# Patient Record
Sex: FEMALE | Race: WHITE | NOT HISPANIC OR LATINO | Employment: OTHER | RURAL
[De-identification: names, ages, dates, MRNs, and addresses within clinical notes are randomized per-mention and may not be internally consistent; named-entity substitution may affect disease eponyms.]

---

## 2017-01-25 RX ORDER — LEVOTHYROXINE SODIUM 0.1 MG/1
TABLET ORAL
Qty: 90 TABLET | Refills: 1 | Status: SHIPPED | OUTPATIENT
Start: 2017-01-25 | End: 2017-08-01 | Stop reason: SDUPTHER

## 2017-01-25 RX ORDER — ATORVASTATIN CALCIUM 20 MG/1
TABLET, FILM COATED ORAL
Qty: 90 TABLET | Refills: 0 | Status: SHIPPED | OUTPATIENT
Start: 2017-01-25 | End: 2017-04-24 | Stop reason: SDUPTHER

## 2017-02-01 RX ORDER — ISOSORBIDE MONONITRATE 30 MG/1
TABLET, EXTENDED RELEASE ORAL
Qty: 90 TABLET | Refills: 0 | Status: SHIPPED | OUTPATIENT
Start: 2017-02-01 | End: 2017-05-05 | Stop reason: SDUPTHER

## 2017-02-08 RX ORDER — SYRINGE-NEEDLE,INSULIN,0.5 ML 31 GX5/16"
SYRINGE, EMPTY DISPOSABLE MISCELLANEOUS
Qty: 100 EACH | Refills: 2 | Status: SHIPPED | OUTPATIENT
Start: 2017-02-08 | End: 2017-10-04 | Stop reason: SDUPTHER

## 2017-02-16 ENCOUNTER — OFFICE VISIT (OUTPATIENT)
Dept: CARDIOLOGY | Facility: CLINIC | Age: 82
End: 2017-02-16

## 2017-02-16 VITALS
HEIGHT: 65 IN | HEART RATE: 70 BPM | WEIGHT: 225 LBS | DIASTOLIC BLOOD PRESSURE: 65 MMHG | BODY MASS INDEX: 37.49 KG/M2 | SYSTOLIC BLOOD PRESSURE: 116 MMHG

## 2017-02-16 DIAGNOSIS — I50.84 CHF (NYHA CLASS IV, ACC/AHA STAGE D) (HCC): ICD-10-CM

## 2017-02-16 DIAGNOSIS — I48.20 CHRONIC ATRIAL FIBRILLATION (HCC): Primary | ICD-10-CM

## 2017-02-16 DIAGNOSIS — I10 ESSENTIAL HYPERTENSION: ICD-10-CM

## 2017-02-16 PROCEDURE — 93289 INTERROG DEVICE EVAL HEART: CPT | Performed by: INTERNAL MEDICINE

## 2017-02-16 PROCEDURE — 99213 OFFICE O/P EST LOW 20 MIN: CPT | Performed by: INTERNAL MEDICINE

## 2017-02-16 NOTE — PROGRESS NOTES
Karla Cristobal  1935  [phone]      02/16/2017    [facility] CARDIOLOGY     Giselle Guzman, DO  [address] [address]    Chief Complaint   Patient presents with   • Atrial Fibrillation   • Cardiomyopathy       Problem List:   PROBLEM LIST:  1. Ischemic heart disease:  a. History of remote myocardial infarction/CABG x3, [facility] (database insufficient).   b. Left heart catheterization by Dr. Basilio Grossman, 2007, revealing patent LIMA graft/medical therapy, LVEF 30%.  c. Echocardiogram, [facility], 2007: LVEF 25%; moderate/severe mitral regurgitation.  d. Upgrade of pacemaker to [employer] Jamel BI-V pacemaker per Dr. Hammonds on 10/25/2007.  e. Reported negative Cardiolite stress test in 2010, [facility], Dr. Basilio Grossman (database insufficient).  f. Reported echocardiogram, October 2011, [facility] (database insufficient).   g. Echocardiogram, 08/29/2012: LVEF 35% to 40%, mild mitral regurgitation.  h. History of MRSA pacemaker infection, 2005 (database insufficient).   i. St. Jamel pacemaker, 2005, with upgrade to a [employer] Jamel BI-V ICD device in 2007, Dr. Hammonds.   j. CRT-D generator exchange by Tano Beal, 09/21/2012, St. Jamel device (Serial# [id number]).  k. Persistent ischemic cardiomyopathy.   l. Echocardiogram, 10/20/2015: LVEF 35% to 40%, moderate mitral regurgitation/tricuspid regurgitation; RVSP 45 mmHg.   2. Chronic class III systolic heart failure.  3. History of persistent atrial fibrillation.  a. CHADS2 score equal to 4.   b. Chronic Coumadin therapy managed by [facility]’s office.   4. Pericardial effusion, 2008.  5. History of frequent urinary tract infections.  6. Hypothyroidism.  7. Insulin-dependent diabetes mellitus, type 2.  8. Chronic kidney disease, stage 4, followed by [facility] Mckeon.   9. Dyslipidemia.  10. Hypertension.  11. Home O2 nightly/sleep apnea.  12. History of endometrial  "cancer, status post partial hysterectomy in 2008 with follow up radiation therapy followed by Dr. Sosa.   13. History of multiple left hip surgeries most recently in 2011 at [facility].     Allergies  Allergies   Allergen Reactions   • No Known Drug Allergy        Current Medications    Current Outpatient Prescriptions:   •  aspirin (ASPIRIN LOW DOSE) 81 MG tablet, Take  by mouth daily., Disp: , Rfl:   •  atorvastatin (LIPITOR) 20 MG tablet, TAKE ONE TABLET ONCE DAILY, Disp: 90 tablet, Rfl: 0  •  B-D INS SYRINGE 0.5CC/31GX5/16 31G X 5/16\" 0.5 ML misc, USE 3 TIMES DAILY, Disp: 100 each, Rfl: 2  •  brimonidine-timolol (COMBIGAN) 0.2-0.5 % ophthalmic solution, Apply  to eye., Disp: , Rfl:   •  carvedilol (COREG) 25 MG tablet, Take 12.5 mg by mouth 2 (two) times a day with meals., Disp: , Rfl:   •  cholecalciferol (VITAMIN D3) 1000 UNITS tablet, Take 1,000 Units by mouth Daily., Disp: , Rfl:   •  eszopiclone (LUNESTA) 2 MG tablet, TAKE 1 TABLET AT BEDTIME AS NEEDED, Disp: 30 tablet, Rfl: 3  •  furosemide (LASIX) 40 MG tablet, Take  by mouth as needed., Disp: , Rfl:   •  glucagon (GLUCAGON EMERGENCY) 1 MG injection, Glucagon Emergency 1 MG Injection Kit; Patient Sig: Glucagon Emergency 1 MG Injection Kit USE AS DIRECTED.; 1; 2; 14-Dec-2012; Active, Disp: , Rfl:   •  glucose blood (ONE TOUCH ULTRA TEST) test strip, 3 (three) times a day., Disp: , Rfl:   •  indapamide (LOZOL) 2.5 MG tablet, Take  by mouth daily., Disp: , Rfl:   •  insulin aspart (NOVOLOG) 100 UNIT/ML injection, Inject 20 Units under the skin 3 (Three) Times a Day Before Meals., Disp: , Rfl:   •  insulin glargine (LANTUS) 100 UNIT/ML injection, Inject 20 Units under the skin Daily. (Patient taking differently: Inject 25 Units under the skin Daily.), Disp: 2 each, Rfl: 0  •  insulin lispro (HumaLOG) 100 UNIT/ML injection, Inject 15 Units under the skin 3 (Three) Times a Day Before Meals., Disp: 5 each, Rfl: 0  •  Insulin Pen Needle (B-D " "ULTRAFINE III SHORT PEN) 31G X 8 MM misc, 1 Units by Subdermal route 4 (Four) Times a Day., Disp: 100 each, Rfl: 3  •  Insulin Syringe-Needle U-100 (ULTRA-COMFORT INSULIN SYRINGE) 31G X 5/16\" 0.3 ML misc, , Disp: , Rfl:   •  isosorbide mononitrate (IMDUR) 30 MG 24 hr tablet, TAKE ONE TABLET BY MOUTH EVERY DAY, Disp: 90 tablet, Rfl: 0  •  levothyroxine (SYNTHROID, LEVOTHROID) 100 MCG tablet, TAKE 1 TABLET DAILY., Disp: 90 tablet, Rfl: 1  •  ondansetron (ZOFRAN) 4 MG tablet, Take  by mouth every 12 (twelve) hours as needed., Disp: , Rfl:   •  ONETOUCH DELICA LANCETS 33G misc, , Disp: , Rfl:   •  spironolactone (ALDACTONE) 25 MG tablet, Take  by mouth daily., Disp: , Rfl:   •  warfarin (COUMADIN) 3 MG tablet, Take  by mouth daily. Patient takes 2 mg & 3 mg.  Rotates dosage every other day. , Disp: , Rfl:     History of Present Illness   HPI    Pt presents for follow up of atrial fibrillation, cardiomyopathy, and BiV ICD check. Since we last saw the pt, pt denies any AF episodes, chronic SOB, CP, LH, and dizziness. Denies any hospitalizations, ER visits, bleeding, or TIA/CVA symptoms. Overall feels well except for a little worsening of her chronic SOB.     ROS:  General:  Denies fatigue, weight gain or loss  Cardiovascular:  Denies CP, PND, syncope, near syncope, edema or palpitations.  Pulmonary:  Denies MARKHAM, cough, or wheezing      Vitals:    02/16/17 1319   BP: 116/65   BP Location: Right arm   Pulse: 70   Weight: 225 lb (102 kg)   Height: 65\" (165.1 cm)     PE:  NAD  Neck: no JVD, no carotid bruits, no TM  Heart RRR, NL S1, S2, S4 present, no rubs, murmurs  Lungs: CTA  Abd: soft, non-tender, NL BS  Ext: No musculoskeletal deformities    Diagnostic Data:  Procedures     BiV ICD check: normal function, 3.3 years on battery, 97% afib    1. Chronic atrial fibrillation    2. CHF (NYHA class IV, ACC/AHA stage D)    3. Essential hypertension          Plan:  1) AF- chronic in nature, rate controlled  Continue present " medications.   2) Anticoagulation: CHADSVasc = 7  Continue coumadin  3) ICM with normal  biV ICD check. On Coreg, no ACE or ARB due to kidney disease  4) HTN- controlled  Wt loss, exercise, salt reduction    F/up in 6 months    Scribed for Tano Beal MD by Mireya Hall PA-C. 2/16/2017  1:54 PM       ITano MD, personally performed the services described in this documentation as scribed by the above named individual in my presence, and it is both accurate and complete.  2/16/2017  1:58 PM    Tano HOLLINGSWORTH MD, personally performed the services described in this documentation as scribed by the above named individual in my presence, and it is both accurate and complete.  2/16/2017  1:57 PM

## 2017-02-24 ENCOUNTER — TELEPHONE (OUTPATIENT)
Dept: INTERNAL MEDICINE | Facility: CLINIC | Age: 82
End: 2017-02-24

## 2017-02-24 NOTE — TELEPHONE ENCOUNTER
----- Message from Yulisa Starr sent at 2/23/2017  2:26 PM EST -----  Contact: PATIENT  RE: SAMPLES    MS MEAD WOULD LIKE TO KNOW IF THERE ARE ANY SAMPLES OF NOVOLOG AVAILABLE.

## 2017-03-13 ENCOUNTER — OFFICE VISIT (OUTPATIENT)
Dept: ENDOCRINOLOGY | Facility: CLINIC | Age: 82
End: 2017-03-13

## 2017-03-13 ENCOUNTER — OFFICE VISIT (OUTPATIENT)
Dept: INTERNAL MEDICINE | Facility: CLINIC | Age: 82
End: 2017-03-13

## 2017-03-13 VITALS
WEIGHT: 237 LBS | DIASTOLIC BLOOD PRESSURE: 60 MMHG | SYSTOLIC BLOOD PRESSURE: 110 MMHG | HEART RATE: 92 BPM | BODY MASS INDEX: 40.68 KG/M2

## 2017-03-13 VITALS
HEIGHT: 64 IN | OXYGEN SATURATION: 98 % | WEIGHT: 237 LBS | BODY MASS INDEX: 40.46 KG/M2 | SYSTOLIC BLOOD PRESSURE: 110 MMHG | HEART RATE: 92 BPM | DIASTOLIC BLOOD PRESSURE: 60 MMHG

## 2017-03-13 DIAGNOSIS — I10 ESSENTIAL HYPERTENSION: ICD-10-CM

## 2017-03-13 DIAGNOSIS — N18.4 CKD (CHRONIC KIDNEY DISEASE), STAGE IV (HCC): ICD-10-CM

## 2017-03-13 DIAGNOSIS — E11.9 TYPE 2 DIABETES MELLITUS WITHOUT COMPLICATION, WITH LONG-TERM CURRENT USE OF INSULIN (HCC): Primary | ICD-10-CM

## 2017-03-13 DIAGNOSIS — I48.20 CHRONIC ATRIAL FIBRILLATION (HCC): ICD-10-CM

## 2017-03-13 DIAGNOSIS — E03.9 ACQUIRED HYPOTHYROIDISM: ICD-10-CM

## 2017-03-13 DIAGNOSIS — Z79.4 TYPE 2 DIABETES MELLITUS WITHOUT COMPLICATION, WITH LONG-TERM CURRENT USE OF INSULIN (HCC): Primary | ICD-10-CM

## 2017-03-13 DIAGNOSIS — I10 ESSENTIAL HYPERTENSION: Primary | ICD-10-CM

## 2017-03-13 DIAGNOSIS — F51.01 PRIMARY INSOMNIA: ICD-10-CM

## 2017-03-13 DIAGNOSIS — E78.00 PURE HYPERCHOLESTEROLEMIA: ICD-10-CM

## 2017-03-13 DIAGNOSIS — E78.5 HYPERLIPIDEMIA, UNSPECIFIED HYPERLIPIDEMIA TYPE: ICD-10-CM

## 2017-03-13 LAB
GLUCOSE BLDC GLUCOMTR-MCNC: 172 MG/DL (ref 70–130)
HBA1C MFR BLD: 7.1 %

## 2017-03-13 PROCEDURE — 83036 HEMOGLOBIN GLYCOSYLATED A1C: CPT | Performed by: INTERNAL MEDICINE

## 2017-03-13 PROCEDURE — 99214 OFFICE O/P EST MOD 30 MIN: CPT | Performed by: INTERNAL MEDICINE

## 2017-03-13 PROCEDURE — 82947 ASSAY GLUCOSE BLOOD QUANT: CPT | Performed by: INTERNAL MEDICINE

## 2017-03-13 PROCEDURE — 99213 OFFICE O/P EST LOW 20 MIN: CPT | Performed by: INTERNAL MEDICINE

## 2017-03-13 NOTE — PROGRESS NOTES
"Karla Cristobal 81 y.o.  [facility]:Follow-up; Diabetes (Type II); Hypothyroidism; Hypertension; and Hyperlipidemia    [place]: Follow-up; Diabetes (Type II); Hypothyroidism; Hypertension; and Hyperlipidemia    Blood sugar and 90 day average sugar reviewed  Results for orders placed or performed in visit on 03/13/17   POC Glycosylated Hemoglobin (Hb A1C)   Result Value Ref Range    Hemoglobin A1C 7.1 %   POC Glucose Fingerstick   Result Value Ref Range    Glucose 172 (A) 70 - 130 mg/dL     bp is good   Energy level is good overall  Having more eye problems  No colds or flu viruses   Dr Muniz - tried to check to see if changes in lenses would help - hard to read   occ neuropathy   Cramp occ in foot     Allergies   Allergen Reactions   • No Known Drug Allergy        Current Outpatient Prescriptions:   •  aspirin (ASPIRIN LOW DOSE) 81 MG tablet, Take  by mouth daily., Disp: , Rfl:   •  atorvastatin (LIPITOR) 20 MG tablet, TAKE ONE TABLET ONCE DAILY, Disp: 90 tablet, Rfl: 0  •  B-D INS SYRINGE 0.5CC/31GX5/16 31G X 5/16\" 0.5 ML misc, USE 3 TIMES DAILY, Disp: 100 each, Rfl: 2  •  brimonidine-timolol (COMBIGAN) 0.2-0.5 % ophthalmic solution, Apply  to eye., Disp: , Rfl:   •  carvedilol (COREG) 25 MG tablet, Take 12.5 mg by mouth 2 (two) times a day with meals., Disp: , Rfl:   •  cholecalciferol (VITAMIN D3) 1000 UNITS tablet, Take 1,000 Units by mouth Daily., Disp: , Rfl:   •  eszopiclone (LUNESTA) 2 MG tablet, TAKE 1 TABLET AT BEDTIME AS NEEDED, Disp: 30 tablet, Rfl: 3  •  furosemide (LASIX) 40 MG tablet, Take  by mouth as needed., Disp: , Rfl:   •  glucagon (GLUCAGON EMERGENCY) 1 MG injection, Glucagon Emergency 1 MG Injection Kit; Patient Sig: Glucagon Emergency 1 MG Injection Kit USE AS DIRECTED.; 1; 2; 14-Dec-2012; Active, Disp: , Rfl:   •  glucose blood (ONE TOUCH ULTRA TEST) test strip, 3 (three) times a day., Disp: , Rfl:   •  indapamide (LOZOL) 2.5 MG tablet, Take  by mouth daily., Disp: , Rfl:   •  insulin aspart (NOVOLOG) " "100 UNIT/ML injection, Inject 20 Units under the skin 3 (Three) Times a Day Before Meals., Disp: 4 each, Rfl: 0  •  insulin glargine (LANTUS) 100 UNIT/ML injection, Inject 20 Units under the skin Daily. (Patient taking differently: Inject 25 Units under the skin Daily.), Disp: 2 each, Rfl: 0  •  Insulin Pen Needle (B-D ULTRAFINE III SHORT PEN) 31G X 8 MM misc, 1 Units by Subdermal route 4 (Four) Times a Day., Disp: 100 each, Rfl: 3  •  Insulin Syringe-Needle U-100 (ULTRA-COMFORT INSULIN SYRINGE) 31G X 5/16\" 0.3 ML misc, , Disp: , Rfl:   •  isosorbide mononitrate (IMDUR) 30 MG 24 hr tablet, TAKE ONE TABLET BY MOUTH EVERY DAY, Disp: 90 tablet, Rfl: 0  •  levothyroxine (SYNTHROID, LEVOTHROID) 100 MCG tablet, TAKE 1 TABLET DAILY., Disp: 90 tablet, Rfl: 1  •  ondansetron (ZOFRAN) 4 MG tablet, Take  by mouth every 12 (twelve) hours as needed., Disp: , Rfl:   •  ONETOUCH DELICA LANCETS 33G misc, , Disp: , Rfl:   •  spironolactone (ALDACTONE) 25 MG tablet, Take  by mouth daily., Disp: , Rfl:   •  warfarin (COUMADIN) 3 MG tablet, Take  by mouth daily. Patient takes 2 mg & 3 mg.  Rotates dosage every other day. , Disp: , Rfl:   Patient Active Problem List    Diagnosis   • Change in mole [D22.9]   • DVT of lower extremity (deep venous thrombosis) [I82.409]   • CAD (coronary artery disease) [I25.10]   • Anemia [D64.9]   • Ischemic heart disease [I25.9]     Overview Note:     1. Ischemic heart disease.  a. History of remote myocardial infarction/CABG x 3, [facility] (database insufficient).   b. Left heart catheterization by Dr. Basilio Grossman. 2007 revealing patent LIMA graft/medical therapy, LVEF 30%.  c. Echocardiogram, [facility]. 2007: LVEF 25%; moderate/severe mitral regurgitation.  d. Upgrade of pacemaker to [employer] Jamel BI-V pacemaker per Dr. Hammonds on 10/25/2007.  e. Reported negative Cardiolite stress test in 2010, [facility], Dr. Basilio Grossman (database insufficient).  f. Reported " echocardiogram, October 2011, [facility] (database insufficient).   g. Echocardiogram, 08/29/2012: LVEF 35-40%, mild mitral regurgitation.  h. Echocardiogram, 10/20/2015: LVEF 35-40%, moderate mitral regurgitation/tricuspid regurgitation; RVSP 45 mmHg.   i. History of MRSA pacemaker infection, 2005 (database insufficient).   j. St. Jamel pacemaker 2005 with upgrade to a [employer] Jamel BI-V ICD device in 2007, Dr. Hammonds.   k. CRT-D generator exchange by Tano Beal, 09/21/2012. St. Jamel device (Serial# [id number]).  l. Persistent ischemic cardiomyopathy.      • Anemia due to chronic kidney disease [N18.9, D63.1]   • Atrial fibrillation [I48.91]     Overview Note:     Impression: 03/15/2016 - stable  Impression: 11/23/2015 - stable. INR is  Impression: 03/24/2015 - INR is 2.1. Continue 3 mg and f/u with cardio  Impression: 03/24/2015 - INR is  Impression: 11/18/2014 - stable;      • Change in nevus [D22.9]   • Chronic kidney disease, stage IV (severe) [N18.4]     Overview Note:     Impression: 11/23/2015 - follows with nephro.; Description: [facility] 12/12     • Conjunctivitis [H10.9]   • Diabetes mellitus [E11.9]     Overview Note:     Impression: 03/15/2016 - blood sugar 166 and hgn a1c 7.3%   is up to date with eye exam   neuropathy with callus, no ulcer   some scratches feet   ur alb due and ordered   no change other than provided samples of toujeo because she feels like lantus worked much better than levemir, she has tried titrating levemir and it is less effective   continue testing and f/u 3-4 months  Impression: 11/23/2015 - blood sugar stable    is up to date with eye exam- 2 weeks ago   ur alb pos, ckd, no arb or ace but bp well controlled   neuropathy intermittent currently- lancinating pain   samples provided for insulin   commended current efforts to maintain good bp control   wound left hand and right arm appear to be healing  Impression: 07/20/2015 - would like to change to lantus for  alexander  is on levemir now  utd eye exam  lancination foot pain  due ur alb  discussed blood sugar 250 after coffee cake  discussed hgn a1c 7.2% (average 150)  Impression: 07/20/2015 - would like to change to lantus for alexander  is on levemir now  utd eye exam  lancination foot pain  due ur alb  Impression: 03/24/2015 - blood [id number], hgn a1c 7.9% (down from 8.2%)  is doing well overall  discussed adjustment of novolog prn   is up to date with eye exam  neuropathy stable - no new callus  ur alb today  Impression: 11/18/2014 - blood sugar is 188, hgn a1c 8.1% (average 180)  is up to date with eye exam, neuropathy is stable  small callus right foot just under great toe  dry skin heel  doing well overall  discussed goal hgn a1c but she has been in the donut hole so has been cutting back on insulin - samples provided today for novolog.  More stress related to recent death of   f/u 3-4 months  Impression: 07/17/2014 - blood sugar and 90 day average sugar were reviewed, in good range currently.  She is up to date with preventive care, doing well overall.  No low sugars, occ higher number.  Giving own insulin   f/u 3-4 months; Description: dx 1995     • Diabetic foot ulcer [E11.621, L97.509]   • Diabetic nephropathy [E11.21]     Overview Note:     Impression: 11/18/2014 - update ur alb;      • Diabetic retinopathy [E11.319]     Overview Note:     Impression: 11/18/2014 - is up to date with eye exam;      • Dog bite of hand [S61.459A, W54.0XXA]   • Bruises easily [R23.8]   • Malignant neoplasm of endometrium [C54.1]   • Foot pain [M79.673]     Overview Note:     Description: lancinating- worse but not consistent enough to want rx     • Hyperlipidemia [E78.5]     Overview Note:     Impression: 03/15/2016 - lipids and cmp  Impression: 11/23/2015 - stable  Impression: 03/24/2015 - lipids, cmp  Impression: 11/18/2014 - check flp  Impression: 11/18/2014 - not fasting, lipids next visit.  Impression: 07/17/2014 - stable on  current dose of statin; Description: pravastatin 40 mg - do not titrate     • Hypertension [I10]     Overview Note:     Impression: 03/15/2016 - bp is good  Impression: 03/15/2016 - cmp, lipids, urine micro  Impression: 11/23/2015 - stable  Impression: 03/24/2015 - urine micro  Impression: 11/18/2014 - bp is good  Impression: 11/18/2014 - stable  Impression: 07/17/2014 - bp is good;      • Hypothyroidism [E03.9]     Overview Note:     Impression: 03/15/2016 - check tsh  Impression: 11/23/2015 - tsh normal 7/15- update next lab draw  Impression: 07/20/2015 - check tfts  Impression: 11/18/2014 - check tft;      • Insomnia [G47.00]     Overview Note:     Impression: 03/15/2016 - refilled ambien  Impression: 11/23/2015 - refilled ambien  Impression: 03/24/2015 - refilled ambien  Impression: 11/18/2014 - stable;      • Generalized ischemic myocardial dysfunction [I25.5]   • Leukocytosis [D72.829]   • Memory impairment [R41.3]     Overview Note:     Impression: 03/15/2016 - MSE 29  May be Lunesta (started around this time)  Impression: 03/15/2016 - MSE  May be Lunesta (started around this time);      • Methicillin resistant Staphylococcus aureus infection [A49.02]   • Nausea and vomiting [R11.2]     Overview Note:     Impression: 07/17/2014 - rx zofran provided  no diarrhea since this am  no abdominal pain, no blood / mucus/ fever  treat with supportive care, to call if not resolved within 24-48 hours;      • Pulmonary embolism [I26.99]   • Renal insufficiency [N28.9]     Overview Note:     Description: baseline creatinine - 2.49     • Skin lesion [L98.9]     Overview Note:     Impression: 03/24/2015 - refer derm for eval- seb kerat ant tib and ?ak medially with redness and irritation;      • Sleep apnea [G47.30]   • Shortness of breath [R06.02]     Overview Note:     Impression: 03/24/2015 - chronic. On 2 l oxygen at night. check cbc, bnp;      • Squamous cell carcinoma of skin [C44.92]     Overview Note:      Description: 5/15 Fito     • CHF (NYHA class IV, ACC/AHA stage D) [I50.9]     Review of Systems   Constitutional: Negative for activity change, appetite change, chills, diaphoresis, fatigue, fever and unexpected weight change.   HENT: Negative for congestion, dental problem, drooling, ear discharge, ear pain, facial swelling, hearing loss, mouth sores, nosebleeds, postnasal drip, rhinorrhea, sinus pressure, sneezing, sore throat, tinnitus, trouble swallowing and voice change.    Eyes: Negative for photophobia, pain, discharge, redness, itching and visual disturbance.   Respiratory: Negative for apnea, cough, choking, chest tightness, shortness of breath, wheezing and stridor.    Cardiovascular: Negative for chest pain, palpitations and leg swelling.   Gastrointestinal: Negative for abdominal distention, abdominal pain, anal bleeding, blood in stool, constipation, diarrhea, nausea, rectal pain and vomiting.   Endocrine: Negative for cold intolerance, heat intolerance, polydipsia, polyphagia and polyuria.   Genitourinary: Negative for decreased urine volume, difficulty urinating, dysuria, enuresis, flank pain, frequency, genital sores, hematuria and urgency.   Musculoskeletal: Negative for arthralgias, back pain, gait problem, joint swelling, myalgias, neck pain and neck stiffness.   Skin: Negative for color change, pallor, rash and wound.   Allergic/Immunologic: Negative for environmental allergies, food allergies and immunocompromised state.   Neurological: Negative for dizziness, tremors, seizures, syncope, facial asymmetry, speech difficulty, weakness, light-headedness, numbness and headaches.   Hematological: Negative for adenopathy. Does not bruise/bleed easily.   Psychiatric/Behavioral: Negative for agitation, behavioral problems, confusion, decreased concentration, dysphoric mood, hallucinations, self-injury, sleep disturbance and suicidal ideas. The patient is not nervous/anxious and is not hyperactive.   "    Social History     Social History   • Marital status:      Spouse name: N/A   • Number of children: N/A   • Years of education: N/A     Occupational History   • Not on file.     Social History Main Topics   • Smoking status: Never Smoker   • Smokeless tobacco: Never Used   • Alcohol use No   • Drug use: No   • Sexual activity: Defer     Other Topics Concern   • Not on file     Social History Narrative     Family History   Problem Relation Age of Onset   • Breast cancer Other    • Diabetes Other    • Esophageal cancer Other    • Hypertension Other    • Transient ischemic attack Other    • Breast cancer Mother    • Cancer Father      Visit Vitals   • /60   • Pulse 92   • Ht 64\" (162.6 cm)   • Wt 237 lb (108 kg)   • LMP  (LMP Unknown)   • SpO2 98%   • BMI 40.68 kg/m2     Physical Exam   Constitutional: She is oriented to person, place, and time. She appears well-developed and well-nourished.   HENT:   Head: Normocephalic and atraumatic.   Nose: Nose normal.   Mouth/Throat: Oropharynx is clear and moist.   Eyes: Conjunctivae, EOM and lids are normal. Pupils are equal, round, and reactive to light.   Neck: Trachea normal and normal range of motion. Neck supple. Carotid bruit is not present. No tracheal deviation present. No thyroid mass and no thyromegaly present.   Cardiovascular: Normal rate, regular rhythm, normal heart sounds and intact distal pulses.  Exam reveals no gallop and no friction rub.    No murmur heard.  Pulmonary/Chest: Effort normal and breath sounds normal. No respiratory distress. She has no wheezes.   Musculoskeletal: Normal range of motion. She exhibits no edema or deformity.   Lymphadenopathy:     She has no cervical adenopathy.   Neurological: She is alert and oriented to person, place, and time. She has normal reflexes. She displays normal reflexes. No cranial nerve deficit.   Skin: Skin is warm and dry. No rash noted. No cyanosis or erythema. Nails show no clubbing. "   Psychiatric: She has a normal mood and affect. Her speech is normal and behavior is normal. Judgment and thought content normal. Cognition and memory are normal.   Nursing note and vitals reviewed.    Results for orders placed or performed in visit on 11/15/16   TSH   Result Value Ref Range    TSH 2.114 0.350 - 5.350 mIU/mL   T4, Free   Result Value Ref Range    Free T4 1.33 0.89 - 1.76 ng/dL   Microalbumin / Creatinine Urine Ratio   Result Value Ref Range    Creatinine, Urine 65.2 Not Estab. mg/dL    Microalbumin, Urine 14.2 Not Estab. ug/mL    Microalbumin/Creatinine Ratio Urine 21.8 0.0 - 30.0 mg/g creat   POC Glucose Fingerstick   Result Value Ref Range    Glucose 85 70 - 130 mg/dL   POC Glycated Hemoglobin, Total   Result Value Ref Range    Hemoglobin A1C 7.3 %    Lot Number [id number]     Expiration Date 6/2018      Karla was seen today for follow-up, diabetes, hypothyroidism, hypertension and hyperlipidemia.    Diagnoses and all orders for this visit:    Type 2 diabetes mellitus without complication, with long-term current use of insulin  -     POC Glycosylated Hemoglobin (Hb A1C)  -     POC Glucose Fingerstick      Problem List Items Addressed This Visit        Cardiovascular and Mediastinum    Hyperlipidemia     Check flp          Hypertension     bp is good overall             Endocrine    Diabetes mellitus - Primary     Blood sugar and 90 day average sugar reviewed  Results for orders placed or performed in visit on 03/13/17   POC Glycosylated Hemoglobin (Hb A1C)   Result Value Ref Range    Hemoglobin A1C 7.1 %   POC Glucose Fingerstick   Result Value Ref Range    Glucose 172 (A) 70 - 130 mg/dL     Is doing well overall  She was reminded about eye exam  She has stable foot exam without callus or ulcer  Ur alb neg 11/16  Goal discussed with patient along with rationale for goals          Relevant Orders    POC Glycosylated Hemoglobin (Hb A1C) (Completed)    POC Glucose Fingerstick (Completed)     Hypothyroidism     Thyroid function tests are normal 11/16- no changes              Return in about 3 months (around 6/13/2017) for Recheck 30 min .    Karina Taylor MA

## 2017-03-13 NOTE — PROGRESS NOTES
Subjective   Karla Cristobal is a 81 y.o. female.   Chief Complaint   Patient presents with   • Hypertension   • Atrial Fibrillation   • Insomnia       Hypertension   This is a chronic problem. The current episode started more than 1 year ago. Pertinent negatives include no chest pain, headaches, neck pain, palpitations or shortness of breath. There are no compliance problems.    Hyperlipidemia   This is a chronic problem. The current episode started more than 1 year ago. Pertinent negatives include no chest pain, myalgias or shortness of breath. The current treatment provides moderate improvement of lipids. There are no compliance problems.    Atrial Fibrillation   Presents for follow-up visit. Symptoms are negative for chest pain, palpitations and shortness of breath. The symptoms have been stable. Past medical history includes atrial fibrillation and hyperlipidemia.   Insomnia   This is a chronic problem. The current episode started more than 1 year ago. Pertinent negatives include no abdominal pain, arthralgias, chest pain, chills, congestion, coughing, diaphoresis, fatigue, fever, headaches, myalgias, nausea, neck pain, numbness, rash, sore throat or vomiting. The treatment provided moderate relief.      CKD has been stable x years.  The following portions of the patient's history were reviewed and updated as appropriate: allergies, current medications, past family history, past medical history, past social history, past surgical history and problem list.    Review of Systems   Constitutional: Negative for activity change, appetite change, chills, diaphoresis, fatigue, fever and unexpected weight change.   HENT: Negative for congestion, ear discharge, ear pain, mouth sores, nosebleeds, sinus pressure, sneezing and sore throat.    Eyes: Negative for pain, discharge and itching.   Respiratory: Negative for cough, chest tightness, shortness of breath and wheezing.    Cardiovascular: Negative for chest pain,  palpitations and leg swelling.   Gastrointestinal: Negative for abdominal pain, constipation, diarrhea, nausea and vomiting.   Endocrine: Negative for cold intolerance, heat intolerance, polydipsia and polyphagia.   Genitourinary: Negative for dysuria, flank pain, frequency, hematuria and urgency.   Musculoskeletal: Negative for arthralgias, back pain, gait problem, myalgias, neck pain and neck stiffness.   Skin: Negative for color change, pallor and rash.   Neurological: Negative for seizures, speech difficulty, numbness and headaches.   Psychiatric/Behavioral: Negative for agitation, confusion, decreased concentration and sleep disturbance. The patient has insomnia. The patient is not nervous/anxious.      Visit Vitals   • /60   • Pulse 92   • Wt 237 lb (108 kg)   • LMP  (LMP Unknown)   • BMI 40.68 kg/m2         Objective   Physical Exam   Constitutional: She appears well-developed.   HENT:   Head: Normocephalic.   Right Ear: External ear normal.   Left Ear: External ear normal.   Nose: Nose normal.   Mouth/Throat: Oropharynx is clear and moist.   Eyes: Conjunctivae are normal. Pupils are equal, round, and reactive to light.   Neck: No JVD present. No thyromegaly present.   Cardiovascular: Normal rate, regular rhythm and normal heart sounds.  Exam reveals no friction rub.    No murmur heard.  Pulmonary/Chest: Effort normal and breath sounds normal. No respiratory distress. She has no wheezes. She has no rales.   Abdominal: Soft. Bowel sounds are normal. She exhibits no distension. There is no tenderness. There is no guarding.   Musculoskeletal: She exhibits no edema or tenderness.   Lymphadenopathy:     She has no cervical adenopathy.   Neurological: She displays normal reflexes. No cranial nerve deficit.   Skin: No rash noted.   Psychiatric: Her behavior is normal.   Nursing note and vitals reviewed.      Assessment/Plan   Karla was seen today for hypertension, atrial fibrillation and insomnia.    Diagnoses  and all orders for this visit:    Essential hypertension  Is having labs at [facility] today  Hyperlipidemia, unspecified hyperlipidemia type  stable  CKD (chronic kidney disease), stage IV  Following with nephro  Chronic atrial fibrillation  stable  Primary insomnia  stable    50% of visit spent counseling

## 2017-03-16 RX ORDER — CARVEDILOL 25 MG/1
TABLET ORAL
Qty: 90 TABLET | Refills: 3 | Status: SHIPPED | OUTPATIENT
Start: 2017-03-16 | End: 2018-03-13 | Stop reason: SDUPTHER

## 2017-03-16 NOTE — ASSESSMENT & PLAN NOTE
Blood sugar and 90 day average sugar reviewed  Results for orders placed or performed in visit on 03/13/17   POC Glycosylated Hemoglobin (Hb A1C)   Result Value Ref Range    Hemoglobin A1C 7.1 %   POC Glucose Fingerstick   Result Value Ref Range    Glucose 172 (A) 70 - 130 mg/dL     Is doing well overall  She was reminded about eye exam  She has stable foot exam without callus or ulcer  Ur alb neg 11/16  Goal discussed with patient along with rationale for goals

## 2017-04-26 RX ORDER — ATORVASTATIN CALCIUM 20 MG/1
TABLET, FILM COATED ORAL
Qty: 90 TABLET | Refills: 0 | Status: SHIPPED | OUTPATIENT
Start: 2017-04-26 | End: 2017-07-23 | Stop reason: SDUPTHER

## 2017-05-05 ENCOUNTER — TELEPHONE (OUTPATIENT)
Dept: INTERNAL MEDICINE | Facility: CLINIC | Age: 82
End: 2017-05-05

## 2017-05-05 RX ORDER — ISOSORBIDE MONONITRATE 30 MG/1
TABLET, EXTENDED RELEASE ORAL
Qty: 90 TABLET | Refills: 0 | Status: SHIPPED | OUTPATIENT
Start: 2017-05-05 | End: 2017-08-07 | Stop reason: SDUPTHER

## 2017-05-18 DIAGNOSIS — G47.00 INSOMNIA: ICD-10-CM

## 2017-05-19 ENCOUNTER — OFFICE VISIT (OUTPATIENT)
Dept: INTERNAL MEDICINE | Facility: CLINIC | Age: 82
End: 2017-05-19

## 2017-05-19 VITALS
DIASTOLIC BLOOD PRESSURE: 76 MMHG | BODY MASS INDEX: 43.67 KG/M2 | OXYGEN SATURATION: 98 % | WEIGHT: 254.4 LBS | SYSTOLIC BLOOD PRESSURE: 124 MMHG | HEART RATE: 92 BPM

## 2017-05-19 DIAGNOSIS — L03.119 CELLULITIS AND ABSCESS OF FOOT: Primary | ICD-10-CM

## 2017-05-19 DIAGNOSIS — L02.619 CELLULITIS AND ABSCESS OF FOOT: Primary | ICD-10-CM

## 2017-05-19 PROCEDURE — 99213 OFFICE O/P EST LOW 20 MIN: CPT | Performed by: INTERNAL MEDICINE

## 2017-05-19 RX ORDER — TIMOLOL MALEATE 5 MG/ML
SOLUTION/ DROPS OPHTHALMIC
Refills: 5 | COMMUNITY
Start: 2017-05-10 | End: 2018-05-15

## 2017-05-19 RX ORDER — ESZOPICLONE 2 MG/1
TABLET, FILM COATED ORAL
Qty: 30 TABLET | Refills: 2 | OUTPATIENT
Start: 2017-05-19 | End: 2017-06-06 | Stop reason: SDUPTHER

## 2017-05-19 RX ORDER — LATANOPROST 50 UG/ML
1 SOLUTION/ DROPS OPHTHALMIC NIGHTLY
Refills: 5 | COMMUNITY
Start: 2017-05-10

## 2017-05-19 RX ORDER — AMOXICILLIN AND CLAVULANATE POTASSIUM 875; 125 MG/1; MG/1
1 TABLET, FILM COATED ORAL 2 TIMES DAILY
Qty: 20 TABLET | Refills: 0 | Status: SHIPPED | OUTPATIENT
Start: 2017-05-19 | End: 2017-07-11

## 2017-05-31 ENCOUNTER — CLINICAL SUPPORT NO REQUIREMENTS (OUTPATIENT)
Dept: CARDIOLOGY | Facility: CLINIC | Age: 82
End: 2017-05-31

## 2017-05-31 DIAGNOSIS — I25.5 GENERALIZED ISCHEMIC MYOCARDIAL DYSFUNCTION: Primary | ICD-10-CM

## 2017-05-31 DIAGNOSIS — I48.20 CHRONIC ATRIAL FIBRILLATION (HCC): ICD-10-CM

## 2017-05-31 DIAGNOSIS — I50.84 CHF (NYHA CLASS IV, ACC/AHA STAGE D) (HCC): ICD-10-CM

## 2017-05-31 PROCEDURE — 93296 REM INTERROG EVL PM/IDS: CPT | Performed by: INTERNAL MEDICINE

## 2017-05-31 PROCEDURE — 93295 DEV INTERROG REMOTE 1/2/MLT: CPT | Performed by: INTERNAL MEDICINE

## 2017-06-06 DIAGNOSIS — G47.00 INSOMNIA: ICD-10-CM

## 2017-06-07 RX ORDER — ESZOPICLONE 2 MG/1
TABLET, FILM COATED ORAL
Qty: 30 TABLET | Refills: 1 | Status: SHIPPED | OUTPATIENT
Start: 2017-06-07 | End: 2017-08-07 | Stop reason: SDUPTHER

## 2017-06-14 ENCOUNTER — TELEPHONE (OUTPATIENT)
Dept: INTERNAL MEDICINE | Facility: CLINIC | Age: 82
End: 2017-06-14

## 2017-06-14 DIAGNOSIS — R35.0 URINARY FREQUENCY: Primary | ICD-10-CM

## 2017-06-14 LAB
BILIRUB BLD-MCNC: NEGATIVE MG/DL
CLARITY, POC: ABNORMAL
COLOR UR: YELLOW
GLUCOSE UR STRIP-MCNC: NEGATIVE MG/DL
KETONES UR QL: NEGATIVE
LEUKOCYTE EST, POC: ABNORMAL
NITRITE UR-MCNC: NEGATIVE MG/ML
PH UR: 6 [PH] (ref 5–8)
PROT UR STRIP-MCNC: NEGATIVE MG/DL
RBC # UR STRIP: ABNORMAL /UL
SP GR UR: 1.01 (ref 1–1.03)
UROBILINOGEN UR QL: NORMAL

## 2017-06-14 PROCEDURE — 81003 URINALYSIS AUTO W/O SCOPE: CPT | Performed by: INTERNAL MEDICINE

## 2017-06-15 ENCOUNTER — TELEPHONE (OUTPATIENT)
Dept: INTERNAL MEDICINE | Facility: CLINIC | Age: 82
End: 2017-06-15

## 2017-06-15 RX ORDER — NITROFURANTOIN 25; 75 MG/1; MG/1
100 CAPSULE ORAL DAILY
Qty: 10 CAPSULE | Refills: 0 | Status: SHIPPED | OUTPATIENT
Start: 2017-06-15 | End: 2017-06-25

## 2017-06-15 RX ORDER — NITROFURANTOIN 25; 75 MG/1; MG/1
CAPSULE ORAL
Qty: 14 CAPSULE | Refills: 0 | Status: SHIPPED | OUTPATIENT
Start: 2017-06-15 | End: 2017-06-15

## 2017-06-15 NOTE — TELEPHONE ENCOUNTER
NEED U/A RESULTS.  IF SHE NEEDS MEDICATION YOU HAVE TO CALL PATIENT BECAUSE SHE GOES TO A DIFFERENT PHARMACY NEAR HER HOME.   HER DAUGHTER CALLED BUT SHE ISN;T ON HER ELIS .

## 2017-06-15 NOTE — TELEPHONE ENCOUNTER
DR. CALLEJAS' OFFICE CALLED HE IS PATIENTS NEPHOLOGY DOCTOR. PATIENT IS IN STAGE 4 RENAL DISEASE AND ARE CALLING TO LET US KNOW WE NEED TO CALL IN MACROBID 100 MG FOR PATIENT TO TAKE ONCE A DAY FOR 10 DAYS. PATIENT NOTIFIED DR. CALLEJAS ABOUT MEDICATION DR. MARTINEZ CALLED IN FOR PATIENTS SYMPTOMS. THIS IS HIS RECOMMENDATION DUE TO HER KIDNEY DISEASE. THEY NEED DR. MARTINEZ TO SEND A SCRIPT FOR MACROBID 100 MG TO PATIENTS PHARMACY. ONCE WE CALL THIS IN THEY WANT US TO NOTIFY PATIENTS DAUGHTER ARCHIE [phone].

## 2017-06-15 NOTE — TELEPHONE ENCOUNTER
Script for Macrobid 100mg bid for 7 days canceled.     Sent in Macrobid 100mg 1 po qd for 10 days.

## 2017-06-28 ENCOUNTER — TELEPHONE (OUTPATIENT)
Dept: INTERNAL MEDICINE | Facility: CLINIC | Age: 82
End: 2017-06-28

## 2017-07-11 ENCOUNTER — OFFICE VISIT (OUTPATIENT)
Dept: ENDOCRINOLOGY | Facility: CLINIC | Age: 82
End: 2017-07-11

## 2017-07-11 ENCOUNTER — OFFICE VISIT (OUTPATIENT)
Dept: INTERNAL MEDICINE | Facility: CLINIC | Age: 82
End: 2017-07-11

## 2017-07-11 VITALS
DIASTOLIC BLOOD PRESSURE: 60 MMHG | HEIGHT: 64 IN | BODY MASS INDEX: 39.78 KG/M2 | WEIGHT: 233 LBS | SYSTOLIC BLOOD PRESSURE: 122 MMHG

## 2017-07-11 VITALS
WEIGHT: 233.3 LBS | SYSTOLIC BLOOD PRESSURE: 140 MMHG | OXYGEN SATURATION: 98 % | BODY MASS INDEX: 40.05 KG/M2 | HEART RATE: 76 BPM | DIASTOLIC BLOOD PRESSURE: 80 MMHG

## 2017-07-11 DIAGNOSIS — E03.8 OTHER SPECIFIED HYPOTHYROIDISM: ICD-10-CM

## 2017-07-11 DIAGNOSIS — N18.4 CKD (CHRONIC KIDNEY DISEASE), STAGE IV (HCC): ICD-10-CM

## 2017-07-11 DIAGNOSIS — E11.9 TYPE 2 DIABETES MELLITUS WITHOUT COMPLICATION, WITH LONG-TERM CURRENT USE OF INSULIN (HCC): Primary | ICD-10-CM
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
## 2017-07-11 DIAGNOSIS — E78.5 HYPERLIPIDEMIA LDL GOAL <100: ICD-10-CM

## 2017-07-11 DIAGNOSIS — I48.20 CHRONIC ATRIAL FIBRILLATION (HCC): Primary | ICD-10-CM

## 2017-07-11 DIAGNOSIS — I10 ESSENTIAL HYPERTENSION: ICD-10-CM

## 2017-07-11 DIAGNOSIS — S40.262A TICK BITE OF SHOULDER, LEFT, INITIAL ENCOUNTER: ICD-10-CM

## 2017-07-11 DIAGNOSIS — Z79.4 TYPE 2 DIABETES MELLITUS WITHOUT COMPLICATION, WITH LONG-TERM CURRENT USE OF INSULIN (HCC): Primary | ICD-10-CM

## 2017-07-11 DIAGNOSIS — N28.9 RENAL INSUFFICIENCY: ICD-10-CM

## 2017-07-11 LAB
GLUCOSE BLDC GLUCOMTR-MCNC: 156 MG/DL (ref 70–130)
HBA1C MFR BLD: 6.8 %

## 2017-07-11 PROCEDURE — 86618 LYME DISEASE ANTIBODY: CPT | Performed by: INTERNAL MEDICINE

## 2017-07-11 PROCEDURE — 99213 OFFICE O/P EST LOW 20 MIN: CPT | Performed by: INTERNAL MEDICINE

## 2017-07-11 PROCEDURE — 99214 OFFICE O/P EST MOD 30 MIN: CPT | Performed by: INTERNAL MEDICINE

## 2017-07-11 PROCEDURE — 82947 ASSAY GLUCOSE BLOOD QUANT: CPT | Performed by: INTERNAL MEDICINE

## 2017-07-11 PROCEDURE — 83036 HEMOGLOBIN GLYCOSYLATED A1C: CPT | Performed by: INTERNAL MEDICINE

## 2017-07-11 RX ORDER — AMOXICILLIN AND CLAVULANATE POTASSIUM 875; 125 MG/1; MG/1
1 TABLET, FILM COATED ORAL 2 TIMES DAILY
Qty: 20 TABLET | Refills: 0 | Status: SHIPPED | OUTPATIENT
Start: 2017-07-11 | End: 2017-07-11

## 2017-07-11 NOTE — ASSESSMENT & PLAN NOTE
Blood sugar and 90 day average sugar reviewed  Results for orders placed or performed in visit on 07/11/17   POC Glycosylated Hemoglobin (Hb A1C)   Result Value Ref Range    Hemoglobin A1C 6.8 %   POC Glucose Fingerstick   Result Value Ref Range    Glucose 156 (A) 70 - 130 mg/dL     Blood sugars well controlled  No low sugars  Is up to date with eye exam  Dr Cast 3/17  Neuropathy stable, has abrasions on toes assoc with hitting toes on rubber wheels of walker  Discussed foot care and recommended podiatry Dr Simms  Ur alb neg 11//6  F/u 4 months- doing well overall

## 2017-07-11 NOTE — PROGRESS NOTES
Marlen Cristobal is a 81 y.o. female.   Chief Complaint   Patient presents with   • Atrial Fibrillation   • Hypertension   • Insomnia       Atrial Fibrillation   Presents for follow-up visit. Symptoms include hypertension. Symptoms are negative for shortness of breath, syncope, tachycardia and weakness. Past medical history includes atrial fibrillation.   Hypertension   This is a chronic problem. The current episode started more than 1 year ago. Pertinent negatives include no shortness of breath.   Insomnia   This is a chronic problem. The current episode started more than 1 year ago. Pertinent negatives include no weakness.      Did get bite by tick last week.  On right shoulder  The following portions of the patient's history were reviewed and updated as appropriate: allergies, current medications, past family history, past medical history, past social history, past surgical history and problem list.    Review of Systems   Respiratory: Negative for shortness of breath.    Cardiovascular: Negative for syncope.   Neurological: Negative for weakness.   Psychiatric/Behavioral: The patient has insomnia.        Objective   Physical Exam   Constitutional: She is oriented to person, place, and time. She appears well-developed.   HENT:   Head: Normocephalic.   Right Ear: External ear normal.   Left Ear: External ear normal.   Nose: Nose normal.   Mouth/Throat: Oropharynx is clear and moist.   Eyes: Conjunctivae are normal. Pupils are equal, round, and reactive to light.   Neck: No JVD present. No thyromegaly present.   Cardiovascular: Normal rate, regular rhythm and normal heart sounds.  Exam reveals no friction rub.    No murmur heard.  Pulmonary/Chest: Effort normal and breath sounds normal. No respiratory distress. She has no wheezes. She has no rales.   Abdominal: Soft. Bowel sounds are normal. She exhibits no distension. There is no tenderness. There is no guarding.   Musculoskeletal: She exhibits no edema  or tenderness.   Lymphadenopathy:     She has no cervical adenopathy.   Neurological: She is oriented to person, place, and time. She displays normal reflexes. No cranial nerve deficit.   Skin: No rash noted.        Psychiatric: Her behavior is normal.   Nursing note and vitals reviewed.      Assessment/Plan   Karla was seen today for atrial fibrillation, hypertension and insomnia.    Diagnoses and all orders for this visit:    Chronic atrial fibrillation  Stable. Nephro manages her coumadin and does not want to change this  Hyperlipidemia LDL goal <100  stable  Essential hypertension  stable  CKD (chronic kidney disease), stage IV  Continue with nephro.    tICK BITE   lYME TITER  50% of visit spent counseling

## 2017-07-11 NOTE — PROGRESS NOTES
"Karla Cristobal 81 y.o.  [facility]:Follow-up and Diabetes (Type II, last eye exam was within the last 3 months by Dr Epifanio Muniz)    [facility]: Follow-up and Diabetes (Type II, last eye exam was within the last 3 months by Dr Epifanio Muniz)    Blood sugar and 90 day average sugar reviewed  Results for orders placed or performed in visit on 07/11/17   POC Glycosylated Hemoglobin (Hb A1C)   Result Value Ref Range    Hemoglobin A1C 6.8 %   POC Glucose Fingerstick   Result Value Ref Range    Glucose 156 (A) 70 - 130 mg/dL     Average sugar is 140  She is doing well overall  Is on statin and not on ace or arb   bp is good   utd with eye exam - Dr Muniz   No foot lesion   Has macular degeneration (getting drops)  occ shot in the eye (not for awhile)  Had laser surgery for glaucoma  and cataract     Allergies   Allergen Reactions   • No Known Drug Allergy        Current Outpatient Prescriptions:   •  aspirin (ASPIRIN LOW DOSE) 81 MG tablet, Take  by mouth daily., Disp: , Rfl:   •  atorvastatin (LIPITOR) 20 MG tablet, TAKE ONE TABLET ONCE DAILY, Disp: 90 tablet, Rfl: 0  •  B-D INS SYRINGE 0.5CC/31GX5/16 31G X 5/16\" 0.5 ML misc, USE 3 TIMES DAILY, Disp: 100 each, Rfl: 2  •  brimonidine-timolol (COMBIGAN) 0.2-0.5 % ophthalmic solution, Apply  to eye., Disp: , Rfl:   •  carvedilol (COREG) 25 MG tablet, TAKE 1/2 TABLET BY MOUTH TWICE A DAY, Disp: 90 tablet, Rfl: 3  •  cholecalciferol (VITAMIN D3) 1000 UNITS tablet, Take 1,000 Units by mouth Daily., Disp: , Rfl:   •  eszopiclone (LUNESTA) 2 MG tablet, TAKE 1 TABLET BY MOUTH AT BEDTIME AS NEEDED, Disp: 30 tablet, Rfl: 1  •  furosemide (LASIX) 40 MG tablet, Take  by mouth as needed., Disp: , Rfl:   •  glucagon (GLUCAGON EMERGENCY) 1 MG injection, Glucagon Emergency 1 MG Injection Kit; Patient Sig: Glucagon Emergency 1 MG Injection Kit USE AS DIRECTED.; 1; 2; 14-Dec-2012; Active, Disp: , Rfl:   •  glucose blood (ONE TOUCH ULTRA TEST) test strip, Test blood sugars TID - Diagnosis: E11.9, Disp: " "100 each, Rfl: 3  •  indapamide (LOZOL) 2.5 MG tablet, Take  by mouth daily., Disp: , Rfl:   •  insulin aspart (NOVOLOG) 100 UNIT/ML injection, Inject 20 Units under the skin 3 (Three) Times a Day Before Meals., Disp: 4 each, Rfl: 0  •  insulin glargine (LANTUS) 100 UNIT/ML injection, Inject 20 Units under the skin Daily. (Patient taking differently: Inject 25 Units under the skin Daily.), Disp: 2 each, Rfl: 0  •  Insulin Pen Needle (B-D ULTRAFINE III SHORT PEN) 31G X 8 MM misc, 1 Units by Subdermal route 4 (Four) Times a Day., Disp: 100 each, Rfl: 3  •  Insulin Syringe-Needle U-100 (ULTRA-COMFORT INSULIN SYRINGE) 31G X 5/16\" 0.3 ML misc, , Disp: , Rfl:   •  isosorbide mononitrate (IMDUR) 30 MG 24 hr tablet, TAKE ONE TABLET BY MOUTH EVERY DAY, Disp: 90 tablet, Rfl: 0  •  latanoprost (XALATAN) 0.005 % ophthalmic solution, APPLY 1 DROP TO EACH EYE AT BEDTIME, Disp: , Rfl: 5  •  levothyroxine (SYNTHROID, LEVOTHROID) 100 MCG tablet, TAKE 1 TABLET DAILY., Disp: 90 tablet, Rfl: 1  •  ondansetron (ZOFRAN) 4 MG tablet, Take  by mouth every 12 (twelve) hours as needed., Disp: , Rfl:   •  ONETOUCH DELICA LANCETS 33G misc, , Disp: , Rfl:   •  spironolactone (ALDACTONE) 25 MG tablet, Take  by mouth daily., Disp: , Rfl:   •  timolol (TIMOPTIC) 0.5 % ophthalmic solution, APPLY 1 DROP IN EACH EYE 2 TIMES A DAY, Disp: , Rfl: 5  •  warfarin (COUMADIN) 3 MG tablet, Take  by mouth daily. Patient takes 2 mg & 3 mg.  Rotates dosage every other day. , Disp: , Rfl:   Patient Active Problem List    Diagnosis   • Change in mole [D22.9]   • DVT of lower extremity (deep venous thrombosis) [I82.409]   • CAD (coronary artery disease) [I25.10]   • Anemia [D64.9]   • Ischemic heart disease [I25.9]     Overview Note:     1. Ischemic heart disease.  a. History of remote myocardial infarction/CABG x 3, [facility] (database insufficient).   b. Left heart catheterization by Dr. Basilio Grossman. 2007 revealing patent URENA " graft/medical therapy, LVEF 30%.  c. Echocardiogram, [facility]. 2007: LVEF 25%; moderate/severe mitral regurgitation.  d. Upgrade of pacemaker to [employer] BI-V pacemaker per Dr. Hammonds on 10/25/2007.  e. Reported negative Cardiolite stress test in 2010, [facility], Dr. Basilio Grossman (database insufficient).  f. Reported echocardiogram, October 2011, [facility] (database insufficient).   g. Echocardiogram, 08/29/2012: LVEF 35-40%, mild mitral regurgitation.  h. Echocardiogram, 10/20/2015: LVEF 35-40%, moderate mitral regurgitation/tricuspid regurgitation; RVSP 45 mmHg.   i. History of MRSA pacemaker infection, 2005 (database insufficient).   j. [employer] Jamel pacemaker 2005 with upgrade to a [employer] BI-V ICD device in 2007, Dr. Hammonds.   k. CRT-D generator exchange by Tano Beal, 09/21/2012. [employer]el device (Serial# [id number]).  l. Persistent ischemic cardiomyopathy.      • Anemia due to chronic kidney disease [N18.9, D63.1]   • Chronic atrial fibrillation [I48.2]     Overview Note:     Impression: 03/15/2016 - stable  Impression: 11/23/2015 - stable. INR is  Impression: 03/24/2015 - INR is 2.1. Continue 3 mg and f/u with cardio  Impression: 03/24/2015 - INR is  Impression: 11/18/2014 - stable;      • Change in nevus [D22.9]   • CKD (chronic kidney disease), stage IV [N18.4]     Overview Note:     Impression: 11/23/2015 - follows with nephro.; Description: [facility] 12/12     • Conjunctivitis [H10.9]   • Diabetes mellitus [E11.9]     Overview Note:     Impression: 03/15/2016 - blood sugar 166 and hgn a1c 7.3%   is up to date with eye exam   neuropathy with callus, no ulcer   some scratches feet   ur alb due and ordered   no change other than provided samples of toujeo because she feels like lantus worked much better than levemir, she has tried titrating levemir and it is less effective   continue testing and f/u 3-4 months  Impression: 11/23/2015 - blood sugar stable    is up to  date with eye exam- 2 weeks ago   ur alb pos, ckd, no arb or ace but bp well controlled   neuropathy intermittent currently- lancinating pain   samples provided for insulin   commended current efforts to maintain good bp control   wound left hand and right arm appear to be healing  Impression: 07/20/2015 - would like to change to lantus for copay  is on levemir now  utd eye exam  lancination foot pain  due ur alb  discussed blood sugar 250 after coffee cake  discussed hgn a1c 7.2% (average 150)  Impression: 07/20/2015 - would like to change to lantus for copay  is on levemir now  utd eye exam  lancination foot pain  due ur alb  Impression: 03/24/2015 - blood [id number], hgn a1c 7.9% (down from 8.2%)  is doing well overall  discussed adjustment of novolog prn   is up to date with eye exam  neuropathy stable - no new callus  ur alb today  Impression: 11/18/2014 - blood sugar is 188, hgn a1c 8.1% (average 180)  is up to date with eye exam, neuropathy is stable  small callus right foot just under great toe  dry skin heel  doing well overall  discussed goal hgn a1c but she has been in the donut hole so has been cutting back on insulin - samples provided today for novolog.  More stress related to recent death of   f/u 3-4 months  Impression: 07/17/2014 - blood sugar and 90 day average sugar were reviewed, in good range currently.  She is up to date with preventive care, doing well overall.  No low sugars, occ higher number.  Giving own insulin   f/u 3-4 months; Description: dx 1995       Assessment & Plan Note:     Blood sugar and 90 day average sugar reviewed  Results for orders placed or performed in visit on 07/11/17   POC Glycosylated Hemoglobin (Hb A1C)   Result Value Ref Range    Hemoglobin A1C 6.8 %   POC Glucose Fingerstick   Result Value Ref Range    Glucose 156 (A) 70 - 130 mg/dL     Blood sugars well controlled  No low sugars  Is up to date with eye exam  Dr Cast 3/17  Neuropathy stable, has abrasions  on toes assoc with hitting toes on rubber wheels of walker  Discussed foot care and recommended podiatry Dr Simms  Ur alb neg 11//6  F/u 4 months- doing well overall     • Diabetic foot ulcer [E11.621, L97.509]   • Diabetic nephropathy [E11.21]     Overview Note:     Impression: 11/18/2014 - update ur alb;      • Diabetic retinopathy [E11.319]     Overview Note:     Impression: 11/18/2014 - is up to date with eye exam;      • Dog bite of hand [S61.459A, W54.0XXA]   • Bruises easily [R23.8]   • Malignant neoplasm of endometrium [C54.1]   • Foot pain [M79.673]     Overview Note:     Description: lancinating- worse but not consistent enough to want rx     • Hyperlipidemia LDL goal <100 [E78.5]     Overview Note:     Impression: 03/15/2016 - lipids and cmp  Impression: 11/23/2015 - stable  Impression: 03/24/2015 - lipids, cmp  Impression: 11/18/2014 - check flp  Impression: 11/18/2014 - not fasting, lipids next visit.  Impression: 07/17/2014 - stable on current dose of statin; Description: pravastatin 40 mg - do not titrate     • Essential hypertension [I10]     Overview Note:     Impression: 03/15/2016 - bp is good  Impression: 03/15/2016 - cmp, lipids, urine micro  Impression: 11/23/2015 - stable  Impression: 03/24/2015 - urine micro  Impression: 11/18/2014 - bp is good  Impression: 11/18/2014 - stable  Impression: 07/17/2014 - bp is good;      • Hypothyroidism [E03.9]     Overview Note:     Impression: 03/15/2016 - check tsh  Impression: 11/23/2015 - tsh normal 7/15- update next lab draw  Impression: 07/20/2015 - check tfts  Impression: 11/18/2014 - check tft;        Assessment & Plan Note:     Reviewed recent tfts- in good range     • Insomnia [G47.00]     Overview Note:     Impression: 03/15/2016 - refilled ambien  Impression: 11/23/2015 - refilled ambien  Impression: 03/24/2015 - refilled ambien  Impression: 11/18/2014 - stable;      • Generalized ischemic myocardial dysfunction [I25.5]   • Leukocytosis [D72.829]    • Memory impairment [R41.3]     Overview Note:     Impression: 03/15/2016 - MSE 29  May be Lunesta (started around this time)  Impression: 03/15/2016 - MSE  May be Lunesta (started around this time);      • Methicillin resistant Staphylococcus aureus infection [A49.02]   • Nausea and vomiting [R11.2]     Overview Note:     Impression: 07/17/2014 - rx zofran provided  no diarrhea since this am  no abdominal pain, no blood / mucus/ fever  treat with supportive care, to call if not resolved within 24-48 hours;      • Pulmonary embolism [I26.99]   • Renal insufficiency [N28.9]     Overview Note:     Description: baseline creatinine - 2.49       Assessment & Plan Note:     Stable cmp      • Skin lesion [L98.9]     Overview Note:     Impression: 03/24/2015 - refer derm for eval- seb kerat ant tib and ?ak medially with redness and irritation;      • Sleep apnea [G47.30]   • Shortness of breath [R06.02]     Overview Note:     Impression: 03/24/2015 - chronic. On 2 l oxygen at night. check cbc, bnp;      • Squamous cell carcinoma of skin [C44.92]     Overview Note:     Description: 5/15 Fito     • CHF (NYHA class IV, ACC/AHA stage D) [I50.9]     Review of Systems   Constitutional: Negative for activity change, appetite change, chills, diaphoresis, fatigue, fever and unexpected weight change.   HENT: Negative for congestion, dental problem, drooling, ear discharge, ear pain, facial swelling, hearing loss, mouth sores, nosebleeds, postnasal drip, rhinorrhea, sinus pressure, sneezing, sore throat, tinnitus, trouble swallowing and voice change.    Eyes: Negative for photophobia, pain, discharge, redness, itching and visual disturbance.   Respiratory: Negative for apnea, cough, choking, chest tightness, shortness of breath, wheezing and stridor.    Cardiovascular: Negative for chest pain, palpitations and leg swelling.   Gastrointestinal: Negative for abdominal distention, abdominal pain, anal bleeding, blood in stool,  "constipation, diarrhea, nausea, rectal pain and vomiting.   Endocrine: Negative for cold intolerance, heat intolerance, polydipsia, polyphagia and polyuria.   Genitourinary: Negative for decreased urine volume, difficulty urinating, dysuria, enuresis, flank pain, frequency, genital sores, hematuria and urgency.   Musculoskeletal: Negative for arthralgias, back pain, gait problem, joint swelling, myalgias, neck pain and neck stiffness.   Skin: Negative for color change, pallor, rash and wound.   Allergic/Immunologic: Negative for environmental allergies, food allergies and immunocompromised state.   Neurological: Negative for dizziness, tremors, seizures, syncope, facial asymmetry, speech difficulty, weakness, light-headedness, numbness and headaches.   Hematological: Negative for adenopathy. Does not bruise/bleed easily.   Psychiatric/Behavioral: Negative for agitation, behavioral problems, confusion, decreased concentration, dysphoric mood, hallucinations, self-injury, sleep disturbance and suicidal ideas. The patient is not nervous/anxious and is not hyperactive.      Social History     Social History   • Marital status:      Spouse name: N/A   • Number of children: N/A   • Years of education: N/A     Occupational History   • Not on file.     Social History Main Topics   • Smoking status: Never Smoker   • Smokeless tobacco: Never Used   • Alcohol use No   • Drug use: No   • Sexual activity: Defer     Other Topics Concern   • Not on file     Social History Narrative     Family History   Problem Relation Age of Onset   • Breast cancer Other    • Diabetes Other    • Esophageal cancer Other    • Hypertension Other    • Transient ischemic attack Other    • Breast cancer Mother    • Cancer Father      /60  Ht 64\" (162.6 cm)  Wt 233 lb (106 kg)  LMP  (LMP Unknown)  BMI 39.99 kg/m2  Physical Exam   Constitutional: She is oriented to person, place, and time. She appears well-developed and well-nourished. "   HENT:   Head: Normocephalic and atraumatic.   Nose: Nose normal.   Mouth/Throat: Oropharynx is clear and moist.   Eyes: Conjunctivae, EOM and lids are normal. Pupils are equal, round, and reactive to light.   Neck: Trachea normal and normal range of motion. Neck supple. Carotid bruit is not present. No tracheal deviation present. No thyroid mass and no thyromegaly present.   Cardiovascular: Normal rate, regular rhythm, normal heart sounds and intact distal pulses.  Exam reveals no gallop and no friction rub.    No murmur heard.  Pulmonary/Chest: Effort normal and breath sounds normal. No respiratory distress. She has no wheezes.   Musculoskeletal: Normal range of motion. She exhibits no edema or deformity.       Neurological Sensory Findings - Altered hot/cold right ankle/foot discrimination and altered hot/cold left ankle/foot discrimination. Altered sharp/dull right ankle/foot discrimination and altered sharp/dull left ankle/foot discrimination. Right ankle/foot altered proprioception and left ankle/foot altered proprioception.    Vascular Status -  Her exam exhibits right foot vasculature normal. Her exam exhibits no right foot edema. Her exam exhibits left foot vasculature normal. Her exam exhibits no left foot edema.   Skin Integrity  -  Her right foot skin is not intact.    Karla 's left foot skin is intact. .  Lymphadenopathy:     She has no cervical adenopathy.   Neurological: She is alert and oriented to person, place, and time. She has normal reflexes. She displays normal reflexes. No cranial nerve deficit.   Skin: Skin is warm and dry. No rash noted. No cyanosis or erythema. Nails show no clubbing.   Psychiatric: She has a normal mood and affect. Her speech is normal and behavior is normal. Judgment and thought content normal. Cognition and memory are normal.   Nursing note and vitals reviewed.    Results for orders placed or performed in visit on 07/11/17   POC Glycosylated Hemoglobin (Hb A1C)   Result  Value Ref Range    Hemoglobin A1C 6.8 %   POC Glucose Fingerstick   Result Value Ref Range    Glucose 156 (A) 70 - 130 mg/dL     Karla was seen today for follow-up and diabetes.    Diagnoses and all orders for this visit:    Type 2 diabetes mellitus without complication, with long-term current use of insulin  -     POC Glycosylated Hemoglobin (Hb A1C)  -     POC Glucose Fingerstick    Other specified hypothyroidism    Renal insufficiency    Other orders  -     Discontinue: amoxicillin-clavulanate (AUGMENTIN) 875-125 MG per tablet; Take 1 tablet by mouth 2 (Two) Times a Day.      Problem List Items Addressed This Visit        Endocrine    Diabetes mellitus - Primary     Blood sugar and 90 day average sugar reviewed  Results for orders placed or performed in visit on 07/11/17   POC Glycosylated Hemoglobin (Hb A1C)   Result Value Ref Range    Hemoglobin A1C 6.8 %   POC Glucose Fingerstick   Result Value Ref Range    Glucose 156 (A) 70 - 130 mg/dL     Blood sugars well controlled  No low sugars  Is up to date with eye exam  Dr Cast 3/17  Neuropathy stable, has abrasions on toes assoc with hitting toes on rubber wheels of walker  Discussed foot care and recommended podiatry Dr Simms  Ur alb neg 11//6  F/u 4 months- doing well overall         Relevant Orders    POC Glycosylated Hemoglobin (Hb A1C) (Completed)    POC Glucose Fingerstick (Completed)    Hypothyroidism     Reviewed recent tfts- in good range            Genitourinary    Renal insufficiency     Stable cmp              Return in about 4 months (around 11/11/2017) for Recheck 30 min .    Rani Lane MD  Signed Rani Lane MD

## 2017-07-13 LAB — B BURGDOR IGG+IGM SER-ACNC: <0.91 ISR (ref 0–0.9)

## 2017-07-24 RX ORDER — ATORVASTATIN CALCIUM 20 MG/1
TABLET, FILM COATED ORAL
Qty: 90 TABLET | Refills: 0 | Status: SHIPPED | OUTPATIENT
Start: 2017-07-24 | End: 2017-10-22 | Stop reason: SDUPTHER

## 2017-08-01 RX ORDER — LEVOTHYROXINE SODIUM 0.1 MG/1
TABLET ORAL
Qty: 90 TABLET | Refills: 1 | Status: SHIPPED | OUTPATIENT
Start: 2017-08-01 | End: 2018-01-22 | Stop reason: SDUPTHER

## 2017-08-07 DIAGNOSIS — G47.00 INSOMNIA: ICD-10-CM

## 2017-08-08 RX ORDER — ESZOPICLONE 2 MG/1
TABLET, FILM COATED ORAL
Qty: 30 TABLET | Refills: 3 | OUTPATIENT
Start: 2017-08-08 | End: 2017-12-16 | Stop reason: SDUPTHER

## 2017-08-08 RX ORDER — ISOSORBIDE MONONITRATE 30 MG/1
TABLET, EXTENDED RELEASE ORAL
Qty: 90 TABLET | Refills: 3 | Status: SHIPPED | OUTPATIENT
Start: 2017-08-08 | End: 2018-08-07 | Stop reason: SDUPTHER

## 2017-08-29 ENCOUNTER — CLINICAL SUPPORT NO REQUIREMENTS (OUTPATIENT)
Dept: CARDIOLOGY | Facility: CLINIC | Age: 82
End: 2017-08-29

## 2017-08-29 DIAGNOSIS — Z95.810 ICD (IMPLANTABLE CARDIOVERTER-DEFIBRILLATOR), BIVENTRICULAR, IN SITU: Primary | ICD-10-CM

## 2017-08-29 PROCEDURE — 93284 PRGRMG EVAL IMPLANTABLE DFB: CPT | Performed by: INTERNAL MEDICINE

## 2017-10-05 RX ORDER — SYRINGE-NEEDLE,INSULIN,0.5 ML 31 GX5/16"
SYRINGE, EMPTY DISPOSABLE MISCELLANEOUS
Qty: 100 EACH | Refills: 3 | Status: SHIPPED | OUTPATIENT
Start: 2017-10-05 | End: 2017-12-01 | Stop reason: SDUPTHER

## 2017-10-23 RX ORDER — ATORVASTATIN CALCIUM 20 MG/1
TABLET, FILM COATED ORAL
Qty: 90 TABLET | Refills: 0 | Status: SHIPPED | OUTPATIENT
Start: 2017-10-23 | End: 2017-10-26 | Stop reason: SDUPTHER

## 2017-10-26 RX ORDER — ATORVASTATIN CALCIUM 20 MG/1
20 TABLET, FILM COATED ORAL NIGHTLY
Qty: 90 TABLET | Refills: 0 | Status: SHIPPED | OUTPATIENT
Start: 2017-10-26 | End: 2018-01-22 | Stop reason: SDUPTHER

## 2017-11-01 ENCOUNTER — TELEPHONE (OUTPATIENT)
Dept: INTERNAL MEDICINE | Facility: CLINIC | Age: 82
End: 2017-11-01

## 2017-11-01 NOTE — TELEPHONE ENCOUNTER
PATIENT CALLED TO REQUEST NOVOLOG SAMPLES IF THERE ARE ANY AVAILABLE. HER DAUGHTER WILL BE ABLE TO  ON THURS.    CALL BACK [phone]

## 2017-11-14 ENCOUNTER — OFFICE VISIT (OUTPATIENT)
Dept: ENDOCRINOLOGY | Facility: CLINIC | Age: 82
End: 2017-11-14

## 2017-11-14 ENCOUNTER — OFFICE VISIT (OUTPATIENT)
Dept: INTERNAL MEDICINE | Facility: CLINIC | Age: 82
End: 2017-11-14

## 2017-11-14 VITALS
WEIGHT: 232 LBS | SYSTOLIC BLOOD PRESSURE: 128 MMHG | DIASTOLIC BLOOD PRESSURE: 76 MMHG | OXYGEN SATURATION: 98 % | HEART RATE: 76 BPM | BODY MASS INDEX: 38.61 KG/M2

## 2017-11-14 VITALS
HEART RATE: 76 BPM | WEIGHT: 232 LBS | BODY MASS INDEX: 38.61 KG/M2 | DIASTOLIC BLOOD PRESSURE: 76 MMHG | SYSTOLIC BLOOD PRESSURE: 128 MMHG | OXYGEN SATURATION: 98 %

## 2017-11-14 DIAGNOSIS — E55.9 VITAMIN D DEFICIENCY: ICD-10-CM

## 2017-11-14 DIAGNOSIS — I48.20 CHRONIC ATRIAL FIBRILLATION (HCC): Primary | ICD-10-CM

## 2017-11-14 DIAGNOSIS — F51.01 PRIMARY INSOMNIA: ICD-10-CM

## 2017-11-14 DIAGNOSIS — E78.5 HYPERLIPIDEMIA LDL GOAL <100: ICD-10-CM

## 2017-11-14 DIAGNOSIS — L97.509 DIABETIC ULCER OF TOE ASSOCIATED WITH TYPE 2 DIABETES MELLITUS, UNSPECIFIED LATERALITY, UNSPECIFIED ULCER STAGE (HCC): ICD-10-CM

## 2017-11-14 DIAGNOSIS — I10 ESSENTIAL HYPERTENSION: ICD-10-CM

## 2017-11-14 DIAGNOSIS — E11.621 DIABETIC ULCER OF TOE ASSOCIATED WITH TYPE 2 DIABETES MELLITUS, UNSPECIFIED LATERALITY, UNSPECIFIED ULCER STAGE (HCC): ICD-10-CM

## 2017-11-14 DIAGNOSIS — Z79.4 TYPE 2 DIABETES MELLITUS WITHOUT COMPLICATION, WITH LONG-TERM CURRENT USE OF INSULIN (HCC): Primary | ICD-10-CM

## 2017-11-14 DIAGNOSIS — E11.9 TYPE 2 DIABETES MELLITUS WITHOUT COMPLICATION, WITH LONG-TERM CURRENT USE OF INSULIN (HCC): Primary | ICD-10-CM

## 2017-11-14 DIAGNOSIS — N18.4 CKD (CHRONIC KIDNEY DISEASE), STAGE IV (HCC): ICD-10-CM

## 2017-11-14 DIAGNOSIS — E11.21 DIABETIC NEPHROPATHY ASSOCIATED WITH TYPE 2 DIABETES MELLITUS (HCC): ICD-10-CM

## 2017-11-14 LAB
25(OH)D3 SERPL-MCNC: 46.1 NG/ML
ALBUMIN SERPL-MCNC: 4 G/DL (ref 3.2–4.8)
ALBUMIN/GLOB SERPL: 1.3 G/DL (ref 1.5–2.5)
ALP SERPL-CCNC: 166 U/L (ref 25–100)
ALT SERPL W P-5'-P-CCNC: 19 U/L (ref 7–40)
ANION GAP SERPL CALCULATED.3IONS-SCNC: 9 MMOL/L (ref 3–11)
ARTICHOKE IGE QN: 63 MG/DL (ref 0–130)
AST SERPL-CCNC: 24 U/L (ref 0–33)
BASOPHILS # BLD AUTO: 0.03 10*3/MM3 (ref 0–0.2)
BASOPHILS NFR BLD AUTO: 0.3 % (ref 0–1)
BILIRUB SERPL-MCNC: 0.6 MG/DL (ref 0.3–1.2)
BUN BLD-MCNC: 76 MG/DL (ref 9–23)
BUN/CREAT SERPL: 38 (ref 7–25)
CALCIUM SPEC-SCNC: 9 MG/DL (ref 8.7–10.4)
CHLORIDE SERPL-SCNC: 106 MMOL/L (ref 99–109)
CHOLEST SERPL-MCNC: 107 MG/DL (ref 0–200)
CO2 SERPL-SCNC: 27 MMOL/L (ref 20–31)
CREAT BLD-MCNC: 2 MG/DL (ref 0.6–1.3)
DEPRECATED RDW RBC AUTO: 49.7 FL (ref 37–54)
EOSINOPHIL # BLD AUTO: 0.16 10*3/MM3 (ref 0–0.3)
EOSINOPHIL NFR BLD AUTO: 1.5 % (ref 0–3)
ERYTHROCYTE [DISTWIDTH] IN BLOOD BY AUTOMATED COUNT: 14.9 % (ref 11.3–14.5)
GFR SERPL CREATININE-BSD FRML MDRD: 24 ML/MIN/1.73
GLOBULIN UR ELPH-MCNC: 3.1 GM/DL
GLUCOSE BLD-MCNC: 65 MG/DL (ref 70–100)
GLUCOSE BLDC GLUCOMTR-MCNC: 62 MG/DL (ref 70–130)
HBA1C MFR BLD: 6.6 %
HCT VFR BLD AUTO: 44.3 % (ref 34.5–44)
HDLC SERPL-MCNC: 39 MG/DL (ref 40–60)
HGB BLD-MCNC: 13.8 G/DL (ref 11.5–15.5)
IMM GRANULOCYTES # BLD: 0.04 10*3/MM3 (ref 0–0.03)
IMM GRANULOCYTES NFR BLD: 0.4 % (ref 0–0.6)
LYMPHOCYTES # BLD AUTO: 1.82 10*3/MM3 (ref 0.6–4.8)
LYMPHOCYTES NFR BLD AUTO: 17 % (ref 24–44)
MCH RBC QN AUTO: 28.5 PG (ref 27–31)
MCHC RBC AUTO-ENTMCNC: 31.2 G/DL (ref 32–36)
MCV RBC AUTO: 91.3 FL (ref 80–99)
MONOCYTES # BLD AUTO: 0.93 10*3/MM3 (ref 0–1)
MONOCYTES NFR BLD AUTO: 8.7 % (ref 0–12)
NEUTROPHILS # BLD AUTO: 7.71 10*3/MM3 (ref 1.5–8.3)
NEUTROPHILS NFR BLD AUTO: 72.1 % (ref 41–71)
PLATELET # BLD AUTO: 204 10*3/MM3 (ref 150–450)
PMV BLD AUTO: 11.5 FL (ref 6–12)
POTASSIUM BLD-SCNC: 4.8 MMOL/L (ref 3.5–5.5)
PROT SERPL-MCNC: 7.1 G/DL (ref 5.7–8.2)
RBC # BLD AUTO: 4.85 10*6/MM3 (ref 3.89–5.14)
SODIUM BLD-SCNC: 142 MMOL/L (ref 132–146)
TRIGL SERPL-MCNC: 102 MG/DL (ref 0–150)
TSH SERPL DL<=0.05 MIU/L-ACNC: 1.68 MIU/ML (ref 0.35–5.35)
WBC NRBC COR # BLD: 10.69 10*3/MM3 (ref 3.5–10.8)

## 2017-11-14 PROCEDURE — 84443 ASSAY THYROID STIM HORMONE: CPT | Performed by: INTERNAL MEDICINE

## 2017-11-14 PROCEDURE — 80061 LIPID PANEL: CPT | Performed by: INTERNAL MEDICINE

## 2017-11-14 PROCEDURE — 80053 COMPREHEN METABOLIC PANEL: CPT | Performed by: INTERNAL MEDICINE

## 2017-11-14 PROCEDURE — 99214 OFFICE O/P EST MOD 30 MIN: CPT | Performed by: INTERNAL MEDICINE

## 2017-11-14 PROCEDURE — 83036 HEMOGLOBIN GLYCOSYLATED A1C: CPT | Performed by: INTERNAL MEDICINE

## 2017-11-14 PROCEDURE — 85025 COMPLETE CBC W/AUTO DIFF WBC: CPT | Performed by: INTERNAL MEDICINE

## 2017-11-14 PROCEDURE — 82570 ASSAY OF URINE CREATININE: CPT | Performed by: INTERNAL MEDICINE

## 2017-11-14 PROCEDURE — 82306 VITAMIN D 25 HYDROXY: CPT | Performed by: INTERNAL MEDICINE

## 2017-11-14 PROCEDURE — 82043 UR ALBUMIN QUANTITATIVE: CPT | Performed by: INTERNAL MEDICINE

## 2017-11-14 PROCEDURE — 82947 ASSAY GLUCOSE BLOOD QUANT: CPT | Performed by: INTERNAL MEDICINE

## 2017-11-14 RX ORDER — MUPIROCIN CALCIUM 20 MG/G
CREAM TOPICAL
Qty: 30 G | Refills: 2 | Status: SHIPPED | OUTPATIENT
Start: 2017-11-14 | End: 2017-11-16 | Stop reason: SDUPTHER

## 2017-11-14 NOTE — ASSESSMENT & PLAN NOTE
Blood sugar and 90 day average sugar reviewed  Results for orders placed or performed in visit on 11/14/17   POCT Glucose   Result Value Ref Range    Glucose 62 (A) 70 - 130 mg/dL   POC Glycosylated Hemoglobin (Hb A1C)   Result Value Ref Range    Hemoglobin A1C 6.6 %     Doing well overall  Low sugars pp meals- discussed reducing premeal insulin by 5 units (novolog 15 units before meals tid) unless eating a lot of carbs (thanksgiving, etc)  Is utd with eye exam  Neuropathy with multiple small toe wounds that appear minimally red with eschar- discussed wearing shoes and mupirocin topical provided  Also recommended appt with podiatry for appropriate foot wear   Encouraged her to wear shoes even at home   Foot care discussed  Due ur alb- has upcoming appt with Dr Mckeon  F/u 3-4 months

## 2017-11-14 NOTE — PROGRESS NOTES
Subjective   Karla Cristobal is a 82 y.o. female.   Chief Complaint   Patient presents with   • Chronic Kidney Disease   • Hyperlipidemia   • Hypertension       Chronic Kidney Disease   Pertinent negatives include no abdominal pain, arthralgias, chest pain, chills, congestion, coughing, diaphoresis, fatigue, fever, headaches, myalgias, nausea, neck pain, numbness, rash, sore throat, vomiting or weakness.   Hyperlipidemia   Pertinent negatives include no chest pain, myalgias or shortness of breath.   Hypertension   This is a chronic problem. The current episode started more than 1 year ago. Pertinent negatives include no chest pain, headaches, neck pain, palpitations or shortness of breath.   Atrial Fibrillation   Presents for follow-up visit. Symptoms include hypertension. Symptoms are negative for chest pain, palpitations, shortness of breath, syncope, tachycardia and weakness. Past medical history includes atrial fibrillation and hyperlipidemia.   Insomnia   This is a chronic problem. The current episode started more than 1 year ago. Pertinent negatives include no abdominal pain, arthralgias, chest pain, chills, congestion, coughing, diaphoresis, fatigue, fever, headaches, myalgias, nausea, neck pain, numbness, rash, sore throat, vomiting or weakness.        The following portions of the patient's history were reviewed and updated as appropriate: allergies, current medications, past family history, past medical history, past social history, past surgical history and problem list.    Review of Systems   Constitutional: Negative for activity change, appetite change, chills, diaphoresis, fatigue, fever and unexpected weight change.   HENT: Negative for congestion, ear discharge, ear pain, mouth sores, nosebleeds, sinus pressure, sneezing and sore throat.    Eyes: Negative for pain, discharge and itching.   Respiratory: Negative for cough, chest tightness, shortness of breath and wheezing.    Cardiovascular: Negative  for chest pain, palpitations, leg swelling and syncope.   Gastrointestinal: Negative for abdominal pain, constipation, diarrhea, nausea and vomiting.   Endocrine: Negative for cold intolerance, heat intolerance, polydipsia and polyphagia.   Genitourinary: Negative for dysuria, flank pain, frequency, hematuria and urgency.   Musculoskeletal: Negative for arthralgias, back pain, gait problem, myalgias, neck pain and neck stiffness.   Skin: Negative for color change, pallor and rash.   Neurological: Negative for seizures, speech difficulty, weakness, numbness and headaches.   Psychiatric/Behavioral: Negative for agitation, confusion, decreased concentration and sleep disturbance. The patient has insomnia. The patient is not nervous/anxious.      /76  Pulse 76  Wt 232 lb (105 kg)  LMP  (LMP Unknown)  SpO2 98%  BMI 38.61 kg/m2    Objective   Physical Exam   Constitutional: She is oriented to person, place, and time. She appears well-developed.   HENT:   Head: Normocephalic.   Right Ear: External ear normal.   Left Ear: External ear normal.   Nose: Nose normal.   Mouth/Throat: Oropharynx is clear and moist.   Eyes: Conjunctivae are normal. Pupils are equal, round, and reactive to light.   Neck: No JVD present. No thyromegaly present.   Cardiovascular: Normal rate, regular rhythm and normal heart sounds.  Exam reveals no friction rub.    No murmur heard.  Pulmonary/Chest: Effort normal and breath sounds normal. No respiratory distress. She has no wheezes. She has no rales.   Abdominal: Soft. Bowel sounds are normal. She exhibits no distension. There is no tenderness. There is no guarding.   Musculoskeletal: She exhibits no edema or tenderness.   Lymphadenopathy:     She has no cervical adenopathy.   Neurological: She is oriented to person, place, and time. She displays normal reflexes. No cranial nerve deficit.   Skin: No rash noted.        Psychiatric: Her behavior is normal.   Nursing note and vitals  reviewed.      Assessment/Plan   Karla was seen today for atrial fibrillation, hypertension and insomnia.    Diagnoses and all orders for this visit:    Chronic atrial fibrillation  Stable. Nephro manages her coumadin and does not want to change this  Hyperlipidemia LDL goal <100  stable  Essential hypertension  stable  CKD (chronic kidney disease), stage IV  Continue with nephro.      50% of visit spent counseling

## 2017-11-14 NOTE — PROGRESS NOTES
"Karla Cristobal 82 y.o.  CC: Follow-up (Type 2 diabetes mellitus)    [place]: Follow-up (Type 2 diabetes mellitus)    Blood sugar and 90 day average sugar reviewed  Results for orders placed or performed in visit on 11/14/17   POCT Glucose   Result Value Ref Range    Glucose 62 (A) 70 - 130 mg/dL   POC Glycosylated Hemoglobin (Hb A1C)   Result Value Ref Range    Hemoglobin A1C 6.6 %   discussed lower sugar- she is fasting today  Higher sugar over the weekend- drank hot apple cider over the weekend   Neuropathy with multiple toe lesions due to hitting feet on walker   She goes barefoot at home   Eye md Dr Muniz - exam 4/17     Allergies   Allergen Reactions   • No Known Drug Allergy        Current Outpatient Prescriptions:   •  aspirin (ASPIRIN LOW DOSE) 81 MG tablet, Take  by mouth daily., Disp: , Rfl:   •  atorvastatin (LIPITOR) 20 MG tablet, Take 1 tablet by mouth Every Night., Disp: 90 tablet, Rfl: 0  •  B-D INS SYRINGE 0.5CC/31GX5/16 31G X 5/16\" 0.5 ML misc, USE 3 TIMES DAILY, Disp: 100 each, Rfl: 3  •  brimonidine-timolol (COMBIGAN) 0.2-0.5 % ophthalmic solution, Apply  to eye., Disp: , Rfl:   •  carvedilol (COREG) 25 MG tablet, TAKE 1/2 TABLET BY MOUTH TWICE A DAY, Disp: 90 tablet, Rfl: 3  •  cholecalciferol (VITAMIN D3) 1000 UNITS tablet, Take 1,000 Units by mouth Daily., Disp: , Rfl:   •  eszopiclone (LUNESTA) 2 MG tablet, TAKE 1 TABLET BY MOUTH AT BEDTIME, Disp: 30 tablet, Rfl: 3  •  furosemide (LASIX) 40 MG tablet, Take  by mouth as needed., Disp: , Rfl:   •  glucagon (GLUCAGON EMERGENCY) 1 MG injection, Glucagon Emergency 1 MG Injection Kit; Patient Sig: Glucagon Emergency 1 MG Injection Kit USE AS DIRECTED.; 1; 2; 14-Dec-2012; Active, Disp: , Rfl:   •  glucose blood (ONE TOUCH ULTRA TEST) test strip, Test blood sugars TID - Diagnosis: E11.9, Disp: 100 each, Rfl: 3  •  indapamide (LOZOL) 2.5 MG tablet, Take  by mouth daily., Disp: , Rfl:   •  insulin aspart (NOVOLOG) 100 UNIT/ML injection, Inject 20 Units " "under the skin 3 (Three) Times a Day Before Meals., Disp: 4 each, Rfl: 0  •  insulin glargine (LANTUS) 100 UNIT/ML injection, Inject 20 Units under the skin Daily. (Patient taking differently: Inject 25 Units under the skin Daily.), Disp: 2 each, Rfl: 0  •  Insulin Pen Needle (B-D ULTRAFINE III SHORT PEN) 31G X 8 MM misc, 1 Units by Subdermal route 4 (Four) Times a Day., Disp: 100 each, Rfl: 3  •  Insulin Syringe-Needle U-100 (ULTRA-COMFORT INSULIN SYRINGE) 31G X 5/16\" 0.3 ML misc, , Disp: , Rfl:   •  isosorbide mononitrate (IMDUR) 30 MG 24 hr tablet, TAKE ONE TABLET BY MOUTH EVERY DAY, Disp: 90 tablet, Rfl: 3  •  latanoprost (XALATAN) 0.005 % ophthalmic solution, APPLY 1 DROP TO EACH EYE AT BEDTIME, Disp: , Rfl: 5  •  levothyroxine (SYNTHROID, LEVOTHROID) 100 MCG tablet, TAKE 1 TABLET DAILY., Disp: 90 tablet, Rfl: 1  •  ondansetron (ZOFRAN) 4 MG tablet, Take  by mouth every 12 (twelve) hours as needed., Disp: , Rfl:   •  ONETOUCH DELICA LANCETS 33G misc, , Disp: , Rfl:   •  spironolactone (ALDACTONE) 25 MG tablet, Take  by mouth daily., Disp: , Rfl:   •  timolol (TIMOPTIC) 0.5 % ophthalmic solution, APPLY 1 DROP IN EACH EYE 2 TIMES A DAY, Disp: , Rfl: 5  •  warfarin (COUMADIN) 3 MG tablet, Take  by mouth daily. Patient takes 2 mg & 3 mg.  Rotates dosage every other day. , Disp: , Rfl:   Patient Active Problem List    Diagnosis   • Change in mole [L81.9]   • DVT of lower extremity (deep venous thrombosis) [I82.409]   • CAD (coronary artery disease) [I25.10]   • Anemia [D64.9]   • Ischemic heart disease [I25.9]     Overview Note:     1. Ischemic heart disease.  a. History of remote myocardial infarction/CABG x 3, [facility] (database insufficient).   b. Left heart catheterization by Dr. Basilio Grossman. 2007 revealing patent LIMA graft/medical therapy, LVEF 30%.  c. Echocardiogram, [facility]. 2007: LVEF 25%; moderate/severe mitral regurgitation.  d. Upgrade of pacemaker to [employer]el BI-V " pacemaker per Dr. Hammonds on 10/25/2007.  e. Reported negative Cardiolite stress test in 2010, [facility], Dr. Basilio Grossman (database insufficient).  f. Reported echocardiogram, October 2011, [facility] (database insufficient).   g. Echocardiogram, 08/29/2012: LVEF 35-40%, mild mitral regurgitation.  h. Echocardiogram, 10/20/2015: LVEF 35-40%, moderate mitral regurgitation/tricuspid regurgitation; RVSP 45 mmHg.   i. History of MRSA pacemaker infection, 2005 (database insufficient).   j. [employer] Jamel pacemaker 2005 with upgrade to a [employer] Jamel BI-V ICD device in 2007, Dr. Hammonds.   k. CRT-D generator exchange by Tano Beal, 09/21/2012. [employer] Jamel device (Serial# [id number]).  l. Persistent ischemic cardiomyopathy.      • Anemia due to chronic kidney disease [N18.9, D63.1]   • Chronic atrial fibrillation [I48.2]     Overview Note:     Impression: 03/15/2016 - stable  Impression: 11/23/2015 - stable. INR is  Impression: 03/24/2015 - INR is 2.1. Continue 3 mg and f/u with cardio  Impression: 03/24/2015 - INR is  Impression: 11/18/2014 - stable;      • Change in nevus [D22.9]   • CKD (chronic kidney disease), stage IV [N18.4]     Overview Note:     Impression: 11/23/2015 - follows with nephro.; Description: [facility] 12/12     • Conjunctivitis [H10.9]   • Diabetes mellitus [E11.9]     Overview Note:     Impression: 03/15/2016 - blood sugar 166 and hgn a1c 7.3%   is up to date with eye exam   neuropathy with callus, no ulcer   some scratches feet   ur alb due and ordered   no change other than provided samples of toujeo because she feels like lantus worked much better than levemir, she has tried titrating levemir and it is less effective   continue testing and f/u 3-4 months  Impression: 11/23/2015 - blood sugar stable    is up to date with eye exam- 2 weeks ago   ur alb pos, ckd, no arb or ace but bp well controlled   neuropathy intermittent currently- lancinating pain   samples provided for  insulin   commended current efforts to maintain good bp control   wound left hand and right arm appear to be healing  Impression: 07/20/2015 - would like to change to lantus for copay  is on levemir now  utd eye exam  lancination foot pain  due ur alb  discussed blood sugar 250 after coffee cake  discussed hgn a1c 7.2% (average 150)  Impression: 07/20/2015 - would like to change to lantus for copay  is on levemir now  utd eye exam  lancination foot pain  due ur alb  Impression: 03/24/2015 - blood [id number], hgn a1c 7.9% (down from 8.2%)  is doing well overall  discussed adjustment of novolog prn   is up to date with eye exam  neuropathy stable - no new callus  ur alb today  Impression: 11/18/2014 - blood sugar is 188, hgn a1c 8.1% (average 180)  is up to date with eye exam, neuropathy is stable  small callus right foot just under great toe  dry skin heel  doing well overall  discussed goal hgn a1c but she has been in the donut hole so has been cutting back on insulin - samples provided today for novolog.  More stress related to recent death of   f/u 3-4 months  Impression: 07/17/2014 - blood sugar and 90 day average sugar were reviewed, in good range currently.  She is up to date with preventive care, doing well overall.  No low sugars, occ higher number.  Giving own insulin   f/u 3-4 months; Description: dx 1995     • Diabetic foot ulcer [E11.621, L97.509]   • Diabetic nephropathy [E11.21]     Overview Note:     Impression: 11/18/2014 - update ur alb;      • Diabetic retinopathy [E11.319]     Overview Note:     Impression: 11/18/2014 - is up to date with eye exam;      • Dog bite of hand [S61.459A, W54.0XXA]   • Bruises easily [R23.8]   • Malignant neoplasm of endometrium [C54.1]   • Foot pain [M79.673]     Overview Note:     Description: lancinating- worse but not consistent enough to want rx     • Hyperlipidemia LDL goal <100 [E78.5]     Overview Note:     Impression: 03/15/2016 - lipids and cmp   Impression: 11/23/2015 - stable  Impression: 03/24/2015 - lipids, cmp  Impression: 11/18/2014 - check flp  Impression: 11/18/2014 - not fasting, lipids next visit.  Impression: 07/17/2014 - stable on current dose of statin; Description: pravastatin 40 mg - do not titrate     • Essential hypertension [I10]     Overview Note:     Impression: 03/15/2016 - bp is good  Impression: 03/15/2016 - cmp, lipids, urine micro  Impression: 11/23/2015 - stable  Impression: 03/24/2015 - urine micro  Impression: 11/18/2014 - bp is good  Impression: 11/18/2014 - stable  Impression: 07/17/2014 - bp is good;      • Hypothyroidism [E03.9]     Overview Note:     Impression: 03/15/2016 - check tsh  Impression: 11/23/2015 - tsh normal 7/15- update next lab draw  Impression: 07/20/2015 - check tfts  Impression: 11/18/2014 - check tft;      • Insomnia [G47.00]     Overview Note:     Impression: 03/15/2016 - refilled ambien  Impression: 11/23/2015 - refilled ambien  Impression: 03/24/2015 - refilled ambien  Impression: 11/18/2014 - stable;      • Generalized ischemic myocardial dysfunction [I25.5]   • Leukocytosis [D72.829]   • Memory impairment [R41.3]     Overview Note:     Impression: 03/15/2016 - MSE 29  May be Lunesta (started around this time)  Impression: 03/15/2016 - MSE  May be Lunesta (started around this time);      • Methicillin resistant Staphylococcus aureus infection [A49.02]   • Nausea and vomiting [R11.2]     Overview Note:     Impression: 07/17/2014 - rx zofran provided  no diarrhea since this am  no abdominal pain, no blood / mucus/ fever  treat with supportive care, to call if not resolved within 24-48 hours;      • Pulmonary embolism [I26.99]   • Renal insufficiency [N28.9]     Overview Note:     Description: baseline creatinine - 2.49     • Skin lesion [L98.9]     Overview Note:     Impression: 03/24/2015 - refer derm for eval- seb kerat ant tib and ?ak medially with redness and irritation;      • Sleep apnea [G47.30]    • Shortness of breath [R06.02]     Overview Note:     Impression: 03/24/2015 - chronic. On 2 l oxygen at night. check cbc, bnp;      • Squamous cell carcinoma of skin [C44.92]     Overview Note:     Description: 5/15 Fito     • CHF (NYHA class IV, ACC/AHA stage D) [I50.9]     Review of Systems   Constitutional: Negative for activity change, appetite change, chills, diaphoresis, fatigue, fever and unexpected weight change.   HENT: Negative for congestion, dental problem, drooling, ear discharge, ear pain, facial swelling, hearing loss, mouth sores, nosebleeds, postnasal drip, rhinorrhea, sinus pressure, sneezing, sore throat, tinnitus, trouble swallowing and voice change.    Eyes: Negative for photophobia, pain, discharge, redness, itching and visual disturbance.   Respiratory: Negative for apnea, cough, choking, chest tightness, shortness of breath, wheezing and stridor.    Cardiovascular: Negative for chest pain, palpitations and leg swelling.   Gastrointestinal: Negative for abdominal distention, abdominal pain, anal bleeding, blood in stool, constipation, diarrhea, nausea, rectal pain and vomiting.   Endocrine: Negative for cold intolerance, heat intolerance, polydipsia, polyphagia and polyuria.   Genitourinary: Negative for decreased urine volume, difficulty urinating, dysuria, enuresis, flank pain, frequency, genital sores, hematuria and urgency.   Musculoskeletal: Negative for arthralgias, back pain, gait problem, joint swelling, myalgias, neck pain and neck stiffness.   Skin: Negative for color change, pallor, rash and wound.        Sweating with sugar 62  Multiple small foot lesions    Allergic/Immunologic: Negative for environmental allergies, food allergies and immunocompromised state.   Neurological: Negative for dizziness, tremors, seizures, syncope, facial asymmetry, speech difficulty, weakness, light-headedness, numbness and headaches.   Hematological: Negative for adenopathy. Does not  bruise/bleed easily.   Psychiatric/Behavioral: Negative for agitation, behavioral problems, confusion, decreased concentration, dysphoric mood, hallucinations, self-injury, sleep disturbance and suicidal ideas. The patient is not nervous/anxious and is not hyperactive.      Social History     Social History   • Marital status:      Spouse name: N/A   • Number of children: N/A   • Years of education: N/A     Occupational History   • Not on file.     Social History Main Topics   • Smoking status: Never Smoker   • Smokeless tobacco: Never Used   • Alcohol use No   • Drug use: No   • Sexual activity: Defer     Other Topics Concern   • Not on file     Social History Narrative     Family History   Problem Relation Age of Onset   • Breast cancer Other    • Diabetes Other    • Esophageal cancer Other    • Hypertension Other    • Transient ischemic attack Other    • Breast cancer Mother    • Cancer Father      /76  Pulse 76  Wt 232 lb (105 kg)  LMP  (LMP Unknown)  SpO2 98%  BMI 38.61 kg/m2  Physical Exam   Constitutional: She is oriented to person, place, and time. She appears well-developed and well-nourished.   HENT:   Head: Normocephalic and atraumatic.   Nose: Nose normal.   Mouth/Throat: Oropharynx is clear and moist.   Eyes: Conjunctivae, EOM and lids are normal. Pupils are equal, round, and reactive to light.   Neck: Trachea normal and normal range of motion. Neck supple. Carotid bruit is not present. No tracheal deviation present. No thyroid mass and no thyromegaly present.   Cardiovascular: Normal rate, regular rhythm, normal heart sounds and intact distal pulses.  Exam reveals no gallop and no friction rub.    No murmur heard.  Pulmonary/Chest: Effort normal and breath sounds normal. No respiratory distress. She has no wheezes.   Musculoskeletal: Normal range of motion. She exhibits no edema or deformity.    Karla had a diabetic foot exam performed today.   During the foot exam she had a  monofilament test performed.    Neurological Sensory Findings - Altered hot/cold right ankle/foot discrimination and altered hot/cold left ankle/foot discrimination. Altered sharp/dull right ankle/foot discrimination and altered sharp/dull left ankle/foot discrimination. Left ankle/foot altered proprioception.    Vascular Status -  Her exam exhibits right foot vasculature normal. Her exam exhibits no right foot edema. Her exam exhibits left foot vasculature normal. Her exam exhibits no left foot edema.   Skin Integrity  -  Her right foot skin is not intact.   Karla's left foot skin is not intact. .     Lymphadenopathy:     She has no cervical adenopathy.   Neurological: She is alert and oriented to person, place, and time. She has normal reflexes. She displays normal reflexes. No cranial nerve deficit.   Skin: Skin is warm and dry. No rash noted. No cyanosis or erythema. Nails show no clubbing.   Psychiatric: She has a normal mood and affect. Her speech is normal and behavior is normal. Judgment and thought content normal. Cognition and memory are normal.   Nursing note and vitals reviewed.    Results for orders placed or performed during the hospital encounter of 11/05/17   POCT Urinalysis (automated dipstick)   Result Value Ref Range    Color Dark Yellow Yellow, Straw, Dark Yellow, Maria Isabel    Clarity, UA Turbid (A) Clear    Glucose, UA Negative Negative, 1000 mg/dL (3+) mg/dL    Bilirubin Negative Negative    Ketones, UA Negative Negative    Specific Gravity  1.020 1.005 - 1.030    Blood, UA 2+ (A) Negative    pH, Urine 6.0 5.0 - 8.0    Protein, POC Trace (A) Negative mg/dL    Urobilinogen, UA Normal Normal    Leukocytes Large (3+) (A) Negative    Nitrite, UA Positive (A) Negative     Problem List Items Addressed This Visit        Cardiovascular and Mediastinum    Hyperlipidemia LDL goal <100     Check flp          Relevant Orders    TSH    Lipid Panel    Essential hypertension     bp is good today- continue to  monitor          Relevant Orders    CBC & Differential    CBC Auto Differential       Endocrine    Diabetes mellitus - Primary     Blood sugar and 90 day average sugar reviewed  Results for orders placed or performed in visit on 11/14/17   POCT Glucose   Result Value Ref Range    Glucose 62 (A) 70 - 130 mg/dL   POC Glycosylated Hemoglobin (Hb A1C)   Result Value Ref Range    Hemoglobin A1C 6.6 %     Doing well overall  Low sugars pp meals- discussed reducing premeal insulin by 5 units (novolog 15 units before meals tid) unless eating a lot of carbs (thanksgiving, etc)  Is utd with eye exam  Neuropathy with multiple small toe wounds that appear minimally red with eschar- discussed wearing shoes and mupirocin topical provided  Also recommended appt with podiatry for appropriate foot wear   Encouraged her to wear shoes even at home   Foot care discussed  Due ur alb- has upcoming appt with Dr Mckeon  F/u 3-4 months          Relevant Orders    POCT Glucose (Completed)    POC Glycosylated Hemoglobin (Hb A1C) (Completed)    Comprehensive Metabolic Panel    Microalbumin / Creatinine Urine Ratio - Urine, Clean Catch    Diabetic foot ulcer     Toes and right heel   Reviewed foot care with plan as above          Diabetic nephropathy     Continue f/u Dr Mckeon            Other Visit Diagnoses     Vitamin D deficiency        Relevant Orders    Vitamin D 25 Hydroxy        Return in about 4 months (around 3/14/2018) for Recheck 30 min .    Tricia Kapoor MA  Signed Rani Lane MD

## 2017-11-16 ENCOUNTER — TELEPHONE (OUTPATIENT)
Dept: INTERNAL MEDICINE | Facility: CLINIC | Age: 82
End: 2017-11-16

## 2017-11-16 LAB
CREAT 24H UR-MCNC: 86.9 MG/DL
MICROALBUMIN UR-MCNC: 30.6 UG/ML
MICROALBUMIN/CREAT UR: 35.2 MG/G CREAT (ref 0–30)

## 2017-11-16 RX ORDER — MUPIROCIN CALCIUM 20 MG/G
CREAM TOPICAL
Qty: 30 G | Refills: 2 | Status: SHIPPED | OUTPATIENT
Start: 2017-11-16 | End: 2018-09-21

## 2017-11-16 NOTE — TELEPHONE ENCOUNTER
PATIENT'S DAUGHTER CALLED ASKING TO HAVE RX FOR MUPIROCIN 2% SENT TO [facility]. THIS IS THE PATIENT'S NEW PHARMACY.

## 2017-11-29 ENCOUNTER — HOSPITAL ENCOUNTER (EMERGENCY)
Facility: HOSPITAL | Age: 82
Discharge: HOME | End: 2017-11-29
Attending: EMERGENCY MEDICINE | Admitting: EMERGENCY MEDICINE

## 2017-11-29 ENCOUNTER — APPOINTMENT (OUTPATIENT)
Dept: GENERAL RADIOLOGY | Facility: HOSPITAL | Age: 82
End: 2017-11-29

## 2017-11-29 VITALS
WEIGHT: 227 LBS | SYSTOLIC BLOOD PRESSURE: 160 MMHG | DIASTOLIC BLOOD PRESSURE: 90 MMHG | RESPIRATION RATE: 16 BRPM | OXYGEN SATURATION: 91 % | HEIGHT: 65 IN | HEART RATE: 74 BPM | TEMPERATURE: 97.9 F | BODY MASS INDEX: 37.82 KG/M2

## 2017-11-29 DIAGNOSIS — E66.9 DIABETES MELLITUS TYPE 2 IN OBESE (HCC): ICD-10-CM

## 2017-11-29 DIAGNOSIS — S61.412A LACERATION OF LEFT HAND WITH COMPLICATION, INITIAL ENCOUNTER: ICD-10-CM

## 2017-11-29 DIAGNOSIS — Z79.01 CHRONIC ANTICOAGULATION: ICD-10-CM

## 2017-11-29 DIAGNOSIS — S61.452A DOG BITE, HAND, LEFT, INITIAL ENCOUNTER: Primary | ICD-10-CM

## 2017-11-29 DIAGNOSIS — I10 ESSENTIAL HYPERTENSION: ICD-10-CM
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
## 2017-11-29 DIAGNOSIS — N18.4 STAGE 4 CHRONIC KIDNEY DISEASE (HCC): ICD-10-CM

## 2017-11-29 DIAGNOSIS — E11.69 DIABETES MELLITUS TYPE 2 IN OBESE (HCC): ICD-10-CM

## 2017-11-29 LAB
ANION GAP SERPL CALCULATED.3IONS-SCNC: 8 MMOL/L (ref 3–11)
BUN BLD-MCNC: 61 MG/DL (ref 9–23)
BUN/CREAT SERPL: 35.9 (ref 7–25)
CALCIUM SPEC-SCNC: 8.9 MG/DL (ref 8.7–10.4)
CHLORIDE SERPL-SCNC: 107 MMOL/L (ref 99–109)
CO2 SERPL-SCNC: 21 MMOL/L (ref 20–31)
CREAT BLD-MCNC: 1.7 MG/DL (ref 0.6–1.3)
GFR SERPL CREATININE-BSD FRML MDRD: 29 ML/MIN/1.73
GLUCOSE BLD-MCNC: 167 MG/DL (ref 70–100)
HOLD SPECIMEN: NORMAL
HOLD SPECIMEN: NORMAL
INR PPP: 2.9 (ref 0.8–1.2)
POTASSIUM BLD-SCNC: 5 MMOL/L (ref 3.5–5.5)
PROTHROMBIN TIME: 33.3 SECONDS (ref 12.8–15.2)
SODIUM BLD-SCNC: 136 MMOL/L (ref 132–146)
WHOLE BLOOD HOLD SPECIMEN: NORMAL
WHOLE BLOOD HOLD SPECIMEN: NORMAL

## 2017-11-29 PROCEDURE — 99284 EMERGENCY DEPT VISIT MOD MDM: CPT

## 2017-11-29 PROCEDURE — 90471 IMMUNIZATION ADMIN: CPT | Performed by: EMERGENCY MEDICINE

## 2017-11-29 PROCEDURE — 80048 BASIC METABOLIC PNL TOTAL CA: CPT | Performed by: EMERGENCY MEDICINE

## 2017-11-29 PROCEDURE — 25010000003 AMPICILLIN-SULBACTAM PER 1.5 G: Performed by: EMERGENCY MEDICINE

## 2017-11-29 PROCEDURE — 25010000002 TDAP 5-2.5-18.5 LF-MCG/0.5 SUSPENSION: Performed by: EMERGENCY MEDICINE

## 2017-11-29 PROCEDURE — 73130 X-RAY EXAM OF HAND: CPT

## 2017-11-29 PROCEDURE — 85610 PROTHROMBIN TIME: CPT

## 2017-11-29 PROCEDURE — 96365 THER/PROPH/DIAG IV INF INIT: CPT

## 2017-11-29 PROCEDURE — 90715 TDAP VACCINE 7 YRS/> IM: CPT | Performed by: EMERGENCY MEDICINE

## 2017-11-29 RX ORDER — TRAMADOL HYDROCHLORIDE 50 MG/1
50 TABLET ORAL EVERY 6 HOURS PRN
Qty: 12 TABLET | Refills: 0 | Status: SHIPPED | OUTPATIENT
Start: 2017-11-29 | End: 2018-03-15

## 2017-11-29 RX ORDER — LIDOCAINE HYDROCHLORIDE AND EPINEPHRINE 10; 10 MG/ML; UG/ML
10 INJECTION, SOLUTION INFILTRATION; PERINEURAL ONCE
Status: COMPLETED | OUTPATIENT
Start: 2017-11-29 | End: 2017-11-29

## 2017-11-29 RX ORDER — SODIUM CHLORIDE 0.9 % (FLUSH) 0.9 %
10 SYRINGE (ML) INJECTION AS NEEDED
Status: DISCONTINUED | OUTPATIENT
Start: 2017-11-29 | End: 2017-11-29 | Stop reason: HOSPADM

## 2017-11-29 RX ORDER — AMOXICILLIN AND CLAVULANATE POTASSIUM 500; 125 MG/1; MG/1
1 TABLET, FILM COATED ORAL 2 TIMES DAILY
Qty: 14 TABLET | Refills: 0 | Status: SHIPPED | OUTPATIENT
Start: 2017-11-29 | End: 2018-03-15

## 2017-11-29 RX ADMIN — TETANUS TOXOID, REDUCED DIPHTHERIA TOXOID AND ACELLULAR PERTUSSIS VACCINE, ADSORBED 0.5 ML: 5; 2.5; 8; 8; 2.5 SUSPENSION INTRAMUSCULAR at 03:01

## 2017-11-29 RX ADMIN — LIDOCAINE HYDROCHLORIDE,EPINEPHRINE BITARTRATE 10 ML: 10; .01 INJECTION, SOLUTION INFILTRATION; PERINEURAL at 10:20

## 2017-11-29 RX ADMIN — AMPICILLIN SODIUM AND SULBACTAM SODIUM 3 G: 2; 1 INJECTION, POWDER, FOR SOLUTION INTRAMUSCULAR; INTRAVENOUS at 03:35

## 2017-12-01 RX ORDER — BLOOD SUGAR DIAGNOSTIC
STRIP MISCELLANEOUS
Qty: 100 EACH | Refills: 5 | Status: SHIPPED | OUTPATIENT
Start: 2017-12-01 | End: 2018-03-15

## 2017-12-04 NOTE — OP NOTE
PREOPERATIVE DIAGNOSIS: Mutilating dog bite to left dorsal hand.    POSTOPERATIVE DIAGNOSIS: Mutilating dog bite to left dorsal hand.    PROCEDURES PERFORMED:   1.  Complex laceration and repair of left dorsal hand (total length:  17 cm).   2.  Repair of left princeps pollicis artery under loupe magnification.    SURGEON:  Thomas Prasad MD    ASSISTANT:  None.    ANESTHESIA:  20 mL of 50/50 mixture of 1% Xylocaine with 1:100,000 epinephrine and 0.25% Marcaine plain.    COMPLICATIONS:  None.     ESTIMATED BLOOD LOSS:  50 mL.    SPECIMENS:  None.    DRAINS:  None.     INDICATIONS FOR PROCEDURE:  This is an 82-year-old right-hand dominant female who was caring for her dog at home and the dog snapped at her and avulsed a large dorsal hand flap based over the 1st web space and dorsum of her left thumb.  Due to the fact that she is on Coumadin, she had fair amount of bleeding at home which was contained by emergency services. She was brought to the emergency room and received tetanus vaccination as well as antibiotics.      DESCRIPTION OF PROCEDURE: After reviewing the informed consent including the risks, benefits, expected outcomes, and possible complications of the procedure, the patient consented for bedside repair.  We specifically discussed the risks of bleeding, infection, loss of hand function, need for hand therapy, numbness, scarring, and she wished to proceed.      In the emergency room she was prepped and draped in the usual customary fashion, a total of 10 mL of local anesthesia was instilled as a field block.  The wound was copiously irrigated with 2L of normal saline.  All critical structures were identified including the princeps pollicis artery which had been divided.  Attention was first paid to the arterial repair using 8-0 nylon suture under loupe magnification. The artery was closed in an interrupted fashion being careful to avoid a back wall injury.  This was flushed with an angiocatheter and  demonstrated pulsatile flow after the repair.  Next attention was turned to the skin repair which was closed with 4-0 Vicryl suture with an interrupted buried stitch and the skin was closed with 4-0 nylon in an interrupted manner.  The patient tolerated the procedure well.  One hour following the procedure was awake, alert, and conversant with good pain control.

## 2017-12-05 ENCOUNTER — PATIENT OUTREACH (OUTPATIENT)
Dept: CASE MANAGEMENT | Facility: OTHER | Age: 82
End: 2017-12-05

## 2017-12-05 NOTE — OUTREACH NOTE
Spoke to Ms Cristobal briefly, as she was on her way to a medical appointment. Will call again later.

## 2017-12-07 ENCOUNTER — PATIENT OUTREACH (OUTPATIENT)
Dept: CASE MANAGEMENT | Facility: OTHER | Age: 82
End: 2017-12-07

## 2017-12-07 NOTE — OUTREACH NOTE
Spoke with Mrs Cristobal for HRCM. Pt has been treated for a recent dog bite, states is healing well, no swelling, pain resolved. Pt voiced no new needs or requests at present. No gaps in care noted. Will follow as needed for HRCM.

## 2017-12-16 DIAGNOSIS — G47.00 INSOMNIA: ICD-10-CM

## 2017-12-18 RX ORDER — ESZOPICLONE 2 MG/1
TABLET, FILM COATED ORAL
Qty: 30 TABLET | Refills: 3 | OUTPATIENT
Start: 2017-12-18

## 2017-12-18 RX ORDER — ESZOPICLONE 2 MG/1
TABLET, FILM COATED ORAL
Qty: 30 TABLET | Refills: 3 | OUTPATIENT
Start: 2017-12-18 | End: 2018-04-11 | Stop reason: SDUPTHER

## 2017-12-20 ENCOUNTER — CLINICAL SUPPORT NO REQUIREMENTS (OUTPATIENT)
Dept: CARDIOLOGY | Facility: CLINIC | Age: 82
End: 2017-12-20

## 2017-12-20 DIAGNOSIS — I50.84 CHF (NYHA CLASS IV, ACC/AHA STAGE D) (HCC): Primary | ICD-10-CM

## 2017-12-20 DIAGNOSIS — I48.20 CHRONIC ATRIAL FIBRILLATION (HCC): ICD-10-CM

## 2017-12-20 PROCEDURE — 93296 REM INTERROG EVL PM/IDS: CPT | Performed by: INTERNAL MEDICINE

## 2017-12-20 PROCEDURE — 93295 DEV INTERROG REMOTE 1/2/MLT: CPT | Performed by: INTERNAL MEDICINE

## 2018-01-22 RX ORDER — LEVOTHYROXINE SODIUM 0.1 MG/1
TABLET ORAL
Qty: 90 TABLET | Refills: 0 | Status: SHIPPED | OUTPATIENT
Start: 2018-01-22 | End: 2018-04-25 | Stop reason: SDUPTHER

## 2018-01-22 RX ORDER — ATORVASTATIN CALCIUM 20 MG/1
20 TABLET, FILM COATED ORAL NIGHTLY
Qty: 90 TABLET | Refills: 0 | Status: SHIPPED | OUTPATIENT
Start: 2018-01-22 | End: 2018-04-30 | Stop reason: SDUPTHER

## 2018-03-02 NOTE — TELEPHONE ENCOUNTER
PT CALLED IN NEEDING A REFILL FOR NOVALOG. PT STATED SHE NEEDED IT BY TODAY SENT TO [facility] LISTED. CALL BACK # IF NEEDED -[phone]

## 2018-03-13 RX ORDER — CARVEDILOL 25 MG/1
TABLET ORAL
Qty: 90 TABLET | Refills: 0 | Status: SHIPPED | OUTPATIENT
Start: 2018-03-13 | End: 2018-06-14 | Stop reason: SDUPTHER

## 2018-03-15 ENCOUNTER — OFFICE VISIT (OUTPATIENT)
Dept: CARDIOLOGY | Facility: CLINIC | Age: 83
End: 2018-03-15

## 2018-03-15 VITALS
WEIGHT: 223 LBS | DIASTOLIC BLOOD PRESSURE: 72 MMHG | SYSTOLIC BLOOD PRESSURE: 124 MMHG | HEIGHT: 65 IN | HEART RATE: 76 BPM | BODY MASS INDEX: 37.15 KG/M2

## 2018-03-15 DIAGNOSIS — I10 ESSENTIAL HYPERTENSION: ICD-10-CM

## 2018-03-15 DIAGNOSIS — I50.22 CHRONIC SYSTOLIC CONGESTIVE HEART FAILURE (HCC): ICD-10-CM

## 2018-03-15 DIAGNOSIS — I48.20 CHRONIC ATRIAL FIBRILLATION (HCC): Primary | ICD-10-CM

## 2018-03-15 PROCEDURE — 93284 PRGRMG EVAL IMPLANTABLE DFB: CPT | Performed by: INTERNAL MEDICINE

## 2018-03-15 NOTE — PROGRESS NOTES
Karla LINO Cristobal  1935  [phone]      03/15/2018    [facility] CARDIOLOGY     Giselle Guzman, DO  [address] [address]    Chief Complaint   Patient presents with   • Atrial Fibrillation       PROBLEM LIST:  1. Ischemic heart disease:  a. History of remote myocardial infarction/CABG x3, [facility] (database insufficient).   b. Left heart catheterization by Dr. Basilio Grossman, 2007, revealing patent LIMA graft/medical therapy, LVEF 30%.  c. Echocardiogram, [facility], 2007: LVEF 25%; moderate/severe mitral regurgitation.  d. Upgrade of pacemaker to [employer] BI-V pacemaker per Dr. Hammonds on 10/25/2007.  e. Reported negative Cardiolite stress test in 2010, [facility], Dr. Basilio Grossman (database insufficient).  f. Reported echocardiogram, October 2011, [facility] (database insufficient).   g. Echocardiogram, 08/29/2012: LVEF 35% to 40%, mild mitral regurgitation.  h. History of MRSA pacemaker infection, 2005 (database insufficient).   i. [employer]el pacemaker, 2005, with upgrade to a [employer] BI-V ICD device in 2007, Dr. Hammonds.   j. CRT-D generator exchange by Tano Beal, 09/21/2012, [employer]el device (Serial# [id number]).  k. Persistent ischemic cardiomyopathy.   l. Echocardiogram, 10/20/2015: LVEF 35% to 40%, moderate mitral regurgitation/tricuspid regurgitation; RVSP 45 mmHg.   2. Chronic class III systolic heart failure.  3. History of persistent atrial fibrillation.  a. CHADS2 score equal to 4.   b. Chronic Coumadin therapy managed by [facility]’s office.   4. Pericardial effusion, 2008.  5. History of frequent urinary tract infections.  6. Hypothyroidism.  7. Insulin-dependent diabetes mellitus, type 2.  8. Chronic kidney disease, stage 4, followed by [facility] Mckeon.   9. Dyslipidemia.  10. Hypertension.  11. Home O2 nightly/sleep apnea.  12. History of endometrial cancer, status post partial  hysterectomy in 2008 with follow up radiation therapy followed by Dr. Sosa.   13. History of multiple left hip surgeries most recently in 2011 at [facility]    Allergies  Allergies   Allergen Reactions   • No Known Drug Allergy        Current Medications    Current Outpatient Prescriptions:   •  aspirin (ASPIRIN LOW DOSE) 81 MG tablet, Take  by mouth daily., Disp: , Rfl:   •  atorvastatin (LIPITOR) 20 MG tablet, Take 1 tablet by mouth Every Night., Disp: 90 tablet, Rfl: 0  •  brimonidine-timolol (COMBIGAN) 0.2-0.5 % ophthalmic solution, Apply  to eye., Disp: , Rfl:   •  carvedilol (COREG) 25 MG tablet, TAKE 1/2 TABLET BY MOUTH TWICE DAILY , Disp: 90 tablet, Rfl: 0  •  cholecalciferol (VITAMIN D3) 1000 UNITS tablet, Take 1,000 Units by mouth Daily., Disp: , Rfl:   •  eszopiclone (LUNESTA) 2 MG tablet, TAKE 1 TABLET BY MOUTH AT BEDTIME. , Disp: 30 tablet, Rfl: 3  •  furosemide (LASIX) 40 MG tablet, Take  by mouth as needed., Disp: , Rfl:   •  glucagon (GLUCAGON EMERGENCY) 1 MG injection, Glucagon Emergency 1 MG Injection Kit; Patient Sig: Glucagon Emergency 1 MG Injection Kit USE AS DIRECTED.; 1; 2; 14-Dec-2012; Active, Disp: , Rfl:   •  indapamide (LOZOL) 2.5 MG tablet, Take  by mouth daily., Disp: , Rfl:   •  insulin aspart (NOVOLOG) 100 UNIT/ML injection, Inject 20 Units under the skin 3 (Three) Times a Day Before Meals., Disp: 4 each, Rfl: 0  •  insulin glargine (LANTUS) 100 UNIT/ML injection, Inject 20 Units under the skin Daily. (Patient taking differently: Inject 25 Units under the skin Daily.), Disp: 2 each, Rfl: 0  •  isosorbide mononitrate (IMDUR) 30 MG 24 hr tablet, TAKE ONE TABLET BY MOUTH EVERY DAY, Disp: 90 tablet, Rfl: 3  •  latanoprost (XALATAN) 0.005 % ophthalmic solution, APPLY 1 DROP TO EACH EYE AT BEDTIME, Disp: , Rfl: 5  •  levothyroxine (SYNTHROID, LEVOTHROID) 100 MCG tablet, TAKE ONE TABLET BY MOUTH ONE TIME DAILY , Disp: 90 tablet, Rfl: 0  •  mupirocin (BACTROBAN) 2 % cream,  "Apply to toes twice daily, Disp: 30 g, Rfl: 2  •  ondansetron (ZOFRAN) 4 MG tablet, Take  by mouth every 12 (twelve) hours as needed., Disp: , Rfl:   •  spironolactone (ALDACTONE) 25 MG tablet, Take  by mouth daily., Disp: , Rfl:   •  timolol (TIMOPTIC) 0.5 % ophthalmic solution, APPLY 1 DROP IN EACH EYE 2 TIMES A DAY, Disp: , Rfl: 5  •  warfarin (COUMADIN) 3 MG tablet, Take  by mouth daily. Patient takes 2 mg & 3 mg.  Rotates dosage every other day. , Disp: , Rfl:     History of Present Illness   HPI    Pt presents for follow up of chronic atrial fibrillation, chronic systolic CHF, and BiV ICD check. Since we last saw the pt, pt denies any awareness AF episodes, changes in her chronic SOB, CP, LH, and dizziness/sycnope. She denies any bleeding issues on her warfarin and her INRs have been therapeutic. No TIA/CVA symptoms. Overall feels well for the most part. She did have one ER visit for a dog bite and had stitches.     ROS:  General:  Denies fatigue, weight gain or loss  Cardiovascular:  Denies CP, PND, syncope, near syncope, edema or palpitations.  Pulmonary: +MARKHAM,  - cough, or wheezing      Vitals:    03/15/18 1255   BP: 124/72   BP Location: Left arm   Patient Position: Sitting   Pulse: 76   Weight: 101 kg (223 lb)   Height: 165.1 cm (65\")     Body mass index is 37.11 kg/m².  PE:  General: NAD  Neck: no JVD, no carotid bruits, no TM  Heart RRR, NL S1, S2,, no rubs, murmurs  Lungs: CTA, no wheezes, rhonchi, or rales  Abd: soft, non-tender, NL BS  Ext: No musculoskeletal deformities, trace left lower extremity edema, cyanosis, or clubbing  Psych: normal mood and affect    Diagnostic Data:    BiV ICD check: normal function. 99% BiV paced. Chronic atrial fibrillation. 2.1 years on battery.     Procedures    1. Chronic atrial fibrillation    2. Chronic systolic congestive heart failure    3. Essential hypertension          Plan:  1) AF- chronic, asymptomatic, rate controlled.   Continue present medications.   2) " Anticoagulation: CHADSVasc = 7   Continue warfarin  3) Chronic systolic CHF - stable, normal BiV ICD function. On Coreg, no ACE or ARB due to CKD  Wt loss, exercise, salt reduction  4) HTN - controlled.     F/up in 8 months    Scribed for Tano Beal MD by Mireya Hall PA-C. 3/15/2018  1:14 PM     I, Tano Beal MD, personally performed the services described in this documentation as scribed by the above named individual in my presence, and it is both accurate and complete.  3/15/2018  1:17 PM

## 2018-04-11 ENCOUNTER — TELEPHONE (OUTPATIENT)
Dept: INTERNAL MEDICINE | Facility: CLINIC | Age: 83
End: 2018-04-11

## 2018-04-11 DIAGNOSIS — G47.00 INSOMNIA: ICD-10-CM

## 2018-04-11 NOTE — TELEPHONE ENCOUNTER
MS MEAD CALLED TODAY TO SEE IF THERE WOULD BE ANY SAMPLES OF NOVOLOG AVAILABLE FOR HER TO .    CALL BACK [phone]

## 2018-04-13 RX ORDER — ESZOPICLONE 2 MG/1
TABLET, FILM COATED ORAL
Qty: 30 TABLET | Refills: 0 | OUTPATIENT
Start: 2018-04-13 | End: 2018-05-15 | Stop reason: SDUPTHER

## 2018-04-13 NOTE — TELEPHONE ENCOUNTER
Patient advised we do not have samples of novolog however we can provide humalog samples.  Pt voiced understanding and will pick humalog samples up

## 2018-04-26 RX ORDER — LEVOTHYROXINE SODIUM 0.1 MG/1
TABLET ORAL
Qty: 90 TABLET | Refills: 0 | OUTPATIENT
Start: 2018-04-26

## 2018-04-26 RX ORDER — LEVOTHYROXINE SODIUM 0.1 MG/1
TABLET ORAL
Qty: 90 TABLET | Refills: 0 | Status: SHIPPED | OUTPATIENT
Start: 2018-04-26 | End: 2018-07-21 | Stop reason: SDUPTHER

## 2018-04-30 RX ORDER — ATORVASTATIN CALCIUM 20 MG/1
20 TABLET, FILM COATED ORAL NIGHTLY
Qty: 90 TABLET | Refills: 0 | Status: SHIPPED | OUTPATIENT
Start: 2018-04-30 | End: 2018-08-03 | Stop reason: SDUPTHER

## 2018-04-30 RX ORDER — SPIRONOLACTONE 25 MG/1
25 TABLET ORAL DAILY
Qty: 90 TABLET | Refills: 1 | Status: SHIPPED | OUTPATIENT
Start: 2018-04-30 | End: 2021-05-02

## 2018-05-10 DIAGNOSIS — G47.00 INSOMNIA: ICD-10-CM

## 2018-05-10 RX ORDER — ESZOPICLONE 2 MG/1
TABLET, FILM COATED ORAL
Qty: 30 TABLET | Refills: 0 | OUTPATIENT
Start: 2018-05-10

## 2018-05-10 NOTE — TELEPHONE ENCOUNTER
Please advise, Dr. Guzman is out and you are on call for her Pt's Friday    Pt last seen 11-14-17 . Last rx 4-13-18 #30 [id number] . CSA: none  UDS: none GEORGETTE: 2016??

## 2018-05-11 NOTE — TELEPHONE ENCOUNTER
Refused lunesta - no documentation in the last year re: insomnia.  Last GEORGETTE from 8/2016.  No signed contract.    Needs OV for RX

## 2018-05-15 ENCOUNTER — OFFICE VISIT (OUTPATIENT)
Dept: ENDOCRINOLOGY | Facility: CLINIC | Age: 83
End: 2018-05-15

## 2018-05-15 ENCOUNTER — OFFICE VISIT (OUTPATIENT)
Dept: INTERNAL MEDICINE | Facility: CLINIC | Age: 83
End: 2018-05-15

## 2018-05-15 VITALS
SYSTOLIC BLOOD PRESSURE: 110 MMHG | BODY MASS INDEX: 37.49 KG/M2 | WEIGHT: 225.3 LBS | HEART RATE: 73 BPM | DIASTOLIC BLOOD PRESSURE: 80 MMHG | OXYGEN SATURATION: 95 %

## 2018-05-15 VITALS
DIASTOLIC BLOOD PRESSURE: 80 MMHG | WEIGHT: 225 LBS | HEART RATE: 73 BPM | BODY MASS INDEX: 36.16 KG/M2 | SYSTOLIC BLOOD PRESSURE: 110 MMHG | HEIGHT: 66 IN

## 2018-05-15 DIAGNOSIS — I48.20 CHRONIC ATRIAL FIBRILLATION (HCC): ICD-10-CM

## 2018-05-15 DIAGNOSIS — I10 ESSENTIAL HYPERTENSION: Primary | ICD-10-CM

## 2018-05-15 DIAGNOSIS — E78.5 HYPERLIPIDEMIA LDL GOAL <100: ICD-10-CM

## 2018-05-15 DIAGNOSIS — E11.9 TYPE 2 DIABETES MELLITUS WITHOUT COMPLICATION, WITH LONG-TERM CURRENT USE OF INSULIN (HCC): Primary | ICD-10-CM

## 2018-05-15 DIAGNOSIS — I10 ESSENTIAL HYPERTENSION: ICD-10-CM

## 2018-05-15 DIAGNOSIS — G47.09 OTHER INSOMNIA: ICD-10-CM

## 2018-05-15 DIAGNOSIS — E03.8 OTHER SPECIFIED HYPOTHYROIDISM: ICD-10-CM

## 2018-05-15 DIAGNOSIS — N18.4 CKD (CHRONIC KIDNEY DISEASE), STAGE IV (HCC): ICD-10-CM

## 2018-05-15 DIAGNOSIS — Z79.4 TYPE 2 DIABETES MELLITUS WITHOUT COMPLICATION, WITH LONG-TERM CURRENT USE OF INSULIN (HCC): Primary | ICD-10-CM

## 2018-05-15 LAB
ALBUMIN SERPL-MCNC: 4 G/DL (ref 3.2–4.8)
ALBUMIN/GLOB SERPL: 1.3 G/DL (ref 1.5–2.5)
ALP SERPL-CCNC: 156 U/L (ref 25–100)
ALT SERPL W P-5'-P-CCNC: 22 U/L (ref 7–40)
ANION GAP SERPL CALCULATED.3IONS-SCNC: 10 MMOL/L (ref 3–11)
ARTICHOKE IGE QN: 73 MG/DL (ref 0–130)
AST SERPL-CCNC: 23 U/L (ref 0–33)
BILIRUB SERPL-MCNC: 0.6 MG/DL (ref 0.3–1.2)
BUN BLD-MCNC: 100 MG/DL (ref 9–23)
BUN/CREAT SERPL: 45.5 (ref 7–25)
CALCIUM SPEC-SCNC: 9.3 MG/DL (ref 8.7–10.4)
CHLORIDE SERPL-SCNC: 102 MMOL/L (ref 99–109)
CHOLEST SERPL-MCNC: 113 MG/DL (ref 0–200)
CO2 SERPL-SCNC: 28 MMOL/L (ref 20–31)
CREAT BLD-MCNC: 2.2 MG/DL (ref 0.6–1.3)
GFR SERPL CREATININE-BSD FRML MDRD: 21 ML/MIN/1.73
GLOBULIN UR ELPH-MCNC: 3.1 GM/DL
GLUCOSE BLD-MCNC: 101 MG/DL (ref 70–100)
GLUCOSE BLDC GLUCOMTR-MCNC: 78 MG/DL (ref 70–130)
HBA1C MFR BLD: 6.7 %
HDLC SERPL-MCNC: 38 MG/DL (ref 40–60)
POTASSIUM BLD-SCNC: 4.6 MMOL/L (ref 3.5–5.5)
PROT SERPL-MCNC: 7.1 G/DL (ref 5.7–8.2)
SODIUM BLD-SCNC: 140 MMOL/L (ref 132–146)
TRIGL SERPL-MCNC: 81 MG/DL (ref 0–150)

## 2018-05-15 PROCEDURE — 82947 ASSAY GLUCOSE BLOOD QUANT: CPT | Performed by: INTERNAL MEDICINE

## 2018-05-15 PROCEDURE — 83036 HEMOGLOBIN GLYCOSYLATED A1C: CPT | Performed by: INTERNAL MEDICINE

## 2018-05-15 PROCEDURE — 99214 OFFICE O/P EST MOD 30 MIN: CPT | Performed by: INTERNAL MEDICINE

## 2018-05-15 PROCEDURE — 80061 LIPID PANEL: CPT | Performed by: INTERNAL MEDICINE

## 2018-05-15 PROCEDURE — 80053 COMPREHEN METABOLIC PANEL: CPT | Performed by: INTERNAL MEDICINE

## 2018-05-15 RX ORDER — ESZOPICLONE 2 MG/1
2 TABLET, FILM COATED ORAL
Qty: 30 TABLET | Refills: 2 | Status: SHIPPED | OUTPATIENT
Start: 2018-05-15 | End: 2018-08-20 | Stop reason: SDUPTHER

## 2018-05-15 NOTE — PROGRESS NOTES
Marlen Cristobal is a 82 y.o. female.   Chief Complaint   Patient presents with   • Atrial Fibrillation   • Hypertension   • Insomnia       Chronic Kidney Disease   Pertinent negatives include no abdominal pain, arthralgias, chest pain, chills, congestion, coughing, diaphoresis, fatigue, fever, headaches, myalgias, nausea, neck pain, numbness, rash, sore throat, vomiting or weakness.   Hyperlipidemia   Pertinent negatives include no chest pain, myalgias or shortness of breath.   Hypertension   This is a chronic problem. The current episode started more than 1 year ago. Pertinent negatives include no chest pain, headaches, neck pain, palpitations or shortness of breath.   Atrial Fibrillation   Presents for follow-up visit. Symptoms include hypertension. Symptoms are negative for chest pain, palpitations, shortness of breath, syncope, tachycardia and weakness. Past medical history includes atrial fibrillation and hyperlipidemia.   Insomnia   This is a chronic problem. The current episode started more than 1 year ago. Pertinent negatives include no abdominal pain, arthralgias, chest pain, chills, congestion, coughing, diaphoresis, fatigue, fever, headaches, myalgias, nausea, neck pain, numbness, rash, sore throat, vomiting or weakness.        The following portions of the patient's history were reviewed and updated as appropriate: allergies, current medications, past family history, past medical history, past social history, past surgical history and problem list.    Review of Systems   Constitutional: Negative for activity change, appetite change, chills, diaphoresis, fatigue, fever and unexpected weight change.   HENT: Negative for congestion, ear discharge, ear pain, mouth sores, nosebleeds, sinus pressure, sneezing and sore throat.    Eyes: Negative for pain, discharge and itching.   Respiratory: Negative for cough, chest tightness, shortness of breath and wheezing.    Cardiovascular: Negative for chest  pain, palpitations, leg swelling and syncope.   Gastrointestinal: Negative for abdominal pain, constipation, diarrhea, nausea and vomiting.   Endocrine: Negative for cold intolerance, heat intolerance, polydipsia and polyphagia.   Genitourinary: Negative for dysuria, flank pain, frequency, hematuria and urgency.   Musculoskeletal: Negative for arthralgias, back pain, gait problem, myalgias, neck pain and neck stiffness.   Skin: Negative for color change, pallor and rash.   Neurological: Negative for seizures, speech difficulty, weakness, numbness and headaches.   Psychiatric/Behavioral: Negative for agitation, confusion, decreased concentration and sleep disturbance. The patient has insomnia. The patient is not nervous/anxious.      /80   Pulse 73   Wt 102 kg (225 lb 4.8 oz)   LMP  (LMP Unknown)   SpO2 95%   BMI 37.49 kg/m²     Objective   Physical Exam   Constitutional: She is oriented to person, place, and time. She appears well-developed.   HENT:   Head: Normocephalic.   Right Ear: External ear normal.   Left Ear: External ear normal.   Nose: Nose normal.   Mouth/Throat: Oropharynx is clear and moist.   Eyes: Conjunctivae are normal. Pupils are equal, round, and reactive to light.   Neck: No JVD present. No thyromegaly present.   Cardiovascular: Normal rate, regular rhythm and normal heart sounds.  Exam reveals no friction rub.    No murmur heard.  Pulmonary/Chest: Effort normal and breath sounds normal. No respiratory distress. She has no wheezes. She has no rales.   Abdominal: Soft. Bowel sounds are normal. She exhibits no distension. There is no tenderness. There is no guarding.   Musculoskeletal: She exhibits no edema or tenderness.   Lymphadenopathy:     She has no cervical adenopathy.   Neurological: She is oriented to person, place, and time. She displays normal reflexes. No cranial nerve deficit.   Skin: No rash noted.   Psychiatric: Her behavior is normal.   Nursing note and vitals  reviewed.      Assessment/Plan   Karla was seen today for atrial fibrillation, hypertension and insomnia.    Diagnoses and all orders for this visit:    Chronic atrial fibrillation  Stable. Nephro manages her coumadin and does not want to change this  Hyperlipidemia LDL goal <100  stable  Essential hypertension  stable  CKD (chronic kidney disease), stage IV  Continue with nephro.      50% of visit spent counseling

## 2018-05-15 NOTE — PROGRESS NOTES
Karla Cristobal 82 y.o.  CC:Follow-up; Diabetes (Type II, eye exam was 2/2018 by Epifanio Muniz MD); and Peripheral Neuropathy      [place]: Follow-up; Diabetes (Type II, eye exam was 2/2018 by Epifanio Muniz MD); and Peripheral Neuropathy    Blood sugar and 90 day average sugar reviewed  Results for orders placed or performed in visit on 05/15/18   POC Glycosylated Hemoglobin (Hb A1C)   Result Value Ref Range    Hemoglobin A1C 6.7 %   POC Glucose Fingerstick   Result Value Ref Range    Glucose 78 70 - 130 mg/dL     Average sugar is 140  She is on lantus and novolog   No low sugars   occ sharp foot pain - in toe  Short lived and self limited  She Is up to date with eye exam  Ur alb borderline 11/17  Has lab work ordered by PCP Dr Guzman     Allergies   Allergen Reactions   • No Known Drug Allergy        Current Outpatient Prescriptions:   •  aspirin (ASPIRIN LOW DOSE) 81 MG tablet, Take  by mouth daily., Disp: , Rfl:   •  atorvastatin (LIPITOR) 20 MG tablet, Take 1 tablet by mouth Every Night., Disp: 90 tablet, Rfl: 0  •  brimonidine-timolol (COMBIGAN) 0.2-0.5 % ophthalmic solution, Apply  to eye., Disp: , Rfl:   •  carvedilol (COREG) 25 MG tablet, TAKE 1/2 TABLET BY MOUTH TWICE DAILY , Disp: 90 tablet, Rfl: 0  •  cholecalciferol (VITAMIN D3) 1000 UNITS tablet, Take 1,000 Units by mouth Daily., Disp: , Rfl:   •  eszopiclone (LUNESTA) 2 MG tablet, Take 1 tablet by mouth every night at bedtime. Take immediately before bedtime, Disp: 30 tablet, Rfl: 2  •  furosemide (LASIX) 40 MG tablet, Take  by mouth as needed., Disp: , Rfl:   •  glucagon (GLUCAGON EMERGENCY) 1 MG injection, Glucagon Emergency 1 MG Injection Kit; Patient Sig: Glucagon Emergency 1 MG Injection Kit USE AS DIRECTED.; 1; 2; 14-Dec-2012; Active, Disp: , Rfl:   •  indapamide (LOZOL) 2.5 MG tablet, Take  by mouth daily., Disp: , Rfl:   •  insulin aspart (NOVOLOG) 100 UNIT/ML injection, Inject 20 Units under the skin 3 (Three) Times a Day Before Meals., Disp: 4  each, Rfl: 0  •  insulin glargine (LANTUS) 100 UNIT/ML injection, Inject 20 Units under the skin Daily. (Patient taking differently: Inject 25 Units under the skin Daily.), Disp: 2 each, Rfl: 0  •  isosorbide mononitrate (IMDUR) 30 MG 24 hr tablet, TAKE ONE TABLET BY MOUTH EVERY DAY, Disp: 90 tablet, Rfl: 3  •  latanoprost (XALATAN) 0.005 % ophthalmic solution, APPLY 1 DROP TO EACH EYE AT BEDTIME, Disp: , Rfl: 5  •  levothyroxine (SYNTHROID, LEVOTHROID) 100 MCG tablet, TAKE ONE TABLET BY MOUTH ONE TIME DAILY , Disp: 90 tablet, Rfl: 0  •  mupirocin (BACTROBAN) 2 % cream, Apply to toes twice daily, Disp: 30 g, Rfl: 2  •  ondansetron (ZOFRAN) 4 MG tablet, Take  by mouth every 12 (twelve) hours as needed., Disp: , Rfl:   •  spironolactone (ALDACTONE) 25 MG tablet, Take 1 tablet by mouth Daily., Disp: 90 tablet, Rfl: 1  •  warfarin (COUMADIN) 3 MG tablet, Take  by mouth daily. Patient takes 2 mg & 3 mg.  Rotates dosage every other day. , Disp: , Rfl:   Patient Active Problem List    Diagnosis   • Chronic systolic congestive heart failure [I50.22]   • Change in mole [L81.9]   • DVT of lower extremity (deep venous thrombosis) [I82.409]   • CAD (coronary artery disease) [I25.10]   • Anemia [D64.9]   • Ischemic heart disease [I25.9]     Overview Note:     1. Ischemic heart disease.  a. History of remote myocardial infarction/CABG x 3, [facility] (database insufficient).   b. Left heart catheterization by Dr. Basilio Grossamn. 2007 revealing patent LIMA graft/medical therapy, LVEF 30%.  c. Echocardiogram, [facility]. 2007: LVEF 25%; moderate/severe mitral regurgitation.  d. Upgrade of pacemaker to [employer] BI-V pacemaker per Dr. Hammonds on 10/25/2007.  e. Reported negative Cardiolite stress test in 2010, [facility], Dr. Basilio Grossman (database insufficient).  f. Reported echocardiogram, October 2011, [facility] (database insufficient).   g. Echocardiogram, 08/29/2012: LVEF 35-40%,  mild mitral regurgitation.  h. Echocardiogram, 10/20/2015: LVEF 35-40%, moderate mitral regurgitation/tricuspid regurgitation; RVSP 45 mmHg.   i. History of MRSA pacemaker infection, 2005 (database insufficient).   j. [employer] Jamel pacemaker 2005 with upgrade to a [employer] BI-V ICD device in 2007, Dr. Hammonds.   k. CRT-D generator exchange by Tano Beal, 09/21/2012. [employer]el device (Serial# [id number]).  l. Persistent ischemic cardiomyopathy.      • Anemia due to chronic kidney disease [N18.9, D63.1]   • Chronic atrial fibrillation [I48.2]     Overview Note:     Impression: 03/15/2016 - stable  Impression: 11/23/2015 - stable. INR is  Impression: 03/24/2015 - INR is 2.1. Continue 3 mg and f/u with cardio  Impression: 03/24/2015 - INR is  Impression: 11/18/2014 - stable;      • Change in nevus [D22.9]   • CKD (chronic kidney disease), stage IV [N18.4]     Overview Note:     Impression: 11/23/2015 - follows with nephro.; Description: [facility] 12/12     • Conjunctivitis [H10.9]   • Diabetes mellitus [E11.9]     Overview Note:     Impression: 03/15/2016 - blood sugar 166 and hgn a1c 7.3%   is up to date with eye exam   neuropathy with callus, no ulcer   some scratches feet   ur alb due and ordered   no change other than provided samples of toujeo because she feels like lantus worked much better than levemir, she has tried titrating levemir and it is less effective   continue testing and f/u 3-4 months  Impression: 11/23/2015 - blood sugar stable    is up to date with eye exam- 2 weeks ago   ur alb pos, ckd, no arb or ace but bp well controlled   neuropathy intermittent currently- lancinating pain   samples provided for insulin   commended current efforts to maintain good bp control   wound left hand and right arm appear to be healing  Impression: 07/20/2015 - would like to change to lantus for copay  is on levemir now  utd eye exam  lancination foot pain  due ur alb  discussed blood sugar 250 after coffee  cake  discussed hgn a1c 7.2% (average 150)  Impression: 07/20/2015 - would like to change to lantus for copay  is on levemir now  utd eye exam  lancination foot pain  due ur alb  Impression: 03/24/2015 - blood [id number], hgn a1c 7.9% (down from 8.2%)  is doing well overall  discussed adjustment of novolog prn   is up to date with eye exam  neuropathy stable - no new callus  ur alb today  Impression: 11/18/2014 - blood sugar is 188, hgn a1c 8.1% (average 180)  is up to date with eye exam, neuropathy is stable  small callus right foot just under great toe  dry skin heel  doing well overall  discussed goal hgn a1c but she has been in the donut hole so has been cutting back on insulin - samples provided today for novolog.  More stress related to recent death of   f/u 3-4 months  Impression: 07/17/2014 - blood sugar and 90 day average sugar were reviewed, in good range currently.  She is up to date with preventive care, doing well overall.  No low sugars, occ higher number.  Giving own insulin   f/u 3-4 months; Description: dx 1994     • Diabetic foot ulcer [E11.621, L97.509]   • Diabetic nephropathy [E11.21]     Overview Note:     Impression: 11/18/2014 - update ur alb;      • Diabetic retinopathy [E11.319]     Overview Note:     Impression: 11/18/2014 - is up to date with eye exam;      • Dog bite of hand [S61.459A, W54.0XXA]   • Bruises easily [R23.8]   • Malignant neoplasm of endometrium [C54.1]   • Foot pain [M79.673]     Overview Note:     Description: lancinating- worse but not consistent enough to want rx     • Hyperlipidemia LDL goal <100 [E78.5]     Overview Note:     Impression: 03/15/2016 - lipids and cmp  Impression: 11/23/2015 - stable  Impression: 03/24/2015 - lipids, cmp  Impression: 11/18/2014 - check flp  Impression: 11/18/2014 - not fasting, lipids next visit.  Impression: 07/17/2014 - stable on current dose of statin; Description: pravastatin 40 mg - do not titrate     • Essential hypertension  [I10]     Overview Note:     Impression: 03/15/2016 - bp is good  Impression: 03/15/2016 - cmp, lipids, urine micro  Impression: 11/23/2015 - stable  Impression: 03/24/2015 - urine micro  Impression: 11/18/2014 - bp is good  Impression: 11/18/2014 - stable  Impression: 07/17/2014 - bp is good;      • Hypothyroidism [E03.9]     Overview Note:     Impression: 03/15/2016 - check tsh  Impression: 11/23/2015 - tsh normal 7/15- update next lab draw  Impression: 07/20/2015 - check tfts  Impression: 11/18/2014 - check tft;      • Insomnia [G47.00]     Overview Note:     Impression: 03/15/2016 - refilled ambien  Impression: 11/23/2015 - refilled ambien  Impression: 03/24/2015 - refilled ambien  Impression: 11/18/2014 - stable;      • Generalized ischemic myocardial dysfunction [I25.5]   • Leukocytosis [D72.829]   • Memory impairment [R41.3]     Overview Note:     Impression: 03/15/2016 - MSE 29  May be Lunesta (started around this time)  Impression: 03/15/2016 - MSE  May be Lunesta (started around this time);      • Methicillin resistant Staphylococcus aureus infection [A49.02]   • Nausea and vomiting [R11.2]     Overview Note:     Impression: 07/17/2014 - rx zofran provided  no diarrhea since this am  no abdominal pain, no blood / mucus/ fever  treat with supportive care, to call if not resolved within 24-48 hours;      • Pulmonary embolism [I26.99]   • Renal insufficiency [N28.9]     Overview Note:     Description: baseline creatinine - 2.49     • Skin lesion [L98.9]     Overview Note:     Impression: 03/24/2015 - refer derm for eval- seb kerat ant tib and ?ak medially with redness and irritation;      • Sleep apnea [G47.30]   • Shortness of breath [R06.02]     Overview Note:     Impression: 03/24/2015 - chronic. On 2 l oxygen at night. check cbc, bnp;      • Squamous cell carcinoma of skin [C44.92]     Overview Note:     Description: 5/15 [place]     • CHF (NYHA class IV, ACC/AHA stage D) [I50.9]     Review of Systems    Constitutional: Negative for activity change, appetite change, chills, diaphoresis, fatigue, fever and unexpected weight change.   HENT: Negative for congestion, dental problem, drooling, ear discharge, ear pain, facial swelling, hearing loss, mouth sores, nosebleeds, postnasal drip, rhinorrhea, sinus pressure, sneezing, sore throat, tinnitus, trouble swallowing and voice change.    Eyes: Negative for photophobia, pain, discharge, redness, itching and visual disturbance.   Respiratory: Negative for apnea, cough, choking, chest tightness, shortness of breath, wheezing and stridor.    Cardiovascular: Positive for leg swelling. Negative for chest pain and palpitations.   Gastrointestinal: Negative for abdominal distention, abdominal pain, anal bleeding, blood in stool, constipation, diarrhea, nausea, rectal pain and vomiting.   Endocrine: Negative for cold intolerance, heat intolerance, polydipsia, polyphagia and polyuria.   Genitourinary: Negative for decreased urine volume, difficulty urinating, dysuria, enuresis, flank pain, frequency, genital sores, hematuria and urgency.   Musculoskeletal: Positive for arthralgias and back pain. Negative for gait problem, joint swelling, myalgias, neck pain and neck stiffness.        Using walker for ambulation due to back and knee pain - stabilizing    Skin: Negative for color change, pallor, rash and wound.   Allergic/Immunologic: Negative for environmental allergies, food allergies and immunocompromised state.   Neurological: Negative for dizziness, tremors, seizures, syncope, facial asymmetry, speech difficulty, weakness, light-headedness, numbness and headaches.   Hematological: Negative for adenopathy. Does not bruise/bleed easily.   Psychiatric/Behavioral: Negative for agitation, behavioral problems, confusion, decreased concentration, dysphoric mood, hallucinations, self-injury, sleep disturbance and suicidal ideas. The patient is not nervous/anxious and is not  "hyperactive.      Social History     Social History   • Marital status:      Spouse name: N/A   • Number of children: N/A   • Years of education: N/A     Occupational History   • Not on file.     Social History Main Topics   • Smoking status: Never Smoker   • Smokeless tobacco: Never Used   • Alcohol use No   • Drug use: No   • Sexual activity: Defer     Other Topics Concern   • Not on file     Social History Narrative   • No narrative on file     Family History   Problem Relation Age of Onset   • Breast cancer Other    • Diabetes Other    • Esophageal cancer Other    • Hypertension Other    • Transient ischemic attack Other    • Breast cancer Mother    • Cancer Father      /80   Pulse 73   Ht 167.6 cm (66\")   Wt 102 kg (225 lb)   LMP  (LMP Unknown)   BMI 36.32 kg/m²   Physical Exam   Constitutional: She is oriented to person, place, and time. She appears well-developed and well-nourished.   HENT:   Head: Normocephalic and atraumatic.   Nose: Nose normal.   Mouth/Throat: Oropharynx is clear and moist.   Eyes: Conjunctivae, EOM and lids are normal. Pupils are equal, round, and reactive to light.   Neck: Trachea normal and normal range of motion. Neck supple. Carotid bruit is not present. No tracheal deviation present. No thyroid mass and no thyromegaly present.   Cardiovascular: Normal rate, regular rhythm, normal heart sounds and intact distal pulses.  Exam reveals no gallop and no friction rub.    No murmur heard.  Pulmonary/Chest: Effort normal and breath sounds normal. No respiratory distress. She has no wheezes.   Musculoskeletal: Normal range of motion. She exhibits no edema or deformity.    Karla had a diabetic foot exam performed today.   During the foot exam she had a monofilament test performed.    Neurological Sensory Findings - Unaltered hot/cold right ankle/foot discrimination and unaltered hot/cold left ankle/foot discrimination. Unaltered sharp/dull right ankle/foot discrimination and " unaltered sharp/dull left ankle/foot discrimination. No right ankle/foot altered proprioception and no left ankle/foot altered proprioception  Vascular Status -  Her right foot exhibits abnormal foot edema. Her right foot exhibits normal foot vasculature . Her left foot exhibits abnormal foot edema. Her left foot exhibits normal foot vasculature .  Skin Integrity  -  Her right foot skin is intact.Her left foot skin is intact..  Lymphadenopathy:     She has no cervical adenopathy.   Neurological: She is alert and oriented to person, place, and time. She has normal reflexes. She displays normal reflexes. No cranial nerve deficit.   Skin: Skin is warm and dry. No rash noted. No cyanosis or erythema. Nails show no clubbing.   Psychiatric: She has a normal mood and affect. Her speech is normal and behavior is normal. Judgment and thought content normal. Cognition and memory are normal.   Nursing note and vitals reviewed.    Results for orders placed or performed during the hospital encounter of 11/29/17   Basic Metabolic Panel   Result Value Ref Range    Glucose 167 (H) 70 - 100 mg/dL    BUN 61 (H) 9 - 23 mg/dL    Creatinine 1.70 (H) 0.60 - 1.30 mg/dL    Sodium 136 132 - 146 mmol/L    Potassium 5.0 3.5 - 5.5 mmol/L    Chloride 107 99 - 109 mmol/L    CO2 21.0 20.0 - 31.0 mmol/L    Calcium 8.9 8.7 - 10.4 mg/dL    eGFR Non African Amer 29 (L) >60 mL/min/1.73    BUN/Creatinine Ratio 35.9 (H) 7.0 - 25.0    Anion Gap 8.0 3.0 - 11.0 mmol/L   POC Protime / INR   Result Value Ref Range    Protime 33.3 (H) 12.8 - 15.2 seconds    INR 2.9 (H) 0.8 - 1.2   Light Blue Top   Result Value Ref Range    Extra Tube hold for add-on    Green Top (Gel)   Result Value Ref Range    Extra Tube Hold for add-ons.    Lavender Top   Result Value Ref Range    Extra Tube hold for add-on    Gold Top - SST   Result Value Ref Range    Extra Tube Hold for add-ons.      Karla was seen today for follow-up, diabetes and peripheral neuropathy.    Diagnoses and  all orders for this visit:    Type 2 diabetes mellitus without complication, with long-term current use of insulin  -     POC Glycosylated Hemoglobin (Hb A1C)  -     POC Glucose Fingerstick      Problem List Items Addressed This Visit        Cardiovascular and Mediastinum    Hyperlipidemia LDL goal <100     Goal ldl 70 or less  Lab work today by Dr Guzman   Is on Lipitor          Essential hypertension      Currently well controlled- borderline high albumin   Continue to monitor             Endocrine    Hypothyroidism     Normal tfts- check yearly          Diabetes mellitus - Primary     Dx 1995  On insulin currently   Likely type 2 (now burned out)  Continue current dose of insulin   No low sugars, hgn a1c reviewed  Results for orders placed or performed in visit on 05/15/18   POC Glycosylated Hemoglobin (Hb A1C)   Result Value Ref Range    Hemoglobin A1C 6.7 %   POC Glucose Fingerstick   Result Value Ref Range    Glucose 78 70 - 130 mg/dL     And is well controlled   Is utd with eye exam  Neuropathy without callus or ulcer  Ur alb pos as above   F/u 3-4 months          Relevant Orders    POC Glycosylated Hemoglobin (Hb A1C) (Completed)    POC Glucose Fingerstick (Completed)      Other Visit Diagnoses    None.       Return in about 4 months (around 9/15/2018) for Recheck 30 min .    Karina Taylor MA  Signed Rani Lane MD

## 2018-05-16 NOTE — ASSESSMENT & PLAN NOTE
Dx 1994  On insulin currently   Likely type 2 (now burned out)  Continue current dose of insulin   No low sugars, hgn a1c reviewed  Results for orders placed or performed in visit on 05/15/18   POC Glycosylated Hemoglobin (Hb A1C)   Result Value Ref Range    Hemoglobin A1C 6.7 %   POC Glucose Fingerstick   Result Value Ref Range    Glucose 78 70 - 130 mg/dL     And is well controlled   Is utd with eye exam  Neuropathy without callus or ulcer  Ur alb pos as above   F/u 3-4 months

## 2018-06-14 RX ORDER — CARVEDILOL 25 MG/1
TABLET ORAL
Qty: 90 TABLET | Refills: 3 | Status: SHIPPED | OUTPATIENT
Start: 2018-06-14 | End: 2019-06-07 | Stop reason: SDUPTHER

## 2018-06-19 ENCOUNTER — TELEPHONE (OUTPATIENT)
Dept: INTERNAL MEDICINE | Facility: CLINIC | Age: 83
End: 2018-06-19

## 2018-06-19 NOTE — TELEPHONE ENCOUNTER
PATIENT WOULD LIKE TO KNOW IF WE HAVE ANY SAMPLES OF HUMALOG OR NOVOLOG ? THE PATIENT WOULD LIKE TO GET A CALL BACK IN REGARDS TO THIS MATTER -[phone]

## 2018-06-20 ENCOUNTER — TELEPHONE (OUTPATIENT)
Dept: INTERNAL MEDICINE | Facility: CLINIC | Age: 83
End: 2018-06-20

## 2018-06-20 ENCOUNTER — CLINICAL SUPPORT NO REQUIREMENTS (OUTPATIENT)
Dept: CARDIOLOGY | Facility: CLINIC | Age: 83
End: 2018-06-20

## 2018-06-20 DIAGNOSIS — I48.20 CHRONIC ATRIAL FIBRILLATION (HCC): ICD-10-CM

## 2018-06-20 DIAGNOSIS — I50.22 CHRONIC SYSTOLIC CONGESTIVE HEART FAILURE (HCC): ICD-10-CM

## 2018-06-20 PROCEDURE — 93295 DEV INTERROG REMOTE 1/2/MLT: CPT | Performed by: INTERNAL MEDICINE

## 2018-06-20 PROCEDURE — 93296 REM INTERROG EVL PM/IDS: CPT | Performed by: INTERNAL MEDICINE

## 2018-06-20 NOTE — TELEPHONE ENCOUNTER
PATIENT IS NEEDING HER DIABETIC TEST STRIPS CALLED IN TODAY TO [facility]. SHE IS COMPLETLEY OUT. HER SUGAR DROPPED LAST NIGHT AND WENT TO GET ORANGE JUICE AND SHE TRIED TO GET UP AND SLID OFF THE BED INTO FLOOR. SHE WAS IN FLOOR ALL NIGHT UNTIL THIS MORNING. HER GRANDDAUGHTER FOUND HER. PATIENT WAS OUT OF IT. SHE WAS NOT ABLE TO CHECK SUGAR THIS MORNING. AMBULANCE CAME AND CHECKED HER OUT AND PATIENT REFUSED TO GO TO ER. HER SUGAR HAD GONE BACK UP.

## 2018-07-18 RX ORDER — SYRINGE-NEEDLE,INSULIN,0.5 ML 31 GX5/16"
SYRINGE, EMPTY DISPOSABLE MISCELLANEOUS
Qty: 100 EACH | Refills: 1 | Status: SHIPPED | OUTPATIENT
Start: 2018-07-18 | End: 2018-09-28 | Stop reason: SDUPTHER

## 2018-07-21 RX ORDER — LEVOTHYROXINE SODIUM 0.1 MG/1
TABLET ORAL
Qty: 90 TABLET | Refills: 0 | Status: SHIPPED | OUTPATIENT
Start: 2018-07-21 | End: 2018-10-22 | Stop reason: SDUPTHER

## 2018-07-23 RX ORDER — ATORVASTATIN CALCIUM 20 MG/1
TABLET, FILM COATED ORAL
Qty: 90 TABLET | Refills: 0 | OUTPATIENT
Start: 2018-07-23

## 2018-07-24 RX ORDER — ATORVASTATIN CALCIUM 20 MG/1
TABLET, FILM COATED ORAL
Qty: 90 TABLET | Refills: 0 | OUTPATIENT
Start: 2018-07-24

## 2018-08-03 RX ORDER — ATORVASTATIN CALCIUM 20 MG/1
20 TABLET, FILM COATED ORAL NIGHTLY
Qty: 90 TABLET | Refills: 1 | Status: SHIPPED | OUTPATIENT
Start: 2018-08-03 | End: 2022-01-01 | Stop reason: SDUPTHER

## 2018-08-03 RX ORDER — ISOSORBIDE MONONITRATE 30 MG/1
TABLET, EXTENDED RELEASE ORAL
Qty: 90 TABLET | Refills: 1 | OUTPATIENT
Start: 2018-08-03

## 2018-08-06 ENCOUNTER — TELEPHONE (OUTPATIENT)
Dept: INTERNAL MEDICINE | Facility: CLINIC | Age: 83
End: 2018-08-06

## 2018-08-06 NOTE — TELEPHONE ENCOUNTER
PT WANTS TO KNOW IF AN RX FOR ISOSORBIDE CAN BE CALLED IN FOR HER THIS RX IS NOT ANYTHING WE'VE NOT GIVEN IT TO HER BEFORE PLEASE CALL PT IF YOU HAVE QUESTIONS [phone]

## 2018-08-07 RX ORDER — ISOSORBIDE MONONITRATE 30 MG/1
30 TABLET, EXTENDED RELEASE ORAL DAILY
Qty: 90 TABLET | Refills: 0 | Status: SHIPPED | OUTPATIENT
Start: 2018-08-07 | End: 2022-01-01 | Stop reason: HOSPADM

## 2018-08-17 RX ORDER — ESZOPICLONE 2 MG/1
2 TABLET, FILM COATED ORAL
Qty: 30 TABLET | Refills: 1 | Status: CANCELLED | OUTPATIENT
Start: 2018-08-17

## 2018-08-20 RX ORDER — ESZOPICLONE 2 MG/1
2 TABLET, FILM COATED ORAL
Qty: 30 TABLET | Refills: 2 | Status: SHIPPED | OUTPATIENT
Start: 2018-08-20 | End: 2018-11-16 | Stop reason: SDUPTHER

## 2018-09-08 ENCOUNTER — OFFICE VISIT (OUTPATIENT)
Dept: INTERNAL MEDICINE | Facility: CLINIC | Age: 83
End: 2018-09-08

## 2018-09-08 VITALS
BODY MASS INDEX: 36.16 KG/M2 | HEIGHT: 66 IN | DIASTOLIC BLOOD PRESSURE: 68 MMHG | OXYGEN SATURATION: 95 % | WEIGHT: 225 LBS | SYSTOLIC BLOOD PRESSURE: 118 MMHG | HEART RATE: 79 BPM

## 2018-09-08 DIAGNOSIS — J06.9 UPPER RESPIRATORY TRACT INFECTION, UNSPECIFIED TYPE: Primary | ICD-10-CM

## 2018-09-08 DIAGNOSIS — J40 BRONCHITIS: ICD-10-CM

## 2018-09-08 DIAGNOSIS — R05.9 COUGH: ICD-10-CM

## 2018-09-08 PROCEDURE — 99214 OFFICE O/P EST MOD 30 MIN: CPT | Performed by: NURSE PRACTITIONER

## 2018-09-08 RX ORDER — BENZONATATE 100 MG/1
100 CAPSULE ORAL 3 TIMES DAILY PRN
Qty: 21 CAPSULE | Refills: 0 | Status: SHIPPED | OUTPATIENT
Start: 2018-09-08 | End: 2019-02-18

## 2018-09-08 RX ORDER — DOXYCYCLINE HYCLATE 100 MG/1
100 CAPSULE ORAL 2 TIMES DAILY
Qty: 14 CAPSULE | Refills: 0 | Status: SHIPPED | OUTPATIENT
Start: 2018-09-08 | End: 2018-09-15

## 2018-09-08 RX ORDER — ALBUTEROL SULFATE 90 UG/1
2 AEROSOL, METERED RESPIRATORY (INHALATION) EVERY 4 HOURS PRN
Qty: 1 INHALER | Refills: 5 | Status: SHIPPED | OUTPATIENT
Start: 2018-09-08 | End: 2020-07-14

## 2018-09-08 NOTE — PROGRESS NOTES
"Chief Complaint   Patient presents with   • URI     cough x 2 weeks.        History of Present Illness  83 y.o.female presents for URI and cough    Cough 2 weeks with frequent cough that won't quit/ bronchospasm, productive cough looked like \"mud\"; did not look like blood in her sputum. Positive post nasal drainage, sore throat.  No fever or chills. Tried flonase.  No melena, hematochezia, or hematemesis.    Hx Diabetes, kidney failure    On coumadin followed by Dr. Mckeon last INR 1.4.     Review of Systems   Constitutional: Negative for chills and fever.   HENT: Positive for congestion, postnasal drip, rhinorrhea, sinus pressure and sore throat. Negative for sneezing.    Eyes: Negative for discharge.   Respiratory: Positive for cough. Negative for shortness of breath.    Gastrointestinal: Negative for blood in stool, diarrhea, nausea and vomiting.         [facility]  The following portions of the patient's history were reviewed and updated as appropriate: allergies, current medications, past family history, past medical history, past social history, past surgical history and problem list.     Past Medical History:   Diagnosis Date   • Acute bronchitis    • Atrial fibrillation (CMS/HCC)    • CAD (coronary artery disease)    • Change in mole    • Chronic kidney disease    • Chronic renal disease, stage 4, severely decreased glomerular filtration rate between 15-29 mL/min/1.73 square meter (CMS/HCC)    • Chronic systolic heart failure (CMS/HCC)    • Congestive heart disease (CMS/HCC)    • Conjunctivitis    • Deep vein thrombosis of lower extremity (CMS/HCC)    • Diabetes mellitus (CMS/HCC)    • Dog bite of hand    • Easy bruising    • Endometrial cancer (CMS/HCC)    • Foot pain    • Fracture of hip (CMS/HCC)    • Hyperlipidemia    • Hypertension    • Hypothyroidism    • Insomnia    • Ischemic heart disease    • Myocardial infarction    • Nausea with vomiting    • Pericardial effusion    • Renal disorder    • Skin " cancer, basal cell    • Sleep apnea    • UTI (urinary tract infection)       Past Surgical History:   Procedure Laterality Date   • CARDIAC CATHETERIZATION     • CARDIAC DEFIBRILLATOR PLACEMENT     • CHOLECYSTECTOMY     • CORONARY ARTERY BYPASS GRAFT     • CORONARY STENT PLACEMENT     • HIP SURGERY      x3   • KNEE ARTHROPLASTY     • PACEMAKER IMPLANTATION        Allergies   Allergen Reactions   • No Known Drug Allergy       Family History   Problem Relation Age of Onset   • Breast cancer Other    • Diabetes Other    • Esophageal cancer Other    • Hypertension Other    • Transient ischemic attack Other    • Breast cancer Mother    • Cancer Father       Social History     Social History   • Marital status:      Spouse name: N/A   • Number of children: N/A   • Years of education: N/A     Occupational History   • Not on file.     Social History Main Topics   • Smoking status: Never Smoker   • Smokeless tobacco: Never Used   • Alcohol use No   • Drug use: No   • Sexual activity: Defer     Other Topics Concern   • Not on file     Social History Narrative   • No narrative on file         Current Outpatient Prescriptions:   •  aspirin (ASPIRIN LOW DOSE) 81 MG tablet, Take  by mouth daily., Disp: , Rfl:   •  atorvastatin (LIPITOR) 20 MG tablet, Take 1 tablet by mouth Every Night., Disp: 90 tablet, Rfl: 1  •  brimonidine-timolol (COMBIGAN) 0.2-0.5 % ophthalmic solution, Apply  to eye., Disp: , Rfl:   •  carvedilol (COREG) 25 MG tablet, take 1/2 tablet by mouth twice a day. , Disp: 90 tablet, Rfl: 3  •  cholecalciferol (VITAMIN D3) 1000 UNITS tablet, Take 1,000 Units by mouth Daily., Disp: , Rfl:   •  eszopiclone (LUNESTA) 2 MG tablet, Take 1 tablet by mouth every night at bedtime. Take immediately before bedtime, Disp: 30 tablet, Rfl: 2  •  furosemide (LASIX) 40 MG tablet, Take  by mouth as needed., Disp: , Rfl:   •  glucagon (GLUCAGON EMERGENCY) 1 MG injection, Glucagon Emergency 1 MG Injection Kit; Patient Sig:  "Glucagon Emergency 1 MG Injection Kit USE AS DIRECTED.; 1; 2; 14-Dec-2012; Active, Disp: , Rfl:   •  indapamide (LOZOL) 2.5 MG tablet, Take  by mouth daily., Disp: , Rfl:   •  insulin aspart (NOVOLOG) 100 UNIT/ML injection, Inject 20 Units under the skin 3 (Three) Times a Day Before Meals., Disp: 4 each, Rfl: 0  •  insulin glargine (LANTUS) 100 UNIT/ML injection, Inject 20 Units under the skin Daily. (Patient taking differently: Inject 25 Units under the skin Daily.), Disp: 2 each, Rfl: 0  •  isosorbide mononitrate (IMDUR) 30 MG 24 hr tablet, Take 1 tablet by mouth Daily., Disp: 90 tablet, Rfl: 0  •  latanoprost (XALATAN) 0.005 % ophthalmic solution, APPLY 1 DROP TO EACH EYE AT BEDTIME, Disp: , Rfl: 5  •  levothyroxine (SYNTHROID, LEVOTHROID) 100 MCG tablet, take one tablet by mouth one time daily, Disp: 90 tablet, Rfl: 0  •  mupirocin (BACTROBAN) 2 % cream, Apply to toes twice daily, Disp: 30 g, Rfl: 2  •  ondansetron (ZOFRAN) 4 MG tablet, Take  by mouth every 12 (twelve) hours as needed., Disp: , Rfl:   •  ONE TOUCH ULTRA TEST test strip, USE TO TEST BLOOD SUGAR 3 TIMES A DAY (DX CODE E11.9), Disp: 100 each, Rfl: 2  •  spironolactone (ALDACTONE) 25 MG tablet, Take 1 tablet by mouth Daily., Disp: 90 tablet, Rfl: 1  •  TRUEPLUS INSULIN SYRINGE 31G X 5/16\" 0.5 ML misc, Use 4 syringes per day to inject insulin, Disp: 100 each, Rfl: 1  •  warfarin (COUMADIN) 3 MG tablet, Take  by mouth daily. Patient takes 2 mg & 3 mg.  Rotates dosage every other day. , Disp: , Rfl:     VITALS:  /68   Pulse 79   Ht 167.6 cm (66\")   Wt 102 kg (225 lb)   LMP  (LMP Unknown)   SpO2 95%   BMI 36.32 kg/m²   T 97.7    Physical Exam   Constitutional: She is oriented to person, place, and time. She appears well-developed and well-nourished.   HENT:   Head: Normocephalic.   Right Ear: Tympanic membrane, external ear and ear canal normal. Tympanic membrane is not erythematous.   Left Ear: Tympanic membrane, external ear and ear canal " normal. Tympanic membrane is not erythematous.   Nose: Mucosal edema, rhinorrhea and congestion present. Right sinus exhibits no maxillary sinus tenderness and no frontal sinus tenderness. Left sinus exhibits no maxillary sinus tenderness and no frontal sinus tenderness.   Mouth/Throat: Mucous membranes are normal. Posterior oropharyngeal erythema present. No tonsillar exudate.   Posterior pharynx cobblestoning   Eyes: Conjunctivae are normal. Right eye exhibits no discharge. Left eye exhibits no discharge.   Neck: Normal range of motion. Neck supple.   Cardiovascular: Normal rate, regular rhythm and normal heart sounds.    Pulmonary/Chest: Effort normal and breath sounds normal. No respiratory distress. She has no wheezes. She has no rhonchi. She has no rales.   Frequent coughing   Abdominal: Soft. Bowel sounds are normal. There is no tenderness.   Lymphadenopathy:     She has no cervical adenopathy.   Neurological: She is alert and oriented to person, place, and time.   Skin: Skin is warm and dry. Capillary refill takes less than 2 seconds. No rash noted. She is not diaphoretic.   Psychiatric: She has a normal mood and affect. Her behavior is normal.   Nursing note and vitals reviewed.      LABS  Results for orders placed or performed in visit on 05/15/18   POC Glycosylated Hemoglobin (Hb A1C)   Result Value Ref Range    Hemoglobin A1C 6.7 %   POC Glucose Fingerstick   Result Value Ref Range    Glucose 78 70 - 130 mg/dL       ASSESSMENT/PLAN  Karla was seen today for uri.    Diagnoses and all orders for this visit:    Upper respiratory tract infection, unspecified type  Comments:  continue with flonase  Orders:  -     doxycycline (VIBRAMYCIN) 100 MG capsule; Take 1 capsule by mouth 2 (Two) Times a Day for 7 days.  -     benzonatate (TESSALON) 100 MG capsule; Take 1 capsule by mouth 3 (Three) Times a Day As Needed for Cough.    Bronchitis  -     doxycycline (VIBRAMYCIN) 100 MG capsule; Take 1 capsule by mouth 2  (Two) Times a Day for 7 days.  -     albuterol (PROVENTIL HFA;VENTOLIN HFA) 108 (90 Base) MCG/ACT inhaler; Inhale 2 puffs Every 4 (Four) Hours As Needed for Wheezing or Shortness of Air.  -     benzonatate (TESSALON) 100 MG capsule; Take 1 capsule by mouth 3 (Three) Times a Day As Needed for Cough.    Cough  -     albuterol (PROVENTIL HFA;VENTOLIN HFA) 108 (90 Base) MCG/ACT inhaler; Inhale 2 puffs Every 4 (Four) Hours As Needed for Wheezing or Shortness of Air.  -     benzonatate (TESSALON) 100 MG capsule; Take 1 capsule by mouth 3 (Three) Times a Day As Needed for Cough.    With her description of mud like sputum production, I opted to treat with doxycycline. Inhaler and tessalon pearls to help with cough.  Educated patient if worsening of symptoms or fevers> 101.5 or blood in sputum, she should be reevaluated or seek emergency care.  I recommended an INR chec in 1 week as antibiotic therapy while on Coumadin can increase INR. Patient verbalized understanding and she is also accompanied by her daughter at this visit.  She will go to get INR at Dr. Chu office.    I discussed the patients findings and my recommendations with patient.     Patient was encouraged to keep me informed of any acute changes, lack of improvement, or any new concerning symptoms.    Patient voiced understanding of all instructions and denied further questions.      FOLLOW-UP  Return in about 1 week (around 9/15/2018), or if symptoms worsen or fail to improve, for 1 wk check INR.    Electronically signed by:    NESS Diaz  09/08/2018

## 2018-09-21 ENCOUNTER — OFFICE VISIT (OUTPATIENT)
Dept: ENDOCRINOLOGY | Facility: CLINIC | Age: 83
End: 2018-09-21

## 2018-09-21 VITALS
HEART RATE: 64 BPM | OXYGEN SATURATION: 98 % | WEIGHT: 228 LBS | HEIGHT: 64 IN | BODY MASS INDEX: 38.93 KG/M2 | SYSTOLIC BLOOD PRESSURE: 124 MMHG | DIASTOLIC BLOOD PRESSURE: 60 MMHG

## 2018-09-21 DIAGNOSIS — Z79.4 TYPE 2 DIABETES MELLITUS WITHOUT COMPLICATION, WITH LONG-TERM CURRENT USE OF INSULIN (HCC): Primary | ICD-10-CM

## 2018-09-21 DIAGNOSIS — I10 ESSENTIAL HYPERTENSION: ICD-10-CM

## 2018-09-21 DIAGNOSIS — E11.9 TYPE 2 DIABETES MELLITUS WITHOUT COMPLICATION, WITH LONG-TERM CURRENT USE OF INSULIN (HCC): Primary | ICD-10-CM

## 2018-09-21 LAB
GLUCOSE BLDC GLUCOMTR-MCNC: 59 MG/DL (ref 70–130)
HBA1C MFR BLD: 6.7 %

## 2018-09-21 PROCEDURE — 82947 ASSAY GLUCOSE BLOOD QUANT: CPT | Performed by: INTERNAL MEDICINE

## 2018-09-21 PROCEDURE — 99214 OFFICE O/P EST MOD 30 MIN: CPT | Performed by: INTERNAL MEDICINE

## 2018-09-21 PROCEDURE — 83036 HEMOGLOBIN GLYCOSYLATED A1C: CPT | Performed by: INTERNAL MEDICINE

## 2018-09-21 PROCEDURE — G0008 ADMIN INFLUENZA VIRUS VAC: HCPCS | Performed by: INTERNAL MEDICINE

## 2018-09-21 PROCEDURE — 90662 IIV NO PRSV INCREASED AG IM: CPT | Performed by: INTERNAL MEDICINE

## 2018-09-21 RX ORDER — TIMOLOL MALEATE 5 MG/ML
1 SOLUTION/ DROPS OPHTHALMIC 2 TIMES DAILY
COMMUNITY
Start: 2018-07-25

## 2018-09-21 NOTE — PROGRESS NOTES
Karla Cristobal 83 y.o.  CC:Follow-up; Diabetes ([facility] II eye exam was 2/18 by Dr Muniz); and Peripheral Neuropathy      [place]: Follow-up; Diabetes (TYpe II eye exam was 2/18 by Dr Muniz); and Peripheral Neuropathy    Blood sugar and 90 day average sugar reviewed  Results for orders placed or performed in visit on 05/15/18   POC Glycosylated Hemoglobin (Hb A1C)   Result Value Ref Range    Hemoglobin A1C 6.7 %   POC Glucose Fingerstick   Result Value Ref Range    Glucose 78 70 - 130 mg/dL     Had low sugar today   Had episode at home - low sugar and they found her on the floor and was out of it - was about 4-6 weeks ago   Also some nighttime low sugars  Is on lantus 25 units before meals  novolog 12-20 units before meals   Discussed hgn a1c   Discussed my concern about her lower sugars and unawaredness   She admits she does not sense low sugars as well as she used to   Information about sensor provided and we did recommend lowering her insulin by 4 units (all insulin doses)  Ranges for premeal insulin discussed and when reviewing this I found that she is frequently bolusing after the meal  She was encouraged to bolus prior to the meal and the rationale for this was discussed    Allergies   Allergen Reactions   • No Known Drug Allergy        Current Outpatient Prescriptions:   •  albuterol (PROVENTIL HFA;VENTOLIN HFA) 108 (90 Base) MCG/ACT inhaler, Inhale 2 puffs Every 4 (Four) Hours As Needed for Wheezing or Shortness of Air., Disp: 1 inhaler, Rfl: 5  •  aspirin (ASPIRIN LOW DOSE) 81 MG tablet, Take  by mouth daily., Disp: , Rfl:   •  atorvastatin (LIPITOR) 20 MG tablet, Take 1 tablet by mouth Every Night., Disp: 90 tablet, Rfl: 1  •  benzonatate (TESSALON) 100 MG capsule, Take 1 capsule by mouth 3 (Three) Times a Day As Needed for Cough., Disp: 21 capsule, Rfl: 0  •  carvedilol (COREG) 25 MG tablet, take 1/2 tablet by mouth twice a day. , Disp: 90 tablet, Rfl: 3  •  cholecalciferol (VITAMIN D3) 1000 UNITS tablet,  "Take 1,000 Units by mouth Daily., Disp: , Rfl:   •  eszopiclone (LUNESTA) 2 MG tablet, Take 1 tablet by mouth every night at bedtime. Take immediately before bedtime, Disp: 30 tablet, Rfl: 2  •  furosemide (LASIX) 40 MG tablet, Take  by mouth as needed., Disp: , Rfl:   •  glucagon (GLUCAGON EMERGENCY) 1 MG injection, Glucagon Emergency 1 MG Injection Kit; Patient Sig: Glucagon Emergency 1 MG Injection Kit USE AS DIRECTED.; 1; 2; 14-Dec-2012; Active, Disp: , Rfl:   •  indapamide (LOZOL) 2.5 MG tablet, Take  by mouth daily., Disp: , Rfl:   •  insulin aspart (NOVOLOG) 100 UNIT/ML injection, Inject 20 Units under the skin 3 (Three) Times a Day Before Meals. (Patient taking differently: Inject 15-20 Units under the skin into the appropriate area as directed 3 (Three) Times a Day Before Meals.), Disp: 4 each, Rfl: 0  •  insulin glargine (LANTUS) 100 UNIT/ML injection, Inject 20 Units under the skin Daily. (Patient taking differently: Inject 25 Units under the skin Daily.), Disp: 2 each, Rfl: 0  •  isosorbide mononitrate (IMDUR) 30 MG 24 hr tablet, Take 1 tablet by mouth Daily., Disp: 90 tablet, Rfl: 0  •  latanoprost (XALATAN) 0.005 % ophthalmic solution, APPLY 1 DROP TO EACH EYE AT BEDTIME, Disp: , Rfl: 5  •  levothyroxine (SYNTHROID, LEVOTHROID) 100 MCG tablet, take one tablet by mouth one time daily, Disp: 90 tablet, Rfl: 0  •  ondansetron (ZOFRAN) 4 MG tablet, Take  by mouth every 12 (twelve) hours as needed., Disp: , Rfl:   •  ONE TOUCH ULTRA TEST test strip, USE TO TEST BLOOD SUGAR 3 TIMES A DAY (DX CODE E11.9), Disp: 100 each, Rfl: 2  •  spironolactone (ALDACTONE) 25 MG tablet, Take 1 tablet by mouth Daily., Disp: 90 tablet, Rfl: 1  •  timolol (TIMOPTIC) 0.5 % ophthalmic solution, , Disp: , Rfl:   •  TRUEPLUS INSULIN SYRINGE 31G X 5/16\" 0.5 ML misc, Use 4 syringes per day to inject insulin, Disp: 100 each, Rfl: 1  •  warfarin (COUMADIN) 3 MG tablet, Take  by mouth daily. Patient takes 2 mg & 3 mg.  Rotates dosage " every other day. , Disp: , Rfl:   Patient Active Problem List    Diagnosis   • Chronic systolic congestive heart failure (CMS/HCC) [I50.22]   • Change in mole [L81.9]   • DVT of lower extremity (deep venous thrombosis) (CMS/HCC) [I82.409]   • CAD (coronary artery disease) [I25.10]   • Anemia [D64.9]   • Ischemic heart disease [I25.9]     Overview Note:     1. Ischemic heart disease.  a. History of remote myocardial infarction/CABG x 3, [facility] (database insufficient).   b. Left heart catheterization by Dr. Basilio Grossman. 2007 revealing patent LIMA graft/medical therapy, LVEF 30%.  c. Echocardiogram, [facility]. 2007: LVEF 25%; moderate/severe mitral regurgitation.  d. Upgrade of pacemaker to [employer]el BI-V pacemaker per Dr. Hammonds on 10/25/2007.  e. Reported negative Cardiolite stress test in 2010, [facility], Dr. Basilio Grossman (database insufficient).  f. Reported echocardiogram, October 2011, [facility] (database insufficient).   g. Echocardiogram, 08/29/2012: LVEF 35-40%, mild mitral regurgitation.  h. Echocardiogram, 10/20/2015: LVEF 35-40%, moderate mitral regurgitation/tricuspid regurgitation; RVSP 45 mmHg.   i. History of MRSA pacemaker infection, 2005 (database insufficient).   j. [employer] Jamel pacemaker 2005 with upgrade to a [employer] BI-V ICD device in 2007, Dr. Hammonds.   k. CRT-D generator exchange by Tano Beal, 09/21/2012. [employer]el device (Serial# [id number]).  l. Persistent ischemic cardiomyopathy.      • Anemia due to chronic kidney disease [N18.9, D63.1]   • Chronic atrial fibrillation (CMS/HCC) [I48.2]     Overview Note:     Impression: 03/15/2016 - stable  Impression: 11/23/2015 - stable. INR is  Impression: 03/24/2015 - INR is 2.1. Continue 3 mg and f/u with cardio  Impression: 03/24/2015 - INR is  Impression: 11/18/2014 - stable;      • Change in nevus [D22.9]   • CKD (chronic kidney disease), stage IV (CMS/HCC) [N18.4]     Overview Note:      Impression: 11/23/2015 - follows with nephro.; Description: [facility]     • Conjunctivitis [H10.9]   • Diabetes mellitus (CMS/[facility]) [E11.9]     Overview Note:     Impression: 03/15/2016 - blood sugar 166 and hgn a1c 7.3%   is up to date with eye exam   neuropathy with callus, no ulcer   some scratches feet   ur alb due and ordered   no change other than provided samples of toujeo because she feels like lantus worked much better than levemir, she has tried titrating levemir and it is less effective   continue testing and f/u 3-4 months  Impression: 11/23/2015 - blood sugar stable    is up to date with eye exam- 2 weeks ago   ur alb pos, ckd, no arb or ace but bp well controlled   neuropathy intermittent currently- lancinating pain   samples provided for insulin   commended current efforts to maintain good bp control   wound left hand and right arm appear to be healing  Impression: 07/20/2015 - would like to change to lantus for copay  is on levemir now  utd eye exam  lancination foot pain  due ur alb  discussed blood sugar 250 after coffee cake  discussed hgn a1c 7.2% (average 150)  Impression: 07/20/2015 - would like to change to lantus for copay  is on levemir now  utd eye exam  lancination foot pain  due ur alb  Impression: 03/24/2015 - blood [id number], hgn a1c 7.9% (down from 8.2%)  is doing well overall  discussed adjustment of novolog prn   is up to date with eye exam  neuropathy stable - no new callus  ur alb today  Impression: 11/18/2014 - blood sugar is 188, hgn a1c 8.1% (average 180)  is up to date with eye exam, neuropathy is stable  small callus right foot just under great toe  dry skin heel  doing well overall  discussed goal hgn a1c but she has been in the donut hole so has been cutting back on insulin - samples provided today for novolog.  More stress related to recent death of   f/u 3-4 months  Impression: 07/17/2014 - blood sugar and 90 day average sugar were reviewed, in good  range currently.  She is up to date with preventive care, doing well overall.  No low sugars, occ higher number.  Giving own insulin   f/u 3-4 months; Description: dx 1995     • Diabetic foot ulcer (CMS/[facility]) [E11.621, L97.509]   • Diabetic nephropathy (CMS/[facility]) [E11.21]     Overview Note:     Impression: 11/18/2014 - update ur alb;      • Diabetic retinopathy (CMS/[facility]) [E11.319]     Overview Note:     Impression: 11/18/2014 - is up to date with eye exam;      • Dog bite of hand [S61.459A, W54.0XXA]   • Bruises easily [R23.8]   • Malignant neoplasm of endometrium (CMS/[facility]) [C54.1]   • Foot pain [M79.673]     Overview Note:     Description: lancinating- worse but not consistent enough to want rx     • Hyperlipidemia LDL goal <100 [E78.5]     Overview Note:     Impression: 03/15/2016 - lipids and cmp  Impression: 11/23/2015 - stable  Impression: 03/24/2015 - lipids, cmp  Impression: 11/18/2014 - check flp  Impression: 11/18/2014 - not fasting, lipids next visit.  Impression: 07/17/2014 - stable on current dose of statin; Description: pravastatin 40 mg - do not titrate     • Essential hypertension [I10]     Overview Note:     Impression: 03/15/2016 - bp is good  Impression: 03/15/2016 - cmp, lipids, urine micro  Impression: 11/23/2015 - stable  Impression: 03/24/2015 - urine micro  Impression: 11/18/2014 - bp is good  Impression: 11/18/2014 - stable  Impression: 07/17/2014 - bp is good;      • Hypothyroidism [E03.9]     Overview Note:     Impression: 03/15/2016 - check tsh  Impression: 11/23/2015 - tsh normal 7/15- update next lab draw  Impression: 07/20/2015 - check tfts  Impression: 11/18/2014 - check tft;      • Insomnia [G47.00]     Overview Note:     Impression: 03/15/2016 - refilled ambien  Impression: 11/23/2015 - refilled ambien  Impression: 03/24/2015 - refilled ambien  Impression: 11/18/2014 - stable;      • Generalized ischemic myocardial dysfunction [I25.5]   • Leukocytosis [D72.796]   • Memory impairment  [R41.3]     Overview Note:     Impression: 03/15/2016 - MSE 29  May be Lunesta (started around this time)  Impression: 03/15/2016 - MSE  May be Lunesta (started around this time);      • Methicillin resistant Staphylococcus aureus infection [A49.02]   • Nausea and vomiting [R11.2]     Overview Note:     Impression: 07/17/2014 - rx zofran provided  no diarrhea since this am  no abdominal pain, no blood / mucus/ fever  treat with supportive care, to call if not resolved within 24-48 hours;      • Pulmonary embolism (CMS/HCC) [I26.99]   • Renal insufficiency [N28.9]     Overview Note:     Description: baseline creatinine - 2.49     • Skin lesion [L98.9]     Overview Note:     Impression: 03/24/2015 - refer derm for eval- seb kerat ant tib and ?ak medially with redness and irritation;      • Sleep apnea [G47.30]   • Shortness of breath [R06.02]     Overview Note:     Impression: 03/24/2015 - chronic. On 2 l oxygen at night. check cbc, bnp;      • Squamous cell carcinoma of skin [C44.92]     Overview Note:     Description: 5/15 Fito     • CHF (NYHA class IV, ACC/AHA stage D) (CMS/HCC) [I50.9]     Review of Systems   Constitutional: Negative for activity change, appetite change, chills, diaphoresis, fatigue, fever and unexpected weight change.   HENT: Negative for congestion, dental problem, drooling, ear discharge, ear pain, facial swelling, hearing loss, mouth sores, nosebleeds, postnasal drip, rhinorrhea, sinus pressure, sneezing, sore throat, tinnitus, trouble swallowing and voice change.    Eyes: Negative for photophobia, pain, discharge, redness, itching and visual disturbance.   Respiratory: Negative for apnea, cough, choking, chest tightness, shortness of breath, wheezing and stridor.    Cardiovascular: Negative for chest pain, palpitations and leg swelling.   Gastrointestinal: Negative for abdominal distention, abdominal pain, anal bleeding, blood in stool, constipation, diarrhea, nausea, rectal pain and  "vomiting.   Endocrine: Negative for cold intolerance, heat intolerance, polydipsia, polyphagia and polyuria.   Genitourinary: Negative for decreased urine volume, difficulty urinating, dysuria, enuresis, flank pain, frequency, genital sores, hematuria and urgency.   Musculoskeletal: Negative for arthralgias, back pain, gait problem, joint swelling, myalgias, neck pain and neck stiffness.   Skin: Negative for color change, pallor, rash and wound.   Allergic/Immunologic: Negative for environmental allergies, food allergies and immunocompromised state.   Neurological: Negative for dizziness, tremors, seizures, syncope, facial asymmetry, speech difficulty, weakness, light-headedness, numbness and headaches.   Hematological: Negative for adenopathy. Does not bruise/bleed easily.   Psychiatric/Behavioral: Negative for agitation, behavioral problems, confusion, decreased concentration, dysphoric mood, hallucinations, self-injury, sleep disturbance and suicidal ideas. The patient is not nervous/anxious and is not hyperactive.      Social History     Social History   • Marital status:      Spouse name: N/A   • Number of children: N/A   • Years of education: N/A     Occupational History   • Not on file.     Social History Main Topics   • Smoking status: Never Smoker   • Smokeless tobacco: Never Used   • Alcohol use No   • Drug use: No   • Sexual activity: Defer     Other Topics Concern   • Not on file     Social History Narrative   • No narrative on file     Family History   Problem Relation Age of Onset   • Breast cancer Other    • Diabetes Other    • Esophageal cancer Other    • Hypertension Other    • Transient ischemic attack Other    • Breast cancer Mother    • Cancer Father      /60   Pulse 64   Ht 162.6 cm (64\")   Wt 103 kg (228 lb)   LMP  (LMP Unknown)   SpO2 98%   Breastfeeding? No   BMI 39.14 kg/m²   Physical Exam   Constitutional: She is oriented to person, place, and time. She appears " well-developed and well-nourished.   HENT:   Head: Normocephalic and atraumatic.   Nose: Nose normal.   Mouth/Throat: Oropharynx is clear and moist.   Eyes: Pupils are equal, round, and reactive to light. Conjunctivae, EOM and lids are normal.   Neck: Trachea normal and normal range of motion. Neck supple. Carotid bruit is not present. No tracheal deviation present. No thyroid mass and no thyromegaly present.   Cardiovascular: Normal rate, regular rhythm, normal heart sounds and intact distal pulses.  Exam reveals no gallop and no friction rub.    No murmur heard.  Pulmonary/Chest: Effort normal and breath sounds normal. No respiratory distress. She has no wheezes.   Musculoskeletal: Normal range of motion. She exhibits no edema or deformity.    Karla had a diabetic foot exam performed today.   During the foot exam she had a monofilament test performed.    Neurological Sensory Findings - Unaltered hot/cold right ankle/foot discrimination and unaltered hot/cold left ankle/foot discrimination. Unaltered sharp/dull right ankle/foot discrimination and unaltered sharp/dull left ankle/foot discrimination. No right ankle/foot altered proprioception and no left ankle/foot altered proprioception  Vascular Status -  Her right foot exhibits normal foot vasculature  and no edema. Her left foot exhibits normal foot vasculature  and no edema.  Skin Integrity  -  Her right foot skin is intact.Her left foot skin is intact..  Lymphadenopathy:     She has no cervical adenopathy.   Neurological: She is alert and oriented to person, place, and time. She has normal reflexes. She displays normal reflexes. No cranial nerve deficit.   Skin: Skin is warm and dry. No rash noted. No cyanosis or erythema. Nails show no clubbing.   Psychiatric: She has a normal mood and affect. Her speech is normal and behavior is normal. Judgment and thought content normal. Cognition and memory are normal.   Nursing note and vitals reviewed.    Results for  orders placed or performed in visit on 05/15/18   POC Glycosylated Hemoglobin (Hb A1C)   Result Value Ref Range    Hemoglobin A1C 6.7 %   POC Glucose Fingerstick   Result Value Ref Range    Glucose 78 70 - 130 mg/dL     Karla was seen today for follow-up, diabetes and peripheral neuropathy.    Diagnoses and all orders for this visit:    Type 2 diabetes mellitus without complication, with long-term current use of insulin (CMS/[facility])  -     POC Glycosylated Hemoglobin (Hb A1C)  -     POC Glucose Fingerstick      Problem List Items Addressed This Visit        Cardiovascular and Mediastinum    Essential hypertension     bp is well controlled - continue to monitor and continue current medications            Endocrine    Diabetes mellitus (CMS/[facility]) - Primary     Blood sugar and 90 day average sugar reviewed  Results for orders placed or performed in visit on 09/21/18   POC Glycosylated Hemoglobin (Hb A1C)   Result Value Ref Range    Hemoglobin A1C 6.7 %   POC Glucose Fingerstick   Result Value Ref Range    Glucose 59 (A) 70 - 130 mg/dL     Average sugar is 140 and she has current low sugar treated with juice and crackers  Lowered all insulin by 4 units and recommended monitoring more often and dosing insulin (novolog) prior to meals  Goal no sugar lower than 100   Risk of low sugar reviewed with her and her daughter  She is particularly vulnerable due to age and ckd  Information about a sensor provided and we discussed benefit of this   F/u here 3 months   Notify us immediately if further low sugars          Relevant Orders    POC Glycosylated Hemoglobin (Hb A1C) (Completed)    POC Glucose Fingerstick (Completed)        Return in about 3 months (around 12/21/2018) for Recheck 30 min .    Karina Taylor MA  Signed Rani Lane MD

## 2018-09-22 NOTE — ASSESSMENT & PLAN NOTE
Blood sugar and 90 day average sugar reviewed  Results for orders placed or performed in visit on 09/21/18   POC Glycosylated Hemoglobin (Hb A1C)   Result Value Ref Range    Hemoglobin A1C 6.7 %   POC Glucose Fingerstick   Result Value Ref Range    Glucose 59 (A) 70 - 130 mg/dL     Average sugar is 140 and she has current low sugar treated with juice and crackers  Lowered all insulin by 4 units and recommended monitoring more often and dosing insulin (novolog) prior to meals  Goal no sugar lower than 100   Risk of low sugar reviewed with her and her daughter  She is particularly vulnerable due to age and ckd  Information about a sensor provided and we discussed benefit of this   F/u here 3 months   Notify us immediately if further low sugars

## 2018-09-26 ENCOUNTER — CLINICAL SUPPORT NO REQUIREMENTS (OUTPATIENT)
Dept: CARDIOLOGY | Facility: CLINIC | Age: 83
End: 2018-09-26

## 2018-09-26 DIAGNOSIS — I25.9 ISCHEMIC HEART DISEASE: ICD-10-CM

## 2018-09-26 PROCEDURE — 93295 DEV INTERROG REMOTE 1/2/MLT: CPT | Performed by: INTERNAL MEDICINE

## 2018-09-26 PROCEDURE — 93296 REM INTERROG EVL PM/IDS: CPT | Performed by: INTERNAL MEDICINE

## 2018-09-28 RX ORDER — SYRINGE-NEEDLE,INSULIN,0.5 ML 31 GX5/16"
SYRINGE, EMPTY DISPOSABLE MISCELLANEOUS
Qty: 100 EACH | Refills: 0 | Status: SHIPPED | OUTPATIENT
Start: 2018-09-28 | End: 2018-12-10 | Stop reason: SDUPTHER

## 2018-10-05 ENCOUNTER — EPISODE CHANGES (OUTPATIENT)
Dept: CASE MANAGEMENT | Facility: OTHER | Age: 83
End: 2018-10-05

## 2018-10-23 RX ORDER — LEVOTHYROXINE SODIUM 0.1 MG/1
TABLET ORAL
Qty: 90 TABLET | Refills: 1 | Status: SHIPPED | OUTPATIENT
Start: 2018-10-23 | End: 2019-04-18 | Stop reason: SDUPTHER

## 2018-10-25 ENCOUNTER — TELEPHONE (OUTPATIENT)
Dept: INTERNAL MEDICINE | Facility: CLINIC | Age: 83
End: 2018-10-25

## 2018-10-25 NOTE — TELEPHONE ENCOUNTER
PATIENT IS WANTING TO KNOW IF WE HAVE ANY SAMPLES OF HUMALOG OR NOVOLOG. PLEASE ADVISE AND GIVE PT A CALL. THANKS.

## 2018-10-26 NOTE — TELEPHONE ENCOUNTER
Patient advised we are currently out of novolog and humalog however advised her to call back in 1-2 weeks to check again  Pt voiced understanding

## 2018-11-02 ENCOUNTER — EPISODE CHANGES (OUTPATIENT)
Dept: CASE MANAGEMENT | Facility: OTHER | Age: 83
End: 2018-11-02

## 2018-11-19 RX ORDER — ESZOPICLONE 2 MG/1
TABLET, FILM COATED ORAL
Qty: 30 TABLET | Refills: 1 | Status: SHIPPED | OUTPATIENT
Start: 2018-11-19 | End: 2019-01-09 | Stop reason: SDUPTHER

## 2018-11-19 NOTE — TELEPHONE ENCOUNTER
Please advise Last OV 5/15/18, Patient does have an appt on 11/30/18.  Jerzy updated and placed in your bin

## 2018-12-10 RX ORDER — SYRINGE-NEEDLE,INSULIN,0.5 ML 31 GX5/16"
SYRINGE, EMPTY DISPOSABLE MISCELLANEOUS
Qty: 200 EACH | Refills: 5 | Status: SHIPPED | OUTPATIENT
Start: 2018-12-10 | End: 2020-01-15

## 2018-12-10 NOTE — TELEPHONE ENCOUNTER
PATIENT CALLED TO FOLLOW UP ON THIS REQUEST, SHE IS NOW DOWN TO 2 SYRINGES AND WILL NEED TO PICK THIS UP TODAY. SHE WOULD ALSO LIKE TO KNOW IF THERE IS ANY EXTRA NOVOLOG OR HUMALOG AVAILABLE TO .

## 2018-12-11 ENCOUNTER — TELEPHONE (OUTPATIENT)
Dept: INTERNAL MEDICINE | Facility: CLINIC | Age: 83
End: 2018-12-11

## 2018-12-11 RX ORDER — BLOOD SUGAR DIAGNOSTIC
STRIP MISCELLANEOUS
Qty: 200 EACH | Refills: 5 | Status: SHIPPED | OUTPATIENT
Start: 2018-12-11 | End: 2020-07-14

## 2018-12-11 NOTE — TELEPHONE ENCOUNTER
Frida was advised we are out of novolog and humalog samples   She states she does have a refill but also has one pen but needs pen needles sent to the pharmacy  rx will be sent

## 2018-12-11 NOTE — TELEPHONE ENCOUNTER
PTS DAUGHTER IS CALLING, SHE WAS TOLD WE WERE OUT OF NOVALOG, SHE WANTS TO KNOW IT WE HAVE ANY HUMALOG SAMPLE INSTEAD, SAYS HER MOTHER HAS USED IT BEFORE, SHE WOULD PREFER BOTTLE, BUT WILL TAKE WHATEVER WE HAVE. DAUGHTERARCHIE CAN BE REACHED -[phone]

## 2018-12-20 ENCOUNTER — CLINICAL SUPPORT NO REQUIREMENTS (OUTPATIENT)
Dept: CARDIOLOGY | Facility: CLINIC | Age: 83
End: 2018-12-20

## 2018-12-20 DIAGNOSIS — Z95.810 ICD (IMPLANTABLE CARDIOVERTER-DEFIBRILLATOR), BIVENTRICULAR, IN SITU: Primary | ICD-10-CM

## 2018-12-20 PROCEDURE — 93284 PRGRMG EVAL IMPLANTABLE DFB: CPT | Performed by: INTERNAL MEDICINE

## 2019-01-10 RX ORDER — ESZOPICLONE 2 MG/1
TABLET, FILM COATED ORAL
Qty: 30 TABLET | Refills: 0 | Status: SHIPPED | OUTPATIENT
Start: 2019-01-10 | End: 2019-02-18 | Stop reason: SDUPTHER

## 2019-02-11 RX ORDER — ESZOPICLONE 2 MG/1
TABLET, FILM COATED ORAL
Qty: 30 TABLET | Refills: 0 | OUTPATIENT
Start: 2019-02-11

## 2019-02-18 ENCOUNTER — OFFICE VISIT (OUTPATIENT)
Dept: INTERNAL MEDICINE | Facility: CLINIC | Age: 84
End: 2019-02-18

## 2019-02-18 VITALS
SYSTOLIC BLOOD PRESSURE: 100 MMHG | DIASTOLIC BLOOD PRESSURE: 80 MMHG | OXYGEN SATURATION: 98 % | WEIGHT: 229.4 LBS | HEART RATE: 73 BPM | BODY MASS INDEX: 39.38 KG/M2

## 2019-02-18 DIAGNOSIS — N18.4 CKD (CHRONIC KIDNEY DISEASE), STAGE IV (HCC): ICD-10-CM

## 2019-02-18 DIAGNOSIS — I10 ESSENTIAL HYPERTENSION: ICD-10-CM

## 2019-02-18 DIAGNOSIS — F51.01 PRIMARY INSOMNIA: Primary | ICD-10-CM

## 2019-02-18 DIAGNOSIS — I50.84 CHF (NYHA CLASS IV, ACC/AHA STAGE D) (HCC): ICD-10-CM

## 2019-02-18 DIAGNOSIS — E78.5 HYPERLIPIDEMIA LDL GOAL <100: ICD-10-CM

## 2019-02-18 DIAGNOSIS — I48.20 CHRONIC ATRIAL FIBRILLATION (HCC): ICD-10-CM

## 2019-02-18 PROCEDURE — 99213 OFFICE O/P EST LOW 20 MIN: CPT | Performed by: INTERNAL MEDICINE

## 2019-02-18 RX ORDER — ESZOPICLONE 2 MG/1
TABLET, FILM COATED ORAL
Qty: 30 TABLET | Refills: 0 | Status: SHIPPED | OUTPATIENT
Start: 2019-02-18 | End: 2019-03-19 | Stop reason: SDUPTHER

## 2019-02-18 NOTE — PROGRESS NOTES
Marlen Cristobal is a 83 y.o. female.   Chief Complaint   Patient presents with   • Atrial Fibrillation     Follow Up   • Hypertension   • Insomnia       Atrial Fibrillation   Presents for follow-up visit. Symptoms include hypertension. Symptoms are negative for chest pain, palpitations, shortness of breath, syncope, tachycardia and weakness. Past medical history includes atrial fibrillation and hyperlipidemia.   Hypertension   This is a chronic problem. The current episode started more than 1 year ago. Pertinent negatives include no chest pain, headaches, neck pain, palpitations or shortness of breath.   Insomnia   This is a chronic problem. The current episode started more than 1 year ago. Pertinent negatives include no abdominal pain, arthralgias, chest pain, chills, congestion, coughing, diaphoresis, fatigue, fever, headaches, myalgias, nausea, neck pain, numbness, rash, sore throat, vomiting or weakness.   Chronic Kidney Disease   Pertinent negatives include no abdominal pain, arthralgias, chest pain, chills, congestion, coughing, diaphoresis, fatigue, fever, headaches, myalgias, nausea, neck pain, numbness, rash, sore throat, vomiting or weakness.   Hyperlipidemia   Pertinent negatives include no chest pain, myalgias or shortness of breath.      Chronic heart failure that is stable.   The following portions of the patient's history were reviewed and updated as appropriate: allergies, current medications, past family history, past medical history, past social history, past surgical history and problem list.    Review of Systems   Constitutional: Negative for activity change, appetite change, chills, diaphoresis, fatigue, fever and unexpected weight change.   HENT: Negative for congestion, ear discharge, ear pain, mouth sores, nosebleeds, sinus pressure, sneezing and sore throat.    Eyes: Negative for pain, discharge and itching.   Respiratory: Negative for cough, chest tightness, shortness of breath  and wheezing.    Cardiovascular: Negative for chest pain, palpitations, leg swelling and syncope.   Gastrointestinal: Negative for abdominal pain, constipation, diarrhea, nausea and vomiting.   Endocrine: Negative for cold intolerance, heat intolerance, polydipsia and polyphagia.   Genitourinary: Negative for dysuria, flank pain, frequency, hematuria and urgency.   Musculoskeletal: Negative for arthralgias, back pain, gait problem, myalgias, neck pain and neck stiffness.   Skin: Negative for color change, pallor and rash.   Neurological: Negative for seizures, speech difficulty, weakness, numbness and headaches.   Psychiatric/Behavioral: Negative for agitation, confusion, decreased concentration and sleep disturbance. The patient has insomnia. The patient is not nervous/anxious.      /80   Pulse 73   Wt 104 kg (229 lb 6.4 oz)   LMP  (LMP Unknown)   SpO2 98%   BMI 39.38 kg/m²     Objective   Physical Exam   Constitutional: She is oriented to person, place, and time. She appears well-developed.   HENT:   Head: Normocephalic.   Right Ear: External ear normal.   Left Ear: External ear normal.   Nose: Nose normal.   Mouth/Throat: Oropharynx is clear and moist.   Eyes: Conjunctivae are normal. Pupils are equal, round, and reactive to light.   Neck: No JVD present. No thyromegaly present.   Cardiovascular: Normal rate, regular rhythm and normal heart sounds. Exam reveals no friction rub.   No murmur heard.  Pulmonary/Chest: Effort normal and breath sounds normal. No respiratory distress. She has no wheezes. She has no rales.   Abdominal: Soft. Bowel sounds are normal. She exhibits no distension. There is no tenderness. There is no guarding.   Musculoskeletal: She exhibits no edema or tenderness.   Lymphadenopathy:     She has no cervical adenopathy.   Neurological: She is oriented to person, place, and time. She displays normal reflexes. No cranial nerve deficit.   Skin: No rash noted.   Psychiatric: Her  behavior is normal.   Nursing note and vitals reviewed.      Assessment/Plan   Karla was seen today for atrial fibrillation, hypertension and insomnia.    Diagnoses and all orders for this visit:    Chronic atrial fibrillation  Stable. Nephro manages her coumadin and does not want to change this  Hyperlipidemia LDL goal <100  stable  Essential hypertension  stable  CKD (chronic kidney disease), stage IV  Continue with nephro.    Insomnia  Refilled Lunesta.

## 2019-02-20 ENCOUNTER — CLINICAL SUPPORT NO REQUIREMENTS (OUTPATIENT)
Dept: CARDIOLOGY | Facility: CLINIC | Age: 84
End: 2019-02-20

## 2019-02-20 DIAGNOSIS — I25.5 ISCHEMIC CARDIOMYOPATHY: ICD-10-CM

## 2019-02-20 PROCEDURE — 93296 REM INTERROG EVL PM/IDS: CPT | Performed by: INTERNAL MEDICINE

## 2019-02-20 PROCEDURE — 93295 DEV INTERROG REMOTE 1/2/MLT: CPT | Performed by: INTERNAL MEDICINE

## 2019-03-19 DIAGNOSIS — F51.01 PRIMARY INSOMNIA: ICD-10-CM

## 2019-03-19 RX ORDER — ESZOPICLONE 2 MG/1
TABLET, FILM COATED ORAL
Qty: 30 TABLET | Refills: 0 | Status: SHIPPED | OUTPATIENT
Start: 2019-03-19 | End: 2019-04-11 | Stop reason: SDUPTHER

## 2019-04-03 ENCOUNTER — OFFICE VISIT (OUTPATIENT)
Dept: INTERNAL MEDICINE | Facility: CLINIC | Age: 84
End: 2019-04-03

## 2019-04-03 VITALS
SYSTOLIC BLOOD PRESSURE: 130 MMHG | HEART RATE: 75 BPM | BODY MASS INDEX: 38.62 KG/M2 | OXYGEN SATURATION: 96 % | DIASTOLIC BLOOD PRESSURE: 80 MMHG | WEIGHT: 225 LBS

## 2019-04-03 DIAGNOSIS — E66.01 MORBIDLY OBESE (HCC): ICD-10-CM

## 2019-04-03 DIAGNOSIS — I50.84 CHF (NYHA CLASS IV, ACC/AHA STAGE D) (HCC): ICD-10-CM

## 2019-04-03 DIAGNOSIS — D63.1 ANEMIA DUE TO STAGE 4 CHRONIC KIDNEY DISEASE (HCC): ICD-10-CM

## 2019-04-03 DIAGNOSIS — C44.92 SQUAMOUS CELL CARCINOMA OF SKIN: ICD-10-CM

## 2019-04-03 DIAGNOSIS — I48.20 CHRONIC ATRIAL FIBRILLATION (HCC): ICD-10-CM

## 2019-04-03 DIAGNOSIS — I25.709 CORONARY ARTERY DISEASE INVOLVING CORONARY BYPASS GRAFT OF NATIVE HEART WITH ANGINA PECTORIS (HCC): ICD-10-CM

## 2019-04-03 DIAGNOSIS — I10 ESSENTIAL HYPERTENSION: ICD-10-CM

## 2019-04-03 DIAGNOSIS — E78.5 HYPERLIPIDEMIA LDL GOAL <100: ICD-10-CM

## 2019-04-03 DIAGNOSIS — E08.311 DIABETIC RETINOPATHY OF BOTH EYES WITH MACULAR EDEMA ASSOCIATED WITH DIABETES MELLITUS DUE TO UNDERLYING CONDITION, UNSPECIFIED RETINOPATHY SEVERITY (HCC): ICD-10-CM

## 2019-04-03 DIAGNOSIS — C54.1 MALIGNANT NEOPLASM OF ENDOMETRIUM (HCC): ICD-10-CM

## 2019-04-03 DIAGNOSIS — G47.33 OBSTRUCTIVE SLEEP APNEA SYNDROME: ICD-10-CM

## 2019-04-03 DIAGNOSIS — I25.10 CORONARY ARTERY DISEASE INVOLVING NATIVE CORONARY ARTERY OF NATIVE HEART WITHOUT ANGINA PECTORIS: ICD-10-CM

## 2019-04-03 DIAGNOSIS — N18.4 CKD (CHRONIC KIDNEY DISEASE), STAGE IV (HCC): ICD-10-CM

## 2019-04-03 DIAGNOSIS — I25.9 ISCHEMIC HEART DISEASE: ICD-10-CM

## 2019-04-03 DIAGNOSIS — I25.5 GENERALIZED ISCHEMIC MYOCARDIAL DYSFUNCTION: ICD-10-CM

## 2019-04-03 DIAGNOSIS — E08.21 DIABETIC NEPHROPATHY ASSOCIATED WITH DIABETES MELLITUS DUE TO UNDERLYING CONDITION (HCC): ICD-10-CM

## 2019-04-03 DIAGNOSIS — N18.4 ANEMIA DUE TO STAGE 4 CHRONIC KIDNEY DISEASE (HCC): ICD-10-CM

## 2019-04-03 DIAGNOSIS — Z00.00 MEDICARE ANNUAL WELLNESS VISIT, SUBSEQUENT: Primary | ICD-10-CM

## 2019-04-03 DIAGNOSIS — E03.9 ACQUIRED HYPOTHYROIDISM: ICD-10-CM

## 2019-04-03 DIAGNOSIS — F51.01 PRIMARY INSOMNIA: ICD-10-CM

## 2019-04-03 DIAGNOSIS — I26.99 OTHER ACUTE PULMONARY EMBOLISM WITHOUT ACUTE COR PULMONALE (HCC): ICD-10-CM

## 2019-04-03 PROCEDURE — G0439 PPPS, SUBSEQ VISIT: HCPCS | Performed by: INTERNAL MEDICINE

## 2019-04-03 NOTE — PROGRESS NOTES
Initial Medicare Wellness Visit   The ABC's of the Annual Wellness Visit    Chief Complaint   Patient presents with   • Medicare Wellness-Initial Visit       HPI:  Karla Cristobal YOB: 1935, is a 83 y.o. female who presents for an Initial Medicare Wellness Visit.    Recent Hospitalizations:  No hospitalization(s) within the last year..    Current Medical Providers:  Patient Care Team:  Giselle Guzman DO as PCP - General  Mckeon, Santana Bourgeois MD as PCP - Claims Attributed    Health Habits and Functional and Cognitive Screening and Depression Screening:  Functional & Cognitive Status 4/3/2019   Do you have difficulty preparing food and eating? No   Do you have difficulty bathing yourself, getting dressed or grooming yourself? No   Do you have difficulty using the toilet? No   Do you have difficulty moving around from place to place? No   Do you have trouble with steps or getting out of a bed or a chair? No   In the past year have you fallen or experienced a near fall? No   Current Diet Well Balanced Diet   Dental Exam Up to date   Eye Exam Up to date   Exercise (times per week) 0 times per week   Current Exercise Activities Include None   Do you need help using the phone?  No   Are you deaf or do you have serious difficulty hearing?  No   Do you need help with transportation? Yes   Do you need help shopping? Yes   Do you need help preparing meals?  Yes   Do you need help with housework?  Yes   Do you need help with laundry? Yes   Do you need help taking your medications? No   Do you need help managing money? No   Do you ever drive or ride in a car without wearing a seat belt? No   Have you felt unusual stress, anger or loneliness in the last month? No   Who do you live with? (No Data)   If you need help, do you have trouble finding someone available to you? No   Have you been bothered in the last four weeks by sexual problems? No   Do you have difficulty concentrating, remembering or making decisions? No        Compared to one year ago, the patient feels her physical health is the same and her mental health is the same.    Depression Screen:  PHQ-2/PHQ-9 Depression Screening 4/3/2019   Little interest or pleasure in doing things 0   Feeling down, depressed, or hopeless 0   Total Score 0         Past Medical/Family/Social History:  The following portions of the patient's history were reviewed and updated as appropriate: allergies, current medications, past family history, past medical history, past social history, past surgical history and problem list.    Allergies   Allergen Reactions   • No Known Drug Allergy          Current Outpatient Medications:   •  albuterol (PROVENTIL HFA;VENTOLIN HFA) 108 (90 Base) MCG/ACT inhaler, Inhale 2 puffs Every 4 (Four) Hours As Needed for Wheezing or Shortness of Air., Disp: 1 inhaler, Rfl: 5  •  aspirin (ASPIRIN LOW DOSE) 81 MG tablet, Take  by mouth daily., Disp: , Rfl:   •  atorvastatin (LIPITOR) 20 MG tablet, Take 1 tablet by mouth Every Night., Disp: 90 tablet, Rfl: 1  •  carvedilol (COREG) 25 MG tablet, take 1/2 tablet by mouth twice a day. , Disp: 90 tablet, Rfl: 3  •  cholecalciferol (VITAMIN D3) 1000 UNITS tablet, Take 1,000 Units by mouth Daily., Disp: , Rfl:   •  eszopiclone (LUNESTA) 2 MG tablet, TAKE ONE TABLET BY MOUTH AT BEDTIME, Disp: 30 tablet, Rfl: 0  •  furosemide (LASIX) 40 MG tablet, Take  by mouth as needed., Disp: , Rfl:   •  glucagon (GLUCAGON EMERGENCY) 1 MG injection, Glucagon Emergency 1 MG Injection Kit; Patient Sig: Glucagon Emergency 1 MG Injection Kit USE AS DIRECTED.; 1; 2; 14-Dec-2012; Active, Disp: , Rfl:   •  glucose blood (ONE TOUCH ULTRA TEST) test strip, Test blood sugars TID, Disp: 100 each, Rfl: 5  •  indapamide (LOZOL) 2.5 MG tablet, Take  by mouth daily., Disp: , Rfl:   •  insulin glargine (LANTUS) 100 UNIT/ML injection, Inject 20 Units under the skin Daily. (Patient taking differently: Inject 25 Units under the skin Daily.), Disp: 2 each,  "Rfl: 0  •  Insulin Pen Needle (B-D ULTRAFINE III SHORT PEN) 31G X 8 MM misc, Use 4 per day to administer insulin, Disp: 150 each, Rfl: 5  •  Insulin Syringe-Needle U-100 (TRUEPLUS INSULIN SYRINGE) 31G X 5/16\" 0.5 ML misc, Use 4 per day to administer insulin, Disp: 200 each, Rfl: 5  •  isosorbide mononitrate (IMDUR) 30 MG 24 hr tablet, Take 1 tablet by mouth Daily., Disp: 90 tablet, Rfl: 0  •  latanoprost (XALATAN) 0.005 % ophthalmic solution, APPLY 1 DROP TO EACH EYE AT BEDTIME, Disp: , Rfl: 5  •  levothyroxine (SYNTHROID, LEVOTHROID) 100 MCG tablet, TAKE ONE TABLET BY MOUTH ONE TIME DAILY , Disp: 90 tablet, Rfl: 1  •  NOVOLOG 100 UNIT/ML injection, Inject 20 units sub-q 3 times a day before meals, Disp: 20 mL, Rfl: 2  •  ondansetron (ZOFRAN) 4 MG tablet, Take  by mouth every 12 (twelve) hours as needed., Disp: , Rfl:   •  spironolactone (ALDACTONE) 25 MG tablet, Take 1 tablet by mouth Daily., Disp: 90 tablet, Rfl: 1  •  timolol (TIMOPTIC) 0.5 % ophthalmic solution, , Disp: , Rfl:   •  TRUEPLUS INSULIN SYRINGE 31G X 5/16\" 0.5 ML misc, USE 4 SYRINGES PER DAY TO INJECT INSULIN, Disp: 200 each, Rfl: 5  •  warfarin (COUMADIN) 3 MG tablet, Take  by mouth daily. Patient takes 2 mg & 3 mg.  Rotates dosage every other day. , Disp: , Rfl:     Aspirin use counseling: Taking ASA appropriately as indicated    Current medication list contains no high risk medications.  No harmful drug interactions have been identified.     Family History   Problem Relation Age of Onset   • Breast cancer Other    • Diabetes Other    • Esophageal cancer Other    • Hypertension Other    • Transient ischemic attack Other    • Breast cancer Mother    • Cancer Father        Social History     Tobacco Use   • Smoking status: Never Smoker   • Smokeless tobacco: Never Used   Substance Use Topics   • Alcohol use: No       Past Surgical History:   Procedure Laterality Date   • CARDIAC CATHETERIZATION     • CARDIAC DEFIBRILLATOR PLACEMENT     • " CHOLECYSTECTOMY     • CORONARY ARTERY BYPASS GRAFT     • CORONARY STENT PLACEMENT     • HIP SURGERY      x3   • KNEE ARTHROPLASTY     • PACEMAKER IMPLANTATION         Patient Active Problem List   Diagnosis   • Anemia due to chronic kidney disease   • Chronic atrial fibrillation (CMS/HCC)   • CKD (chronic kidney disease), stage IV (CMS/HCC)   • Diabetic nephropathy (CMS/HCC)   • Diabetic retinopathy (CMS/HCC)   • Malignant neoplasm of endometrium (CMS/HCC)   • Hyperlipidemia LDL goal <100   • Essential hypertension   • Hypothyroidism   • Insomnia   • Generalized ischemic myocardial dysfunction   • Leukocytosis   • Memory impairment   • Nausea and vomiting   • Pulmonary embolism (CMS/HCC)   • Renal insufficiency   • Sleep apnea   • Squamous cell carcinoma of skin   • CHF (NYHA class IV, ACC/AHA stage D) (CMS/HCC)   • Ischemic heart disease   • CAD (coronary artery disease)   • DVT of lower extremity (deep venous thrombosis) (CMS/HCC)   • Chronic systolic congestive heart failure (CMS/HCC)   • Morbidly obese (CMS/HCC)       Review of Systems   Constitutional: Negative for activity change, appetite change, chills, diaphoresis, fatigue, fever and unexpected weight change.   HENT: Negative for congestion, ear discharge, ear pain, mouth sores, nosebleeds, sinus pressure, sneezing and sore throat.    Eyes: Negative for pain, discharge and itching.   Respiratory: Positive for shortness of breath (chronic). Negative for cough, chest tightness and wheezing.    Cardiovascular: Negative for chest pain, palpitations and leg swelling.   Gastrointestinal: Negative for abdominal pain, constipation, diarrhea, nausea and vomiting.   Endocrine: Negative for cold intolerance, heat intolerance, polydipsia and polyphagia.   Genitourinary: Negative for dysuria, flank pain, frequency, hematuria and urgency.   Musculoskeletal: Positive for arthralgias. Negative for back pain, gait problem, myalgias, neck pain and neck stiffness.        Hand  pain, chronic   Skin: Negative for color change, pallor and rash.   Neurological: Negative for seizures, speech difficulty, numbness and headaches.   Psychiatric/Behavioral: Negative for agitation, confusion, decreased concentration and sleep disturbance. The patient is not nervous/anxious.        Objective     Vitals:    04/03/19 1507   BP: 130/80   Pulse: 75   SpO2: 96%   Weight: 102 kg (225 lb)   PainSc: 0-No pain       Patient's Body mass index is 38.62 kg/m². BMI is above normal parameters. Recommendations include: educational material.      No exam data present    The patient has potential evidence of mild cognitive impairment. 2/3 items were correctly remembered at 5 minutes.  /80   Pulse 75   Wt 102 kg (225 lb)   LMP  (LMP Unknown)   SpO2 96%   BMI 38.62 kg/m²     Physical Exam   Constitutional: She is oriented to person, place, and time. She appears well-developed.   HENT:   Head: Normocephalic.   Right Ear: External ear normal.   Left Ear: External ear normal.   Nose: Nose normal.   Mouth/Throat: Oropharynx is clear and moist.   Eyes: Conjunctivae are normal. Pupils are equal, round, and reactive to light.   Neck: No JVD present. No thyromegaly present.   Cardiovascular: Normal rate, regular rhythm and normal heart sounds. Exam reveals no friction rub.   No murmur heard.  Pulmonary/Chest: Effort normal and breath sounds normal. No respiratory distress. She has no wheezes. She has no rales.   Abdominal: Soft. Bowel sounds are normal. She exhibits no distension. There is no tenderness.   Musculoskeletal: She exhibits no edema or tenderness.   Lymphadenopathy:     She has no cervical adenopathy.   Neurological: She is oriented to person, place, and time. She displays normal reflexes. No cranial nerve deficit.   Skin: No rash noted.   Psychiatric: Her behavior is normal.   Nursing note and vitals reviewed.      Recent Lab Results:     Lab Results   Component Value Date    CHOL 113 05/15/2018    TRIG  81 05/15/2018    HDL 38 (L) 05/15/2018         Assessment/Plan   Age-appropriate Screening Schedule:  Refer to the list below for future screening recommendations based on patient's age, sex and/or medical conditions.      Health Maintenance   Topic Date Due   • MAMMOGRAM  03/13/2019   • HEMOGLOBIN A1C  03/21/2019   • URINE MICROALBUMIN  04/12/2019 (Originally 11/14/2018)   • ZOSTER VACCINE (2 of 2) 02/27/2020 (Originally 5/8/2017)   • LIPID PANEL  05/15/2019   • INFLUENZA VACCINE  08/01/2019   • TDAP/TD VACCINES (2 - Td) 11/29/2027   • PNEUMOCOCCAL VACCINES (65+ LOW/MEDIUM RISK)  Completed       Medicare Risks and Personalized Health Plan:  No longer does mammogram      CMS-Preventive Services Quick Reference  Medicare Preventive Services Addressed:  Annual Wellness Visit (AWV)    Advance Care Planning:  Patient has an advance directive - a copy has not been provided. Have asked the patient to send this to us to add to record    Diagnoses and all orders for this visit:    1. Medicare annual wellness visit, subsequent (Primary)  Done today  2. Chronic atrial fibrillation (CMS/[facility])  On chronic anticoagulation. Coumadin. Monitored with cardio.   3. Hyperlipidemia LDL goal <100  stable  4. Essential hypertension  stable  5. Generalized ischemic myocardial dysfunction  stable  6. CHF (NYHA class IV, ACC/AHA stage D) (CMS/[facility])  stable  7. Ischemic heart disease  stable  8. Coronary artery disease involving native coronary artery of native heart without angina pectoris  stable  9. Obstructive sleep apnea syndrome  Did not tolerate Cpap. Uses 2L oxygen at night  10. Diabetic retinopathy of both eyes with macular edema associated with diabetes mellitus due to underlying condition, unspecified retinopathy severity (CMS/[facility])  stable  11. Diabetic nephropathy associated with diabetes mellitus due to underlying condition (CMS/[facility])  stable  12. Acquired hypothyroidism  Follows with Dr. Lane  13. Squamous cell carcinoma of  skin  Sees derm  14. CKD (chronic kidney disease), stage IV (CMS/HCC)  Stable . Follows with Nephrology  15. Anemia due to stage 4 chronic kidney disease (CMS/HCC)  stable  16. Primary insomnia  stable  17. Malignant neoplasm of endometrium (CMS/HCC)  HECTOR BSO. Completed radiation treatment  18. Other acute pulmonary embolism without acute cor pulmonale (CMS/HCC)  On coumadin  19. Coronary artery disease involving coronary bypass graft of native heart with angina pectoris (CMS/HCC)  stable  20. Morbidly obese (CMS/HCC)   Exercise not an option.  Low carb diet.      An After Visit Summary and PPPS with all of these plans were given to the patient.      Follow Up:  Return in about 1 year (around 4/3/2020) for Medicare Wellness.

## 2019-04-09 ENCOUNTER — OFFICE VISIT (OUTPATIENT)
Dept: ENDOCRINOLOGY | Facility: CLINIC | Age: 84
End: 2019-04-09

## 2019-04-09 VITALS
DIASTOLIC BLOOD PRESSURE: 62 MMHG | BODY MASS INDEX: 40.22 KG/M2 | HEIGHT: 63 IN | OXYGEN SATURATION: 99 % | WEIGHT: 227 LBS | HEART RATE: 74 BPM | SYSTOLIC BLOOD PRESSURE: 112 MMHG

## 2019-04-09 DIAGNOSIS — I25.10 CORONARY ARTERY DISEASE INVOLVING NATIVE CORONARY ARTERY OF NATIVE HEART WITHOUT ANGINA PECTORIS: ICD-10-CM

## 2019-04-09 DIAGNOSIS — E78.5 HYPERLIPIDEMIA LDL GOAL <100: ICD-10-CM

## 2019-04-09 DIAGNOSIS — Z79.4 TYPE 2 DIABETES MELLITUS WITHOUT COMPLICATION, WITH LONG-TERM CURRENT USE OF INSULIN (HCC): Primary | ICD-10-CM

## 2019-04-09 DIAGNOSIS — E03.9 ACQUIRED HYPOTHYROIDISM: ICD-10-CM

## 2019-04-09 DIAGNOSIS — E11.9 TYPE 2 DIABETES MELLITUS WITHOUT COMPLICATION, WITH LONG-TERM CURRENT USE OF INSULIN (HCC): Primary | ICD-10-CM

## 2019-04-09 DIAGNOSIS — I10 ESSENTIAL HYPERTENSION: ICD-10-CM

## 2019-04-09 DIAGNOSIS — N18.4 CKD (CHRONIC KIDNEY DISEASE), STAGE IV (HCC): ICD-10-CM

## 2019-04-09 LAB
25(OH)D3 SERPL-MCNC: 46.1 NG/ML (ref 30–100)
ALBUMIN SERPL-MCNC: 3.8 G/DL (ref 3.5–5.2)
ALBUMIN/GLOB SERPL: 1 G/DL
ALP SERPL-CCNC: 143 U/L (ref 39–117)
ALT SERPL W P-5'-P-CCNC: 12 U/L (ref 1–33)
ANION GAP SERPL CALCULATED.3IONS-SCNC: 15.3 MMOL/L
AST SERPL-CCNC: 10 U/L (ref 1–32)
BASOPHILS # BLD AUTO: 0.05 10*3/MM3 (ref 0–0.2)
BASOPHILS NFR BLD AUTO: 0.5 % (ref 0–1.5)
BILIRUB SERPL-MCNC: 0.6 MG/DL (ref 0.2–1.2)
BUN BLD-MCNC: 57 MG/DL (ref 8–23)
BUN/CREAT SERPL: 31.8 (ref 7–25)
CALCIUM SPEC-SCNC: 9.4 MG/DL (ref 8.6–10.5)
CHLORIDE SERPL-SCNC: 100 MMOL/L (ref 98–107)
CHOLEST SERPL-MCNC: 108 MG/DL (ref 0–200)
CO2 SERPL-SCNC: 27.7 MMOL/L (ref 22–29)
CREAT BLD-MCNC: 1.79 MG/DL (ref 0.57–1)
DEPRECATED RDW RBC AUTO: 48.3 FL (ref 37–54)
EOSINOPHIL # BLD AUTO: 0.14 10*3/MM3 (ref 0–0.4)
EOSINOPHIL NFR BLD AUTO: 1.3 % (ref 0.3–6.2)
ERYTHROCYTE [DISTWIDTH] IN BLOOD BY AUTOMATED COUNT: 13.8 % (ref 12.3–15.4)
GFR SERPL CREATININE-BSD FRML MDRD: 27 ML/MIN/1.73
GLOBULIN UR ELPH-MCNC: 4 GM/DL
GLUCOSE BLD-MCNC: 104 MG/DL (ref 65–99)
GLUCOSE BLDC GLUCOMTR-MCNC: 87 MG/DL (ref 70–130)
HBA1C MFR BLD: 7 %
HCT VFR BLD AUTO: 46 % (ref 34–46.6)
HDLC SERPL-MCNC: 40 MG/DL (ref 40–60)
HGB BLD-MCNC: 14.2 G/DL (ref 12–15.9)
IMM GRANULOCYTES # BLD AUTO: 0.04 10*3/MM3 (ref 0–0.05)
IMM GRANULOCYTES NFR BLD AUTO: 0.4 % (ref 0–0.5)
LDLC SERPL CALC-MCNC: 48 MG/DL (ref 0–100)
LDLC/HDLC SERPL: 1.19 {RATIO}
LYMPHOCYTES # BLD AUTO: 2.55 10*3/MM3 (ref 0.7–3.1)
LYMPHOCYTES NFR BLD AUTO: 24.3 % (ref 19.6–45.3)
MCH RBC QN AUTO: 29.3 PG (ref 26.6–33)
MCHC RBC AUTO-ENTMCNC: 30.9 G/DL (ref 31.5–35.7)
MCV RBC AUTO: 95 FL (ref 79–97)
MONOCYTES # BLD AUTO: 0.8 10*3/MM3 (ref 0.1–0.9)
MONOCYTES NFR BLD AUTO: 7.6 % (ref 5–12)
NEUTROPHILS # BLD AUTO: 6.91 10*3/MM3 (ref 1.4–7)
NEUTROPHILS NFR BLD AUTO: 65.9 % (ref 42.7–76)
NRBC BLD AUTO-RTO: 0 /100 WBC (ref 0–0)
PLATELET # BLD AUTO: 241 10*3/MM3 (ref 140–450)
PMV BLD AUTO: 12.4 FL (ref 6–12)
POTASSIUM BLD-SCNC: 4.7 MMOL/L (ref 3.5–5.2)
PROT SERPL-MCNC: 7.8 G/DL (ref 6–8.5)
RBC # BLD AUTO: 4.84 10*6/MM3 (ref 3.77–5.28)
SODIUM BLD-SCNC: 143 MMOL/L (ref 136–145)
T4 FREE SERPL-MCNC: 1.5 NG/DL (ref 0.93–1.7)
TRIGL SERPL-MCNC: 102 MG/DL (ref 0–150)
TSH SERPL DL<=0.05 MIU/L-ACNC: 2.98 MIU/ML (ref 0.27–4.2)
VLDLC SERPL-MCNC: 20.4 MG/DL (ref 5–40)
WBC NRBC COR # BLD: 10.49 10*3/MM3 (ref 3.4–10.8)

## 2019-04-09 PROCEDURE — 84443 ASSAY THYROID STIM HORMONE: CPT | Performed by: INTERNAL MEDICINE

## 2019-04-09 PROCEDURE — 80061 LIPID PANEL: CPT | Performed by: INTERNAL MEDICINE

## 2019-04-09 PROCEDURE — 85025 COMPLETE CBC W/AUTO DIFF WBC: CPT | Performed by: INTERNAL MEDICINE

## 2019-04-09 PROCEDURE — 99214 OFFICE O/P EST MOD 30 MIN: CPT | Performed by: INTERNAL MEDICINE

## 2019-04-09 PROCEDURE — 80053 COMPREHEN METABOLIC PANEL: CPT | Performed by: INTERNAL MEDICINE

## 2019-04-09 PROCEDURE — 82043 UR ALBUMIN QUANTITATIVE: CPT | Performed by: INTERNAL MEDICINE

## 2019-04-09 PROCEDURE — 83036 HEMOGLOBIN GLYCOSYLATED A1C: CPT | Performed by: INTERNAL MEDICINE

## 2019-04-09 PROCEDURE — 82570 ASSAY OF URINE CREATININE: CPT | Performed by: INTERNAL MEDICINE

## 2019-04-09 PROCEDURE — 82947 ASSAY GLUCOSE BLOOD QUANT: CPT | Performed by: INTERNAL MEDICINE

## 2019-04-09 PROCEDURE — 82306 VITAMIN D 25 HYDROXY: CPT | Performed by: INTERNAL MEDICINE

## 2019-04-09 PROCEDURE — 84439 ASSAY OF FREE THYROXINE: CPT | Performed by: INTERNAL MEDICINE

## 2019-04-09 NOTE — PROGRESS NOTES
Karla Cristobal 83 y.o.  CC:Diabetes (Type II, eye exam was January 2019 Dr Muniz); Peripheral Neuropathy; and Hypothyroidism      [place]: Diabetes (Type II, eye exam was January 2019 Dr Muniz); Peripheral Neuropathy; and Hypothyroidism    Blood sugar and 90 day average sugar reviewed  Results for orders placed or performed in visit on 04/09/19   POC Glycosylated Hemoglobin (Hb A1C)   Result Value Ref Range    Hemoglobin A1C 7.0 %   POC Glucose Fingerstick   Result Value Ref Range    Glucose 87 70 - 130 mg/dL     Average sugar is 150   Is utd with eye exam  No foot callus or ulcer   bp is well controlled   Average sugar is good  Gave juice for sugar 87  Energy is good  occ low sugar- did not bring meter or readings  Is on lantus and novolog   Discussed bringing meter with her or blood sugar readings  Sees Dr Muniz for eye exam    Allergies   Allergen Reactions   • No Known Drug Allergy        Current Outpatient Medications:   •  albuterol (PROVENTIL HFA;VENTOLIN HFA) 108 (90 Base) MCG/ACT inhaler, Inhale 2 puffs Every 4 (Four) Hours As Needed for Wheezing or Shortness of Air., Disp: 1 inhaler, Rfl: 5  •  aspirin (ASPIRIN LOW DOSE) 81 MG tablet, Take  by mouth daily., Disp: , Rfl:   •  atorvastatin (LIPITOR) 20 MG tablet, Take 1 tablet by mouth Every Night., Disp: 90 tablet, Rfl: 1  •  carvedilol (COREG) 25 MG tablet, take 1/2 tablet by mouth twice a day. , Disp: 90 tablet, Rfl: 3  •  cholecalciferol (VITAMIN D3) 1000 UNITS tablet, Take 1,000 Units by mouth Daily., Disp: , Rfl:   •  eszopiclone (LUNESTA) 2 MG tablet, TAKE ONE TABLET BY MOUTH AT BEDTIME, Disp: 30 tablet, Rfl: 0  •  furosemide (LASIX) 40 MG tablet, Take  by mouth as needed., Disp: , Rfl:   •  glucagon (GLUCAGON EMERGENCY) 1 MG injection, Glucagon Emergency 1 MG Injection Kit; Patient Sig: Glucagon Emergency 1 MG Injection Kit USE AS DIRECTED.; 1; 2; 14-Dec-2012; Active, Disp: , Rfl:   •  glucose blood (ONE TOUCH ULTRA TEST) test strip, Test blood  "sugars TID, Disp: 100 each, Rfl: 5  •  indapamide (LOZOL) 2.5 MG tablet, Take  by mouth daily., Disp: , Rfl:   •  insulin glargine (LANTUS) 100 UNIT/ML injection, Inject 20 Units under the skin Daily., Disp: 2 each, Rfl: 0  •  Insulin Pen Needle (B-D ULTRAFINE III SHORT PEN) 31G X 8 MM misc, Use 4 per day to administer insulin, Disp: 150 each, Rfl: 5  •  Insulin Syringe-Needle U-100 (TRUEPLUS INSULIN SYRINGE) 31G X 5/16\" 0.5 ML misc, Use 4 per day to administer insulin, Disp: 200 each, Rfl: 5  •  isosorbide mononitrate (IMDUR) 30 MG 24 hr tablet, Take 1 tablet by mouth Daily., Disp: 90 tablet, Rfl: 0  •  latanoprost (XALATAN) 0.005 % ophthalmic solution, APPLY 1 DROP TO EACH EYE AT BEDTIME, Disp: , Rfl: 5  •  levothyroxine (SYNTHROID, LEVOTHROID) 100 MCG tablet, TAKE ONE TABLET BY MOUTH ONE TIME DAILY , Disp: 90 tablet, Rfl: 1  •  NOVOLOG 100 UNIT/ML injection, Inject 20 units sub-q 3 times a day before meals (Patient taking differently: Inject 15 units sub-q 3 times a day before meals), Disp: 20 mL, Rfl: 2  •  ondansetron (ZOFRAN) 4 MG tablet, Take  by mouth every 12 (twelve) hours as needed., Disp: , Rfl:   •  spironolactone (ALDACTONE) 25 MG tablet, Take 1 tablet by mouth Daily., Disp: 90 tablet, Rfl: 1  •  timolol (TIMOPTIC) 0.5 % ophthalmic solution, , Disp: , Rfl:   •  TRUEPLUS INSULIN SYRINGE 31G X 5/16\" 0.5 ML misc, USE 4 SYRINGES PER DAY TO INJECT INSULIN, Disp: 200 each, Rfl: 5  •  warfarin (COUMADIN) 3 MG tablet, Take  by mouth daily. Patient takes 2 mg & 3 mg.  Rotates dosage every other day. , Disp: , Rfl:   Patient Active Problem List    Diagnosis   • Morbidly obese (CMS/[facility]) [E66.01]   • Chronic systolic congestive heart failure (CMS/[facility]) [I50.22]   • DVT of lower extremity (deep venous thrombosis) (CMS/[facility]) [I82.409]   • CAD (coronary artery disease) [I25.10]   • Ischemic heart disease [I25.9]   • Anemia due to chronic kidney disease [N18.9, D63.1]   • Chronic atrial fibrillation (CMS/[facility]) [I48.2] "   • CKD (chronic kidney disease), stage IV (CMS/[facility]) [N18.4]   • Diabetic nephropathy (CMS/[facility]) [E11.21]   • Diabetic retinopathy (CMS/[facility]) [E11.319]   • Malignant neoplasm of endometrium (CMS/[facility]) [C54.1]   • Hyperlipidemia LDL goal <100 [E78.5]   • Essential hypertension [I10]   • Hypothyroidism [E03.9]   • Insomnia [G47.00]   • Generalized ischemic myocardial dysfunction [I25.5]   • Leukocytosis [D72.829]   • Memory impairment [R41.3]   • Nausea and vomiting [R11.2]   • Pulmonary embolism (CMS/[facility]) [I26.99]   • Renal insufficiency [N28.9]   • Sleep apnea [G47.30]   • Squamous cell carcinoma of skin [C44.92]   • CHF (NYHA class IV, ACC/AHA stage D) (CMS/[facility]) [I50.9]     Review of Systems   Constitutional: Positive for activity change and fatigue. Negative for appetite change, chills, diaphoresis, fever and unexpected weight change.   HENT: Negative for congestion, dental problem, drooling, ear discharge, ear pain, facial swelling, hearing loss, mouth sores, nosebleeds, postnasal drip, rhinorrhea, sinus pressure, sneezing, sore throat, tinnitus, trouble swallowing and voice change.    Eyes: Negative for photophobia, pain, discharge, redness, itching and visual disturbance.   Respiratory: Positive for shortness of breath. Negative for apnea, cough, choking, chest tightness, wheezing and stridor.    Cardiovascular: Positive for leg swelling. Negative for chest pain and palpitations.   Gastrointestinal: Negative for abdominal distention, abdominal pain, anal bleeding, blood in stool, constipation, diarrhea, nausea, rectal pain and vomiting.   Endocrine: Negative for cold intolerance, heat intolerance, polydipsia, polyphagia and polyuria.   Genitourinary: Negative for decreased urine volume, difficulty urinating, dysuria, enuresis, flank pain, frequency, genital sores, hematuria and urgency.   Musculoskeletal: Negative for arthralgias, back pain, gait problem, joint swelling, myalgias, neck pain and neck stiffness.  "  Skin: Negative for color change, pallor, rash and wound.   Allergic/Immunologic: Negative for environmental allergies, food allergies and immunocompromised state.   Neurological: Negative for dizziness, tremors, seizures, syncope, facial asymmetry, speech difficulty, weakness, light-headedness, numbness and headaches.   Hematological: Negative for adenopathy. Does not bruise/bleed easily.   Psychiatric/Behavioral: Negative for agitation, behavioral problems, confusion, decreased concentration, dysphoric mood, hallucinations, self-injury, sleep disturbance and suicidal ideas. The patient is not nervous/anxious and is not hyperactive.      Social History     Socioeconomic History   • Marital status:      Spouse name: Not on file   • Number of children: Not on file   • Years of education: Not on file   • Highest education level: Not on file   Tobacco Use   • Smoking status: Never Smoker   • Smokeless tobacco: Never Used   Substance and Sexual Activity   • Alcohol use: No   • Drug use: No   • Sexual activity: Defer     Family History   Problem Relation Age of Onset   • Breast cancer Other    • Diabetes Other    • Esophageal cancer Other    • Hypertension Other    • Transient ischemic attack Other    • Breast cancer Mother    • Cancer Father      Ht 160 cm (63\")   Wt 103 kg (227 lb)   LMP  (LMP Unknown)   Breastfeeding? No   BMI 40.21 kg/m²   Physical Exam   Constitutional: She is oriented to person, place, and time. She appears well-developed and well-nourished.   HENT:   Head: Normocephalic and atraumatic.   Nose: Nose normal.   Mouth/Throat: Oropharynx is clear and moist.   Eyes: Conjunctivae, EOM and lids are normal. Pupils are equal, round, and reactive to light.   Neck: Trachea normal and normal range of motion. Neck supple. Carotid bruit is not present. No tracheal deviation present. No thyroid mass and no thyromegaly present.   Cardiovascular: Normal rate, regular rhythm, normal heart sounds and " intact distal pulses. Exam reveals no gallop and no friction rub.   No murmur heard.  Pulmonary/Chest: Effort normal and breath sounds normal. No respiratory distress. She has no wheezes.   Musculoskeletal: Normal range of motion. She exhibits no edema or deformity.    Karla had a diabetic foot exam performed today.   During the foot exam she had a monofilament test performed.    Neurological Sensory Findings - Altered hot/cold right ankle/foot discrimination and altered hot/cold left ankle/foot discrimination. Unaltered sharp/dull right ankle/foot discrimination and unaltered sharp/dull left ankle/foot discrimination. No right ankle/foot altered proprioception and no left ankle/foot altered proprioception  Vascular Status -  Her right foot exhibits normal foot vasculature  and no edema. Her left foot exhibits normal foot vasculature  and no edema.  Skin Integrity  -  Her right foot skin is intact.Her left foot skin is intact..     Lymphadenopathy:     She has no cervical adenopathy.   Neurological: She is alert and oriented to person, place, and time. She has normal reflexes. She displays normal reflexes. No cranial nerve deficit.   Skin: Skin is warm and dry. No rash noted. No cyanosis or erythema. Nails show no clubbing.   Psychiatric: She has a normal mood and affect. Her speech is normal and behavior is normal. Judgment and thought content normal. Cognition and memory are normal.   Nursing note and vitals reviewed.    Results for orders placed or performed in visit on 09/21/18   POC Glycosylated Hemoglobin (Hb A1C)   Result Value Ref Range    Hemoglobin A1C 6.7 %   POC Glucose Fingerstick   Result Value Ref Range    Glucose 59 (A) 70 - 130 mg/dL     Karla was seen today for diabetes, peripheral neuropathy and hypothyroidism.    Diagnoses and all orders for this visit:    Type 2 diabetes mellitus without complication, with long-term current use of insulin (CMS/[facility])  -     POC Glycosylated Hemoglobin (Hb A1C)  -      POC Glucose Fingerstick      Problem List Items Addressed This Visit        Cardiovascular and Mediastinum    Hyperlipidemia LDL goal <100     Check flp- goal as above          Relevant Orders    Lipid Panel (Completed)    Vitamin D 25 Hydroxy (Completed)    Essential hypertension     bp is good          Relevant Orders    CBC Auto Differential (Completed)    CAD (coronary artery disease)     Check flp   Goal LDL less than 70 bsed on above             Endocrine    Hypothyroidism    Relevant Orders    TSH (Completed)    T4, Free (Completed)    Diabetes mellitus (CMS/[facility]) - Primary     Well controlled without significant hypoglycemia- discussed goal average sugar 150 or less  Results for orders placed or performed in visit on 04/09/19   Comprehensive Metabolic Panel   Result Value Ref Range    Glucose 104 (H) 65 - 99 mg/dL    BUN 57 (H) 8 - 23 mg/dL    Creatinine 1.79 (H) 0.57 - 1.00 mg/dL    Sodium 143 136 - 145 mmol/L    Potassium 4.7 3.5 - 5.2 mmol/L    Chloride 100 98 - 107 mmol/L    CO2 27.7 22.0 - 29.0 mmol/L    Calcium 9.4 8.6 - 10.5 mg/dL    Total Protein 7.8 6.0 - 8.5 g/dL    Albumin 3.80 3.50 - 5.20 g/dL    ALT (SGPT) 12 1 - 33 U/L    AST (SGOT) 10 1 - 32 U/L    Alkaline Phosphatase 143 (H) 39 - 117 U/L    Total Bilirubin 0.6 0.2 - 1.2 mg/dL    eGFR Non African Amer 27 (L) >60 mL/min/1.73    Globulin 4.0 gm/dL    A/G Ratio 1.0 g/dL    BUN/Creatinine Ratio 31.8 (H) 7.0 - 25.0    Anion Gap 15.3 mmol/L   CBC Auto Differential   Result Value Ref Range    WBC 10.49 3.40 - 10.80 10*3/mm3    RBC 4.84 3.77 - 5.28 10*6/mm3    Hemoglobin 14.2 12.0 - 15.9 g/dL    Hematocrit 46.0 34.0 - 46.6 %    MCV 95.0 79.0 - 97.0 fL    MCH 29.3 26.6 - 33.0 pg    MCHC 30.9 (L) 31.5 - 35.7 g/dL    RDW 13.8 12.3 - 15.4 %    RDW-SD 48.3 37.0 - 54.0 fl    MPV 12.4 (H) 6.0 - 12.0 fL    Platelets 241 140 - 450 10*3/mm3    Neutrophil % 65.9 42.7 - 76.0 %    Lymphocyte % 24.3 19.6 - 45.3 %    Monocyte % 7.6 5.0 - 12.0 %    Eosinophil % 1.3  0.3 - 6.2 %    Basophil % 0.5 0.0 - 1.5 %    Immature Grans % 0.4 0.0 - 0.5 %    Neutrophils, Absolute 6.91 1.40 - 7.00 10*3/mm3    Lymphocytes, Absolute 2.55 0.70 - 3.10 10*3/mm3    Monocytes, Absolute 0.80 0.10 - 0.90 10*3/mm3    Eosinophils, Absolute 0.14 0.00 - 0.40 10*3/mm3    Basophils, Absolute 0.05 0.00 - 0.20 10*3/mm3    Immature Grans, Absolute 0.04 0.00 - 0.05 10*3/mm3    nRBC 0.0 0.0 - 0.0 /100 WBC   Microalbumin / Creatinine Urine Ratio - Urine, Clean Catch   Result Value Ref Range    Microalbumin/Creatinine Ratio 127.1 mg/g    Creatinine, Urine 91.3 mg/dL    Microalbumin, Urine 11.6 mg/L   Lipid Panel   Result Value Ref Range    Total Cholesterol 108 0 - 200 mg/dL    Triglycerides 102 0 - 150 mg/dL    HDL Cholesterol 40 40 - 60 mg/dL    LDL Cholesterol  48 0 - 100 mg/dL    VLDL Cholesterol 20.4 5 - 40 mg/dL    LDL/HDL Ratio 1.19    TSH   Result Value Ref Range    TSH 2.980 0.270 - 4.200 mIU/mL   T4, Free   Result Value Ref Range    Free T4 1.50 0.93 - 1.70 ng/dL   Vitamin D 25 Hydroxy   Result Value Ref Range    25 Hydroxy, Vitamin D 46.1 30.0 - 100.0 ng/ml   POC Glycosylated Hemoglobin (Hb A1C)   Result Value Ref Range    Hemoglobin A1C 7.0 %   POC Glucose Fingerstick   Result Value Ref Range    Glucose 87 70 - 130 mg/dL     Average sugar is 150 currently   Monitor medication and dosing with ckd- caution related to low sugar discussed   Is utd with eye exam  Neuropathy stable without callus or ulcer   Ur alb due today   F/u 3-4 months          Relevant Orders    POC Glycosylated Hemoglobin (Hb A1C) (Completed)    POC Glucose Fingerstick (Completed)    Comprehensive Metabolic Panel (Completed)    Microalbumin / Creatinine Urine Ratio - Urine, Clean Catch (Completed)       Genitourinary    CKD (chronic kidney disease), stage IV (CMS/HCC)     See above- followed by Dr Mckeon  Avoid nsaids, other kidney toxins         Relevant Orders    Vitamin D 25 Hydroxy (Completed)        Return in about 4 years  (around 4/9/2023) for Recheck 30 min .    Karina Taylor MA  Signed Rani Lane MD

## 2019-04-10 LAB
ALBUMIN UR-MCNC: 11.6 MG/L
CREAT UR-MCNC: 91.3 MG/DL
MICROALBUMIN/CREAT UR: 127.1 MG/G

## 2019-04-11 DIAGNOSIS — F51.01 PRIMARY INSOMNIA: ICD-10-CM

## 2019-04-11 NOTE — ASSESSMENT & PLAN NOTE
Well controlled without significant hypoglycemia- discussed goal average sugar 150 or less  Results for orders placed or performed in visit on 04/09/19   Comprehensive Metabolic Panel   Result Value Ref Range    Glucose 104 (H) 65 - 99 mg/dL    BUN 57 (H) 8 - 23 mg/dL    Creatinine 1.79 (H) 0.57 - 1.00 mg/dL    Sodium 143 136 - 145 mmol/L    Potassium 4.7 3.5 - 5.2 mmol/L    Chloride 100 98 - 107 mmol/L    CO2 27.7 22.0 - 29.0 mmol/L    Calcium 9.4 8.6 - 10.5 mg/dL    Total Protein 7.8 6.0 - 8.5 g/dL    Albumin 3.80 3.50 - 5.20 g/dL    ALT (SGPT) 12 1 - 33 U/L    AST (SGOT) 10 1 - 32 U/L    Alkaline Phosphatase 143 (H) 39 - 117 U/L    Total Bilirubin 0.6 0.2 - 1.2 mg/dL    eGFR Non African Amer 27 (L) >60 mL/min/1.73    Globulin 4.0 gm/dL    A/G Ratio 1.0 g/dL    BUN/Creatinine Ratio 31.8 (H) 7.0 - 25.0    Anion Gap 15.3 mmol/L   CBC Auto Differential   Result Value Ref Range    WBC 10.49 3.40 - 10.80 10*3/mm3    RBC 4.84 3.77 - 5.28 10*6/mm3    Hemoglobin 14.2 12.0 - 15.9 g/dL    Hematocrit 46.0 34.0 - 46.6 %    MCV 95.0 79.0 - 97.0 fL    MCH 29.3 26.6 - 33.0 pg    MCHC 30.9 (L) 31.5 - 35.7 g/dL    RDW 13.8 12.3 - 15.4 %    RDW-SD 48.3 37.0 - 54.0 fl    MPV 12.4 (H) 6.0 - 12.0 fL    Platelets 241 140 - 450 10*3/mm3    Neutrophil % 65.9 42.7 - 76.0 %    Lymphocyte % 24.3 19.6 - 45.3 %    Monocyte % 7.6 5.0 - 12.0 %    Eosinophil % 1.3 0.3 - 6.2 %    Basophil % 0.5 0.0 - 1.5 %    Immature Grans % 0.4 0.0 - 0.5 %    Neutrophils, Absolute 6.91 1.40 - 7.00 10*3/mm3    Lymphocytes, Absolute 2.55 0.70 - 3.10 10*3/mm3    Monocytes, Absolute 0.80 0.10 - 0.90 10*3/mm3    Eosinophils, Absolute 0.14 0.00 - 0.40 10*3/mm3    Basophils, Absolute 0.05 0.00 - 0.20 10*3/mm3    Immature Grans, Absolute 0.04 0.00 - 0.05 10*3/mm3    nRBC 0.0 0.0 - 0.0 /100 WBC   Microalbumin / Creatinine Urine Ratio - Urine, Clean Catch   Result Value Ref Range    Microalbumin/Creatinine Ratio 127.1 mg/g    Creatinine, Urine 91.3 mg/dL    Microalbumin,  Urine 11.6 mg/L   Lipid Panel   Result Value Ref Range    Total Cholesterol 108 0 - 200 mg/dL    Triglycerides 102 0 - 150 mg/dL    HDL Cholesterol 40 40 - 60 mg/dL    LDL Cholesterol  48 0 - 100 mg/dL    VLDL Cholesterol 20.4 5 - 40 mg/dL    LDL/HDL Ratio 1.19    TSH   Result Value Ref Range    TSH 2.980 0.270 - 4.200 mIU/mL   T4, Free   Result Value Ref Range    Free T4 1.50 0.93 - 1.70 ng/dL   Vitamin D 25 Hydroxy   Result Value Ref Range    25 Hydroxy, Vitamin D 46.1 30.0 - 100.0 ng/ml   POC Glycosylated Hemoglobin (Hb A1C)   Result Value Ref Range    Hemoglobin A1C 7.0 %   POC Glucose Fingerstick   Result Value Ref Range    Glucose 87 70 - 130 mg/dL     Average sugar is 150 currently   Monitor medication and dosing with ckd- caution related to low sugar discussed   Is utd with eye exam  Neuropathy stable without callus or ulcer   Ur alb due today   F/u 3-4 months

## 2019-04-12 RX ORDER — ESZOPICLONE 2 MG/1
TABLET, FILM COATED ORAL
Qty: 30 TABLET | Refills: 0 | Status: SHIPPED | OUTPATIENT
Start: 2019-04-12 | End: 2019-05-10 | Stop reason: SDUPTHER

## 2019-04-18 RX ORDER — LEVOTHYROXINE SODIUM 0.1 MG/1
TABLET ORAL
Qty: 90 TABLET | Refills: 0 | Status: SHIPPED | OUTPATIENT
Start: 2019-04-18 | End: 2019-07-20 | Stop reason: SDUPTHER

## 2019-05-10 DIAGNOSIS — F51.01 PRIMARY INSOMNIA: ICD-10-CM

## 2019-05-10 RX ORDER — ESZOPICLONE 2 MG/1
TABLET, FILM COATED ORAL
Qty: 30 TABLET | Refills: 0 | Status: SHIPPED | OUTPATIENT
Start: 2019-05-10 | End: 2019-06-07 | Stop reason: SDUPTHER

## 2019-05-21 ENCOUNTER — TELEPHONE (OUTPATIENT)
Dept: CARDIOLOGY | Facility: CLINIC | Age: 84
End: 2019-05-21

## 2019-05-21 NOTE — TELEPHONE ENCOUNTER
Called pt due to Merlin home monitor not connecting.  Spoke to pt and she would like to try cell phone adapter.  Sending cellular adapter and she will call when she receives it for instructions.

## 2019-05-22 ENCOUNTER — CLINICAL SUPPORT NO REQUIREMENTS (OUTPATIENT)
Dept: CARDIOLOGY | Facility: CLINIC | Age: 84
End: 2019-05-22

## 2019-05-22 DIAGNOSIS — I50.22 CHRONIC SYSTOLIC CONGESTIVE HEART FAILURE (HCC): ICD-10-CM

## 2019-05-22 DIAGNOSIS — I48.20 CHRONIC ATRIAL FIBRILLATION (HCC): ICD-10-CM

## 2019-05-22 PROCEDURE — 93295 DEV INTERROG REMOTE 1/2/MLT: CPT | Performed by: INTERNAL MEDICINE

## 2019-05-22 PROCEDURE — 93296 REM INTERROG EVL PM/IDS: CPT | Performed by: INTERNAL MEDICINE

## 2019-06-07 DIAGNOSIS — F51.01 PRIMARY INSOMNIA: ICD-10-CM

## 2019-06-07 RX ORDER — CARVEDILOL 25 MG/1
TABLET ORAL
Qty: 90 TABLET | Refills: 2 | Status: SHIPPED | OUTPATIENT
Start: 2019-06-07 | End: 2020-03-10

## 2019-06-10 RX ORDER — ESZOPICLONE 2 MG/1
TABLET, FILM COATED ORAL
Qty: 30 TABLET | Refills: 0 | Status: SHIPPED | OUTPATIENT
Start: 2019-06-10 | End: 2019-08-09 | Stop reason: SDUPTHER

## 2019-06-20 ENCOUNTER — OFFICE VISIT (OUTPATIENT)
Dept: CARDIOLOGY | Facility: CLINIC | Age: 84
End: 2019-06-20

## 2019-06-20 VITALS
DIASTOLIC BLOOD PRESSURE: 80 MMHG | HEART RATE: 71 BPM | WEIGHT: 227 LBS | BODY MASS INDEX: 37.82 KG/M2 | SYSTOLIC BLOOD PRESSURE: 137 MMHG | HEIGHT: 65 IN

## 2019-06-20 DIAGNOSIS — I10 ESSENTIAL HYPERTENSION: ICD-10-CM

## 2019-06-20 DIAGNOSIS — I50.22 CHRONIC SYSTOLIC CONGESTIVE HEART FAILURE (HCC): ICD-10-CM

## 2019-06-20 DIAGNOSIS — I48.20 CHRONIC ATRIAL FIBRILLATION (HCC): Primary | ICD-10-CM

## 2019-06-20 DIAGNOSIS — I25.9 ISCHEMIC HEART DISEASE: ICD-10-CM

## 2019-06-20 PROCEDURE — 93284 PRGRMG EVAL IMPLANTABLE DFB: CPT | Performed by: INTERNAL MEDICINE

## 2019-06-20 PROCEDURE — 99213 OFFICE O/P EST LOW 20 MIN: CPT | Performed by: INTERNAL MEDICINE

## 2019-06-20 NOTE — PROGRESS NOTES
Karla HUYNH Colbert  1935  [phone]      06/20/2019    [facility] CARDIOLOGY     Giselle Guzman, DO  [address] [id number]    Chief Complaint   Patient presents with   • Atrial Fibrillation   • Congestive Heart Failure   • Cardiomyopathy       Problem List:   PROBLEM LIST:  1. Ischemic heart disease:  a. History of remote myocardial infarction/CABG x3, [facility] (database insufficient).   b. Left heart catheterization by Dr. Basilio Grossman, 2007, revealing patent LIMA graft/medical therapy, LVEF 30%.  c. Echocardiogram, [facility], 2007: LVEF 25%; moderate/severe mitral regurgitation.  d. Upgrade of pacemaker to [employer] BI-V pacemaker per Dr. Hammonds on 10/25/2007.  e. Reported negative Cardiolite stress test in 2010, [facility], Dr. Basilio Grossman (database insufficient).  f. Reported echocardiogram, October 2011, [facility] (database insufficient).   g. Echocardiogram, 08/29/2012: LVEF 35% to 40%, mild mitral regurgitation.  h. History of MRSA pacemaker infection, 2005 (database insufficient).   i. [employer]el pacemaker, 2005, with upgrade to a [employer] BI-V ICD device in 2007, Dr. Hammonds.   j. CRT-D generator exchange by Tano Beal, 09/21/2012, [employer] device (Serial# [id number]).  k. Persistent ischemic cardiomyopathy.   l. Echocardiogram, 10/20/2015: LVEF 35% to 40%, moderate mitral regurgitation/tricuspid regurgitation; RVSP 45 mmHg.   2. Chronic class III systolic heart failure.  3. History of persistent atrial fibrillation.  a. CHADS2 score equal to 4.   b. Chronic Coumadin therapy managed by Bk Mckeon’s office.   4. Pericardial effusion, 2008.  5. History of frequent urinary tract infections.  6. Hypothyroidism.  7. Insulin-dependent diabetes mellitus, type 2.  8. Chronic kidney disease, stage 4, followed by Bk Mckeon.   9. Dyslipidemia.  10. Hypertension.  11. Home O2 nightly/sleep  "apnea.  12. History of endometrial cancer, status post partial hysterectomy in 2008 with follow up radiation therapy followed by Dr. Sosa.   13. History of multiple left hip surgeries most recently in 2011 at [facility]  Allergies  Allergies   Allergen Reactions   • No Known Drug Allergy        Current Medications    Current Outpatient Medications:   •  albuterol (PROVENTIL HFA;VENTOLIN HFA) 108 (90 Base) MCG/ACT inhaler, Inhale 2 puffs Every 4 (Four) Hours As Needed for Wheezing or Shortness of Air., Disp: 1 inhaler, Rfl: 5  •  aspirin (ASPIRIN LOW DOSE) 81 MG tablet, Take  by mouth daily., Disp: , Rfl:   •  atorvastatin (LIPITOR) 20 MG tablet, Take 1 tablet by mouth Every Night., Disp: 90 tablet, Rfl: 1  •  carvedilol (COREG) 25 MG tablet, TAKE 1/2 TABLET BY MOUTH TWICE DAILY, Disp: 90 tablet, Rfl: 2  •  cholecalciferol (VITAMIN D3) 1000 UNITS tablet, Take 1,000 Units by mouth Daily., Disp: , Rfl:   •  eszopiclone (LUNESTA) 2 MG tablet, TAKE ONE TABLET BY MOUTH AT BEDTIME , Disp: 30 tablet, Rfl: 0  •  furosemide (LASIX) 40 MG tablet, Take  by mouth as needed., Disp: , Rfl:   •  glucose blood (ONE TOUCH ULTRA TEST) test strip, Test blood sugars TID, Disp: 100 each, Rfl: 5  •  indapamide (LOZOL) 2.5 MG tablet, Take  by mouth daily., Disp: , Rfl:   •  insulin glargine (LANTUS) 100 UNIT/ML injection, Inject 20 Units under the skin Daily., Disp: 2 each, Rfl: 0  •  Insulin Pen Needle (B-D ULTRAFINE III SHORT PEN) 31G X 8 MM misc, Use 4 per day to administer insulin, Disp: 150 each, Rfl: 5  •  Insulin Syringe-Needle U-100 (TRUEPLUS INSULIN SYRINGE) 31G X 5/16\" 0.5 ML misc, Use 4 per day to administer insulin, Disp: 200 each, Rfl: 5  •  isosorbide mononitrate (IMDUR) 30 MG 24 hr tablet, Take 1 tablet by mouth Daily., Disp: 90 tablet, Rfl: 0  •  latanoprost (XALATAN) 0.005 % ophthalmic solution, APPLY 1 DROP TO EACH EYE AT BEDTIME, Disp: , Rfl: 5  •  levothyroxine (SYNTHROID, LEVOTHROID) 100 MCG tablet, " "TAKE ONE TABLET BY MOUTH ONE TIME DAILY , Disp: 90 tablet, Rfl: 0  •  NOVOLOG 100 UNIT/ML injection, Inject 20 units sub-q 3 times a day before meals (Patient taking differently: Inject 15 units sub-q 3 times a day before meals), Disp: 20 mL, Rfl: 2  •  ondansetron (ZOFRAN) 4 MG tablet, Take  by mouth every 12 (twelve) hours as needed., Disp: , Rfl:   •  spironolactone (ALDACTONE) 25 MG tablet, Take 1 tablet by mouth Daily., Disp: 90 tablet, Rfl: 1  •  timolol (TIMOPTIC) 0.5 % ophthalmic solution, , Disp: , Rfl:   •  TRUEPLUS INSULIN SYRINGE 31G X 5/16\" 0.5 ML misc, USE 4 SYRINGES PER DAY TO INJECT INSULIN, Disp: 200 each, Rfl: 5  •  warfarin (COUMADIN) 3 MG tablet, Take  by mouth daily. Patient takes 2 mg & 3 mg.  Rotates dosage every other day. , Disp: , Rfl:     History of Present Illness     Pt presents for follow up of CHF/DCM/AF. Since the pt has seen us, pt has done reasonably well.  She denies any substantial palpitations, CP, LH, and dizziness. Denies any hospitalizations, ER visits, ICD shocks, or TIA/CVA symptoms. Overall feels ok except for fatigue.  She does have chronic dyspnea on exertion and walks with a walker which is been unchanged..  Blood pressures at home of been stable.  Her PT/INRs have been stable for the most part.    ROS:  General:  + fatigue, No weight gain or loss  Cardiovascular:  Denies CP, PND, syncope, near syncope, edema or palpitations.  Pulmonary:  + MARKHAM, No cough, or wheezing    Vitals:    06/20/19 1211   BP: 137/80   BP Location: Right arm   Patient Position: Sitting   Pulse: 71   Weight: 103 kg (227 lb)   Height: 165.1 cm (65\")       PE:  General: NAD  Neck: no JVD, no carotid bruits, no TM  Heart RRR, NL S1, S2, no rubs, murmurs  Lungs: CTA, no wheezes, rhonchi, or rales  Abd: soft, non-tender, NL BS  Ext: No musculoskeletal deformities, no edema, cyanosis, or clubbing  Psych: normal mood and affect    Diagnostic Data:  Procedures.    1. Chronic atrial fibrillation (CMS/HCC)  "   2. Chronic systolic congestive heart failure (CMS/HCC)    3. Ischemic heart disease    4. Essential hypertension        ICD interrogation: NL fxn, NL battery fxn,  CAF, 99% BiV paced, 18 M left    Plan:  1) AF- chronic, asymptomatic, rate controlled.   Continue present medications.   2) Anticoagulation: CHADSVasc = 7   Continue warfarin  3) Chronic systolic CHF - stable class I-2 with normal BiV ICD function. On Coreg, no ACE or ARB due to CKD  Wt loss, exercise, salt reduction  4) HTN - controlled.     F/up in 6 months

## 2019-06-30 DIAGNOSIS — F51.01 PRIMARY INSOMNIA: ICD-10-CM

## 2019-07-02 ENCOUNTER — OFFICE VISIT (OUTPATIENT)
Dept: INTERNAL MEDICINE | Facility: CLINIC | Age: 84
End: 2019-07-02

## 2019-07-02 VITALS
WEIGHT: 228.4 LBS | OXYGEN SATURATION: 98 % | SYSTOLIC BLOOD PRESSURE: 110 MMHG | BODY MASS INDEX: 38.01 KG/M2 | HEART RATE: 73 BPM | DIASTOLIC BLOOD PRESSURE: 80 MMHG

## 2019-07-02 DIAGNOSIS — T14.8XXA HEMATOMA: Primary | ICD-10-CM

## 2019-07-02 DIAGNOSIS — L03.115 CELLULITIS OF RIGHT LOWER EXTREMITY: ICD-10-CM

## 2019-07-02 PROCEDURE — 99213 OFFICE O/P EST LOW 20 MIN: CPT | Performed by: INTERNAL MEDICINE

## 2019-07-02 RX ORDER — CEPHALEXIN 500 MG/1
CAPSULE ORAL 2 TIMES DAILY
COMMUNITY
Start: 2019-06-27 | End: 2019-07-15

## 2019-07-02 RX ORDER — ESZOPICLONE 2 MG/1
TABLET, FILM COATED ORAL
Qty: 30 TABLET | Refills: 0 | Status: SHIPPED | OUTPATIENT
Start: 2019-07-02 | End: 2019-07-02 | Stop reason: SDUPTHER

## 2019-07-02 NOTE — PROGRESS NOTES
Subjective   Karla Cristobal is a 83 y.o. female.   Chief Complaint   Patient presents with   • Knot in shin     Acute       History of Present Illness   Hit right lower extremity on car door 1 week ago. Went to [facility] on Thursday and given keflex and bactroban.  The following portions of the patient's history were reviewed and updated as appropriate: allergies, current medications, past family history, past medical history, past social history, past surgical history and problem list.    Review of Systems   Constitutional: Negative for activity change, appetite change, chills, diaphoresis, fatigue, fever and unexpected weight change.   HENT: Negative for congestion, ear discharge, ear pain, mouth sores, nosebleeds, sinus pressure, sneezing and sore throat.    Eyes: Negative for pain, discharge and itching.   Respiratory: Negative for cough, chest tightness, shortness of breath and wheezing.    Cardiovascular: Negative for chest pain, palpitations and leg swelling.   Gastrointestinal: Negative for abdominal pain, constipation, diarrhea, nausea and vomiting.   Endocrine: Negative for cold intolerance, heat intolerance, polydipsia and polyphagia.   Genitourinary: Negative for dysuria, flank pain, frequency, hematuria and urgency.   Musculoskeletal: Negative for arthralgias, back pain, gait problem, myalgias, neck pain and neck stiffness.   Skin: Negative for color change, pallor and rash.   Neurological: Negative for seizures, speech difficulty, numbness and headaches.   Psychiatric/Behavioral: Negative for agitation, confusion, decreased concentration and sleep disturbance. The patient is not nervous/anxious.      /80   Pulse 73   Wt 104 kg (228 lb 6.4 oz)   LMP  (LMP Unknown)   SpO2 98%   BMI 38.01 kg/m²     Objective   Physical Exam   Constitutional: She is oriented to person, place, and time. She appears well-developed.   HENT:   Head: Normocephalic.   Right Ear: External ear normal.   Left Ear: External ear  normal.   Nose: Nose normal.   Mouth/Throat: Oropharynx is clear and moist.   Eyes: Conjunctivae are normal. Pupils are equal, round, and reactive to light.   Neck: No JVD present. No thyromegaly present.   Cardiovascular: Normal rate, regular rhythm and normal heart sounds. Exam reveals no friction rub.   No murmur heard.  Pulmonary/Chest: Effort normal and breath sounds normal. No respiratory distress. She has no wheezes. She has no rales.   Abdominal: Soft. Bowel sounds are normal. She exhibits no distension. There is no tenderness. There is no guarding.   Musculoskeletal: She exhibits no edema or tenderness.   Lymphadenopathy:     She has no cervical adenopathy.   Neurological: She is oriented to person, place, and time. She displays normal reflexes. No cranial nerve deficit.   Skin: No rash noted.        Psychiatric: Her behavior is normal.   Nursing note and vitals reviewed.      Assessment/Plan   Karla was seen today for knot in shin.    Diagnoses and all orders for this visit:    Hematoma  Will resolve  Cellulitis of right lower extremity  Finish antibiotics.

## 2019-07-12 ENCOUNTER — TELEPHONE (OUTPATIENT)
Dept: INTERNAL MEDICINE | Facility: CLINIC | Age: 84
End: 2019-07-12

## 2019-07-12 NOTE — TELEPHONE ENCOUNTER
Please call pt she has questions about a pain med ication for her leg    Please bradly [phone]

## 2019-07-15 ENCOUNTER — HOSPITAL ENCOUNTER (OUTPATIENT)
Dept: GENERAL RADIOLOGY | Facility: HOSPITAL | Age: 84
Discharge: HOME | End: 2019-07-15
Admitting: INTERNAL MEDICINE

## 2019-07-15 ENCOUNTER — OFFICE VISIT (OUTPATIENT)
Dept: INTERNAL MEDICINE | Facility: CLINIC | Age: 84
End: 2019-07-15

## 2019-07-15 VITALS
SYSTOLIC BLOOD PRESSURE: 130 MMHG | BODY MASS INDEX: 38.24 KG/M2 | DIASTOLIC BLOOD PRESSURE: 80 MMHG | WEIGHT: 229.8 LBS | HEART RATE: 74 BPM | OXYGEN SATURATION: 98 %

## 2019-07-15 DIAGNOSIS — M79.661 PAIN IN RIGHT LOWER LEG: ICD-10-CM

## 2019-07-15 DIAGNOSIS — M79.661 PAIN IN RIGHT LOWER LEG: Primary | ICD-10-CM

## 2019-07-15 PROCEDURE — 73590 X-RAY EXAM OF LOWER LEG: CPT

## 2019-07-15 PROCEDURE — 99213 OFFICE O/P EST LOW 20 MIN: CPT | Performed by: INTERNAL MEDICINE

## 2019-07-15 PROCEDURE — 85025 COMPLETE CBC W/AUTO DIFF WBC: CPT | Performed by: INTERNAL MEDICINE

## 2019-07-15 RX ORDER — HYDROCODONE BITARTRATE AND ACETAMINOPHEN 5; 325 MG/1; MG/1
1 TABLET ORAL EVERY 6 HOURS PRN
Qty: 12 TABLET | Refills: 0 | Status: SHIPPED | OUTPATIENT
Start: 2019-07-15 | End: 2020-07-14

## 2019-07-15 NOTE — PROGRESS NOTES
Subjective   Karla Cristobal is a 83 y.o. female.   Chief Complaint   Patient presents with   • Right Leg Pain       History of Present Illness   Seen by NP after she hit her leg on car door. Finished antibiotics one week ago. Feels like swelling more. Has a hematoma. NO SOB or CP. Is on chronic coumadin.  The following portions of the patient's history were reviewed and updated as appropriate: allergies, current medications, past family history, past medical history, past social history, past surgical history and problem list.    Review of Systems   Constitutional: Negative for activity change, appetite change, chills, diaphoresis, fatigue, fever and unexpected weight change.   HENT: Negative for congestion, ear discharge, ear pain, mouth sores, nosebleeds, sinus pressure, sneezing and sore throat.    Eyes: Negative for pain, discharge and itching.   Respiratory: Negative for cough, chest tightness, shortness of breath and wheezing.    Cardiovascular: Negative for chest pain, palpitations and leg swelling.   Gastrointestinal: Negative for abdominal pain, constipation, diarrhea, nausea and vomiting.   Endocrine: Negative for cold intolerance, heat intolerance, polydipsia and polyphagia.   Genitourinary: Negative for dysuria, flank pain, frequency, hematuria and urgency.   Musculoskeletal: Negative for arthralgias, back pain, gait problem, myalgias, neck pain and neck stiffness.        Leg pain   Skin: Negative for color change, pallor and rash.   Neurological: Negative for seizures, speech difficulty, numbness and headaches.   Psychiatric/Behavioral: Negative for agitation, confusion, decreased concentration and sleep disturbance. The patient is not nervous/anxious.        Objective   Physical Exam   Constitutional: She is oriented to person, place, and time. She appears well-developed.   HENT:   Head: Normocephalic.   Right Ear: External ear normal.   Left Ear: External ear normal.   Nose: Nose normal.    Mouth/Throat: Oropharynx is clear and moist.   Eyes: Conjunctivae are normal. Pupils are equal, round, and reactive to light.   Neck: No JVD present. No thyromegaly present.   Cardiovascular: Normal rate, regular rhythm and normal heart sounds. Exam reveals no friction rub.   Pulmonary/Chest: Effort normal and breath sounds normal. No respiratory distress. She has no wheezes. She has no rales.   Abdominal: She exhibits no distension. There is no tenderness. There is no guarding.   Musculoskeletal: She exhibits no edema or tenderness.   Lymphadenopathy:     She has no cervical adenopathy.   Neurological: She is oriented to person, place, and time. She displays normal reflexes. No cranial nerve deficit.   Skin: No rash noted.        Psychiatric: Her behavior is normal.   Nursing note and vitals reviewed.      Assessment/Plan   Karla was seen today for right leg pain.    Diagnoses and all orders for this visit:    Pain in right lower leg  -     XR tibia and fibula 1 vw right; Future  -     CBC & Differential  -     HYDROcodone-acetaminophen (NORCO) 5-325 MG per tablet; Take 1 tablet by mouth Every 6 (Six) Hours As Needed for Moderate Pain .  -     CBC Auto Differential

## 2019-07-16 ENCOUNTER — TELEPHONE (OUTPATIENT)
Dept: INTERNAL MEDICINE | Facility: CLINIC | Age: 84
End: 2019-07-16

## 2019-07-16 LAB
BASOPHILS # BLD AUTO: 0.06 10*3/MM3 (ref 0–0.2)
BASOPHILS NFR BLD AUTO: 0.6 % (ref 0–1.5)
DEPRECATED RDW RBC AUTO: 50.3 FL (ref 37–54)
EOSINOPHIL # BLD AUTO: 0.17 10*3/MM3 (ref 0–0.4)
EOSINOPHIL NFR BLD AUTO: 1.8 % (ref 0.3–6.2)
ERYTHROCYTE [DISTWIDTH] IN BLOOD BY AUTOMATED COUNT: 14.6 % (ref 12.3–15.4)
HCT VFR BLD AUTO: 44 % (ref 34–46.6)
HGB BLD-MCNC: 13.3 G/DL (ref 12–15.9)
IMM GRANULOCYTES # BLD AUTO: 0.04 10*3/MM3 (ref 0–0.05)
IMM GRANULOCYTES NFR BLD AUTO: 0.4 % (ref 0–0.5)
LYMPHOCYTES # BLD AUTO: 2.51 10*3/MM3 (ref 0.7–3.1)
LYMPHOCYTES NFR BLD AUTO: 26.6 % (ref 19.6–45.3)
MCH RBC QN AUTO: 28.6 PG (ref 26.6–33)
MCHC RBC AUTO-ENTMCNC: 30.2 G/DL (ref 31.5–35.7)
MCV RBC AUTO: 94.6 FL (ref 79–97)
MONOCYTES # BLD AUTO: 0.89 10*3/MM3 (ref 0.1–0.9)
MONOCYTES NFR BLD AUTO: 9.4 % (ref 5–12)
NEUTROPHILS # BLD AUTO: 5.75 10*3/MM3 (ref 1.7–7)
NEUTROPHILS NFR BLD AUTO: 61.2 % (ref 42.7–76)
NRBC BLD AUTO-RTO: 0 /100 WBC (ref 0–0.2)
PLATELET # BLD AUTO: 245 10*3/MM3 (ref 140–450)
PMV BLD AUTO: 12.2 FL (ref 6–12)
RBC # BLD AUTO: 4.65 10*6/MM3 (ref 3.77–5.28)
WBC NRBC COR # BLD: 9.42 10*3/MM3 (ref 3.4–10.8)

## 2019-07-16 NOTE — TELEPHONE ENCOUNTER
Let know xray showed no fracture. Blood count is okay. If still hurting, next step would be ortho eval.

## 2019-07-22 RX ORDER — LEVOTHYROXINE SODIUM 0.1 MG/1
TABLET ORAL
Qty: 90 TABLET | Refills: 0 | Status: SHIPPED | OUTPATIENT
Start: 2019-07-22 | End: 2019-10-22 | Stop reason: SDUPTHER

## 2019-08-07 DIAGNOSIS — F51.01 PRIMARY INSOMNIA: ICD-10-CM

## 2019-08-07 RX ORDER — ESZOPICLONE 2 MG/1
TABLET, FILM COATED ORAL
Qty: 30 TABLET | Refills: 0 | OUTPATIENT
Start: 2019-08-07

## 2019-08-08 NOTE — TELEPHONE ENCOUNTER
PATENT CALLED TO INQUIRE AS TO WHY THIS REFILL REQUEST WAS DENIED BY THIS OFFCE. SHE STATES SHE HAS KEPT ALL OF HER APPTS AND HAS NOT HAD ISSUE REFILLING IN THE PAST.

## 2019-08-09 DIAGNOSIS — F51.01 PRIMARY INSOMNIA: ICD-10-CM

## 2019-08-09 RX ORDER — ESZOPICLONE 2 MG/1
2 TABLET, FILM COATED ORAL
Qty: 30 TABLET | Refills: 2 | Status: SHIPPED | OUTPATIENT
Start: 2019-08-09 | End: 2019-11-02 | Stop reason: SDUPTHER

## 2019-08-09 NOTE — TELEPHONE ENCOUNTER
PATIENTS DAUGHTER ARCHIE DUMONT HAS WALKED IN THE OFFICE AND IS WANTING HER MOTHERS ESZOPICLONE REFILLED TODAY, SHE IS NOT UNDERSTANDING WHY IT HAS NOT BEEN REFILLED. SHE CANB E REACHED [phone]

## 2019-08-12 ENCOUNTER — OFFICE VISIT (OUTPATIENT)
Dept: ENDOCRINOLOGY | Facility: CLINIC | Age: 84
End: 2019-08-12

## 2019-08-12 VITALS
BODY MASS INDEX: 37.69 KG/M2 | SYSTOLIC BLOOD PRESSURE: 118 MMHG | OXYGEN SATURATION: 99 % | HEART RATE: 74 BPM | WEIGHT: 226.2 LBS | HEIGHT: 65 IN | DIASTOLIC BLOOD PRESSURE: 80 MMHG

## 2019-08-12 DIAGNOSIS — I10 ESSENTIAL HYPERTENSION: ICD-10-CM

## 2019-08-12 DIAGNOSIS — Z79.4 TYPE 2 DIABETES MELLITUS WITHOUT COMPLICATION, WITH LONG-TERM CURRENT USE OF INSULIN (HCC): Primary | ICD-10-CM

## 2019-08-12 DIAGNOSIS — E78.5 HYPERLIPIDEMIA LDL GOAL <100: ICD-10-CM

## 2019-08-12 DIAGNOSIS — R11.0 NAUSEA: ICD-10-CM

## 2019-08-12 DIAGNOSIS — I25.10 CORONARY ARTERY DISEASE INVOLVING NATIVE CORONARY ARTERY OF NATIVE HEART WITHOUT ANGINA PECTORIS: ICD-10-CM

## 2019-08-12 DIAGNOSIS — E11.9 TYPE 2 DIABETES MELLITUS WITHOUT COMPLICATION, WITH LONG-TERM CURRENT USE OF INSULIN (HCC): Primary | ICD-10-CM

## 2019-08-12 LAB
GLUCOSE BLDC GLUCOMTR-MCNC: 85 MG/DL (ref 70–130)
HBA1C MFR BLD: 7.2 %

## 2019-08-12 PROCEDURE — 99214 OFFICE O/P EST MOD 30 MIN: CPT | Performed by: INTERNAL MEDICINE

## 2019-08-12 PROCEDURE — 82947 ASSAY GLUCOSE BLOOD QUANT: CPT | Performed by: INTERNAL MEDICINE

## 2019-08-12 PROCEDURE — 83036 HEMOGLOBIN GLYCOSYLATED A1C: CPT | Performed by: INTERNAL MEDICINE

## 2019-08-12 RX ORDER — INSULIN GLARGINE 100 [IU]/ML
20 INJECTION, SOLUTION SUBCUTANEOUS DAILY
Qty: 15 ML | Refills: 1 | COMMUNITY
Start: 2019-08-12 | End: 2019-12-16 | Stop reason: SDUPTHER

## 2019-08-12 NOTE — PROGRESS NOTES
Karla Cristobal 84 y.o.  [facility]:Follow-up; Diabetes (TYpe II, eye exam was 1/19); Peripheral Neuropathy; and Hypothyroidism      [place]: Follow-up; Diabetes (TYpe II, eye exam was 1/19); Peripheral Neuropathy; and Hypothyroidism    Blood sugar and 90 day average sugar reviewed  Results for orders placed or performed in visit on 08/12/19   POC Glycosylated Hemoglobin (Hb A1C)   Result Value Ref Range    Hemoglobin A1C 7.2 %   POC Glucose Fingerstick   Result Value Ref Range    Glucose 85 70 - 130 mg/dL     Provided juice for current blood sugar  Cc is nausea- started after waking up this morning  No abdominal pain or vomiting   Kept juice down today  Feels out of balance as well  No problems with allergies   Last bm was yesterday- normal  Last few weeks- blood sugars 100-200  Has been sitting in the sun - wondered if it has affected the function and we discussed replacing   She is utd with eye exam  No foot callus or ulcer  Ur alb 4/19 neg       Allergies   Allergen Reactions   • No Known Drug Allergy        Current Outpatient Medications:   •  albuterol (PROVENTIL HFA;VENTOLIN HFA) 108 (90 Base) MCG/ACT inhaler, Inhale 2 puffs Every 4 (Four) Hours As Needed for Wheezing or Shortness of Air., Disp: 1 inhaler, Rfl: 5  •  aspirin (ASPIRIN LOW DOSE) 81 MG tablet, Take  by mouth daily., Disp: , Rfl:   •  atorvastatin (LIPITOR) 20 MG tablet, Take 1 tablet by mouth Every Night., Disp: 90 tablet, Rfl: 1  •  carvedilol (COREG) 25 MG tablet, TAKE 1/2 TABLET BY MOUTH TWICE DAILY, Disp: 90 tablet, Rfl: 2  •  cholecalciferol (VITAMIN D3) 1000 UNITS tablet, Take 1,000 Units by mouth Daily., Disp: , Rfl:   •  eszopiclone (LUNESTA) 2 MG tablet, Take 1 tablet by mouth every night at bedtime. Take immediately before bedtime, Disp: 30 tablet, Rfl: 2  •  furosemide (LASIX) 40 MG tablet, Take  by mouth as needed., Disp: , Rfl:   •  glucose blood (ONE TOUCH ULTRA TEST) test strip, Test blood sugars TID, Disp: 100 each, Rfl: 5  •   "HYDROcodone-acetaminophen (NORCO) 5-325 MG per tablet, Take 1 tablet by mouth Every 6 (Six) Hours As Needed for Moderate Pain ., Disp: 12 tablet, Rfl: 0  •  indapamide (LOZOL) 2.5 MG tablet, Take  by mouth daily., Disp: , Rfl:   •  insulin glargine (LANTUS) 100 UNIT/ML injection, Inject 20 Units under the skin Daily., Disp: 2 each, Rfl: 0  •  Insulin Pen Needle (B-D ULTRAFINE III SHORT PEN) 31G X 8 MM misc, Use 4 per day to administer insulin, Disp: 150 each, Rfl: 5  •  Insulin Syringe-Needle U-100 (TRUEPLUS INSULIN SYRINGE) 31G X 5/16\" 0.5 ML misc, Use 4 per day to administer insulin, Disp: 200 each, Rfl: 5  •  isosorbide mononitrate (IMDUR) 30 MG 24 hr tablet, Take 1 tablet by mouth Daily., Disp: 90 tablet, Rfl: 0  •  latanoprost (XALATAN) 0.005 % ophthalmic solution, APPLY 1 DROP TO EACH EYE AT BEDTIME, Disp: , Rfl: 5  •  levothyroxine (SYNTHROID, LEVOTHROID) 100 MCG tablet, TAKE ONE TABLET BY MOUTH ONE TIME DAILY , Disp: 90 tablet, Rfl: 0  •  mupirocin (BACTROBAN) 2 % ointment, , Disp: , Rfl:   •  NOVOLOG 100 UNIT/ML injection, Inject 20 units sub-q 3 times a day before meals (Patient taking differently: Inject 15 units sub-q 3 times a day before meals), Disp: 20 mL, Rfl: 2  •  ondansetron (ZOFRAN) 4 MG tablet, Take  by mouth every 12 (twelve) hours as needed., Disp: , Rfl:   •  spironolactone (ALDACTONE) 25 MG tablet, Take 1 tablet by mouth Daily., Disp: 90 tablet, Rfl: 1  •  timolol (TIMOPTIC) 0.5 % ophthalmic solution, , Disp: , Rfl:   •  TRUEPLUS INSULIN SYRINGE 31G X 5/16\" 0.5 ML misc, USE 4 SYRINGES PER DAY TO INJECT INSULIN, Disp: 200 each, Rfl: 5  •  warfarin (COUMADIN) 3 MG tablet, Take  by mouth daily. Patient takes 2 mg & 3 mg.  Rotates dosage every other day. , Disp: , Rfl:   Patient Active Problem List    Diagnosis   • Morbidly obese (CMS/[facility]) [E66.01]   • Chronic systolic congestive heart failure (CMS/[facility]) [I50.22]   • DVT of lower extremity (deep venous thrombosis) (CMS/[facility]) [I82.409]   • CAD " (coronary artery disease) [I25.10]   • Ischemic heart disease [I25.9]   • Anemia due to chronic kidney disease [N18.9, D63.1]   • Chronic atrial fibrillation (CMS/HCC) [I48.2]   • CKD (chronic kidney disease), stage IV (CMS/HCC) [N18.4]   • Diabetes mellitus (CMS/HCC) [E11.9]   • Diabetic nephropathy (CMS/HCC) [E11.21]   • Diabetic retinopathy (CMS/HCC) [E11.319]   • Malignant neoplasm of endometrium (CMS/HCC) [C54.1]   • Hyperlipidemia LDL goal <100 [E78.5]   • Essential hypertension [I10]   • Hypothyroidism [E03.9]   • Insomnia [G47.00]   • Generalized ischemic myocardial dysfunction [I25.5]   • Leukocytosis [D72.829]   • Memory impairment [R41.3]   • Nausea and vomiting [R11.2]   • Pulmonary embolism (CMS/HCC) [I26.99]   • Renal insufficiency [N28.9]   • Sleep apnea [G47.30]   • Squamous cell carcinoma of skin [C44.92]   • CHF (NYHA class IV, ACC/AHA stage D) (CMS/HCC) [I50.9]     Review of Systems   Constitutional: Negative for activity change, appetite change, chills, diaphoresis, fatigue, fever and unexpected weight change.   HENT: Negative for congestion, dental problem, drooling, ear discharge, ear pain, facial swelling, hearing loss, mouth sores, nosebleeds, postnasal drip, rhinorrhea, sinus pressure, sneezing, sore throat, tinnitus, trouble swallowing and voice change.    Eyes: Negative for photophobia, pain, discharge, redness, itching and visual disturbance.   Respiratory: Negative for apnea, cough, choking, chest tightness, shortness of breath, wheezing and stridor.    Cardiovascular: Negative for chest pain, palpitations and leg swelling.   Gastrointestinal: Negative for abdominal distention, abdominal pain, anal bleeding, blood in stool, constipation, diarrhea, nausea, rectal pain and vomiting.   Endocrine: Negative for cold intolerance, heat intolerance, polydipsia, polyphagia and polyuria.   Genitourinary: Negative for decreased urine volume, difficulty urinating, dysuria, enuresis, flank pain,  "frequency, genital sores, hematuria and urgency.   Musculoskeletal: Negative for arthralgias, back pain, gait problem, joint swelling, myalgias, neck pain and neck stiffness.   Skin: Negative for color change, pallor, rash and wound.   Allergic/Immunologic: Negative for environmental allergies, food allergies and immunocompromised state.   Neurological: Negative for dizziness, tremors, seizures, syncope, facial asymmetry, speech difficulty, weakness, light-headedness, numbness and headaches.   Hematological: Negative for adenopathy. Does not bruise/bleed easily.   Psychiatric/Behavioral: Negative for agitation, behavioral problems, confusion, decreased concentration, dysphoric mood, hallucinations, self-injury, sleep disturbance and suicidal ideas. The patient is not nervous/anxious and is not hyperactive.      Social History     Socioeconomic History   • Marital status:      Spouse name: Not on file   • Number of children: Not on file   • Years of education: Not on file   • Highest education level: Not on file   Tobacco Use   • Smoking status: Never Smoker   • Smokeless tobacco: Never Used   Substance and Sexual Activity   • Alcohol use: No   • Drug use: No   • Sexual activity: Defer     Family History   Problem Relation Age of Onset   • Breast cancer Other    • Diabetes Other    • Esophageal cancer Other    • Hypertension Other    • Transient ischemic attack Other    • Breast cancer Mother    • Cancer Father      /80   Pulse 74   Ht 165.1 cm (65\")   Wt 103 kg (226 lb 3.2 oz)   LMP  (LMP Unknown)   SpO2 99%   BMI 37.64 kg/m²   Physical Exam   Constitutional: She is oriented to person, place, and time. She appears well-developed and well-nourished.   HENT:   Head: Normocephalic and atraumatic.   Nose: Nose normal.   Mouth/Throat: Oropharynx is clear and moist.   Eyes: Conjunctivae, EOM and lids are normal. Pupils are equal, round, and reactive to light.   Neck: Trachea normal and normal range of " motion. Neck supple. Carotid bruit is not present. No tracheal deviation present. No thyroid mass and no thyromegaly present.   Cardiovascular: Normal rate, regular rhythm, normal heart sounds and intact distal pulses. Exam reveals no gallop and no friction rub.   No murmur heard.  Pulmonary/Chest: Effort normal and breath sounds normal. No respiratory distress. She has no wheezes.   Abdominal: Soft. Bowel sounds are normal.   Musculoskeletal: Normal range of motion. She exhibits no edema or deformity.    Karla had a diabetic foot exam performed today.   During the foot exam she had a monofilament test performed.    Neurological Sensory Findings - Unaltered hot/cold right ankle/foot discrimination and unaltered hot/cold left ankle/foot discrimination. Unaltered sharp/dull right ankle/foot discrimination and unaltered sharp/dull left ankle/foot discrimination. Right ankle/foot altered proprioception and left ankle/foot altered proprioception.  Vascular Status -  Her right foot exhibits normal foot vasculature  and no edema. Her left foot exhibits normal foot vasculature  and no edema.  Skin Integrity  -  Her right foot skin is intact.Her left foot skin is intact..     Lymphadenopathy:     She has no cervical adenopathy.   Neurological: She is alert and oriented to person, place, and time. She has normal reflexes. She displays normal reflexes. No cranial nerve deficit.   Skin: Skin is warm and dry. No rash noted. No cyanosis or erythema. Nails show no clubbing.   Psychiatric: She has a normal mood and affect. Her speech is normal and behavior is normal. Judgment and thought content normal. Cognition and memory are normal.   Nursing note and vitals reviewed.    Results for orders placed or performed in visit on 07/15/19   CBC Auto Differential   Result Value Ref Range    WBC 9.42 3.40 - 10.80 10*3/mm3    RBC 4.65 3.77 - 5.28 10*6/mm3    Hemoglobin 13.3 12.0 - 15.9 g/dL    Hematocrit 44.0 34.0 - 46.6 %    MCV 94.6 79.0 -  97.0 fL    MCH 28.6 26.6 - 33.0 pg    MCHC 30.2 (L) 31.5 - 35.7 g/dL    RDW 14.6 12.3 - 15.4 %    RDW-SD 50.3 37.0 - 54.0 fl    MPV 12.2 (H) 6.0 - 12.0 fL    Platelets 245 140 - 450 10*3/mm3    Neutrophil % 61.2 42.7 - 76.0 %    Lymphocyte % 26.6 19.6 - 45.3 %    Monocyte % 9.4 5.0 - 12.0 %    Eosinophil % 1.8 0.3 - 6.2 %    Basophil % 0.6 0.0 - 1.5 %    Immature Grans % 0.4 0.0 - 0.5 %    Neutrophils, Absolute 5.75 1.70 - 7.00 10*3/mm3    Lymphocytes, Absolute 2.51 0.70 - 3.10 10*3/mm3    Monocytes, Absolute 0.89 0.10 - 0.90 10*3/mm3    Eosinophils, Absolute 0.17 0.00 - 0.40 10*3/mm3    Basophils, Absolute 0.06 0.00 - 0.20 10*3/mm3    Immature Grans, Absolute 0.04 0.00 - 0.05 10*3/mm3    nRBC 0.0 0.0 - 0.2 /100 WBC     Karla was seen today for follow-up, diabetes, peripheral neuropathy and hypothyroidism.    Diagnoses and all orders for this visit:    Type 2 diabetes mellitus without complication, with long-term current use of insulin (CMS/[facility])  -     POC Glycosylated Hemoglobin (Hb A1C)  -     POC Glucose Fingerstick      Problem List Items Addressed This Visit        Cardiovascular and Mediastinum    Hyperlipidemia LDL goal <100     LDL 4/19 was 48  No change for now - continue current medications and monitoring          Essential hypertension     bp is good today -continue to monitor          CAD (coronary artery disease)     H/o prior MI- has chest pain with heart attack   Denies any chest pain and understands if she develops worsening nausea or vomiting, shortness of breath, chest pain/pressure or neck pain she should see emergency attention .            Digestive    Nausea     Started this morning - no vomiting and she declines medication for this  No abdominal pain             Endocrine    Diabetes mellitus (CMS/HCC) - Primary     Blood sugar and 90 day average sugar reviewed  Results for orders placed or performed in visit on 08/12/19   POC Glycosylated Hemoglobin (Hb A1C)   Result Value Ref Range     Hemoglobin A1C 7.2 %   POC Glucose Fingerstick   Result Value Ref Range    Glucose 85 70 - 130 mg/dL     Average sugar is 150   She was given juice for lower sugar  She is utd with preventive care  F/u 3-4 months          Relevant Orders    POC Glycosylated Hemoglobin (Hb A1C) (Completed)    POC Glucose Fingerstick (Completed)        Return in about 4 months (around 12/12/2019) for Recheck 30 min .    Karina Taylor MA  Signed Rani Lane MD

## 2019-08-13 NOTE — ASSESSMENT & PLAN NOTE
H/o prior MI- has chest pain with heart attack   Denies any chest pain and understands if she develops worsening nausea or vomiting, shortness of breath, chest pain/pressure or neck pain she should see emergency attention .

## 2019-08-13 NOTE — ASSESSMENT & PLAN NOTE
Blood sugar and 90 day average sugar reviewed  Results for orders placed or performed in visit on 08/12/19   POC Glycosylated Hemoglobin (Hb A1C)   Result Value Ref Range    Hemoglobin A1C 7.2 %   POC Glucose Fingerstick   Result Value Ref Range    Glucose 85 70 - 130 mg/dL     Average sugar is 150   She was given juice for lower sugar  She is utd with preventive care  F/u 3-4 months

## 2019-08-22 ENCOUNTER — CLINICAL SUPPORT NO REQUIREMENTS (OUTPATIENT)
Dept: CARDIOLOGY | Facility: CLINIC | Age: 84
End: 2019-08-22

## 2019-08-22 DIAGNOSIS — I48.20 CHRONIC ATRIAL FIBRILLATION (HCC): Primary | ICD-10-CM

## 2019-08-22 DIAGNOSIS — I50.22 CHRONIC SYSTOLIC CONGESTIVE HEART FAILURE (HCC): ICD-10-CM

## 2019-08-22 PROCEDURE — 93295 DEV INTERROG REMOTE 1/2/MLT: CPT | Performed by: INTERNAL MEDICINE

## 2019-08-22 PROCEDURE — 93296 REM INTERROG EVL PM/IDS: CPT | Performed by: INTERNAL MEDICINE

## 2019-09-04 ENCOUNTER — OFFICE VISIT (OUTPATIENT)
Dept: INTERNAL MEDICINE | Facility: CLINIC | Age: 84
End: 2019-09-04

## 2019-09-04 VITALS
BODY MASS INDEX: 37.61 KG/M2 | DIASTOLIC BLOOD PRESSURE: 80 MMHG | WEIGHT: 226 LBS | SYSTOLIC BLOOD PRESSURE: 100 MMHG | HEART RATE: 75 BPM | OXYGEN SATURATION: 97 %

## 2019-09-04 DIAGNOSIS — I48.20 CHRONIC ATRIAL FIBRILLATION (HCC): Primary | ICD-10-CM

## 2019-09-04 DIAGNOSIS — F51.01 PRIMARY INSOMNIA: ICD-10-CM

## 2019-09-04 DIAGNOSIS — N18.4 CKD (CHRONIC KIDNEY DISEASE), STAGE IV (HCC): ICD-10-CM

## 2019-09-04 DIAGNOSIS — E78.5 HYPERLIPIDEMIA LDL GOAL <100: ICD-10-CM

## 2019-09-04 DIAGNOSIS — I10 ESSENTIAL HYPERTENSION: ICD-10-CM

## 2019-09-04 PROCEDURE — G0008 ADMIN INFLUENZA VIRUS VAC: HCPCS | Performed by: INTERNAL MEDICINE

## 2019-09-04 PROCEDURE — 99214 OFFICE O/P EST MOD 30 MIN: CPT | Performed by: INTERNAL MEDICINE

## 2019-09-04 PROCEDURE — 90662 IIV NO PRSV INCREASED AG IM: CPT | Performed by: INTERNAL MEDICINE

## 2019-09-04 NOTE — PROGRESS NOTES
Subjective   Karla Cristobal is a 84 y.o. female.   Chief Complaint   Patient presents with   • Chronic Kidney Disease     Follow Up   • Hyperlipidemia   • Hypertension   • Insomnia   • Chronic Afib       Atrial Fibrillation   Presents for follow-up visit. Symptoms include hypertension and shortness of breath (chronic and require oxygen at night). Symptoms are negative for chest pain, palpitations, syncope, tachycardia and weakness. Past medical history includes atrial fibrillation and hyperlipidemia.   Hypertension   This is a chronic problem. The current episode started more than 1 year ago. Associated symptoms include shortness of breath (chronic and require oxygen at night). Pertinent negatives include no chest pain, headaches, neck pain or palpitations.   Insomnia   This is a chronic problem. The current episode started more than 1 year ago. Pertinent negatives include no abdominal pain, arthralgias, chest pain, chills, congestion, coughing, diaphoresis, fatigue, fever, headaches, myalgias, nausea, neck pain, numbness, rash, sore throat, vomiting or weakness.   Chronic Kidney Disease   Pertinent negatives include no abdominal pain, arthralgias, chest pain, chills, congestion, coughing, diaphoresis, fatigue, fever, headaches, myalgias, nausea, neck pain, numbness, rash, sore throat, vomiting or weakness.   Hyperlipidemia   Associated symptoms include shortness of breath (chronic and require oxygen at night). Pertinent negatives include no chest pain or myalgias.      Chronic heart failure that is stable.   The following portions of the patient's history were reviewed and updated as appropriate: allergies, current medications, past family history, past medical history, past social history, past surgical history and problem list.    Review of Systems   Constitutional: Negative for activity change, appetite change, chills, diaphoresis, fatigue, fever and unexpected weight change.   HENT: Negative for congestion, ear  discharge, ear pain, mouth sores, nosebleeds, sinus pressure, sneezing and sore throat.    Eyes: Negative for pain, discharge and itching.   Respiratory: Positive for shortness of breath (chronic and require oxygen at night). Negative for cough, chest tightness and wheezing.    Cardiovascular: Negative for chest pain, palpitations, leg swelling and syncope.   Gastrointestinal: Negative for abdominal pain, constipation, diarrhea, nausea and vomiting.   Endocrine: Negative for cold intolerance, heat intolerance, polydipsia and polyphagia.   Genitourinary: Negative for dysuria, flank pain, frequency, hematuria and urgency.   Musculoskeletal: Negative for arthralgias, back pain, gait problem, myalgias, neck pain and neck stiffness.   Skin: Negative for color change, pallor and rash.   Neurological: Negative for seizures, speech difficulty, weakness, numbness and headaches.   Psychiatric/Behavioral: Negative for agitation, confusion, decreased concentration and sleep disturbance. The patient has insomnia. The patient is not nervous/anxious.      /80   Pulse 75   Wt 103 kg (226 lb)   LMP  (LMP Unknown)   SpO2 97%   BMI 37.61 kg/m²     Objective   Physical Exam   Constitutional: She is oriented to person, place, and time. She appears well-developed.   HENT:   Head: Normocephalic.   Right Ear: External ear normal.   Left Ear: External ear normal.   Nose: Nose normal.   Mouth/Throat: Oropharynx is clear and moist.   Eyes: Conjunctivae are normal. Pupils are equal, round, and reactive to light.   Neck: No JVD present. No thyromegaly present.   Cardiovascular: Normal rate, regular rhythm and normal heart sounds. Exam reveals no friction rub.   No murmur heard.  Pulmonary/Chest: Effort normal and breath sounds normal. No respiratory distress. She has no wheezes. She has no rales.   Abdominal: Soft. Bowel sounds are normal. She exhibits no distension. There is no tenderness. There is no guarding.   Musculoskeletal:  She exhibits no edema or tenderness.   Lymphadenopathy:     She has no cervical adenopathy.   Neurological: She is oriented to person, place, and time. She displays normal reflexes. No cranial nerve deficit.   Skin: No rash noted.   Psychiatric: Her behavior is normal.   Nursing note and vitals reviewed.      Assessment/Plan   Karla was seen today for atrial fibrillation, hypertension and insomnia.    Diagnoses and all orders for this visit:    Chronic atrial fibrillation  Stable. Nephro manages her coumadin and does not want to change this  Hyperlipidemia LDL goal <100  stable  Essential hypertension  stable  CKD (chronic kidney disease), stage IV  Continue with nephro.    Insomnia  Refilled Lunesta.

## 2019-09-27 ENCOUNTER — HOSPITAL ENCOUNTER (EMERGENCY)
Facility: HOSPITAL | Age: 84
End: 2019-09-27

## 2019-09-27 ENCOUNTER — OFFICE VISIT (OUTPATIENT)
Dept: INTERNAL MEDICINE | Facility: CLINIC | Age: 84
End: 2019-09-27

## 2019-09-27 VITALS
HEART RATE: 75 BPM | DIASTOLIC BLOOD PRESSURE: 80 MMHG | OXYGEN SATURATION: 94 % | WEIGHT: 228 LBS | HEIGHT: 65 IN | SYSTOLIC BLOOD PRESSURE: 134 MMHG | BODY MASS INDEX: 37.99 KG/M2 | TEMPERATURE: 97.8 F

## 2019-09-27 DIAGNOSIS — M79.89 PAIN AND SWELLING OF LEFT LOWER LEG: Primary | ICD-10-CM

## 2019-09-27 DIAGNOSIS — M79.662 PAIN AND SWELLING OF LEFT LOWER LEG: Primary | ICD-10-CM

## 2019-09-27 PROCEDURE — 99213 OFFICE O/P EST LOW 20 MIN: CPT | Performed by: NURSE PRACTITIONER

## 2019-09-30 ENCOUNTER — EPISODE CHANGES (OUTPATIENT)
Dept: CASE MANAGEMENT | Facility: OTHER | Age: 84
End: 2019-09-30

## 2019-10-18 ENCOUNTER — EPISODE CHANGES (OUTPATIENT)
Dept: CASE MANAGEMENT | Facility: OTHER | Age: 84
End: 2019-10-18

## 2019-10-22 RX ORDER — LEVOTHYROXINE SODIUM 0.1 MG/1
TABLET ORAL
Qty: 90 TABLET | Refills: 0 | Status: SHIPPED | OUTPATIENT
Start: 2019-10-22 | End: 2020-01-20

## 2019-11-02 DIAGNOSIS — F51.01 PRIMARY INSOMNIA: ICD-10-CM

## 2019-11-02 PROCEDURE — 87088 URINE BACTERIA CULTURE: CPT | Performed by: NURSE PRACTITIONER

## 2019-11-02 PROCEDURE — 87086 URINE CULTURE/COLONY COUNT: CPT | Performed by: NURSE PRACTITIONER

## 2019-11-02 PROCEDURE — 87186 SC STD MICRODIL/AGAR DIL: CPT | Performed by: NURSE PRACTITIONER

## 2019-11-04 ENCOUNTER — TELEPHONE (OUTPATIENT)
Dept: URGENT CARE | Facility: CLINIC | Age: 84
End: 2019-11-04

## 2019-11-04 RX ORDER — ESZOPICLONE 2 MG/1
TABLET, FILM COATED ORAL
Qty: 30 TABLET | Refills: 1 | Status: SHIPPED | OUTPATIENT
Start: 2019-11-04 | End: 2019-12-31

## 2019-12-16 ENCOUNTER — OFFICE VISIT (OUTPATIENT)
Dept: ENDOCRINOLOGY | Facility: CLINIC | Age: 84
End: 2019-12-16

## 2019-12-16 VITALS
OXYGEN SATURATION: 98 % | BODY MASS INDEX: 38.81 KG/M2 | HEART RATE: 87 BPM | SYSTOLIC BLOOD PRESSURE: 132 MMHG | WEIGHT: 233.2 LBS | DIASTOLIC BLOOD PRESSURE: 80 MMHG

## 2019-12-16 DIAGNOSIS — I10 ESSENTIAL HYPERTENSION: ICD-10-CM

## 2019-12-16 DIAGNOSIS — Z79.4 TYPE 2 DIABETES MELLITUS WITHOUT COMPLICATION, WITH LONG-TERM CURRENT USE OF INSULIN (HCC): Primary | ICD-10-CM

## 2019-12-16 DIAGNOSIS — E78.5 HYPERLIPIDEMIA LDL GOAL <100: ICD-10-CM

## 2019-12-16 DIAGNOSIS — E11.9 TYPE 2 DIABETES MELLITUS WITHOUT COMPLICATION, WITH LONG-TERM CURRENT USE OF INSULIN (HCC): Primary | ICD-10-CM

## 2019-12-16 DIAGNOSIS — E03.9 ACQUIRED HYPOTHYROIDISM: ICD-10-CM

## 2019-12-16 LAB
GLUCOSE BLDC GLUCOMTR-MCNC: 121 MG/DL (ref 70–130)
HBA1C MFR BLD: 6.7 %

## 2019-12-16 PROCEDURE — 80053 COMPREHEN METABOLIC PANEL: CPT | Performed by: INTERNAL MEDICINE

## 2019-12-16 PROCEDURE — 84443 ASSAY THYROID STIM HORMONE: CPT | Performed by: INTERNAL MEDICINE

## 2019-12-16 PROCEDURE — 84439 ASSAY OF FREE THYROXINE: CPT | Performed by: INTERNAL MEDICINE

## 2019-12-16 PROCEDURE — 80061 LIPID PANEL: CPT | Performed by: INTERNAL MEDICINE

## 2019-12-16 PROCEDURE — 82947 ASSAY GLUCOSE BLOOD QUANT: CPT | Performed by: INTERNAL MEDICINE

## 2019-12-16 PROCEDURE — 99214 OFFICE O/P EST MOD 30 MIN: CPT | Performed by: INTERNAL MEDICINE

## 2019-12-16 PROCEDURE — 82043 UR ALBUMIN QUANTITATIVE: CPT | Performed by: INTERNAL MEDICINE

## 2019-12-16 PROCEDURE — 82570 ASSAY OF URINE CREATININE: CPT | Performed by: INTERNAL MEDICINE

## 2019-12-16 PROCEDURE — 83036 HEMOGLOBIN GLYCOSYLATED A1C: CPT | Performed by: INTERNAL MEDICINE

## 2019-12-16 RX ORDER — BLOOD-GLUCOSE METER
EACH MISCELLANEOUS
Qty: 1 EACH | Refills: 0 | Status: SHIPPED | OUTPATIENT
Start: 2019-12-16 | End: 2019-12-16 | Stop reason: SDUPTHER

## 2019-12-16 RX ORDER — BLOOD-GLUCOSE METER
EACH MISCELLANEOUS
Qty: 1 EACH | Refills: 0 | Status: SHIPPED | OUTPATIENT
Start: 2019-12-16 | End: 2019-12-20 | Stop reason: SDUPTHER

## 2019-12-16 RX ORDER — INSULIN GLARGINE 100 [IU]/ML
20 INJECTION, SOLUTION SUBCUTANEOUS DAILY
Qty: 20 ML | Refills: 3 | Status: SHIPPED | OUTPATIENT
Start: 2019-12-16 | End: 2019-12-17 | Stop reason: CLARIF

## 2019-12-16 NOTE — PROGRESS NOTES
Karla Cristobal 84 y.o.  CC:Follow-up; Diabetes (TYpe II, eye exam was Jan 2019); Hypothyroidism; and Peripheral Neuropathy      Crow: Follow-up; Diabetes (TYpe II, eye exam was Jan 2019); Hypothyroidism; and Peripheral Neuropathy    Blood sugar and 90 day average sugar reviewed  Results for orders placed or performed in visit on 12/16/19   POC Glycosylated Hemoglobin (Hb A1C)   Result Value Ref Range    Hemoglobin A1C 6.7 %   POC Glucose Fingerstick   Result Value Ref Range    Glucose 121 70 - 130 mg/dL     No low blood sugar   bp is good   Energy is good   Is on vitamin D supplement   Neuropathy stable without callus or ulcer   Is utd with eye exam  Ur alb neg     Allergies   Allergen Reactions   • No Known Drug Allergy        Current Outpatient Medications:   •  albuterol (PROVENTIL HFA;VENTOLIN HFA) 108 (90 Base) MCG/ACT inhaler, Inhale 2 puffs Every 4 (Four) Hours As Needed for Wheezing or Shortness of Air., Disp: 1 inhaler, Rfl: 5  •  aspirin (ASPIRIN LOW DOSE) 81 MG tablet, Take  by mouth daily., Disp: , Rfl:   •  atorvastatin (LIPITOR) 20 MG tablet, Take 1 tablet by mouth Every Night., Disp: 90 tablet, Rfl: 1  •  Blood Glucose Monitoring Suppl (ONE TOUCH ULTRA 2) w/Device kit, Use daily to test blood sugars, Disp: 1 each, Rfl: 0  •  carvedilol (COREG) 25 MG tablet, TAKE 1/2 TABLET BY MOUTH TWICE DAILY, Disp: 90 tablet, Rfl: 2  •  cholecalciferol (VITAMIN D3) 1000 UNITS tablet, Take 1,000 Units by mouth Daily., Disp: , Rfl:   •  ciprofloxacin (CIPRO) 500 MG tablet, Take 1 tablet by mouth Daily., Disp: 7 tablet, Rfl: 0  •  eszopiclone (LUNESTA) 2 MG tablet, TAKE ONE TABLET BY MOUTH EVERY NIGHT IMMEDIATELY BEFORE BEDTIME , Disp: 30 tablet, Rfl: 1  •  furosemide (LASIX) 40 MG tablet, Take  by mouth as needed., Disp: , Rfl:   •  glucose blood (ONE TOUCH ULTRA TEST) test strip, Test blood sugars TID, Disp: 100 each, Rfl: 5  •  HYDROcodone-acetaminophen (NORCO) 5-325 MG per tablet, Take 1 tablet by mouth Every 6  "(Six) Hours As Needed for Moderate Pain ., Disp: 12 tablet, Rfl: 0  •  indapamide (LOZOL) 2.5 MG tablet, Take  by mouth daily., Disp: , Rfl:   •  insulin aspart (NOVOLOG) 100 UNIT/ML injection, Inject 15 Units under the skin into the appropriate area as directed 3 (Three) Times a Day Before Meals., Disp: 20 mL, Rfl: 2  •  insulin glargine (LANTUS) 100 UNIT/ML injection, Inject 20 Units under the skin into the appropriate area as directed Daily., Disp: 20 mL, Rfl: 3  •  Insulin Pen Needle (B-D ULTRAFINE III SHORT PEN) 31G X 8 MM misc, Use 4 per day to administer insulin, Disp: 150 each, Rfl: 5  •  Insulin Syringe-Needle U-100 (TRUEPLUS INSULIN SYRINGE) 31G X 5/16\" 0.5 ML misc, Use 4 per day to administer insulin, Disp: 200 each, Rfl: 5  •  isosorbide mononitrate (IMDUR) 30 MG 24 hr tablet, Take 1 tablet by mouth Daily., Disp: 90 tablet, Rfl: 0  •  latanoprost (XALATAN) 0.005 % ophthalmic solution, APPLY 1 DROP TO EACH EYE AT BEDTIME, Disp: , Rfl: 5  •  levothyroxine (SYNTHROID, LEVOTHROID) 100 MCG tablet, TAKE ONE TABLET BY MOUTH ONE TIME DAILY, Disp: 90 tablet, Rfl: 0  •  mupirocin (BACTROBAN) 2 % ointment, , Disp: , Rfl:   •  ondansetron (ZOFRAN) 4 MG tablet, Take  by mouth every 12 (twelve) hours as needed., Disp: , Rfl:   •  spironolactone (ALDACTONE) 25 MG tablet, Take 1 tablet by mouth Daily., Disp: 90 tablet, Rfl: 1  •  timolol (TIMOPTIC) 0.5 % ophthalmic solution, , Disp: , Rfl:   •  TRUEPLUS INSULIN SYRINGE 31G X 5/16\" 0.5 ML misc, USE 4 SYRINGES PER DAY TO INJECT INSULIN, Disp: 200 each, Rfl: 5  •  warfarin (COUMADIN) 3 MG tablet, Take  by mouth daily. Patient takes 2 mg & 3 mg.  Rotates dosage every other day. , Disp: , Rfl:   Patient Active Problem List    Diagnosis   • Coronary atherosclerosis [I25.10]   • Morbidly obese (CMS/[facility]) [E66.01]   • Chronic systolic congestive heart failure (CMS/[facility]) [I50.22]   • DVT of lower extremity (deep venous thrombosis) (CMS/[facility]) [I82.409]   • CAD (coronary artery " disease) [I25.10]   • Ischemic heart disease [I25.9]   • Anemia due to chronic kidney disease [N18.9, D63.1]   • Chronic atrial fibrillation [I48.20]   • CKD (chronic kidney disease), stage IV (CMS/[facility]) [N18.4]   • Diabetes mellitus (CMS/HCC) [E11.9]   • Diabetic nephropathy (CMS/[facility]) [E11.21]   • Diabetic retinopathy (CMS/[facility]) [E11.319]   • Malignant neoplasm of endometrium (CMS/[facility]) [C54.1]   • Hyperlipidemia LDL goal <100 [E78.5]   • Essential hypertension [I10]   • Hypothyroidism [E03.9]   • Insomnia [G47.00]   • 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   HENT: Negative for congestion, dental problem, drooling, ear discharge, ear pain, facial swelling, hearing loss, mouth sores, nosebleeds, postnasal drip, rhinorrhea, sinus pressure, sneezing, sore throat, tinnitus, trouble swallowing and voice change.    Eyes: Negative for photophobia, pain, discharge, redness, itching and visual disturbance.   Respiratory: Negative for apnea, cough, choking, chest tightness, shortness of breath, wheezing and stridor.    Cardiovascular: Negative for chest pain, palpitations and leg swelling.   Gastrointestinal: Negative for abdominal distention, abdominal pain, anal bleeding, blood in stool, constipation, diarrhea, nausea, rectal pain and vomiting.   Endocrine: Negative for cold intolerance, heat intolerance, polydipsia, polyphagia and polyuria.   Genitourinary: Negative for decreased urine volume, difficulty urinating, dysuria, enuresis, flank pain,  frequency, genital sores, hematuria and urgency.   Musculoskeletal: Negative for arthralgias, back pain, gait problem, joint swelling, myalgias, neck pain and neck stiffness.   Skin: Negative for color change, pallor, rash and wound.   Allergic/Immunologic: Negative for environmental allergies, food allergies and immunocompromised state.   Neurological: Negative for dizziness, tremors, seizures, syncope, facial asymmetry, speech difficulty, weakness, light-headedness, numbness and headaches.   Hematological: Negative for adenopathy. Does not bruise/bleed easily.   Psychiatric/Behavioral: Negative for agitation, behavioral problems, confusion, decreased concentration, dysphoric mood, hallucinations, self-injury, sleep disturbance and suicidal ideas. The patient is not nervous/anxious and is not hyperactive.      Social History     Socioeconomic History   • Marital status:      Spouse name: Not on file   • Number of children: Not on file   • Years of education: Not on file   • Highest education level: Not on file   Tobacco Use   • Smoking status: Never Smoker   • Smokeless tobacco: Never Used   Substance and Sexual Activity   • Alcohol use: No   • Drug use: No   • Sexual activity: Defer     Family History   Problem Relation Age of Onset   • Breast cancer Other    • Diabetes Other    • Esophageal cancer Other    • Hypertension Other    • Transient ischemic attack Other    • Breast cancer Mother    • Cancer Father      /80   Pulse 87   Wt 106 kg (233 lb 3.2 oz)   LMP  (LMP Unknown)   SpO2 98%   BMI 38.81 kg/m²   Physical Exam   Constitutional: She is oriented to person, place, and time. She appears well-developed and well-nourished.   HENT:   Head: Normocephalic and atraumatic.   Nose: Nose normal.   Mouth/Throat: Oropharynx is clear and moist.   Eyes: Pupils are equal, round, and reactive to light. Conjunctivae, EOM and lids are normal.   Neck: Trachea normal and normal range of motion. Neck supple.  Carotid bruit is not present. No tracheal deviation present. No thyroid mass and no thyromegaly present.   Cardiovascular: Normal rate, regular rhythm, normal heart sounds and intact distal pulses. Exam reveals no gallop and no friction rub.   No murmur heard.  Pulmonary/Chest: Effort normal and breath sounds normal. No respiratory distress. She has no wheezes.   Musculoskeletal: Normal range of motion. She exhibits no edema or deformity.    Karla had a diabetic foot exam performed today.   During the foot exam she had a monofilament test performed.    Neurological Sensory Findings - Unaltered hot/cold right ankle/foot discrimination and unaltered hot/cold left ankle/foot discrimination. Unaltered sharp/dull right ankle/foot discrimination and unaltered sharp/dull left ankle/foot discrimination. No right ankle/foot altered proprioception and no left ankle/foot altered proprioception  Vascular Status -  Her right foot exhibits normal foot vasculature  and no edema. Her left foot exhibits normal foot vasculature  and no edema.  Skin Integrity  -  Her right foot skin is intact.Her left foot skin is intact..  Lymphadenopathy:     She has no cervical adenopathy.   Neurological: She is alert and oriented to person, place, and time. She has normal reflexes. She displays normal reflexes. No cranial nerve deficit.   Skin: Skin is warm and dry. No rash noted. No cyanosis or erythema. Nails show no clubbing.   Psychiatric: She has a normal mood and affect. Her speech is normal and behavior is normal. Judgment and thought content normal. Cognition and memory are normal.   Nursing note and vitals reviewed.    Results for orders placed or performed during the hospital encounter of 11/02/19   Urine Culture - Urine, Urine, Clean Catch   Result Value Ref Range    Urine Culture >100,000 CFU/mL Escherichia coli (A)        Susceptibility    Escherichia coli - TRUDY     Ampicillin 8 Susceptible ug/ml     Ampicillin + Sulbactam <=2  Susceptible ug/ml     Cefazolin <=4 Susceptible ug/ml     Cefepime <=1 Susceptible ug/ml     Ceftazidime <=1 Susceptible ug/ml     Ceftriaxone <=1 Susceptible ug/ml     Gentamicin <=1 Susceptible ug/ml     Levofloxacin <=0.12 Susceptible ug/ml     Nitrofurantoin <=16 Susceptible ug/ml     Piperacillin + Tazobactam <=4 Susceptible ug/ml     Tetracycline <=1 Susceptible ug/ml     Trimethoprim + Sulfamethoxazole <=20 Susceptible ug/ml   POC Urinalysis Dipstick, Multipro (Automated dipstick)   Result Value Ref Range    Color Yellow Yellow, Straw, Dark Yellow, Maria Isabel    Clarity, UA Cloudy (A) Clear    Glucose, UA Negative Negative, 1000 mg/dL (3+) mg/dL    Bilirubin Negative Negative    Ketones, UA Negative Negative    Specific Gravity  1.015 1.005 - 1.030    Blood, UA 1+ (A) Negative    pH, Urine 6.0 5.0 - 8.0    Protein, POC 1+ (A) Negative mg/dL    Urobilinogen, UA Normal Normal    Nitrite, UA Negative Negative    Leukocytes Large (3+) (A) Negative     Karla was seen today for follow-up, diabetes, hypothyroidism and peripheral neuropathy.    Diagnoses and all orders for this visit:    Type 2 diabetes mellitus without complication, with long-term current use of insulin (CMS/[facility])  -     POC Glycosylated Hemoglobin (Hb A1C)  -     POC Glucose Fingerstick    Other orders  -     Discontinue: Blood Glucose Monitoring Suppl (ONE TOUCH ULTRA 2) w/Device kit; Use daily to test blood sugars  -     insulin glargine (LANTUS) 100 UNIT/ML injection; Inject 20 Units under the skin into the appropriate area as directed Daily.  -     insulin aspart (NOVOLOG) 100 UNIT/ML injection; Inject 15 Units under the skin into the appropriate area as directed 3 (Three) Times a Day Before Meals.  -     Blood Glucose Monitoring Suppl (ONE TOUCH ULTRA 2) w/Device kit; Use daily to test blood sugars      Problem List Items Addressed This Visit        Cardiovascular and Mediastinum    Hyperlipidemia LDL goal <100     Is on statin therapy   Check flp           Relevant Orders    Lipid Panel    Essential hypertension     bp is good             Endocrine    Hypothyroidism     Check tfts- is on supplement without missed doses or problems with dosing          Relevant Orders    T4, Free    TSH    Diabetes mellitus (CMS/HCC) - Primary     Blood sugar and 90 day average sugar reviewed  Results for orders placed or performed in visit on 12/16/19   POC Glycosylated Hemoglobin (Hb A1C)   Result Value Ref Range    Hemoglobin A1C 6.7 %   POC Glucose Fingerstick   Result Value Ref Range    Glucose 121 70 - 130 mg/dL     Average sugar is good  Is utd with preventive care   Is utd with eye exam  No changes for now   No low sugars   F/u 4-6 months          Relevant Medications    insulin glargine (LANTUS) 100 UNIT/ML injection    insulin aspart (NOVOLOG) 100 UNIT/ML injection    Other Relevant Orders    POC Glycosylated Hemoglobin (Hb A1C) (Completed)    POC Glucose Fingerstick (Completed)    Comprehensive Metabolic Panel    Microalbumin / Creatinine Urine Ratio - Urine, Clean Catch        Return in about 6 months (around 6/16/2020) for Recheck 30 min .    Karina Taylor MA  Signed Rani Lane MD

## 2019-12-16 NOTE — ASSESSMENT & PLAN NOTE
Blood sugar and 90 day average sugar reviewed  Results for orders placed or performed in visit on 12/16/19   POC Glycosylated Hemoglobin (Hb A1C)   Result Value Ref Range    Hemoglobin A1C 6.7 %   POC Glucose Fingerstick   Result Value Ref Range    Glucose 121 70 - 130 mg/dL     Average sugar is good  Is utd with preventive care   Is utd with eye exam  No changes for now   No low sugars   F/u 4-6 months

## 2019-12-17 LAB
ALBUMIN SERPL-MCNC: 3.5 G/DL (ref 3.5–5.2)
ALBUMIN UR-MCNC: 3.8 MG/DL
ALBUMIN/GLOB SERPL: 0.9 G/DL
ALP SERPL-CCNC: 130 U/L (ref 39–117)
ALT SERPL W P-5'-P-CCNC: 12 U/L (ref 1–33)
ANION GAP SERPL CALCULATED.3IONS-SCNC: 13.4 MMOL/L (ref 5–15)
AST SERPL-CCNC: 13 U/L (ref 1–32)
BILIRUB SERPL-MCNC: 0.5 MG/DL (ref 0.2–1.2)
BUN BLD-MCNC: 50 MG/DL (ref 8–23)
BUN/CREAT SERPL: 24.9 (ref 7–25)
CALCIUM SPEC-SCNC: 9.3 MG/DL (ref 8.6–10.5)
CHLORIDE SERPL-SCNC: 103 MMOL/L (ref 98–107)
CHOLEST SERPL-MCNC: 96 MG/DL (ref 0–200)
CO2 SERPL-SCNC: 26.6 MMOL/L (ref 22–29)
CREAT BLD-MCNC: 2.01 MG/DL (ref 0.57–1)
CREAT UR-MCNC: 58.9 MG/DL
GFR SERPL CREATININE-BSD FRML MDRD: 24 ML/MIN/1.73
GLOBULIN UR ELPH-MCNC: 3.9 GM/DL
GLUCOSE BLD-MCNC: 86 MG/DL (ref 65–99)
HDLC SERPL-MCNC: 38 MG/DL (ref 40–60)
LDLC SERPL CALC-MCNC: 40 MG/DL (ref 0–100)
LDLC/HDLC SERPL: 1.05 {RATIO}
MICROALBUMIN/CREAT UR: 64.5 MG/G
POTASSIUM BLD-SCNC: 5.3 MMOL/L (ref 3.5–5.2)
PROT SERPL-MCNC: 7.4 G/DL (ref 6–8.5)
SODIUM BLD-SCNC: 143 MMOL/L (ref 136–145)
T4 FREE SERPL-MCNC: 1.55 NG/DL (ref 0.93–1.7)
TRIGL SERPL-MCNC: 90 MG/DL (ref 0–150)
TSH SERPL DL<=0.05 MIU/L-ACNC: 3.63 UIU/ML (ref 0.27–4.2)
VLDLC SERPL-MCNC: 18 MG/DL (ref 5–40)

## 2019-12-17 RX ORDER — INSULIN GLARGINE 100 [IU]/ML
INJECTION, SOLUTION SUBCUTANEOUS
Qty: 7 PEN | Refills: 5 | OUTPATIENT
Start: 2019-12-17 | End: 2020-12-07

## 2019-12-17 NOTE — TELEPHONE ENCOUNTER
rx changed from lantus to basaglar reina Cook at [facility] pharmacy due to formulary

## 2019-12-20 ENCOUNTER — TELEPHONE (OUTPATIENT)
Dept: INTERNAL MEDICINE | Facility: CLINIC | Age: 84
End: 2019-12-20

## 2019-12-20 RX ORDER — LANCETS
EACH MISCELLANEOUS
Qty: 100 EACH | Refills: 5 | Status: SHIPPED | OUTPATIENT
Start: 2019-12-20 | End: 2020-07-14

## 2019-12-20 RX ORDER — BLOOD-GLUCOSE METER
EACH MISCELLANEOUS
Qty: 1 EACH | Refills: 0 | Status: SHIPPED | OUTPATIENT
Start: 2019-12-20 | End: 2020-07-14

## 2019-12-20 NOTE — TELEPHONE ENCOUNTER
[facility] CALLED BACK ABOUT THE PRESCRIPTION FOR THE TESTING MACHINE. STATED THEY ALSO NEED A SCRIPT FOR TEST STRIPS AND LANCTICS AS WELL.      YOU CAN REACH THEM -[phone]

## 2019-12-20 NOTE — TELEPHONE ENCOUNTER
[facility] CALLED TO GET PRESCRIPTION FOR ONE TOUCH ULTRA KIT SENT TO   [facility] [address].

## 2019-12-30 DIAGNOSIS — F51.01 PRIMARY INSOMNIA: ICD-10-CM

## 2019-12-31 RX ORDER — ESZOPICLONE 2 MG/1
TABLET, FILM COATED ORAL
Qty: 30 TABLET | Refills: 0 | Status: SHIPPED | OUTPATIENT
Start: 2019-12-31 | End: 2020-01-29

## 2020-01-15 RX ORDER — SYRINGE-NEEDLE,INSULIN,0.5 ML 31 GX5/16"
SYRINGE, EMPTY DISPOSABLE MISCELLANEOUS
Qty: 200 EACH | Refills: 5 | Status: SHIPPED | OUTPATIENT
Start: 2020-01-15 | End: 2020-07-14

## 2020-01-16 ENCOUNTER — OFFICE VISIT (OUTPATIENT)
Dept: CARDIOLOGY | Facility: CLINIC | Age: 85
End: 2020-01-16

## 2020-01-16 VITALS
HEIGHT: 65 IN | HEART RATE: 76 BPM | DIASTOLIC BLOOD PRESSURE: 70 MMHG | BODY MASS INDEX: 37.82 KG/M2 | SYSTOLIC BLOOD PRESSURE: 122 MMHG | WEIGHT: 227 LBS

## 2020-01-16 DIAGNOSIS — I25.9 ISCHEMIC HEART DISEASE: ICD-10-CM

## 2020-01-16 DIAGNOSIS — I48.20 CHRONIC ATRIAL FIBRILLATION (HCC): Primary | ICD-10-CM

## 2020-01-16 DIAGNOSIS — I10 ESSENTIAL HYPERTENSION: ICD-10-CM

## 2020-01-16 DIAGNOSIS — I50.22 CHRONIC SYSTOLIC CONGESTIVE HEART FAILURE (HCC): ICD-10-CM

## 2020-01-16 PROCEDURE — 99213 OFFICE O/P EST LOW 20 MIN: CPT | Performed by: INTERNAL MEDICINE

## 2020-01-16 PROCEDURE — 93284 PRGRMG EVAL IMPLANTABLE DFB: CPT | Performed by: INTERNAL MEDICINE

## 2020-01-16 NOTE — PROGRESS NOTES
Karla LINO [place]lake Weir  1935  [phone]      01/16/2020    [facility] CARDIOLOGY     Giselle Guzman, DO  [address] [address]    Chief Complaint   Patient presents with   • Cardiomyopathy       PROBLEM LIST:  1. Ischemic heart disease:  a. History of remote myocardial infarction/CABG x3, [facility] (database insufficient).   b. Left heart catheterization by Dr. Basilio Grossman, 2007, revealing patent LIMA graft/medical therapy, LVEF 30%.  c. Echocardiogram, [facility], 2007: LVEF 25%; moderate/severe mitral regurgitation.  d. Upgrade of pacemaker to [employer] Jamel BI-V pacemaker per Dr. Hammonds on 10/25/2007.  e. Reported negative Cardiolite stress test in 2010, [facility], Dr. Basilio Grossman (database insufficient).  f. Reported echocardiogram, October 2011, [facility] (database insufficient).   g. Echocardiogram, 08/29/2012: LVEF 35% to 40%, mild mitral regurgitation.  h. History of MRSA pacemaker infection, 2005 (database insufficient).   i. St. Jamel pacemaker, 2005, with upgrade to a [employer] Jamel BI-V ICD device in 2007, Dr. Hammonds.   j. CRT-D generator exchange by Tano Beal, 09/21/2012, St. Jamel device (Serial# [id number]).  k. Persistent ischemic cardiomyopathy.   l. Echocardiogram, 10/20/2015: LVEF 35% to 40%, moderate mitral regurgitation/tricuspid regurgitation; RVSP 45 mmHg.   2. Chronic class III systolic heart failure.  3. History of persistent atrial fibrillation.  a. CHADS2 score equal to 4.   b. Chronic Coumadin therapy managed by Bk Mckeon’s office.   4. Pericardial effusion, 2008.  5. History of frequent urinary tract infections.  6. Hypothyroidism.  7. Insulin-dependent diabetes mellitus, type 2.  8. Chronic kidney disease, stage 4, followed by Bk Mckeon.   9. Dyslipidemia.  10. Hypertension.  11. Home O2 nightly/sleep apnea.  12. History of endometrial cancer, status post partial hysterectomy in  "2008 with follow up radiation therapy followed by Dr. Sosa.   13. History of multiple left hip surgeries most recently in 2011 at [facility]    Allergies  Allergies   Allergen Reactions   • No Known Drug Allergy        Current Medications    Current Outpatient Medications:   •  aspirin (ASPIRIN LOW DOSE) 81 MG tablet, Take  by mouth daily., Disp: , Rfl:   •  atorvastatin (LIPITOR) 20 MG tablet, Take 1 tablet by mouth Every Night., Disp: 90 tablet, Rfl: 1  •  Blood Glucose Monitoring Suppl (ONE TOUCH ULTRA 2) w/Device kit, Use daily to test blood sugars, Disp: 1 each, Rfl: 0  •  carvedilol (COREG) 25 MG tablet, TAKE 1/2 TABLET BY MOUTH TWICE DAILY, Disp: 90 tablet, Rfl: 2  •  cholecalciferol (VITAMIN D3) 1000 UNITS tablet, Take 1,000 Units by mouth Daily., Disp: , Rfl:   •  eszopiclone (LUNESTA) 2 MG tablet, Take 1 tablet by mouth every night immediately before bedtime, Disp: 30 tablet, Rfl: 0  •  furosemide (LASIX) 40 MG tablet, Take  by mouth as needed., Disp: , Rfl:   •  glucose blood (ONE TOUCH ULTRA TEST) test strip, Test blood sugars TID, Disp: 100 each, Rfl: 5  •  glucose blood (ONE TOUCH ULTRA TEST) test strip, USE ONE STRIP EACH TIME TO CHECK BLOOD SUGARS THREE TIMES DAILY, Disp: 100 each, Rfl: 5  •  indapamide (LOZOL) 2.5 MG tablet, Take  by mouth daily., Disp: , Rfl:   •  insulin aspart (NOVOLOG) 100 UNIT/ML injection, Inject 15 Units under the skin into the appropriate area as directed 3 (Three) Times a Day Before Meals., Disp: 20 mL, Rfl: 2  •  Insulin Glargine (BASAGLAR KWIKPEN) 100 UNIT/ML injection pen, Inject 20 units subQ once per day, Disp: 7 pen, Rfl: 5  •  Insulin Pen Needle (B-D ULTRAFINE III SHORT PEN) 31G X 8 MM misc, Use 4 per day to administer insulin, Disp: 150 each, Rfl: 5  •  Insulin Syringe-Needle U-100 (TRUEPLUS INSULIN SYRINGE) 31G X 5/16\" 0.5 ML misc, Use 4 per day to administer insulin, Disp: 200 each, Rfl: 5  •  isosorbide mononitrate (IMDUR) 30 MG 24 hr tablet, " "Take 1 tablet by mouth Daily., Disp: 90 tablet, Rfl: 0  •  Lancets (ONETOUCH ULTRASOFT) lancets, USE ONE LANCET TO CHECK BLOOD SUGARS THREE TIMES DAILY, Disp: 100 each, Rfl: 5  •  latanoprost (XALATAN) 0.005 % ophthalmic solution, APPLY 1 DROP TO EACH EYE AT BEDTIME, Disp: , Rfl: 5  •  levothyroxine (SYNTHROID, LEVOTHROID) 100 MCG tablet, TAKE ONE TABLET BY MOUTH ONE TIME DAILY, Disp: 90 tablet, Rfl: 0  •  mupirocin (BACTROBAN) 2 % ointment, , Disp: , Rfl:   •  ondansetron (ZOFRAN) 4 MG tablet, Take  by mouth every 12 (twelve) hours as needed., Disp: , Rfl:   •  spironolactone (ALDACTONE) 25 MG tablet, Take 1 tablet by mouth Daily., Disp: 90 tablet, Rfl: 1  •  timolol (TIMOPTIC) 0.5 % ophthalmic solution, , Disp: , Rfl:   •  TRUEPLUS INSULIN SYRINGE 31G X 5/16\" 0.5 ML misc, Use 4 syringes daily to inject insulin as directed, Disp: 200 each, Rfl: 5  •  warfarin (COUMADIN) 3 MG tablet, Take  by mouth daily. Patient takes 2 mg & 3 mg.  Rotates dosage every other day. , Disp: , Rfl:   •  albuterol (PROVENTIL HFA;VENTOLIN HFA) 108 (90 Base) MCG/ACT inhaler, Inhale 2 puffs Every 4 (Four) Hours As Needed for Wheezing or Shortness of Air., Disp: 1 inhaler, Rfl: 5  •  ciprofloxacin (CIPRO) 500 MG tablet, Take 1 tablet by mouth Daily., Disp: 7 tablet, Rfl: 0  •  HYDROcodone-acetaminophen (NORCO) 5-325 MG per tablet, Take 1 tablet by mouth Every 6 (Six) Hours As Needed for Moderate Pain ., Disp: 12 tablet, Rfl: 0    History of Present Illness     Pt presents for follow up of DCM/CHF/AF Since the pt has seen us, pt denies any palpitations, CP, LH, and dizziness. Denies any hospitalizations, ER visits, ICD shocks, or TIA/CVA symptoms. Overall feels OK. Mild MARKHAM chronic, No side effects to medications. Right calf pain x two weeks relieved with tylenol. BP stable at home    ROS:  General:  + mild fatigue, No weight gain or loss  Cardiovascular:  Denies CP, PND, syncope, near syncope, edema or palpitations.  Pulmonary:  Denies MARKHAM, " "cough, or wheezing    Vitals:    01/16/20 1304   BP: 122/70   BP Location: Left arm   Patient Position: Sitting   Pulse: 76   Weight: 103 kg (227 lb)   Height: 165.1 cm (65\")       PE:  General: NAD  Neck: no JVD, no carotid bruits, no TM  Heart RRR, NL S1, S2, S4 present, no rubs, murmurs  Lungs: CTA, no wheezes, rhonchi, or rales  Abd: soft, non-tender, NL BS  Ext: No musculoskeletal deformities, TR  Bilateral ankle edema, No cyanosis, or clubbing  Psych: normal mood and affect    Diagnostic Data:  Procedures.    1. Chronic atrial fibrillation    2. Chronic systolic congestive heart failure (CMS/HCC)    3. Ischemic heart disease    4. Essential hypertension        ICD interrogation: NL fxn, NL battery fxn, No VT, 100% CAF, 10 M battery, 99% BiV paced     Plan:  1) AF- permanent, asymptomatic, rate controlled.   Continue present medications.   2) Anticoagulation: CHADSVasc = 7   Continue warfarin  3) Chronic systolic CHF - stable class I-2 with normal BiV ICD function. On Coreg, no ACE or ARB due to CKD  Wt loss, exercise, salt reduction  4) HTN - controlled.       F/up in 6 months         "

## 2020-01-20 RX ORDER — LEVOTHYROXINE SODIUM 0.1 MG/1
TABLET ORAL
Qty: 90 TABLET | Refills: 0 | Status: SHIPPED | OUTPATIENT
Start: 2020-01-20 | End: 2020-04-14

## 2020-01-28 DIAGNOSIS — F51.01 PRIMARY INSOMNIA: ICD-10-CM

## 2020-01-29 RX ORDER — ESZOPICLONE 2 MG/1
TABLET, FILM COATED ORAL
Qty: 30 TABLET | Refills: 0 | Status: SHIPPED | OUTPATIENT
Start: 2020-01-29 | End: 2020-02-27

## 2020-02-11 ENCOUNTER — TELEPHONE (OUTPATIENT)
Dept: INTERNAL MEDICINE | Facility: CLINIC | Age: 85
End: 2020-02-11

## 2020-02-19 ENCOUNTER — CLINICAL SUPPORT NO REQUIREMENTS (OUTPATIENT)
Dept: CARDIOLOGY | Facility: CLINIC | Age: 85
End: 2020-02-19

## 2020-02-19 DIAGNOSIS — I50.22 CHRONIC SYSTOLIC CONGESTIVE HEART FAILURE (HCC): ICD-10-CM

## 2020-02-19 DIAGNOSIS — I48.20 CHRONIC ATRIAL FIBRILLATION (HCC): ICD-10-CM

## 2020-02-19 PROCEDURE — 93295 DEV INTERROG REMOTE 1/2/MLT: CPT | Performed by: INTERNAL MEDICINE

## 2020-02-19 PROCEDURE — 93296 REM INTERROG EVL PM/IDS: CPT | Performed by: INTERNAL MEDICINE

## 2020-02-25 DIAGNOSIS — F51.01 PRIMARY INSOMNIA: ICD-10-CM

## 2020-02-26 ENCOUNTER — TELEPHONE (OUTPATIENT)
Dept: INTERNAL MEDICINE | Facility: CLINIC | Age: 85
End: 2020-02-26

## 2020-02-27 RX ORDER — ESZOPICLONE 2 MG/1
TABLET, FILM COATED ORAL
Qty: 30 TABLET | Refills: 0 | Status: SHIPPED | OUTPATIENT
Start: 2020-02-27 | End: 2020-03-04 | Stop reason: SDUPTHER

## 2020-03-04 ENCOUNTER — OFFICE VISIT (OUTPATIENT)
Dept: INTERNAL MEDICINE | Facility: CLINIC | Age: 85
End: 2020-03-04

## 2020-03-04 VITALS
DIASTOLIC BLOOD PRESSURE: 76 MMHG | HEART RATE: 77 BPM | HEIGHT: 65 IN | BODY MASS INDEX: 38.09 KG/M2 | RESPIRATION RATE: 22 BRPM | OXYGEN SATURATION: 94 % | TEMPERATURE: 96.9 F | WEIGHT: 228.6 LBS | SYSTOLIC BLOOD PRESSURE: 106 MMHG

## 2020-03-04 DIAGNOSIS — N18.4 CKD (CHRONIC KIDNEY DISEASE), STAGE IV (HCC): ICD-10-CM

## 2020-03-04 DIAGNOSIS — I48.20 CHRONIC ATRIAL FIBRILLATION (HCC): ICD-10-CM

## 2020-03-04 DIAGNOSIS — E78.5 HYPERLIPIDEMIA LDL GOAL <100: ICD-10-CM

## 2020-03-04 DIAGNOSIS — F51.01 PRIMARY INSOMNIA: ICD-10-CM

## 2020-03-04 DIAGNOSIS — I50.22 CHRONIC SYSTOLIC CONGESTIVE HEART FAILURE (HCC): Primary | ICD-10-CM

## 2020-03-04 DIAGNOSIS — I10 ESSENTIAL HYPERTENSION: ICD-10-CM

## 2020-03-04 PROCEDURE — 99214 OFFICE O/P EST MOD 30 MIN: CPT | Performed by: INTERNAL MEDICINE

## 2020-03-04 RX ORDER — ESZOPICLONE 2 MG/1
2 TABLET, FILM COATED ORAL NIGHTLY PRN
Qty: 30 TABLET | Refills: 2 | Status: SHIPPED | OUTPATIENT
Start: 2020-03-04 | End: 2020-06-23

## 2020-03-04 NOTE — PROGRESS NOTES
Subjective   Karla Cristobal is a 84 y.o. female.   Chief Complaint   Patient presents with   • Atrial Fibrillation     6 month follow up   • Hypertension       Chronic Kidney Disease   Pertinent negatives include no abdominal pain, arthralgias, chest pain, chills, congestion, coughing, diaphoresis, fatigue, fever, headaches, myalgias, nausea, neck pain, numbness, rash, sore throat, vomiting or weakness.   Hyperlipidemia   Associated symptoms include shortness of breath (chronic and require oxygen at night). Pertinent negatives include no chest pain or myalgias.   Hypertension   This is a chronic problem. The current episode started more than 1 year ago. Associated symptoms include shortness of breath (chronic and require oxygen at night). Pertinent negatives include no chest pain, headaches, neck pain or palpitations.   Insomnia   This is a chronic problem. The current episode started more than 1 year ago. Pertinent negatives include no abdominal pain, arthralgias, chest pain, chills, congestion, coughing, diaphoresis, fatigue, fever, headaches, myalgias, nausea, neck pain, numbness, rash, sore throat, vomiting or weakness.   Atrial Fibrillation   Presents for follow-up visit. Symptoms include hypertension and shortness of breath (chronic and require oxygen at night). Symptoms are negative for chest pain, palpitations, syncope, tachycardia and weakness. Past medical history includes atrial fibrillation and hyperlipidemia.      Chronic heart failure that is stable.   The following portions of the patient's history were reviewed and updated as appropriate: allergies, current medications, past family history, past medical history, past social history, past surgical history and problem list.    Review of Systems   Constitutional: Negative for activity change, appetite change, chills, diaphoresis, fatigue, fever and unexpected weight change.   HENT: Negative for congestion, ear discharge, ear pain, mouth sores,  "nosebleeds, sinus pressure, sneezing and sore throat.    Eyes: Negative for pain, discharge and itching.   Respiratory: Positive for shortness of breath (chronic and require oxygen at night). Negative for cough, chest tightness and wheezing.    Cardiovascular: Negative for chest pain, palpitations, leg swelling and syncope.   Gastrointestinal: Negative for abdominal pain, constipation, diarrhea, nausea and vomiting.   Endocrine: Negative for cold intolerance, heat intolerance, polydipsia and polyphagia.   Genitourinary: Negative for dysuria, flank pain, frequency, hematuria and urgency.   Musculoskeletal: Negative for arthralgias, back pain, gait problem, myalgias, neck pain and neck stiffness.   Skin: Negative for color change, pallor and rash.   Neurological: Negative for seizures, speech difficulty, weakness, numbness and headaches.   Psychiatric/Behavioral: Negative for agitation, confusion, decreased concentration and sleep disturbance. The patient has insomnia. The patient is not nervous/anxious.      /76   Pulse 77   Temp 96.9 °F (36.1 °C)   Resp 22   Ht 165.1 cm (65\")   Wt 104 kg (228 lb 9.6 oz)   LMP  (LMP Unknown)   SpO2 94%   BMI 38.04 kg/m²     Objective   Physical Exam   Constitutional: She is oriented to person, place, and time. She appears well-developed.   HENT:   Head: Normocephalic.   Right Ear: External ear normal.   Left Ear: External ear normal.   Nose: Nose normal.   Mouth/Throat: Oropharynx is clear and moist.   Eyes: Pupils are equal, round, and reactive to light. Conjunctivae are normal.   Neck: No JVD present. No thyromegaly present.   Cardiovascular: Normal rate, regular rhythm and normal heart sounds. Exam reveals no friction rub.   No murmur heard.  Pulmonary/Chest: Effort normal and breath sounds normal. No respiratory distress. She has no wheezes. She has no rales.   Abdominal: Soft. Bowel sounds are normal. She exhibits no distension. There is no tenderness. There is no " guarding.   Musculoskeletal: She exhibits no edema or tenderness.   Lymphadenopathy:     She has no cervical adenopathy.   Neurological: She is oriented to person, place, and time. She displays normal reflexes. No cranial nerve deficit.   Skin: No rash noted.   Psychiatric: Her behavior is normal.   Nursing note and vitals reviewed.      Assessment/Plan   Karla was seen today for atrial fibrillation, hypertension and insomnia.    Diagnoses and all orders for this visit:    Chronic atrial fibrillation  Stable. Nephro manages her coumadin and does not want to change this  Hyperlipidemia LDL goal <100  stable  Essential hypertension  stable  CKD (chronic kidney disease), stage IV  Continue with nephro.    Insomnia  Refilled Lunesta.

## 2020-03-05 ENCOUNTER — TELEPHONE (OUTPATIENT)
Dept: ENDOCRINOLOGY | Facility: CLINIC | Age: 85
End: 2020-03-05

## 2020-03-05 NOTE — TELEPHONE ENCOUNTER
PATIENT CALLED TO SEE IF THERE ARE NOVOLOG OR HUMALOG VIAL SAMPLES AVAILABLE OR  WHATEVER IS AVAILABLE. SHE STATES THAT HER DAUGHTER CAN PICK THEM UP FROM HER.     CALL BACK [phone]

## 2020-03-10 RX ORDER — CARVEDILOL 25 MG/1
TABLET ORAL
Qty: 90 TABLET | Refills: 3 | Status: SHIPPED | OUTPATIENT
Start: 2020-03-10 | End: 2021-03-03

## 2020-03-16 RX ORDER — OXYMETAZOLINE HCL 0.05% 0.05 MG/ML
SPRAY NASAL
Qty: 200 EACH | Refills: 4 | Status: SHIPPED | OUTPATIENT
Start: 2020-03-16 | End: 2020-07-14

## 2020-04-14 RX ORDER — LEVOTHYROXINE SODIUM 0.1 MG/1
TABLET ORAL
Qty: 90 TABLET | Refills: 0 | Status: SHIPPED | OUTPATIENT
Start: 2020-04-14 | End: 2020-07-22 | Stop reason: SDUPTHER

## 2020-05-20 ENCOUNTER — TELEPHONE (OUTPATIENT)
Dept: CARDIOLOGY | Facility: CLINIC | Age: 85
End: 2020-05-20

## 2020-05-20 NOTE — TELEPHONE ENCOUNTER
Called pt due to Merlin home monitor isn't connecting or reading.  Left message for pt to return my call.

## 2020-05-26 ENCOUNTER — TELEPHONE (OUTPATIENT)
Dept: INTERNAL MEDICINE | Facility: CLINIC | Age: 85
End: 2020-05-26

## 2020-05-26 NOTE — TELEPHONE ENCOUNTER
Pt's daughter called because she bought a [employer] UTI reader and she is positive and would like to drop off a sample the office for confirmation and possible antibiotics     [phone]

## 2020-05-27 ENCOUNTER — OFFICE VISIT (OUTPATIENT)
Dept: INTERNAL MEDICINE | Facility: CLINIC | Age: 85
End: 2020-05-27

## 2020-05-27 ENCOUNTER — APPOINTMENT (OUTPATIENT)
Dept: LAB | Facility: HOSPITAL | Age: 85
End: 2020-05-27

## 2020-05-27 VITALS
TEMPERATURE: 96.4 F | DIASTOLIC BLOOD PRESSURE: 80 MMHG | HEIGHT: 65 IN | HEART RATE: 78 BPM | WEIGHT: 219 LBS | SYSTOLIC BLOOD PRESSURE: 110 MMHG | BODY MASS INDEX: 36.49 KG/M2 | OXYGEN SATURATION: 98 %

## 2020-05-27 DIAGNOSIS — N30.01 ACUTE CYSTITIS WITH HEMATURIA: Primary | ICD-10-CM

## 2020-05-27 DIAGNOSIS — R30.0 DYSURIA: ICD-10-CM

## 2020-05-27 LAB
BILIRUB BLD-MCNC: NEGATIVE MG/DL
CLARITY, POC: ABNORMAL
COLOR UR: ABNORMAL
GLUCOSE UR STRIP-MCNC: NEGATIVE MG/DL
KETONES UR QL: NEGATIVE
LEUKOCYTE EST, POC: ABNORMAL
NITRITE UR-MCNC: POSITIVE MG/ML
PH UR: 6 [PH] (ref 5–8)
PROT UR STRIP-MCNC: ABNORMAL MG/DL
RBC # UR STRIP: ABNORMAL /UL
SP GR UR: 1.02 (ref 1–1.03)
UROBILINOGEN UR QL: NORMAL

## 2020-05-27 PROCEDURE — 81003 URINALYSIS AUTO W/O SCOPE: CPT | Performed by: NURSE PRACTITIONER

## 2020-05-27 PROCEDURE — 99213 OFFICE O/P EST LOW 20 MIN: CPT | Performed by: NURSE PRACTITIONER

## 2020-05-27 PROCEDURE — 87086 URINE CULTURE/COLONY COUNT: CPT | Performed by: NURSE PRACTITIONER

## 2020-05-27 RX ORDER — CIPROFLOXACIN 500 MG/1
500 TABLET, FILM COATED ORAL DAILY
Qty: 7 TABLET | Refills: 0 | Status: SHIPPED | OUTPATIENT
Start: 2020-05-27 | End: 2020-06-03

## 2020-05-27 NOTE — PROGRESS NOTES
Chief Complaint   Patient presents with   • Urinary Tract Infection     x1 week        History of Present Illness    Karla Cristobal is a 84 y.o. female who presents today for UTI.    Urinary Tract Infection    This is a new problem. Episode onset: 1 week ago. The problem has been gradually worsening. The patient is experiencing no pain. There has been no fever. Associated symptoms include frequency and urgency. Pertinent negatives include no chills, flank pain, hematuria, hesitancy, nausea, possible pregnancy, sweats or vomiting. She has tried nothing for the symptoms. Her past medical history is significant for recurrent UTIs.   Followed by Dr. Mckeon nephrologist who has allowed cipro 500mg daily x 7 days in the past given chronic renal failure.     [facility]    The following portions of the patient's history were reviewed and updated as appropriate: allergies, current medications, past family history, past medical history, past social history, past surgical history and problem list.     Social History     Tobacco Use   • Smoking status: Never Smoker   • Smokeless tobacco: Never Used   Substance Use Topics   • Alcohol use: No       Past Medical History:   Diagnosis Date   • Acute bronchitis    • Atrial fibrillation (CMS/[facility])    • CAD (coronary artery disease)    • Change in mole    • Change in mole    • Change in nevus 6/16/2016   • Chronic kidney disease    • Chronic renal disease, stage 4, severely decreased glomerular filtration rate between 15-29 mL/min/1.73 square meter (CMS/[facility])    • Chronic systolic heart failure (CMS/[facility])    • Congestive heart disease (CMS/[facility])    • Conjunctivitis    • Deep vein thrombosis of lower extremity (CMS/[facility])    • Diabetes mellitus (CMS/[facility])    • Diabetes mellitus (CMS/[facility]) 6/16/2016    Impression: 03/15/2016 - blood sugar 166 and hgn a1c 7.3%  is up to date with eye exam  neuropathy with callus, no ulcer  some scratches feet  ur alb due and ordered  no change other than provided  samples of toujeo because she feels like lantus worked much better than levemir, she has tried titrating levemir and it is less effective  continue testing and f/u 3-4 months  Impression: 11/23/2015 - bl   • Diabetic foot ulcer (CMS/HCC) 6/16/2016   • Dog bite of hand    • Easy bruising    • Endometrial cancer (CMS/HCC)    • Foot pain    • Foot pain 6/16/2016    Description: lancinating- worse but not consistent enough to want rx   • Fracture of hip (CMS/HCC)    • Hyperlipidemia    • Hypertension    • Hypothyroidism    • Insomnia    • Ischemic heart disease    • Methicillin resistant Staphylococcus aureus infection 6/16/2016   • Myocardial infarction (CMS/HCC)    • Nausea with vomiting    • Pericardial effusion    • Renal disorder    • Shortness of breath 6/16/2016    Impression: 03/24/2015 - chronic. On 2 l oxygen at night. check cbc, bnp;    • Skin cancer, basal cell    • Skin lesion 6/16/2016    Impression: 03/24/2015 - refer derm for eval- seb kerat ant tib and ?ak medially with redness and irritation;    • Sleep apnea    • UTI (urinary tract infection)        Past Surgical History:   Procedure Laterality Date   • CARDIAC CATHETERIZATION     • CARDIAC DEFIBRILLATOR PLACEMENT     • CHOLECYSTECTOMY     • CORONARY ARTERY BYPASS GRAFT     • CORONARY STENT PLACEMENT     • HIP SURGERY      x3   • KNEE ARTHROPLASTY     • PACEMAKER IMPLANTATION         Family History   Problem Relation Age of Onset   • Breast cancer Other    • Diabetes Other    • Esophageal cancer Other    • Hypertension Other    • Transient ischemic attack Other    • Breast cancer Mother    • Cancer Father        No Known Allergies      Current Outpatient Medications:   •  albuterol (PROVENTIL HFA;VENTOLIN HFA) 108 (90 Base) MCG/ACT inhaler, Inhale 2 puffs Every 4 (Four) Hours As Needed for Wheezing or Shortness of Air., Disp: 1 inhaler, Rfl: 5  •  aspirin (ASPIRIN LOW DOSE) 81 MG tablet, Take  by mouth daily., Disp: , Rfl:   •  atorvastatin (LIPITOR)  "20 MG tablet, Take 1 tablet by mouth Every Night., Disp: 90 tablet, Rfl: 1  •  Blood Glucose Monitoring Suppl (ONE TOUCH ULTRA 2) w/Device kit, Use daily to test blood sugars, Disp: 1 each, Rfl: 0  •  carvedilol (COREG) 25 MG tablet, Take 1/2 tablet by mouth twice a day, Disp: 90 tablet, Rfl: 3  •  cholecalciferol (VITAMIN D3) 1000 UNITS tablet, Take 1,000 Units by mouth Daily., Disp: , Rfl:   •  eszopiclone (LUNESTA) 2 MG tablet, Take 1 tablet by mouth At Night As Needed for Sleep. Take immediately before bedtime, Disp: 30 tablet, Rfl: 2  •  furosemide (LASIX) 40 MG tablet, Take  by mouth as needed., Disp: , Rfl:   •  glucose blood (ONE TOUCH ULTRA TEST) test strip, Test blood sugars TID, Disp: 100 each, Rfl: 5  •  glucose blood (ONE TOUCH ULTRA TEST) test strip, USE ONE STRIP EACH TIME TO CHECK BLOOD SUGARS THREE TIMES DAILY, Disp: 100 each, Rfl: 5  •  HYDROcodone-acetaminophen (NORCO) 5-325 MG per tablet, Take 1 tablet by mouth Every 6 (Six) Hours As Needed for Moderate Pain ., Disp: 12 tablet, Rfl: 0  •  indapamide (LOZOL) 2.5 MG tablet, Take  by mouth daily., Disp: , Rfl:   •  insulin aspart (NOVOLOG) 100 UNIT/ML injection, Inject 15 Units under the skin into the appropriate area as directed 3 (Three) Times a Day Before Meals., Disp: 20 mL, Rfl: 2  •  Insulin Glargine (BASAGLAR KWIKPEN) 100 UNIT/ML injection pen, Inject 20 units subQ once per day, Disp: 7 pen, Rfl: 5  •  Insulin Syringe-Needle U-100 (TRUEPLUS INSULIN SYRINGE) 31G X 5/16\" 0.5 ML misc, Use 4 per day to administer insulin, Disp: 200 each, Rfl: 5  •  isosorbide mononitrate (IMDUR) 30 MG 24 hr tablet, Take 1 tablet by mouth Daily., Disp: 90 tablet, Rfl: 0  •  Lancets (ONETOUCH ULTRASOFT) lancets, USE ONE LANCET TO CHECK BLOOD SUGARS THREE TIMES DAILY, Disp: 100 each, Rfl: 5  •  latanoprost (XALATAN) 0.005 % ophthalmic solution, APPLY 1 DROP TO EACH EYE AT BEDTIME, Disp: , Rfl: 5  •  levothyroxine (SYNTHROID, LEVOTHROID) 100 MCG tablet, TAKE ONE " "TABLET BY MOUTH ONE TIME DAILY, Disp: 90 tablet, Rfl: 0  •  mupirocin (BACTROBAN) 2 % ointment, , Disp: , Rfl:   •  ondansetron (ZOFRAN) 4 MG tablet, Take  by mouth every 12 (twelve) hours as needed., Disp: , Rfl:   •  spironolactone (ALDACTONE) 25 MG tablet, Take 1 tablet by mouth Daily., Disp: 90 tablet, Rfl: 1  •  timolol (TIMOPTIC) 0.5 % ophthalmic solution, , Disp: , Rfl:   •  TRUEPLUS INSULIN SYRINGE 31G X 5/16\" 0.5 ML misc, Use 4 syringes daily to inject insulin as directed, Disp: 200 each, Rfl: 5  •  UNIFINE PENTIPS PLUS 31G X 8 MM misc, Use 4 needles daily with insulin pens as directed, Disp: 200 each, Rfl: 4  •  warfarin (COUMADIN) 3 MG tablet, Take 2 mg by mouth Daily. Patient takes 2 mg 6 days a week; on 7th day takes 4mg, Disp: , Rfl:   •  ciprofloxacin (CIPRO) 500 MG tablet, Take 1 tablet by mouth Daily for 7 days., Disp: 7 tablet, Rfl: 0    Review of Systems  Review of Systems   Constitutional: Positive for fatigue. Negative for appetite change, chills, diaphoresis and fever.   Respiratory: Negative for cough and shortness of breath.    Cardiovascular: Negative for chest pain and palpitations.   Gastrointestinal: Negative for abdominal distention, abdominal pain, diarrhea, nausea and vomiting.   Genitourinary: Positive for frequency, pelvic pain and urgency. Negative for decreased urine volume, difficulty urinating, dysuria, flank pain, hematuria and hesitancy.   Musculoskeletal: Negative for back pain and myalgias.   Neurological: Negative for dizziness, light-headedness and headaches.   Psychiatric/Behavioral: Negative for confusion and sleep disturbance.       Vitals:  Vitals:    05/27/20 1419   BP: 110/80   Pulse: 78   Temp: 96.4 °F (35.8 °C)   SpO2: 98%   Weight: 99.3 kg (219 lb)   Height: 165.1 cm (65\")   PainSc:   2   PainLoc: Abdomen       Physical Exam  Physical Exam   Constitutional: She is oriented to person, place, and time. Vital signs are normal. She appears well-developed and " well-nourished.  Non-toxic appearance. No distress.   Cardiovascular: Normal rate, regular rhythm and normal heart sounds.   Pulmonary/Chest: Effort normal and breath sounds normal. No respiratory distress. She has no decreased breath sounds. She has no wheezes. She has no rhonchi. She has no rales.   Abdominal: There is no CVA tenderness.   Neurological: She is alert and oriented to person, place, and time.   Skin: Skin is warm and dry.   Psychiatric: She has a normal mood and affect. Her speech is normal and behavior is normal. Cognition and memory are normal.   Nursing note and vitals reviewed.      Labs  Results for orders placed or performed in visit on 05/27/20   POCT urinalysis dipstick, automated   Result Value Ref Range    Color Straw Yellow, Straw, Dark Yellow, Maria Isabel    Clarity, UA Hazy (A) Clear    Specific Gravity  1.020 1.005 - 1.030    pH, Urine 6.0 5.0 - 8.0    Leukocytes 500 Ho/ul (A) Negative    Nitrite, UA Positive (A) Negative    Protein, POC 1+ (A) Negative mg/dL    Glucose, UA Negative Negative, 1000 mg/dL (3+) mg/dL    Ketones, UA Negative Negative    Urobilinogen, UA Normal Normal    Bilirubin Negative Negative    Blood,  Nicholas/ul (A) Negative       Assessment/Plan    Karla was seen today for urinary tract infection.    Diagnoses and all orders for this visit:    Acute cystitis with hematuria  -     ciprofloxacin (CIPRO) 500 MG tablet; Take 1 tablet by mouth Daily for 7 days.    Dysuria  -     POCT urinalysis dipstick, automated  -     Urine Culture - Urine, Urine, Clean Catch    Will notify of urine culture results received and treat accordingly.  Patient started on antibiotics empirically given presenting symptoms and lab results in office.  Patient advised in adequate water intake, avoidance of excessive caffeine, and use of acetaminophen as needed.  Advised patient to follow-up for new, worsening, or persistent symptoms for repeat urinalysis.    Plan of care reviewed with patient at  conclusion of today's visit. Patient education was provided regarding diagnosis, management, and prescribed or recommended OTC medications. Patient was informed to notify office of any new, worsening, or persistent symptoms. Patient verbalized understanding and agreement with plan of care.     Follow-Up  Return if symptoms worsen or fail to improve.      Electronically Signed By:  NESS Sweeney Dragon/Transcription Disclaimer:  Please note that portions of this encounter note were completed using electronic transcription/translation of spoken language to printed text.  The electronic transcription/translation of spoken language may permit erroneous, or at times, nonsensical words or phrases to be inadvertently transcribed.  Although I have reviewed the note for such errors, some may still exist in this documentation.

## 2020-05-28 LAB — BACTERIA SPEC AEROBE CULT: ABNORMAL

## 2020-05-29 ENCOUNTER — TELEPHONE (OUTPATIENT)
Dept: ENDOCRINOLOGY | Facility: CLINIC | Age: 85
End: 2020-05-29

## 2020-06-19 ENCOUNTER — TELEPHONE (OUTPATIENT)
Dept: INTERNAL MEDICINE | Facility: CLINIC | Age: 85
End: 2020-06-19

## 2020-06-22 ENCOUNTER — TELEPHONE (OUTPATIENT)
Dept: CARDIOLOGY | Facility: CLINIC | Age: 85
End: 2020-06-22

## 2020-06-22 DIAGNOSIS — I25.5 ISCHEMIC CARDIOMYOPATHY: ICD-10-CM

## 2020-06-22 DIAGNOSIS — I48.20 CHRONIC ATRIAL FIBRILLATION (HCC): Primary | ICD-10-CM

## 2020-06-22 NOTE — TELEPHONE ENCOUNTER
Called pt due to Merlin home monitor readings show she has reached CRESENCIO.  Left message that scheduling will contact her to get procedure scheduled.

## 2020-06-23 DIAGNOSIS — F51.01 PRIMARY INSOMNIA: ICD-10-CM

## 2020-06-23 PROBLEM — I25.5 ISCHEMIC CARDIOMYOPATHY: Status: ACTIVE | Noted: 2020-06-23

## 2020-06-23 RX ORDER — ESZOPICLONE 2 MG/1
TABLET, FILM COATED ORAL
Qty: 30 TABLET | Refills: 0 | Status: SHIPPED | OUTPATIENT
Start: 2020-06-23 | End: 2020-07-22

## 2020-06-24 ENCOUNTER — TELEPHONE (OUTPATIENT)
Dept: CARDIOLOGY | Facility: CLINIC | Age: 85
End: 2020-06-24

## 2020-06-24 NOTE — TELEPHONE ENCOUNTER
----- Message from Tano Beal MD sent at 6/24/2020  7:43 AM EDT -----  Hold warfarin 2 day prior to ICD change

## 2020-06-25 ENCOUNTER — PREP FOR SURGERY (OUTPATIENT)
Dept: OTHER | Facility: HOSPITAL | Age: 85
End: 2020-06-25

## 2020-06-25 DIAGNOSIS — I25.5 ISCHEMIC CARDIOMYOPATHY: Primary | ICD-10-CM

## 2020-06-25 RX ORDER — SODIUM CHLORIDE 0.9 % (FLUSH) 0.9 %
10 SYRINGE (ML) INJECTION AS NEEDED
Status: CANCELLED | OUTPATIENT
Start: 2020-06-25

## 2020-06-25 RX ORDER — SODIUM CHLORIDE 9 MG/ML
1 INJECTION, SOLUTION INTRAVENOUS CONTINUOUS
Status: CANCELLED | OUTPATIENT
Start: 2020-06-25 | End: 2020-06-25

## 2020-06-25 RX ORDER — CEFAZOLIN SODIUM 2 G/100ML
2 INJECTION, SOLUTION INTRAVENOUS ONCE
Status: CANCELLED | OUTPATIENT
Start: 2020-06-25 | End: 2020-06-25

## 2020-06-25 RX ORDER — SODIUM CHLORIDE 0.9 % (FLUSH) 0.9 %
3 SYRINGE (ML) INJECTION EVERY 12 HOURS SCHEDULED
Status: CANCELLED | OUTPATIENT
Start: 2020-06-25

## 2020-06-25 RX ORDER — NITROGLYCERIN 0.4 MG/1
0.4 TABLET SUBLINGUAL
Status: CANCELLED | OUTPATIENT
Start: 2020-06-25

## 2020-06-25 RX ORDER — PROMETHAZINE HYDROCHLORIDE 25 MG/ML
12.5 INJECTION, SOLUTION INTRAMUSCULAR; INTRAVENOUS EVERY 4 HOURS PRN
Status: CANCELLED | OUTPATIENT
Start: 2020-06-25

## 2020-06-25 RX ORDER — ACETAMINOPHEN 325 MG/1
650 TABLET ORAL EVERY 4 HOURS PRN
Status: CANCELLED | OUTPATIENT
Start: 2020-06-25

## 2020-07-13 ENCOUNTER — OFFICE VISIT (OUTPATIENT)
Dept: INTERNAL MEDICINE | Facility: CLINIC | Age: 85
End: 2020-07-13

## 2020-07-13 VITALS
WEIGHT: 217.2 LBS | BODY MASS INDEX: 36.19 KG/M2 | TEMPERATURE: 96.4 F | RESPIRATION RATE: 20 BRPM | SYSTOLIC BLOOD PRESSURE: 130 MMHG | DIASTOLIC BLOOD PRESSURE: 84 MMHG | HEIGHT: 65 IN | HEART RATE: 88 BPM | OXYGEN SATURATION: 97 %

## 2020-07-13 DIAGNOSIS — N30.00 ACUTE CYSTITIS WITHOUT HEMATURIA: Primary | ICD-10-CM

## 2020-07-13 LAB
BILIRUB BLD-MCNC: NEGATIVE MG/DL
CLARITY, POC: ABNORMAL
COLOR UR: YELLOW
GLUCOSE UR STRIP-MCNC: NEGATIVE MG/DL
KETONES UR QL: NEGATIVE
LEUKOCYTE EST, POC: ABNORMAL
NITRITE UR-MCNC: NEGATIVE MG/ML
PH UR: 6 [PH] (ref 5–8)
PROT UR STRIP-MCNC: NEGATIVE MG/DL
RBC # UR STRIP: NEGATIVE /UL
SP GR UR: 1.01 (ref 1–1.03)
UROBILINOGEN UR QL: NORMAL

## 2020-07-13 PROCEDURE — 81003 URINALYSIS AUTO W/O SCOPE: CPT | Performed by: INTERNAL MEDICINE

## 2020-07-13 PROCEDURE — 99213 OFFICE O/P EST LOW 20 MIN: CPT | Performed by: INTERNAL MEDICINE

## 2020-07-13 RX ORDER — CEFDINIR 300 MG/1
300 CAPSULE ORAL DAILY
Qty: 7 CAPSULE | Refills: 0 | Status: SHIPPED | OUTPATIENT
Start: 2020-07-13 | End: 2020-07-20

## 2020-07-13 NOTE — PROGRESS NOTES
"Internal Medicine Acute Visit    Chief Complaint   Patient presents with   • Urinary Frequency     several days,no pain,frequency         HPI  Ms. Cristobal comes in today with urinary frequency. She reports that she has been urinating frequently for the last 2-3 days. Also gets an \"electrical like sensation\" in her urethra with urination. No fever, chills, flank pain, N/V, hematuria, abdominal pain. The symptoms she is experiencing currently are typical of UTI for her. She reports frequent UTI with last being at end of May and prior to that being in March. She reports that she is typically treated with ciprofloxacin. She has chronic stress incontinence and wears pads. She also had CKD stage 4 and follows with Dr. Mckeon, reports renal function has been stable. She also has diabetes on insulin and reports FSBG 120s.       Review of Systems  Review of Systems   Constitutional: Negative.    Gastrointestinal: Negative for abdominal pain, nausea and vomiting.   Genitourinary: Positive for dysuria, frequency, urgency and urinary incontinence. Negative for decreased urine volume, difficulty urinating, flank pain, hematuria, pelvic pain and pelvic pressure.        Medications  Current Outpatient Medications on File Prior to Visit   Medication Sig Dispense Refill   • albuterol (PROVENTIL HFA;VENTOLIN HFA) 108 (90 Base) MCG/ACT inhaler Inhale 2 puffs Every 4 (Four) Hours As Needed for Wheezing or Shortness of Air. 1 inhaler 5   • aspirin (ASPIRIN LOW DOSE) 81 MG tablet Take  by mouth daily.     • atorvastatin (LIPITOR) 20 MG tablet Take 1 tablet by mouth Every Night. 90 tablet 1   • Blood Glucose Monitoring Suppl (ONE TOUCH ULTRA 2) w/Device kit Use daily to test blood sugars 1 each 0   • carvedilol (COREG) 25 MG tablet Take 1/2 tablet by mouth twice a day 90 tablet 3   • cholecalciferol (VITAMIN D3) 1000 UNITS tablet Take 1,000 Units by mouth Daily.     • eszopiclone (LUNESTA) 2 MG tablet TAKE ONE TABLET BY MOUTH AT NIGHT " "IMMEDIATELY BEFORE BEDTIME AS NEEDED FOR SLEEP. 30 tablet 0   • furosemide (LASIX) 40 MG tablet Take  by mouth as needed.     • glucose blood (ONE TOUCH ULTRA TEST) test strip Test blood sugars  each 5   • glucose blood (ONE TOUCH ULTRA TEST) test strip USE ONE STRIP EACH TIME TO CHECK BLOOD SUGARS THREE TIMES DAILY 100 each 5   • HYDROcodone-acetaminophen (NORCO) 5-325 MG per tablet Take 1 tablet by mouth Every 6 (Six) Hours As Needed for Moderate Pain . 12 tablet 0   • indapamide (LOZOL) 2.5 MG tablet Take  by mouth daily.     • insulin aspart (NOVOLOG) 100 UNIT/ML injection Inject 15 Units under the skin into the appropriate area as directed 3 (Three) Times a Day Before Meals. 20 mL 2   • Insulin Glargine (BASAGLAR KWIKPEN) 100 UNIT/ML injection pen Inject 20 units subQ once per day 7 pen 5   • Insulin Syringe-Needle U-100 (TRUEPLUS INSULIN SYRINGE) 31G X 5/16\" 0.5 ML misc Use 4 per day to administer insulin 200 each 5   • isosorbide mononitrate (IMDUR) 30 MG 24 hr tablet Take 1 tablet by mouth Daily. 90 tablet 0   • Lancets (ONETOUCH ULTRASOFT) lancets USE ONE LANCET TO CHECK BLOOD SUGARS THREE TIMES DAILY 100 each 5   • latanoprost (XALATAN) 0.005 % ophthalmic solution APPLY 1 DROP TO EACH EYE AT BEDTIME  5   • levothyroxine (SYNTHROID, LEVOTHROID) 100 MCG tablet TAKE ONE TABLET BY MOUTH ONE TIME DAILY 90 tablet 0   • mupirocin (BACTROBAN) 2 % ointment      • ondansetron (ZOFRAN) 4 MG tablet Take  by mouth every 12 (twelve) hours as needed.     • spironolactone (ALDACTONE) 25 MG tablet Take 1 tablet by mouth Daily. 90 tablet 1   • timolol (TIMOPTIC) 0.5 % ophthalmic solution      • TRUEPLUS INSULIN SYRINGE 31G X 5/16\" 0.5 ML misc Use 4 syringes daily to inject insulin as directed 200 each 5   • UNIFINE PENTIPS PLUS 31G X 8 MM misc Use 4 needles daily with insulin pens as directed 200 each 4   • warfarin (COUMADIN) 3 MG tablet Take 2 mg by mouth Daily. Patient takes 2 mg 6 days a week; on 7th day takes " 4mg       No current facility-administered medications on file prior to visit.         Allergies  No Known Allergies    [facility]  Past Medical History:   Diagnosis Date   • Acute bronchitis    • Atrial fibrillation (CMS/[facility])    • CAD (coronary artery disease)    • Change in mole    • Change in mole    • Change in nevus 6/16/2016   • Chronic kidney disease    • Chronic renal disease, stage 4, severely decreased glomerular filtration rate between 15-29 mL/min/1.73 square meter (CMS/[facility])    • Chronic systolic heart failure (CMS/[facility])    • Congestive heart disease (CMS/[facility])    • Conjunctivitis    • Deep vein thrombosis of lower extremity (CMS/[facility])    • Diabetes mellitus (CMS/[facility])    • Diabetes mellitus (CMS/[facility]) 6/16/2016    Impression: 03/15/2016 - blood sugar 166 and hgn a1c 7.3%  is up to date with eye exam  neuropathy with callus, no ulcer  some scratches feet  ur alb due and ordered  no change other than provided samples of toujeo because she feels like lantus worked much better than levemir, she has tried titrating levemir and it is less effective  continue testing and f/u 3-4 months  Impression: 11/23/2015 - bl   • Diabetic foot ulcer (CMS/[facility]) 6/16/2016   • Dog bite of hand    • Easy bruising    • Endometrial cancer (CMS/[facility])    • Foot pain    • Foot pain 6/16/2016    Description: lancinating- worse but not consistent enough to want rx   • Fracture of hip (CMS/[facility])    • Hyperlipidemia    • Hypertension    • Hypothyroidism    • Insomnia    • Ischemic heart disease    • Methicillin resistant Staphylococcus aureus infection 6/16/2016   • Myocardial infarction (CMS/[facility])    • Nausea with vomiting    • Pericardial effusion    • Renal disorder    • Shortness of breath 6/16/2016    Impression: 03/24/2015 - chronic. On 2 l oxygen at night. check cbc, bnp;    • Skin cancer, basal cell    • Skin lesion 6/16/2016    Impression: 03/24/2015 - refer derm for eval- seb kerat ant tib and ?ak medially with redness and irritation;    •  "Sleep apnea    • UTI (urinary tract infection)        Objective  Visit Vitals  /84   Pulse 88   Temp 96.4 °F (35.8 °C)   Resp 20   Ht 165.1 cm (65\")   Wt 98.5 kg (217 lb 3.2 oz)   LMP  (LMP Unknown)   SpO2 97%   BMI 36.14 kg/m²        Physical Exam  Physical Exam   Constitutional: She is oriented to person, place, and time. She appears well-developed and well-nourished.  Non-toxic appearance. No distress.   HENT:   Head: Normocephalic and atraumatic.   Eyes: Conjunctivae are normal.   Pulmonary/Chest: Effort normal. No respiratory distress.   Abdominal: Soft. She exhibits no distension. There is no tenderness. There is no CVA tenderness.   Neurological: She is alert and oriented to person, place, and time.   Skin: Skin is warm and dry.   Psychiatric: She has a normal mood and affect.   Nursing note and vitals reviewed.      Results  Results for orders placed or performed in visit on 05/27/20   Urine Culture - Urine, Urine, Clean Catch   Result Value Ref Range    Urine Culture <25,000 CFU/mL Proteus species (A)    POCT urinalysis dipstick, automated   Result Value Ref Range    Color Straw Yellow, Straw, Dark Yellow, Maria Isabel    Clarity, UA Hazy (A) Clear    Specific Gravity  1.020 1.005 - 1.030    pH, Urine 6.0 5.0 - 8.0    Leukocytes 500 Ho/ul (A) Negative    Nitrite, UA Positive (A) Negative    Protein, POC 1+ (A) Negative mg/dL    Glucose, UA Negative Negative, 1000 mg/dL (3+) mg/dL    Ketones, UA Negative Negative    Urobilinogen, UA Normal Normal    Bilirubin Negative Negative    Blood,  Nicholas/ul (A) Negative        Assessment and Plan  Karla was seen today for urinary frequency.    Diagnoses and all orders for this visit:    Acute cystitis without hematuria  - UA in office with leukocytes, symptoms consistent with UTI.  - Review of most recent visit for UTI with culture growing proteus mirabilis. Based upon this will choose to treat with cefdinir due to resistance to cipro. Creatinine clearance " calculated to be 25 so dose reduced to 300mg daily x7d.  - urine culture sent.  - Defer KUB to evaluate for staghorn calculus today due to lack of flank pain however if UTI persist and proteus continues to grow in culture this may be considered.      Return if symptoms worsen or fail to improve.

## 2020-07-14 ENCOUNTER — APPOINTMENT (OUTPATIENT)
Dept: PREADMISSION TESTING | Facility: HOSPITAL | Age: 85
End: 2020-07-14

## 2020-07-14 ENCOUNTER — LAB (OUTPATIENT)
Dept: LAB | Facility: HOSPITAL | Age: 85
End: 2020-07-14

## 2020-07-14 DIAGNOSIS — I25.5 ISCHEMIC CARDIOMYOPATHY: ICD-10-CM

## 2020-07-14 DIAGNOSIS — N30.00 ACUTE CYSTITIS WITHOUT HEMATURIA: ICD-10-CM

## 2020-07-14 LAB
ANION GAP SERPL CALCULATED.3IONS-SCNC: 6 MMOL/L (ref 5–15)
BUN SERPL-MCNC: 89 MG/DL (ref 8–23)
BUN/CREAT SERPL: 39.9 (ref 7–25)
CALCIUM SPEC-SCNC: 9 MG/DL (ref 8.6–10.5)
CHLORIDE SERPL-SCNC: 103 MMOL/L (ref 98–107)
CO2 SERPL-SCNC: 32 MMOL/L (ref 22–29)
CREAT SERPL-MCNC: 2.23 MG/DL (ref 0.57–1)
DEPRECATED RDW RBC AUTO: 48.3 FL (ref 37–54)
ERYTHROCYTE [DISTWIDTH] IN BLOOD BY AUTOMATED COUNT: 14.1 % (ref 12.3–15.4)
GFR SERPL CREATININE-BSD FRML MDRD: 21 ML/MIN/1.73
GLUCOSE SERPL-MCNC: 126 MG/DL (ref 65–99)
HBA1C MFR BLD: 6.1 % (ref 4.8–5.6)
HCT VFR BLD AUTO: 42.6 % (ref 34–46.6)
HGB BLD-MCNC: 13.1 G/DL (ref 12–15.9)
INR PPP: 2.39 (ref 0.85–1.16)
MCH RBC QN AUTO: 28.6 PG (ref 26.6–33)
MCHC RBC AUTO-ENTMCNC: 30.8 G/DL (ref 31.5–35.7)
MCV RBC AUTO: 93 FL (ref 79–97)
PLATELET # BLD AUTO: 233 10*3/MM3 (ref 140–450)
PMV BLD AUTO: 11.3 FL (ref 6–12)
POTASSIUM SERPL-SCNC: 4.3 MMOL/L (ref 3.5–5.2)
PROTHROMBIN TIME: 25.7 SECONDS (ref 11.5–14)
RBC # BLD AUTO: 4.58 10*6/MM3 (ref 3.77–5.28)
SODIUM SERPL-SCNC: 141 MMOL/L (ref 136–145)
WBC # BLD AUTO: 7.59 10*3/MM3 (ref 3.4–10.8)

## 2020-07-14 PROCEDURE — 80048 BASIC METABOLIC PNL TOTAL CA: CPT | Performed by: NURSE PRACTITIONER

## 2020-07-14 PROCEDURE — C9803 HOPD COVID-19 SPEC COLLECT: HCPCS

## 2020-07-14 PROCEDURE — 87086 URINE CULTURE/COLONY COUNT: CPT | Performed by: INTERNAL MEDICINE

## 2020-07-14 PROCEDURE — 83036 HEMOGLOBIN GLYCOSYLATED A1C: CPT | Performed by: NURSE PRACTITIONER

## 2020-07-14 PROCEDURE — 87186 SC STD MICRODIL/AGAR DIL: CPT | Performed by: INTERNAL MEDICINE

## 2020-07-14 PROCEDURE — 85610 PROTHROMBIN TIME: CPT | Performed by: NURSE PRACTITIONER

## 2020-07-14 PROCEDURE — 36415 COLL VENOUS BLD VENIPUNCTURE: CPT

## 2020-07-14 PROCEDURE — 85027 COMPLETE CBC AUTOMATED: CPT | Performed by: NURSE PRACTITIONER

## 2020-07-14 PROCEDURE — U0002 COVID-19 LAB TEST NON-CDC: HCPCS

## 2020-07-14 PROCEDURE — U0004 COV-19 TEST NON-CDC HGH THRU: HCPCS

## 2020-07-14 PROCEDURE — 87077 CULTURE AEROBIC IDENTIFY: CPT | Performed by: INTERNAL MEDICINE

## 2020-07-14 RX ORDER — ANTIOX #8/OM3/DHA/EPA/LUT/ZEAX 250-2.5 MG
1 CAPSULE ORAL 2 TIMES DAILY
COMMUNITY
End: 2022-01-01 | Stop reason: HOSPADM

## 2020-07-14 RX ORDER — CHOLECALCIFEROL (VITAMIN D3) 125 MCG
5 CAPSULE ORAL NIGHTLY
COMMUNITY
End: 2022-01-01 | Stop reason: ALTCHOICE

## 2020-07-14 NOTE — DISCHARGE INSTRUCTIONS

## 2020-07-14 NOTE — PAT
Patient to apply Chlorhexadine wipes  to surgical area (as instructed) the night before procedure and the AM of procedure. Wipes provided.    Patient currently taking Cefdinir for UTI.  Patient's daughter Frida Vazquez called Dr Beal's office and informed them of UTI and antibiotics.  The office stated it would not be an issue.     Patient currently taking coumadin

## 2020-07-15 LAB
REF LAB TEST METHOD: NORMAL
SARS-COV-2 RNA RESP QL NAA+PROBE: NOT DETECTED

## 2020-07-16 LAB — BACTERIA SPEC AEROBE CULT: ABNORMAL

## 2020-07-17 ENCOUNTER — HOSPITAL ENCOUNTER (OUTPATIENT)
Facility: HOSPITAL | Age: 85
Discharge: HOME | End: 2020-07-17
Attending: INTERNAL MEDICINE | Admitting: INTERNAL MEDICINE

## 2020-07-17 VITALS
BODY MASS INDEX: 36 KG/M2 | WEIGHT: 216.05 LBS | RESPIRATION RATE: 18 BRPM | HEIGHT: 65 IN | TEMPERATURE: 97.4 F | SYSTOLIC BLOOD PRESSURE: 100 MMHG | DIASTOLIC BLOOD PRESSURE: 84 MMHG | HEART RATE: 70 BPM | OXYGEN SATURATION: 93 %

## 2020-07-17 DIAGNOSIS — I48.20 CHRONIC ATRIAL FIBRILLATION (HCC): ICD-10-CM

## 2020-07-17 DIAGNOSIS — I25.5 ISCHEMIC CARDIOMYOPATHY: ICD-10-CM

## 2020-07-17 LAB — GLUCOSE BLDC GLUCOMTR-MCNC: 127 MG/DL (ref 70–130)

## 2020-07-17 PROCEDURE — 25010000002 ONDANSETRON PER 1 MG: Performed by: INTERNAL MEDICINE

## 2020-07-17 PROCEDURE — 25010000002 FENTANYL CITRATE (PF) 100 MCG/2ML SOLUTION: Performed by: INTERNAL MEDICINE

## 2020-07-17 PROCEDURE — 25010000003 CEFAZOLIN IN DEXTROSE 2-4 GM/100ML-% SOLUTION: Performed by: NURSE PRACTITIONER

## 2020-07-17 PROCEDURE — 33264 RMVL & RPLCMT DFB GEN MLT LD: CPT | Performed by: INTERNAL MEDICINE

## 2020-07-17 PROCEDURE — 99152 MOD SED SAME PHYS/QHP 5/>YRS: CPT | Performed by: INTERNAL MEDICINE

## 2020-07-17 PROCEDURE — 93641 EP EVL 1/2CHMB PAC CVDFB TST: CPT | Performed by: INTERNAL MEDICINE

## 2020-07-17 PROCEDURE — 99153 MOD SED SAME PHYS/QHP EA: CPT | Performed by: INTERNAL MEDICINE

## 2020-07-17 PROCEDURE — 25010000003 LIDOCAINE 1 % SOLUTION: Performed by: INTERNAL MEDICINE

## 2020-07-17 PROCEDURE — 82962 GLUCOSE BLOOD TEST: CPT

## 2020-07-17 PROCEDURE — 25010000002 MIDAZOLAM PER 1 MG: Performed by: INTERNAL MEDICINE

## 2020-07-17 PROCEDURE — C1882 AICD, OTHER THAN SING/DUAL: HCPCS | Performed by: INTERNAL MEDICINE

## 2020-07-17 DEVICE — ICD UNIFY ASSURA CRTD CD335740C: Type: IMPLANTABLE DEVICE | Status: FUNCTIONAL

## 2020-07-17 RX ORDER — LIDOCAINE HYDROCHLORIDE 10 MG/ML
INJECTION, SOLUTION INFILTRATION; PERINEURAL AS NEEDED
Status: DISCONTINUED | OUTPATIENT
Start: 2020-07-17 | End: 2020-07-17 | Stop reason: HOSPADM

## 2020-07-17 RX ORDER — NITROGLYCERIN 0.4 MG/1
0.4 TABLET SUBLINGUAL
Status: DISCONTINUED | OUTPATIENT
Start: 2020-07-17 | End: 2020-07-17 | Stop reason: HOSPADM

## 2020-07-17 RX ORDER — SODIUM CHLORIDE 0.9 % (FLUSH) 0.9 %
10 SYRINGE (ML) INJECTION AS NEEDED
Status: DISCONTINUED | OUTPATIENT
Start: 2020-07-17 | End: 2020-07-17 | Stop reason: HOSPADM

## 2020-07-17 RX ORDER — SODIUM CHLORIDE 0.9 % (FLUSH) 0.9 %
3 SYRINGE (ML) INJECTION EVERY 12 HOURS SCHEDULED
Status: DISCONTINUED | OUTPATIENT
Start: 2020-07-17 | End: 2020-07-17 | Stop reason: HOSPADM

## 2020-07-17 RX ORDER — ONDANSETRON 2 MG/ML
4 INJECTION INTRAMUSCULAR; INTRAVENOUS EVERY 6 HOURS PRN
Status: DISCONTINUED | OUTPATIENT
Start: 2020-07-17 | End: 2020-07-17 | Stop reason: HOSPADM

## 2020-07-17 RX ORDER — ONDANSETRON 2 MG/ML
INJECTION INTRAMUSCULAR; INTRAVENOUS AS NEEDED
Status: DISCONTINUED | OUTPATIENT
Start: 2020-07-17 | End: 2020-07-17 | Stop reason: HOSPADM

## 2020-07-17 RX ORDER — ACETAMINOPHEN 650 MG/1
650 SUPPOSITORY RECTAL EVERY 4 HOURS PRN
Status: DISCONTINUED | OUTPATIENT
Start: 2020-07-17 | End: 2020-07-17 | Stop reason: HOSPADM

## 2020-07-17 RX ORDER — BUPIVACAINE HYDROCHLORIDE 5 MG/ML
INJECTION, SOLUTION PERINEURAL AS NEEDED
Status: DISCONTINUED | OUTPATIENT
Start: 2020-07-17 | End: 2020-07-17 | Stop reason: HOSPADM

## 2020-07-17 RX ORDER — HYDROCODONE BITARTRATE AND ACETAMINOPHEN 5; 325 MG/1; MG/1
1 TABLET ORAL EVERY 4 HOURS PRN
Status: DISCONTINUED | OUTPATIENT
Start: 2020-07-17 | End: 2020-07-17 | Stop reason: HOSPADM

## 2020-07-17 RX ORDER — CEFAZOLIN SODIUM 2 G/100ML
2 INJECTION, SOLUTION INTRAVENOUS ONCE
Status: COMPLETED | OUTPATIENT
Start: 2020-07-17 | End: 2020-07-17

## 2020-07-17 RX ORDER — SODIUM CHLORIDE 9 MG/ML
INJECTION, SOLUTION INTRAVENOUS CONTINUOUS PRN
Status: COMPLETED | OUTPATIENT
Start: 2020-07-17 | End: 2020-07-17

## 2020-07-17 RX ORDER — FENTANYL CITRATE 50 UG/ML
INJECTION, SOLUTION INTRAMUSCULAR; INTRAVENOUS AS NEEDED
Status: DISCONTINUED | OUTPATIENT
Start: 2020-07-17 | End: 2020-07-17 | Stop reason: HOSPADM

## 2020-07-17 RX ORDER — SODIUM CHLORIDE 9 MG/ML
1 INJECTION, SOLUTION INTRAVENOUS CONTINUOUS
Status: ACTIVE | OUTPATIENT
Start: 2020-07-17 | End: 2020-07-17

## 2020-07-17 RX ORDER — MIDAZOLAM HYDROCHLORIDE 1 MG/ML
INJECTION INTRAMUSCULAR; INTRAVENOUS AS NEEDED
Status: DISCONTINUED | OUTPATIENT
Start: 2020-07-17 | End: 2020-07-17 | Stop reason: HOSPADM

## 2020-07-17 RX ORDER — PROMETHAZINE HYDROCHLORIDE 25 MG/ML
12.5 INJECTION, SOLUTION INTRAMUSCULAR; INTRAVENOUS EVERY 4 HOURS PRN
Status: DISCONTINUED | OUTPATIENT
Start: 2020-07-17 | End: 2020-07-17 | Stop reason: HOSPADM

## 2020-07-17 RX ORDER — ACETAMINOPHEN 325 MG/1
650 TABLET ORAL EVERY 4 HOURS PRN
Status: DISCONTINUED | OUTPATIENT
Start: 2020-07-17 | End: 2020-07-17 | Stop reason: HOSPADM

## 2020-07-17 RX ADMIN — SODIUM CHLORIDE 1 ML/KG/HR: 9 INJECTION, SOLUTION INTRAVENOUS at 07:18

## 2020-07-17 RX ADMIN — CEFAZOLIN SODIUM 2 G: 2 INJECTION, SOLUTION INTRAVENOUS at 07:58

## 2020-07-17 NOTE — H&P
Cardiology H&P     Karla A Cristobal  1935 07/17/20    DATE OF ADMISSION: 7/17/2020  [facility] Giselle Mullins DO  [address] [id number]    No chief complaint on file.    PROBLEM LIST:    1. Ischemic heart disease / Chronic class III systolic heart failure.   a. History of remote myocardial infarction/CABG x3, [facility] (database insufficient).   b. Left heart catheterization by Dr. Basilio Grossman, 2007, revealing patent LIMA graft/medical therapy, LVEF 30%.  c. Echocardiogram, [facility], 2007: LVEF 25%; moderate/severe mitral regurgitation.  d. Upgrade of pacemaker to [employer] Jamel BI-V pacemaker per Dr. Hammonds on 10/25/2007.  e. Reported negative Cardiolite stress test in 2010, [facility], Dr. Basilio Grossman (database insufficient).  f. Reported echocardiogram, October 2011,  (database insufficient).   g. Echocardiogram, 08/29/2012: LVEF 35% to 40%, mild mitral regurgitation.  h. History of MRSA pacemaker infection, 2005 (database insufficient).   i. [employer]. Jamel pacemaker, 2005, with upgrade to a [employer] Jamel BI-V ICD device in 2007, Dr. Hammonds.   j. CRT-D generator exchange by Tano Beal, 09/21/2012, [employer]. Jamel device (Serial# [id number]).  k. Persistent ischemic cardiomyopathy.    l. Echocardiogram, 10/20/2015: LVEF 35% to 40%, moderate mitral regurgitation/tricuspid regurgitation; RVSP 45 mmHg.   2. Persistent atrial fibrillation.  a. CHADs VASc score equal to 7  b. Chronic Coumadin therapy managed by Bk Mckeon’s office.   3. Essential hypertension  4. Dyslipidemia  5. Hypothyroidism.  6. Insulin-dependent diabetes mellitus, type 2.  7. Chronic kidney disease   a. stage 4   b. followed by Bk Mckeon.   c. Baseline Creatinine 1.7-2.2  8. Home O2 nightly/sleep apnea.  9. History of endometrial cancer, status post partial hysterectomy in 2008 with follow up radiation therapy followed by Dr. Sosa.   10. History  of multiple left hip surgeries most recently in 2011 at [facility]      History of Present Illness: Ms. Karla hammond is a 85-year-old white female with a past medical history of ischemic cardiomyopathy, prior MI, and chronic systolic congestive heart failure status post biventricular ICD placement in the past that was recently found to be at CRESENCIO necessitating presentation for replacement.  Upon presentation patient admits to stable NYHA class III CHF symptomology with fatigue, shortness of breath, and dyspnea on minimal exertion.  Patient denies chest pain, palpitations, dizziness, presyncope, or syncope.  Patient noted to have a history of persistent atrial fibrillation with a chads vasc score equal to 7 maintained on Coumadin therapy with preprocedure INR at 2.3 on 7/14/2020.  Patient reports holding Coumadin over the past 2 days as instructed.  Patient denies side effect to Coumadin, symptoms of bleeding, or TIA/strokelike symptoms.  Patient does report recent UTI for which she was initiated on antibiotics on 7/13/2020 with WBC count of 7.59 on preprocedure lab evaluation.  Patient denies recent sick contacts.  She denies symptoms of fever, chills, sweats, or cough.  She has a documented negative COVID screening on the chart within the last 72 hours.      No Known Allergies    Prior to Admission Medications     Prescriptions Last Dose Informant Patient Reported? Taking?    aspirin (ASPIRIN LOW DOSE) 81 MG tablet  Medication Bottle Yes No    Take 81 mg by mouth Daily.    atorvastatin (LIPITOR) 20 MG tablet  Medication Bottle No No    Take 1 tablet by mouth Every Night.    carvedilol (COREG) 25 MG tablet  Medication Bottle No No    Take 1/2 tablet by mouth twice a day    Patient taking differently:  Take 12.5 mg by mouth 2 (Two) Times a Day With Meals.    cefdinir (OMNICEF) 300 MG capsule  Medication Bottle No No    Take 1 capsule by mouth Daily for 7 days.    Patient taking differently:  Take 300 mg  by mouth Daily. Recent UTI    cholecalciferol (VITAMIN D3) 1000 UNITS tablet  Medication Bottle Yes No    Take 1,000 Units by mouth Daily.    eszopiclone (LUNESTA) 2 MG tablet  Medication Bottle No No    TAKE ONE TABLET BY MOUTH AT NIGHT IMMEDIATELY BEFORE BEDTIME AS NEEDED FOR SLEEP.    Patient taking differently:  Take 2 mg by mouth Every Night.    furosemide (LASIX) 40 MG tablet  Medication Bottle Yes No    Take 40 mg by mouth As Needed.    indapamide (LOZOL) 2.5 MG tablet  Medication Bottle Yes No    Take 2.5 mg by mouth Every Morning.    insulin aspart (NOVOLOG) 100 UNIT/ML injection  Self No No    Inject 15 Units under the skin into the appropriate area as directed 3 (Three) Times a Day Before Meals.    Insulin Glargine (BASAGLAR KWIKPEN) 100 UNIT/ML injection pen  Self No No    Inject 20 units subQ once per day    Patient taking differently:  20 Units Every Night. Inject 20 units subQ once per day    isosorbide mononitrate (IMDUR) 30 MG 24 hr tablet  Medication Bottle No No    Take 1 tablet by mouth Daily.    Patient taking differently:  Take 30 mg by mouth Every Morning.    latanoprost (XALATAN) 0.005 % ophthalmic solution  Self Yes No    Administer 1 drop to both eyes Every Night.    levothyroxine (SYNTHROID, LEVOTHROID) 100 MCG tablet  Medication Bottle No No    TAKE ONE TABLET BY MOUTH ONE TIME DAILY    Patient taking differently:  Take 100 mcg by mouth Every Morning.    melatonin 5 MG tablet tablet  Medication Bottle Yes No    Take 5 mg by mouth Every Night.    multivitamins-minerals (PRESERVISION AREDS 2) capsule capsule  Medication Bottle Yes No    Take 1 capsule by mouth 2 (Two) Times a Day.    mupirocin (BACTROBAN) 2 % ointment  Self Yes No    1 application As Needed.    ondansetron (ZOFRAN) 4 MG tablet  Self Yes No    Take 4 mg by mouth Every 12 (Twelve) Hours As Needed.    spironolactone (ALDACTONE) 25 MG tablet  Medication Bottle No No    Take 1 tablet by mouth Daily.    timolol (TIMOPTIC) 0.5 %  ophthalmic solution  Self Yes No    Administer 1 drop to both eyes 2 (Two) Times a Day.    warfarin (Coumadin) 2 MG tablet  Medication Bottle Yes No    Take 2 mg by mouth Daily.            Current Facility-Administered Medications:   •  acetaminophen (TYLENOL) tablet 650 mg, 650 mg, Oral, Q4H PRN, Sherrell Lance APRN  •  ceFAZolin in dextrose (ANCEF) IVPB solution 2 g, 2 g, Intravenous, Once, Sherrell Lance APRN  •  nitroglycerin (NITROSTAT) SL tablet 0.4 mg, 0.4 mg, Sublingual, Q5 Min PRN, Sherrell Lance APRN  •  promethazine (PHENERGAN) injection 12.5 mg, 12.5 mg, Intravenous, Q4H PRN, Sherrell Lance APRN  •  sodium chloride 0.9 % flush 10 mL, 10 mL, Intravenous, PRN, Sherrell Lance APRN  •  sodium chloride 0.9 % flush 3 mL, 3 mL, Intravenous, Q12H, Sherrell Lance APRN  •  sodium chloride 0.9 % infusion, 1 mL/kg/hr, Intravenous, Continuous, Sherrell Lance APRN, Last Rate: 99.3 mL/hr at 07/17/20 0718, 1 mL/kg/hr at 07/17/20 0718    Social History     Socioeconomic History   • Marital status:      Spouse name: Not on file   • Number of children: Not on file   • Years of education: Not on file   • Highest education level: Not on file   Tobacco Use   • Smoking status: Never Smoker   • Smokeless tobacco: Never Used   Substance and Sexual Activity   • Alcohol use: No   • Drug use: No   • Sexual activity: Defer       Family History   Problem Relation Age of Onset   • Breast cancer Other    • Diabetes Other    • Esophageal cancer Other    • Hypertension Other    • Transient ischemic attack Other    • Breast cancer Mother    • Cancer Father        REVIEW OF SYSTEMS:   CONST:  No weight loss, fever, chills, weakness or fatigue.   HEENT:  No visual loss, blurred vision, double vision, yellow sclerae.                   No hearing loss, congestion, sore throat.   SKIN:      No rashes, urticaria, ulcers, sores.     RESP:     No shortness  "of breath, hemoptysis, cough, sputum.   GI:           No anorexia, nausea, vomiting, diarrhea. No abdominal pain, melena.   :         No burning on urination, hematuria or increased frequency.  ENDO:    No diaphoresis, cold or heat intolerance. No polyuria or polydipsia.   NEURO:  No headache, dizziness, syncope, paralysis, ataxia, or parasthesias.                  No change in bowel or bladder control. No history of CVA/TIA  MUSC:    No muscle, back pain, joint pain or stiffness.   HEME:    No anemia, bleeding, bruising. No history of DVT/PE.  PSYCH:  No history of depression, anxiety    Vitals:    07/17/20 0649 07/17/20 0651   BP: 127/40 140/57   BP Location: Right arm Left arm   Patient Position: Lying Lying   Pulse: 71    Resp: 16    Temp: 97.4 °F (36.3 °C)    TempSrc: Tympanic    SpO2: 97%    Weight: 98 kg (216 lb 0.8 oz)    Height: 165.1 cm (65\")          Vital Sign Min/Max for last 24 hours  Temp  Min: 97.4 °F (36.3 °C)  Max: 97.4 °F (36.3 °C)   BP  Min: 127/40  Max: 140/57   Pulse  Min: 71  Max: 71   Resp  Min: 16  Max: 16   SpO2  Min: 97 %  Max: 97 %   No data recorded    No intake or output data in the 24 hours ending 07/17/20 0719          Physical Exam:  GEN: Well nourished, Well- developed  No acute distress  HEENT: Normocephalic, Atraumatic, PERRLA, moist mucous membranes  NECK: supple, NO JVD, no thyromegaly, no lymphadenopathy  CARD: S1S2  RRR no murmur, gallop, rub  LUNGS: Clear to auscultataion, normal respiratory effort  ABDOMEN: Soft, nontender, normal bowel sounds  EXTREMITIES:No gross deformities,  No clubbing, cyanosis, or edema  SKIN: Warm, dry  NEURO: No focal deficits  PSYCHIATRIC: Normal affect and mood      I personally viewed and interpreted the patient's EKG/Telemetry/lab data    Data:   Results from last 7 days   Lab Units 07/14/20  1232   WBC 10*3/mm3 7.59   HEMOGLOBIN g/dL 13.1   HEMATOCRIT % 42.6   PLATELETS 10*3/mm3 233     Results from last 7 days   Lab Units 07/14/20  1232 "   SODIUM mmol/L 141   POTASSIUM mmol/L 4.3   CHLORIDE mmol/L 103   CO2 mmol/L 32.0*   BUN mg/dL 89*   CREATININE mg/dL 2.23*   GLUCOSE mg/dL 126*      Results from last 7 days   Lab Units 07/14/20  1233   HEMOGLOBIN A1C % 6.10*             Results from last 7 days   Lab Units 07/14/20  1232   PROTIME Seconds 25.7*   INR  2.39*       Telemetry: V Paced 70 bpm      Assessment and Plan: Ms. Cristobal presents today for elective biventricular ICD generator change with recent remote device interrogation revealing her device at CRESENCIO necessitating replacement.  Patient reports stable NYHA class III symptomology without chest pain, palpitations, dizziness, presyncope, or syncope.  She is maintained on Coumadin therapy for a history of atrial fibrillation with reports of holding over the past 2 days as instructed.  Last INR is 2.39 on 7/14/2020.  All preoperative lab evaluation was reviewed and acceptable with noted elevated creatinine due to her chronic renal insufficiency.  All risk, benefits, and alternatives to the procedure were outlined and viewed in detail.  Patient verbalized complete understanding of the procedure is willing to proceed as scheduled.      Electronically signed by NESS Miranda, 07/17/20, 7:04 AM.

## 2020-07-21 ENCOUNTER — TELEPHONE (OUTPATIENT)
Dept: INTERNAL MEDICINE | Facility: CLINIC | Age: 85
End: 2020-07-21

## 2020-07-21 NOTE — TELEPHONE ENCOUNTER
PATIENT'S DAUGHTER STATES PATIENT RECENTLY HAD HER BATTERY REPLACED IN HER PACE MAKER (7/17) AND TOOK SOME TYLENOL FOR A COUPLE OF DAYS AND NOW THE PATIENT IS EXTREMELY CONSTIPATED.  THE DAUGHTER IS REQUESTING A CALL BACK SO SHE CAN LEARN WHAT WOULD BE A SAFE METHOD OR MEDICATION TO HELP WITH THE PATIENT'S CONSTIPATION.    PLEASE CALL AND ADVISE [phone]

## 2020-07-22 DIAGNOSIS — F51.01 PRIMARY INSOMNIA: ICD-10-CM

## 2020-07-22 RX ORDER — ESZOPICLONE 2 MG/1
TABLET, FILM COATED ORAL
Qty: 30 TABLET | Refills: 0 | Status: SHIPPED | OUTPATIENT
Start: 2020-07-22 | End: 2020-08-27

## 2020-07-22 RX ORDER — LEVOTHYROXINE SODIUM 0.1 MG/1
100 TABLET ORAL EVERY MORNING
Qty: 90 TABLET | Refills: 0 | Status: SHIPPED | OUTPATIENT
Start: 2020-07-22 | End: 2020-10-15

## 2020-07-24 ENCOUNTER — OFFICE VISIT (OUTPATIENT)
Dept: CARDIOLOGY | Facility: CLINIC | Age: 85
End: 2020-07-24

## 2020-07-24 DIAGNOSIS — I48.20 CHRONIC ATRIAL FIBRILLATION (HCC): Primary | ICD-10-CM

## 2020-07-24 PROCEDURE — 99024 POSTOP FOLLOW-UP VISIT: CPT | Performed by: INTERNAL MEDICINE

## 2020-07-24 NOTE — PROGRESS NOTES
WOUND CHECK    2020    Karla LINO Cristobal, : 1935    WOUND CHECK    B/P: 128/74  (Sitting)      Pulse: 72    Patient has fever: [] Temperature if indicated:  n/a    Wound Location: left intraventricular     Dressing Removed [x]        Old Dressing Appearance:  Clean, dry []                 Old, bloody drainage [x]                            Moist, serous drainage []                Moist, thick yellow/green drainage []       Wound Appearance: Redness [x]                  Drainage []                  Culture obtained []        Color: Clear     Consistency: n/a     Amount: none         Gloves used, wound cleansed with sterile 4x4 and peroxide [x]       MD notified [] MD orders:    Antibiotic started []  If checked, type   Other:     Appointment for follow-up scheduled for 3 months post procedure [x]    Future Appointments   Date Time Provider Department Center   2020  3:30 PM Rani Lane MD [facility] None   2020  2:00 PM Giselle Guzman DO [facility] None   10/15/2020  1:15 PM Tano Beal MD [facility] None           Cristina Perez MA, 20      MD Signature:______________________________ Completed By/Date:

## 2020-08-11 ENCOUNTER — OFFICE VISIT (OUTPATIENT)
Dept: ENDOCRINOLOGY | Facility: CLINIC | Age: 85
End: 2020-08-11

## 2020-08-11 VITALS
SYSTOLIC BLOOD PRESSURE: 124 MMHG | BODY MASS INDEX: 35.65 KG/M2 | HEIGHT: 65 IN | WEIGHT: 214 LBS | OXYGEN SATURATION: 99 % | HEART RATE: 77 BPM | DIASTOLIC BLOOD PRESSURE: 64 MMHG

## 2020-08-11 DIAGNOSIS — N18.4 CKD (CHRONIC KIDNEY DISEASE), STAGE IV (HCC): ICD-10-CM

## 2020-08-11 DIAGNOSIS — I10 ESSENTIAL HYPERTENSION: ICD-10-CM

## 2020-08-11 DIAGNOSIS — Z79.4 TYPE 2 DIABETES MELLITUS WITHOUT COMPLICATION, WITH LONG-TERM CURRENT USE OF INSULIN (HCC): Primary | ICD-10-CM

## 2020-08-11 DIAGNOSIS — E78.5 HYPERLIPIDEMIA LDL GOAL <100: ICD-10-CM

## 2020-08-11 DIAGNOSIS — E11.9 TYPE 2 DIABETES MELLITUS WITHOUT COMPLICATION, WITH LONG-TERM CURRENT USE OF INSULIN (HCC): Primary | ICD-10-CM

## 2020-08-11 DIAGNOSIS — E03.9 ACQUIRED HYPOTHYROIDISM: ICD-10-CM

## 2020-08-11 LAB
EXPIRATION DATE: NORMAL
EXPIRATION DATE: NORMAL
GLUCOSE BLDC GLUCOMTR-MCNC: 86 MG/DL (ref 70–130)
HBA1C MFR BLD: 6 %
Lab: NORMAL
Lab: NORMAL

## 2020-08-11 PROCEDURE — 99214 OFFICE O/P EST MOD 30 MIN: CPT | Performed by: INTERNAL MEDICINE

## 2020-08-11 PROCEDURE — 82947 ASSAY GLUCOSE BLOOD QUANT: CPT | Performed by: INTERNAL MEDICINE

## 2020-08-11 PROCEDURE — 83036 HEMOGLOBIN GLYCOSYLATED A1C: CPT | Performed by: INTERNAL MEDICINE

## 2020-08-11 NOTE — PROGRESS NOTES
Karla Cristobal 85 y.o.  CC:Follow-up; Diabetes (Type II, eye exam 2 months ago Dr Donohue); Hypothyroidism; and Peripheral Neuropathy      [place]: Follow-up; Diabetes (Type II, eye exam 2 months ago Dr Donohue); Hypothyroidism; and Peripheral Neuropathy    Blood sugar and 90 day average sugar reviewed  Results for orders placed or performed in visit on 08/11/20   POC Glycosylated Hemoglobin (Hb A1C)   Result Value Ref Range    Hemoglobin A1C 6.0 %    Lot Number [id number]     Expiration Date 02/24/2022    POC Glucose Fingerstick   Result Value Ref Range    Glucose 86 70 - 130 mg/dL    Lot Number [id number]     Expiration Date 2,242,020      Average sugar is good  Is on synthroid 100 mcg daily   Is on basaglar 20 u daily   Is on novolog 15 u before meals  Discussed lower sugar now   Adjustment of premeal insulin discussed with lowering of novolog to 10 units reviewed  Is utd with eye exam  Neuropathy without callus but does have right ingrown nail   Non painful   Foot care reviewed    No Known Allergies    Current Outpatient Medications:   •  aspirin (ASPIRIN LOW DOSE) 81 MG tablet, Take 81 mg by mouth Every Night., Disp: , Rfl:   •  atorvastatin (LIPITOR) 20 MG tablet, Take 1 tablet by mouth Every Night., Disp: 90 tablet, Rfl: 1  •  carvedilol (COREG) 25 MG tablet, Take 1/2 tablet by mouth twice a day (Patient taking differently: Take 12.5 mg by mouth 2 (Two) Times a Day With Meals.), Disp: 90 tablet, Rfl: 3  •  cholecalciferol (VITAMIN D3) 1000 UNITS tablet, Take 1,000 Units by mouth Daily., Disp: , Rfl:   •  eszopiclone (LUNESTA) 2 MG tablet, TAKE ONE TABLET BY MOUTH IMMEDIATELY BEFORE BEDTIME AS NEEDED FOR SLEEP, Disp: 30 tablet, Rfl: 0  •  furosemide (LASIX) 40 MG tablet, Take 40 mg by mouth As Needed., Disp: , Rfl:   •  indapamide (LOZOL) 2.5 MG tablet, Take 2.5 mg by mouth Every Morning., Disp: , Rfl:   •  insulin aspart (NOVOLOG) 100 UNIT/ML injection, Inject 15 Units under the skin into the appropriate area as  directed 3 (Three) Times a Day Before Meals., Disp: 20 mL, Rfl: 2  •  Insulin Glargine (BASAGLAR KWIKPEN) 100 UNIT/ML injection pen, Inject 20 units subQ once per day (Patient taking differently: Inject 20 Units under the skin into the appropriate area as directed Every Night. Inject 20 units subQ once per day), Disp: 7 pen, Rfl: 5  •  isosorbide mononitrate (IMDUR) 30 MG 24 hr tablet, Take 1 tablet by mouth Daily. (Patient taking differently: Take 30 mg by mouth Every Morning.), Disp: 90 tablet, Rfl: 0  •  latanoprost (XALATAN) 0.005 % ophthalmic solution, Administer 1 drop to both eyes Every Night., Disp: , Rfl: 5  •  levothyroxine (SYNTHROID, LEVOTHROID) 100 MCG tablet, Take 1 tablet by mouth Every Morning., Disp: 90 tablet, Rfl: 0  •  melatonin 5 MG tablet tablet, Take 5 mg by mouth Every Night., Disp: , Rfl:   •  multivitamins-minerals (PRESERVISION AREDS 2) capsule capsule, Take 1 capsule by mouth 2 (Two) Times a Day., Disp: , Rfl:   •  mupirocin (BACTROBAN) 2 % ointment, 1 application As Needed., Disp: , Rfl:   •  ondansetron (ZOFRAN) 4 MG tablet, Take 4 mg by mouth Every 12 (Twelve) Hours As Needed., Disp: , Rfl:   •  spironolactone (ALDACTONE) 25 MG tablet, Take 1 tablet by mouth Daily., Disp: 90 tablet, Rfl: 1  •  timolol (TIMOPTIC) 0.5 % ophthalmic solution, Administer 1 drop to both eyes 2 (Two) Times a Day., Disp: , Rfl:   •  warfarin (Coumadin) 2 MG tablet, Take 2 mg by mouth Daily., Disp: , Rfl:   Patient Active Problem List    Diagnosis   • Ischemic cardiomyopathy [I25.5]   • Coronary atherosclerosis [I25.10]   • Morbidly obese (CMS/HCC) [E66.01]   • Chronic systolic congestive heart failure (CMS/HCC) [I50.22]   • DVT of lower extremity (deep venous thrombosis) (CMS/HCC) [I82.409]   • CAD (coronary artery disease) [I25.10]   • Ischemic heart disease [I25.9]   • Anemia due to chronic kidney disease [N18.9, D63.1]   • Chronic atrial fibrillation (CMS/HCC) [I48.20]   • CKD (chronic kidney disease),  stage IV (CMS/HCC) [N18.4]   • Diabetes mellitus (CMS/HCC) [E11.9]   • Diabetic nephropathy (CMS/HCC) [E11.21]   • Diabetic retinopathy (CMS/HCC) [E11.319]   • Malignant neoplasm of endometrium (CMS/HCC) [C54.1]   • Hyperlipidemia LDL goal <100 [E78.5]   • Essential hypertension [I10]   • Hypothyroidism [E03.9]   • Insomnia [G47.00]   • Generalized ischemic myocardial dysfunction [I25.5]   • Leukocytosis [D72.829]   • Memory impairment [R41.3]   • Nausea [R11.0]   • Pulmonary embolism (CMS/HCC) [I26.99]   • Renal insufficiency [N28.9]   • Sleep apnea [G47.30]   • Squamous cell carcinoma of skin [C44.92]   • CHF (NYHA class IV, ACC/AHA stage D) (CMS/[facility]) [I50.9]   • Renal insufficiency syndrome [N28.9]     Review of Systems   Constitutional: Positive for activity change and fatigue. Negative for appetite change, chills, diaphoresis, fever and unexpected weight change.   HENT: Negative for congestion, dental problem, drooling, ear discharge, ear pain, facial swelling, hearing loss, mouth sores, nosebleeds, postnasal drip, rhinorrhea, sinus pressure, sneezing, sore throat, tinnitus, trouble swallowing and voice change.    Eyes: Negative for photophobia, pain, discharge, redness, itching and visual disturbance.   Respiratory: Negative for apnea, cough, choking, chest tightness, shortness of breath, wheezing and stridor.    Cardiovascular: Positive for leg swelling. Negative for chest pain and palpitations.   Gastrointestinal: Negative for abdominal distention, abdominal pain, anal bleeding, blood in stool, constipation, diarrhea, nausea, rectal pain and vomiting.   Endocrine: Negative for cold intolerance, heat intolerance, polydipsia, polyphagia and polyuria.   Genitourinary: Negative for decreased urine volume, difficulty urinating, dysuria, enuresis, flank pain, frequency, genital sores, hematuria and urgency.   Musculoskeletal: Positive for arthralgias and gait problem. Negative for back pain, joint swelling,  "myalgias, neck pain and neck stiffness.   Skin: Negative for color change, pallor, rash and wound.   Allergic/Immunologic: Negative for environmental allergies, food allergies and immunocompromised state.   Neurological: Positive for weakness and numbness. Negative for dizziness, tremors, seizures, syncope, facial asymmetry, speech difficulty, light-headedness and headaches.   Hematological: Negative for adenopathy. Does not bruise/bleed easily.   Psychiatric/Behavioral: Negative for agitation, behavioral problems, confusion, decreased concentration, dysphoric mood, hallucinations, self-injury, sleep disturbance and suicidal ideas. The patient is not nervous/anxious and is not hyperactive.      Social History     Socioeconomic History   • Marital status:      Spouse name: Not on file   • Number of children: Not on file   • Years of education: Not on file   • Highest education level: Not on file   Tobacco Use   • Smoking status: Never Smoker   • Smokeless tobacco: Never Used   Substance and Sexual Activity   • Alcohol use: No   • Drug use: No   • Sexual activity: Defer     Family History   Problem Relation Age of Onset   • Breast cancer Other    • Diabetes Other    • Esophageal cancer Other    • Hypertension Other    • Transient ischemic attack Other    • Breast cancer Mother    • Cancer Father      /64   Pulse 77   Ht 165.1 cm (65\")   Wt 97.1 kg (214 lb)   LMP  (LMP Unknown) Comment: last mammogram -unsure   SpO2 99%   BMI 35.61 kg/m²   Physical Exam   Constitutional: She is oriented to person, place, and time. She appears well-developed and well-nourished.   HENT:   Head: Normocephalic and atraumatic.   Nose: Nose normal.   Mouth/Throat: Oropharynx is clear and moist.   Eyes: Pupils are equal, round, and reactive to light. Conjunctivae, EOM and lids are normal.   Neck: Trachea normal and normal range of motion. Neck supple. Carotid bruit is not present. No tracheal deviation present. No thyroid " mass and no thyromegaly present.   Cardiovascular: Normal rate, regular rhythm and intact distal pulses. Exam reveals no gallop and no friction rub.   Murmur heard.  Pulmonary/Chest: Effort normal and breath sounds normal. No respiratory distress. She has no wheezes.   Musculoskeletal: Normal range of motion. She exhibits edema. She exhibits no deformity.   Lymphadenopathy:     She has no cervical adenopathy.   Neurological: She is alert and oriented to person, place, and time. She has normal reflexes. She displays normal reflexes. No cranial nerve deficit.   Skin: Skin is warm and dry. No rash noted. No cyanosis or erythema. Nails show no clubbing.   Psychiatric: She has a normal mood and affect. Her speech is normal and behavior is normal. Judgment and thought content normal. Cognition and memory are normal.   Nursing note and vitals reviewed.    Results for orders placed or performed in visit on 08/11/20   POC Glycosylated Hemoglobin (Hb A1C)   Result Value Ref Range    Hemoglobin A1C 6.0 %    Lot Number [id number]     Expiration Date 02/24/2022    POC Glucose Fingerstick   Result Value Ref Range    Glucose 86 70 - 130 mg/dL    Lot Number [id number]     Expiration Date 2,242,020      Karla was seen today for follow-up, diabetes, hypothyroidism and peripheral neuropathy.    Diagnoses and all orders for this visit:    Type 2 diabetes mellitus without complication, with long-term current use of insulin (CMS/[facility])  -     POC Glycosylated Hemoglobin (Hb A1C)  -     POC Glucose Fingerstick      Problem List Items Addressed This Visit        Cardiovascular and Mediastinum    Hyperlipidemia LDL goal <100     Is on lipitor 20 mg daily   Check flp          Essential hypertension     bp is good   Continue monitoring and medication             Endocrine    Hypothyroidism     Recent tft normal  Continue to monitor   Continue synthroid 100 mcg daily          Diabetes mellitus (CMS/[facility]) - Primary     Blood sugar and 90 day average  sugar reviewed  Results for orders placed or performed in visit on 08/11/20   POC Glycosylated Hemoglobin (Hb A1C)   Result Value Ref Range    Hemoglobin A1C 6.0 %    Lot Number [id number]     Expiration Date 02/24/2022    POC Glucose Fingerstick   Result Value Ref Range    Glucose 86 70 - 130 mg/dL    Lot Number [id number]     Expiration Date 2,242,020      Average sugar is 120   Is utd with eye exam   No foot callus but ingrown nail right foot   Foot care discussed  Ur alb neg  F/u 4-6 months          Relevant Orders    POC Glycosylated Hemoglobin (Hb A1C) (Completed)    POC Glucose Fingerstick (Completed)       Genitourinary    CKD (chronic kidney disease), stage IV (CMS/[facility])     Has appt next week with Dr Mckeon  Will have lab work at that time  Need for reduction of insulin dosing with decline in kidney function discussed  Reduce novolog as discussed   Call if sugar below 80   Reduction of basaglar for low fasting sugar reviewed               Return in about 3 months (around 11/11/2020).    Rani Lane MD  Signed Rani Lane MD

## 2020-08-12 NOTE — ASSESSMENT & PLAN NOTE
Has appt next week with Dr Mckeon  Will have lab work at that time  Need for reduction of insulin dosing with decline in kidney function discussed  Reduce novolog as discussed   Call if sugar below 80   Reduction of basaglar for low fasting sugar reviewed

## 2020-08-12 NOTE — ASSESSMENT & PLAN NOTE
Blood sugar and 90 day average sugar reviewed  Results for orders placed or performed in visit on 08/11/20   POC Glycosylated Hemoglobin (Hb A1C)   Result Value Ref Range    Hemoglobin A1C 6.0 %    Lot Number [id number]     Expiration Date 02/24/2022    POC Glucose Fingerstick   Result Value Ref Range    Glucose 86 70 - 130 mg/dL    Lot Number [id number]     Expiration Date 2,242,020      Average sugar is 120   Is utd with eye exam   No foot callus but ingrown nail right foot   Foot care discussed  Ur alb neg  F/u 4-6 months

## 2020-08-26 DIAGNOSIS — F51.01 PRIMARY INSOMNIA: ICD-10-CM

## 2020-08-27 RX ORDER — ESZOPICLONE 2 MG/1
TABLET, FILM COATED ORAL
Qty: 30 TABLET | Refills: 0 | Status: SHIPPED | OUTPATIENT
Start: 2020-08-27 | End: 2020-09-24

## 2020-08-29 PROCEDURE — 87077 CULTURE AEROBIC IDENTIFY: CPT | Performed by: NURSE PRACTITIONER

## 2020-08-29 PROCEDURE — 87186 SC STD MICRODIL/AGAR DIL: CPT | Performed by: NURSE PRACTITIONER

## 2020-08-29 PROCEDURE — 87086 URINE CULTURE/COLONY COUNT: CPT | Performed by: NURSE PRACTITIONER

## 2020-09-01 ENCOUNTER — TELEPHONE (OUTPATIENT)
Dept: URGENT CARE | Facility: CLINIC | Age: 85
End: 2020-09-01

## 2020-09-01 NOTE — TELEPHONE ENCOUNTER
----- Message from NESS Dixon sent at 8/31/2020  8:05 PM EDT -----  She is taking appropriate therapy, have her continue as prescribed.  Assess for improvement in symptoms.  Have her follow-up with primary care provider  Pt called and given urine cx result. Pt states she is feeling better. Advised pt to complete full course of abx. VU.

## 2020-09-04 ENCOUNTER — OFFICE VISIT (OUTPATIENT)
Dept: INTERNAL MEDICINE | Facility: CLINIC | Age: 85
End: 2020-09-04

## 2020-09-04 ENCOUNTER — LAB (OUTPATIENT)
Dept: LAB | Facility: HOSPITAL | Age: 85
End: 2020-09-04

## 2020-09-04 VITALS
OXYGEN SATURATION: 93 % | SYSTOLIC BLOOD PRESSURE: 110 MMHG | HEIGHT: 65 IN | DIASTOLIC BLOOD PRESSURE: 78 MMHG | BODY MASS INDEX: 35.65 KG/M2 | WEIGHT: 214 LBS | TEMPERATURE: 96 F | HEART RATE: 88 BPM

## 2020-09-04 DIAGNOSIS — N18.4 CKD (CHRONIC KIDNEY DISEASE), STAGE IV (HCC): ICD-10-CM

## 2020-09-04 DIAGNOSIS — I50.22 CHRONIC SYSTOLIC CONGESTIVE HEART FAILURE (HCC): ICD-10-CM

## 2020-09-04 DIAGNOSIS — I50.84 CHF (NYHA CLASS IV, ACC/AHA STAGE D) (HCC): ICD-10-CM

## 2020-09-04 DIAGNOSIS — G47.33 OBSTRUCTIVE SLEEP APNEA SYNDROME: ICD-10-CM

## 2020-09-04 DIAGNOSIS — I25.10 CORONARY ARTERY DISEASE INVOLVING NATIVE CORONARY ARTERY OF NATIVE HEART WITHOUT ANGINA PECTORIS: ICD-10-CM

## 2020-09-04 DIAGNOSIS — E78.5 HYPERLIPIDEMIA LDL GOAL <100: ICD-10-CM

## 2020-09-04 DIAGNOSIS — I48.20 CHRONIC ATRIAL FIBRILLATION (HCC): ICD-10-CM

## 2020-09-04 DIAGNOSIS — Z00.00 MEDICARE ANNUAL WELLNESS VISIT, SUBSEQUENT: Primary | ICD-10-CM

## 2020-09-04 DIAGNOSIS — I25.5 ISCHEMIC CARDIOMYOPATHY: ICD-10-CM

## 2020-09-04 DIAGNOSIS — I10 ESSENTIAL HYPERTENSION: ICD-10-CM

## 2020-09-04 DIAGNOSIS — R41.3 MEMORY IMPAIRMENT: ICD-10-CM

## 2020-09-04 DIAGNOSIS — F51.01 PRIMARY INSOMNIA: ICD-10-CM

## 2020-09-04 DIAGNOSIS — C44.92 SQUAMOUS CELL CARCINOMA OF SKIN: ICD-10-CM

## 2020-09-04 DIAGNOSIS — D63.1 ANEMIA DUE TO CHRONIC KIDNEY DISEASE, UNSPECIFIED CKD STAGE: ICD-10-CM

## 2020-09-04 DIAGNOSIS — N18.9 ANEMIA DUE TO CHRONIC KIDNEY DISEASE, UNSPECIFIED CKD STAGE: ICD-10-CM

## 2020-09-04 DIAGNOSIS — E66.01 MORBIDLY OBESE (HCC): ICD-10-CM

## 2020-09-04 LAB
ALBUMIN SERPL-MCNC: 3.7 G/DL (ref 3.5–5.2)
ALBUMIN/GLOB SERPL: 1.1 G/DL
ALP SERPL-CCNC: 133 U/L (ref 39–117)
ALT SERPL W P-5'-P-CCNC: 19 U/L (ref 1–33)
ANION GAP SERPL CALCULATED.3IONS-SCNC: 11.2 MMOL/L (ref 5–15)
AST SERPL-CCNC: 17 U/L (ref 1–32)
BASOPHILS # BLD AUTO: 0.05 10*3/MM3 (ref 0–0.2)
BASOPHILS NFR BLD AUTO: 0.7 % (ref 0–1.5)
BILIRUB SERPL-MCNC: 0.5 MG/DL (ref 0–1.2)
BUN SERPL-MCNC: 75 MG/DL (ref 8–23)
BUN/CREAT SERPL: 34.4 (ref 7–25)
CALCIUM SPEC-SCNC: 9.2 MG/DL (ref 8.6–10.5)
CHLORIDE SERPL-SCNC: 106 MMOL/L (ref 98–107)
CHOLEST SERPL-MCNC: 103 MG/DL (ref 0–200)
CO2 SERPL-SCNC: 24.8 MMOL/L (ref 22–29)
CREAT SERPL-MCNC: 2.18 MG/DL (ref 0.57–1)
DEPRECATED RDW RBC AUTO: 44.5 FL (ref 37–54)
EOSINOPHIL # BLD AUTO: 0.25 10*3/MM3 (ref 0–0.4)
EOSINOPHIL NFR BLD AUTO: 3.3 % (ref 0.3–6.2)
ERYTHROCYTE [DISTWIDTH] IN BLOOD BY AUTOMATED COUNT: 13.1 % (ref 12.3–15.4)
GFR SERPL CREATININE-BSD FRML MDRD: 21 ML/MIN/1.73
GLOBULIN UR ELPH-MCNC: 3.3 GM/DL
GLUCOSE SERPL-MCNC: 145 MG/DL (ref 65–99)
HCT VFR BLD AUTO: 40.6 % (ref 34–46.6)
HDLC SERPL-MCNC: 38 MG/DL (ref 40–60)
HGB BLD-MCNC: 12.9 G/DL (ref 12–15.9)
IMM GRANULOCYTES # BLD AUTO: 0.02 10*3/MM3 (ref 0–0.05)
IMM GRANULOCYTES NFR BLD AUTO: 0.3 % (ref 0–0.5)
LDLC SERPL CALC-MCNC: 48 MG/DL (ref 0–100)
LDLC/HDLC SERPL: 1.26 {RATIO}
LYMPHOCYTES # BLD AUTO: 1.7 10*3/MM3 (ref 0.7–3.1)
LYMPHOCYTES NFR BLD AUTO: 22.8 % (ref 19.6–45.3)
MCH RBC QN AUTO: 29.1 PG (ref 26.6–33)
MCHC RBC AUTO-ENTMCNC: 31.8 G/DL (ref 31.5–35.7)
MCV RBC AUTO: 91.6 FL (ref 79–97)
MONOCYTES # BLD AUTO: 0.72 10*3/MM3 (ref 0.1–0.9)
MONOCYTES NFR BLD AUTO: 9.6 % (ref 5–12)
NEUTROPHILS NFR BLD AUTO: 4.73 10*3/MM3 (ref 1.7–7)
NEUTROPHILS NFR BLD AUTO: 63.3 % (ref 42.7–76)
NRBC BLD AUTO-RTO: 0 /100 WBC (ref 0–0.2)
PLATELET # BLD AUTO: 221 10*3/MM3 (ref 140–450)
PMV BLD AUTO: 12.6 FL (ref 6–12)
POTASSIUM SERPL-SCNC: 4.8 MMOL/L (ref 3.5–5.2)
PROT SERPL-MCNC: 7 G/DL (ref 6–8.5)
RBC # BLD AUTO: 4.43 10*6/MM3 (ref 3.77–5.28)
SODIUM SERPL-SCNC: 142 MMOL/L (ref 136–145)
TRIGL SERPL-MCNC: 86 MG/DL (ref 0–150)
VLDLC SERPL-MCNC: 17.2 MG/DL (ref 5–40)
WBC # BLD AUTO: 7.47 10*3/MM3 (ref 3.4–10.8)

## 2020-09-04 PROCEDURE — 80053 COMPREHEN METABOLIC PANEL: CPT | Performed by: INTERNAL MEDICINE

## 2020-09-04 PROCEDURE — 80061 LIPID PANEL: CPT | Performed by: INTERNAL MEDICINE

## 2020-09-04 PROCEDURE — G0439 PPPS, SUBSEQ VISIT: HCPCS | Performed by: INTERNAL MEDICINE

## 2020-09-04 PROCEDURE — 85025 COMPLETE CBC W/AUTO DIFF WBC: CPT | Performed by: INTERNAL MEDICINE

## 2020-09-04 NOTE — PROGRESS NOTES
The ABCs of the Annual Wellness Visit  Subsequent Medicare Wellness Visit    Chief Complaint   Patient presents with   • Medicare Wellness-subsequent       Subjective   History of Present Illness:  Karla Cristobal is a 85 y.o. female who presents for a Subsequent Medicare Wellness Visit.    HEALTH RISK ASSESSMENT    Recent Hospitalizations:  No hospitalization(s) within the last year.    Current Medical Providers:  Patient Care Team:  Giselle Guzman DO as PCP - General  Giselle Guzman DO as PCP - Claims Attributed    Smoking Status:  Social History     Tobacco Use   Smoking Status Never Smoker   Smokeless Tobacco Never Used       Alcohol Consumption:  Social History     Substance and Sexual Activity   Alcohol Use No       Depression Screen:   PHQ-2/PHQ-9 Depression Screening 7/2/2019   Little interest or pleasure in doing things 0   Feeling down, depressed, or hopeless 0   Total Score 0       Fall Risk Screen:  STEADI Fall Risk Assessment has not been completed.    Health Habits and Functional and Cognitive Screening:  Functional & Cognitive Status 4/3/2019   Do you have difficulty preparing food and eating? No   Do you have difficulty bathing yourself, getting dressed or grooming yourself? No   Do you have difficulty using the toilet? No   Do you have difficulty moving around from place to place? No   Do you have trouble with steps or getting out of a bed or a chair? No   Current Diet Well Balanced Diet   Dental Exam Up to date   Eye Exam Up to date   Exercise (times per week) 0 times per week   Current Exercise Activities Include None   Do you need help using the phone?  No   Are you deaf or do you have serious difficulty hearing?  No   Do you need help with transportation? Yes   Do you need help shopping? Yes   Do you need help preparing meals?  Yes   Do you need help with housework?  Yes   Do you need help with laundry? Yes   Do you need help taking your medications? No   Do you need help managing money?  No   Do you ever drive or ride in a car without wearing a seat belt? No   Have you felt unusual stress, anger or loneliness in the last month? No   Who do you live with? (No Data)   If you need help, do you have trouble finding someone available to you? No   Have you been bothered in the last four weeks by sexual problems? No   Do you have difficulty concentrating, remembering or making decisions? No         Does the patient have evidence of cognitive impairment? No    Asprin use counseling:Taking ASA appropriately as indicated    Age-appropriate Screening Schedule:  Refer to the list below for future screening recommendations based on patient's age, sex and/or medical conditions. Orders for these recommended tests are listed in the plan section. The patient has been provided with a written plan.    Health Maintenance   Topic Date Due   • MAMMOGRAM  04/29/2016   • ZOSTER VACCINE (2 of 2) 05/08/2017   • INFLUENZA VACCINE  08/01/2020   • DIABETIC EYE EXAM  10/30/2020   • LIPID PANEL  12/16/2020   • URINE MICROALBUMIN  12/16/2020   • HEMOGLOBIN A1C  02/11/2021   • TDAP/TD VACCINES (2 - Td) 11/29/2027          The following portions of the patient's history were reviewed and updated as appropriate: allergies, current medications, past family history, past medical history, past social history, past surgical history and problem list.    Outpatient Medications Prior to Visit   Medication Sig Dispense Refill   • aspirin (ASPIRIN LOW DOSE) 81 MG tablet Take 81 mg by mouth Every Night.     • atorvastatin (LIPITOR) 20 MG tablet Take 1 tablet by mouth Every Night. 90 tablet 1   • carvedilol (COREG) 25 MG tablet Take 1/2 tablet by mouth twice a day (Patient taking differently: Take 12.5 mg by mouth 2 (Two) Times a Day With Meals.) 90 tablet 3   • cholecalciferol (VITAMIN D3) 1000 UNITS tablet Take 1,000 Units by mouth Daily.     • ciprofloxacin (CIPRO) 500 MG tablet Take 1 tablet by mouth Daily for 7 days. 7 tablet 0   •  eszopiclone (LUNESTA) 2 MG tablet TAKE ONE TABLET BY MOUTH IMMEDIATELY BEFORE BEDTIME AS NEEDED FOR SLEEP 30 tablet 0   • furosemide (LASIX) 40 MG tablet Take 40 mg by mouth As Needed.     • indapamide (LOZOL) 2.5 MG tablet Take 2.5 mg by mouth Every Morning.     • insulin aspart (NOVOLOG) 100 UNIT/ML injection Inject 15 Units under the skin into the appropriate area as directed 3 (Three) Times a Day Before Meals. 20 mL 2   • Insulin Glargine (BASAGLAR KWIKPEN) 100 UNIT/ML injection pen Inject 20 units subQ once per day (Patient taking differently: Inject 20 Units under the skin into the appropriate area as directed Every Night. Inject 20 units subQ once per day) 7 pen 5   • isosorbide mononitrate (IMDUR) 30 MG 24 hr tablet Take 1 tablet by mouth Daily. (Patient taking differently: Take 30 mg by mouth Every Morning.) 90 tablet 0   • latanoprost (XALATAN) 0.005 % ophthalmic solution Administer 1 drop to both eyes Every Night.  5   • levothyroxine (SYNTHROID, LEVOTHROID) 100 MCG tablet Take 1 tablet by mouth Every Morning. 90 tablet 0   • melatonin 5 MG tablet tablet Take 5 mg by mouth Every Night.     • multivitamins-minerals (PRESERVISION AREDS 2) capsule capsule Take 1 capsule by mouth 2 (Two) Times a Day.     • mupirocin (BACTROBAN) 2 % ointment 1 application As Needed.     • ondansetron (ZOFRAN) 4 MG tablet Take 4 mg by mouth Every 12 (Twelve) Hours As Needed.     • spironolactone (ALDACTONE) 25 MG tablet Take 1 tablet by mouth Daily. 90 tablet 1   • timolol (TIMOPTIC) 0.5 % ophthalmic solution Administer 1 drop to both eyes 2 (Two) Times a Day.     • warfarin (Coumadin) 2 MG tablet Take 2 mg by mouth Daily.       No facility-administered medications prior to visit.        Patient Active Problem List   Diagnosis   • Anemia due to chronic kidney disease   • Chronic atrial fibrillation (CMS/HCC)   • CKD (chronic kidney disease), stage IV (CMS/HCC)   • Diabetes mellitus (CMS/HCC)   • Diabetic nephropathy (CMS/HCC)    • Diabetic retinopathy (CMS/HCC)   • Malignant neoplasm of endometrium (CMS/HCC)   • Hyperlipidemia LDL goal <100   • Essential hypertension   • Hypothyroidism   • Insomnia   • Generalized ischemic myocardial dysfunction   • Leukocytosis   • Memory impairment   • Nausea   • Pulmonary embolism (CMS/HCC)   • Renal insufficiency   • Sleep apnea   • Squamous cell carcinoma of skin   • CHF (NYHA class IV, ACC/AHA stage D) (CMS/HCC)   • Ischemic heart disease   • CAD (coronary artery disease)   • DVT of lower extremity (deep venous thrombosis) (CMS/HCC)   • Chronic systolic congestive heart failure (CMS/HCC)   • Morbidly obese (CMS/HCC)   • Renal insufficiency syndrome   • Coronary atherosclerosis   • Ischemic cardiomyopathy       Advanced Care Planning:  ACP discussion was held with the patient during this visit. Patient does not have an advance directive, information provided.    Review of Systems   Constitutional: Negative for activity change, appetite change, chills, diaphoresis, fatigue, fever and unexpected weight change.   HENT: Negative for congestion, ear discharge, ear pain, mouth sores, nosebleeds, sinus pressure, sneezing and sore throat.    Eyes: Negative for pain, discharge and itching.   Respiratory: Positive for shortness of breath (exertion only,chronic). Negative for cough, chest tightness and wheezing.    Cardiovascular: Positive for leg swelling. Negative for chest pain and palpitations.   Gastrointestinal: Negative for abdominal pain, constipation, diarrhea, nausea and vomiting.   Endocrine: Negative for cold intolerance, heat intolerance, polydipsia and polyphagia.   Genitourinary: Negative for dysuria, flank pain, frequency, hematuria and urgency.   Musculoskeletal: Positive for arthralgias. Negative for back pain, gait problem, myalgias, neck pain and neck stiffness.   Skin: Negative for color change, pallor and rash.   Neurological: Negative for seizures, speech difficulty, numbness and  headaches.   Psychiatric/Behavioral: Negative for agitation, confusion, decreased concentration and sleep disturbance. The patient is not nervous/anxious.        Compared to one year ago, the patient feels her physical health is the same.  Compared to one year ago, the patient feels her mental health is the same.    Reviewed chart for potential of high risk medication in the elderly: yes  Reviewed chart for potential of harmful drug interactions in the elderly:yes    Objective       There were no vitals filed for this visit.    There is no height or weight on file to calculate BMI.  Discussed the patient's BMI with her. The BMI is above average; BMI management plan is completed.    Physical Exam    Lab Results   Component Value Date    HGBA1C 6.0 08/11/2020    HGBA1C 6.10 (H) 07/14/2020        Assessment/Plan   Medicare Risks and Personalized Health Plan  CMS Preventative Services Quick Reference  Obesity/Overweight     The above risks/problems have been discussed with the patient.  Pertinent information has been shared with the patient in the After Visit Summary.  Follow up plans and orders are seen below in the Assessment/Plan Section.    Diagnoses and all orders for this visit:    1. Medicare annual wellness visit, subsequent (Primary)  Done today  2. Coronary artery disease involving native coronary artery of native heart without angina pectoris  On ASA and statin  3. CHF (NYHA class IV, ACC/AHA stage D) (CMS/[facility])  Stable on lasix  4. Chronic atrial fibrillation (CMS/[facility])  carvediol  5. Chronic systolic congestive heart failure (CMS/[facility])  Stable on carvediol and lasix  6. Essential hypertension  -     Comprehensive Metabolic Panel; Future  -     CBC & Differential; Future    7. Hyperlipidemia LDL goal <100  -     Lipid Panel; Future  -     Comprehensive Metabolic Panel; Future  Stable on lipitor  8. Ischemic cardiomyopathy  stable  9. Obstructive sleep apnea syndrome  stable  10. Morbidly obese (CMS/[facility])  Weight  loss  11. Squamous cell carcinoma of skin  Follows with derm  12. CKD (chronic kidney disease), stage IV (CMS/HCC)  Cmp, follows with Nephrology  13. Anemia due to chronic kidney disease, unspecified CKD stage  cbc  14. Primary insomnia  Stable on lunesta  15. Memory impairment  stable    Follow Up:  No follow-ups on file.     An After Visit Summary and PPPS were given to the patient.

## 2020-09-23 DIAGNOSIS — F51.01 PRIMARY INSOMNIA: ICD-10-CM

## 2020-09-24 RX ORDER — ESZOPICLONE 2 MG/1
TABLET, FILM COATED ORAL
Qty: 30 TABLET | Refills: 0 | Status: SHIPPED | OUTPATIENT
Start: 2020-09-24 | End: 2020-10-28

## 2020-10-15 RX ORDER — LEVOTHYROXINE SODIUM 0.1 MG/1
TABLET ORAL
Qty: 90 TABLET | Refills: 1 | Status: SHIPPED | OUTPATIENT
Start: 2020-10-15 | End: 2021-04-14

## 2020-10-19 NOTE — PROGRESS NOTES
[facility]   OFFICE NOTE      Karla Cristobal  1935  PCP: Giselle Guzman DO    SUBJECTIVE:   Krala Cristobal is a 85 y.o. female seen for a follow up visit regarding the following:     CC:ICM    HPI:   Pleasant 85-year-old female returns today follow-up regarding ischemic cardiomyopathy, chronic class III systolic heart failure, permanent atrial fibrillation, and Saint Jamel biventricular ICD device.  She recently had a generator change 3 months ago.  Since since her device is healed well.  She is not have any worsening heart failure symptoms.  She denies any recent hospitalizations.  She denies any strokelike symptoms.  She is tolerating her Coumadin states her INR levels have been stable.  She did have a slight mechanical fall and injured her back she is little uncomfortable but this seems to get better over the past week.  Otherwise she states she is doing well.    Cardiac PMH: (Old records have been reviewed and summarized below)  1. Ischemic heart disease:  a. History of remote myocardial infarction/CABG x3, [facility] (database insufficient).   b. Left heart catheterization by Dr. Basilio Grossman, 2007, revealing patent LIMA graft/medical therapy, LVEF 30%.  c. Echocardiogram, [facility], 2007: LVEF 25%; moderate/severe mitral regurgitation.  d. Upgrade of pacemaker to St. Jamel BI-V pacemaker per Dr. Hammonds on 10/25/2007.  e. Reported negative Cardiolite stress test in 2010, [facility], Dr. Basilio Grossman (database insufficient).  f. Reported echocardiogram, October 2011, [facility] (database insufficient).   g. Echocardiogram, 08/29/2012: LVEF 35% to 40%, mild mitral regurgitation.  h. History of MRSA pacemaker infection, 2005 (database insufficient).   i. St. Jamel pacemaker, 2005, with upgrade to a St. Jamel BI-V ICD device in 2007, Dr. Hammonds.   j. CRT-D generator exchange by Tano Beal, 09/21/2012, St. Jamel device  (Serial# [id number]).  k. Persistent ischemic cardiomyopathy.   l. Echocardiogram, 10/20/2015: LVEF 35% to 40%, moderate mitral regurgitation/tricuspid regurgitation; RVSP 45 mmHg.   2. Chronic class III systolic heart failure.  3. History of persistent atrial fibrillation.  a. CHADS2 score equal to 4.   b. Chronic Coumadin therapy managed by Bk Mckeon’s office.   4. Pericardial effusion, 2008.  5. History of frequent urinary tract infections.  6. Hypothyroidism.  7. Insulin-dependent diabetes mellitus, type 2.  8. Chronic kidney disease, stage 4, followed by Bk Mckeon.   9. Dyslipidemia.  10. Hypertension.  11. Home O2 nightly/sleep apnea.  12. History of endometrial cancer, status post partial hysterectomy in 2008 with follow up radiation therapy followed by Dr. Sosa.   13. History of multiple left hip surgeries most recently in 2011 at [facility]       Past Medical History, Past Surgical History, Family history, Social History, and Medications were all reviewed with the patient today and updated as necessary.       Current Outpatient Medications:   •  aspirin (ASPIRIN LOW DOSE) 81 MG tablet, Take 81 mg by mouth Every Night., Disp: , Rfl:   •  atorvastatin (LIPITOR) 20 MG tablet, Take 1 tablet by mouth Every Night., Disp: 90 tablet, Rfl: 1  •  carvedilol (COREG) 25 MG tablet, Take 1/2 tablet by mouth twice a day (Patient taking differently: Take 12.5 mg by mouth 2 (Two) Times a Day With Meals.), Disp: 90 tablet, Rfl: 3  •  cholecalciferol (VITAMIN D3) 1000 UNITS tablet, Take 1,000 Units by mouth Daily., Disp: , Rfl:   •  eszopiclone (LUNESTA) 2 MG tablet, TAKE ONE TABLET BY MOUTH IMMEDIATELY BEFORE BEDTIME AS NEEDED FOR SLEEP, Disp: 30 tablet, Rfl: 0  •  furosemide (LASIX) 40 MG tablet, Take 40 mg by mouth As Needed., Disp: , Rfl:   •  indapamide (LOZOL) 2.5 MG tablet, Take 2.5 mg by mouth Every Morning., Disp: , Rfl:   •  insulin aspart (NOVOLOG) 100 UNIT/ML injection, Inject 15 Units  under the skin into the appropriate area as directed 3 (Three) Times a Day Before Meals., Disp: 20 mL, Rfl: 2  •  Insulin Glargine (BASAGLAR KWIKPEN) 100 UNIT/ML injection pen, Inject 20 units subQ once per day (Patient taking differently: Inject 20 Units under the skin into the appropriate area as directed Every Night. Inject 20 units subQ once per day), Disp: 7 pen, Rfl: 5  •  isosorbide mononitrate (IMDUR) 30 MG 24 hr tablet, Take 1 tablet by mouth Daily. (Patient taking differently: Take 30 mg by mouth Every Morning.), Disp: 90 tablet, Rfl: 0  •  latanoprost (XALATAN) 0.005 % ophthalmic solution, Administer 1 drop to both eyes Every Night., Disp: , Rfl: 5  •  levothyroxine (SYNTHROID, LEVOTHROID) 100 MCG tablet, TAKE ONE TABLET BY MOUTH IN THE MORNING , Disp: 90 tablet, Rfl: 1  •  melatonin 5 MG tablet tablet, Take 5 mg by mouth Every Night., Disp: , Rfl:   •  multivitamins-minerals (PRESERVISION AREDS 2) capsule capsule, Take 1 capsule by mouth 2 (Two) Times a Day., Disp: , Rfl:   •  mupirocin (BACTROBAN) 2 % ointment, 1 application As Needed., Disp: , Rfl:   •  ondansetron (ZOFRAN) 4 MG tablet, Take 4 mg by mouth Every 12 (Twelve) Hours As Needed., Disp: , Rfl:   •  spironolactone (ALDACTONE) 25 MG tablet, Take 1 tablet by mouth Daily. (Patient taking differently: Take 25 mg by mouth Daily. 1/2 tablet 12.5mg), Disp: 90 tablet, Rfl: 1  •  timolol (TIMOPTIC) 0.5 % ophthalmic solution, Administer 1 drop to both eyes 2 (Two) Times a Day., Disp: , Rfl:   •  warfarin (Coumadin) 2 MG tablet, Take 2 mg by mouth Daily., Disp: , Rfl:       No Known Allergies  Patient Active Problem List   Diagnosis   • Anemia due to chronic kidney disease   • Chronic atrial fibrillation (CMS/HCC)   • CKD (chronic kidney disease), stage IV (CMS/HCC)   • Diabetes mellitus (CMS/HCC)   • Diabetic nephropathy (CMS/HCC)   • Diabetic retinopathy (CMS/HCC)   • Malignant neoplasm of endometrium (CMS/HCC)   • Hyperlipidemia LDL goal <100   •  Essential hypertension   • Hypothyroidism   • Insomnia   • Generalized ischemic myocardial dysfunction   • Leukocytosis   • Memory impairment   • Nausea   • Pulmonary embolism (CMS/[facility])   • Renal insufficiency   • Sleep apnea   • Squamous cell carcinoma of skin   • CHF (NYHA class IV, ACC/AHA stage D) (CMS/[facility])   • Ischemic heart disease   • CAD (coronary artery disease)   • DVT of lower extremity (deep venous thrombosis) (CMS/[facility])   • Chronic systolic congestive heart failure (CMS/[facility])   • Morbidly obese (CMS/[facility])   • Renal insufficiency syndrome   • Coronary atherosclerosis   • Ischemic cardiomyopathy     Past Medical History:   Diagnosis Date   • Acute bronchitis    • Arthritis    • Atrial fibrillation (CMS/[facility])    • CAD (coronary artery disease)     s/p MI 2005; 3 stents inserted    • Change in mole    • Change in mole    • Change in nevus 6/16/2016   • Chronic kidney disease     stage IV-sees Dr Bk Mckeon every 3-4 months    • Chronic renal disease, stage 4, severely decreased glomerular filtration rate between 15-29 mL/min/1.73 square meter (CMS/[facility])    • Chronic systolic heart failure (CMS/[facility])    • Congestive heart disease (CMS/[facility])    • Conjunctivitis    • Deep vein thrombosis of lower extremity (CMS/[facility])    • Diabetes mellitus (CMS/[facility])    • Diabetes mellitus (CMS/[facility]) 6/16/2016    Impression: 03/15/2016 - blood sugar 166 and hgn a1c 7.3%  is up to date with eye exam  neuropathy with callus, no ulcer  some scratches feet  ur alb due and ordered  no change other than provided samples of toujeo because she feels like lantus worked much better than levemir, she has tried titrating levemir and it is less effective  continue testing and f/u 3-4 months  Impression: 11/23/2015 - bl   • Diabetic foot ulcer (CMS/[facility]) 6/16/2016   • Dog bite of hand    • Easy bruising    • Endometrial cancer (CMS/[facility])     partial hysterectomy; radiation as well    • Foot pain    • Foot pain 6/16/2016    Description: lancinating-  worse but not consistent enough to want rx   • Fracture of hip (CMS/[facility])    • Glaucoma     drops daily    • Hyperlipidemia    • Hypertension    • Hypothyroidism    • Insomnia    • Ischemic heart disease    • Macular degeneration    • Methicillin resistant Staphylococcus aureus infection 6/16/2016   • Myocardial infarction (CMS/[facility]) 2005   • Nausea with vomiting    • Pericardial effusion    • Shortness of breath 6/16/2016    Impression: 03/24/2015 - chronic. On 2 l oxygen at night. check cbc, bnp;    • Skin cancer, basal cell     nose, leg    • Skin lesion 6/16/2016    Impression: 03/24/2015 - refer derm for eval- seb kerat ant tib and ?ak medially with redness and irritation;    • Sleep apnea     oxygen at night due to low sats but no cpap   • UTI (urinary tract infection) 06/12/2020    currently taking antibiotics-patient's daughter notified Dr Beal's office and office said it should not delay generator change      Past Surgical History:   Procedure Laterality Date   • CARDIAC CATHETERIZATION     • CARDIAC DEFIBRILLATOR PLACEMENT     • CARDIAC ELECTROPHYSIOLOGY PROCEDURE N/A 7/17/2020    Procedure: ICD BIV  generator change- [facility]- 3-6 weeks;  Surgeon: Tano Beal MD;  Location: [facility] INVASIVE LOCATION;  Service: Cardiology;  Laterality: N/A;   • CHOLECYSTECTOMY     • CORONARY ARTERY BYPASS GRAFT  2000   • CORONARY STENT PLACEMENT      3 stents    • EYE SURGERY Bilateral     cataract extraction    • HIP SURGERY Left     x3   • HYSTERECTOMY      partial    • KNEE ARTHROPLASTY Bilateral    • PACEMAKER IMPLANTATION     • TONSILLECTOMY       Family History   Problem Relation Age of Onset   • Breast cancer Other    • Diabetes Other    • Esophageal cancer Other    • Hypertension Other    • Transient ischemic attack Other    • Breast cancer Mother    • Cancer Father      Social History     Tobacco Use   • Smoking status: Never Smoker   • Smokeless tobacco: Never Used   Substance Use Topics   • Alcohol use:  "No       ROS:  Review of Symptoms:  General:, weakness or fatigue  Skin: no rashes, lumps, or other skin changes  HEENT: no dizziness, lightheadedness, or vision changes  Respiratory: no cough or hemoptysis  Cardiovascular: no palpitations, and tachycardia  Gastrointestinal: no black/tarry stools or diarrhea  Urinary: no change in frequency or urgency  Peripheral Vascular: no claudication or leg cramps  Musculoskeletal: Back pain  Psychiatric: no depression or excessive stress  Neurological: no sensory or motor loss, no syncope  Hematologic: no anemia, easy bruising or bleeding  Endocrine: no thyroid problems, nor heat or cold intolerance    PHYSICAL EXAM:    /60 (BP Location: Right arm, Patient Position: Sitting)   Pulse 77   Ht 165.1 cm (65\")   Wt 98.2 kg (216 lb 9.6 oz)   LMP  (LMP Unknown) Comment: last mammogram -unsure   SpO2 96%   BMI 36.04 kg/m²        Wt Readings from Last 5 Encounters:   10/20/20 98.2 kg (216 lb 9.6 oz)   09/04/20 97.1 kg (214 lb)   08/11/20 97.1 kg (214 lb)   07/17/20 98 kg (216 lb 0.8 oz)   07/13/20 98.5 kg (217 lb 3.2 oz)       BP Readings from Last 5 Encounters:   10/20/20 110/60   09/04/20 110/78   08/29/20 121/64   08/11/20 124/64   07/17/20 100/84       General appearance - Alert, well appearing, and in no distress   Mental status - Affect appropriate to mood.  Eyes - Sclerae anicteric,  ENMT - Hearing grossly normal bilaterally, Dental hygiene good.  Neck - Carotids upstroke normal bilaterally, no bruits, no JVD.  Resp - Clear to auscultation, no wheezes, rales or rhonchi, symmetric air entry.  Heart - Normal rate, regular rhythm, normal S1, S2, no murmurs, rubs, clicks or gallops.  GI - Soft, nontender, nondistended, no masses or organomegaly.  Neurological - Grossly intact - normal speech, no focal findings  Musculoskeletal - No joint tenderness, deformity or swelling, no muscular tenderness noted.  Extremities - Peripheral pulses normal, no pedal edema, no clubbing " or cyanosis.  Skin - Normal coloration and turgor.  Psych -  oriented to person, place, and time.    Medical problems and test results were reviewed with the patient today.     No results found for this or any previous visit (from the past 672 hour(s)).      EKG: (EKG has been independently visualized by me and summarized below)    ECG 12 Lead    Date/Time: 10/20/2020 3:50 PM  Performed by: Terrence Byrnes PA  Authorized by: Terrence Byrnes PA   Comparison: not compared with previous ECG   Rhythm: atrial fibrillation and paced  Rate: normal  Conduction: conduction normal  QRS axis: normal  Other findings: non-specific ST-T wave changes            Device Interrogation:  Please see device interrogation form which has been signed and updated for full details.  Saint Jamel biventricular ICD.  VVIR at 70.  RV LV paced 9 9%.  R wave 6.6 mV.  Acceptable thresholds and impedances.  Charge impedance 42 ohms.  Battery voltage 94% 6.5 years remaining.  Turned off A. fib alerts.    ASSESSMENT   1. Permanent Afib, Asymptomatic rate controlled.  2. Chronic SHF, Class II  3. CKD stage IV  4. Anticoagulation: Chadsvasc=7, Coumadin-Stable INR's  5. BIVICD: Normal function.     PLAN  · Continue medical therapy  · Return for follow-up in 9 months [facility] or sooner as needed    10/20/2020  15:48 EDT    Will Soniya WALLACE

## 2020-10-20 ENCOUNTER — OFFICE VISIT (OUTPATIENT)
Dept: CARDIOLOGY | Facility: CLINIC | Age: 85
End: 2020-10-20

## 2020-10-20 VITALS
OXYGEN SATURATION: 96 % | BODY MASS INDEX: 36.09 KG/M2 | DIASTOLIC BLOOD PRESSURE: 60 MMHG | SYSTOLIC BLOOD PRESSURE: 110 MMHG | HEART RATE: 77 BPM | HEIGHT: 65 IN | WEIGHT: 216.6 LBS

## 2020-10-20 DIAGNOSIS — I10 ESSENTIAL HYPERTENSION: ICD-10-CM

## 2020-10-20 DIAGNOSIS — I48.20 CHRONIC ATRIAL FIBRILLATION (HCC): Primary | ICD-10-CM

## 2020-10-20 DIAGNOSIS — I25.5 ISCHEMIC CARDIOMYOPATHY: ICD-10-CM

## 2020-10-20 DIAGNOSIS — I25.9 ISCHEMIC HEART DISEASE: ICD-10-CM

## 2020-10-20 PROCEDURE — 93283 PRGRMG EVAL IMPLANTABLE DFB: CPT | Performed by: PHYSICIAN ASSISTANT

## 2020-10-20 PROCEDURE — 99213 OFFICE O/P EST LOW 20 MIN: CPT | Performed by: PHYSICIAN ASSISTANT

## 2020-10-28 DIAGNOSIS — F51.01 PRIMARY INSOMNIA: ICD-10-CM

## 2020-10-28 RX ORDER — ESZOPICLONE 2 MG/1
TABLET, FILM COATED ORAL
Qty: 30 TABLET | Refills: 0 | Status: SHIPPED | OUTPATIENT
Start: 2020-10-28 | End: 2020-11-25

## 2020-11-25 DIAGNOSIS — F51.01 PRIMARY INSOMNIA: ICD-10-CM

## 2020-11-25 RX ORDER — ESZOPICLONE 2 MG/1
TABLET, FILM COATED ORAL
Qty: 30 TABLET | Refills: 2 | Status: SHIPPED | OUTPATIENT
Start: 2020-11-25 | End: 2021-02-23

## 2020-11-25 NOTE — TELEPHONE ENCOUNTER
Last Office Visit: 09/04/20  Next Office Visit:03/10/21    Labs completed in past 6 months? yes      Last Refill Date:10/28/20  Quantity:30  Refills:0

## 2020-12-07 RX ORDER — INSULIN GLARGINE 100 [IU]/ML
20 INJECTION, SOLUTION SUBCUTANEOUS NIGHTLY
Qty: 15 ML | Refills: 0 | Status: SHIPPED | OUTPATIENT
Start: 2020-12-07 | End: 2021-01-07

## 2021-01-01 ENCOUNTER — OFFICE VISIT (OUTPATIENT)
Dept: CARDIOLOGY | Facility: CLINIC | Age: 86
End: 2021-01-01

## 2021-01-01 VITALS
WEIGHT: 196 LBS | OXYGEN SATURATION: 97 % | SYSTOLIC BLOOD PRESSURE: 124 MMHG | HEIGHT: 64 IN | DIASTOLIC BLOOD PRESSURE: 58 MMHG | BODY MASS INDEX: 33.46 KG/M2 | HEART RATE: 74 BPM

## 2021-01-01 DIAGNOSIS — N18.4 CKD (CHRONIC KIDNEY DISEASE), STAGE IV (HCC): ICD-10-CM

## 2021-01-01 DIAGNOSIS — I48.21 PERMANENT ATRIAL FIBRILLATION (HCC): Primary | ICD-10-CM

## 2021-01-01 DIAGNOSIS — I50.22 CHRONIC SYSTOLIC CONGESTIVE HEART FAILURE (HCC): ICD-10-CM

## 2021-01-01 DIAGNOSIS — I25.5 ISCHEMIC CARDIOMYOPATHY: ICD-10-CM

## 2021-01-01 DIAGNOSIS — I44.30 AVB (ATRIOVENTRICULAR BLOCK): ICD-10-CM

## 2021-01-01 PROCEDURE — 99213 OFFICE O/P EST LOW 20 MIN: CPT | Performed by: INTERNAL MEDICINE

## 2021-01-01 PROCEDURE — 93284 PRGRMG EVAL IMPLANTABLE DFB: CPT | Performed by: INTERNAL MEDICINE

## 2021-01-06 ENCOUNTER — TELEPHONE (OUTPATIENT)
Dept: ENDOCRINOLOGY | Facility: CLINIC | Age: 86
End: 2021-01-06

## 2021-01-06 NOTE — TELEPHONE ENCOUNTER
Yes- if we have some ok to put in fridge with her name on it and let her know she can come by to   Thank you!

## 2021-01-07 RX ORDER — INSULIN GLARGINE 100 [IU]/ML
INJECTION, SOLUTION SUBCUTANEOUS
Qty: 15 ML | Refills: 1 | Status: SHIPPED | OUTPATIENT
Start: 2021-01-07 | End: 2022-01-01 | Stop reason: SDUPTHER

## 2021-01-07 NOTE — TELEPHONE ENCOUNTER
Pt advised we currently are out of humalog and novolog samples but she is welcome to call back anytime to check on samples  Pt voiced understanding

## 2021-01-13 PROCEDURE — 87086 URINE CULTURE/COLONY COUNT: CPT | Performed by: NURSE PRACTITIONER

## 2021-02-22 DIAGNOSIS — F51.01 PRIMARY INSOMNIA: ICD-10-CM

## 2021-02-23 RX ORDER — ESZOPICLONE 2 MG/1
TABLET, FILM COATED ORAL
Qty: 30 TABLET | Refills: 0 | Status: SHIPPED | OUTPATIENT
Start: 2021-02-23 | End: 2021-03-24

## 2021-02-23 NOTE — TELEPHONE ENCOUNTER
Last Office Visit: 09/04/20  Next Office Visit: 03/10/21    Labs completed in past 6 months? no  Labs completed in past year? yes    Last Refill Date: 11/25/20  Quantity:30  Refills:2    Pharmacy:

## 2021-02-27 PROCEDURE — 93295 DEV INTERROG REMOTE 1/2/MLT: CPT | Performed by: INTERNAL MEDICINE

## 2021-02-27 PROCEDURE — 93296 REM INTERROG EVL PM/IDS: CPT | Performed by: INTERNAL MEDICINE

## 2021-03-03 RX ORDER — CARVEDILOL 25 MG/1
12.5 TABLET ORAL 2 TIMES DAILY WITH MEALS
Qty: 30 TABLET | Refills: 11 | Status: SHIPPED | OUTPATIENT
Start: 2021-03-03 | End: 2022-01-01 | Stop reason: HOSPADM

## 2021-03-10 ENCOUNTER — OFFICE VISIT (OUTPATIENT)
Dept: ENDOCRINOLOGY | Facility: CLINIC | Age: 86
End: 2021-03-10

## 2021-03-10 ENCOUNTER — OFFICE VISIT (OUTPATIENT)
Dept: INTERNAL MEDICINE | Facility: CLINIC | Age: 86
End: 2021-03-10

## 2021-03-10 VITALS
BODY MASS INDEX: 35.15 KG/M2 | OXYGEN SATURATION: 97 % | SYSTOLIC BLOOD PRESSURE: 110 MMHG | HEART RATE: 70 BPM | DIASTOLIC BLOOD PRESSURE: 78 MMHG | WEIGHT: 211.2 LBS | TEMPERATURE: 96.9 F

## 2021-03-10 VITALS
HEIGHT: 65 IN | HEART RATE: 68 BPM | WEIGHT: 212 LBS | BODY MASS INDEX: 35.32 KG/M2 | SYSTOLIC BLOOD PRESSURE: 106 MMHG | TEMPERATURE: 97.1 F | DIASTOLIC BLOOD PRESSURE: 68 MMHG

## 2021-03-10 DIAGNOSIS — E03.9 ACQUIRED HYPOTHYROIDISM: ICD-10-CM

## 2021-03-10 DIAGNOSIS — E11.9 TYPE 2 DIABETES MELLITUS WITHOUT COMPLICATION, WITH LONG-TERM CURRENT USE OF INSULIN (HCC): Primary | ICD-10-CM

## 2021-03-10 DIAGNOSIS — Z79.4 TYPE 2 DIABETES MELLITUS WITHOUT COMPLICATION, WITH LONG-TERM CURRENT USE OF INSULIN (HCC): Primary | ICD-10-CM

## 2021-03-10 DIAGNOSIS — R54 AGE-RELATED PHYSICAL DEBILITY: Primary | ICD-10-CM

## 2021-03-10 DIAGNOSIS — E78.5 HYPERLIPIDEMIA LDL GOAL <100: ICD-10-CM

## 2021-03-10 DIAGNOSIS — K22.70 BARRETT'S ESOPHAGUS WITHOUT DYSPLASIA: ICD-10-CM

## 2021-03-10 DIAGNOSIS — I10 ESSENTIAL HYPERTENSION: ICD-10-CM

## 2021-03-10 DIAGNOSIS — D64.9 ANEMIA, UNSPECIFIED TYPE: ICD-10-CM

## 2021-03-10 DIAGNOSIS — E78.2 MIXED HYPERLIPIDEMIA: ICD-10-CM

## 2021-03-10 LAB
EXPIRATION DATE: ABNORMAL
EXPIRATION DATE: NORMAL
GLUCOSE BLDC GLUCOMTR-MCNC: 205 MG/DL (ref 70–130)
HBA1C MFR BLD: 6.2 %
Lab: ABNORMAL
Lab: NORMAL

## 2021-03-10 PROCEDURE — 82947 ASSAY GLUCOSE BLOOD QUANT: CPT | Performed by: INTERNAL MEDICINE

## 2021-03-10 PROCEDURE — 99214 OFFICE O/P EST MOD 30 MIN: CPT | Performed by: NURSE PRACTITIONER

## 2021-03-10 PROCEDURE — 99214 OFFICE O/P EST MOD 30 MIN: CPT | Performed by: INTERNAL MEDICINE

## 2021-03-10 PROCEDURE — 83036 HEMOGLOBIN GLYCOSYLATED A1C: CPT | Performed by: INTERNAL MEDICINE

## 2021-03-10 RX ORDER — OMEPRAZOLE 40 MG/1
CAPSULE, DELAYED RELEASE ORAL
COMMUNITY
End: 2022-01-01 | Stop reason: HOSPADM

## 2021-03-10 RX ORDER — APIXABAN 2.5 MG/1
TABLET, FILM COATED ORAL
COMMUNITY
Start: 2021-02-23 | End: 2022-01-01 | Stop reason: HOSPADM

## 2021-03-10 NOTE — PROGRESS NOTES
Chief Complaint   Patient presents with   • Follow-up     6 month        History of Present Illness    85 y.o.female with chronic anemia due to CKD, diabetes type 2, HLD, HTN, hypothyroid, CHF, CAD, PAF presents for 6 month follow up appt.   Had colonoscopy in Feb Dr. Vazquez; had 2 polyps one with focus of cancer. Has appt with Dr. Price oncology. Small spot GI bleed, barretts esophagus and continue prilosec. Is also getting procit with nephrology Dr. Mckeon. eliquis resumed after colonoscopy. Has had her first covid vaccine tolerated ok. Has appt with Dr. Lane later today for endocrine FU. And FU next month with nephrology for updated cbc.  HTN chronic onset years; controlled. On coreg, lasix prn, imdur, aldactone.  HLD: chronic onset years. On statin  PAF on eliquis.  Chronic physical debility. Uses wheelchair for long distances.     Review of Systems   Constitutional: Positive for appetite change and fatigue. Negative for chills and fever.   Respiratory: Positive for shortness of breath. Negative for cough.    Cardiovascular: Positive for leg swelling. Negative for chest pain and palpitations.   Gastrointestinal: Negative for abdominal pain and blood in stool.   Genitourinary: Negative for difficulty urinating.   Musculoskeletal: Positive for arthralgias and gait problem.   Neurological: Negative for dizziness.           PMSFH  The following portions of the patient's history were reviewed and updated as appropriate: allergies, current medications, past family history, past medical history, past social history, past surgical history and problem list.     Past Medical History:   Diagnosis Date   • Acute bronchitis    • Arthritis    • Atrial fibrillation (CMS/HCC)    • CAD (coronary artery disease)     s/p MI 2005; 3 stents inserted    • Change in mole    • Change in mole    • Change in nevus 6/16/2016   • Chronic kidney disease     stage IV-sees Dr Bk Mckeon every 3-4 months    • Chronic renal disease,  stage 4, severely decreased glomerular filtration rate between 15-29 mL/min/1.73 square meter (CMS/[facility])    • Chronic systolic heart failure (CMS/[facility])    • Congestive heart disease (CMS/[facility])    • Conjunctivitis    • Deep vein thrombosis of lower extremity (CMS/[facility])    • Diabetes mellitus (CMS/[facility])    • Diabetes mellitus (CMS/[facility]) 6/16/2016    Impression: 03/15/2016 - blood sugar 166 and hgn a1c 7.3%  is up to date with eye exam  neuropathy with callus, no ulcer  some scratches feet  ur alb due and ordered  no change other than provided samples of toujeo because she feels like lantus worked much better than levemir, she has tried titrating levemir and it is less effective  continue testing and f/u 3-4 months  Impression: 11/23/2015 - bl   • Diabetic foot ulcer (CMS/[facility]) 6/16/2016   • Dog bite of hand    • Easy bruising    • Endometrial cancer (CMS/[facility])     partial hysterectomy; radiation as well    • Foot pain    • Foot pain 6/16/2016    Description: lancinating- worse but not consistent enough to want rx   • Fracture of hip (CMS/[facility])    • Glaucoma     drops daily    • Hyperlipidemia    • Hypertension    • Hypothyroidism    • Insomnia    • Ischemic heart disease    • Macular degeneration    • Methicillin resistant Staphylococcus aureus infection 6/16/2016   • Myocardial infarction (CMS/[facility]) 2005   • Nausea with vomiting    • Pericardial effusion    • Shortness of breath 6/16/2016    Impression: 03/24/2015 - chronic. On 2 l oxygen at night. check cbc, bnp;    • Skin cancer, basal cell     nose, leg    • Skin lesion 6/16/2016    Impression: 03/24/2015 - refer derm for eval- seb kerat ant tib and ?ak medially with redness and irritation;    • Sleep apnea     oxygen at night due to low sats but no cpap   • UTI (urinary tract infection) 06/12/2020    currently taking antibiotics-patient's daughter notified Dr Beal's office and office said it should not delay generator change       Allergies   Allergen Reactions   •  Lisinopril Cough   • Codeine Rash      Social History     Tobacco Use   • Smoking status: Never Smoker   • Smokeless tobacco: Never Used   Substance Use Topics   • Alcohol use: No   • Drug use: No         Current Outpatient Medications:   •  atorvastatin (LIPITOR) 20 MG tablet, Take 1 tablet by mouth Every Night., Disp: 90 tablet, Rfl: 1  •  carvedilol (COREG) 25 MG tablet, Take 0.5 tablets by mouth 2 (Two) Times a Day With Meals., Disp: 30 tablet, Rfl: 11  •  cholecalciferol (VITAMIN D3) 1000 UNITS tablet, Take 1,000 Units by mouth Daily., Disp: , Rfl:   •  Eliquis 2.5 MG tablet tablet, , Disp: , Rfl:   •  eszopiclone (LUNESTA) 2 MG tablet, TAKE ONE TABLET BY MOUTH IMMEDIATELY BEFORE BEDTIME AS NEEDED FOR SLEEP, Disp: 30 tablet, Rfl: 0  •  furosemide (LASIX) 40 MG tablet, Take 40 mg by mouth As Needed., Disp: , Rfl:   •  indapamide (LOZOL) 2.5 MG tablet, Take 2.5 mg by mouth Every Morning., Disp: , Rfl:   •  Insulin Glargine (BASAGLAR KWIKPEN) 100 UNIT/ML injection pen, INJECT 20 UNITS UNDER THE SKIN ONCE DAILY AS DIRECTED, Disp: 15 mL, Rfl: 1  •  isosorbide mononitrate (IMDUR) 30 MG 24 hr tablet, Take 1 tablet by mouth Daily. (Patient taking differently: Take 30 mg by mouth Every Morning.), Disp: 90 tablet, Rfl: 0  •  latanoprost (XALATAN) 0.005 % ophthalmic solution, Administer 1 drop to both eyes Every Night., Disp: , Rfl: 5  •  levothyroxine (SYNTHROID, LEVOTHROID) 100 MCG tablet, TAKE ONE TABLET BY MOUTH IN THE MORNING , Disp: 90 tablet, Rfl: 1  •  melatonin 5 MG tablet tablet, Take 5 mg by mouth Every Night., Disp: , Rfl:   •  multivitamins-minerals (PRESERVISION AREDS 2) capsule capsule, Take 1 capsule by mouth 2 (Two) Times a Day., Disp: , Rfl:   •  mupirocin (BACTROBAN) 2 % ointment, 1 application As Needed., Disp: , Rfl:   •  NovoLOG 100 UNIT/ML injection, Inject 15 units under the skin as directed 3 times a day before meals, Disp: 20 mL, Rfl: 0  •  omeprazole (priLOSEC) 40 MG capsule, omeprazole 40 mg  capsule,delayed release, Disp: , Rfl:   •  ondansetron (ZOFRAN) 4 MG tablet, Take 4 mg by mouth Every 12 (Twelve) Hours As Needed., Disp: , Rfl:   •  spironolactone (ALDACTONE) 25 MG tablet, Take 1 tablet by mouth Daily. (Patient taking differently: Take 25 mg by mouth Daily. 1/2 tablet 12.5mg), Disp: 90 tablet, Rfl: 1  •  timolol (TIMOPTIC) 0.5 % ophthalmic solution, Administer 1 drop to both eyes 2 (Two) Times a Day., Disp: , Rfl:     VITALS:  /78   Pulse 70   Temp 96.9 °F (36.1 °C)   Wt 95.8 kg (211 lb 3.2 oz)   LMP  (LMP Unknown) Comment: last mammogram -unsure   SpO2 97%   Breastfeeding No   BMI 35.15 kg/m²     Physical Exam  HENT:      Mouth/Throat:      Mouth: Mucous membranes are moist.   Cardiovascular:      Rate and Rhythm: Normal rate and regular rhythm.      Heart sounds: Normal heart sounds.   Pulmonary:      Effort: Pulmonary effort is normal. No respiratory distress.      Breath sounds: Normal breath sounds.   Abdominal:      General: Bowel sounds are normal.      Palpations: Abdomen is soft.      Tenderness: There is no abdominal tenderness.   Skin:     General: Skin is warm and dry.      Coloration: Skin is pale.   Neurological:      Mental Status: She is alert and oriented to person, place, and time. Mental status is at baseline.   Psychiatric:         Mood and Affect: Mood normal.         Result Review: pending    Assessment and Plan    Diagnoses and all orders for this visit:    1. Age-related physical debility (Primary)  -     Heavy Duty Wheelchair  Pt was seen in office . Pt has limited mobility that is affecting their ability to perform basic activities in the home and when having to walk longer distances. The use of a wheelchair will improve this problem, and the patient has agreed to do so. A can and walker would not be sufficient for this patient. Patient has ability to self propel wheelchair.  2. Essential hypertension  Comments:  coreg, imdur, aldactone per cardiology; prn  lasix.    3. Mixed hyperlipidemia  Comments:  cont statin    4. Anemia, unspecified type  Comments:  multifacotoral. last hg 8.9 March 2nd. cont procrit and FU nephrology    5. 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  Comments:  cont prilosec    reviewed medications; no refills needed today.  Pt reports will be getting labs done this afternoon at endocrine appt.        Follow Up   Return in about 6 months (around 9/10/2021), or if symptoms worsen or fail to improve, for Medicare Wellness.      I discussed the patients findings and my recommendations with patient.  Patient was encouraged to keep me informed of any acute changes, lack of improvement, or any new concerning symptoms.  Patient voiced understanding of all instructions and denied further questions.    Electronically signed by:    NESS Diaz  03/10/2021    EMR Dragon/Transcription Disclaimer:  Much of this encounter note is an electronic transcription/translation of spoken language to printed text.  The electronic translation of spoken language may permit erroneous, or at times, nonsensical words or phrases to be inadvertently transcribed.  Although I have reviewed the note for such errors, some may still exist

## 2021-03-10 NOTE — PROGRESS NOTES
Karla Cristobal 85 y.o.  [facility]: Follow-up, Diabetes, Hypertension, and Hypothyroidism      [place]: Follow-up, Diabetes, Hypertension, and Hypothyroidism    Is on 11 u novolog before meals  Is on 20 u of basaglar   Is anemic  GI bleed   Is getting procrit   bp is low and she feels bad  Had colonoscopy - Dr Vazquez   She had 2 polyps- one focus of cancer   Has appt to see Dr Price (oncology)   Previously did not want colonoscopy  Stomach also has erosions with bleeding   Had barretts esophagus and is supposed to continue prilosec     Allergies   Allergen Reactions   • Lisinopril Cough   • Codeine Rash       Current Outpatient Medications:   •  atorvastatin (LIPITOR) 20 MG tablet, Take 1 tablet by mouth Every Night., Disp: 90 tablet, Rfl: 1  •  carvedilol (COREG) 25 MG tablet, Take 0.5 tablets by mouth 2 (Two) Times a Day With Meals., Disp: 30 tablet, Rfl: 11  •  cholecalciferol (VITAMIN D3) 1000 UNITS tablet, Take 1,000 Units by mouth Daily., Disp: , Rfl:   •  Eliquis 2.5 MG tablet tablet, , Disp: , Rfl:   •  eszopiclone (LUNESTA) 2 MG tablet, TAKE ONE TABLET BY MOUTH IMMEDIATELY BEFORE BEDTIME AS NEEDED FOR SLEEP, Disp: 30 tablet, Rfl: 0  •  furosemide (LASIX) 40 MG tablet, Take 40 mg by mouth As Needed., Disp: , Rfl:   •  indapamide (LOZOL) 2.5 MG tablet, Take 2.5 mg by mouth Every Morning., Disp: , Rfl:   •  Insulin Glargine (BASAGLAR KWIKPEN) 100 UNIT/ML injection pen, INJECT 20 UNITS UNDER THE SKIN ONCE DAILY AS DIRECTED, Disp: 15 mL, Rfl: 1  •  isosorbide mononitrate (IMDUR) 30 MG 24 hr tablet, Take 1 tablet by mouth Daily. (Patient taking differently: Take 30 mg by mouth Every Morning.), Disp: 90 tablet, Rfl: 0  •  latanoprost (XALATAN) 0.005 % ophthalmic solution, Administer 1 drop to both eyes Every Night., Disp: , Rfl: 5  •  levothyroxine (SYNTHROID, LEVOTHROID) 100 MCG tablet, TAKE ONE TABLET BY MOUTH IN THE MORNING , Disp: 90 tablet, Rfl: 1  •  melatonin 5 MG tablet tablet, Take 5 mg by mouth Every Night., Disp: ,  Rfl:   •  multivitamins-minerals (PRESERVISION AREDS 2) capsule capsule, Take 1 capsule by mouth 2 (Two) Times a Day., Disp: , Rfl:   •  mupirocin (BACTROBAN) 2 % ointment, 1 application As Needed., Disp: , Rfl:   •  NovoLOG 100 UNIT/ML injection, Inject 15 units under the skin as directed 3 times a day before meals, Disp: 20 mL, Rfl: 0  •  omeprazole (priLOSEC) 40 MG capsule, omeprazole 40 mg capsule,delayed release, Disp: , Rfl:   •  ondansetron (ZOFRAN) 4 MG tablet, Take 4 mg by mouth Every 12 (Twelve) Hours As Needed., Disp: , Rfl:   •  spironolactone (ALDACTONE) 25 MG tablet, Take 1 tablet by mouth Daily. (Patient taking differently: Take 25 mg by mouth Daily. 1/2 tablet 12.5mg), Disp: 90 tablet, Rfl: 1  •  timolol (TIMOPTIC) 0.5 % ophthalmic solution, Administer 1 drop to both eyes 2 (Two) Times a Day., Disp: , Rfl:   Patient Active Problem List    Diagnosis    • Ischemic cardiomyopathy [I25.5]    • Coronary atherosclerosis [I25.10]    • Morbidly obese (CMS/HCC) [E66.01]    • Chronic systolic congestive heart failure (CMS/HCC) [I50.22]    • DVT of lower extremity (deep venous thrombosis) (CMS/HCC) [I82.409]    • CAD (coronary artery disease) [I25.10]    • Ischemic heart disease [I25.9]    • Anemia due to chronic kidney disease [N18.9, D63.1]    • Chronic atrial fibrillation (CMS/HCC) [I48.20]    • CKD (chronic kidney disease), stage IV (CMS/HCC) [N18.4]    • Diabetes mellitus (CMS/HCC) [E11.9]    • Diabetic nephropathy (CMS/HCC) [E11.21]    • Diabetic retinopathy (CMS/HCC) [E11.319]    • Malignant neoplasm of endometrium (CMS/HCC) [C54.1]    • Hyperlipidemia LDL goal <100 [E78.5]    • Essential hypertension [I10]    • Hypothyroidism [E03.9]    • Insomnia [G47.00]    • Generalized ischemic myocardial dysfunction [I25.5]    • Leukocytosis [D72.829]    • Memory impairment [R41.3]    • Nausea [R11.0]    • Pulmonary embolism (CMS/HCC) [I26.99]    • Renal insufficiency [N28.9]    • Sleep apnea [G47.30]    • Squamous  "cell carcinoma of skin [C44.92]    • CHF (NYHA class IV, ACC/AHA stage D) (CMS/HCC) [I50.9]    • Renal insufficiency syndrome [N28.9]      Review of Systems   Constitutional: Positive for activity change, appetite change and unexpected weight change.   HENT: Negative for congestion and rhinorrhea.    Eyes: Negative for visual disturbance.   Respiratory: Positive for shortness of breath. Negative for cough.    Cardiovascular: Positive for leg swelling. Negative for palpitations.   Gastrointestinal: Negative for constipation, diarrhea and nausea.        See above- malignant polyp and barretts esophagus    Genitourinary: Negative for hematuria.   Musculoskeletal: Positive for arthralgias, gait problem and joint swelling. Negative for back pain and myalgias.   Skin: Negative for color change, rash and wound.   Allergic/Immunologic: Negative for environmental allergies, food allergies and immunocompromised state.   Neurological: Positive for dizziness and light-headedness. Negative for weakness.   Psychiatric/Behavioral: Negative for confusion, decreased concentration, dysphoric mood and sleep disturbance. The patient is not nervous/anxious.      Social History     Socioeconomic History   • Marital status:      Spouse name: Not on file   • Number of children: Not on file   • Years of education: Not on file   • Highest education level: Not on file   Tobacco Use   • Smoking status: Never Smoker   • Smokeless tobacco: Never Used   Vaping Use   • Vaping Use: Never used   Substance and Sexual Activity   • Alcohol use: No   • Drug use: No   • Sexual activity: Defer     Family History   Problem Relation Age of Onset   • Breast cancer Other    • Diabetes Other    • Esophageal cancer Other    • Hypertension Other    • Transient ischemic attack Other    • Breast cancer Mother    • Cancer Father      /68   Pulse 68   Temp 97.1 °F (36.2 °C) (Infrared)   Ht 165.1 cm (65\")   Wt 96.2 kg (212 lb)   LMP  (LMP Unknown) " Comment: last mammogram -unsure   BMI 35.28 kg/m²   Physical Exam  Vitals and nursing note reviewed.   Constitutional:       Appearance: She is well-developed. She is ill-appearing.      Comments: Pale    HENT:      Head: Normocephalic and atraumatic.   Eyes:      General: Lids are normal.      Extraocular Movements: Extraocular movements intact.      Conjunctiva/sclera: Conjunctivae normal.      Pupils: Pupils are equal, round, and reactive to light.   Neck:      Thyroid: No thyroid mass or thyromegaly.      Vascular: No carotid bruit.      Trachea: Trachea normal. No tracheal deviation.   Cardiovascular:      Rate and Rhythm: Normal rate and regular rhythm.      Heart sounds: Murmur present. No friction rub. No gallop.    Pulmonary:      Effort: Pulmonary effort is normal. No respiratory distress.      Breath sounds: Normal breath sounds. No wheezing.   Musculoskeletal:         General: No deformity. Normal range of motion.      Cervical back: Normal range of motion and neck supple.      Right lower leg: Edema present.      Left lower leg: Edema present.   Lymphadenopathy:      Cervical: No cervical adenopathy.   Skin:     General: Skin is warm and dry.      Coloration: Skin is pale.      Findings: No erythema or rash.      Nails: There is no clubbing.   Neurological:      General: No focal deficit present.      Mental Status: She is alert and oriented to person, place, and time.      Cranial Nerves: No cranial nerve deficit.      Motor: Weakness present.      Deep Tendon Reflexes: Reflexes are normal and symmetric. Reflexes normal.      Comments: Wheelchair bound    Psychiatric:         Speech: Speech normal.         Behavior: Behavior normal.         Thought Content: Thought content normal.         Judgment: Judgment normal.       Results for orders placed or performed in visit on 03/10/21   POC Glycosylated Hemoglobin (Hb A1C)    Specimen: Blood   Result Value Ref Range    Hemoglobin A1C 6.2 %    Lot Number  [id number]     Expiration Date 12-7-22    POC Glucose, Blood    Specimen: Blood   Result Value Ref Range    Glucose 205 (A) 70 - 130 mg/dL    Lot Number [id number]     Expiration Date 12-7-22      Diagnoses and all orders for this visit:    1. Type 2 diabetes mellitus without complication, with long-term current use of insulin (CMS/[facility]) (Primary)  Assessment & Plan:  Blood sugar and 90 day average sugar reviewed  Results for orders placed or performed in visit on 03/10/21   POC Glycosylated Hemoglobin (Hb A1C)    Specimen: Blood   Result Value Ref Range    Hemoglobin A1C 6.2 %    Lot Number [id number]     Expiration Date 12-7-22    POC Glucose, Blood    Specimen: Blood   Result Value Ref Range    Glucose 205 (A) 70 - 130 mg/dL    Lot Number [id number]     Expiration Date 12-7-22      Current sugar is high but a1c is lower likely due to anemia   She is getting procrit- had ugi bleeding with barretts esophagus as well as malignant polyp (Dr Vazquez) - has f/u with oncology Dr Price [facility]   Is utd with eye exam  No foot callus or ulcer   Continue basaglar and novolog   Samples provided  No low sugars   F/u 6 months per patient request     Orders:  -     POC Glycosylated Hemoglobin (Hb A1C)  -     POC Glucose, Blood    2. Acquired hypothyroidism  Assessment & Plan:  No recent tfts- is on synthroid 100 mcg daily   Check tfts - order provided     Orders:  -     TSH; Future  -     T4, Free; Future    3. Hyperlipidemia LDL goal <100  Assessment & Plan:  Is on low fat diet - check flp   Order provided     Orders:  -     Lipid Panel; Future    4. Essential hypertension  Assessment & Plan:  bp is low - consider reducing amlodipine (is swelling )       Problems Addressed this Visit        Other    Hypothyroidism     No recent tfts- is on synthroid 100 mcg daily   Check tfts - order provided          Relevant Orders    TSH    T4, Free    Hyperlipidemia LDL goal <100     Is on low fat diet - check flp   Order provided           Relevant Orders    Lipid Panel    Essential hypertension     bp is low - consider reducing amlodipine (is swelling )          Diabetes mellitus (CMS/[facility]) - Primary     Blood sugar and 90 day average sugar reviewed  Results for orders placed or performed in visit on 03/10/21   POC Glycosylated Hemoglobin (Hb A1C)    Specimen: Blood   Result Value Ref Range    Hemoglobin A1C 6.2 %    Lot Number [id number]     Expiration Date 12-7-22    POC Glucose, Blood    Specimen: Blood   Result Value Ref Range    Glucose 205 (A) 70 - 130 mg/dL    Lot Number [id number]     Expiration Date 12-7-22      Current sugar is high but a1c is lower likely due to anemia   She is getting procrit- had ugi bleeding with barretts esophagus as well as malignant polyp (Dr Vazquez) - has f/u with oncology Dr Price [facility]   Is utd with eye exam  No foot callus or ulcer   Continue basaglar and novolog   Samples provided  No low sugars   F/u 6 months per patient request          Relevant Orders    POC Glycosylated Hemoglobin (Hb A1C) (Completed)    POC Glucose, Blood (Completed)      Diagnoses       Codes Comments    Type 2 diabetes mellitus without complication, with long-term current use of insulin (CMS/[facility])    -  Primary ICD-10-CM: E11.9, Z79.4  ICD-9-CM: 250.00, V58.67     Acquired hypothyroidism     ICD-10-CM: E03.9  ICD-9-CM: 244.9     Hyperlipidemia LDL goal <100     ICD-10-CM: E78.5  ICD-9-CM: 272.4     Essential hypertension     ICD-10-CM: I10  ICD-9-CM: 401.9         Return in about 6 months (around 9/10/2021) for Recheck.    Rani Lane MD  Signed Rani Lane MD

## 2021-03-11 NOTE — ASSESSMENT & PLAN NOTE
Blood sugar and 90 day average sugar reviewed  Results for orders placed or performed in visit on 03/10/21   POC Glycosylated Hemoglobin (Hb A1C)    Specimen: Blood   Result Value Ref Range    Hemoglobin A1C 6.2 %    Lot Number [id number]     Expiration Date 12-7-22    POC Glucose, Blood    Specimen: Blood   Result Value Ref Range    Glucose 205 (A) 70 - 130 mg/dL    Lot Number [id number]     Expiration Date 12-7-22      Current sugar is high but a1c is lower likely due to anemia   She is getting procrit- had ugi bleeding with barretts esophagus as well as malignant polyp (Dr Vazquez) - has f/u with oncology Dr Price [facility]   Is utd with eye exam  No foot callus or ulcer   Continue basaglar and novolog   Samples provided  No low sugars   F/u 6 months per patient request

## 2021-03-24 DIAGNOSIS — F51.01 PRIMARY INSOMNIA: ICD-10-CM

## 2021-03-24 RX ORDER — ESZOPICLONE 2 MG/1
TABLET, FILM COATED ORAL
Qty: 30 TABLET | Refills: 3 | Status: SHIPPED | OUTPATIENT
Start: 2021-03-24 | End: 2021-07-26

## 2021-03-24 NOTE — TELEPHONE ENCOUNTER
Last Office Visit: 09/04/20  Next Office Visit:09/08/21    Labs completed in past 6 months? yes  Labs completed in past year? yes    Last Refill Date: 02/23/21  Quantity:30  Refills:0    Pharmacy:

## 2021-03-27 LAB
CHOLEST SERPL-MCNC: 79 MG/DL (ref 100–199)
HDLC SERPL-MCNC: 36 MG/DL
LDLC SERPL CALC-MCNC: 29 MG/DL (ref 0–99)
TRIGL SERPL-MCNC: 57 MG/DL (ref 0–149)
TSH SERPL DL<=0.005 MIU/L-ACNC: 2.39 UIU/ML (ref 0.45–4.5)
VLDLC SERPL CALC-MCNC: 14 MG/DL (ref 5–40)

## 2021-04-14 RX ORDER — LEVOTHYROXINE SODIUM 0.1 MG/1
TABLET ORAL
Qty: 90 TABLET | Refills: 0 | Status: SHIPPED | OUTPATIENT
Start: 2021-04-14 | End: 2021-07-12

## 2021-04-26 RX ORDER — BLOOD SUGAR DIAGNOSTIC
STRIP MISCELLANEOUS
Qty: 100 EACH | Refills: 5 | Status: SHIPPED | OUTPATIENT
Start: 2021-04-26 | End: 2022-01-01

## 2021-05-02 PROCEDURE — 87086 URINE CULTURE/COLONY COUNT: CPT | Performed by: NURSE PRACTITIONER

## 2021-05-02 PROCEDURE — 87077 CULTURE AEROBIC IDENTIFY: CPT | Performed by: NURSE PRACTITIONER

## 2021-05-02 PROCEDURE — 87186 SC STD MICRODIL/AGAR DIL: CPT | Performed by: NURSE PRACTITIONER

## 2021-05-06 ENCOUNTER — TELEPHONE (OUTPATIENT)
Dept: URGENT CARE | Facility: CLINIC | Age: 86
End: 2021-05-06

## 2021-05-06 NOTE — TELEPHONE ENCOUNTER
----- Message from NESS Castillo sent at 5/4/2021  8:27 AM EDT -----  Patient treated appropriately with cefdinir which covers bacteria.  Please see how patient is feeling

## 2021-05-29 PROCEDURE — 93295 DEV INTERROG REMOTE 1/2/MLT: CPT | Performed by: INTERNAL MEDICINE

## 2021-05-29 PROCEDURE — 93296 REM INTERROG EVL PM/IDS: CPT | Performed by: INTERNAL MEDICINE

## 2021-07-12 RX ORDER — LEVOTHYROXINE SODIUM 0.1 MG/1
TABLET ORAL
Qty: 90 TABLET | Refills: 0 | Status: SHIPPED | OUTPATIENT
Start: 2021-07-12 | End: 2021-09-10

## 2021-07-14 ENCOUNTER — TELEPHONE (OUTPATIENT)
Dept: INTERNAL MEDICINE | Facility: CLINIC | Age: 86
End: 2021-07-14

## 2021-07-25 DIAGNOSIS — F51.01 PRIMARY INSOMNIA: ICD-10-CM

## 2021-07-26 RX ORDER — ESZOPICLONE 2 MG/1
TABLET, FILM COATED ORAL
Qty: 30 TABLET | Refills: 1 | Status: SHIPPED | OUTPATIENT
Start: 2021-07-26 | End: 2021-09-21

## 2021-07-26 NOTE — TELEPHONE ENCOUNTER
Last Office Visit: 9/4/20  Next Office Visit: 9/8/21    Labs completed in past 6 months? no  Labs completed in past year? no    Last Refill Date: 3/24/21  Quantity: 30  Refills:3    Pharmacy:     Please review pended refill request for any changes needed on refills or quantities. Thank you!

## 2021-08-28 PROCEDURE — 93295 DEV INTERROG REMOTE 1/2/MLT: CPT | Performed by: INTERNAL MEDICINE

## 2021-08-28 PROCEDURE — 93296 REM INTERROG EVL PM/IDS: CPT | Performed by: INTERNAL MEDICINE

## 2021-09-07 RX ORDER — SPIRONOLACTONE 25 MG/1
12.5 TABLET ORAL EVERY MORNING
COMMUNITY
Start: 2021-07-12 | End: 2022-01-01 | Stop reason: HOSPADM

## 2021-09-07 RX ORDER — INDAPAMIDE 2.5 MG/1
TABLET, FILM COATED ORAL
COMMUNITY
Start: 2021-06-14 | End: 2022-01-01 | Stop reason: HOSPADM

## 2021-09-08 ENCOUNTER — OFFICE VISIT (OUTPATIENT)
Dept: INTERNAL MEDICINE | Facility: CLINIC | Age: 86
End: 2021-09-08

## 2021-09-08 ENCOUNTER — OFFICE VISIT (OUTPATIENT)
Dept: ENDOCRINOLOGY | Facility: CLINIC | Age: 86
End: 2021-09-08

## 2021-09-08 ENCOUNTER — LAB (OUTPATIENT)
Dept: LAB | Facility: HOSPITAL | Age: 86
End: 2021-09-08

## 2021-09-08 VITALS
DIASTOLIC BLOOD PRESSURE: 84 MMHG | HEART RATE: 75 BPM | WEIGHT: 199 LBS | OXYGEN SATURATION: 97 % | SYSTOLIC BLOOD PRESSURE: 138 MMHG | BODY MASS INDEX: 33.15 KG/M2 | HEIGHT: 65 IN

## 2021-09-08 VITALS
WEIGHT: 199.6 LBS | DIASTOLIC BLOOD PRESSURE: 84 MMHG | SYSTOLIC BLOOD PRESSURE: 138 MMHG | HEIGHT: 65 IN | BODY MASS INDEX: 33.26 KG/M2

## 2021-09-08 DIAGNOSIS — I48.20 CHRONIC ATRIAL FIBRILLATION (HCC): ICD-10-CM

## 2021-09-08 DIAGNOSIS — Z79.4 TYPE 2 DIABETES MELLITUS WITHOUT COMPLICATION, WITH LONG-TERM CURRENT USE OF INSULIN (HCC): Primary | ICD-10-CM

## 2021-09-08 DIAGNOSIS — E66.01 MORBIDLY OBESE (HCC): ICD-10-CM

## 2021-09-08 DIAGNOSIS — R41.3 MEMORY IMPAIRMENT: ICD-10-CM

## 2021-09-08 DIAGNOSIS — I50.84 CHF (NYHA CLASS IV, ACC/AHA STAGE D) (HCC): ICD-10-CM

## 2021-09-08 DIAGNOSIS — N18.4 CKD (CHRONIC KIDNEY DISEASE), STAGE IV (HCC): ICD-10-CM

## 2021-09-08 DIAGNOSIS — Z79.4 DIABETES MELLITUS, TYPE II, INSULIN DEPENDENT (HCC): ICD-10-CM

## 2021-09-08 DIAGNOSIS — I25.9 ISCHEMIC HEART DISEASE: ICD-10-CM

## 2021-09-08 DIAGNOSIS — E03.9 ACQUIRED HYPOTHYROIDISM: ICD-10-CM

## 2021-09-08 DIAGNOSIS — E11.9 TYPE 2 DIABETES MELLITUS WITHOUT COMPLICATION, WITH LONG-TERM CURRENT USE OF INSULIN (HCC): Primary | ICD-10-CM

## 2021-09-08 DIAGNOSIS — I10 ESSENTIAL HYPERTENSION: ICD-10-CM

## 2021-09-08 DIAGNOSIS — I25.5 ISCHEMIC CARDIOMYOPATHY: ICD-10-CM

## 2021-09-08 DIAGNOSIS — I25.10 CORONARY ARTERY DISEASE INVOLVING NATIVE CORONARY ARTERY OF NATIVE HEART WITHOUT ANGINA PECTORIS: ICD-10-CM

## 2021-09-08 DIAGNOSIS — E08.21 DIABETIC NEPHROPATHY ASSOCIATED WITH DIABETES MELLITUS DUE TO UNDERLYING CONDITION (HCC): ICD-10-CM

## 2021-09-08 DIAGNOSIS — E78.5 HYPERLIPIDEMIA LDL GOAL <100: ICD-10-CM

## 2021-09-08 DIAGNOSIS — N18.4 ANEMIA DUE TO STAGE 4 CHRONIC KIDNEY DISEASE (HCC): ICD-10-CM

## 2021-09-08 DIAGNOSIS — F51.04 PSYCHOPHYSIOLOGICAL INSOMNIA: ICD-10-CM

## 2021-09-08 DIAGNOSIS — C54.1 MALIGNANT NEOPLASM OF ENDOMETRIUM (HCC): ICD-10-CM

## 2021-09-08 DIAGNOSIS — R09.02 HYPOXIA: ICD-10-CM

## 2021-09-08 DIAGNOSIS — H35.3211 EXUDATIVE AGE-RELATED MACULAR DEGENERATION, RIGHT EYE, WITH ACTIVE CHOROIDAL NEOVASCULARIZATION (HCC): ICD-10-CM

## 2021-09-08 DIAGNOSIS — E11.9 DIABETES MELLITUS, TYPE II, INSULIN DEPENDENT (HCC): ICD-10-CM

## 2021-09-08 DIAGNOSIS — Z00.00 MEDICARE ANNUAL WELLNESS VISIT, SUBSEQUENT: Primary | ICD-10-CM

## 2021-09-08 DIAGNOSIS — I82.401 ACUTE DEEP VEIN THROMBOSIS (DVT) OF RIGHT LOWER EXTREMITY, UNSPECIFIED VEIN (HCC): ICD-10-CM

## 2021-09-08 DIAGNOSIS — I26.99 OTHER ACUTE PULMONARY EMBOLISM WITHOUT ACUTE COR PULMONALE (HCC): ICD-10-CM

## 2021-09-08 DIAGNOSIS — D63.1 ANEMIA DUE TO STAGE 4 CHRONIC KIDNEY DISEASE (HCC): ICD-10-CM

## 2021-09-08 DIAGNOSIS — Z78.0 POSTMENOPAUSE: ICD-10-CM

## 2021-09-08 LAB
ALBUMIN SERPL-MCNC: 3.7 G/DL (ref 3.5–5.2)
ALBUMIN UR-MCNC: 3.4 MG/DL
ALBUMIN/GLOB SERPL: 1.1 G/DL
ALP SERPL-CCNC: 203 U/L (ref 39–117)
ALT SERPL W P-5'-P-CCNC: 11 U/L (ref 1–33)
ANION GAP SERPL CALCULATED.3IONS-SCNC: 10.6 MMOL/L (ref 5–15)
AST SERPL-CCNC: 18 U/L (ref 1–32)
BILIRUB SERPL-MCNC: 1.3 MG/DL (ref 0–1.2)
BUN SERPL-MCNC: 61 MG/DL (ref 8–23)
BUN/CREAT SERPL: 24.8 (ref 7–25)
CALCIUM SPEC-SCNC: 9.5 MG/DL (ref 8.6–10.5)
CHLORIDE SERPL-SCNC: 104 MMOL/L (ref 98–107)
CHOLEST SERPL-MCNC: 89 MG/DL (ref 0–200)
CO2 SERPL-SCNC: 28.4 MMOL/L (ref 22–29)
CREAT SERPL-MCNC: 2.46 MG/DL (ref 0.57–1)
CREAT UR-MCNC: 52.7 MG/DL
EXPIRATION DATE: NORMAL
EXPIRATION DATE: NORMAL
GFR SERPL CREATININE-BSD FRML MDRD: 19 ML/MIN/1.73
GLOBULIN UR ELPH-MCNC: 3.5 GM/DL
GLUCOSE BLDC GLUCOMTR-MCNC: 109 MG/DL (ref 70–130)
GLUCOSE SERPL-MCNC: 85 MG/DL (ref 65–99)
HBA1C MFR BLD: 6.6 %
HDLC SERPL-MCNC: 40 MG/DL (ref 40–60)
LDLC SERPL CALC-MCNC: 35 MG/DL (ref 0–100)
LDLC/HDLC SERPL: 0.92 {RATIO}
Lab: NORMAL
Lab: NORMAL
MICROALBUMIN/CREAT UR: 64.5 MG/G
POTASSIUM SERPL-SCNC: 4.8 MMOL/L (ref 3.5–5.2)
PROT SERPL-MCNC: 7.2 G/DL (ref 6–8.5)
SODIUM SERPL-SCNC: 143 MMOL/L (ref 136–145)
TRIGL SERPL-MCNC: 61 MG/DL (ref 0–150)
VLDLC SERPL-MCNC: 14 MG/DL (ref 5–40)

## 2021-09-08 PROCEDURE — 85025 COMPLETE CBC W/AUTO DIFF WBC: CPT

## 2021-09-08 PROCEDURE — 83036 HEMOGLOBIN GLYCOSYLATED A1C: CPT | Performed by: INTERNAL MEDICINE

## 2021-09-08 PROCEDURE — 84443 ASSAY THYROID STIM HORMONE: CPT

## 2021-09-08 PROCEDURE — 84439 ASSAY OF FREE THYROXINE: CPT

## 2021-09-08 PROCEDURE — G0439 PPPS, SUBSEQ VISIT: HCPCS | Performed by: INTERNAL MEDICINE

## 2021-09-08 PROCEDURE — 99214 OFFICE O/P EST MOD 30 MIN: CPT | Performed by: INTERNAL MEDICINE

## 2021-09-08 PROCEDURE — 82947 ASSAY GLUCOSE BLOOD QUANT: CPT | Performed by: INTERNAL MEDICINE

## 2021-09-08 PROCEDURE — 82043 UR ALBUMIN QUANTITATIVE: CPT | Performed by: INTERNAL MEDICINE

## 2021-09-08 PROCEDURE — 80061 LIPID PANEL: CPT

## 2021-09-08 PROCEDURE — 85007 BL SMEAR W/DIFF WBC COUNT: CPT

## 2021-09-08 PROCEDURE — 80053 COMPREHEN METABOLIC PANEL: CPT | Performed by: INTERNAL MEDICINE

## 2021-09-08 PROCEDURE — 3044F HG A1C LEVEL LT 7.0%: CPT | Performed by: INTERNAL MEDICINE

## 2021-09-08 PROCEDURE — 82570 ASSAY OF URINE CREATININE: CPT | Performed by: INTERNAL MEDICINE

## 2021-09-08 NOTE — PROGRESS NOTES
Karla Cristobal 86 y.o.  CC: Follow-up and Diabetes      "[employer]:  Follow-up and Diabetes    Blood sugar and 90 day average sugar reviewed  Results for orders placed or performed in visit on 09/08/21   Comprehensive Metabolic Panel    Specimen: Blood   Result Value Ref Range    Glucose 85 65 - 99 mg/dL    BUN 61 (H) 8 - 23 mg/dL    Creatinine 2.46 (H) 0.57 - 1.00 mg/dL    Sodium 143 136 - 145 mmol/L    Potassium 4.8 3.5 - 5.2 mmol/L    Chloride 104 98 - 107 mmol/L    CO2 28.4 22.0 - 29.0 mmol/L    Calcium 9.5 8.6 - 10.5 mg/dL    Total Protein 7.2 6.0 - 8.5 g/dL    Albumin 3.70 3.50 - 5.20 g/dL    ALT (SGPT) 11 1 - 33 U/L    AST (SGOT) 18 1 - 32 U/L    Alkaline Phosphatase 203 (H) 39 - 117 U/L    Total Bilirubin 1.3 (H) 0.0 - 1.2 mg/dL    eGFR Non African Amer 19 (L) >60 mL/min/1.73    Globulin 3.5 gm/dL    A/G Ratio 1.1 g/dL    BUN/Creatinine Ratio 24.8 7.0 - 25.0    Anion Gap 10.6 5.0 - 15.0 mmol/L   POC Glucose, Blood    Specimen: Blood   Result Value Ref Range    Glucose 109 70 - 130 mg/dL    Lot Number [id number]     Expiration Date 04.14.22    POC Glycosylated Hemoglobin (Hb A1C)    Specimen: Blood   Result Value Ref Range    Hemoglobin A1C 6.6 %    Lot Number [id number]     Expiration Date 05.11.2023      She is on basaglar and novolog  She is taking 10 u novolog prior to meals  Was having low sugar  Is on basaglar 16 u at hs  Now improved  Living with her daughter- healthy diet  Is utd with eye exam    Allergies   Allergen Reactions   • Bactrim [Sulfamethoxazole-Trimethoprim] GI Intolerance   • Lisinopril Cough   • Codeine Rash       Current Outpatient Medications:   •  atorvastatin (LIPITOR) 20 MG tablet, Take 1 tablet by mouth Every Night., Disp: 90 tablet, Rfl: 1  •  carvedilol (COREG) 25 MG tablet, Take 0.5 tablets by mouth 2 (Two) Times a Day With Meals. (Patient taking differently: Take 6.25 mg by mouth 2 (Two) Times a Day With Meals.), Disp: 30 tablet, Rfl: 11  •  cholecalciferol (VITAMIN D3) 1000 UNITS  "tablet, Take 1,000 Units by mouth Daily., Disp: , Rfl:   •  Eliquis 2.5 MG tablet tablet, , Disp: , Rfl:   •  eszopiclone (LUNESTA) 2 MG tablet, TAKE ONE TABLET BY MOUTH IMMEDIATELY BEFORE BEDTIME AS NEEDED FOR SLEEP, Disp: 30 tablet, Rfl: 1  •  furosemide (LASIX) 40 MG tablet, Take 40 mg by mouth As Needed., Disp: , Rfl:   •  indapamide (LOZOL) 2.5 MG tablet, , Disp: , Rfl:   •  Insulin Glargine (BASAGLAR KWIKPEN) 100 UNIT/ML injection pen, INJECT 20 UNITS UNDER THE SKIN ONCE DAILY AS DIRECTED, Disp: 15 mL, Rfl: 1  •  Insulin Syringe-Needle U-100 (TRUEplus Insulin Syringe) 31G X 5/16\" 0.5 ML misc, Use with Insulin Three Times daily, Disp: 100 each, Rfl: 5  •  isosorbide mononitrate (IMDUR) 30 MG 24 hr tablet, Take 1 tablet by mouth Daily. (Patient taking differently: Take 30 mg by mouth Every Morning.), Disp: 90 tablet, Rfl: 0  •  latanoprost (XALATAN) 0.005 % ophthalmic solution, Administer 1 drop to both eyes Every Night., Disp: , Rfl: 5  •  levothyroxine (SYNTHROID, LEVOTHROID) 100 MCG tablet, TAKE ONE TABLET BY MOUTH IN THE MORNING , Disp: 90 tablet, Rfl: 0  •  melatonin 5 MG tablet tablet, Take 5 mg by mouth Every Night., Disp: , Rfl:   •  multivitamins-minerals (PRESERVISION AREDS 2) capsule capsule, Take 1 capsule by mouth 2 (Two) Times a Day., Disp: , Rfl:   •  mupirocin (BACTROBAN) 2 % ointment, 1 application As Needed., Disp: , Rfl:   •  NovoLOG 100 UNIT/ML injection, INJECT 15 UNITS UNDER THE SKIN AS DIRECTED 3 TIMES DAILY BEFORE MEALS, Disp: 20 mL, Rfl: 5  •  omeprazole (priLOSEC) 40 MG capsule, omeprazole 40 mg capsule,delayed release, Disp: , Rfl:   •  ondansetron (ZOFRAN) 4 MG tablet, Take 4 mg by mouth Every 12 (Twelve) Hours As Needed., Disp: , Rfl:   •  spironolactone (ALDACTONE) 25 MG tablet, , Disp: , Rfl:   •  timolol (TIMOPTIC) 0.5 % ophthalmic solution, Administer 1 drop to both eyes 2 (Two) Times a Day., Disp: , Rfl:   Patient Active Problem List    Diagnosis    • Diabetes mellitus, type II, " insulin dependent (CMS/[facility]) [E11.9, Z79.4]    • Exudative age-related macular degeneration, right eye, with active choroidal neovascularization (CMS/[facility]) [H35.3211]    • Hypoxia [R09.02]    • Ischemic cardiomyopathy [I25.5]    • Morbidly obese (CMS/[facility]) [E66.01]    • Chronic systolic congestive heart failure (CMS/HCC) [I50.22]    • DVT of lower extremity (deep venous thrombosis) (CMS/[facility]) [I82.409]    • CAD (coronary artery disease) [I25.10]    • Ischemic heart disease [I25.9]    • Anemia due to chronic kidney disease [N18.9, D63.1]    • Chronic atrial fibrillation (CMS/[facility]) [I48.20]    • CKD (chronic kidney disease), stage IV (CMS/[facility]) [N18.4]    • Diabetic nephropathy (CMS/[facility]) [E11.21]    • Diabetic retinopathy (CMS/[facility]) [E11.319]    • Malignant neoplasm of endometrium (CMS/[facility]) [C54.1]    • Hyperlipidemia LDL goal <100 [E78.5]    • Essential hypertension [I10]    • Hypothyroidism [E03.9]    • Insomnia [G47.00]    • Leukocytosis [D72.829]    • Memory impairment [R41.3]    • Nausea [R11.0]    • Pulmonary embolism (CMS/[facility]) [I26.99]    • Sleep apnea [G47.30]    • Squamous cell carcinoma of skin [C44.92]    • CHF (NYHA class IV, ACC/AHA stage D) (CMS/[facility]) [I50.9]      Review of Systems   Constitutional: Negative for activity change, appetite change and unexpected weight change.   HENT: Negative for congestion and rhinorrhea.    Eyes: Negative for visual disturbance.   Respiratory: Negative for cough and shortness of breath.    Cardiovascular: Negative for palpitations and leg swelling.   Gastrointestinal: Negative for constipation, diarrhea and nausea.   Genitourinary: Negative for hematuria.   Musculoskeletal: Negative for arthralgias, back pain, gait problem, joint swelling and myalgias.   Skin: Negative for color change, rash and wound.   Allergic/Immunologic: Negative for environmental allergies, food allergies and immunocompromised state.   Neurological: Negative for dizziness, weakness and light-headedness.  "  Psychiatric/Behavioral: Negative for confusion, decreased concentration, dysphoric mood and sleep disturbance. The patient is not nervous/anxious.      Social History     Socioeconomic History   • Marital status:      Spouse name: Not on file   • Number of children: Not on file   • Years of education: Not on file   • Highest education level: Not on file   Tobacco Use   • Smoking status: Never Smoker   • Smokeless tobacco: Never Used   Vaping Use   • Vaping Use: Former   Substance and Sexual Activity   • Alcohol use: No   • Drug use: No   • Sexual activity: Defer     Family History   Problem Relation Age of Onset   • Breast cancer Other    • Diabetes Other    • Esophageal cancer Other    • Hypertension Other    • Transient ischemic attack Other    • Breast cancer Mother    • Cancer Father      /84   Ht 165.1 cm (65\")   Wt 90.5 kg (199 lb 9.6 oz)   LMP  (LMP Unknown) Comment: last mammogram -unsure   BMI 33.22 kg/m²   Physical Exam  Vitals and nursing note reviewed.   Constitutional:       Appearance: Normal appearance. She is well-developed.   HENT:      Head: Normocephalic and atraumatic.   Eyes:      General: Lids are normal.      Extraocular Movements: Extraocular movements intact.      Conjunctiva/sclera: Conjunctivae normal.      Pupils: Pupils are equal, round, and reactive to light.   Neck:      Thyroid: No thyroid mass or thyromegaly.      Vascular: No carotid bruit.      Trachea: Trachea normal. No tracheal deviation.   Cardiovascular:      Rate and Rhythm: Normal rate and regular rhythm.      Pulses: Normal pulses.      Heart sounds: Normal heart sounds. No murmur heard.   No friction rub. No gallop.    Pulmonary:      Effort: Pulmonary effort is normal. No respiratory distress.      Breath sounds: Normal breath sounds. No wheezing.   Musculoskeletal:         General: No deformity. Normal range of motion.      Cervical back: Normal range of motion and neck supple.   Feet:      Comments: " Diabetic foot exam:   Left: Filament test present   Pulses Dorsalis Pedis:  diminished   Reflexes absent    Vibratory sensation diminished   Proprioception diminished   Sharp/dull discrimination diminished       Right: Filament test absent   Pulses Dorsalis Pedis:  diminished   Reflexes absent    Vibratory sensation diminished   Proprioception diminished   Sharp/dull discrimination diminished  Diabetic Foot Exam Performed and Monofilament Test Performed    Lymphadenopathy:      Cervical: No cervical adenopathy.   Skin:     General: Skin is warm and dry.      Findings: No erythema or rash.      Nails: There is no clubbing.   Neurological:      General: No focal deficit present.      Mental Status: She is alert and oriented to person, place, and time.      Cranial Nerves: No cranial nerve deficit.      Deep Tendon Reflexes: Reflexes are normal and symmetric. Reflexes normal.   Psychiatric:         Mood and Affect: Mood normal.         Speech: Speech normal.         Behavior: Behavior normal.         Thought Content: Thought content normal.         Judgment: Judgment normal.       Results for orders placed or performed in visit on 09/08/21   Comprehensive Metabolic Panel    Specimen: Blood   Result Value Ref Range    Glucose 85 65 - 99 mg/dL    BUN 61 (H) 8 - 23 mg/dL    Creatinine 2.46 (H) 0.57 - 1.00 mg/dL    Sodium 143 136 - 145 mmol/L    Potassium 4.8 3.5 - 5.2 mmol/L    Chloride 104 98 - 107 mmol/L    CO2 28.4 22.0 - 29.0 mmol/L    Calcium 9.5 8.6 - 10.5 mg/dL    Total Protein 7.2 6.0 - 8.5 g/dL    Albumin 3.70 3.50 - 5.20 g/dL    ALT (SGPT) 11 1 - 33 U/L    AST (SGOT) 18 1 - 32 U/L    Alkaline Phosphatase 203 (H) 39 - 117 U/L    Total Bilirubin 1.3 (H) 0.0 - 1.2 mg/dL    eGFR Non African Amer 19 (L) >60 mL/min/1.73    Globulin 3.5 gm/dL    A/G Ratio 1.1 g/dL    BUN/Creatinine Ratio 24.8 7.0 - 25.0    Anion Gap 10.6 5.0 - 15.0 mmol/L   POC Glucose, Blood    Specimen: Blood   Result Value Ref Range    Glucose 109  70 - 130 mg/dL    Lot Number [id number]     Expiration Date 04.14.22    POC Glycosylated Hemoglobin (Hb A1C)    Specimen: Blood   Result Value Ref Range    Hemoglobin A1C 6.6 %    Lot Number [id number]     Expiration Date 05.11.2023      Diagnoses and all orders for this visit:    1. Type 2 diabetes mellitus without complication, with long-term current use of insulin (CMS/[facility]) (Primary)  -     POC Glucose, Blood  -     POC Glycosylated Hemoglobin (Hb A1C)  -     Comprehensive Metabolic Panel  -     Cancel: Lipid Panel  -     Cancel: TSH    2. Diabetes mellitus, type II, insulin dependent (CMS/[facility])  Assessment & Plan:  Blood sugar and 90 day average sugar reviewed  Results for orders placed or performed in visit on 09/08/21   Comprehensive Metabolic Panel    Specimen: Blood   Result Value Ref Range    Glucose 85 65 - 99 mg/dL    BUN 61 (H) 8 - 23 mg/dL    Creatinine 2.46 (H) 0.57 - 1.00 mg/dL    Sodium 143 136 - 145 mmol/L    Potassium 4.8 3.5 - 5.2 mmol/L    Chloride 104 98 - 107 mmol/L    CO2 28.4 22.0 - 29.0 mmol/L    Calcium 9.5 8.6 - 10.5 mg/dL    Total Protein 7.2 6.0 - 8.5 g/dL    Albumin 3.70 3.50 - 5.20 g/dL    ALT (SGPT) 11 1 - 33 U/L    AST (SGOT) 18 1 - 32 U/L    Alkaline Phosphatase 203 (H) 39 - 117 U/L    Total Bilirubin 1.3 (H) 0.0 - 1.2 mg/dL    eGFR Non African Amer 19 (L) >60 mL/min/1.73    Globulin 3.5 gm/dL    A/G Ratio 1.1 g/dL    BUN/Creatinine Ratio 24.8 7.0 - 25.0    Anion Gap 10.6 5.0 - 15.0 mmol/L   POC Glucose, Blood    Specimen: Blood   Result Value Ref Range    Glucose 109 70 - 130 mg/dL    Lot Number [id number]     Expiration Date 04.14.22    POC Glycosylated Hemoglobin (Hb A1C)    Specimen: Blood   Result Value Ref Range    Hemoglobin A1C 6.6 %    Lot Number [id number]     Expiration Date 05.11.2023      Average sugar is 135  Discussed continued reduction in insulin for lighter meals  Reduction for low sugar fasting discussed with her and her daughter  Is utd with eye exam  No callous or  ulcer  Ur alb due next ov   F/u 3-4 months      3. Essential hypertension  Assessment & Plan:  bp is controlled  Continue monitoring and medications      4. Acquired hypothyroidism  Assessment & Plan:  Is on thyroid supplement   Update tsh         5. Hyperlipidemia LDL goal <100  Assessment & Plan:  Is on low fat diet- check flp     Return in about 4 months (around 1/8/2022) for Recheck.    Rani Lane MD  Signed Rani Lane MD

## 2021-09-08 NOTE — PROGRESS NOTES
"The ABCs of the Annual Wellness Visit  Subsequent Medicare Wellness Visit    Chief Complaint   Patient presents with   • Medicare Wellness-subsequent      Subjective    History of Present Illness:  Karla Cristobal is a 86 y.o. female who presents for a Subsequent Medicare Wellness Visit.    The following portions of the patient's history were reviewed and   updated as appropriate: allergies, current medications, past family history, past medical history, past social history, past surgical history and problem list.     Compared to one year ago, the patient feels her physical   health is the same.    Compared to one year ago, the patient feels her mental   health is the same.    Recent Hospitalizations:  She was not admitted to the hospital during the last year.       Current Medical Providers:  Patient Care Team:  Giselle Guzman DO as PCP - General    Outpatient Medications Prior to Visit   Medication Sig Dispense Refill   • atorvastatin (LIPITOR) 20 MG tablet Take 1 tablet by mouth Every Night. 90 tablet 1   • carvedilol (COREG) 25 MG tablet Take 0.5 tablets by mouth 2 (Two) Times a Day With Meals. (Patient taking differently: Take 6.25 mg by mouth 2 (Two) Times a Day With Meals.) 30 tablet 11   • cholecalciferol (VITAMIN D3) 1000 UNITS tablet Take 1,000 Units by mouth Daily.     • Eliquis 2.5 MG tablet tablet      • eszopiclone (LUNESTA) 2 MG tablet TAKE ONE TABLET BY MOUTH IMMEDIATELY BEFORE BEDTIME AS NEEDED FOR SLEEP 30 tablet 1   • furosemide (LASIX) 40 MG tablet Take 40 mg by mouth As Needed.     • indapamide (LOZOL) 2.5 MG tablet      • Insulin Glargine (BASAGLAR KWIKPEN) 100 UNIT/ML injection pen INJECT 20 UNITS UNDER THE SKIN ONCE DAILY AS DIRECTED 15 mL 1   • Insulin Syringe-Needle U-100 (TRUEplus Insulin Syringe) 31G X 5/16\" 0.5 ML misc Use with Insulin Three Times daily 100 each 5   • isosorbide mononitrate (IMDUR) 30 MG 24 hr tablet Take 1 tablet by mouth Daily. (Patient taking differently: Take 30 " mg by mouth Every Morning.) 90 tablet 0   • latanoprost (XALATAN) 0.005 % ophthalmic solution Administer 1 drop to both eyes Every Night.  5   • levothyroxine (SYNTHROID, LEVOTHROID) 100 MCG tablet TAKE ONE TABLET BY MOUTH IN THE MORNING  90 tablet 0   • melatonin 5 MG tablet tablet Take 5 mg by mouth Every Night.     • multivitamins-minerals (PRESERVISION AREDS 2) capsule capsule Take 1 capsule by mouth 2 (Two) Times a Day.     • mupirocin (BACTROBAN) 2 % ointment 1 application As Needed.     • NovoLOG 100 UNIT/ML injection INJECT 15 UNITS UNDER THE SKIN AS DIRECTED 3 TIMES DAILY BEFORE MEALS 20 mL 5   • omeprazole (priLOSEC) 40 MG capsule omeprazole 40 mg capsule,delayed release     • ondansetron (ZOFRAN) 4 MG tablet Take 4 mg by mouth Every 12 (Twelve) Hours As Needed.     • spironolactone (ALDACTONE) 25 MG tablet      • timolol (TIMOPTIC) 0.5 % ophthalmic solution Administer 1 drop to both eyes 2 (Two) Times a Day.       No facility-administered medications prior to visit.       No opioid medication identified on active medication list. I have reviewed chart for other potential  high risk medication/s and harmful drug interactions in the elderly.          Aspirin is not on active medication list.  Aspirin use is not indicated based on review of current medical condition/s. Risk of harm outweighs potential benefits.  .    Patient Active Problem List   Diagnosis   • Anemia due to chronic kidney disease   • Chronic atrial fibrillation (CMS/HCC)   • CKD (chronic kidney disease), stage IV (CMS/HCC)   • Diabetic nephropathy (CMS/HCC)   • Diabetic retinopathy (CMS/HCC)   • Malignant neoplasm of endometrium (CMS/HCC)   • Hyperlipidemia LDL goal <100   • Essential hypertension   • Hypothyroidism   • Insomnia   • Leukocytosis   • Memory impairment   • Nausea   • Pulmonary embolism (CMS/HCC)   • Sleep apnea   • Squamous cell carcinoma of skin   • CHF (NYHA class IV, ACC/AHA stage D) (CMS/HCC)   • Ischemic heart disease   •  "CAD (coronary artery disease)   • DVT of lower extremity (deep venous thrombosis) (CMS/[facility])   • Chronic systolic congestive heart failure (CMS/HCC)   • Morbidly obese (CMS/[facility])   • Ischemic cardiomyopathy   • Diabetes mellitus, type II, insulin dependent (CMS/[facility])   • Exudative age-related macular degeneration, right eye, with active choroidal neovascularization (CMS/[facility])     Advance Care Planning   Advance Directive is not on file.  ACP discussion was held with the patient during this visit. Patient has an advance directive (not in EMR), copy requested.    Review of Systems   Constitutional: Negative for activity change and chills.   HENT: Negative for sinus pressure.    Respiratory: Negative for cough and chest tightness.    Cardiovascular: Negative for chest pain.   Gastrointestinal: Negative for abdominal pain and constipation.   Musculoskeletal: Negative for gait problem and joint swelling.   Neurological: Negative for dizziness and confusion.   Psychiatric/Behavioral: Negative for hallucinations. The patient is not nervous/anxious.         Objective       Vitals:    09/08/21 1525   BP: 138/84   Pulse: 75   SpO2: 97%   Weight: 90.3 kg (199 lb)   Height: 165.1 cm (65\")   PainSc: 0-No pain     BMI Readings from Last 1 Encounters:   09/08/21 33.12 kg/m²   BMI is above normal parameters. Recommendations include: none (medical contraindication)    Does the patient have evidence of cognitive impairment? No    Physical Exam  Lab Results   Component Value Date    HGBA1C 6.6 09/08/2021            HEALTH RISK ASSESSMENT    Smoking Status:  Social History     Tobacco Use   Smoking Status Never Smoker   Smokeless Tobacco Never Used     Alcohol Consumption:  Social History     Substance and Sexual Activity   Alcohol Use No     Fall Risk Screen:    KARLAADI Fall Risk Assessment has not been completed.    Depression Screening:  PHQ-2/PHQ-9 Depression Screening 9/8/2021   Little interest or pleasure in doing things 0   Feeling " down, depressed, or hopeless 0   Total Score 0       Health Habits and Functional and Cognitive Screening:  Functional & Cognitive Status 9/8/2021   Do you have difficulty preparing food and eating? No   Do you have difficulty bathing yourself, getting dressed or grooming yourself? No   Do you have difficulty using the toilet? No   Do you have difficulty moving around from place to place? No   Do you have trouble with steps or getting out of a bed or a chair? Yes   Current Diet Well Balanced Diet   Dental Exam Not up to date   Eye Exam Up to date   Exercise (times per week) 0 times per week   Current Exercises Include No Regular Exercise   Current Exercise Activities Include -   Do you need help using the phone?  No   Are you deaf or do you have serious difficulty hearing?  No   Do you need help with transportation? Yes   Do you need help shopping? Yes   Do you need help preparing meals?  No   Do you need help with housework?  Yes   Do you need help with laundry? Yes   Do you need help taking your medications? No   Do you need help managing money? No   Do you ever drive or ride in a car without wearing a seat belt? No   Have you felt unusual stress, anger or loneliness in the last month? No   Who do you live with? Other   If you need help, do you have trouble finding someone available to you? No   Have you been bothered in the last four weeks by sexual problems? No   Do you have difficulty concentrating, remembering or making decisions? No       Age-appropriate Screening Schedule:  Refer to the list below for future screening recommendations based on patient's age, sex and/or medical conditions. Orders for these recommended tests are listed in the plan section. The patient has been provided with a written plan.    Health Maintenance   Topic Date Due   • DXA SCAN  Never done   • ZOSTER VACCINE (2 of 2) 05/08/2017   • URINE MICROALBUMIN  12/16/2020   • INFLUENZA VACCINE  10/01/2021   • DIABETIC EYE EXAM  02/25/2022    • HEMOGLOBIN A1C  03/08/2022   • LIPID PANEL  03/26/2022   • TDAP/TD VACCINES (3 - Td or Tdap) 11/29/2027   • MAMMOGRAM  Discontinued              Assessment/Plan     CMS Preventative Services Quick Reference  Risk Factors Identified During Encounter  Fall Risk-High or Moderate  Obesity/Overweight   The above risks/problems have been discussed with the patient.  Follow up actions/plans if indicated are seen below in the Assessment/Plan Section.  Pertinent information has been shared with the patient in the After Visit Summary.    Diagnoses and all orders for this visit:    1. Medicare annual wellness visit, subsequent (Primary)  Done today  2. Coronary artery disease involving native coronary artery of native heart without angina pectoris  Continue Lipitor 20 mg po qhs  3. Chronic atrial fibrillation (CMS/HCC)  Continue Eluiq 2.5 mg po qd  4. CHF (NYHA class IV, ACC/AHA stage D) (CMS/[facility])  Continue lasix 40 mg po qd and coreg 12.5 m po bid  5. Essential hypertension  -     CBC & Differential; Future  -     Microalbumin / Creatinine Urine Ratio - Urine, Clean Catch  Continue coreg 12. 5 mg po bid  6. Hyperlipidemia LDL goal <100  Continue Lipitor 20 mg po qhs  7. Ischemic cardiomyopathy  Echo UTD  8. Ischemic heart disease  Continue Lipitor 20 mg po qhs  9. Acute deep vein thrombosis (DVT) of right lower extremity, unspecified vein (CMS/HCC)  On eliquis 2.5 mg po bid  10. Acquired hypothyroidism  Continue synthroid 100 mcg po qd  11. CKD (chronic kidney disease), stage IV (CMS/HCC)  Continue to follow with Nephrology  12. Diabetic nephropathy associated with diabetes mellitus due to underlying condition (CMS/HCC)  Follows with Endo  13. Anemia due to stage 4 chronic kidney disease (CMS/HCC)  Follows with nephrology, stable  14. Malignant neoplasm of endometrium (CMS/HCC)    15. Memory impairment  stable  16. Psychophysiological insomnia  Continue Lunesta 2 mg po qhs  17. Diabetes mellitus, type II, insulin  dependent (CMS/HCC)  Continue Basaglar and Novolog. Follows with endo  18. Exudative age-related macular degeneration, right eye, with active choroidal neovascularization (CMS/HCC)  follows with opth  19. Other acute pulmonary embolism without acute cor pulmonale (CMS/HCC)  On Eliquis  20. Morbidly obese (CMS/HCC)  Low carb diet  21. Postmenopause  -     DEXA Bone Density Axial; Future        Follow Up:   No follow-ups on file.     An After Visit Summary and PPPS were given to the patient.

## 2021-09-09 ENCOUNTER — HOSPITAL ENCOUNTER (EMERGENCY)
Age: 86
Discharge: HOME | End: 2021-09-09
Payer: MEDICARE

## 2021-09-09 ENCOUNTER — TELEPHONE (OUTPATIENT)
Dept: INTERNAL MEDICINE | Facility: CLINIC | Age: 86
End: 2021-09-09

## 2021-09-09 VITALS — BODY MASS INDEX: 34.1 KG/M2

## 2021-09-09 VITALS
DIASTOLIC BLOOD PRESSURE: 68 MMHG | TEMPERATURE: 97.88 F | OXYGEN SATURATION: 96 % | HEART RATE: 72 BPM | SYSTOLIC BLOOD PRESSURE: 137 MMHG | RESPIRATION RATE: 18 BRPM

## 2021-09-09 VITALS
SYSTOLIC BLOOD PRESSURE: 137 MMHG | HEART RATE: 72 BPM | TEMPERATURE: 97.8 F | DIASTOLIC BLOOD PRESSURE: 68 MMHG | OXYGEN SATURATION: 96 % | RESPIRATION RATE: 18 BRPM

## 2021-09-09 DIAGNOSIS — R07.89: Primary | ICD-10-CM

## 2021-09-09 DIAGNOSIS — N30.00: ICD-10-CM

## 2021-09-09 DIAGNOSIS — R79.89 ABNORMAL CBC: Primary | ICD-10-CM

## 2021-09-09 LAB
ACANTHOCYTES BLD QL SMEAR: NORMAL
ANISOCYTOSIS BLD QL: NORMAL
BASOPHILS # BLD MANUAL: 0.08 10*3/MM3 (ref 0–0.2)
BASOPHILS NFR BLD AUTO: 1 % (ref 0–1.5)
BURR CELLS BLD QL SMEAR: NORMAL
DACRYOCYTES BLD QL SMEAR: NORMAL
DEPRECATED RDW RBC AUTO: 66 FL (ref 37–54)
EOSINOPHIL # BLD MANUAL: 0.32 10*3/MM3 (ref 0–0.4)
EOSINOPHIL NFR BLD MANUAL: 4.1 % (ref 0.3–6.2)
ERYTHROCYTE [DISTWIDTH] IN BLOOD BY AUTOMATED COUNT: 20.4 % (ref 12.3–15.4)
HCT VFR BLD AUTO: 43 % (ref 34–46.6)
HGB BLD-MCNC: 12.7 G/DL (ref 12–15.9)
LYMPHOCYTES # BLD MANUAL: 1.75 10*3/MM3 (ref 0.7–3.1)
LYMPHOCYTES NFR BLD MANUAL: 22.4 % (ref 19.6–45.3)
LYMPHOCYTES NFR BLD MANUAL: 7.1 % (ref 5–12)
MACROCYTES BLD QL SMEAR: NORMAL
MCH RBC QN AUTO: 26.3 PG (ref 26.6–33)
MCHC RBC AUTO-ENTMCNC: 29.5 G/DL (ref 31.5–35.7)
MCV RBC AUTO: 89.2 FL (ref 79–97)
MONOCYTES # BLD AUTO: 0.55 10*3/MM3 (ref 0.1–0.9)
NEUTROPHILS # BLD AUTO: 5.1 10*3/MM3 (ref 1.7–7)
NEUTROPHILS NFR BLD MANUAL: 65.3 % (ref 42.7–76)
OVALOCYTES BLD QL SMEAR: NORMAL
PH,URINE: 6.5 (ref 5–8.5)
PLAT MORPH BLD: NORMAL
PLATELET # BLD AUTO: 137 10*3/MM3 (ref 140–450)
POIKILOCYTOSIS BLD QL SMEAR: NORMAL
POLYCHROMASIA BLD QL SMEAR: NORMAL
PROTEIN,URINE: (no result)
RBC # BLD AUTO: 4.82 10*6/MM3 (ref 3.77–5.28)
ROULEAUX BLD QL SMEAR: NORMAL
SPECIFIC GRAVITY, URINE: 1.02 (ref 1–1.03)
T4 FREE SERPL-MCNC: 1.63 NG/DL (ref 0.93–1.7)
TSH SERPL DL<=0.05 MIU/L-ACNC: 5.11 UIU/ML (ref 0.27–4.2)
UROBILINOGEN,URINE: 0.2 EU/DL
UTC LEUKOCYTE ESTERASE,URINE: (no result)
WBC # BLD AUTO: 7.81 10*3/MM3 (ref 3.4–10.8)
WBC MORPH BLD: NORMAL

## 2021-09-09 PROCEDURE — G0463 HOSPITAL OUTPT CLINIC VISIT: HCPCS

## 2021-09-09 PROCEDURE — 99202 OFFICE O/P NEW SF 15 MIN: CPT

## 2021-09-09 PROCEDURE — 96372 THER/PROPH/DIAG INJ SC/IM: CPT

## 2021-09-09 PROCEDURE — 87086 URINE CULTURE/COLONY COUNT: CPT

## 2021-09-09 PROCEDURE — 81003 URINALYSIS AUTO W/O SCOPE: CPT

## 2021-09-09 PROCEDURE — 87186 SC STD MICRODIL/AGAR DIL: CPT

## 2021-09-09 PROCEDURE — 87088 URINE BACTERIA CULTURE: CPT

## 2021-09-09 NOTE — ASSESSMENT & PLAN NOTE
Blood sugar and 90 day average sugar reviewed  Results for orders placed or performed in visit on 09/08/21   Comprehensive Metabolic Panel    Specimen: Blood   Result Value Ref Range    Glucose 85 65 - 99 mg/dL    BUN 61 (H) 8 - 23 mg/dL    Creatinine 2.46 (H) 0.57 - 1.00 mg/dL    Sodium 143 136 - 145 mmol/L    Potassium 4.8 3.5 - 5.2 mmol/L    Chloride 104 98 - 107 mmol/L    CO2 28.4 22.0 - 29.0 mmol/L    Calcium 9.5 8.6 - 10.5 mg/dL    Total Protein 7.2 6.0 - 8.5 g/dL    Albumin 3.70 3.50 - 5.20 g/dL    ALT (SGPT) 11 1 - 33 U/L    AST (SGOT) 18 1 - 32 U/L    Alkaline Phosphatase 203 (H) 39 - 117 U/L    Total Bilirubin 1.3 (H) 0.0 - 1.2 mg/dL    eGFR Non African Amer 19 (L) >60 mL/min/1.73    Globulin 3.5 gm/dL    A/G Ratio 1.1 g/dL    BUN/Creatinine Ratio 24.8 7.0 - 25.0    Anion Gap 10.6 5.0 - 15.0 mmol/L   POC Glucose, Blood    Specimen: Blood   Result Value Ref Range    Glucose 109 70 - 130 mg/dL    Lot Number [id number]     Expiration Date 04.14.22    POC Glycosylated Hemoglobin (Hb A1C)    Specimen: Blood   Result Value Ref Range    Hemoglobin A1C 6.6 %    Lot Number [id number]     Expiration Date 05.11.2023      Average sugar is 135  Discussed continued reduction in insulin for lighter meals  Reduction for low sugar fasting discussed with her and her daughter  Is utd with eye exam  No callous or ulcer  Ur alb due next ov   F/u 3-4 months

## 2021-09-09 NOTE — TELEPHONE ENCOUNTER
PATIENT RETURNED PHONE CALL REGARDING LAB RESULTS    PLEASE ADVISE   DAUGHTER MODESTO [phone]

## 2021-09-09 NOTE — TELEPHONE ENCOUNTER
----- Message from Giselle Guzman, DO sent at 9/9/2021  9:01 AM EDT -----  Platelets are decreased. Would like her to have a cbc  repeated in 4 weeks.

## 2021-09-10 RX ORDER — LEVOTHYROXINE SODIUM 112 UG/1
112 TABLET ORAL DAILY
Qty: 90 TABLET | Refills: 1 | Status: SHIPPED | OUTPATIENT
Start: 2021-09-10 | End: 2022-01-01 | Stop reason: SDUPTHER

## 2021-09-13 ENCOUNTER — TELEPHONE (OUTPATIENT)
Dept: INTERNAL MEDICINE | Facility: CLINIC | Age: 86
End: 2021-09-13

## 2021-09-13 DIAGNOSIS — N39.0 URINARY TRACT INFECTION WITH HEMATURIA, SITE UNSPECIFIED: Primary | ICD-10-CM

## 2021-09-13 DIAGNOSIS — R31.9 URINARY TRACT INFECTION WITH HEMATURIA, SITE UNSPECIFIED: Primary | ICD-10-CM

## 2021-09-13 RX ORDER — CIPROFLOXACIN 250 MG/1
250 TABLET, FILM COATED ORAL 2 TIMES DAILY
Qty: 14 TABLET | Refills: 0 | Status: SHIPPED | OUTPATIENT
Start: 2021-09-13 | End: 2021-11-19 | Stop reason: HOSPADM

## 2021-09-13 NOTE — TELEPHONE ENCOUNTER
Spoke with Dr. Guzman and per order sent in Cipro 250 bid X 7 days to [facility].  Instructed to stop taking Ceftnir.  Explained need for repeat urine in 1 week to daughter.  Lab order mailed to daughter at [address] [facility], [place] [id number].

## 2021-09-13 NOTE — TELEPHONE ENCOUNTER
Caller: SundeepleonoraArchie    Relationship: Emergency Contact    Best call back number: [phone]     Caller requesting test results: DAUGHTER ARCHIE    What test was performed: URINALYSIS    When was the test performed: 09/09/2021  Where was the test performed: [facility]   Additional notes: THE PATIENT'S DAUGHTER IS REQUESTING A CALL BACK TO SEE IF THE RESULTS WERE SENT TO DR MARTINEZ.    PLEASE CALL ARCHIE AND ADVISE -[phone].

## 2021-09-15 ENCOUNTER — TELEPHONE (OUTPATIENT)
Dept: INTERNAL MEDICINE | Facility: CLINIC | Age: 86
End: 2021-09-15

## 2021-09-15 NOTE — TELEPHONE ENCOUNTER
Caller: Archie Vazquez    Relationship: Emergency Contact    Best call back number: [phone]    What medication are you requesting: SOMETHING FOR COUGH    What are your current symptoms: COUGH AND DRAINAGE  How long have you been experiencing symptoms: 3 DAYS    If a prescription is needed, what is your preferred pharmacy and phone number: [facility] - [place], [place] - [address] - [phone] [facility] [phone]      Additional notes:PATIENT HAS A BAD COUGH WITH DRAINAGE. OTHER THAN THAT SHE FEELS OK SO THEY DON'T BELIEVE IT IS COVID. PLEASE ADVISE AND CALL ARCHIE BACK

## 2021-09-17 ENCOUNTER — TELEPHONE (OUTPATIENT)
Dept: INTERNAL MEDICINE | Facility: CLINIC | Age: 86
End: 2021-09-17

## 2021-09-17 NOTE — TELEPHONE ENCOUNTER
Caller: Frida Vazquez    Relationship: Emergency Contact    Best call back number: [phone]    What is the best time to reach you: ANYTIME    Who are you requesting to speak with (clinical staff, provider,  specific staff member): CLINICAL    What was the call regarding: PATIENT'S DAUGHTER CALLED AND STATED THAT HER MOTHER WOULD LIKE TO KEEP THE GLUCOSE MONITOR THAT GOES ON HER ARM. SHE'S HAD IT FOR TWO WEEKS AND IT'S TIME FOR ANOTHER    Do you require a callback: YES

## 2021-09-20 DIAGNOSIS — F51.01 PRIMARY INSOMNIA: ICD-10-CM

## 2021-09-21 ENCOUNTER — HOSPITAL ENCOUNTER (EMERGENCY)
Dept: HOSPITAL 22 - UTC | Age: 86
Discharge: HOME | End: 2021-09-21
Payer: MEDICARE

## 2021-09-21 VITALS
TEMPERATURE: 97.34 F | OXYGEN SATURATION: 96 % | RESPIRATION RATE: 16 BRPM | SYSTOLIC BLOOD PRESSURE: 142 MMHG | DIASTOLIC BLOOD PRESSURE: 74 MMHG | HEART RATE: 72 BPM

## 2021-09-21 VITALS
DIASTOLIC BLOOD PRESSURE: 76 MMHG | SYSTOLIC BLOOD PRESSURE: 142 MMHG | HEART RATE: 73 BPM | RESPIRATION RATE: 16 BRPM | OXYGEN SATURATION: 98 % | TEMPERATURE: 98.9 F

## 2021-09-21 VITALS — BODY MASS INDEX: 34.1 KG/M2 | BODY MASS INDEX: 33.9 KG/M2

## 2021-09-21 VITALS
OXYGEN SATURATION: 95 % | DIASTOLIC BLOOD PRESSURE: 85 MMHG | SYSTOLIC BLOOD PRESSURE: 144 MMHG | RESPIRATION RATE: 18 BRPM | HEART RATE: 75 BPM | TEMPERATURE: 98.42 F

## 2021-09-21 DIAGNOSIS — N18.9: ICD-10-CM

## 2021-09-21 DIAGNOSIS — Z20.822: ICD-10-CM

## 2021-09-21 DIAGNOSIS — J18.9: Primary | ICD-10-CM

## 2021-09-21 DIAGNOSIS — Z79.899: ICD-10-CM

## 2021-09-21 DIAGNOSIS — I50.9: ICD-10-CM

## 2021-09-21 LAB
ALBUMIN LEVEL: 3.5 G/DL (ref 3.5–5)
ALBUMIN/GLOB SERPL: 1 {RATIO} (ref 1.1–1.8)
ALP ISO SERPL-ACNC: 207 U/L (ref 38–126)
ALT SERPLBLD-CCNC: 13 U/L (ref 12–78)
ANION GAP SERPL CALC-SCNC: 15.4 MEQ/L (ref 5–15)
AST SERPL QL: 25 U/L (ref 14–36)
BILIRUBIN,TOTAL: 1.3 MG/DL (ref 0.2–1.3)
BUN SERPL-MCNC: 73 MG/DL (ref 7–17)
CALCIUM SPEC-MCNC: 8.9 MG/DL (ref 8.4–10.2)
CHLORIDE SPEC-SCNC: 103 MMOL/L (ref 98–107)
CO2 SERPL-SCNC: 28 MMOL/L (ref 22–30)
CREAT BLD-SCNC: 2.2 MG/DL (ref 0.52–1.04)
CREATININE CLEARANCE ESTIMATED: 27 ML/MIN (ref 50–200)
ESTIMATED GLOMERULAR FILT RATE: 21 ML/MIN (ref 60–?)
GFR (AFRICAN AMERICAN): 26 ML/MIN (ref 60–?)
GLOBULIN SER CALC-MCNC: 3.4 G/DL (ref 1.3–3.2)
GLUCOSE: 101 MG/DL (ref 74–100)
HCT VFR BLD CALC: 42.2 % (ref 37–47)
HGB BLD-MCNC: 13 G/DL (ref 12.2–16.2)
MCHC RBC-ENTMCNC: 30.8 G/DL (ref 31.8–35.4)
MCV RBC: 93.4 FL (ref 81–99)
MEAN CORPUSCULAR HEMOGLOBIN: 28.8 PG (ref 27–31.2)
NT PRO BRAIN NATRIURETIC PEP.: 4450 PG/ML (ref 0–450)
PLATELET # BLD: 114 K/MM3 (ref 142–424)
POTASSIUM: 4.4 MMOL/L (ref 3.5–5.1)
PROT SERPL-MCNC: 6.9 G/DL (ref 6.3–8.2)
RBC # BLD AUTO: 4.51 M/MM3 (ref 4.2–5.4)
SODIUM SPEC-SCNC: 142 MMOL/L (ref 136–145)
TROPONIN I: 0.02 NG/ML (ref 0–0.03)
WBC # BLD AUTO: 6.3 K/MM3 (ref 4.8–10.8)

## 2021-09-21 PROCEDURE — C9803 HOPD COVID-19 SPEC COLLECT: HCPCS

## 2021-09-21 PROCEDURE — 99282 EMERGENCY DEPT VISIT SF MDM: CPT

## 2021-09-21 PROCEDURE — 80053 COMPREHEN METABOLIC PANEL: CPT

## 2021-09-21 PROCEDURE — 83880 ASSAY OF NATRIURETIC PEPTIDE: CPT

## 2021-09-21 PROCEDURE — 85025 COMPLETE CBC W/AUTO DIFF WBC: CPT

## 2021-09-21 PROCEDURE — U0003 INFECTIOUS AGENT DETECTION BY NUCLEIC ACID (DNA OR RNA); SEVERE ACUTE RESPIRATORY SYNDROME CORONAVIRUS 2 (SARS-COV-2) (CORONAVIRUS DISEASE [COVID-19]), AMPLIFIED PROBE TECHNIQUE, MAKING USE OF HIGH THROUGHPUT TECHNOLOGIES AS DESCRIBED BY CMS-2020-01-R: HCPCS

## 2021-09-21 PROCEDURE — 84484 ASSAY OF TROPONIN QUANT: CPT

## 2021-09-21 PROCEDURE — 71046 X-RAY EXAM CHEST 2 VIEWS: CPT

## 2021-09-21 PROCEDURE — U0005 INFEC AGEN DETEC AMPLI PROBE: HCPCS

## 2021-09-21 RX ORDER — ESZOPICLONE 2 MG/1
TABLET, FILM COATED ORAL
Qty: 30 TABLET | Refills: 0 | Status: SHIPPED | OUTPATIENT
Start: 2021-09-21 | End: 2021-10-13

## 2021-09-21 NOTE — TELEPHONE ENCOUNTER
Last Office Visit: 09/08/21  Next Office Visit:03/09/2022    Labs completed in past 6 months? yes  Labs completed in past year? yes    Last Refill Date: 07/26/21  Quantity:30  Refills:1    Pharmacy:     Please review pended refill request for any changes needed on refills or quantities. Thank you!

## 2021-09-21 NOTE — TELEPHONE ENCOUNTER
Patient's daughter called and stated patient would like to keep her glucose monitor and have it prescribed. Stated she has had it for 2 weeks and really likes it.

## 2021-09-23 ENCOUNTER — TELEPHONE (OUTPATIENT)
Dept: ENDOCRINOLOGY | Facility: CLINIC | Age: 86
End: 2021-09-23

## 2021-09-29 LAB
APPEARANCE UR: CLEAR
BACTERIA #/AREA URNS HPF: NORMAL /[HPF]
BACTERIA UR CULT: NORMAL
BACTERIA UR CULT: NORMAL
BILIRUB UR QL STRIP: NEGATIVE
CASTS URNS QL MICRO: NORMAL /LPF
COLOR UR: YELLOW
EPI CELLS #/AREA URNS HPF: NORMAL /HPF (ref 0–10)
GLUCOSE UR QL: NEGATIVE
HGB UR QL STRIP: NEGATIVE
KETONES UR QL STRIP: NEGATIVE
LEUKOCYTE ESTERASE UR QL STRIP: ABNORMAL
MICRO URNS: ABNORMAL
NITRITE UR QL STRIP: NEGATIVE
PH UR STRIP: 6.5 [PH] (ref 5–7.5)
PROT UR QL STRIP: NEGATIVE
RBC #/AREA URNS HPF: NORMAL /HPF (ref 0–2)
SP GR UR: 1.01 (ref 1–1.03)
URINALYSIS REFLEX: ABNORMAL
UROBILINOGEN UR STRIP-MCNC: 1 MG/DL (ref 0.2–1)
WBC #/AREA URNS HPF: NORMAL /HPF (ref 0–5)

## 2021-09-30 NOTE — TELEPHONE ENCOUNTER
Reponse from [facility]:  The patient does not qualify for a CG under the patients' specific insurance requirements

## 2021-10-04 ENCOUNTER — TELEPHONE (OUTPATIENT)
Dept: INTERNAL MEDICINE | Facility: CLINIC | Age: 86
End: 2021-10-04

## 2021-10-04 NOTE — TELEPHONE ENCOUNTER
Left message for patient to return call to office regarding urine culture results.  Office Phone number given

## 2021-10-04 NOTE — TELEPHONE ENCOUNTER
----- Message from Giselle Guzman DO sent at 10/4/2021  9:01 AM EDT -----  Culture mixed sebas (okay). If sxs come back will need retest

## 2021-10-07 ENCOUNTER — TELEPHONE (OUTPATIENT)
Dept: ENDOCRINOLOGY | Facility: CLINIC | Age: 86
End: 2021-10-07

## 2021-10-07 NOTE — TELEPHONE ENCOUNTER
PT'S DAUGHTER CAME BY THE OFFICE TODAY TO SEE IF WE COULD DO SOMETHING TO LOWER THE COST OF THIS PT'S ANNAMARIA SENSORS. SHE STATED [facility] IS CHARGING $140 PER MONTH. IF THERE'S ANYTHING WE CAN DO PLEASE LOOK INTO THIS AND REACH OUT TO PT'S DAUGHTER REGARDLESS. THANK YOU

## 2021-10-07 NOTE — TELEPHONE ENCOUNTER
Contacted PT daughter and le her know that I have sent her Rx request to [facility]. With medicare, it must go through a DME company. They should hear back her them in a few days on cost

## 2021-10-12 DIAGNOSIS — F51.01 PRIMARY INSOMNIA: ICD-10-CM

## 2021-10-13 RX ORDER — ESZOPICLONE 2 MG/1
TABLET, FILM COATED ORAL
Qty: 30 TABLET | Refills: 2 | Status: SHIPPED | OUTPATIENT
Start: 2021-10-13 | End: 2022-01-01

## 2021-10-13 NOTE — TELEPHONE ENCOUNTER
Last Office Visit: 09/08/21  Next Office Visit:03/09/2022    Labs completed in past 6 months? yes  Labs completed in past year? yes    Last Refill Date:09/21/21  Quantity:30  Refills:0    Pharmacy:     Please review pended refill request for any changes needed on refills or quantities. Thank you!

## 2021-11-15 ENCOUNTER — HOSPITAL ENCOUNTER (OUTPATIENT)
Facility: HOSPITAL | Age: 86
Discharge: HOME HEALTH | End: 2021-11-19
Attending: EMERGENCY MEDICINE | Admitting: INTERNAL MEDICINE

## 2021-11-15 ENCOUNTER — APPOINTMENT (OUTPATIENT)
Dept: GENERAL RADIOLOGY | Facility: HOSPITAL | Age: 86
End: 2021-11-15

## 2021-11-15 ENCOUNTER — APPOINTMENT (OUTPATIENT)
Dept: CT IMAGING | Facility: HOSPITAL | Age: 86
End: 2021-11-15

## 2021-11-15 DIAGNOSIS — Z74.09 IMPAIRED FUNCTIONAL MOBILITY, BALANCE, GAIT, AND ENDURANCE: ICD-10-CM

## 2021-11-15 DIAGNOSIS — N18.9 CHRONIC KIDNEY DISEASE, UNSPECIFIED CKD STAGE: ICD-10-CM

## 2021-11-15 DIAGNOSIS — K22.70 BARRETT'S ESOPHAGUS WITHOUT DYSPLASIA: ICD-10-CM

## 2021-11-15 DIAGNOSIS — I25.5 ISCHEMIC CARDIOMYOPATHY: ICD-10-CM

## 2021-11-15 DIAGNOSIS — Z86.39 HISTORY OF INSULIN DEPENDENT DIABETES MELLITUS: ICD-10-CM

## 2021-11-15 DIAGNOSIS — I48.20 CHRONIC A-FIB (HCC): ICD-10-CM

## 2021-11-15 DIAGNOSIS — R09.02 HYPOXIA: ICD-10-CM

## 2021-11-15 DIAGNOSIS — E66.01 MORBIDLY OBESE (HCC): ICD-10-CM

## 2021-11-15 DIAGNOSIS — E11.9 DIABETES MELLITUS, TYPE II, INSULIN DEPENDENT (HCC): ICD-10-CM

## 2021-11-15 DIAGNOSIS — I50.84 CHF (NYHA CLASS IV, ACC/AHA STAGE D) (HCC): ICD-10-CM

## 2021-11-15 DIAGNOSIS — Z79.01 ANTICOAGULATED: ICD-10-CM

## 2021-11-15 DIAGNOSIS — L03.116 CELLULITIS OF LEFT LOWER EXTREMITY: ICD-10-CM

## 2021-11-15 DIAGNOSIS — T18.128A FOOD IMPACTION OF ESOPHAGUS, INITIAL ENCOUNTER: ICD-10-CM

## 2021-11-15 DIAGNOSIS — L03.116 LEFT LEG CELLULITIS: Primary | ICD-10-CM

## 2021-11-15 DIAGNOSIS — I82.402 ACUTE DEEP VEIN THROMBOSIS (DVT) OF LEFT LOWER EXTREMITY, UNSPECIFIED VEIN (HCC): ICD-10-CM

## 2021-11-15 DIAGNOSIS — I50.22 CHRONIC SYSTOLIC CONGESTIVE HEART FAILURE (HCC): ICD-10-CM

## 2021-11-15 DIAGNOSIS — R13.14 PHARYNGOESOPHAGEAL DYSPHAGIA: ICD-10-CM

## 2021-11-15 DIAGNOSIS — E08.311 DIABETIC RETINOPATHY OF BOTH EYES WITH MACULAR EDEMA ASSOCIATED WITH DIABETES MELLITUS DUE TO UNDERLYING CONDITION, UNSPECIFIED RETINOPATHY SEVERITY (HCC): ICD-10-CM

## 2021-11-15 DIAGNOSIS — Z79.4 DIABETES MELLITUS, TYPE II, INSULIN DEPENDENT (HCC): ICD-10-CM

## 2021-11-15 DIAGNOSIS — E08.21 DIABETIC NEPHROPATHY ASSOCIATED WITH DIABETES MELLITUS DUE TO UNDERLYING CONDITION (HCC): ICD-10-CM

## 2021-11-15 LAB
ALBUMIN SERPL-MCNC: 3.3 G/DL (ref 3.5–5.2)
ALBUMIN/GLOB SERPL: 0.9 G/DL
ALP SERPL-CCNC: 236 U/L (ref 39–117)
ALT SERPL W P-5'-P-CCNC: 8 U/L (ref 1–33)
ANION GAP SERPL CALCULATED.3IONS-SCNC: 11 MMOL/L (ref 5–15)
AST SERPL-CCNC: 15 U/L (ref 1–32)
BASOPHILS # BLD AUTO: 0.05 10*3/MM3 (ref 0–0.2)
BASOPHILS NFR BLD AUTO: 0.9 % (ref 0–1.5)
BILIRUB SERPL-MCNC: 1.1 MG/DL (ref 0–1.2)
BUN SERPL-MCNC: 66 MG/DL (ref 8–23)
BUN/CREAT SERPL: 28.1 (ref 7–25)
CALCIUM SPEC-SCNC: 9 MG/DL (ref 8.6–10.5)
CHLORIDE SERPL-SCNC: 105 MMOL/L (ref 98–107)
CO2 SERPL-SCNC: 28 MMOL/L (ref 22–29)
CREAT SERPL-MCNC: 2.35 MG/DL (ref 0.57–1)
DEPRECATED RDW RBC AUTO: 51.3 FL (ref 37–54)
EOSINOPHIL # BLD AUTO: 0.17 10*3/MM3 (ref 0–0.4)
EOSINOPHIL NFR BLD AUTO: 3 % (ref 0.3–6.2)
ERYTHROCYTE [DISTWIDTH] IN BLOOD BY AUTOMATED COUNT: 15.2 % (ref 12.3–15.4)
GFR SERPL CREATININE-BSD FRML MDRD: 20 ML/MIN/1.73
GLOBULIN UR ELPH-MCNC: 3.6 GM/DL
GLUCOSE SERPL-MCNC: 111 MG/DL (ref 65–99)
HCT VFR BLD AUTO: 41.8 % (ref 34–46.6)
HGB BLD-MCNC: 13.3 G/DL (ref 12–15.9)
IMM GRANULOCYTES # BLD AUTO: 0.01 10*3/MM3 (ref 0–0.05)
IMM GRANULOCYTES NFR BLD AUTO: 0.2 % (ref 0–0.5)
LYMPHOCYTES # BLD AUTO: 1.76 10*3/MM3 (ref 0.7–3.1)
LYMPHOCYTES NFR BLD AUTO: 30.9 % (ref 19.6–45.3)
MCH RBC QN AUTO: 29.2 PG (ref 26.6–33)
MCHC RBC AUTO-ENTMCNC: 31.8 G/DL (ref 31.5–35.7)
MCV RBC AUTO: 91.7 FL (ref 79–97)
MONOCYTES # BLD AUTO: 0.54 10*3/MM3 (ref 0.1–0.9)
MONOCYTES NFR BLD AUTO: 9.5 % (ref 5–12)
NEUTROPHILS NFR BLD AUTO: 3.17 10*3/MM3 (ref 1.7–7)
NEUTROPHILS NFR BLD AUTO: 55.5 % (ref 42.7–76)
NRBC BLD AUTO-RTO: 0 /100 WBC (ref 0–0.2)
NT-PROBNP SERPL-MCNC: 4108 PG/ML (ref 0–1800)
PLATELET # BLD AUTO: 133 10*3/MM3 (ref 140–450)
PMV BLD AUTO: 12.4 FL (ref 6–12)
POTASSIUM SERPL-SCNC: 4.1 MMOL/L (ref 3.5–5.2)
PROCALCITONIN SERPL-MCNC: 0.09 NG/ML (ref 0–0.25)
PROT SERPL-MCNC: 6.9 G/DL (ref 6–8.5)
RBC # BLD AUTO: 4.56 10*6/MM3 (ref 3.77–5.28)
SODIUM SERPL-SCNC: 144 MMOL/L (ref 136–145)
WBC # BLD AUTO: 5.7 10*3/MM3 (ref 3.4–10.8)

## 2021-11-15 PROCEDURE — 83880 ASSAY OF NATRIURETIC PEPTIDE: CPT | Performed by: PHYSICIAN ASSISTANT

## 2021-11-15 PROCEDURE — 87040 BLOOD CULTURE FOR BACTERIA: CPT | Performed by: PHYSICIAN ASSISTANT

## 2021-11-15 PROCEDURE — 73700 CT LOWER EXTREMITY W/O DYE: CPT

## 2021-11-15 PROCEDURE — 99220 PR INITIAL OBSERVATION CARE/DAY 70 MINUTES: CPT | Performed by: INTERNAL MEDICINE

## 2021-11-15 PROCEDURE — 87186 SC STD MICRODIL/AGAR DIL: CPT | Performed by: PHYSICIAN ASSISTANT

## 2021-11-15 PROCEDURE — 96365 THER/PROPH/DIAG IV INF INIT: CPT

## 2021-11-15 PROCEDURE — 73590 X-RAY EXAM OF LOWER LEG: CPT

## 2021-11-15 PROCEDURE — 99284 EMERGENCY DEPT VISIT MOD MDM: CPT

## 2021-11-15 PROCEDURE — 80053 COMPREHEN METABOLIC PANEL: CPT | Performed by: PHYSICIAN ASSISTANT

## 2021-11-15 PROCEDURE — 25010000002 DAPTOMYCIN PER 1 MG: Performed by: PHYSICIAN ASSISTANT

## 2021-11-15 PROCEDURE — 87147 CULTURE TYPE IMMUNOLOGIC: CPT | Performed by: PHYSICIAN ASSISTANT

## 2021-11-15 PROCEDURE — 87150 DNA/RNA AMPLIFIED PROBE: CPT | Performed by: PHYSICIAN ASSISTANT

## 2021-11-15 PROCEDURE — 87636 SARSCOV2 & INF A&B AMP PRB: CPT | Performed by: INTERNAL MEDICINE

## 2021-11-15 PROCEDURE — 83605 ASSAY OF LACTIC ACID: CPT | Performed by: PHYSICIAN ASSISTANT

## 2021-11-15 PROCEDURE — 84145 PROCALCITONIN (PCT): CPT | Performed by: PHYSICIAN ASSISTANT

## 2021-11-15 PROCEDURE — C9803 HOPD COVID-19 SPEC COLLECT: HCPCS

## 2021-11-15 PROCEDURE — 85025 COMPLETE CBC W/AUTO DIFF WBC: CPT | Performed by: PHYSICIAN ASSISTANT

## 2021-11-15 PROCEDURE — G0378 HOSPITAL OBSERVATION PER HR: HCPCS

## 2021-11-15 RX ORDER — DEXTROSE MONOHYDRATE 25 G/50ML
25 INJECTION, SOLUTION INTRAVENOUS
Status: DISCONTINUED | OUTPATIENT
Start: 2021-11-15 | End: 2021-11-19 | Stop reason: HOSPADM

## 2021-11-15 RX ORDER — NICOTINE POLACRILEX 4 MG
15 LOZENGE BUCCAL
Status: DISCONTINUED | OUTPATIENT
Start: 2021-11-15 | End: 2021-11-19 | Stop reason: HOSPADM

## 2021-11-15 RX ADMIN — DAPTOMYCIN 250 MG: 500 INJECTION, POWDER, LYOPHILIZED, FOR SOLUTION INTRAVENOUS at 23:33

## 2021-11-16 ENCOUNTER — APPOINTMENT (OUTPATIENT)
Dept: CARDIOLOGY | Facility: HOSPITAL | Age: 86
End: 2021-11-16

## 2021-11-16 LAB
ANION GAP SERPL CALCULATED.3IONS-SCNC: 16 MMOL/L (ref 5–15)
BACTERIA BLD CULT: ABNORMAL
BASOPHILS # BLD AUTO: 0.03 10*3/MM3 (ref 0–0.2)
BASOPHILS NFR BLD AUTO: 0.6 % (ref 0–1.5)
BH CV ECHO MEAS - BSA(HAYCOCK): 2 M^2
BH CV ECHO MEAS - BSA: 1.9 M^2
BH CV ECHO MEAS - BZI_BMI: 32.8 KILOGRAMS/M^2
BH CV ECHO MEAS - BZI_METRIC_HEIGHT: 162.6 CM
BH CV ECHO MEAS - BZI_METRIC_WEIGHT: 86.6 KG
BH CV LOWER VASCULAR LEFT COMMON FEMORAL AUGMENT: NORMAL
BH CV LOWER VASCULAR LEFT COMMON FEMORAL COMPRESS: NORMAL
BH CV LOWER VASCULAR LEFT COMMON FEMORAL PHASIC: NORMAL
BH CV LOWER VASCULAR LEFT COMMON FEMORAL SPONT: NORMAL
BH CV LOWER VASCULAR LEFT DISTAL FEMORAL COMPRESS: NORMAL
BH CV LOWER VASCULAR LEFT GASTRONEMIUS COMPRESS: NORMAL
BH CV LOWER VASCULAR LEFT GREATER SAPH AK COMPRESS: NORMAL
BH CV LOWER VASCULAR LEFT GREATER SAPH BK COMPRESS: NORMAL
BH CV LOWER VASCULAR LEFT LESSER SAPH COMPRESS: NORMAL
BH CV LOWER VASCULAR LEFT MID FEMORAL AUGMENT: NORMAL
BH CV LOWER VASCULAR LEFT MID FEMORAL COMPRESS: NORMAL
BH CV LOWER VASCULAR LEFT MID FEMORAL PHASIC: NORMAL
BH CV LOWER VASCULAR LEFT MID FEMORAL SPONT: NORMAL
BH CV LOWER VASCULAR LEFT PERONEAL COMPRESS: NORMAL
BH CV LOWER VASCULAR LEFT POPLITEAL AUGMENT: NORMAL
BH CV LOWER VASCULAR LEFT POPLITEAL COMPRESS: NORMAL
BH CV LOWER VASCULAR LEFT POPLITEAL PHASIC: NORMAL
BH CV LOWER VASCULAR LEFT POPLITEAL SPONT: NORMAL
BH CV LOWER VASCULAR LEFT POSTERIOR TIBIAL COMPRESS: NORMAL
BH CV LOWER VASCULAR LEFT PROFUNDA FEMORAL COMPRESS: NORMAL
BH CV LOWER VASCULAR LEFT PROXIMAL FEMORAL COMPRESS: NORMAL
BH CV LOWER VASCULAR LEFT SAPHENOFEMORAL JUNCTION COMPRESS: NORMAL
BH CV LOWER VASCULAR RIGHT COMMON FEMORAL AUGMENT: NORMAL
BH CV LOWER VASCULAR RIGHT COMMON FEMORAL COMPRESS: NORMAL
BH CV LOWER VASCULAR RIGHT COMMON FEMORAL PHASIC: NORMAL
BH CV LOWER VASCULAR RIGHT COMMON FEMORAL SPONT: NORMAL
BUN SERPL-MCNC: 63 MG/DL (ref 8–23)
BUN/CREAT SERPL: 26.7 (ref 7–25)
CALCIUM SPEC-SCNC: 8.8 MG/DL (ref 8.6–10.5)
CHLORIDE SERPL-SCNC: 102 MMOL/L (ref 98–107)
CO2 SERPL-SCNC: 22 MMOL/L (ref 22–29)
CREAT SERPL-MCNC: 2.36 MG/DL (ref 0.57–1)
D-LACTATE SERPL-SCNC: 2.2 MMOL/L (ref 0.5–2)
D-LACTATE SERPL-SCNC: 2.5 MMOL/L (ref 0.5–2)
DEPRECATED RDW RBC AUTO: 49.9 FL (ref 37–54)
EOSINOPHIL # BLD AUTO: 0.12 10*3/MM3 (ref 0–0.4)
EOSINOPHIL NFR BLD AUTO: 2.6 % (ref 0.3–6.2)
ERYTHROCYTE [DISTWIDTH] IN BLOOD BY AUTOMATED COUNT: 14.8 % (ref 12.3–15.4)
FLUAV SUBTYP SPEC NAA+PROBE: NOT DETECTED
FLUBV RNA ISLT QL NAA+PROBE: NOT DETECTED
GFR SERPL CREATININE-BSD FRML MDRD: 20 ML/MIN/1.73
GLUCOSE BLDC GLUCOMTR-MCNC: 147 MG/DL (ref 70–130)
GLUCOSE BLDC GLUCOMTR-MCNC: 148 MG/DL (ref 70–130)
GLUCOSE BLDC GLUCOMTR-MCNC: 166 MG/DL (ref 70–130)
GLUCOSE BLDC GLUCOMTR-MCNC: 245 MG/DL (ref 70–130)
GLUCOSE BLDC GLUCOMTR-MCNC: 91 MG/DL (ref 70–130)
GLUCOSE SERPL-MCNC: 240 MG/DL (ref 65–99)
HBA1C MFR BLD: 7.4 % (ref 4.8–5.6)
HCT VFR BLD AUTO: 38.8 % (ref 34–46.6)
HGB BLD-MCNC: 12.5 G/DL (ref 12–15.9)
IMM GRANULOCYTES # BLD AUTO: 0.01 10*3/MM3 (ref 0–0.05)
IMM GRANULOCYTES NFR BLD AUTO: 0.2 % (ref 0–0.5)
LYMPHOCYTES # BLD AUTO: 1.32 10*3/MM3 (ref 0.7–3.1)
LYMPHOCYTES NFR BLD AUTO: 28.5 % (ref 19.6–45.3)
MAXIMAL PREDICTED HEART RATE: 135 BPM
MCH RBC QN AUTO: 29.6 PG (ref 26.6–33)
MCHC RBC AUTO-ENTMCNC: 32.2 G/DL (ref 31.5–35.7)
MCV RBC AUTO: 91.9 FL (ref 79–97)
MONOCYTES # BLD AUTO: 0.45 10*3/MM3 (ref 0.1–0.9)
MONOCYTES NFR BLD AUTO: 9.7 % (ref 5–12)
NEUTROPHILS NFR BLD AUTO: 2.7 10*3/MM3 (ref 1.7–7)
NEUTROPHILS NFR BLD AUTO: 58.4 % (ref 42.7–76)
NRBC BLD AUTO-RTO: 0 /100 WBC (ref 0–0.2)
PLATELET # BLD AUTO: 110 10*3/MM3 (ref 140–450)
PMV BLD AUTO: 11.8 FL (ref 6–12)
POTASSIUM SERPL-SCNC: 4.2 MMOL/L (ref 3.5–5.2)
RBC # BLD AUTO: 4.22 10*6/MM3 (ref 3.77–5.28)
SARS-COV-2 RNA PNL SPEC NAA+PROBE: NOT DETECTED
SODIUM SERPL-SCNC: 140 MMOL/L (ref 136–145)
STRESS TARGET HR: 115 BPM
T4 FREE SERPL-MCNC: 1.45 NG/DL (ref 0.93–1.7)
TSH SERPL DL<=0.05 MIU/L-ACNC: 1.95 UIU/ML (ref 0.27–4.2)
WBC # BLD AUTO: 4.63 10*3/MM3 (ref 3.4–10.8)

## 2021-11-16 PROCEDURE — 85025 COMPLETE CBC W/AUTO DIFF WBC: CPT | Performed by: PHYSICIAN ASSISTANT

## 2021-11-16 PROCEDURE — G0378 HOSPITAL OBSERVATION PER HR: HCPCS

## 2021-11-16 PROCEDURE — 25010000002 PIPERACILLIN SOD-TAZOBACTAM PER 1 G: Performed by: INTERNAL MEDICINE

## 2021-11-16 PROCEDURE — 63710000001 INSULIN LISPRO (HUMAN) PER 5 UNITS: Performed by: PHYSICIAN ASSISTANT

## 2021-11-16 PROCEDURE — 83036 HEMOGLOBIN GLYCOSYLATED A1C: CPT | Performed by: PHYSICIAN ASSISTANT

## 2021-11-16 PROCEDURE — 84439 ASSAY OF FREE THYROXINE: CPT | Performed by: PHYSICIAN ASSISTANT

## 2021-11-16 PROCEDURE — 82962 GLUCOSE BLOOD TEST: CPT

## 2021-11-16 PROCEDURE — 93971 EXTREMITY STUDY: CPT

## 2021-11-16 PROCEDURE — 83605 ASSAY OF LACTIC ACID: CPT | Performed by: PHYSICIAN ASSISTANT

## 2021-11-16 PROCEDURE — 99226 PR SBSQ OBSERVATION CARE/DAY 35 MINUTES: CPT | Performed by: INTERNAL MEDICINE

## 2021-11-16 PROCEDURE — 84443 ASSAY THYROID STIM HORMONE: CPT | Performed by: PHYSICIAN ASSISTANT

## 2021-11-16 PROCEDURE — 96367 TX/PROPH/DG ADDL SEQ IV INF: CPT

## 2021-11-16 PROCEDURE — 96366 THER/PROPH/DIAG IV INF ADDON: CPT

## 2021-11-16 PROCEDURE — 93971 EXTREMITY STUDY: CPT | Performed by: INTERNAL MEDICINE

## 2021-11-16 PROCEDURE — 80048 BASIC METABOLIC PNL TOTAL CA: CPT | Performed by: PHYSICIAN ASSISTANT

## 2021-11-16 RX ORDER — MULTIPLE VITAMINS W/ MINERALS TAB 9MG-400MCG
1 TAB ORAL DAILY
Status: DISCONTINUED | OUTPATIENT
Start: 2021-11-16 | End: 2021-11-19 | Stop reason: HOSPADM

## 2021-11-16 RX ORDER — SODIUM CHLORIDE 0.9 % (FLUSH) 0.9 %
10 SYRINGE (ML) INJECTION AS NEEDED
Status: DISCONTINUED | OUTPATIENT
Start: 2021-11-16 | End: 2021-11-19 | Stop reason: HOSPADM

## 2021-11-16 RX ORDER — PANTOPRAZOLE SODIUM 40 MG/1
40 TABLET, DELAYED RELEASE ORAL DAILY
Status: DISCONTINUED | OUTPATIENT
Start: 2021-11-16 | End: 2021-11-19 | Stop reason: HOSPADM

## 2021-11-16 RX ORDER — CARVEDILOL 6.25 MG/1
6.25 TABLET ORAL 2 TIMES DAILY WITH MEALS
Status: DISCONTINUED | OUTPATIENT
Start: 2021-11-16 | End: 2021-11-19 | Stop reason: HOSPADM

## 2021-11-16 RX ORDER — LEVOTHYROXINE SODIUM 112 UG/1
112 TABLET ORAL
Status: DISCONTINUED | OUTPATIENT
Start: 2021-11-16 | End: 2021-11-19 | Stop reason: HOSPADM

## 2021-11-16 RX ORDER — ONDANSETRON 4 MG/1
4 TABLET, FILM COATED ORAL EVERY 6 HOURS PRN
Status: DISCONTINUED | OUTPATIENT
Start: 2021-11-16 | End: 2021-11-19 | Stop reason: HOSPADM

## 2021-11-16 RX ORDER — SODIUM CHLORIDE 0.9 % (FLUSH) 0.9 %
10 SYRINGE (ML) INJECTION EVERY 12 HOURS SCHEDULED
Status: DISCONTINUED | OUTPATIENT
Start: 2021-11-16 | End: 2021-11-19 | Stop reason: HOSPADM

## 2021-11-16 RX ORDER — ATORVASTATIN CALCIUM 20 MG/1
20 TABLET, FILM COATED ORAL NIGHTLY
Status: DISCONTINUED | OUTPATIENT
Start: 2021-11-16 | End: 2021-11-19 | Stop reason: HOSPADM

## 2021-11-16 RX ORDER — ISOSORBIDE MONONITRATE 30 MG/1
30 TABLET, EXTENDED RELEASE ORAL DAILY
Status: DISCONTINUED | OUTPATIENT
Start: 2021-11-16 | End: 2021-11-19 | Stop reason: HOSPADM

## 2021-11-16 RX ORDER — ONDANSETRON 2 MG/ML
4 INJECTION INTRAMUSCULAR; INTRAVENOUS EVERY 6 HOURS PRN
Status: DISCONTINUED | OUTPATIENT
Start: 2021-11-16 | End: 2021-11-19 | Stop reason: HOSPADM

## 2021-11-16 RX ORDER — TIMOLOL MALEATE 5 MG/ML
1 SOLUTION/ DROPS OPHTHALMIC 2 TIMES DAILY
Status: DISCONTINUED | OUTPATIENT
Start: 2021-11-16 | End: 2021-11-19 | Stop reason: HOSPADM

## 2021-11-16 RX ORDER — CHOLECALCIFEROL (VITAMIN D3) 125 MCG
5 CAPSULE ORAL NIGHTLY
Status: DISCONTINUED | OUTPATIENT
Start: 2021-11-16 | End: 2021-11-19 | Stop reason: HOSPADM

## 2021-11-16 RX ORDER — LATANOPROST 50 UG/ML
1 SOLUTION/ DROPS OPHTHALMIC NIGHTLY
Status: DISCONTINUED | OUTPATIENT
Start: 2021-11-16 | End: 2021-11-19 | Stop reason: HOSPADM

## 2021-11-16 RX ADMIN — LEVOTHYROXINE SODIUM 112 MCG: 112 TABLET ORAL at 05:33

## 2021-11-16 RX ADMIN — INSULIN LISPRO 2 UNITS: 100 INJECTION, SOLUTION INTRAVENOUS; SUBCUTANEOUS at 17:11

## 2021-11-16 RX ADMIN — ISOSORBIDE MONONITRATE 30 MG: 30 TABLET, EXTENDED RELEASE ORAL at 08:59

## 2021-11-16 RX ADMIN — APIXABAN 2.5 MG: 2.5 TABLET, FILM COATED ORAL at 20:22

## 2021-11-16 RX ADMIN — INSULIN LISPRO 3 UNITS: 100 INJECTION, SOLUTION INTRAVENOUS; SUBCUTANEOUS at 13:09

## 2021-11-16 RX ADMIN — CARVEDILOL 6.25 MG: 6.25 TABLET, FILM COATED ORAL at 08:59

## 2021-11-16 RX ADMIN — MULTIPLE VITAMINS W/ MINERALS TAB 1 TABLET: TAB at 08:59

## 2021-11-16 RX ADMIN — PANTOPRAZOLE SODIUM 40 MG: 40 TABLET, DELAYED RELEASE ORAL at 08:59

## 2021-11-16 RX ADMIN — TIMOLOL MALEATE 1 DROP: 5 SOLUTION/ DROPS OPHTHALMIC at 09:01

## 2021-11-16 RX ADMIN — SODIUM CHLORIDE, PRESERVATIVE FREE 10 ML: 5 INJECTION INTRAVENOUS at 08:59

## 2021-11-16 RX ADMIN — ATORVASTATIN CALCIUM 20 MG: 20 TABLET, FILM COATED ORAL at 20:23

## 2021-11-16 RX ADMIN — TIMOLOL MALEATE 1 DROP: 5 SOLUTION/ DROPS OPHTHALMIC at 20:23

## 2021-11-16 RX ADMIN — TAZOBACTAM SODIUM AND PIPERACILLIN SODIUM 3.38 G: 375; 3 INJECTION, SOLUTION INTRAVENOUS at 08:59

## 2021-11-16 RX ADMIN — TAZOBACTAM SODIUM AND PIPERACILLIN SODIUM 3.38 G: 375; 3 INJECTION, SOLUTION INTRAVENOUS at 20:23

## 2021-11-16 RX ADMIN — LATANOPROST 1 DROP: 50 SOLUTION OPHTHALMIC at 20:22

## 2021-11-16 RX ADMIN — SODIUM CHLORIDE, PRESERVATIVE FREE 10 ML: 5 INJECTION INTRAVENOUS at 20:25

## 2021-11-16 RX ADMIN — APIXABAN 2.5 MG: 2.5 TABLET, FILM COATED ORAL at 09:00

## 2021-11-16 RX ADMIN — TAZOBACTAM SODIUM AND PIPERACILLIN SODIUM 3.38 G: 375; 3 INJECTION, SOLUTION INTRAVENOUS at 01:19

## 2021-11-16 RX ADMIN — Medication 5 MG: at 20:22

## 2021-11-16 RX ADMIN — CARVEDILOL 6.25 MG: 6.25 TABLET, FILM COATED ORAL at 17:53

## 2021-11-16 NOTE — PLAN OF CARE
Goal Outcome Evaluation:  Plan of Care Reviewed With: patient        Progress: no change  Outcome Summary: vss, up to bathroom several times, tolerated well. paced on tele, denies pain

## 2021-11-16 NOTE — CASE MANAGEMENT/SOCIAL WORK
Discharge Planning Assessment  [facility]     Patient Name: Karla Cristobal  MRN: [id number]  Today's Date: 11/16/2021    Admit Date: 11/15/2021     Discharge Needs Assessment     Row Name 11/16/21 0910       Living Environment    Lives With grandchild(karyna)    Name(s) of Who Lives With Patient Grandson-Josue Cristobal    Current Living Arrangements home/apartment/condo    Primary Care Provided by self    Provides Primary Care For no one    Family Caregiver if Needed child(karyna), adult; grandchild(karyna), adult    Quality of Family Relationships unable to assess    Able to Return to Prior Arrangements yes       Transition Planning    Patient/Family Anticipates Transition to home with family    Transportation Anticipated family or friend will provide       Discharge Needs Assessment    Equipment Currently Used at Home shower chair; rollator; glucometer; oxygen               Discharge Plan     Row Name 11/16/21 0997       Plan    Plan Home    Patient/Family in Agreement with Plan yes    Plan Comments I met with pt at bedside. Pt lives in [facility] [facility] with her grandson. Pt uses a Rollator to ambulate. Indpendent with cooking and medication but dependent with all other ADL's. Pt wears oxygen at night, 2L NC. Pt states she cannot remember where oxygen is supplied, just that it's out of [facility]. Pt plans to D/C to home. Pt states her daughter will transport at time of D/C. PCP-Dr. Giselle Guzman. Confirmed Medicare, [employer], and RX coverage. No needs voiced or identified at this time. CM will continue to follow.    Final Discharge Disposition Code 01 - home or self-care              Continued Care and Services - Admitted Since 11/15/2021    Coordination has not been started for this encounter.       Expected Discharge Date and Time     Expected Discharge Date Expected Discharge Time    Nov 19, 2021          Demographic Summary     Row Name 11/16/21 0909       General Information    Reason for Consult  discharge planning       Contact Information    Permission Granted to Share Info With                Functional Status     Row Name 11/16/21 0909       Functional Status    Usual Activity Tolerance moderate    Current Activity Tolerance moderate    Functional Status Comments Uses Rollator       Functional Status, IADL    Medications independent    Meal Preparation independent    Housekeeping completely dependent    Laundry completely dependent    Shopping completely dependent               Psychosocial    No documentation.                Abuse/Neglect    No documentation.                Legal    No documentation.                Substance Abuse    No documentation.                Patient Forms    No documentation.                   Janelle Tong RN

## 2021-11-16 NOTE — H&P
[facility] Medicine Services  HISTORY AND PHYSICAL    Patient Name: Karla Cristobal  : 1935  MRN: [id number]  Primary Care Physician: Giselle Guzman DO  Date of admission: 11/15/2021    Subjective   Subjective     Chief Complaint:  Left Lower Extremity Cellulitis    HPI:  Karla Cristobal is a 86 y.o. female with a history of atrial fibrillation (on Eliquis), coronary artery disease s/p stents ('05), hypertension, hyperlipidemia, chronic kidney disease, congestive heart failure, insulin-dependent type 2 diabetes, hypothyroidism, and AFTAB who presents to [facility] ED today for complaint of left lower extremity pain.  She states that about a week ago, she was getting out of bed and tripped and fell forward, with her shin landing on the wheel of her walker.  She denies any head injury or loss of consciousness.  She states that over the next day or so, her left shin became more red and tender.  Stating it began to hurt to ambulate.  She says that earlier today she had stopped urgent care facility to have her this evaluated, and they had suggested she come to the ED for treatment.  She denies fever, chills, headache, palpitations, chest pain, shortness of breath, cough, nausea, vomiting, or diarrhea.  Patient has a history of chronic atrial fibrillation with daily Eliquis, as well as a history of DVT/PE.  Patient is a non-smoker.  Denies alcohol abuse or illicit drug use.  Patient has received all doses of the Covid vaccine.      COVID Details:        Symptoms: [x] NONE [] Fever []  Cough [] Shortness of breath [] Change in taste or smell  The patient has a COVID HM Topic on their chart, and they are fully vaccinated.    Review of Systems   Constitutional: Negative.  Negative for chills, diaphoresis, fatigue and fever.   HENT: Negative.  Negative for nosebleeds, postnasal drip and trouble swallowing.    Eyes: Negative.  Negative for photophobia and visual disturbance.   Respiratory:  Negative.  Negative for cough, shortness of breath and wheezing.    Cardiovascular: Positive for leg swelling. Negative for chest pain.   Gastrointestinal: Negative for abdominal pain, diarrhea, nausea and vomiting.   Genitourinary: Negative for dysuria and hematuria.   Musculoskeletal: Negative.  Negative for arthralgias and myalgias.   Skin: Positive for color change and rash.   Neurological: Negative.  Negative for dizziness, syncope, speech difficulty, weakness and headaches.   Hematological: Bruises/bleeds easily.   Psychiatric/Behavioral: Negative for confusion. The patient is not nervous/anxious.         All other systems reviewed and are negative.     Personal History     Past Medical History:   Diagnosis Date   • Acute bronchitis    • Arthritis    • Atrial fibrillation (HCC)    • CAD (coronary artery disease)     s/p MI 2005; 3 stents inserted    • Change in mole    • Change in mole    • Change in nevus 6/16/2016   • Chronic kidney disease     stage IV-sees Dr Bk Mckeon every 3-4 months    • Chronic renal disease, stage 4, severely decreased glomerular filtration rate between 15-29 mL/min/1.73 square meter (HCC)    • Chronic systolic heart failure (HCC)    • Congestive heart disease (HCC)    • Conjunctivitis    • Deep vein thrombosis of lower extremity (HCC)    • Diabetes mellitus (HCC)    • Diabetes mellitus (HCC) 6/16/2016    Impression: 03/15/2016 - blood sugar 166 and hgn a1c 7.3%  is up to date with eye exam  neuropathy with callus, no ulcer  some scratches feet  ur alb due and ordered  no change other than provided samples of toujeo because she feels like lantus worked much better than levemir, she has tried titrating levemir and it is less effective  continue testing and f/u 3-4 months  Impression: 11/23/2015 - bl   • Diabetic foot ulcer (HCC) 6/16/2016   • Dog bite of hand    • Easy bruising    • Endometrial cancer (HCC)     partial hysterectomy; radiation as well    • Foot pain    • Foot pain  6/16/2016    Description: lancinating- worse but not consistent enough to want rx   • Fracture of hip (HCC)    • Generalized ischemic myocardial dysfunction 6/16/2016   • Glaucoma     drops daily    • Hyperlipidemia    • Hypertension    • Hypothyroidism    • Insomnia    • Ischemic heart disease    • Macular degeneration    • Methicillin resistant Staphylococcus aureus infection 6/16/2016   • Myocardial infarction (HCC) 2005   • Nausea with vomiting    • Pericardial effusion    • Shortness of breath 6/16/2016    Impression: 03/24/2015 - chronic. On 2 l oxygen at night. check cbc, bnp;    • Skin cancer, basal cell     nose, leg    • Skin lesion 6/16/2016    Impression: 03/24/2015 - refer derm for eval- seb kerat ant tib and ?ak medially with redness and irritation;    • Sleep apnea     oxygen at night due to low sats but no cpap   • Type 2 diabetes mellitus (HCC)    • UTI (urinary tract infection) 06/12/2020    currently taking antibiotics-patient's daughter notified Dr Beal's office and office said it should not delay generator change        Past Surgical History:   Procedure Laterality Date   • CARDIAC CATHETERIZATION     • CARDIAC DEFIBRILLATOR PLACEMENT     • CARDIAC ELECTROPHYSIOLOGY PROCEDURE N/A 7/17/2020    Procedure: ICD BIV  generator change- SJM- 3-6 weeks;  Surgeon: Tano Beal MD;  Location: [facility] INVASIVE LOC[facility];  Service: Cardiology;  Laterality: N/A;   • CHOLECYSTECTOMY     • CORONARY ARTERY BYPASS GRAFT  2000   • CORONARY STENT PLACEMENT      3 stents    • EYE SURGERY Bilateral     cataract extraction    • HIP SURGERY Left     x3   • HYSTERECTOMY      partial    • KNEE ARTHROPLASTY Bilateral    • PACEMAKER IMPLANTATION     • TONSILLECTOMY         Family History:  family history includes Breast cancer in her mother and another family member; Cancer in her father; Diabetes in an other family member; Esophageal cancer in an other family member; Hypertension in an other family member;  Transient ischemic attack in an other family member. Otherwise pertinent FHx was reviewed and unremarkable.     Social History:  reports that she has never smoked. She has never used smokeless tobacco. She reports that she does not drink alcohol and does not use drugs.  Social History     Social History Narrative   • Not on file       Medications:  BASAGLAR KWIKPEN, FreeStyle Lokesh 2 Sensor, Insulin Syringe-Needle U-100, apixaban, atorvastatin, carvedilol, cholecalciferol, ciprofloxacin, eszopiclone, furosemide, indapamide, insulin aspart, isosorbide mononitrate, latanoprost, levothyroxine, melatonin, multivitamins-minerals, mupirocin, omeprazole, ondansetron, spironolactone, and timolol    Allergies   Allergen Reactions   • Bactrim [Sulfamethoxazole-Trimethoprim] GI Intolerance   • Lisinopril Cough   • Codeine Rash       Objective   Objective     Vital Signs:   Temp:  [97.1 °F (36.2 °C)] 97.1 °F (36.2 °C)  Heart Rate:  [72] 72  Resp:  [18] 18  BP: (130)/(67) 130/67    Physical Exam  Constitutional:       General: She is not in acute distress.     Appearance: Normal appearance. She is obese.   HENT:      Head: Atraumatic.      Right Ear: External ear normal.      Left Ear: External ear normal.      Nose: Nose normal.   Eyes:      Extraocular Movements: Extraocular movements intact.      Conjunctiva/sclera: Conjunctivae normal.      Pupils: Pupils are equal, round, and reactive to light.   Cardiovascular:      Rate and Rhythm: Normal rate and regular rhythm.      Pulses: Normal pulses.      Heart sounds: Normal heart sounds. No murmur heard.      Pulmonary:      Effort: Pulmonary effort is normal. No respiratory distress.      Breath sounds: Normal breath sounds. No wheezing, rhonchi or rales.   Abdominal:      General: Bowel sounds are normal. There is no distension.      Tenderness: There is no abdominal tenderness. There is no guarding or rebound.   Musculoskeletal:         General: Normal range of motion.       Cervical back: No rigidity.   Skin:     General: Skin is warm and dry.      Coloration: Skin is not jaundiced.      Findings: Rash (Erythema and warmth of the anterior left lower leg. There is a central area of a palpible fluid-filled mass. Area mildly tender to palpation. No streaking up the leg noted. ) present.   Neurological:      General: No focal deficit present.      Mental Status: She is alert and oriented to person, place, and time.   Psychiatric:         Attention and Perception: Attention normal.         Mood and Affect: Mood normal.         Behavior: Behavior normal.         Thought Content: Thought content normal.          Result Review:  I have personally reviewed the results from the time of this admission to 11/15/21 9:25 PM EST and agree with these findings:  [x]  Laboratory  []  Microbiology  [x]  Radiology  []  EKG/Telemetry   []  Cardiology/Vascular   []  Pathology  []  Old records  []  Other:        LAB RESULTS:      Lab 11/15/21  1926   WBC 5.70   HEMOGLOBIN 13.3   HEMATOCRIT 41.8   PLATELETS 133*   NEUTROS ABS 3.17   IMMATURE GRANS (ABS) 0.01   LYMPHS ABS 1.76   MONOS ABS 0.54   EOS ABS 0.17   MCV 91.7         Lab 11/15/21  2028   SODIUM 144   POTASSIUM 4.1   CHLORIDE 105   CO2 28.0   ANION GAP 11.0   BUN 66*   CREATININE 2.35*   GLUCOSE 111*   CALCIUM 9.0         Lab 11/15/21  2028   TOTAL PROTEIN 6.9   ALBUMIN 3.30*   GLOBULIN 3.6   ALT (SGPT) 8   AST (SGOT) 15   BILIRUBIN 1.1   ALK PHOS 236*         Lab 11/15/21  1926   PROBNP 4,108.0*                 UA    Urinalysis 1/13/21 5/2/21 9/27/21 9/27/21      1116 1116   Ketones, UA Negative Negative     Blood, UA   Negative    Leukocytes, UA Large (3+) (A) Large (3+) (A) Trace (A)    Nitrite, UA   Negative    RBC, UA    None seen   Bacteria, UA    None seen   (A) Abnormal value            Microbiology Results (last 10 days)     ** No results found for the last 240 hours. **          XR Tibia Fibula 2 View Left    Result Date: 11/15/2021  CR  Tibia Fibula 2 Vws LT INDICATION: Acute left leg pain. Trauma COMPARISON: None available. FINDINGS: AP and lateral views of the left tibia/fibula. No definite acute fracture or subluxation. There are changes of total knee arthroplasty. Hardware is intact. There is vascular calcification. No foreign body.     Impression: No acute fracture or subluxation. Signer Name: Angelique Kelley MD  Signed: 11/15/2021 8:23 PM  Workstation Name: [id number]  [facility]          Assessment/Plan   Assessment & Plan       Cellulitis of left lower extremity    Chronic atrial fibrillation (HCC)    CKD (chronic kidney disease), stage IV (HCC)    Diabetic nephropathy (HCC)    Hyperlipidemia LDL goal <100    Essential hypertension    Hypothyroidism    CAD (coronary artery disease)    Chronic systolic congestive heart failure (HCC)    Diabetes mellitus, type II, insulin dependent ([facility])    Karla Cristobal is a 86 y.o. female with a history of atrial fibrillation (on Eliquis), coronary artery disease s/p stents ('05), hypertension, hyperlipidemia, chronic kidney disease, congestive heart failure, insulin-dependent type 2 diabetes, hypothyroidism, and AFTAB who presents to [facility] ED today for complaint of left lower extremity pain.  She states that about a week ago, she was getting out of bed and tripped and fell forward, with her shin landing on the wheel of her walker.  She denies any head injury or loss of consciousness.  She states that over the next day or so, her left shin became more red and tender.    Left Lower Extremity Cellulitis  - XR of leg shows no acute fracture or subluxation.  - WBC, Procal normal  - Blood cultures pending  - LE doppler pending  - Will get Non-contrast CT of the leg to assess fluid-filled area  - Will start Zosyn given hx of T2DM with recent trauma to affected area, Will add Dapto given hx of MRSA bacteremia.  - Repeat labs in the am   -Clinically more c/w hematoma from her injury with  "chronic AC, however given her risk factors (diabetes, prior MRSA pacemaker lead infection) we will cover with broad antibiotics as noted and CT her leg for further delineation of what feels like a fluid-filled collection; if large enough collection identified it may be able to be drained    Chronic atrial fibrillation  CAD  Chronic systolic CHF, compensated  Hx DVT/PE  - Continue Eliquis  - Continue asa, statin  -Prior EF as low as 25%, most recent available data with EF in 2015 of 35-40%    Chronic Kidney Disease (stage IV)  - Creatinine elevated 2.35 - Appears to be chronically elevated  - eGFR 20   - IV fluids  - Avoid nephrotoxic agents  - Repeat labs in the am    Type 2 Diabetes Mellitus, with long-term use of insulin  Diabetic Neuropathy  - Blood glucose 111  - Last A1C was on 9/8/2021 and was 6.6 at that time.  - SSI  - Repeat bmp in the am    Hypertension  Hyperlipidemia  - Continue home Coreg, Lasix, Imdur, and Spironolactone  - Continue statin    Hypothyroidism  - Continue home Synthroid  - Check TSH, T4    Remote Hx MRSA bacteremia with pacemaker lead infection    DVT prophylaxis:  Eliquis    CODE STATUS:  Full Code  Patient is emphatically full code and states to \"go for it\" regardless of surrounding circumstances       This note has been completed as part of a split-shared workflow.   Signature: Electronically signed by Houston Palma PA-C, 11/15/21, 9:26 PM EST        Attending   Admission Attestation       I have seen and examined the patient, performing an independent face-to-face diagnostic evaluation with plan of care reviewed and developed with the advanced practice clinician (APC).      Brief Summary Statement:   Karla Cristobal is a 86 y.o. female with Hx DVT/PE/A. fib on Eliquis, systolic CHF s/p ICD and Hx MRSA bacteremia/infected pacemaker lead, CKD 4, diabetes on insulin who presents for evaluation of a wound to her anterior shin.  Proximately 10 days ago she struck her left shin " while trying to get out of bed, she thinks she hit it on her walker.  The area started as a small bump but has progressively become more red and tender, she was seen in an urgent care center this morning who promptly sent her to the ED.  Denies fever, chills, nausea/vomiting/diarrhea.    Remainder of detailed HPI is as noted by APC and has been reviewed and/or edited by me for completeness.    Attending Physical Exam:  Constitutional: Awake, alert, no acute distress, laying on ED stretcher on the phone  Eyes: PERRL, sclerae anicteric, no conjunctival injection  HENT: NCAT, mucous membranes moist  Neck: Supple, trachea midline  Respiratory: Clear to auscultation bilaterally, nonlabored respirations   Cardiovascular: RRR, no murmurs, rubs, or gallops, palpable radial pulses bilaterally  Gastrointestinal: Positive bowel sounds, soft, nontender, nondistended  Musculoskeletal: No bilateral ankle edema, no clubbing or cyanosis to extremities  Psychiatric: Appropriate affect, cooperative  Neurologic: Speech clear, moving all extremities spontaneously  Skin: LT anterior shin with area of ecchymosis with possible underlying erythema, 5 cm palpable nodule that feels fluid-filled    Brief Assessment/Plan :  See detailed assessment and plan developed with APC which I have reviewed and/or edited for completeness.        Admission Status: I believe that this patient meets OBSERVATION status, however if further evaluation or treatment plans warrant, status may change.  Based upon current information, I predict patient's care encounter to be less than or equal to 2 midnights.      Kamlesh Gomez, DO  11/15/21

## 2021-11-17 ENCOUNTER — APPOINTMENT (OUTPATIENT)
Dept: CARDIOLOGY | Facility: HOSPITAL | Age: 86
End: 2021-11-17

## 2021-11-17 ENCOUNTER — ANESTHESIA (OUTPATIENT)
Dept: GASTROENTEROLOGY | Facility: HOSPITAL | Age: 86
End: 2021-11-17

## 2021-11-17 ENCOUNTER — ANESTHESIA EVENT (OUTPATIENT)
Dept: GASTROENTEROLOGY | Facility: HOSPITAL | Age: 86
End: 2021-11-17
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
## 2021-11-17 PROBLEM — T18.128A FOOD IMPACTION OF ESOPHAGUS: Status: ACTIVE | Noted: 2021-11-15

## 2021-11-17 LAB
ANION GAP SERPL CALCULATED.3IONS-SCNC: 14 MMOL/L (ref 5–15)
BASOPHILS # BLD AUTO: 0.01 10*3/MM3 (ref 0–0.2)
BASOPHILS NFR BLD AUTO: 0.2 % (ref 0–1.5)
BH CV ECHO MEAS - AO MAX PG (FULL): 3.6 MMHG
BH CV ECHO MEAS - AO MAX PG: 5.9 MMHG
BH CV ECHO MEAS - AO MEAN PG (FULL): 2.2 MMHG
BH CV ECHO MEAS - AO MEAN PG: 3.3 MMHG
BH CV ECHO MEAS - AO ROOT AREA (BSA CORRECTED): 1.5
BH CV ECHO MEAS - AO ROOT AREA: 6.7 CM^2
BH CV ECHO MEAS - AO ROOT DIAM: 2.9 CM
BH CV ECHO MEAS - AO V2 MAX: 121.9 CM/SEC
BH CV ECHO MEAS - AO V2 MEAN: 85 CM/SEC
BH CV ECHO MEAS - AO V2 VTI: 25.1 CM
BH CV ECHO MEAS - ASC AORTA: 2.4 CM
BH CV ECHO MEAS - AVA(I,A): 2 CM^2
BH CV ECHO MEAS - AVA(I,D): 2 CM^2
BH CV ECHO MEAS - AVA(V,A): 1.9 CM^2
BH CV ECHO MEAS - AVA(V,D): 1.9 CM^2
BH CV ECHO MEAS - BSA(HAYCOCK): 2 M^2
BH CV ECHO MEAS - BSA: 1.9 M^2
BH CV ECHO MEAS - BZI_BMI: 32.8 KILOGRAMS/M^2
BH CV ECHO MEAS - BZI_METRIC_HEIGHT: 162.6 CM
BH CV ECHO MEAS - BZI_METRIC_WEIGHT: 86.6 KG
BH CV ECHO MEAS - EDV(CUBED): 162.3 ML
BH CV ECHO MEAS - EDV(MOD-SP4): 113 ML
BH CV ECHO MEAS - EDV(TEICH): 144.6 ML
BH CV ECHO MEAS - EF(CUBED): 71.1 %
BH CV ECHO MEAS - EF(MOD-SP4): 54 %
BH CV ECHO MEAS - EF(TEICH): 62.2 %
BH CV ECHO MEAS - ESV(CUBED): 46.8 ML
BH CV ECHO MEAS - ESV(MOD-SP4): 52 ML
BH CV ECHO MEAS - ESV(TEICH): 54.6 ML
BH CV ECHO MEAS - FS: 33.9 %
BH CV ECHO MEAS - IVS/LVPW: 0.97
BH CV ECHO MEAS - IVSD: 0.89 CM
BH CV ECHO MEAS - LA DIMENSION: 4.6 CM
BH CV ECHO MEAS - LA/AO: 1.6
BH CV ECHO MEAS - LAD MAJOR: 7.3 CM
BH CV ECHO MEAS - LAT PEAK E' VEL: 6.9 CM/SEC
BH CV ECHO MEAS - LATERAL E/E' RATIO: 17.8
BH CV ECHO MEAS - LV DIASTOLIC VOL/BSA (35-75): 58.9 ML/M^2
BH CV ECHO MEAS - LV IVRT: 0.08 SEC
BH CV ECHO MEAS - LV MASS(C)D: 185.2 GRAMS
BH CV ECHO MEAS - LV MASS(C)DI: 96.5 GRAMS/M^2
BH CV ECHO MEAS - LV MAX PG: 2.3 MMHG
BH CV ECHO MEAS - LV MEAN PG: 1.1 MMHG
BH CV ECHO MEAS - LV SYSTOLIC VOL/BSA (12-30): 27.1 ML/M^2
BH CV ECHO MEAS - LV V1 MAX: 76 CM/SEC
BH CV ECHO MEAS - LV V1 MEAN: 49.5 CM/SEC
BH CV ECHO MEAS - LV V1 VTI: 16.6 CM
BH CV ECHO MEAS - LVIDD: 5.5 CM
BH CV ECHO MEAS - LVIDS: 3.6 CM
BH CV ECHO MEAS - LVLD AP4: 7.7 CM
BH CV ECHO MEAS - LVLS AP4: 6.6 CM
BH CV ECHO MEAS - LVOT AREA (M): 3.1 CM^2
BH CV ECHO MEAS - LVOT AREA: 3 CM^2
BH CV ECHO MEAS - LVOT DIAM: 2 CM
BH CV ECHO MEAS - LVPWD: 0.92 CM
BH CV ECHO MEAS - MED PEAK E' VEL: 6.4 CM/SEC
BH CV ECHO MEAS - MEDIAL E/E' RATIO: 19.2
BH CV ECHO MEAS - MV A MAX VEL: 48 CM/SEC
BH CV ECHO MEAS - MV DEC TIME: 0.19 SEC
BH CV ECHO MEAS - MV E MAX VEL: 124.8 CM/SEC
BH CV ECHO MEAS - MV E/A: 2.6
BH CV ECHO MEAS - PA ACC SLOPE: 974.5 CM/SEC^2
BH CV ECHO MEAS - PA ACC TIME: 0.07 SEC
BH CV ECHO MEAS - PA MAX PG: 2.3 MMHG
BH CV ECHO MEAS - PA PR(ACCEL): 48.9 MMHG
BH CV ECHO MEAS - PA V2 MAX: 76.3 CM/SEC
BH CV ECHO MEAS - PI END-D VEL: 108.7 CM/SEC
BH CV ECHO MEAS - RAP SYSTOLE: 15 MMHG
BH CV ECHO MEAS - RVSP: 42 MMHG
BH CV ECHO MEAS - SI(AO): 87.5 ML/M^2
BH CV ECHO MEAS - SI(CUBED): 60.2 ML/M^2
BH CV ECHO MEAS - SI(LVOT): 26 ML/M^2
BH CV ECHO MEAS - SI(MOD-SP4): 31.8 ML/M^2
BH CV ECHO MEAS - SI(TEICH): 46.9 ML/M^2
BH CV ECHO MEAS - SV(AO): 167.9 ML
BH CV ECHO MEAS - SV(CUBED): 115.5 ML
BH CV ECHO MEAS - SV(LVOT): 49.9 ML
BH CV ECHO MEAS - SV(MOD-SP4): 61 ML
BH CV ECHO MEAS - SV(TEICH): 90 ML
BH CV ECHO MEAS - TAPSE (>1.6): 1.2 CM
BH CV ECHO MEAS - TR MAX PG: 27 MMHG
BH CV ECHO MEAS - TR MAX VEL: 258.3 CM/SEC
BH CV ECHO MEASUREMENTS AVERAGE E/E' RATIO: 18.77
BH CV XLRA - RV BASE: 5.2 CM
BH CV XLRA - RV LENGTH: 7.8 CM
BH CV XLRA - RV MID: 3.9 CM
BH CV XLRA - TDI S': 6.14 CM/SEC
BUN SERPL-MCNC: 69 MG/DL (ref 8–23)
BUN/CREAT SERPL: 25.6 (ref 7–25)
CALCIUM SPEC-SCNC: 9.1 MG/DL (ref 8.6–10.5)
CHLORIDE SERPL-SCNC: 101 MMOL/L (ref 98–107)
CK SERPL-CCNC: 196 U/L (ref 20–180)
CO2 SERPL-SCNC: 22 MMOL/L (ref 22–29)
CREAT SERPL-MCNC: 2.7 MG/DL (ref 0.57–1)
DEPRECATED RDW RBC AUTO: 50.2 FL (ref 37–54)
EOSINOPHIL # BLD AUTO: 0 10*3/MM3 (ref 0–0.4)
EOSINOPHIL NFR BLD AUTO: 0 % (ref 0.3–6.2)
ERYTHROCYTE [DISTWIDTH] IN BLOOD BY AUTOMATED COUNT: 14.8 % (ref 12.3–15.4)
GFR SERPL CREATININE-BSD FRML MDRD: 17 ML/MIN/1.73
GLUCOSE BLDC GLUCOMTR-MCNC: 140 MG/DL (ref 70–130)
GLUCOSE BLDC GLUCOMTR-MCNC: 236 MG/DL (ref 70–130)
GLUCOSE BLDC GLUCOMTR-MCNC: 254 MG/DL (ref 70–130)
GLUCOSE BLDC GLUCOMTR-MCNC: 283 MG/DL (ref 70–130)
GLUCOSE SERPL-MCNC: 257 MG/DL (ref 65–99)
HCT VFR BLD AUTO: 43.6 % (ref 34–46.6)
HGB BLD-MCNC: 14 G/DL (ref 12–15.9)
IMM GRANULOCYTES # BLD AUTO: 0.01 10*3/MM3 (ref 0–0.05)
IMM GRANULOCYTES NFR BLD AUTO: 0.2 % (ref 0–0.5)
LYMPHOCYTES # BLD AUTO: 0.65 10*3/MM3 (ref 0.7–3.1)
LYMPHOCYTES NFR BLD AUTO: 10 % (ref 19.6–45.3)
MAXIMAL PREDICTED HEART RATE: 135 BPM
MCH RBC QN AUTO: 29.5 PG (ref 26.6–33)
MCHC RBC AUTO-ENTMCNC: 32.1 G/DL (ref 31.5–35.7)
MCV RBC AUTO: 92 FL (ref 79–97)
MONOCYTES # BLD AUTO: 0.2 10*3/MM3 (ref 0.1–0.9)
MONOCYTES NFR BLD AUTO: 3.1 % (ref 5–12)
NEUTROPHILS NFR BLD AUTO: 5.65 10*3/MM3 (ref 1.7–7)
NEUTROPHILS NFR BLD AUTO: 86.5 % (ref 42.7–76)
NRBC BLD AUTO-RTO: 0 /100 WBC (ref 0–0.2)
PLATELET # BLD AUTO: 126 10*3/MM3 (ref 140–450)
PMV BLD AUTO: 12.6 FL (ref 6–12)
POTASSIUM SERPL-SCNC: 4.5 MMOL/L (ref 3.5–5.2)
RBC # BLD AUTO: 4.74 10*6/MM3 (ref 3.77–5.28)
SODIUM SERPL-SCNC: 137 MMOL/L (ref 136–145)
STRESS TARGET HR: 115 BPM
WBC NRBC COR # BLD: 6.52 10*3/MM3 (ref 3.4–10.8)

## 2021-11-17 PROCEDURE — 99214 OFFICE O/P EST MOD 30 MIN: CPT | Performed by: PHYSICIAN ASSISTANT

## 2021-11-17 PROCEDURE — G0378 HOSPITAL OBSERVATION PER HR: HCPCS

## 2021-11-17 PROCEDURE — 25010000002 CEFTRIAXONE PER 250 MG: Performed by: NURSE PRACTITIONER

## 2021-11-17 PROCEDURE — A9270 NON-COVERED ITEM OR SERVICE: HCPCS | Performed by: INTERNAL MEDICINE

## 2021-11-17 PROCEDURE — 80048 BASIC METABOLIC PNL TOTAL CA: CPT | Performed by: INTERNAL MEDICINE

## 2021-11-17 PROCEDURE — 63710000001 CARVEDILOL 3.125 MG TABLET: Performed by: INTERNAL MEDICINE

## 2021-11-17 PROCEDURE — 63710000001 MELATONIN 5 MG TABLET: Performed by: INTERNAL MEDICINE

## 2021-11-17 PROCEDURE — 82550 ASSAY OF CK (CPK): CPT | Performed by: INTERNAL MEDICINE

## 2021-11-17 PROCEDURE — 93306 TTE W/DOPPLER COMPLETE: CPT

## 2021-11-17 PROCEDURE — 99226 PR SBSQ OBSERVATION CARE/DAY 35 MINUTES: CPT | Performed by: INTERNAL MEDICINE

## 2021-11-17 PROCEDURE — 25010000002 PROPOFOL 10 MG/ML EMULSION: Performed by: NURSE ANESTHETIST, CERTIFIED REGISTERED

## 2021-11-17 PROCEDURE — 63710000001 ATORVASTATIN 20 MG TABLET: Performed by: INTERNAL MEDICINE

## 2021-11-17 PROCEDURE — 97166 OT EVAL MOD COMPLEX 45 MIN: CPT

## 2021-11-17 PROCEDURE — 97535 SELF CARE MNGMENT TRAINING: CPT

## 2021-11-17 PROCEDURE — 82962 GLUCOSE BLOOD TEST: CPT

## 2021-11-17 PROCEDURE — 0 LIDOCAINE 1 % SOLUTION: Performed by: NURSE ANESTHETIST, CERTIFIED REGISTERED

## 2021-11-17 PROCEDURE — 96367 TX/PROPH/DG ADDL SEQ IV INF: CPT

## 2021-11-17 PROCEDURE — 63710000001 APIXABAN 2.5 MG TABLET: Performed by: INTERNAL MEDICINE

## 2021-11-17 PROCEDURE — 25010000002 GLUCAGON (HUMAN RECOMBINANT) 1 MG RECONSTITUTED SOLUTION: Performed by: INTERNAL MEDICINE

## 2021-11-17 PROCEDURE — 93306 TTE W/DOPPLER COMPLETE: CPT | Performed by: INTERNAL MEDICINE

## 2021-11-17 PROCEDURE — 25010000002 DAPTOMYCIN PER 1 MG: Performed by: INTERNAL MEDICINE

## 2021-11-17 PROCEDURE — 25010000002 ONDANSETRON PER 1 MG: Performed by: PHYSICIAN ASSISTANT

## 2021-11-17 PROCEDURE — 63710000001 INSULIN LISPRO (HUMAN) PER 5 UNITS: Performed by: INTERNAL MEDICINE

## 2021-11-17 PROCEDURE — 85025 COMPLETE CBC W/AUTO DIFF WBC: CPT | Performed by: PHYSICIAN ASSISTANT

## 2021-11-17 PROCEDURE — 63710000001 ACETAMINOPHEN 325 MG TABLET: Performed by: INTERNAL MEDICINE

## 2021-11-17 PROCEDURE — 25010000002 DEXAMETHASONE PER 1 MG: Performed by: NURSE ANESTHETIST, CERTIFIED REGISTERED

## 2021-11-17 PROCEDURE — 25010000002 ONDANSETRON PER 1 MG: Performed by: INTERNAL MEDICINE

## 2021-11-17 PROCEDURE — 43235 EGD DIAGNOSTIC BRUSH WASH: CPT | Performed by: INTERNAL MEDICINE

## 2021-11-17 RX ORDER — SODIUM CHLORIDE 9 MG/ML
50 INJECTION, SOLUTION INTRAVENOUS CONTINUOUS
Status: DISCONTINUED | OUTPATIENT
Start: 2021-11-17 | End: 2021-11-17

## 2021-11-17 RX ORDER — LIDOCAINE HYDROCHLORIDE 10 MG/ML
INJECTION, SOLUTION INFILTRATION; PERINEURAL AS NEEDED
Status: DISCONTINUED | OUTPATIENT
Start: 2021-11-17 | End: 2021-11-17 | Stop reason: SURG

## 2021-11-17 RX ORDER — ACETAMINOPHEN 325 MG/1
650 TABLET ORAL EVERY 6 HOURS PRN
Status: DISCONTINUED | OUTPATIENT
Start: 2021-11-17 | End: 2021-11-19 | Stop reason: HOSPADM

## 2021-11-17 RX ORDER — PROPOFOL 10 MG/ML
VIAL (ML) INTRAVENOUS AS NEEDED
Status: DISCONTINUED | OUTPATIENT
Start: 2021-11-17 | End: 2021-11-17 | Stop reason: SURG

## 2021-11-17 RX ORDER — DEXAMETHASONE SODIUM PHOSPHATE 4 MG/ML
INJECTION, SOLUTION INTRA-ARTICULAR; INTRALESIONAL; INTRAMUSCULAR; INTRAVENOUS; SOFT TISSUE AS NEEDED
Status: DISCONTINUED | OUTPATIENT
Start: 2021-11-17 | End: 2021-11-17 | Stop reason: SURG

## 2021-11-17 RX ADMIN — Medication 5 MG: at 20:25

## 2021-11-17 RX ADMIN — GLUCAGON HYDROCHLORIDE 1 MG: 1 INJECTION, POWDER, FOR SOLUTION INTRAMUSCULAR; INTRAVENOUS; SUBCUTANEOUS at 09:03

## 2021-11-17 RX ADMIN — LEVOTHYROXINE SODIUM 112 MCG: 112 TABLET ORAL at 05:46

## 2021-11-17 RX ADMIN — PANTOPRAZOLE SODIUM 40 MG: 40 TABLET, DELAYED RELEASE ORAL at 08:26

## 2021-11-17 RX ADMIN — MULTIPLE VITAMINS W/ MINERALS TAB 1 TABLET: TAB at 08:26

## 2021-11-17 RX ADMIN — APIXABAN 2.5 MG: 2.5 TABLET, FILM COATED ORAL at 08:26

## 2021-11-17 RX ADMIN — INSULIN LISPRO 4 UNITS: 100 INJECTION, SOLUTION INTRAVENOUS; SUBCUTANEOUS at 17:23

## 2021-11-17 RX ADMIN — PROPOFOL 100 MG: 10 INJECTION, EMULSION INTRAVENOUS at 11:10

## 2021-11-17 RX ADMIN — ACETAMINOPHEN 650 MG: 325 TABLET, FILM COATED ORAL at 20:25

## 2021-11-17 RX ADMIN — ACETAMINOPHEN 650 MG: 325 TABLET, FILM COATED ORAL at 14:45

## 2021-11-17 RX ADMIN — CARVEDILOL 6.25 MG: 6.25 TABLET, FILM COATED ORAL at 17:23

## 2021-11-17 RX ADMIN — SODIUM CHLORIDE 50 ML/HR: 9 INJECTION, SOLUTION INTRAVENOUS at 11:02

## 2021-11-17 RX ADMIN — SODIUM CHLORIDE, PRESERVATIVE FREE 10 ML: 5 INJECTION INTRAVENOUS at 20:26

## 2021-11-17 RX ADMIN — TIMOLOL MALEATE 1 DROP: 5 SOLUTION/ DROPS OPHTHALMIC at 20:26

## 2021-11-17 RX ADMIN — LIDOCAINE HYDROCHLORIDE 50 MG: 10 INJECTION, SOLUTION INFILTRATION; PERINEURAL at 11:10

## 2021-11-17 RX ADMIN — SODIUM CHLORIDE, PRESERVATIVE FREE 10 ML: 5 INJECTION INTRAVENOUS at 08:27

## 2021-11-17 RX ADMIN — CARVEDILOL 6.25 MG: 6.25 TABLET, FILM COATED ORAL at 08:26

## 2021-11-17 RX ADMIN — DEXAMETHASONE SODIUM PHOSPHATE 8 MG: 4 INJECTION, SOLUTION INTRA-ARTICULAR; INTRALESIONAL; INTRAMUSCULAR; INTRAVENOUS; SOFT TISSUE at 11:12

## 2021-11-17 RX ADMIN — DAPTOMYCIN 250 MG: 500 INJECTION, POWDER, LYOPHILIZED, FOR SOLUTION INTRAVENOUS at 17:23

## 2021-11-17 RX ADMIN — ISOSORBIDE MONONITRATE 30 MG: 30 TABLET, EXTENDED RELEASE ORAL at 08:26

## 2021-11-17 RX ADMIN — ATORVASTATIN CALCIUM 20 MG: 20 TABLET, FILM COATED ORAL at 20:25

## 2021-11-17 RX ADMIN — TIMOLOL MALEATE 1 DROP: 5 SOLUTION/ DROPS OPHTHALMIC at 08:27

## 2021-11-17 RX ADMIN — ONDANSETRON 4 MG: 2 INJECTION INTRAMUSCULAR; INTRAVENOUS at 11:22

## 2021-11-17 RX ADMIN — APIXABAN 2.5 MG: 2.5 TABLET, FILM COATED ORAL at 20:25

## 2021-11-17 RX ADMIN — LATANOPROST 1 DROP: 50 SOLUTION OPHTHALMIC at 20:26

## 2021-11-17 RX ADMIN — SODIUM CHLORIDE 2 G: 900 INJECTION INTRAVENOUS at 08:26

## 2021-11-17 NOTE — ANESTHESIA PROCEDURE NOTES
Airway  Urgency: elective    Date/Time: 11/17/2021 11:16 AM  Airway not difficult    General Information and Staff    Patient location during procedure: OR  CRNA: Vivian Polanco CRNA    Indications and Patient Condition  Indications for airway management: airway protection    Preoxygenated: yes  MILS not maintained throughout  Mask difficulty assessment: 1 - vent by mask    Final Airway Details  Final airway type: endotracheal airway      Successful airway: ETT  Cuffed: yes   Successful intubation technique: direct laryngoscopy  Endotracheal tube insertion site: oral  Blade: Milena  Blade size: 3  ETT size (mm): 7.0  Cormack-Lehane Classification: grade I - full view of glottis  Placement verified by: chest auscultation and capnometry   Measured from: lips  ETT/EBT  to lips (cm): 20  Number of attempts at approach: 1  Assessment: lips, teeth, and gum same as pre-op and atraumatic intubation    Additional Comments  Negative epigastric sounds, Breath sound equal bilaterally with symmetric chest rise and fall

## 2021-11-17 NOTE — ADDENDUM NOTE
Addendum  created 11/17/21 1156 by Vivian Polanco CRNA    Intraprocedure Event edited, Intraprocedure Staff edited

## 2021-11-17 NOTE — NURSING NOTE
Woc consulted for: Left lower extremity cellulitis    Wound/ Skin Assessment: Left lower extremity is circumferentially red may be slightly swollen warm, evidence of bruising, per imaging possible hematoma on the anterior lateral lower aspect of the lower extremity.  There is a small 0.4 x 0.4 circular white patch of dry skin.  They expressed no open wounds.    Intervention performed: None no open wounds    Recommendations: RN notified to consult Woc if anything opens up or any purulence is noted.    Head to toe Ax performed.    Additional skin issues: None noted    Skin interventions in place.    Pressure Injury Protocol (initiate for Cornel Score of 18 or less):   *Maintain good skin care, keep dry, turn q 2 hr, keep heels elevated and offloaded with heel boots.    *Apply z-guard to sacrococcygeal area/ perineal area BID or daily and PRN per incontinent episodes.  *Follow C.A.R.E protocol if medical devices (Bipap, sr, Ng tube, etc) are being used.     Specialty bed: No    Woc will sign off.    Please contact with questions or concerns.                Maulik disclaimer:  This note was entered via electronic voice recognition/ transcription prorgram. The electronic translation of spoken language may permit erroneous, or at times, nonsensical words or phrases to be inadvertently transcribed; Although I have reviewed the note for such errors, some may still exist.

## 2021-11-17 NOTE — PLAN OF CARE
Alert and oriented.  SOA intermittent with activity. 2L cannula intermittent.  Monitor V-paced w/PVC's.  Episode of distress, coughing, vomiting and tachypneic this am after choking on breakfast.  Glucagon once.  EGD completed.  Refused barium esophagram post procedure.  PRN Tylenol once for LLE pain.  Up to bathroom and recliner 1 assist/rollator.  Daughter bedside for visit.

## 2021-11-17 NOTE — CONSULTS
INFECTIOUS DISEASE CONSULT/INITIAL HOSPITAL VISIT    Karla Cristobal  1935  [id number]    Date of Consult: 11/17/2021    Admission Date: 11/15/2021      Requesting Provider: Kamlesh Gomez DO  Evaluating Physician: Rip Coelho MD    Reason for Consultation: Left lower extremity cellulitis     History of present illness:    Patient is a 86 y.o. female, with PMH afib, CAD, CKD, CHF, DM, seen today for left lower extremity cellulitis. Approximately one week ago, she tripped getting out of bed, hitting her left leg.  She has noted increasing swelling, redness and pain over the past several days, prompting her presentation to the Ed.  She has been afebrile.  CT of left lower extremity with soft tissue edema suggesting cellulitis, no soft tissue gas, with a subcutaneous lesion in the anterolateral mid lower leg, most likely a hematoma measuring 3.5 x 1.3, x2.7 cm.  Xray without fracture.  Duplex negative for DVT. She has been afebrile without leukocytosis.  Admitting labs with WBC 5.7, PCT 0.098, Scr 2.35, lactate 2.5, and now blood cultures positive for Staph spp.   She is currently on Daptomycin and Zosyn and we were consulted for evaluation and treatment.     Past Medical History:   Diagnosis Date   • Acute bronchitis    • Arthritis    • Atrial fibrillation (HCC)    • CAD (coronary artery disease)     s/p MI 2005; 3 stents inserted    • Change in mole    • Change in mole    • Change in nevus 6/16/2016   • Chronic kidney disease     stage IV-sees Dr Bk Mckeon every 3-4 months    • Chronic renal disease, stage 4, severely decreased glomerular filtration rate between 15-29 mL/min/1.73 square meter (HCC)    • Chronic systolic heart failure (HCC)    • Congestive heart disease (HCC)    • Conjunctivitis    • Deep vein thrombosis of lower extremity (HCC)    • Diabetes mellitus (HCC)    • Diabetes mellitus (HCC) 6/16/2016    Impression: 03/15/2016 - blood sugar 166 and hgn a1c 7.3%  is up to date with eye  exam  neuropathy with callus, no ulcer  some scratches feet  ur alb due and ordered  no change other than provided samples of toujeo because she feels like lantus worked much better than levemir, she has tried titrating levemir and it is less effective  continue testing and f/u 3-4 months  Impression: 11/23/2015 - bl   • Diabetic foot ulcer (HCC) 6/16/2016   • Dog bite of hand    • Easy bruising    • Endometrial cancer (HCC)     partial hysterectomy; radiation as well    • Foot pain    • Foot pain 6/16/2016    Description: lancinating- worse but not consistent enough to want rx   • Fracture of hip (HCC)    • Generalized ischemic myocardial dysfunction 6/16/2016   • Glaucoma     drops daily    • Hyperlipidemia    • Hypertension    • Hypothyroidism    • Insomnia    • Ischemic heart disease    • Macular degeneration    • Methicillin resistant Staphylococcus aureus infection 6/16/2016   • Myocardial infarction ([facility]) 2005   • Nausea with vomiting    • Pericardial effusion    • Shortness of breath 6/16/2016    Impression: 03/24/2015 - chronic. On 2 l oxygen at night. check cbc, bnp;    • Skin cancer, basal cell     nose, leg    • Skin lesion 6/16/2016    Impression: 03/24/2015 - refer derm for eval- seb kerat ant tib and ?ak medially with redness and irritation;    • Sleep apnea     oxygen at night due to low sats but no cpap   • Type 2 diabetes mellitus (HCC)    • UTI (urinary tract infection) 06/12/2020    currently taking antibiotics-patient's daughter notified Dr Beal's office and office said it should not delay generator change        Past Surgical History:   Procedure Laterality Date   • CARDIAC CATHETERIZATION     • CARDIAC DEFIBRILLATOR PLACEMENT     • CARDIAC ELECTROPHYSIOLOGY PROCEDURE N/A 7/17/2020    Procedure: ICD BIV  generator change- [facility]- 3-6 weeks;  Surgeon: Tano Beal MD;  Location: [facility] INVASIVE LOCATION;  Service: Cardiology;  Laterality: N/A;   • CHOLECYSTECTOMY     • CORONARY ARTERY  BYPASS GRAFT  2000   • CORONARY STENT PLACEMENT      3 stents    • EYE SURGERY Bilateral     cataract extraction    • HIP SURGERY Left     x3   • HYSTERECTOMY      partial    • KNEE ARTHROPLASTY Bilateral    • PACEMAKER IMPLANTATION     • TONSILLECTOMY         Family History   Problem Relation Age of Onset   • Breast cancer Other    • Diabetes Other    • Esophageal cancer Other    • Hypertension Other    • Transient ischemic attack Other    • Breast cancer Mother    • Cancer Father        Social History     Socioeconomic History   • Marital status:    Tobacco Use   • Smoking status: Never Smoker   • Smokeless tobacco: Never Used   Vaping Use   • Vaping Use: Former   Substance and Sexual Activity   • Alcohol use: No   • Drug use: No   • Sexual activity: Defer       Allergies   Allergen Reactions   • Bactrim [Sulfamethoxazole-Trimethoprim] GI Intolerance   • Lisinopril Cough   • Codeine Rash         Medication:    Current Facility-Administered Medications:   •  acetaminophen (TYLENOL) tablet 650 mg, 650 mg, Oral, Q6H PRN, Yolanda Gomez MD, 650 mg at 11/17/21 2025  •  apixaban (ELIQUIS) tablet 2.5 mg, 2.5 mg, Oral, Q12H, Brunner, Mark I, MD, 2.5 mg at 11/17/21 2025  •  atorvastatin (LIPITOR) tablet 20 mg, 20 mg, Oral, Nightly, Brunner, Mark I, MD, 20 mg at 11/17/21 2025  •  carvedilol (COREG) tablet 6.25 mg, 6.25 mg, Oral, BID With Meals, Brunner, Mark I, MD, 6.25 mg at 11/17/21 1723  •  cefTRIAXone (ROCEPHIN) 2 g/100 mL 0.9% NS IVPB (MBP), 2 g, Intravenous, Q24H, Brunner, Mark I, MD, Stopped at 11/17/21 0900  •  DAPTOmycin (CUBICIN) 250 mg in sodium chloride 0.9 % 50 mL IVPB, 4 mg/kg (Adjusted), Intravenous, Q48H, Brunner, Mark I, MD, Last Rate: 100 mL/hr at 11/17/21 1723, 250 mg at 11/17/21 1723  •  dextrose (D50W) (25 g/50 mL) IV injection 25 g, 25 g, Intravenous, Q15 Min PRN, Brunner, Mark I, MD  •  dextrose (GLUTOSE) oral gel 15 g, 15 g, Oral, Q15 Min PRN, Brunner, Mark I, MD  •  glucagon (human  recombinant) (GLUCAGEN DIAGNOSTIC) injection 1 mg, 1 mg, Subcutaneous, Q15 Min PRN, Brunner, Mark I, MD  •  insulin lispro (humaLOG) injection 0-7 Units, 0-7 Units, Subcutaneous, TID AC, Brunner, Mark I, MD, 4 Units at 11/17/21 1723  •  isosorbide mononitrate (IMDUR) 24 hr tablet 30 mg, 30 mg, Oral, Daily, Brunner, Mark I, MD, 30 mg at 11/17/21 0826  •  latanoprost (XALATAN) 0.005 % ophthalmic solution 1 drop, 1 drop, Both Eyes, Nightly, Brunner, Mark I, MD, 1 drop at 11/17/21 2026  •  levothyroxine (SYNTHROID, LEVOTHROID) tablet 112 mcg, 112 mcg, Oral, Q AM, Brunner, Mark I, MD, 112 mcg at 11/17/21 0546  •  melatonin tablet 5 mg, 5 mg, Oral, Nightly, Brunner, Mark I, MD, 5 mg at 11/17/21 2025  •  multivitamin with minerals 1 tablet, 1 tablet, Oral, Daily, Brunner, Mark I, MD, 1 tablet at 11/17/21 0826  •  ondansetron (ZOFRAN) tablet 4 mg, 4 mg, Oral, Q6H PRN **OR** ondansetron (ZOFRAN) injection 4 mg, 4 mg, Intravenous, Q6H PRN, Brunner, Mark I, MD, 4 mg at 11/17/21 1122  •  pantoprazole (PROTONIX) EC tablet 40 mg, 40 mg, Oral, Daily, Brunner, Mark I, MD, 40 mg at 11/17/21 0826  •  sodium chloride 0.9 % flush 10 mL, 10 mL, Intravenous, Q12H, Brunner, Mark I, MD, 10 mL at 11/17/21 2026  •  sodium chloride 0.9 % flush 10 mL, 10 mL, Intravenous, PRN, Brunner, Mark I, MD  •  timolol (TIMOPTIC) 0.5 % ophthalmic solution 1 drop, 1 drop, Both Eyes, BID, Brunner, Mark I, MD, 1 drop at 11/17/21 2026    Antibiotics:  Anti-Infectives (From admission, onward)    Ordered     Dose/Rate Route Frequency Start Stop    11/16/21 1101  DAPTOmycin (CUBICIN) 250 mg in sodium chloride 0.9 % 50 mL IVPB        Ordering Provider: Brunner, Mark I, MD    4 mg/kg × 67.5 kg (Adjusted)  100 mL/hr over 30 Minutes Intravenous Every 48 Hours 11/17/21 1800 11/23/21 1759    11/17/21 0809  cefTRIAXone (ROCEPHIN) 2 g/100 mL 0.9% NS IVPB (MBP)        Ordering Provider: Brunner, Mark I, MD    2 g  over 30 Minutes Intravenous Every 24 Hours 11/17/21 0900  21 0859    11/15/21 2236  piperacillin-tazobactam (ZOSYN) 3.375 g in iso-osmotic dextrose 50 ml (premix)        Ordering Provider: Kamlesh Gomez,     3.375 g  over 30 Minutes Intravenous Once 11/15/21 2236 11/16/21 0209            Review of Systems:  Constitutional-- No Fever, chills or sweats.  Appetite good, and no malaise. No fatigue.  HEENT-- No new vision, hearing or throat complaints.  No epistaxis or oral sores. She has some dysphagia and sometimes food becomes caught in her esophagus. No headache, photophobia or neck stiffness.  CV-- No chest pain, palpitation or syncope  Resp-- No SOB/cough/Hemoptysis  GI- No nausea, vomiting, or diarrhea.  No hematochezia, melena, or hematemesis. Denies jaundice or chronic liver disease.  -- No dysuria, hematuria, or flank pain.  Denies hesitancy, urgency or flank pain.  Lymph- no swollen lymph nodes in neck/axilla or groin.   Heme- No active bruising or bleeding; no Hx of DVT or PE.  MS-- Left pretibial erythema, with circular area of induration approx 6cm in diamter laterally   Neuro-- No acute focal weakness or numbness in the arms or legs.  No seizures.  Skin--No rashes or lesions      Physical Exam:   Vital Signs  Temp (24hrs), Av.8 °F (36.6 °C), Min:97.4 °F (36.3 °C), Max:98.3 °F (36.8 °C)    Temp  Min: 97.4 °F (36.3 °C)  Max: 98.3 °F (36.8 °C)  BP  Min: 117/91  Max: 156/64  Pulse  Min: 69  Max: 114  Resp  Min: 16  Max: 18  SpO2  Min: 89 %  Max: 97 %    GENERAL: Awake and alert, in no acute distress.   HEENT: Normocephalic, atraumatic.  PERRL. EOMI. No conjunctival injection. No icterus. Oropharynx clear without evidence of thrush or exudate. No evidence of peridontal disease.    NECK: Supple without nuchal rigidity. No mass.  LYMPH: No cervical, axillary or inguinal lymphadenopathy.  HEART: RRR; No murmur, rubs, gallops.   LUNGS: Clear to auscultation bilaterally without wheezing, rales, rhonchi. Normal respiratory effort. Nonlabored. No  dullness.  ABDOMEN: Soft, nontender, nondistended. Positive bowel sounds. No rebound or guarding. NO mass or HSM.  EXT:  No cyanosis, clubbing or edema. No cord.  :  Without Huang catheter.  MSK:  No joint effusions   SKIN: Warm and dry   Left pretibial erythema, with lateral area of induration, ecchymosis approx 6cm in diameter, no crepitus   NEURO: Oriented to PPT.  PSYCHIATRIC: Normal insight and judgement. Cooperative with PE    Laboratory Data    Results from last 7 days   Lab Units 11/17/21  1448 11/16/21  1403 11/15/21  1926   WBC 10*3/mm3 6.52 4.63 5.70   HEMOGLOBIN g/dL 14.0 12.5 13.3   HEMATOCRIT % 43.6 38.8 41.8   PLATELETS 10*3/mm3 126* 110* 133*     Results from last 7 days   Lab Units 11/17/21  1448   SODIUM mmol/L 137   POTASSIUM mmol/L 4.5   CHLORIDE mmol/L 101   CO2 mmol/L 22.0   BUN mg/dL 69*   CREATININE mg/dL 2.70*   GLUCOSE mg/dL 257*   CALCIUM mg/dL 9.1     Results from last 7 days   Lab Units 11/15/21  2028   ALK PHOS U/L 236*   BILIRUBIN mg/dL 1.1   ALT (SGPT) U/L 8   AST (SGOT) U/L 15             Results from last 7 days   Lab Units 11/16/21  1403   LACTATE mmol/L 2.2*     Results from last 7 days   Lab Units 11/17/21  1448   CK TOTAL U/L 196*         Estimated Creatinine Clearance: 16.2 mL/min (A) (by C-G formula based on SCr of 2.7 mg/dL (H)).      Microbiology:  Blood Culture   Date Value Ref Range Status   11/15/2021 Abnormal Stain (C)  Preliminary     BCID, PCR   Date Value Ref Range Status   11/15/2021 (A) Negative by BCID PCR. Culture to Follow. Final    Staph spp, not aureus or lugdunesis. Identification by BCID2 PCR.     No results found for: CULTURES, HSVCX, URCX  No results found for: EYECULTURE, GCCX, HSVCULTURE, LABHSV  No results found for: LEGIONELLA, MRSACX, MUMPSCX, MYCOPLASCX  No results found for: NOCARDIACX, STOOLCX  No results found for: THROATCX, UNSTIMCULT, URINECX, CULTURE, VZVCULTUR  No results found for: VIRALCULTU, WOUNDCX        Radiology:  Imaging Results (Last  72 Hours)     Procedure Component Value Units Date/Time    CT Lower Extremity Left Without Contrast [[id number]] Collected: 11/15/21 2322     Updated: 11/15/21 2324    Narrative:      CT LE LT WO    INDICATION:    Lower extremity cellulitis. Fluid-filled lesion on physical exam.    TECHNIQUE:   CT of the left lower leg and foot without IV contrast. Coronal and sagittal reconstructions were obtained.  Radiation dose reduction techniques included automated exposure control or exposure modulation based on body size. Count of known CT and cardiac  nuc med studies performed in previous 12 months: 0.      COMPARISON:    Radiographs same day    FINDINGS:  Patient has a left knee arthroplasty. This causes extensive streak artifact in the proximal lower leg. This appears appropriately positioned radiographically. No acute fracture or malalignment is identified. There is no evidence of osteomyelitis. There  is extensive arterial calcification. There is generalized skin thickening and subcutaneous fat stranding in the lower leg which may reflect bland edema or cellulitis. In the subcutaneous tissues of the anterolateral mid lower leg, there is a soft tissue  lesion that is likely a hematoma. This is superficial to the muscular fascia and measures about 3.5 cm in width by 1.3 cm in depth by about 2.7 cm in height. No soft tissue gas is identified. There are no radiopaque foreign bodies.      Impression:        1. No acute fracture or malalignment.  2. Generalized soft tissue edema as above suggesting cellulitis or bland edema. No soft tissue gas.  3. Subcutaneous lesion in the anterolateral mid lower leg, most likely a subcutaneous hematoma. It measures 3.5 x 1.3 x 2.7 cm.    Signer Name: Josue Lr MD   Signed: 11/15/2021 11:22 PM   Workstation Name: [id number]    [facility]    XR Tibia Fibula 2 View Left [[id number]] Collected: 11/15/21 2023     Updated: 11/15/21 2025    Narrative:      CR Tibia  Fibula 2 Vws LT    INDICATION:   Acute left leg pain. Trauma    COMPARISON:   None available.    FINDINGS:   AP and lateral views of the left tibia/fibula. No definite acute fracture or subluxation. There are changes of total knee arthroplasty. Hardware is intact. There is vascular calcification. No foreign body.      Impression:      No acute fracture or subluxation.    Signer Name: Angelique Kelley MD   Signed: 11/15/2021 8:23 PM   Workstation Name: [id number]    [facility]            Impression:   1.  Left lower extremity cellulitis, with lateral hematoma from trauma, usual pathogens Staph or Strep.  I will plan to continue daptomycin switch her Zosyn to ceftriaxone. She should be able to discharge soon on oral antibiotic therapy.  2.  Staph spp bacteremia- most likely skin contaminants  3.  Afib, on Eliquis   4.  CKD, stage IV  5.  CHF, chronic systolic    6.  Dysphagia-GI is evaluating    PLAN/RECOMMENDATIONS:   Thank you for asking us to see Karla Cristobal, I recommend the followin.  Continue Daptomycin adjusting for renal dysfunction  2.  Baseline CK  3.  Rocephin 2g IV daily  4.  Blood cultures, pending  5.  DC Zosyn      Dr. Coelho has obtained the history, performed the physical exam and formulated the above treatment plan.        Rip Coelho MD  2021  21:12 EST

## 2021-11-17 NOTE — PROGRESS NOTES
[facility] Medicine Services  PROGRESS NOTE    Patient Name: Karla Cristobal  : 1935  MRN: [id number]    Date of Admission: 11/15/2021  Primary Care Physician: Giselle Guzman DO    Subjective   Subjective     CC:  F/U LLE cellulitis    HPI:  This morning at breakfast began choking and feels like food is stuck in her throat.  No better after glucagon and continues to vomit clear fluid.  No pain.  Was able to swallow her pills this am prior to breakfast without difficulty.    ROS:  Gen- No fevers, chills  GI- As above      Objective   Objective     Vital Signs:   Temp:  [97.7 °F (36.5 °C)-98.5 °F (36.9 °C)] 97.7 °F (36.5 °C)  Heart Rate:  [] 69  Resp:  [14-18] 16  BP: (123-156)/(64-70) 144/70  Flow (L/min):  [2] 2     Physical Exam:  Constitutional: Appears uncomfortable, vomiting, awake, alert, sitting up in chair  HENT: NCAT, mucous membranes moist  Respiratory: Clear to auscultation bilaterally, respiratory effort normal   Cardiovascular: RRR, no murmurs, rubs, or gallops  Gastrointestinal: Soft, nontender, nondistended, actively vomiting  Musculoskeletal: Trace LLE edema  Psychiatric: Appropriate affect, cooperative  Neurologic: Cranial Nerves grossly intact to confrontation, speech clear  Skin: Erythema and warmth of left leg along the anterior and lateral aspect of the shin with subcutaneous hematoma, some improvement improvement in erythema from yesterday    Results Reviewed:  LAB RESULTS:      Lab 21  1403 11/15/21  2246 11/15/21  2028 11/15/21  1926   WBC 4.63  --   --  5.70   HEMOGLOBIN 12.5  --   --  13.3   HEMATOCRIT 38.8  --   --  41.8   PLATELETS 110*  --   --  133*   NEUTROS ABS 2.70  --   --  3.17   IMMATURE GRANS (ABS) 0.01  --   --  0.01   LYMPHS ABS 1.32  --   --  1.76   MONOS ABS 0.45  --   --  0.54   EOS ABS 0.12  --   --  0.17   MCV 91.9  --   --  91.7   PROCALCITONIN  --   --  0.09  --    LACTATE 2.2* 2.5*  --   --          Lab 21  5121  11/15/21  2028   SODIUM 140 144   POTASSIUM 4.2 4.1   CHLORIDE 102 105   CO2 22.0 28.0   ANION GAP 16.0* 11.0   BUN 63* 66*   CREATININE 2.36* 2.35*   GLUCOSE 240* 111*   CALCIUM 8.8 9.0   HEMOGLOBIN A1C 7.40*  --    TSH 1.950  --          Lab 11/15/21  2028   TOTAL PROTEIN 6.9   ALBUMIN 3.30*   GLOBULIN 3.6   ALT (SGPT) 8   AST (SGOT) 15   BILIRUBIN 1.1   ALK PHOS 236*         Lab 11/15/21  1926   PROBNP 4,108.0*                 Brief Urine Lab Results  (Last result in the past 365 days)      Color   Clarity   Blood   Leuk Est   Nitrite   Protein   CREAT   Urine HCG        09/27/21 1116 Yellow   Clear   Negative   Trace   Negative   Negative                 Microbiology Results Abnormal     Procedure Component Value - Date/Time    COVID PRE-OP / PRE-PROCEDURE SCREENING ORDER (NO ISOLATION) - Swab, Nasopharynx [[id number]]  (Normal) Collected: 11/15/21 2300    Lab Status: Final result Specimen: Swab from Nasopharynx Updated: 11/16/21 0126    Narrative:      The following orders were created for panel order COVID PRE-OP / PRE-PROCEDURE SCREENING ORDER (NO ISOLATION) - Swab, Nasopharynx.  Procedure                               Abnormality         Status                     ---------                               -----------         ------                     COVID-19 and FLU A/B PCR...[[id number]]  Normal              Final result                 Please view results for these tests on the individual orders.    COVID-19 and FLU A/B PCR - Swab, Nasopharynx [[id number]]  (Normal) Collected: 11/15/21 2300    Lab Status: Final result Specimen: Swab from Nasopharynx Updated: 11/16/21 0126     COVID19 Not Detected     Influenza A PCR Not Detected     Influenza B PCR Not Detected    Narrative:      Fact sheet for providers: https://www.fda.gov/media/075010/download    Fact sheet for patients: https://www.fda.gov/media/194003/download    Test performed by PCR.          XR Tibia Fibula 2 View Left    Result Date: 11/15/2021  CR  Tibia Fibula 2 Vws LT INDICATION: Acute left leg pain. Trauma COMPARISON: None available. FINDINGS: AP and lateral views of the left tibia/fibula. No definite acute fracture or subluxation. There are changes of total knee arthroplasty. Hardware is intact. There is vascular calcification. No foreign body.     Impression: No acute fracture or subluxation. Signer Name: Angelique Kelley MD  Signed: 11/15/2021 8:23 PM  Workstation Name: [id number]  [facility]    Duplex Venous Lower Extremity - Left    Result Date: 11/16/2021  · Normal left lower extremity venous duplex scan.      CT Lower Extremity Left Without Contrast    Result Date: 11/15/2021  CT LE LT WO INDICATION:  Lower extremity cellulitis. Fluid-filled lesion on physical exam. TECHNIQUE: CT of the left lower leg and foot without IV contrast. Coronal and sagittal reconstructions were obtained.  Radiation dose reduction techniques included automated exposure control or exposure modulation based on body size. Count of known CT and cardiac nuc med studies performed in previous 12 months: 0.  COMPARISON:  Radiographs same day FINDINGS: Patient has a left knee arthroplasty. This causes extensive streak artifact in the proximal lower leg. This appears appropriately positioned radiographically. No acute fracture or malalignment is identified. There is no evidence of osteomyelitis. There is extensive arterial calcification. There is generalized skin thickening and subcutaneous fat stranding in the lower leg which may reflect bland edema or cellulitis. In the subcutaneous tissues of the anterolateral mid lower leg, there is a soft tissue lesion that is likely a hematoma. This is superficial to the muscular fascia and measures about 3.5 cm in width by 1.3 cm in depth by about 2.7 cm in height. No soft tissue gas is identified. There are no radiopaque foreign bodies.     Impression: 1. No acute fracture or malalignment. 2. Generalized soft tissue edema  as above suggesting cellulitis or bland edema. No soft tissue gas. 3. Subcutaneous lesion in the anterolateral mid lower leg, most likely a subcutaneous hematoma. It measures 3.5 x 1.3 x 2.7 cm. Signer Name: Josue Lr MD  Signed: 11/15/2021 11:22 PM  Workstation Name: [facility]  [facility] [place]          I have reviewed the medications:  Scheduled Meds:apixaban, 2.5 mg, Oral, Q12H  atorvastatin, 20 mg, Oral, Nightly  carvedilol, 6.25 mg, Oral, BID With Meals  cefTRIAXone, 2 g, Intravenous, Q24H  DAPTOmycin, 4 mg/kg (Adjusted), Intravenous, Q48H  insulin lispro, 0-7 Units, Subcutaneous, TID AC  isosorbide mononitrate, 30 mg, Oral, Daily  latanoprost, 1 drop, Both Eyes, Nightly  levothyroxine, 112 mcg, Oral, Q AM  melatonin, 5 mg, Oral, Nightly  multivitamin with minerals, 1 tablet, Oral, Daily  pantoprazole, 40 mg, Oral, Daily  sodium chloride, 10 mL, Intravenous, Q12H  timolol, 1 drop, Both Eyes, BID      Continuous Infusions:   PRN Meds:.dextrose  •  dextrose  •  glucagon (human recombinant)  •  ondansetron **OR** ondansetron  •  sodium chloride    Assessment/Plan   Assessment & Plan     Active Hospital Problems    Diagnosis  POA   • **Cellulitis of left lower extremity [L03.116]  Yes   • Left leg cellulitis [L03.116]  Yes   • Diabetes mellitus, type II, insulin dependent (HCC) [E11.9, Z79.4]  Not Applicable   • Chronic systolic congestive heart failure (HCC) [I50.22]  Yes   • CAD (coronary artery disease) [I25.10]  Yes   • Chronic atrial fibrillation (HCC) [I48.20]  Yes   • CKD (chronic kidney disease), stage IV (HCC) [N18.4]  Yes   • Diabetic nephropathy (HCC) [E11.21]  Yes   • Hyperlipidemia LDL goal <100 [E78.5]  Yes   • Essential hypertension [I10]  Yes   • Hypothyroidism [E03.9]  Yes      Resolved Hospital Problems   No resolved problems to display.        Brief Hospital Course to date:  Karla Cristobal is a 86 y.o. female with atrial fibrillation (on Eliquis), coronary artery disease  s/p stents ('05), hypertension, hyperlipidemia, chronic kidney disease, congestive heart failure, insulin-dependent type 2 diabetes, hypothyroidism, and AFTAB who presented to [facility] ED for complaint of left lower extremity pain after falling and landing with her left shin on the wheel of her walker.  Since then she has had progressive erythema and pain in the left shin.     Left Lower Extremity Cellulitis  Blood cultures positive for non-aureus staph in 4/4 bottles  -CT LLE suggestive of hematoma with cellulitis.  No gas or foreign bodies.  -Continue Zosyn and daptomycin  -Called this evening with positive blood cultures, gram positive cocci in pairs and clusters 4/4 bottles.  BCID on one set non-aureus staph.  -ID consult  -Obtain echocardiogram  -History of MRSA bacteremia/pacemaker lead infection (remote ~2005)    Food impaction  -S/P glucagon without improvement  -GI consulted     Chronic atrial fibrillation  CAD  Chronic systolic CHF, compensated  Hx DVT/PE  -Continue Eliquis  -Continue asa, statin  -Prior LVEF as low as 25%     Chronic Kidney Disease (stage IV)  -Creatinine at baseline     Type 2 Diabetes Mellitus, with long-term use of insulin  Diabetic Neuropathy  -A1C 7.4%  -SSI    Hypertension  Hyperlipidemia  -Continue home Coreg, Imdur  -Continue statin  -Lasix and spironolactone on hold, resume as tolerated     Hypothyroidism  -Continue home Synthroid  -TSH, T4 WNL    DVT prophylaxis:  Medical DVT prophylaxis orders are present.       AM-PAC 6 Clicks Score (PT): 17 (11/17/21 9650)    Disposition: I expect the patient to be discharged TBD.    CODE STATUS:   Code Status and Medical Interventions:   Ordered at: 11/15/21 2222     Code Status (Patient has no pulse and is not breathing):    CPR (Attempt to Resuscitate)     Medical Interventions (Patient has pulse or is breathing):    Full Support       Yolanda Gomez MD  11/17/21

## 2021-11-17 NOTE — PROGRESS NOTES
[facility] Medicine Services  PROGRESS NOTE    Patient Name: Karla Cristobal  : 1935  MRN: [id number]    Date of Admission: 11/15/2021  Primary Care Physician: Giselle Guzman DO    Subjective   Subjective     CC:  F/U LLE cellulitis    HPI:  Pain in her leg is better today.    ROS:  Gen- No fevers, chills  GI- No N/V/D, abd pain      Objective   Objective     Vital Signs:   Temp:  [97.5 °F (36.4 °C)-98.5 °F (36.9 °C)] 98.2 °F (36.8 °C)  Heart Rate:  [] 74  Resp:  [14-18] 14  BP: (123-158)/(67-83) 137/70  Flow (L/min):  [2] 2     Physical Exam:  Constitutional: No acute distress, awake, alert, sitting up in bed  HENT: NCAT, mucous membranes moist  Respiratory: Clear to auscultation bilaterally, respiratory effort normal   Cardiovascular: RRR, no murmurs, rubs, or gallops  Gastrointestinal: Positive bowel sounds, soft, nontender, nondistended  Musculoskeletal: Trace LLE edema  Psychiatric: Appropriate affect, cooperative  Neurologic: Cranial Nerves grossly intact to confrontation, speech clear  Skin: Erythema and warmth of left leg along the anterior and lateral aspect of the shin with subcutaneous hematoma    Results Reviewed:  LAB RESULTS:      Lab 21  1403 11/15/21  2246 11/15/21  2028 11/15/21  1926   WBC 4.63  --   --  5.70   HEMOGLOBIN 12.5  --   --  13.3   HEMATOCRIT 38.8  --   --  41.8   PLATELETS 110*  --   --  133*   NEUTROS ABS 2.70  --   --  3.17   IMMATURE GRANS (ABS) 0.01  --   --  0.01   LYMPHS ABS 1.32  --   --  1.76   MONOS ABS 0.45  --   --  0.54   EOS ABS 0.12  --   --  0.17   MCV 91.9  --   --  91.7   PROCALCITONIN  --   --  0.09  --    LACTATE 2.2* 2.5*  --   --          Lab 21  1403 11/15/21  2028   SODIUM 140 144   POTASSIUM 4.2 4.1   CHLORIDE 102 105   CO2 22.0 28.0   ANION GAP 16.0* 11.0   BUN 63* 66*   CREATININE 2.36* 2.35*   GLUCOSE 240* 111*   CALCIUM 8.8 9.0   HEMOGLOBIN A1C 7.40*  --    TSH 1.950  --          Lab 11/15/21  2028    TOTAL PROTEIN 6.9   ALBUMIN 3.30*   GLOBULIN 3.6   ALT (SGPT) 8   AST (SGOT) 15   BILIRUBIN 1.1   ALK PHOS 236*         Lab 11/15/21  1926   PROBNP 4,108.0*                 Brief Urine Lab Results  (Last result in the past 365 days)      Color   Clarity   Blood   Leuk Est   Nitrite   Protein   CREAT   Urine HCG        09/27/21 1116 Yellow   Clear   Negative   Trace   Negative   Negative                 Microbiology Results Abnormal     Procedure Component Value - Date/Time    COVID PRE-OP / PRE-PROCEDURE SCREENING ORDER (NO ISOLATION) - Swab, Nasopharynx [[id number]]  (Normal) Collected: 11/15/21 2300    Lab Status: Final result Specimen: Swab from Nasopharynx Updated: 11/16/21 0126    Narrative:      The following orders were created for panel order COVID PRE-OP / PRE-PROCEDURE SCREENING ORDER (NO ISOLATION) - Swab, Nasopharynx.  Procedure                               Abnormality         Status                     ---------                               -----------         ------                     COVID-19 and FLU A/B PCR...[[id number]]  Normal              Final result                 Please view results for these tests on the individual orders.    COVID-19 and FLU A/B PCR - Swab, Nasopharynx [[id number]]  (Normal) Collected: 11/15/21 2300    Lab Status: Final result Specimen: Swab from Nasopharynx Updated: 11/16/21 0126     COVID19 Not Detected     Influenza A PCR Not Detected     Influenza B PCR Not Detected    Narrative:      Fact sheet for providers: https://www.fda.gov/media/599931/download    Fact sheet for patients: https://www.fda.gov/media/784802/download    Test performed by PCR.          XR Tibia Fibula 2 View Left    Result Date: 11/15/2021  CR Tibia Fibula 2 Vws LT INDICATION: Acute left leg pain. Trauma COMPARISON: None available. FINDINGS: AP and lateral views of the left tibia/fibula. No definite acute fracture or subluxation. There are changes of total knee arthroplasty. Hardware is intact.  There is vascular calcification. No foreign body.     Impression: No acute fracture or subluxation. Signer Name: Angelique Kelley MD  Signed: 11/15/2021 8:23 PM  Workstation Name: [id number]  [facility]    Duplex Venous Lower Extremity - Left    Result Date: 11/16/2021  · Normal left lower extremity venous duplex scan.      CT Lower Extremity Left Without Contrast    Result Date: 11/15/2021  CT LE LT WO INDICATION:  Lower extremity cellulitis. Fluid-filled lesion on physical exam. TECHNIQUE: CT of the left lower leg and foot without IV contrast. Coronal and sagittal reconstructions were obtained.  Radiation dose reduction techniques included automated exposure control or exposure modulation based on body size. Count of known CT and cardiac nuc med studies performed in previous 12 months: 0.  COMPARISON:  Radiographs same day FINDINGS: Patient has a left knee arthroplasty. This causes extensive streak artifact in the proximal lower leg. This appears appropriately positioned radiographically. No acute fracture or malalignment is identified. There is no evidence of osteomyelitis. There is extensive arterial calcification. There is generalized skin thickening and subcutaneous fat stranding in the lower leg which may reflect bland edema or cellulitis. In the subcutaneous tissues of the anterolateral mid lower leg, there is a soft tissue lesion that is likely a hematoma. This is superficial to the muscular fascia and measures about 3.5 cm in width by 1.3 cm in depth by about 2.7 cm in height. No soft tissue gas is identified. There are no radiopaque foreign bodies.     Impression: 1. No acute fracture or malalignment. 2. Generalized soft tissue edema as above suggesting cellulitis or bland edema. No soft tissue gas. 3. Subcutaneous lesion in the anterolateral mid lower leg, most likely a subcutaneous hematoma. It measures 3.5 x 1.3 x 2.7 cm. Signer Name: Josue Lr MD  Signed: 11/15/2021 11:22 PM   Workstation Name: [facility]  [facility] [place]          I have reviewed the medications:  Scheduled Meds:apixaban, 2.5 mg, Oral, Q12H  atorvastatin, 20 mg, Oral, Nightly  carvedilol, 6.25 mg, Oral, BID With Meals  [START ON 11/17/2021] DAPTOmycin, 4 mg/kg (Adjusted), Intravenous, Q48H  insulin lispro, 0-7 Units, Subcutaneous, TID AC  isosorbide mononitrate, 30 mg, Oral, Daily  latanoprost, 1 drop, Both Eyes, Nightly  levothyroxine, 112 mcg, Oral, Q AM  melatonin, 5 mg, Oral, Nightly  multivitamin with minerals, 1 tablet, Oral, Daily  pantoprazole, 40 mg, Oral, Daily  piperacillin-tazobactam, 3.375 g, Intravenous, Q12H  sodium chloride, 10 mL, Intravenous, Q12H  timolol, 1 drop, Both Eyes, BID      Continuous Infusions:   PRN Meds:.dextrose  •  dextrose  •  glucagon (human recombinant)  •  ondansetron **OR** ondansetron  •  sodium chloride    Assessment/Plan   Assessment & Plan     Active Hospital Problems    Diagnosis  POA   • **Cellulitis of left lower extremity [L03.116]  Yes   • Left leg cellulitis [L03.116]  Yes   • Diabetes mellitus, type II, insulin dependent (HCC) [E11.9, Z79.4]  Not Applicable   • Chronic systolic congestive heart failure (HCC) [I50.22]  Yes   • CAD (coronary artery disease) [I25.10]  Yes   • Chronic atrial fibrillation (HCC) [I48.20]  Yes   • CKD (chronic kidney disease), stage IV (HCC) [N18.4]  Yes   • Diabetic nephropathy (HCC) [E11.21]  Yes   • Hyperlipidemia LDL goal <100 [E78.5]  Yes   • Essential hypertension [I10]  Yes   • Hypothyroidism [E03.9]  Yes      Resolved Hospital Problems   No resolved problems to display.        Brief Hospital Course to date:  Karla Cristobal is a 86 y.o. female with atrial fibrillation (on Eliquis), coronary artery disease s/p stents ('05), hypertension, hyperlipidemia, chronic kidney disease, congestive heart failure, insulin-dependent type 2 diabetes, hypothyroidism, and AFTAB who presented to [facility] ED for complaint of left lower  extremity pain after falling and landing with her left shin on the wheel of her walker.  Since then she has had progressive erythema and pain in the left shin.     Left Lower Extremity Cellulitis  Blood cultures positive for non-aureus staph in 4/4 bottles  -CT LLE suggestive of hematoma with cellulitis.  No gas or foreign bodies.  -Continue Zosyn and daptomycin  -Called this evening with positive blood cultures, gram positive cocci in pairs and clusters 4/4 bottles.  BCID on one set non-aureus staph.  -ID consult in am  -Obtain echocardiogram  -History of MRSA bacteremia/pacemaker lead infection (remote ~2005)     Chronic atrial fibrillation  CAD  Chronic systolic CHF, compensated  Hx DVT/PE  -Continue Eliquis  -Continue asa, statin  -Prior LVEF as low as 25%     Chronic Kidney Disease (stage IV)  -Creatinine at baseline     Type 2 Diabetes Mellitus, with long-term use of insulin  Diabetic Neuropathy  -A1C 7.4%  -SSI    Hypertension  Hyperlipidemia  -Continue home Coreg, Imdur  -Continue statin  -Lasix and spironolactone on hold, resume as tolerated     Hypothyroidism  -Continue home Synthroid  -TSH, T4 WNL    DVT prophylaxis:  Medical DVT prophylaxis orders are present.       AM-PAC 6 Clicks Score (PT): 17 (11/16/21 0800)    Disposition: I expect the patient to be discharged TBD.    CODE STATUS:   Code Status and Medical Interventions:   Ordered at: 11/15/21 2741     Code Status (Patient has no pulse and is not breathing):    CPR (Attempt to Resuscitate)     Medical Interventions (Patient has pulse or is breathing):    Full Support       Yolanda Gomez MD  11/16/21

## 2021-11-17 NOTE — ANESTHESIA POSTPROCEDURE EVALUATION
Patient: Karla Cristobal    Procedure Summary     Date: 11/17/21 Room / Location:  [facility]    Anesthesia Start: 1106 Anesthesia Stop: 1145    Procedure: ESOPHAGOGASTRODUODENOSCOPY (N/A ) Diagnosis:       Food impaction of esophagus, initial encounter      (Food impaction of esophagus, initial encounter [T18.128A])    Surgeons: Brunner, Mark I, MD Provider: Basilio Salazar MD    Anesthesia Type: general ASA Status: 4          Anesthesia Type: general    Vitals  Vitals Value Taken Time   /63 11/17/21 1141   Temp 97.9 °F (36.6 °C) 11/17/21 1141   Pulse 73 11/17/21 1143   Resp 16 11/17/21 1141   SpO2 96 % 11/17/21 1143   Vitals shown include unvalidated device data.        Post Anesthesia Care and Evaluation    Patient location during evaluation: PACU  Patient participation: complete - patient participated  Level of consciousness: awake and alert  Pain management: adequate  Airway patency: patent  Anesthetic complications: No anesthetic complications  PONV Status: none  Cardiovascular status: hemodynamically stable and acceptable  Respiratory status: nonlabored ventilation, acceptable and nasal cannula  Hydration status: acceptable

## 2021-11-17 NOTE — THERAPY EVALUATION
Patient Name: Karla Cristobal  : 1935    MRN: [id number]                              Today's Date: 2021       Admit Date: 11/15/2021    Visit Dx:     ICD-10-CM ICD-9-CM   1. Left leg cellulitis  L03.116 682.6   2. History of insulin dependent diabetes mellitus  Z86.39 V12.29   3. Chronic kidney disease, unspecified CKD stage  N18.9 585.9   4. Chronic a-fib (HCC)  I48.20 427.31   5. Anticoagulated  Z79.01 V58.61     Patient Active Problem List   Diagnosis   • Anemia due to chronic kidney disease   • Chronic atrial fibrillation (HCC)   • CKD (chronic kidney disease), stage IV (HCC)   • Diabetic nephropathy (HCC)   • Diabetic retinopathy ([facility])   • Malignant neoplasm of endometrium (HCC)   • Hyperlipidemia LDL goal <100   • Essential hypertension   • Hypothyroidism   • Insomnia   • Leukocytosis   • Memory impairment   • Nausea   • Pulmonary embolism ([facility])   • Sleep apnea   • Squamous cell carcinoma of skin   • CHF (NYHA class IV, ACC/AHA stage D) ([facility])   • Ischemic heart disease   • CAD (coronary artery disease)   • DVT of lower extremity (deep venous thrombosis) ([facility])   • Chronic systolic congestive heart failure (HCC)   • Morbidly obese ([facility])   • Ischemic cardiomyopathy   • Diabetes mellitus, type II, insulin dependent ([facility])   • Exudative age-related macular degeneration, right eye, with active choroidal neovascularization ([facility])   • Hypoxia   • Cellulitis of left lower extremity   • Left leg cellulitis     Past Medical History:   Diagnosis Date   • Acute bronchitis    • Arthritis    • Atrial fibrillation ([facility])    • CAD (coronary artery disease)     s/p MI ; 3 stents inserted    • Change in mole    • Change in mole    • Change in nevus 2016   • Chronic kidney disease     stage IV-sees Dr Bk Mckeon every 3-4 months    • Chronic renal disease, stage 4, severely decreased glomerular filtration rate between 15-29 mL/min/1.73 square meter ([facility])    • Chronic systolic heart failure ([facility])    •  Congestive heart disease ([facility])    • Conjunctivitis    • Deep vein thrombosis of lower extremity ([facility])    • Diabetes mellitus ([facility])    • Diabetes mellitus ([facility]) 6/16/2016    Impression: 03/15/2016 - blood sugar 166 and hgn a1c 7.3%  is up to date with eye exam  neuropathy with callus, no ulcer  some scratches feet  ur alb due and ordered  no change other than provided samples of toujeo because she feels like lantus worked much better than levemir, she has tried titrating levemir and it is less effective  continue testing and f/u 3-4 months  Impression: 11/23/2015 - bl   • Diabetic foot ulcer ([facility]) 6/16/2016   • Dog bite of hand    • Easy bruising    • Endometrial cancer ([facility])     partial hysterectomy; radiation as well    • Foot pain    • Foot pain 6/16/2016    Description: lancinating- worse but not consistent enough to want rx   • Fracture of hip ([facility])    • Generalized ischemic myocardial dysfunction 6/16/2016   • Glaucoma     drops daily    • Hyperlipidemia    • Hypertension    • Hypothyroidism    • Insomnia    • Ischemic heart disease    • Macular degeneration    • Methicillin resistant Staphylococcus aureus infection 6/16/2016   • Myocardial infarction ([facility]) 2005   • Nausea with vomiting    • Pericardial effusion    • Shortness of breath 6/16/2016    Impression: 03/24/2015 - chronic. On 2 l oxygen at night. check cbc, bnp;    • Skin cancer, basal cell     nose, leg    • Skin lesion 6/16/2016    Impression: 03/24/2015 - refer derm for eval- seb kerat ant tib and ?ak medially with redness and irritation;    • Sleep apnea     oxygen at night due to low sats but no cpap   • Type 2 diabetes mellitus ([facility])    • UTI (urinary tract infection) 06/12/2020    currently taking antibiotics-patient's daughter notified Dr Beal's office and office said it should not delay generator change      Past Surgical History:   Procedure Laterality Date   • CARDIAC CATHETERIZATION     • CARDIAC DEFIBRILLATOR PLACEMENT     • CARDIAC  ELECTROPHYSIOLOGY PROCEDURE N/A 7/17/2020    Procedure: ICD BIV  generator change- SJM- 3-6 weeks;  Surgeon: Tano Beal MD;  Location: [facility] INVASIVE LOCATION;  Service: Cardiology;  Laterality: N/A;   • CHOLECYSTECTOMY     • CORONARY ARTERY BYPASS GRAFT  2000   • CORONARY STENT PLACEMENT      3 stents    • EYE SURGERY Bilateral     cataract extraction    • HIP SURGERY Left     x3   • HYSTERECTOMY      partial    • KNEE ARTHROPLASTY Bilateral    • PACEMAKER IMPLANTATION     • TONSILLECTOMY        General Information     Row Name 11/17/21 0842          OT Time and Intention    Document Type evaluation  -CS     Mode of Treatment occupational therapy  -CS     Row Name 11/17/21 0842          General Information    Patient Profile Reviewed yes  -CS     Prior Level of Function independent:; bed mobility; transfer; all household mobility; min assist:; feeding; grooming; bathing; mod assist:; dressing; dependent:; driving  Rollator for mobility in house (ramp to enter), shower chair in walk-in shower for bathing, raised toilet seat and grab bars, Dtr assists 2-3x/wk for bathing and grooming  -CS     Existing Precautions/Restrictions fall; other (see comments)  LLE cellulitus (WBAT), remote B TKA, low vision  -CS     Barriers to Rehab medically complex; previous functional deficit  -CS     Row Name 11/17/21 0842          Living Environment    Lives With grandchild(karyna); other relative(s); other (see comments)  Grandson and his spouse, two grandchildren, Dtr lives an hour away and visits to assist w/ bathing/grooming ADLs  -CS     Row Name 11/17/21 0842          Home Main Entrance    Number of Stairs, Main Entrance none  ramp to enter  -CS     Row Name 11/17/21 0842          Stairs Within Home, Primary    Stairs, Within Home, Primary walk-in shower w/ seating, raised toilet seat and grab bars  -CS     Number of Stairs, Within Home, Primary none  -CS     Row Name 11/17/21 0842          Cognition    Orientation  Status (Cognition) oriented x 4  -CS     Row Name 11/17/21 0842          Safety Issues, Functional Mobility    Impairments Affecting Function (Mobility) endurance/activity tolerance; pain; strength  -CS     Comment, Safety Issues/Impairments (Mobility) demo'd good safety awareness and sequencing w/ rollator use  -CS           User Key  (r) = Recorded By, (t) = Taken By, (c) = Cosigned By    Initials Name Provider Type    CS Daniel Brothers OT Occupational Therapist                 Mobility/ADL's     Row Name 11/17/21 0847          Bed Mobility    Bed Mobility supine-sit; scooting/bridging  -CS     Scooting/Bridging [place] (Bed Mobility) minimum assist (75% patient effort); verbal cues  -CS     Supine-Sit [place] (Bed Mobility) minimum assist (75% patient effort); verbal cues  -CS     Bed Mobility, Safety Issues decreased use of legs for bridging/pushing  -CS     Assistive Device (Bed Mobility) bed rails; head of bed elevated  -CS     Comment (Bed Mobility) appropriate sequencing, assist at trunk during supine-sit, cues for scoot to EOB for improved posture/balance  -     Row Name 11/17/21 0847          Transfers    Transfers sit-stand transfer; toilet transfer; bed-chair transfer  -CS     Comment (Transfers) minimal cues for safety awareness and sequencing, good use of grab bars w/ no cues  -CS     Bed-Chair [place] (Transfers) contact guard; verbal cues  -CS     Assistive Device (Bed-Chair Transfers) walker, 4-wheeled  -CS     Sit-Stand [place] (Transfers) contact guard; verbal cues  -CS     [place] Level (Toilet Transfer) contact guard; verbal cues  -CS     Assistive Device (Toilet Transfer) grab bars/safety frame; walker, 4-wheeled; raised toilet seat  -CS     Row Name 11/17/21 0847          Sit-Stand Transfer    Assistive Device (Sit-Stand Transfers) walker, 4-wheeled  -     Row Name 11/17/21 0847          Functional Mobility    Functional Mobility- Comment CGA for in-room  distance to toilet and recliner  -CS     Row Name 11/17/21 [facility]          Activities of Daily Living    BADL Assessment/Intervention upper body dressing; lower body dressing; grooming; feeding; toileting  -CS     Row Name 11/17/21 [facility]          Upper Body Dressing Assessment/Training    [place] Level (Upper Body Dressing) don; pajama/robe; minimum assist (75% patient effort)  -CS     Position (Upper Body Dressing) edge of bed sitting  -CS     Row Name 11/17/21 [facility]          Lower Body Dressing Assessment/Training    [place] Level (Lower Body Dressing) don; socks; dependent (less than 25% patient effort); verbal cues; nonverbal cues (demo/gesture)  -CS     Position (Lower Body Dressing) edge of bed sitting  -CS     Comment (Lower Body Dressing) reports no shoes/socks at baseline, provided reacher and LHS along w/ education/demo for improved reach to distal BLEs - requires further training, not interested in sock-aid  -CS     Row Name 11/17/21 [facility]          Grooming Assessment/Training    [place] Level (Grooming) wash face, hands; set up; hair care, combing/brushing; maximum assist (25% patient effort)  -CS     Comment (Grooming) Pt reach to posterior head limitted by strength deficit, discussed availability of long handled samuel  -CS     Row Name 11/17/21 [facility]          Self-Feeding Assessment/Training    [place] Level (Feeding) liquids to mouth; scoop food and bring to mouth; independent; prepare tray/open items; moderate assist (50% patient effort)  -CS     Position (Self-Feeding) supported sitting  -CS     Comment (Feeding) difficulty with lidded items  -CS     Row Name 11/17/21 [facility]          Toileting Assessment/Training    [place] Level (Toileting) adjust/manage clothing; perform perineal hygiene; standby assist  -CS     Position (Toileting) supported standing; unsupported sitting  -CS           User Key  (r) = Recorded By, (t) = Taken By, (c) = Cosigned By    Initials Name Provider Type      Daniel Brothers OT Occupational Therapist               Obj/Interventions     Row Name 11/17/21 0852          Sensory Assessment (Somatosensory)    Sensory Assessment (Somatosensory) UE sensation intact  -     Row Name 11/17/21 0852          Vision Assessment/Intervention    Visual Impairment/Limitations visual/perceptual impairments present; corrective lenses full-time; other (see comments)  macular degeneration  -     Row Name 11/17/21 0852          Range of Motion Comprehensive    General Range of Motion bilateral upper extremity ROM WFL  -     Comment, General Range of Motion PROM WFL, AROM limitted by strength deficit  -     Row Name 11/17/21 0852          Strength Comprehensive (MMT)    General Manual Muscle Testing (MMT) Assessment upper extremity strength deficits identified  -     Comment, General Manual Muscle Testing (MMT) Assessment BUE grossly 4-/5  -     Row Name 11/17/21 0852          Shoulder (Therapeutic Exercise)    Shoulder (Therapeutic Exercise) AAROM (active assistive range of motion)  -     Shoulder AAROM (Therapeutic Exercise) bilateral; flexion; extension; scapular elevation; scapular retraction; 10 repetitions; other (see comments)  2 sets - assist to reach full range  -     Row Name 11/17/21 0852          Motor Skills    Motor Skills coordination; functional endurance  -     Coordination bilateral; upper extremity; bimanual skills; WFL  -     Functional Endurance RA throughout session w/ O2 sats remaining >92%, SOA observed at conclusion of activities - resolved quickly, 2Lnc use at baseline at sleep  -     Row Name 11/17/21 0852          Balance    Balance Assessment sitting dynamic balance; sitting static balance; standing static balance; standing dynamic balance  -     Static Sitting Balance WFL; unsupported; sitting, edge of bed  -     Dynamic Sitting Balance WFL; unsupported; sitting, edge of bed  -     Static Standing Balance WFL; supported;  standing  -CS     Dynamic Standing Balance mild impairment; supported; standing  -CS     Balance Interventions sitting; sit to stand; standing; occupation based/functional task  -CS     Row Name 11/17/21 0852          Therapeutic Exercise    Therapeutic Exercise shoulder; elbow/forearm  -CS           User Key  (r) = Recorded By, (t) = Taken By, (c) = Cosigned By    Initials Name Provider Type    CS Daniel Brothers, OT Occupational Therapist               Goals/Plan     Row Name 11/17/21 0900          Bed Mobility Goal 1 (OT)    Activity/Assistive Device (Bed Mobility Goal 1, OT) sit to supine; supine to sit; scooting  -CS     [place] Level/Cues Needed (Bed Mobility Goal 1, OT) standby assist  -CS     Time Frame (Bed Mobility Goal 1, OT) long term goal (LTG); 10 days  -CS     Progress/Outcomes (Bed Mobility Goal 1, OT) goal ongoing  -CS     Row Name 11/17/21 0900          Transfer Goal 1 (OT)    Activity/Assistive Device (Transfer Goal 1, OT) sit-to-stand/stand-to-sit; toilet; rollator  -CS     [place] Level/Cues Needed (Transfer Goal 1, OT) modified independence  -CS     Time Frame (Transfer Goal 1, OT) long term goal (LTG); 10 days  -CS     Strategies/Barriers (Transfers Goal 1, OT) rollator in room  -CS     Progress/Outcome (Transfer Goal 1, OT) goal ongoing  -CS     Row Name 11/17/21 0900          Dressing Goal 1 (OT)    Activity/Device (Dressing Goal 1, OT) lower body dressing; long-handled shoe horn; reacher  -CS     [place]/Cues Needed (Dressing Goal 1, OT) set-up required; minimum assist (75% or more patient effort)  -CS     Time Frame (Dressing Goal 1, OT) long term goal (LTG); 10 days  -CS     Strategies/Barriers (Dressing Goal 1, OT) pants, shoes, - no socks at baseline  -CS     Progress/Outcome (Dressing Goal 1, OT) goal ongoing  -CS     Row Name 11/17/21 0900          Strength Goal 1 (OT)    Strength Goal 1 (OT) Pt will tolerate 1/8reps BUE HEP w/ appropriate resistance by discharge to  promote increased fxl strength for ADL performance and safe transfers  -CS     Time Frame (Strength Goal 1, OT) long term goal (LTG); 10 days  -CS     Progress/Outcome (Strength Goal 1, OT) goal ongoing  -CS     Row Name 11/17/21 0900          Therapy Assessment/Plan (OT)    Planned Therapy Interventions (OT) functional balance retraining; occupation/activity based interventions; ROM/therapeutic exercise; strengthening exercise; transfer/mobility retraining; adaptive equipment training  -CS           User Key  (r) = Recorded By, (t) = Taken By, (c) = Cosigned By    Initials Name Provider Type    CS Daniel Brothers OT Occupational Therapist               Clinical Impression     Row Name 11/17/21 0855          Pain Assessment    Additional Documentation Pain Scale: FACES Pre/Post-Treatment (Group)  -     Row Name 11/17/21 0855          Pain Scale: FACES Pre/Post-Treatment    Pain: FACES Scale, Pretreatment 2-->hurts little bit  -CS     Posttreatment Pain Rating 4-->hurts little more  -CS     Pain Location - Side Left  -CS     Pain Location - Orientation lower  -CS     Pain Location extremity  -CS     Pre/Posttreatment Pain Comment intermittent pain (zingers), nursing aware and managing, tolerated eval  -CS     Row Name 11/17/21 0855          Plan of Care Review    Plan of Care Reviewed With patient  -CS     Progress no change  OT eval  -CS     Outcome Summary Initial OT evaluation completed. Pt presents w/ LLE pain, decreased activity tolerance, generalized weakness, and mild balance deficit warranting skilled IP OT services to promote return to PLOF. Pt required [place] for bed mobility, CGA for transfers and functional distances w/ rollator, SBA for toileting, and ModA/MARIE for seated grooming and food tray prep. Pt issued AE and demo/training to improve reach to distal BLEs, tolerated BUE AAROM HEP to full shoulder range(s). Recommend d/c to home w/ assist and HHOT.  -     Row Name 11/17/21 4265          Therapy  Assessment/Plan (OT)    Rehab Potential (OT) good, to achieve stated therapy goals  -CS     Criteria for Skilled Therapeutic Interventions Met (OT) yes; meets criteria; skilled treatment is necessary  -CS     Therapy Frequency (OT) daily  -CS     Row Name 11/17/21 0855          Therapy Plan Review/Discharge Plan (OT)    Anticipated Discharge Disposition (OT) home with assist; home with home health  -CS     Row Name 11/17/21 0855          Vital Signs    Pre Systolic BP Rehab 144  RN cleared for eval, VSS on RA  -CS     Pre Treatment Diastolic BP 70  -CS     Pretreatment Heart Rate (beats/min) 74  -CS     Pre SpO2 (%) 96  -CS     O2 Delivery Pre Treatment room air  -CS     Intra SpO2 (%) 92  -CS     O2 Delivery Intra Treatment room air  -CS     Post SpO2 (%) 94  -CS     O2 Delivery Post Treatment room air  -CS     Pre Patient Position Supine  -CS     Intra Patient Position Standing  -CS     Post Patient Position Sitting  -CS     Row Name 11/17/21 0855          Positioning and Restraints    Pre-Treatment Position in bed  -CS     Post Treatment Position chair  -CS     In Chair notified nsg; reclined; sitting; call light within reach; encouraged to call for assist; exit alarm on; with family/caregiver; waffle cushion; legs elevated; heels elevated  -CS           User Key  (r) = Recorded By, (t) = Taken By, (c) = Cosigned By    Initials Name Provider Type    CS Daniel Brothers, OT Occupational Therapist               Outcome Measures     Row Name 11/17/21 0902          How much help from another is currently needed...    Putting on and taking off regular lower body clothing? 2  -CS     Bathing (including washing, rinsing, and drying) 2  -CS     Toileting (which includes using toilet bed pan or urinal) 3  -CS     Putting on and taking off regular upper body clothing 3  -CS     Taking care of personal grooming (such as brushing teeth) 3  -CS     Eating meals 3  -CS     AM-PAC 6 Clicks Score (OT) 16  -CS     Row Name  11/17/21 0834          How much help from another person do you currently need...    Turning from your back to your side while in flat bed without using bedrails? 3  -LC     Moving from lying on back to sitting on the side of a flat bed without bedrails? 3  -LC     Moving to and from a bed to a chair (including a wheelchair)? 3  -LC     Standing up from a chair using your arms (e.g., wheelchair, bedside chair)? 3  -LC     Climbing 3-5 steps with a railing? 2  -LC     To walk in hospital room? 3  -LC     AM-PAC 6 Clicks Score (PT) 17  -LC     Row Name 11/17/21 0902          Functional Assessment    Outcome Measure Options AM-PAC 6 Clicks Daily Activity (OT)  -CS           User Key  (r) = Recorded By, (t) = Taken By, (c) = Cosigned By    Initials Name Provider Type    Danni Degroot RN Registered Nurse    Daniel Zelaya OT Occupational Therapist                Occupational Therapy Education                 Title: PT OT SLP Therapies (Done)     Topic: Occupational Therapy (Done)     Point: ADL training (Done)     Description:   Instruct learner(s) on proper safety adaptation and remediation techniques during self care or transfers.   Instruct in proper use of assistive devices.              Learning Progress Summary           Patient Acceptance, E,D, VU,NR by GRACIELA at 11/17/2021 0902    Comment: OT role, AE for LB dressing, BUE HEP                   Point: Home exercise program (Done)     Description:   Instruct learner(s) on appropriate technique for monitoring, assisting and/or progressing therapeutic exercises/activities.              Learning Progress Summary           Patient Acceptance, E,D, VU,NR by GRACIELA at 11/17/2021 0902    Comment: OT role, AE for LB dressing, BUE HEP                   Point: Precautions (Done)     Description:   Instruct learner(s) on prescribed precautions during self-care and functional transfers.              Learning Progress Summary           Patient Acceptance, E,D, VU,NR by GRACIELA at  11/17/2021 0902    Comment: OT role, AE for LB dressing, BUE HEP                   Point: Body mechanics (Done)     Description:   Instruct learner(s) on proper positioning and spine alignment during self-care, functional mobility activities and/or exercises.              Learning Progress Summary           Patient Acceptance, E,D, VU,NR by  at 11/17/2021 0902    Comment: OT role, AE for LB dressing, BUE HEP                               User Key     Initials Effective Dates Name Provider Type Discipline     06/16/21 -  Daniel Brothers OT Occupational Therapist OT              OT Recommendation and Plan  Planned Therapy Interventions (OT): functional balance retraining, occupation/activity based interventions, ROM/therapeutic exercise, strengthening exercise, transfer/mobility retraining, adaptive equipment training  Therapy Frequency (OT): daily  Plan of Care Review  Plan of Care Reviewed With: patient  Progress: no change (OT eval)  Outcome Summary: Initial OT evaluation completed. Pt presents w/ LLE pain, decreased activity tolerance, generalized weakness, and mild balance deficit warranting skilled IP OT services to promote return to PLOF. Pt required [place] for bed mobility, CGA for transfers and functional distances w/ rollator, SBA for toileting, and ModA/MARIE for seated grooming and food tray prep. Pt issued AE and demo/training to improve reach to distal BLEs, tolerated BUE AAROM HEP to full shoulder range(s). Recommend d/c to home w/ assist and HHOT.     Time Calculation:    Time Calculation- OT     Row Name 11/17/21 0903             Time Calculation- OT    OT Start Time 0750  -CS      OT Received On 11/17/21  -      OT Goal Re-Cert Due Date 11/27/21  -CS              Timed Charges    84851 - OT Therapeutic Exercise Minutes 4  -CS      85558 - OT Self Care/Mgmt Minutes 6  -CS              Total Minutes    Timed Charges Total Minutes 10  -CS       Total Minutes 10  -CS            User Key  (r) =  Recorded By, (t) = Taken By, (c) = Cosigned By    Initials Name Provider Type    CS Daniel Brothers, OT Occupational Therapist              Therapy Charges for Today     Code Description Service Date Service Provider Modifiers Qty    68364465239 HC OT SELF CARE/MGMT/TRAIN EA 15 MIN 11/17/2021 Daniel Brothers, OT GO 1    12311558815 HC OT EVAL MOD COMPLEXITY 4 11/17/2021 Daniel Brothers OT GO 1               Daniel Brothers OT  11/17/2021

## 2021-11-17 NOTE — BRIEF OP NOTE
ESOPHAGOGASTRODUODENOSCOPY  Progress Note    Karla A Levar  11/17/2021    EGD shows apparent indentation of the upper esophagus from anterior osteophyte, though it is not obstructing.  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  Stomach and duodenum are normal.    >> Obtain formal barium esophagram  >> Needs outpatient EGD with Armas's biopsies and esophageal dilation, off anticoagulation.    Mark I. Brunner, MD     Date: 11/17/2021  Time: 11:25 EST

## 2021-11-17 NOTE — PROGRESS NOTES
Patient declined barium esophagram.  Will arrange EGD with dilation and Armas's biopsies in approximately 1 month.      Will sign off.  Please call with questions or concerns.

## 2021-11-17 NOTE — ANESTHESIA PREPROCEDURE EVALUATION
Anesthesia Evaluation                  Airway   Mallampati: I  TM distance: >3 FB  Neck ROM: full  No difficulty expected  Dental      Pulmonary    (+) pulmonary embolism, shortness of breath, sleep apnea,   Cardiovascular     ECG reviewed    (+) pacemaker ICD, pacemaker, hypertension, past MI , CAD, CABG, dysrhythmias, CHF , pericardial effusion, DVT, hyperlipidemia,     ROS comment: Ef 25-40%    Neuro/Psych  GI/Hepatic/Renal/Endo    (+)   renal disease, diabetes mellitus,     Musculoskeletal     Abdominal    Substance History      OB/GYN          Other   arthritis,    history of cancer                  Anesthesia Plan    ASA 4     general     intravenous induction     Anesthetic plan, all risks, benefits, and alternatives have been provided, discussed and informed consent has been obtained with: patient.    Plan discussed with CRNA.

## 2021-11-17 NOTE — CONSULTS
[facility] [place] [facility]enterology Consult    Referring Provider: Kamlesh Gomez DO    PCP: Giselle Guzman DO    Reason for Consultation: Food impaction     Chief complaint: I can't swallow     History of present illness:    Karla Cristobal is a 86 y.o. female who is admitted with left lower extremity cellulitis.  GI is consulted this morning due to concern of a food impaction.   The patient states she had a bidirectional endoscopy at [facility] several months ago and states she developed intermittent solid food dysphagia after this.   She took her medications by mouth this morning.   She then ate cottage cheese with fruit when she felt a piece become lodged in her upper esophagus.   She had several episodes of clear emesis.   She received glucagon without much improvement.   She has not been able to drink since.       She denies previous esophageal dilation.   She is on chronic anticoagulation with Apixiban and received 2.5 mg this morning.      Allergies:  Bactrim [sulfamethoxazole-trimethoprim], Lisinopril, and Codeine    Scheduled Meds:  apixaban, 2.5 mg, Oral, Q12H  atorvastatin, 20 mg, Oral, Nightly  carvedilol, 6.25 mg, Oral, BID With Meals  cefTRIAXone, 2 g, Intravenous, Q24H  DAPTOmycin, 4 mg/kg (Adjusted), Intravenous, Q48H  insulin lispro, 0-7 Units, Subcutaneous, TID AC  isosorbide mononitrate, 30 mg, Oral, Daily  latanoprost, 1 drop, Both Eyes, Nightly  levothyroxine, 112 mcg, Oral, Q AM  melatonin, 5 mg, Oral, Nightly  multivitamin with minerals, 1 tablet, Oral, Daily  pantoprazole, 40 mg, Oral, Daily  sodium chloride, 10 mL, Intravenous, Q12H  timolol, 1 drop, Both Eyes, BID       Infusions:       PRN Meds:  dextrose  •  dextrose  •  glucagon (human recombinant)  •  ondansetron **OR** ondansetron  •  sodium chloride    Home Meds:  Medications Prior to Admission   Medication Sig Dispense Refill Last Dose   • atorvastatin (LIPITOR) 20 MG tablet Take 1 tablet by mouth Every Night. 90 tablet 1  "11/15/2021 at Unknown time   • carvedilol (COREG) 25 MG tablet Take 0.5 tablets by mouth 2 (Two) Times a Day With Meals. (Patient taking differently: Take 6.25 mg by mouth 2 (Two) Times a Day With Meals.) 30 tablet 11 11/15/2021 at Unknown time   • cholecalciferol (VITAMIN D3) 1000 UNITS tablet Take 1,000 Units by mouth Daily.   11/15/2021 at Unknown time   • Eliquis 2.5 MG tablet tablet    11/15/2021 at Unknown time   • eszopiclone (LUNESTA) 2 MG tablet TAKE ONE TABLET BY MOUTH IMMEDIATELY BEFORE BEDTIME AS NEEDED FOR SLEEP 30 tablet 2 11/15/2021 at Unknown time   • furosemide (LASIX) 40 MG tablet Take 40 mg by mouth As Needed.   11/15/2021 at Unknown time   • Insulin Glargine (BASAGLAR KWIKPEN) 100 UNIT/ML injection pen INJECT 20 UNITS UNDER THE SKIN ONCE DAILY AS DIRECTED 15 mL 1 11/15/2021 at Unknown time   • Insulin Syringe-Needle U-100 (TRUEplus Insulin Syringe) 31G X 5/16\" 0.5 ML misc Use with Insulin Three Times daily 100 each 5 11/15/2021 at Unknown time   • isosorbide mononitrate (IMDUR) 30 MG 24 hr tablet Take 1 tablet by mouth Daily. (Patient taking differently: Take 30 mg by mouth Every Morning.) 90 tablet 0 11/15/2021 at Unknown time   • latanoprost (XALATAN) 0.005 % ophthalmic solution Administer 1 drop to both eyes Every Night.  5 11/15/2021 at Unknown time   • levothyroxine (Synthroid) 112 MCG tablet Take 1 tablet by mouth Daily. 90 tablet 1 11/15/2021 at Unknown time   • melatonin 5 MG tablet tablet Take 5 mg by mouth Every Night.   11/15/2021 at Unknown time   • multivitamins-minerals (PRESERVISION AREDS 2) capsule capsule Take 1 capsule by mouth 2 (Two) Times a Day.   11/15/2021 at Unknown time   • NovoLOG 100 UNIT/ML injection INJECT 15 UNITS UNDER THE SKIN AS DIRECTED 3 TIMES DAILY BEFORE MEALS 20 mL 5 11/15/2021 at Unknown time   • omeprazole (priLOSEC) 40 MG capsule omeprazole 40 mg capsule,delayed release   11/15/2021 at Unknown time   • timolol (TIMOPTIC) 0.5 % ophthalmic solution " Administer 1 drop to both eyes 2 (Two) Times a Day.   11/15/2021 at Unknown time   • ciprofloxacin (Cipro) 250 MG tablet Take 1 tablet by mouth 2 (Two) Times a Day. 14 tablet 0    • Continuous Blood Gluc Sensor (FreeStyle Lokesh 2 Sensor) misc 1 each Every 14 (Fourteen) Days. 2 each 3    • indapamide (LOZOL) 2.5 MG tablet    Unknown at Unknown time   • mupirocin (BACTROBAN) 2 % ointment 1 application As Needed.      • ondansetron (ZOFRAN) 4 MG tablet Take 4 mg by mouth Every 12 (Twelve) Hours As Needed.   More than a month at Unknown time   • spironolactone (ALDACTONE) 25 MG tablet    More than a month at Unknown time     ROS: Review of Systems   Constitutional: Positive for fatigue.   HENT: Positive for trouble swallowing.    Eyes: Negative.    Respiratory: Positive for cough, choking and shortness of breath.    Cardiovascular: Negative.    Gastrointestinal: Negative.    Endocrine: Negative.    Genitourinary: Negative.    Musculoskeletal: Negative.    Skin: Positive for color change.   Allergic/Immunologic: Negative.    Neurological: Negative.    Hematological: Negative.    Psychiatric/Behavioral: Negative.        PAST MED HX:  Past Medical History:   Diagnosis Date   • Acute bronchitis    • Arthritis    • Atrial fibrillation (HCC)    • CAD (coronary artery disease)     s/p MI 2005; 3 stents inserted    • Change in mole    • Change in mole    • Change in nevus 6/16/2016   • Chronic kidney disease     stage IV-sees Dr Bk Mckeon every 3-4 months    • Chronic renal disease, stage 4, severely decreased glomerular filtration rate between 15-29 mL/min/1.73 square meter (HCC)    • Chronic systolic heart failure (HCC)    • Congestive heart disease (HCC)    • Conjunctivitis    • Deep vein thrombosis of lower extremity (HCC)    • Diabetes mellitus (HCC)    • Diabetes mellitus (HCC) 6/16/2016    Impression: 03/15/2016 - blood sugar 166 and hgn a1c 7.3%  is up to date with eye exam  neuropathy with callus, no ulcer  some  scratches feet  ur alb due and ordered  no change other than provided samples of toujeo because she feels like lantus worked much better than levemir, she has tried titrating levemir and it is less effective  continue testing and f/u 3-4 months  Impression: 11/23/2015 - bl   • Diabetic foot ulcer (HCC) 6/16/2016   • Dog bite of hand    • Easy bruising    • Endometrial cancer (HCC)     partial hysterectomy; radiation as well    • Foot pain    • Foot pain 6/16/2016    Description: lancinating- worse but not consistent enough to want rx   • Fracture of hip (HCC)    • Generalized ischemic myocardial dysfunction 6/16/2016   • Glaucoma     drops daily    • Hyperlipidemia    • Hypertension    • Hypothyroidism    • Insomnia    • Ischemic heart disease    • Macular degeneration    • Methicillin resistant Staphylococcus aureus infection 6/16/2016   • Myocardial infarction ([facility]) 2005   • Nausea with vomiting    • Pericardial effusion    • Shortness of breath 6/16/2016    Impression: 03/24/2015 - chronic. On 2 l oxygen at night. check cbc, bnp;    • Skin cancer, basal cell     nose, leg    • Skin lesion 6/16/2016    Impression: 03/24/2015 - refer derm for eval- seb kerat ant tib and ?ak medially with redness and irritation;    • Sleep apnea     oxygen at night due to low sats but no cpap   • Type 2 diabetes mellitus (HCC)    • UTI (urinary tract infection) 06/12/2020    currently taking antibiotics-patient's daughter notified Dr Beal's office and office said it should not delay generator change      PAST SURG HX:  Past Surgical History:   Procedure Laterality Date   • CARDIAC CATHETERIZATION     • CARDIAC DEFIBRILLATOR PLACEMENT     • CARDIAC ELECTROPHYSIOLOGY PROCEDURE N/A 7/17/2020    Procedure: ICD BIV  generator change- [facility]- 3-6 weeks;  Surgeon: Tano Beal MD;  Location: [facility] INVASIVE LOCATION;  Service: Cardiology;  Laterality: N/A;   • CHOLECYSTECTOMY     • CORONARY ARTERY BYPASS GRAFT  2000   • CORONARY  "STENT PLACEMENT      3 stents    • EYE SURGERY Bilateral     cataract extraction    • HIP SURGERY Left     x3   • HYSTERECTOMY      partial    • KNEE ARTHROPLASTY Bilateral    • PACEMAKER IMPLANTATION     • TONSILLECTOMY       FAM HX:  Family History   Problem Relation Age of Onset   • Breast cancer Other    • Diabetes Other    • Esophageal cancer Other    • Hypertension Other    • Transient ischemic attack Other    • Breast cancer Mother    • Cancer Father      SOC HX:  Social History     Socioeconomic History   • Marital status:    Tobacco Use   • Smoking status: Never Smoker   • Smokeless tobacco: Never Used   Vaping Use   • Vaping Use: Former   Substance and Sexual Activity   • Alcohol use: No   • Drug use: No   • Sexual activity: Defer       PHYSICAL EXAM  /70 (BP Location: Left arm, Patient Position: Lying)   Pulse 69   Temp 97.7 °F (36.5 °C) (Oral)   Resp 16   Ht 162.6 cm (64\")   Wt 86.6 kg (191 lb)   LMP  (LMP Unknown) Comment: last mammogram -unsure   SpO2 94%   BMI 32.79 kg/m²   Wt Readings from Last 3 Encounters:   11/15/21 86.6 kg (191 lb)   09/08/21 90.3 kg (199 lb)   09/08/21 90.5 kg (199 lb 9.6 oz)   ,body mass index is 32.79 kg/m².  Physical Exam  Constitutional:       Comments: Patient is alert and oriented.   Elderly female sitting up in bed appears uncomfortable, holding emesis basin.      HENT:      Head: Normocephalic and atraumatic.   Eyes:      General: No scleral icterus.  Cardiovascular:      Rate and Rhythm: Normal rate and regular rhythm.   Pulmonary:      Effort: Pulmonary effort is normal. No respiratory distress.      Breath sounds: No stridor.   Abdominal:      General: Bowel sounds are normal. There is no distension.      Palpations: Abdomen is soft.      Tenderness: There is no abdominal tenderness.      Comments: Obese abdomen   Skin:     General: Skin is warm and dry.   Neurological:      Mental Status: She is oriented to person, place, and time.   Psychiatric: "         Mood and Affect: Mood is anxious.       Results Review:   I reviewed the patient's new clinical results.    Lab Results   Component Value Date    WBC 4.63 11/16/2021    HGB 12.5 11/16/2021    HGB 13.3 11/15/2021    HGB 12.7 09/08/2021    HCT 38.8 11/16/2021    MCV 91.9 11/16/2021     (L) 11/16/2021     Lab Results   Component Value Date    INR 2.39 (H) 07/14/2020    INR 2.9 (H) 11/29/2017    INR 1.35 10/20/2015     Lab Results   Component Value Date    GLUCOSE 240 (H) 11/16/2021    BUN 63 (H) 11/16/2021    CREATININE 2.36 (H) 11/16/2021    EGFRIFNONA 20 (L) 11/16/2021    BCR 26.7 (H) 11/16/2021     11/16/2021    K 4.2 11/16/2021    CO2 22.0 11/16/2021    CALCIUM 8.8 11/16/2021    ALBUMIN 3.30 (L) 11/15/2021    ALKPHOS 236 (H) 11/15/2021    BILITOT 1.1 11/15/2021    ALT 8 11/15/2021    AST 15 11/15/2021       ASSESSMENTS/PLANS    1. Esophageal food impaction  2. Esophageal dysphagia     >>> EGD today for food removal.    Will likely need repeat EGD with dilation outpatient when anticoagulation has been held.   Obtain records from [place].       I discussed the patient's findings and my recommendations with patient    TIMMY Rm  11/17/21  09:43 EST

## 2021-11-17 NOTE — PLAN OF CARE
Problem: Adult Inpatient Plan of Care  Goal: Plan of Care Review  Recent Flowsheet Documentation  Taken 11/17/2021 0855 by Daniel Brothers OT  Progress: (OT eval) no change  Plan of Care Reviewed With: patient  Outcome Summary: Initial OT evaluation completed. Pt presents w/ LLE pain, decreased activity tolerance, generalized weakness, and mild balance deficit warranting skilled IP OT services to promote return to PLOF. Pt required Donovan for bed mobility, CGA for transfers and functional distances w/ rollator, SBA for toileting, and ModA/MARIE for seated grooming and food tray prep. Pt issued AE and demo/training to improve reach to distal BLEs, tolerated BUE AAROM HEP to full shoulder range(s). Recommend d/c to home w/ assist and HHOT.

## 2021-11-17 NOTE — CASE MANAGEMENT/SOCIAL WORK
Continued Stay Note  [facility]     Patient Name: Karla Cristobal  MRN: [id number]  Today's Date: 11/17/2021    Admit Date: 11/15/2021     Discharge Plan     Row Name 11/17/21 1002       Plan    Plan Home with family    Patient/Family in Agreement with Plan yes    Plan Comments Spoke with patient at bedside. Plan is home with grandson. Patient denies any other discharge needs at this time. CM will continue to follow.    Final Discharge Disposition Code 01 - home or self-care               Discharge Codes    No documentation.               Expected Discharge Date and Time     Expected Discharge Date Expected Discharge Time    Nov 19, 2021             Choco Sellers RN

## 2021-11-18 LAB
ANION GAP SERPL CALCULATED.3IONS-SCNC: 15 MMOL/L (ref 5–15)
BACTERIA SPEC AEROBE CULT: ABNORMAL
BACTERIA SPEC AEROBE CULT: ABNORMAL
BASOPHILS # BLD MANUAL: 0 10*3/MM3 (ref 0–0.2)
BASOPHILS NFR BLD MANUAL: 0 % (ref 0–1.5)
BUN SERPL-MCNC: 67 MG/DL (ref 8–23)
BUN/CREAT SERPL: 26.7 (ref 7–25)
CALCIUM SPEC-SCNC: 8.7 MG/DL (ref 8.6–10.5)
CHLORIDE SERPL-SCNC: 98 MMOL/L (ref 98–107)
CO2 SERPL-SCNC: 22 MMOL/L (ref 22–29)
CREAT SERPL-MCNC: 2.51 MG/DL (ref 0.57–1)
DEPRECATED RDW RBC AUTO: 51 FL (ref 37–54)
EOSINOPHIL # BLD MANUAL: 0 10*3/MM3 (ref 0–0.4)
EOSINOPHIL NFR BLD MANUAL: 0 % (ref 0.3–6.2)
ERYTHROCYTE [DISTWIDTH] IN BLOOD BY AUTOMATED COUNT: 14.8 % (ref 12.3–15.4)
GFR SERPL CREATININE-BSD FRML MDRD: 18 ML/MIN/1.73
GLUCOSE BLDC GLUCOMTR-MCNC: 274 MG/DL (ref 70–130)
GLUCOSE BLDC GLUCOMTR-MCNC: 323 MG/DL (ref 70–130)
GLUCOSE BLDC GLUCOMTR-MCNC: 355 MG/DL (ref 70–130)
GLUCOSE BLDC GLUCOMTR-MCNC: 356 MG/DL (ref 70–130)
GLUCOSE BLDC GLUCOMTR-MCNC: 394 MG/DL (ref 70–130)
GLUCOSE SERPL-MCNC: 364 MG/DL (ref 65–99)
GRAM STN SPEC: ABNORMAL
HCT VFR BLD AUTO: 41.4 % (ref 34–46.6)
HGB BLD-MCNC: 12.9 G/DL (ref 12–15.9)
ISOLATED FROM: ABNORMAL
ISOLATED FROM: ABNORMAL
LYMPHOCYTES # BLD MANUAL: 0.63 10*3/MM3 (ref 0.7–3.1)
LYMPHOCYTES NFR BLD MANUAL: 2 % (ref 5–12)
MCH RBC QN AUTO: 29.2 PG (ref 26.6–33)
MCHC RBC AUTO-ENTMCNC: 31.2 G/DL (ref 31.5–35.7)
MCV RBC AUTO: 93.7 FL (ref 79–97)
MONOCYTES # BLD: 0.13 10*3/MM3 (ref 0.1–0.9)
NEUTROPHILS # BLD AUTO: 5.5 10*3/MM3 (ref 1.7–7)
NEUTROPHILS NFR BLD MANUAL: 82 % (ref 42.7–76)
NEUTS BAND NFR BLD MANUAL: 6 % (ref 0–5)
PLAT MORPH BLD: NORMAL
PLATELET # BLD AUTO: 121 10*3/MM3 (ref 140–450)
PMV BLD AUTO: 12.5 FL (ref 6–12)
POTASSIUM SERPL-SCNC: 4.3 MMOL/L (ref 3.5–5.2)
RBC # BLD AUTO: 4.42 10*6/MM3 (ref 3.77–5.28)
RBC MORPH BLD: NORMAL
SODIUM SERPL-SCNC: 135 MMOL/L (ref 136–145)
VARIANT LYMPHS NFR BLD MANUAL: 3 % (ref 19.6–45.3)
VARIANT LYMPHS NFR BLD MANUAL: 7 % (ref 0–5)
WBC MORPH BLD: NORMAL
WBC NRBC COR # BLD: 6.25 10*3/MM3 (ref 3.4–10.8)

## 2021-11-18 PROCEDURE — 63710000001 ATORVASTATIN 20 MG TABLET: Performed by: INTERNAL MEDICINE

## 2021-11-18 PROCEDURE — 80048 BASIC METABOLIC PNL TOTAL CA: CPT | Performed by: INTERNAL MEDICINE

## 2021-11-18 PROCEDURE — A9270 NON-COVERED ITEM OR SERVICE: HCPCS | Performed by: INTERNAL MEDICINE

## 2021-11-18 PROCEDURE — 63710000001 CARVEDILOL 6.25 MG TABLET: Performed by: INTERNAL MEDICINE

## 2021-11-18 PROCEDURE — 82962 GLUCOSE BLOOD TEST: CPT

## 2021-11-18 PROCEDURE — 63710000001 MELATONIN 5 MG TABLET: Performed by: INTERNAL MEDICINE

## 2021-11-18 PROCEDURE — 85007 BL SMEAR W/DIFF WBC COUNT: CPT | Performed by: INTERNAL MEDICINE

## 2021-11-18 PROCEDURE — 63710000001 ISOSORBIDE MONONITRATE 30 MG TABLET SUSTAINED-RELEASE 24 HOUR: Performed by: INTERNAL MEDICINE

## 2021-11-18 PROCEDURE — 85025 COMPLETE CBC W/AUTO DIFF WBC: CPT | Performed by: INTERNAL MEDICINE

## 2021-11-18 PROCEDURE — 63710000001 ACETAMINOPHEN 325 MG TABLET: Performed by: INTERNAL MEDICINE

## 2021-11-18 PROCEDURE — 63710000001 APIXABAN 2.5 MG TABLET: Performed by: INTERNAL MEDICINE

## 2021-11-18 PROCEDURE — 63710000001 INSULIN LISPRO (HUMAN) PER 5 UNITS: Performed by: INTERNAL MEDICINE

## 2021-11-18 PROCEDURE — 63710000001 PANTOPRAZOLE 40 MG TABLET DELAYED-RELEASE: Performed by: INTERNAL MEDICINE

## 2021-11-18 PROCEDURE — 63710000001 LEVOTHYROXINE 112 MCG TABLET: Performed by: INTERNAL MEDICINE

## 2021-11-18 PROCEDURE — 25010000002 CEFTRIAXONE PER 250 MG: Performed by: INTERNAL MEDICINE

## 2021-11-18 PROCEDURE — 99225 PR SBSQ OBSERVATION CARE/DAY 25 MINUTES: CPT | Performed by: INTERNAL MEDICINE

## 2021-11-18 PROCEDURE — 63710000001 MULTIVITAMIN WITH MINERALS TABLET: Performed by: INTERNAL MEDICINE

## 2021-11-18 PROCEDURE — 63710000001 OXYCODONE 5 MG TABLET: Performed by: INTERNAL MEDICINE

## 2021-11-18 PROCEDURE — 97162 PT EVAL MOD COMPLEX 30 MIN: CPT

## 2021-11-18 PROCEDURE — G0378 HOSPITAL OBSERVATION PER HR: HCPCS

## 2021-11-18 RX ORDER — OXYCODONE HYDROCHLORIDE 5 MG/1
5 TABLET ORAL EVERY 4 HOURS PRN
Status: DISCONTINUED | OUTPATIENT
Start: 2021-11-18 | End: 2021-11-19 | Stop reason: HOSPADM

## 2021-11-18 RX ORDER — ESZOPICLONE 2 MG/1
2 TABLET, FILM COATED ORAL NIGHTLY PRN
Status: DISCONTINUED | OUTPATIENT
Start: 2021-11-18 | End: 2021-11-19

## 2021-11-18 RX ADMIN — LATANOPROST 1 DROP: 50 SOLUTION OPHTHALMIC at 22:40

## 2021-11-18 RX ADMIN — SODIUM CHLORIDE, PRESERVATIVE FREE 10 ML: 5 INJECTION INTRAVENOUS at 08:07

## 2021-11-18 RX ADMIN — CARVEDILOL 6.25 MG: 6.25 TABLET, FILM COATED ORAL at 08:07

## 2021-11-18 RX ADMIN — INSULIN LISPRO 6 UNITS: 100 INJECTION, SOLUTION INTRAVENOUS; SUBCUTANEOUS at 23:52

## 2021-11-18 RX ADMIN — PANTOPRAZOLE SODIUM 40 MG: 40 TABLET, DELAYED RELEASE ORAL at 08:06

## 2021-11-18 RX ADMIN — APIXABAN 2.5 MG: 2.5 TABLET, FILM COATED ORAL at 22:39

## 2021-11-18 RX ADMIN — Medication 5 MG: at 22:39

## 2021-11-18 RX ADMIN — OXYCODONE 5 MG: 5 TABLET ORAL at 22:39

## 2021-11-18 RX ADMIN — SODIUM CHLORIDE, PRESERVATIVE FREE 10 ML: 5 INJECTION INTRAVENOUS at 22:39

## 2021-11-18 RX ADMIN — ACETAMINOPHEN 650 MG: 325 TABLET, FILM COATED ORAL at 16:02

## 2021-11-18 RX ADMIN — ACETAMINOPHEN 650 MG: 325 TABLET, FILM COATED ORAL at 06:14

## 2021-11-18 RX ADMIN — INSULIN LISPRO 6 UNITS: 100 INJECTION, SOLUTION INTRAVENOUS; SUBCUTANEOUS at 17:08

## 2021-11-18 RX ADMIN — LEVOTHYROXINE SODIUM 112 MCG: 112 TABLET ORAL at 06:10

## 2021-11-18 RX ADMIN — MULTIPLE VITAMINS W/ MINERALS TAB 1 TABLET: TAB at 08:06

## 2021-11-18 RX ADMIN — INSULIN LISPRO 5 UNITS: 100 INJECTION, SOLUTION INTRAVENOUS; SUBCUTANEOUS at 11:48

## 2021-11-18 RX ADMIN — ATORVASTATIN CALCIUM 20 MG: 20 TABLET, FILM COATED ORAL at 22:39

## 2021-11-18 RX ADMIN — SODIUM CHLORIDE 2 G: 900 INJECTION INTRAVENOUS at 08:07

## 2021-11-18 RX ADMIN — APIXABAN 2.5 MG: 2.5 TABLET, FILM COATED ORAL at 08:06

## 2021-11-18 RX ADMIN — CARVEDILOL 6.25 MG: 6.25 TABLET, FILM COATED ORAL at 16:02

## 2021-11-18 RX ADMIN — TIMOLOL MALEATE 1 DROP: 5 SOLUTION/ DROPS OPHTHALMIC at 08:08

## 2021-11-18 RX ADMIN — TIMOLOL MALEATE 1 DROP: 5 SOLUTION/ DROPS OPHTHALMIC at 22:40

## 2021-11-18 RX ADMIN — INSULIN LISPRO 4 UNITS: 100 INJECTION, SOLUTION INTRAVENOUS; SUBCUTANEOUS at 08:07

## 2021-11-18 RX ADMIN — ISOSORBIDE MONONITRATE 30 MG: 30 TABLET, EXTENDED RELEASE ORAL at 08:06

## 2021-11-18 NOTE — PROGRESS NOTES
[facility] Medicine Services  PROGRESS NOTE    Patient Name: Karla Cristobal  : 1935  MRN: [id number]    Date of Admission: 11/15/2021  Primary Care Physician: Giselle Guzman DO    Subjective   Subjective     CC:  F/U LLE cellulitis    HPI:  Doing better today.  Having some pain in her leg mostly at night.    ROS:  Gen- No fevers, chills  MSK-As above      Objective   Objective     Vital Signs:   Temp:  [97.4 °F (36.3 °C)-98.3 °F (36.8 °C)] 97.7 °F (36.5 °C)  Heart Rate:  [] 91  Resp:  [16] 16  BP: (117-159)/(63-94) 158/87  Flow (L/min):  [2-4] 2     Physical Exam:  Constitutional: No acute distress, awake, alert, sitting up in chair  HENT: NCAT, mucous membranes moist  Respiratory: Clear to auscultation bilaterally, respiratory effort normal   Cardiovascular: RRR, no murmurs, rubs, or gallops  Gastrointestinal: Soft, nontender, nondistended  Musculoskeletal: Trace LLE edema  Psychiatric: Appropriate affect, cooperative  Neurologic: Cranial Nerves grossly intact to confrontation, speech clear  Skin: Improved erythema of LLE    Results Reviewed:  LAB RESULTS:      Lab 21  1448 21  1403 11/15/21  2246 11/15/21  2028 11/15/21  1926   WBC 6.52 4.63  --   --  5.70   HEMOGLOBIN 14.0 12.5  --   --  13.3   HEMATOCRIT 43.6 38.8  --   --  41.8   PLATELETS 126* 110*  --   --  133*   NEUTROS ABS 5.65 2.70  --   --  3.17   IMMATURE GRANS (ABS) 0.01 0.01  --   --  0.01   LYMPHS ABS 0.65* 1.32  --   --  1.76   MONOS ABS 0.20 0.45  --   --  0.54   EOS ABS 0.00 0.12  --   --  0.17   MCV 92.0 91.9  --   --  91.7   PROCALCITONIN  --   --   --  0.09  --    LACTATE  --  2.2* 2.5*  --   --          Lab 21  1448 21  1403 11/15/21  2028   SODIUM 137 140 144   POTASSIUM 4.5 4.2 4.1   CHLORIDE 101 102 105   CO2 22.0 22.0 28.0   ANION GAP 14.0 16.0* 11.0   BUN 69* 63* 66*   CREATININE 2.70* 2.36* 2.35*   GLUCOSE 257* 240* 111*   CALCIUM 9.1 8.8 9.0   HEMOGLOBIN A1C  --  7.40*   --    TSH  --  1.950  --          Lab 11/15/21  2028   TOTAL PROTEIN 6.9   ALBUMIN 3.30*   GLOBULIN 3.6   ALT (SGPT) 8   AST (SGOT) 15   BILIRUBIN 1.1   ALK PHOS 236*         Lab 11/15/21  1926   PROBNP 4,108.0*                 Brief Urine Lab Results  (Last result in the past 365 days)      Color   Clarity   Blood   Leuk Est   Nitrite   Protein   CREAT   Urine HCG        09/27/21 1116 Yellow   Clear   Negative   Trace   Negative   Negative                 Microbiology Results Abnormal     Procedure Component Value - Date/Time    COVID PRE-OP / PRE-PROCEDURE SCREENING ORDER (NO ISOLATION) - Swab, Nasopharynx [[id number]]  (Normal) Collected: 11/15/21 2300    Lab Status: Final result Specimen: Swab from Nasopharynx Updated: 11/16/21 0126    Narrative:      The following orders were created for panel order COVID PRE-OP / PRE-PROCEDURE SCREENING ORDER (NO ISOLATION) - Swab, Nasopharynx.  Procedure                               Abnormality         Status                     ---------                               -----------         ------                     COVID-19 and FLU A/B PCR...[[id number]]  Normal              Final result                 Please view results for these tests on the individual orders.    COVID-19 and FLU A/B PCR - Swab, Nasopharynx [[id number]]  (Normal) Collected: 11/15/21 2300    Lab Status: Final result Specimen: Swab from Nasopharynx Updated: 11/16/21 0126     COVID19 Not Detected     Influenza A PCR Not Detected     Influenza B PCR Not Detected    Narrative:      Fact sheet for providers: https://www.fda.gov/media/519584/download    Fact sheet for patients: https://www.fda.gov/media/454290/download    Test performed by PCR.          Adult Transthoracic Echo Complete W/ Cont if Necessary Per Protocol    Result Date: 11/17/2021  · Left ventricular ejection fraction appears to be 51 - 55%. Left ventricular systolic function is low normal. · Left ventricular diastolic function is consistent with  (grade III w/high LAP) restrictive pattern. · The right ventricular cavity is moderate to severely dilated. · The right atrial cavity is mildly dilated. · Moderate tricuspid valve regurgitation is present. · Mild to moderate mitral regurgitation · Estimated right ventricular systolic pressure from tricuspid regurgitation is mildly elevated (35-45 mmHg).      Duplex Venous Lower Extremity - Left    Result Date: 11/16/2021  · Normal left lower extremity venous duplex scan.        Results for orders placed during the hospital encounter of 11/15/21    Adult Transthoracic Echo Complete W/ Cont if Necessary Per Protocol    Interpretation Summary  · Left ventricular ejection fraction appears to be 51 - 55%. Left ventricular systolic function is low normal.  · Left ventricular diastolic function is consistent with (grade III w/high LAP) restrictive pattern.  · The right ventricular cavity is moderate to severely dilated.  · The right atrial cavity is mildly dilated.  · Moderate tricuspid valve regurgitation is present.  · Mild to moderate mitral regurgitation  · Estimated right ventricular systolic pressure from tricuspid regurgitation is mildly elevated (35-45 mmHg).      I have reviewed the medications:  Scheduled Meds:apixaban, 2.5 mg, Oral, Q12H  atorvastatin, 20 mg, Oral, Nightly  carvedilol, 6.25 mg, Oral, BID With Meals  cefTRIAXone, 2 g, Intravenous, Q24H  DAPTOmycin, 4 mg/kg (Adjusted), Intravenous, Q48H  insulin lispro, 0-7 Units, Subcutaneous, TID AC  isosorbide mononitrate, 30 mg, Oral, Daily  latanoprost, 1 drop, Both Eyes, Nightly  levothyroxine, 112 mcg, Oral, Q AM  melatonin, 5 mg, Oral, Nightly  multivitamin with minerals, 1 tablet, Oral, Daily  pantoprazole, 40 mg, Oral, Daily  sodium chloride, 10 mL, Intravenous, Q12H  timolol, 1 drop, Both Eyes, BID      Continuous Infusions:   PRN Meds:.•  acetaminophen  •  dextrose  •  dextrose  •  glucagon (human recombinant)  •  ondansetron **OR** ondansetron  •   sodium chloride    Assessment/Plan   Assessment & Plan     Active Hospital Problems    Diagnosis  POA   • **Cellulitis of left lower extremity [L03.116]  Yes   • Left leg cellulitis [L03.116]  Yes   • Food impaction of esophagus [T18.128A]  Unknown   • Diabetes mellitus, type II, insulin dependent (HCC) [E11.9, Z79.4]  Not Applicable   • Chronic systolic congestive heart failure (HCC) [I50.22]  Yes   • CAD (coronary artery disease) [I25.10]  Yes   • Chronic atrial fibrillation (HCC) [I48.20]  Yes   • CKD (chronic kidney disease), stage IV (HCC) [N18.4]  Yes   • Diabetic nephropathy (HCC) [E11.21]  Yes   • Hyperlipidemia LDL goal <100 [E78.5]  Yes   • Essential hypertension [I10]  Yes   • Hypothyroidism [E03.9]  Yes      Resolved Hospital Problems   No resolved problems to display.        Brief Hospital Course to date:  Karla Cristobal is a 86 y.o. female with atrial fibrillation (on Eliquis), coronary artery disease s/p stents ('05), hypertension, hyperlipidemia, chronic kidney disease, congestive heart failure, insulin-dependent type 2 diabetes, hypothyroidism, and AFTAB who presented to [facility] ED for complaint of left lower extremity pain after falling and landing with her left shin on the wheel of her walker.  Since then she has had progressive erythema and pain in the left shin.     Left Lower Extremity Cellulitis  Blood cultures positive for non-aureus staph in 4/4 bottles  -CT LLE suggestive of hematoma with cellulitis.  No gas or foreign bodies.  -Continue daptomycin and ceftriaxone  -ID following  -TTE without evidence of vegetation  -History of MRSA bacteremia/pacemaker lead infection (remote ~2005)    Food impaction  -GI evaluated with EGD.  Showed apparent indentation of upper esophagus from anterior osteophyte and short segment of Armas's esophagus.  She will need outpatient EGD with Armas's biopsies and esophageal dilation in 1 month off anticoagulation.     Chronic atrial  fibrillation  CAD  Chronic systolic CHF, compensated  Hx DVT/PE  -Continue Eliquis  -Continue asa, statin  -Prior LVEF as low as 25%     Chronic Kidney Disease (stage IV)  -Creatinine at baseline     Type 2 Diabetes Mellitus, with long-term use of insulin  Diabetic Neuropathy  -A1C 7.4%  -SSI    Hypertension  Hyperlipidemia  -Continue home Coreg, Imdur  -Continue statin  -Lasix and spironolactone on hold, resume as tolerated     Hypothyroidism  -Continue home Synthroid  -TSH, T4 WNL    DVT prophylaxis:  Medical DVT prophylaxis orders are present.       AM-PAC 6 Clicks Score (PT): 17 (11/18/21 0800)    Disposition: I expect the patient to be discharged TBD.    CODE STATUS:   Code Status and Medical Interventions:   Ordered at: 11/15/21 6176     Code Status (Patient has no pulse and is not breathing):    CPR (Attempt to Resuscitate)     Medical Interventions (Patient has pulse or is breathing):    Full Support       Yolanda Gomez MD  11/18/21

## 2021-11-18 NOTE — PROGRESS NOTES
INFECTIOUS DISEASE Progress Note    Karla Cristobal  1935  [id number]    Admission Date: 11/15/2021      Requesting Provider: Kamlesh Gomez DO  Evaluating Physician: Rip Coelho MD    Reason for Consultation: Left lower extremity cellulitis     History of present illness:    11/17/21: Patient is a 86 y.o. female, with PMH afib, CAD, CKD, CHF, DM, seen today for left lower extremity cellulitis. Approximately one week ago, she tripped getting out of bed, hitting her left leg.  She has noted increasing swelling, redness and pain over the past several days, prompting her presentation to the Ed.  She has been afebrile.  CT of left lower extremity with soft tissue edema suggesting cellulitis, no soft tissue gas, with a subcutaneous lesion in the anterolateral mid lower leg, most likely a hematoma measuring 3.5 x 1.3, x2.7 cm.  Xray without fracture.  Duplex negative for DVT. She has been afebrile without leukocytosis.  Admitting labs with WBC 5.7, PCT 0.098, Scr 2.35, lactate 2.5, and now blood cultures positive for Staph spp.   She is currently on Daptomycin and Zosyn and we were consulted for evaluation and treatment.     11/18/21:She underwent EGD yesterday revealing an indentation of the upper esophagus which was nonobstructing and a short segment of Armas's esophagus in the distal esophagus. She has remained afebrile.  Her white blood cell count on 11/17 was 6.5 and a creatinine 2.7. Blood cultures have grown coagulase-negative staph in 2 sets from 11/15.        Past Medical History:   Diagnosis Date   • Acute bronchitis    • Arthritis    • Atrial fibrillation (HCC)    • CAD (coronary artery disease)     s/p MI 2005; 3 stents inserted    • Change in mole    • Change in mole    • Change in nevus 6/16/2016   • Chronic kidney disease     stage IV-sees Dr Bk Mckeon every 3-4 months    • Chronic renal disease, stage 4, severely decreased glomerular filtration rate between 15-29 mL/min/1.73 square  meter ([facility])    • Chronic systolic heart failure ([facility])    • Congestive heart disease ([facility])    • Conjunctivitis    • Deep vein thrombosis of lower extremity ([facility])    • Diabetes mellitus ([facility])    • Diabetes mellitus ([facility]) 6/16/2016    Impression: 03/15/2016 - blood sugar 166 and hgn a1c 7.3%  is up to date with eye exam  neuropathy with callus, no ulcer  some scratches feet  ur alb due and ordered  no change other than provided samples of toujeo because she feels like lantus worked much better than levemir, she has tried titrating levemir and it is less effective  continue testing and f/u 3-4 months  Impression: 11/23/2015 - bl   • Diabetic foot ulcer ([facility]) 6/16/2016   • Dog bite of hand    • Easy bruising    • Endometrial cancer ([facility])     partial hysterectomy; radiation as well    • Foot pain    • Foot pain 6/16/2016    Description: lancinating- worse but not consistent enough to want rx   • Fracture of hip ([facility])    • Generalized ischemic myocardial dysfunction 6/16/2016   • Glaucoma     drops daily    • Hyperlipidemia    • Hypertension    • Hypothyroidism    • Insomnia    • Ischemic heart disease    • Macular degeneration    • Methicillin resistant Staphylococcus aureus infection 6/16/2016   • Myocardial infarction ([facility]) 2005   • Nausea with vomiting    • Pericardial effusion    • Shortness of breath 6/16/2016    Impression: 03/24/2015 - chronic. On 2 l oxygen at night. check cbc, bnp;    • Skin cancer, basal cell     nose, leg    • Skin lesion 6/16/2016    Impression: 03/24/2015 - refer derm for eval- seb kerat ant tib and ?ak medially with redness and irritation;    • Sleep apnea     oxygen at night due to low sats but no cpap   • Type 2 diabetes mellitus ([facility])    • UTI (urinary tract infection) 06/12/2020    currently taking antibiotics-patient's daughter notified Dr Beal's office and office said it should not delay generator change        Past Surgical History:   Procedure Laterality Date   • CARDIAC  CATHETERIZATION     • CARDIAC DEFIBRILLATOR PLACEMENT     • CARDIAC ELECTROPHYSIOLOGY PROCEDURE N/A 7/17/2020    Procedure: ICD BIV  generator change- SJM- 3-6 weeks;  Surgeon: Tano Beal MD;  Location: [facility] INVASIVE LOCATION;  Service: Cardiology;  Laterality: N/A;   • CHOLECYSTECTOMY     • CORONARY ARTERY BYPASS GRAFT  2000   • CORONARY STENT PLACEMENT      3 stents    • EYE SURGERY Bilateral     cataract extraction    • HIP SURGERY Left     x3   • HYSTERECTOMY      partial    • KNEE ARTHROPLASTY Bilateral    • PACEMAKER IMPLANTATION     • TONSILLECTOMY         Family History   Problem Relation Age of Onset   • Breast cancer Other    • Diabetes Other    • Esophageal cancer Other    • Hypertension Other    • Transient ischemic attack Other    • Breast cancer Mother    • Cancer Father        Social History     Socioeconomic History   • Marital status:    Tobacco Use   • Smoking status: Never Smoker   • Smokeless tobacco: Never Used   Vaping Use   • Vaping Use: Former   Substance and Sexual Activity   • Alcohol use: No   • Drug use: No   • Sexual activity: Defer       Allergies   Allergen Reactions   • Bactrim [Sulfamethoxazole-Trimethoprim] GI Intolerance   • Lisinopril Cough   • Codeine Rash         Medication:    Current Facility-Administered Medications:   •  acetaminophen (TYLENOL) tablet 650 mg, 650 mg, Oral, Q6H PRN, Yolanda Gomez MD, 650 mg at 11/18/21 0614  •  apixaban (ELIQUIS) tablet 2.5 mg, 2.5 mg, Oral, Q12H, Brunner, Mark I, MD, 2.5 mg at 11/17/21 2025  •  atorvastatin (LIPITOR) tablet 20 mg, 20 mg, Oral, Nightly, Brunner, Mark I, MD, 20 mg at 11/17/21 2025  •  carvedilol (COREG) tablet 6.25 mg, 6.25 mg, Oral, BID With Meals, Brunner, Mark I, MD, 6.25 mg at 11/17/21 1723  •  cefTRIAXone (ROCEPHIN) 2 g/100 mL 0.9% NS IVPB (MBP), 2 g, Intravenous, Q24H, Brunner, Mark I, MD, Stopped at 11/17/21 0900  •  DAPTOmycin (CUBICIN) 250 mg in sodium chloride 0.9 % 50 mL IVPB, 4 mg/kg  (Adjusted), Intravenous, Q48H, Brunner, Mark I, MD, Last Rate: 100 mL/hr at 11/17/21 1723, 250 mg at 11/17/21 1723  •  dextrose (D50W) (25 g/50 mL) IV injection 25 g, 25 g, Intravenous, Q15 Min PRN, Brunner, Mark I, MD  •  dextrose (GLUTOSE) oral gel 15 g, 15 g, Oral, Q15 Min PRN, Brunner, Mark I, MD  •  glucagon (human recombinant) (GLUCAGEN DIAGNOSTIC) injection 1 mg, 1 mg, Subcutaneous, Q15 Min PRN, Brunner, Mark I, MD  •  insulin lispro (humaLOG) injection 0-7 Units, 0-7 Units, Subcutaneous, TID AC, Brunner, Mark I, MD, 4 Units at 11/17/21 1723  •  isosorbide mononitrate (IMDUR) 24 hr tablet 30 mg, 30 mg, Oral, Daily, Brunner, Mark I, MD, 30 mg at 11/17/21 0826  •  latanoprost (XALATAN) 0.005 % ophthalmic solution 1 drop, 1 drop, Both Eyes, Nightly, Brunner, Mark I, MD, 1 drop at 11/17/21 2026  •  levothyroxine (SYNTHROID, LEVOTHROID) tablet 112 mcg, 112 mcg, Oral, Q AM, Brunner, Mark I, MD, 112 mcg at 11/18/21 0610  •  melatonin tablet 5 mg, 5 mg, Oral, Nightly, Brunner, Mark I, MD, 5 mg at 11/17/21 2025  •  multivitamin with minerals 1 tablet, 1 tablet, Oral, Daily, Brunner, Mark I, MD, 1 tablet at 11/17/21 0826  •  ondansetron (ZOFRAN) tablet 4 mg, 4 mg, Oral, Q6H PRN **OR** ondansetron (ZOFRAN) injection 4 mg, 4 mg, Intravenous, Q6H PRN, Brunner, Mark I, MD, 4 mg at 11/17/21 1122  •  pantoprazole (PROTONIX) EC tablet 40 mg, 40 mg, Oral, Daily, Brunner, Mark I, MD, 40 mg at 11/17/21 0826  •  sodium chloride 0.9 % flush 10 mL, 10 mL, Intravenous, Q12H, Brunner, Mark I, MD, 10 mL at 11/17/21 2026  •  sodium chloride 0.9 % flush 10 mL, 10 mL, Intravenous, PRN, Brunner, Mark I, MD  •  timolol (TIMOPTIC) 0.5 % ophthalmic solution 1 drop, 1 drop, Both Eyes, BID, Brunner, Mark I, MD, 1 drop at 11/17/21 2026    Antibiotics:  Anti-Infectives (From admission, onward)    Ordered     Dose/Rate Route Frequency Start Stop    11/16/21 1101  DAPTOmycin (CUBICIN) 250 mg in sodium chloride 0.9 % 50 mL IVPB        Ordering  Provider: Brunner, Mark I, MD    4 mg/kg × 67.5 kg (Adjusted)  100 mL/hr over 30 Minutes Intravenous Every 48 Hours 21 1800 21 1759    21 0809  cefTRIAXone (ROCEPHIN) 2 g/100 mL 0.9% NS IVPB (MBP)        Ordering Provider: Brunner, Mark I, MD    2 g  over 30 Minutes Intravenous Every 24 Hours 21 0900 21 0859    11/15/21 2236  piperacillin-tazobactam (ZOSYN) 3.375 g in iso-osmotic dextrose 50 ml (premix)        Ordering Provider: Kamlesh Gomez DO    3.375 g  over 30 Minutes Intravenous Once 11/15/21 22321 0209            Review of Systems:  See HPI    Physical Exam:   Vital Signs  Temp (24hrs), Av.8 °F (36.6 °C), Min:97.4 °F (36.3 °C), Max:98.3 °F (36.8 °C)    Temp  Min: 97.4 °F (36.3 °C)  Max: 98.3 °F (36.8 °C)  BP  Min: 117/91  Max: 159/76  Pulse  Min: 69  Max: 114  Resp  Min: 16  Max: 16  SpO2  Min: 89 %  Max: 99 %    GENERAL: Awake and alert, in no acute distress.   HEENT: Normocephalic, atraumatic.  PERRL. EOMI. No conjunctival injection. No icterus. Oropharynx clear without evidence of thrush or exudate.  NECK: Supple   HEART: RRR; No murmur, rubs, gallops.   LUNGS: Clear to auscultation bilaterally without wheezing, rales, rhonchi. Normal respiratory effort. Nonlabored. No dullness.  ABDOMEN: Soft, nontender, nondistended. . No rebound or guarding. NO mass or HSM.  EXT:  No cyanosis, clubbing or edema.   :  Without Huang catheter.  MSK:  No joint effusions   SKIN: Warm and dry   Left pretibial erythema, with lateral area of induration, ecchymosis approx 6cm in diameter, no crepitus   NEURO: Oriented to PPT.  PSYCHIATRIC: Normal insight and judgement. Cooperative with PE    Laboratory Data    Results from last 7 days   Lab Units 21  1448 21  1403 11/15/21  1926   WBC 10*3/mm3 6.52 4.63 5.70   HEMOGLOBIN g/dL 14.0 12.5 13.3   HEMATOCRIT % 43.6 38.8 41.8   PLATELETS 10*3/mm3 126* 110* 133*     Results from last 7 days   Lab Units 21  1445    SODIUM mmol/L 137   POTASSIUM mmol/L 4.5   CHLORIDE mmol/L 101   CO2 mmol/L 22.0   BUN mg/dL 69*   CREATININE mg/dL 2.70*   GLUCOSE mg/dL 257*   CALCIUM mg/dL 9.1     Results from last 7 days   Lab Units 11/15/21  2028   ALK PHOS U/L 236*   BILIRUBIN mg/dL 1.1   ALT (SGPT) U/L 8   AST (SGOT) U/L 15             Results from last 7 days   Lab Units 11/16/21  1403   LACTATE mmol/L 2.2*     Results from last 7 days   Lab Units 11/17/21  1448   CK TOTAL U/L 196*         Estimated Creatinine Clearance: 16.2 mL/min (A) (by C-G formula based on SCr of 2.7 mg/dL (H)).      Microbiology:  Blood Culture   Date Value Ref Range Status   11/15/2021 Abnormal Stain (C)  Preliminary     BCID, PCR   Date Value Ref Range Status   11/15/2021 (A) Negative by BCID PCR. Culture to Follow. Final    Staph spp, not aureus or lugdunesis. Identification by BCID2 PCR.     No results found for: CULTURES, HSVCX, URCX  No results found for: EYECULTURE, GCCX, HSVCULTURE, LABHSV  No results found for: LEGIONELLA, MRSACX, MUMPSCX, MYCOPLASCX  No results found for: NOCARDIACX, STOOLCX  No results found for: THROATCX, UNSTIMCULT, URINECX, CULTURE, VZVCULTUR  No results found for: VIRALCULTU, WOUNDCX        Radiology:  Imaging Results (Last 72 Hours)     Procedure Component Value Units Date/Time    CT Lower Extremity Left Without Contrast [[id number]] Collected: 11/15/21 2322     Updated: 11/15/21 2324    Narrative:      CT LE LT WO    INDICATION:    Lower extremity cellulitis. Fluid-filled lesion on physical exam.    TECHNIQUE:   CT of the left lower leg and foot without IV contrast. Coronal and sagittal reconstructions were obtained.  Radiation dose reduction techniques included automated exposure control or exposure modulation based on body size. Count of known CT and cardiac  nuc med studies performed in previous 12 months: 0.      COMPARISON:    Radiographs same day    FINDINGS:  Patient has a left knee arthroplasty. This causes extensive streak  artifact in the proximal lower leg. This appears appropriately positioned radiographically. No acute fracture or malalignment is identified. There is no evidence of osteomyelitis. There  is extensive arterial calcification. There is generalized skin thickening and subcutaneous fat stranding in the lower leg which may reflect bland edema or cellulitis. In the subcutaneous tissues of the anterolateral mid lower leg, there is a soft tissue  lesion that is likely a hematoma. This is superficial to the muscular fascia and measures about 3.5 cm in width by 1.3 cm in depth by about 2.7 cm in height. No soft tissue gas is identified. There are no radiopaque foreign bodies.      Impression:        1. No acute fracture or malalignment.  2. Generalized soft tissue edema as above suggesting cellulitis or bland edema. No soft tissue gas.  3. Subcutaneous lesion in the anterolateral mid lower leg, most likely a subcutaneous hematoma. It measures 3.5 x 1.3 x 2.7 cm.    Signer Name: Josue Lr MD   Signed: 11/15/2021 11:22 PM   Workstation Name: [id number]    [facility]    XR Tibia Fibula 2 View Left [[id number]] Collected: 11/15/21 2023     Updated: 11/15/21 2025    Narrative:      CR Tibia Fibula 2 Vws LT    INDICATION:   Acute left leg pain. Trauma    COMPARISON:   None available.    FINDINGS:   AP and lateral views of the left tibia/fibula. No definite acute fracture or subluxation. There are changes of total knee arthroplasty. Hardware is intact. There is vascular calcification. No foreign body.      Impression:      No acute fracture or subluxation.    Signer Name: Angelique Kelley MD   Signed: 11/15/2021 8:23 PM   Workstation Name: [id number]    [facility]            Impression:   1.  Left Hematoma/cellulitis- I will plan to continue daptomycin and ceftriaxone.She should be able to discharge soon on oral antibiotic therapy.  2.  Staph spp bacteremia- most likely skin  contaminants  3.  Afib, on Eliquis   4.  CKD, stage IV  5.  CHF, chronic systolic    6.  Dysphagia-GI is evaluating    PLAN/RECOMMENDATIONS:  1.  Continue intravenous daptomycin  2.  Continue intravenous ceftriaxone        Rip Coelho MD  11/18/2021  06:43 EST

## 2021-11-18 NOTE — PLAN OF CARE
Alert and oriented.  Respirations unlabored.  SOA intermittent w/activity.  Monitor V-paced w/PVC's.  Glucose trending upward.  PRN Tylenol once for LLE pain.  Up to bathroom and recliner 1 assist/rollator.  Daughter bedside for visit.

## 2021-11-18 NOTE — DISCHARGE PLACEMENT REQUEST
"David Mead (86 y.o. Female)             Date of Birth Social Security Number Address Home Phone MRN    [id number]  [id number] [facility] [address] [phone] [id number]    [employer] Marital Status             Latter-day        Admission Date Admission Type Admitting Provider Attending Provider Department, Room/Bed    11/15/21 Emergency Yolanda Gomez MD Brown, Hannah, MD [facility] 3F, S320/1    Discharge Date Discharge Disposition Discharge Destination                         Attending Provider: Yolanda Gomez MD    Allergies: Bactrim [Sulfamethoxazole-trimethoprim], Lisinopril, Codeine    Isolation: None   Infection: None   Code Status: CPR   Advance Care Planning Activity    Ht: 162.6 cm (64.02\")   Wt: 86.6 kg (190 lb 14.7 oz)    Admission Cmt: None   Principal Problem: Cellulitis of left lower extremity [L03.116]                 Active Insurance as of 11/15/2021     Primary Coverage     Payor Plan Insurance Group Employer/Plan Group    MEDICARE MEDICARE A & B      Payor Plan Address Payor Plan Phone Number Payor Plan Fax Number Effective Dates    [address] [phone]  7/1/2001 - None Entered    [facility] [id number]       Subscriber Name Subscriber Birth Date Member ID       DAVID MEAD 1935 [id number]           Secondary Coverage     Payor Plan Insurance Group Employer/Plan Group    [place] [employer] [place] [employer] [facility]      Payor Plan Address Payor Plan Phone Number Payor Plan Fax Number Effective Dates    [address] [phone]  7/1/2001 - None Entered    [place] [address]       Subscriber Name Subscriber Birth Date Member ID       DAVID MEAD 1935 [id number]           Tertiary Coverage     Payor Plan Insurance Group Employer/Plan Group    [place]      Payor Plan Address Payor Plan Phone Number Payor Plan Fax Number Effective Dates    [address] [phone]  7/1/2020 - None Entered    [place] " [id number]       Subscriber Name Subscriber Birth Date Member ID       KARLA MEAD 1935 [id number]                 Emergency Contacts      (Rel.) Home Phone Work Phone Mobile Phone    Frida Vazquez (Daughter) [phone] -- --      Primary Visit Coverage    Payer Plan Sponsor Code Group Number Group Name   MEDICARE MEDICARE A & B          Primary Visit Coverage Subscriber    Subscriber ID Subscriber Name Subscriber SSN Subscriber Address   [id number] KARLA MEAD  [address] [place] [address]       Secondary Visit Coverage    Payer Plan Sponsor Code Group Number Group Name   [place] [employer] [place] [employer] [facility]          Secondary Visit Coverage Subscriber    Subscriber ID Subscriber Name Subscriber SSN Subscriber Address   [id number] KARLA MEAD  [address] [id number]       Encounter Information    Encounter Information    Provider Department Encounter #   9/8/2021 3:15 PM Giselle Guzman DO [facility] [id number]   Office Visit    9/8/2021  [facility] INTERNAL MEDICINE     Giselle Guzman DO      Internal Medicine  Medicare annual wellness visit, subsequent +21 more      Dx  Medicare Wellness-subsequent      Reason for Visit       Progress Notes  Giselle Guzman DO (Physician) • • Internal Medicine  Expand All Collapse All  The ABCs of the Annual Wellness Visit  Subsequent Medicare Wellness Visit         Chief Complaint   Patient presents with   • Medicare Wellness-subsequent         Subjective       History of Present Illness:  Karla Mead is a 86 y.o. female who presents for a Subsequent Medicare Wellness Visit.     The following portions of the patient's history were reviewed and   updated as appropriate: allergies, current medications, past family history, past medical history, past social history, past surgical history and problem list.      Compared to one year ago, the  "patient feels her physical   health is the same.     Compared to one year ago, the patient feels her mental   health is the same.     Recent Hospitalizations:  She was not admitted to the hospital during the last year.         Current Medical Providers:  Patient Care Team:  Giselle Guzman DO as PCP - General     Medications Prior to Visit          Outpatient Medications Prior to Visit   Medication Sig Dispense Refill   • atorvastatin (LIPITOR) 20 MG tablet Take 1 tablet by mouth Every Night. 90 tablet 1   • carvedilol (COREG) 25 MG tablet Take 0.5 tablets by mouth 2 (Two) Times a Day With Meals. (Patient taking differently: Take 6.25 mg by mouth 2 (Two) Times a Day With Meals.) 30 tablet 11   • cholecalciferol (VITAMIN D3) 1000 UNITS tablet Take 1,000 Units by mouth Daily.       • Eliquis 2.5 MG tablet tablet         • eszopiclone (LUNESTA) 2 MG tablet TAKE ONE TABLET BY MOUTH IMMEDIATELY BEFORE BEDTIME AS NEEDED FOR SLEEP 30 tablet 1   • furosemide (LASIX) 40 MG tablet Take 40 mg by mouth As Needed.       • indapamide (LOZOL) 2.5 MG tablet         • Insulin Glargine (BASAGLAR KWIKPEN) 100 UNIT/ML injection pen INJECT 20 UNITS UNDER THE SKIN ONCE DAILY AS DIRECTED 15 mL 1   • Insulin Syringe-Needle U-100 (TRUEplus Insulin Syringe) 31G X 5/16\" 0.5 ML misc Use with Insulin Three Times daily 100 each 5   • isosorbide mononitrate (IMDUR) 30 MG 24 hr tablet Take 1 tablet by mouth Daily. (Patient taking differently: Take 30 mg by mouth Every Morning.) 90 tablet 0   • latanoprost (XALATAN) 0.005 % ophthalmic solution Administer 1 drop to both eyes Every Night.   5   • levothyroxine (SYNTHROID, LEVOTHROID) 100 MCG tablet TAKE ONE TABLET BY MOUTH IN THE MORNING  90 tablet 0   • melatonin 5 MG tablet tablet Take 5 mg by mouth Every Night.       • multivitamins-minerals (PRESERVISION AREDS 2) capsule capsule Take 1 capsule by mouth 2 (Two) Times a Day.       • mupirocin (BACTROBAN) 2 % ointment 1 application As Needed.   "     • NovoLOG 100 UNIT/ML injection INJECT 15 UNITS UNDER THE SKIN AS DIRECTED 3 TIMES DAILY BEFORE MEALS 20 mL 5   • omeprazole (priLOSEC) 40 MG capsule omeprazole 40 mg capsule,delayed release       • ondansetron (ZOFRAN) 4 MG tablet Take 4 mg by mouth Every 12 (Twelve) Hours As Needed.       • spironolactone (ALDACTONE) 25 MG tablet         • timolol (TIMOPTIC) 0.5 % ophthalmic solution Administer 1 drop to both eyes 2 (Two) Times a Day.          No facility-administered medications prior to visit.            No opioid medication identified on active medication list. I have reviewed chart for other potential  high risk medication/s and harmful drug interactions in the elderly.              Aspirin is not on active medication list.  Aspirin use is not indicated based on review of current medical condition/s. Risk of harm outweighs potential benefits.  .         Patient Active Problem List   Diagnosis   • Anemia due to chronic kidney disease   • Chronic atrial fibrillation (CMS/HCC)   • CKD (chronic kidney disease), stage IV (CMS/[facility])   • Diabetic nephropathy (CMS/HCC)   • Diabetic retinopathy (CMS/[facility])   • Malignant neoplasm of endometrium (CMS/[facility])   • Hyperlipidemia LDL goal <100   • Essential hypertension   • Hypothyroidism   • Insomnia   • Leukocytosis   • Memory impairment   • Nausea   • Pulmonary embolism (CMS/[facility])   • Sleep apnea   • Squamous cell carcinoma of skin   • CHF (NYHA class IV, ACC/AHA stage D) (CMS/[facility])   • Ischemic heart disease   • CAD (coronary artery disease)   • DVT of lower extremity (deep venous thrombosis) (CMS/HCC)   • Chronic systolic congestive heart failure (CMS/HCC)   • Morbidly obese (CMS/[facility])   • Ischemic cardiomyopathy   • Diabetes mellitus, type II, insulin dependent (CMS/[facility])   • Exudative age-related macular degeneration, right eye, with active choroidal neovascularization (CMS/[facility])      Advance Care Planning   Advance Directive is not on file.  ACP discussion was held with  "the patient during this visit. Patient has an advance directive (not in EMR), copy requested.     Review of Systems   Constitutional: Negative for activity change and chills.   HENT: Negative for sinus pressure.    Respiratory: Negative for cough and chest tightness.    Cardiovascular: Negative for chest pain.   Gastrointestinal: Negative for abdominal pain and constipation.   Musculoskeletal: Negative for gait problem and joint swelling.   Neurological: Negative for dizziness and confusion.   Psychiatric/Behavioral: Negative for hallucinations. The patient is not nervous/anxious.                Objective          Vitals       Vitals:     09/08/21 1525   BP: 138/84   Pulse: 75   SpO2: 97%   Weight: 90.3 kg (199 lb)   Height: 165.1 cm (65\")   PainSc: 0-No pain             BMI Readings from Last 1 Encounters:   09/08/21 33.12 kg/m²   BMI is above normal parameters. Recommendations include: none (medical contraindication)     Does the patient have evidence of cognitive impairment? No     Physical Exam        Lab Results   Component Value Date     HGBA1C 6.6 09/08/2021                  HEALTH RISK ASSESSMENT     Smoking Status:  Social History          Tobacco Use   Smoking Status Never Smoker   Smokeless Tobacco Never Used      Alcohol Consumption:  Social History          Substance and Sexual Activity   Alcohol Use No      Fall Risk Screen:     [facility] Fall Risk Assessment has not been completed.     Depression Screening:  PHQ-2/PHQ-9 Depression Screening 9/8/2021   Little interest or pleasure in doing things 0   Feeling down, depressed, or hopeless 0   Total Score 0         Health Habits and Functional and Cognitive Screening:  Functional & Cognitive Status 9/8/2021   Do you have difficulty preparing food and eating? No   Do you have difficulty bathing yourself, getting dressed or grooming yourself? No   Do you have difficulty using the toilet? No   Do you have difficulty moving around from place to place? No   Do you " have trouble with steps or getting out of a bed or a chair? Yes   Current Diet Well Balanced Diet   Dental Exam Not up to date   Eye Exam Up to date   Exercise (times per week) 0 times per week   Current Exercises Include No Regular Exercise   Current Exercise Activities Include -   Do you need help using the phone?  No   Are you deaf or do you have serious difficulty hearing?  No   Do you need help with transportation? Yes   Do you need help shopping? Yes   Do you need help preparing meals?  No   Do you need help with housework?  Yes   Do you need help with laundry? Yes   Do you need help taking your medications? No   Do you need help managing money? No   Do you ever drive or ride in a car without wearing a seat belt? No   Have you felt unusual stress, anger or loneliness in the last month? No   Who do you live with? Other   If you need help, do you have trouble finding someone available to you? No   Have you been bothered in the last four weeks by sexual problems? No   Do you have difficulty concentrating, remembering or making decisions? No         Age-appropriate Screening Schedule:  Refer to the list below for future screening recommendations based on patient's age, sex and/or medical conditions. Orders for these recommended tests are listed in the plan section. The patient has been provided with a written plan.           Health Maintenance   Topic Date Due   • DXA SCAN  Never done   • ZOSTER VACCINE (2 of 2) 05/08/2017   • URINE MICROALBUMIN  12/16/2020   • INFLUENZA VACCINE  10/01/2021   • DIABETIC EYE EXAM  02/25/2022   • HEMOGLOBIN A1C  03/08/2022   • LIPID PANEL  03/26/2022   • TDAP/TD VACCINES (3 - Td or Tdap) 11/29/2027   • MAMMOGRAM  Discontinued                      Assessment/Plan         CMS Preventative Services Quick Reference  Risk Factors Identified During Encounter  Fall Risk-High or Moderate  Obesity/Overweight   The above risks/problems have been discussed with the patient.  Follow up  actions/plans if indicated are seen below in the Assessment/Plan Section.  Pertinent information has been shared with the patient in the After Visit Summary.     Diagnoses and all orders for this visit:     1. Medicare annual wellness visit, subsequent (Primary)  Done today  2. Coronary artery disease involving native coronary artery of native heart without angina pectoris  Continue Lipitor 20 mg po qhs  3. Chronic atrial fibrillation (CMS/HCC)  Continue Eluiq 2.5 mg po qd  4. CHF (NYHA class IV, ACC/AHA stage D) (CMS/HCC)  Continue lasix 40 mg po qd and coreg 12.5 m po bid  5. Essential hypertension  -     CBC & Differential; Future  -     Microalbumin / Creatinine Urine Ratio - Urine, Clean Catch  Continue coreg 12. 5 mg po bid  6. Hyperlipidemia LDL goal <100  Continue Lipitor 20 mg po qhs  7. Ischemic cardiomyopathy  Echo UTD  8. Ischemic heart disease  Continue Lipitor 20 mg po qhs  9. Acute deep vein thrombosis (DVT) of right lower extremity, unspecified vein (CMS/HCC)  On eliquis 2.5 mg po bid  10. Acquired hypothyroidism  Continue synthroid 100 mcg po qd  11. CKD (chronic kidney disease), stage IV (CMS/HCC)  Continue to follow with Nephrology  12. Diabetic nephropathy associated with diabetes mellitus due to underlying condition (CMS/HCC)  Follows with Endo  13. Anemia due to stage 4 chronic kidney disease (CMS/HCC)  Follows with nephrology, stable  14. Malignant neoplasm of endometrium (CMS/HCC)     15. Memory impairment  stable  16. Psychophysiological insomnia  Continue Lunesta 2 mg po qhs  17. Diabetes mellitus, type II, insulin dependent (CMS/HCC)  Continue Basaglar and Novolog. Follows with endo  18. Exudative age-related macular degeneration, right eye, with active choroidal neovascularization (CMS/HCC)  follows with opth  19. Other acute pulmonary embolism without acute cor pulmonale (CMS/HCC)  On Eliquis  20. Morbidly obese (CMS/HCC)  Low carb diet  21. Postmenopause  -     DEXA Bone Density Axial;  "Future           Follow Up:   No follow-ups on file.      An After Visit Summary and PPPS were given to the patient.                                 Instructions         Return in about 6 months (around 3/8/2022).     After Visit Summary (Printed 9/8/2021)        Additional Documentation    Vitals:  /84     Pulse 75     Ht 165.1 cm (65\")     Wt 90.3 kg (199 lb)     LMP  (LMP Unknown)      SpO2 97%     BMI 33.12 kg/m²     BSA 1.97 m²     Pain Sc 0-No pain            More Vitals     Flowsheets:  Outbreak Screen,     Functional and Cognitive Status,     PHQ-9 Depression Scale,     STEADI Fall Risk        Encounter Info:  Billing Info,     History,     Allergies,     Detailed Report,     Prep for Surgery Order Report,     PAF,     Chart Review: Routing History,     Coding Summary,     Encounter Facesheet,     Link Facesheet,     ONC Road Map View,     AWV Summary     ...(8 more)       Orders Placed         CBC & Differential         Microalbumin / Creatinine Urine Ratio - Urine, Clean Catch       All Encounter Results     Medication Changes         None       Medication List     Visit Diagnoses         Medicare annual wellness visit, subsequent         Coronary artery disease involving native coronary artery of native heart without angina pectoris         Chronic atrial fibrillation (CMS/HCC)         CHF (NYHA class IV, ACC/AHA stage D) (CMS/HCC)         Essential hypertension         Hyperlipidemia LDL goal <100         Ischemic cardiomyopathy         Ischemic heart disease         Acute deep vein thrombosis (DVT) of right lower extremity, unspecified vein (CMS/HCC)         Acquired hypothyroidism         CKD (chronic kidney disease), stage IV (CMS/HCC)         Diabetic nephropathy associated with diabetes mellitus due to underlying condition (CMS/HCC)         Anemia due to stage 4 chronic kidney disease (CMS/HCC)         Malignant neoplasm of endometrium (CMS/HCC)         Memory " "impairment         Psychophysiological insomnia         Diabetes mellitus, type II, insulin dependent (CMS/HCC)         Exudative age-related macular degeneration, right eye, with active choroidal neovascularization (CMS/HCC)         Other acute pulmonary embolism without acute cor pulmonale (CMS/HCC)         Morbidly obese (CMS/HCC)         Postmenopause         Hypoxia       Problem List      Current Medications     Disp Start End   atorvastatin (LIPITOR) 20 MG tablet 90 tablet 8/3/2018    Sig - Route: Take 1 tablet by mouth Every Night. - Oral   Notes to Pharmacy: OTHER REFILLS FROM PCP!!     Renewals    Renewal provider: Kevin Gonzales IV, MD      carvedilol (COREG) 25 MG tablet 30 tablet 3/3/2021    Sig - Route: Take 0.5 tablets by mouth 2 (Two) Times a Day With Meals. - Oral   cholecalciferol (VITAMIN D3) 1000 UNITS tablet      Sig - Route: Take 1,000 Units by mouth Daily. - Oral   Class: Historical Med   ciprofloxacin (Cipro) 250 MG tablet 14 tablet 9/13/2021    Sig - Route: Take 1 tablet by mouth 2 (Two) Times a Day. - Oral   Continuous Blood Gluc Sensor (FreeStyle Lokesh 2 Sensor) misc 2 each 9/21/2021    Sig - Route: 1 each Every 14 (Fourteen) Days. - Does not apply   Eliquis 2.5 MG tablet tablet  2/23/2021    Class: Historical Med   eszopiclone (LUNESTA) 2 MG tablet 30 tablet 10/13/2021    Sig: TAKE ONE TABLET BY MOUTH IMMEDIATELY BEFORE BEDTIME AS NEEDED FOR SLEEP   furosemide (LASIX) 40 MG tablet  11/12/2012    Sig - Route: Take 40 mg by mouth As Needed. - Oral   Class: Historical Med   indapamide (LOZOL) 2.5 MG tablet  6/14/2021    Class: Historical Med   Insulin Glargine (BASAGLAR KWIKPEN) 100 UNIT/ML injection pen 15 mL 1/7/2021    Sig: INJECT 20 UNITS UNDER THE SKIN ONCE DAILY AS DIRECTED   Insulin Syringe-Needle U-100 (TRUEplus Insulin Syringe) 31G X 5/16\" 0.5 ML misc 100 each 4/26/2021    Sig: Use with Insulin Three Times daily   isosorbide mononitrate (IMDUR) 30 MG 24 hr tablet 90 " tablet 2018    Sig - Route: Take 1 tablet by mouth Daily. - Oral     Renewals    Renewal provider: Kevin Gonzales IV, MD      latanoprost (XALATAN) 0.005 % ophthalmic solution  5/10/2017    Sig - Route: Administer 1 drop to both eyes Every Night. - Both Eyes   Class: Historical Med   levothyroxine (Synthroid) 112 MCG tablet 90 tablet 9/10/2021 9/10/2022   Sig - Route: Take 1 tablet by mouth Daily. - Oral   melatonin 5 MG tablet tablet      Sig - Route: Take 5 mg by mouth Every Night. - Oral   Class: Historical Med   multivitamins-minerals (PRESERVISION AREDS 2) capsule capsule      Sig - Route: Take 1 capsule by mouth 2 (Two) Times a Day. - Oral   Class: Historical Med   mupirocin (BACTROBAN) 2 % ointment  2019    Si application As Needed.   Class: Historical Med   NovoLOG 100 UNIT/ML injection 20 mL 2021    Sig: INJECT 15 UNITS UNDER THE SKIN AS DIRECTED 3 TIMES DAILY BEFORE MEALS   omeprazole (priLOSEC) 40 MG capsule      Sig: omeprazole 40 mg capsule,delayed release   Class: Historical Med   ondansetron (ZOFRAN) 4 MG tablet  2014    Sig - Route: Take 4 mg by mouth Every 12 (Twelve) Hours As Needed. - Oral   Class: Historical Med   spironolactone (ALDACTONE) 25 MG tablet  2021    Class: Historical Med   timolol (TIMOPTIC) 0.5 % ophthalmic solution  2018    Sig - Route: Administer 1 drop to both eyes 2 (Two) Times a Day. - Both Eyes   Class: Historical Med     Hospital Medications     Dose Frequency Start End   acetaminophen (TYLENOL) tablet 650 mg 650 mg Every 6 Hours PRN 2021    Admin Instructions: Do not exceed 4 grams of acetaminophen in a 24 hr period. Max dose of 2gm for AST/ALT greater than 120 units/L       If given for pain, use the following pain scale:   Mild Pain = Pain Score of 1-3, CPOT 1-2   Moderate Pain = Pain Score of 4-6, CPOT 3-4   Severe Pain = Pain Score of 7-10, CPOT 5-8   Route: Oral   apixaban (ELIQUIS) tablet 2.5 mg 2.5 mg Every 12 Hours  Scheduled 11/16/2021    Admin Instructions: Tablet may be crushed and suspended in 60 mL of water or D5W and immediately delivered via NG tube.  Avoid grapefruit juice.   Route: Oral   atorvastatin (LIPITOR) tablet 20 mg 20 mg Nightly 11/16/2021    Admin Instructions: Avoid grapefruit juice.   Route: Oral   carvedilol (COREG) tablet 6.25 mg 6.25 mg 2 Times Daily With Meals 11/16/2021    Admin Instructions: Give with food.   Route: Oral   cefTRIAXone (ROCEPHIN) 2 g/100 mL 0.9% NS IVPB (MBP) 2 g Every 24 Hours 11/17/2021 11/24/2021   Admin Instructions: Caution: Look alike/sound alike drug alert. Break seal and mix to activiate vial before use.   Route: Intravenous   DAPTOmycin (CUBICIN) 250 mg in sodium chloride 0.9 % 50 mL IVPB 4 mg/kg × 67.5 kg (Adjusted) Every 48 Hours 11/17/2021 11/23/2021   Admin Instructions: Caution: Look alike/sound alike drug alert.  Refrigerate. Do not shake.   Route: Intravenous   dextrose (D50W) (25 g/50 mL) IV injection 25 g 25 g Every 15 Minutes PRN 11/15/2021    Admin Instructions: Blood sugar less than 70; patient has IV access - Unresponsive, NPO or Unable To Safely Swallow   Route: Intravenous   dextrose (GLUTOSE) oral gel 15 g 15 g Every 15 Minutes PRN 11/15/2021    Admin Instructions: BS<70, Patient Alert, Is not NPO, Can safely swallow.   Route: Oral   eszopiclone (LUNESTA) tablet 2 mg **PATIENT SUPPLIED** 2 mg Nightly PRN 11/18/2021    Admin Instructions: Maximum 3 mg per 24 hours.   Swallow whole.  Do not crush, split, or chew.   Route: Oral   Non-formulary Exception Code: Patient supplied medication   glucagon (human recombinant) (GLUCAGEN DIAGNOSTIC) injection 1 mg 1 mg Every 15 Minutes PRN 11/15/2021    Admin Instructions: Blood Glucose Less Than 70 - Patient Without IV Access - Unresponsive, NPO or Unable To Safely Swallow   Route: Subcutaneous   insulin lispro (humaLOG) injection 0-7 Units 0-7 Units 3 Times Daily Before Meals 11/16/2021    Admin Instructions: Correction -  "Low Dose.  Less than 40 units/day total insulin dose or lean, elderly, renal patients     Blood glucose 150-199 mg/dL - 2 units   Blood glucose 200-249 mg/dL - 3 units   Blood glucose 250-299 mg/dL - 4 units   Blood glucose 300-349 mg/dL - 5 units   Blood glucose 350-400 mg/dL - 6 units   Blood glucose greater than 400 mg/dL - 7 units and call provider    Caution: Look alike/sound alike drug alert   Route: Subcutaneous   isosorbide mononitrate (IMDUR) 24 hr tablet 30 mg 30 mg Daily 11/16/2021    Admin Instructions: Do not crush, or chew.   Route: Oral   latanoprost (XALATAN) 0.005 % ophthalmic solution 1 drop 1 drop Nightly 11/16/2021    Route: Both Eyes   levothyroxine (SYNTHROID, LEVOTHROID) tablet 112 mcg 112 mcg Every Early Morning 11/16/2021    Admin Instructions: Take on empty stomach.   Route: Oral   melatonin tablet 5 mg 5 mg Nightly 11/16/2021    Route: Oral   multivitamin with minerals 1 tablet 1 tablet Daily 11/16/2021    Admin Instructions:    Route: Oral   ondansetron (ZOFRAN) injection 4 mg 4 mg Every 6 Hours PRN 11/16/2021    Admin Instructions: If BOTH ondansetron (ZOFRAN) and promethazine (PHENERGAN) are ordered use ondansetron first and THEN promethazine IF ondansetron is ineffective.   Route: Intravenous   Linked Group 1: \"Or\" Linked Group Details       ondansetron (ZOFRAN) tablet 4 mg 4 mg Every 6 Hours PRN 11/16/2021    Admin Instructions: If BOTH ondansetron (ZOFRAN) and promethazine (PHENERGAN) are ordered use ondansetron first and THEN promethazine IF ondansetron is ineffective.   Route: Oral   Linked Group 1: \"Or\" Linked Group Details       oxyCODONE (ROXICODONE) immediate release tablet 5 mg 5 mg Every 4 Hours PRN 11/18/2021 11/25/2021   Admin Instructions:      If given for pain, use the following pain scale:   Mild Pain = Pain Score of 1-3, CPOT 1-2   Moderate Pain = Pain Score of 4-6, CPOT 3-4   Severe Pain = Pain Score of 7-10, CPOT 5-8   Route: Oral   pantoprazole (PROTONIX) EC tablet " 40 mg 40 mg Daily 11/16/2021    Admin Instructions: Swallow whole; do not crush, split, or chew.   Route: Oral   sodium chloride 0.9 % flush 10 mL 10 mL Every 12 Hours Scheduled 11/16/2021    Route: Intravenous   sodium chloride 0.9 % flush 10 mL 10 mL As Needed 11/16/2021    Route: Intravenous   timolol (TIMOPTIC) 0.5 % ophthalmic solution 1 drop 1 drop 2 Times Daily 11/16/2021    Route: Both Eyes

## 2021-11-18 NOTE — PLAN OF CARE
Goal Outcome Evaluation:  Plan of Care Reviewed With: patient, daughter           Outcome Summary: PT eval completed. Pt presents with difficulty walking per PLOF. Pt amb within room with RW and CGA, distance limited by fatigue. A-fib with activity. O2 sat stable on RA. PT rec d/c home with HHPT.

## 2021-11-18 NOTE — THERAPY EVALUATION
Patient Name: Karla Cristobal  : 1935    MRN: [id number]                              Today's Date: 2021       Admit Date: 11/15/2021    Visit Dx:     ICD-10-CM ICD-9-CM   1. Left leg cellulitis  L03.116 682.6   2. History of insulin dependent diabetes mellitus  Z86.39 V12.29   3. Chronic kidney disease, unspecified CKD stage  N18.9 585.9   4. Chronic a-fib ([facility])  I48.20 427.31   5. Anticoagulated  Z79.01 V58.61   6. Food impaction of esophagus, initial encounter  T18.128A 935.1   7. Pharyngoesophageal dysphagia  R13.14 787.24   8. 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 Cellulitis of left lower extremity  L03.116 682.6   10. Diabetes mellitus, type II, insulin dependent ([facility])  E11.9 250.00    Z79.4 V58.67   11. Ischemic cardiomyopathy  I25.5 414.8   12. Morbidly obese ([facility])  E66.01 278.01   13. Chronic systolic congestive heart failure ([facility])  I50.22 428.22     428.0   14. Acute deep vein thrombosis (DVT) of left lower extremity, unspecified vein ([facility])  I82.402 453.40   15. CHF (NYHA class IV, ACC/AHA stage D) ([facility])  I50.84 428.0   16. Hypoxia  R09.02 799.02   17. Diabetic retinopathy of both eyes with macular edema associated with diabetes mellitus due to underlying condition, unspecified retinopathy severity ([facility])  E08.311 249.50     362.07     362.01   18. Diabetic nephropathy associated with diabetes mellitus due to underlying condition ([facility])  E08.21 249.40     583.81   19. Impaired functional mobility, balance, gait, and endurance  Z74.09 V49.89     Patient Active Problem List   Diagnosis   • Anemia due to chronic kidney disease   • Chronic atrial fibrillation (HCC)   • CKD (chronic kidney disease), stage IV ([facility])   • Diabetic nephropathy ([facility])   • Diabetic retinopathy ([facility])   • Malignant neoplasm of endometrium ([facility])   • Hyperlipidemia LDL goal <100   • Essential hypertension   • Hypothyroidism   • Insomnia   • Leukocytosis   • Memory impairment   • Nausea   • Pulmonary embolism  ([facility])   • Sleep apnea   • Squamous cell carcinoma of skin   • CHF (NYHA class IV, ACC/AHA stage D) ([facility])   • Ischemic heart disease   • CAD (coronary artery disease)   • DVT of lower extremity (deep venous thrombosis) ([facility])   • Chronic systolic congestive heart failure ([facility])   • Morbidly obese ([facility])   • Ischemic cardiomyopathy   • Diabetes mellitus, type II, insulin dependent ([facility])   • Exudative age-related macular degeneration, right eye, with active choroidal neovascularization ([facility])   • Hypoxia   • Cellulitis of left lower extremity   • Left leg cellulitis   • Food impaction of esophagus     Past Medical History:   Diagnosis Date   • Acute bronchitis    • Arthritis    • Atrial fibrillation ([facility])    • CAD (coronary artery disease)     s/p MI 2005; 3 stents inserted    • Change in mole    • Change in mole    • Change in nevus 6/16/2016   • Chronic kidney disease     stage IV-sees Dr Bk Mckeon every 3-4 months    • Chronic renal disease, stage 4, severely decreased glomerular filtration rate between 15-29 mL/min/1.73 square meter ([facility])    • Chronic systolic heart failure ([facility])    • Congestive heart disease ([facility])    • Conjunctivitis    • Deep vein thrombosis of lower extremity ([facility])    • Diabetes mellitus ([facility])    • Diabetes mellitus ([facility]) 6/16/2016    Impression: 03/15/2016 - blood sugar 166 and hgn a1c 7.3%  is up to date with eye exam  neuropathy with callus, no ulcer  some scratches feet  ur alb due and ordered  no change other than provided samples of toujeo because she feels like lantus worked much better than levemir, she has tried titrating levemir and it is less effective  continue testing and f/u 3-4 months  Impression: 11/23/2015 - bl   • Diabetic foot ulcer ([facility]) 6/16/2016   • Dog bite of hand    • Easy bruising    • Endometrial cancer ([facility])     partial hysterectomy; radiation as well    • Foot pain    • Foot pain 6/16/2016    Description: lancinating- worse but not consistent enough to want rx   •  Fracture of hip (HCC)    • Generalized ischemic myocardial dysfunction 6/16/2016   • Glaucoma     drops daily    • Hyperlipidemia    • Hypertension    • Hypothyroidism    • Insomnia    • Ischemic heart disease    • Macular degeneration    • Methicillin resistant Staphylococcus aureus infection 6/16/2016   • Myocardial infarction ([facility]) 2005   • Nausea with vomiting    • Pericardial effusion    • Shortness of breath 6/16/2016    Impression: 03/24/2015 - chronic. On 2 l oxygen at night. check cbc, bnp;    • Skin cancer, basal cell     nose, leg    • Skin lesion 6/16/2016    Impression: 03/24/2015 - refer derm for eval- seb kerat ant tib and ?ak medially with redness and irritation;    • Sleep apnea     oxygen at night due to low sats but no cpap   • Type 2 diabetes mellitus ([facility])    • UTI (urinary tract infection) 06/12/2020    currently taking antibiotics-patient's daughter notified Dr Beal's office and office said it should not delay generator change      Past Surgical History:   Procedure Laterality Date   • CARDIAC CATHETERIZATION     • CARDIAC DEFIBRILLATOR PLACEMENT     • CARDIAC ELECTROPHYSIOLOGY PROCEDURE N/A 7/17/2020    Procedure: ICD BIV  generator change- [facility]- 3-6 weeks;  Surgeon: Tano Beal MD;  Location: [facility] INVASIVE LOCATION;  Service: Cardiology;  Laterality: N/A;   • CHOLECYSTECTOMY     • CORONARY ARTERY BYPASS GRAFT  2000   • CORONARY STENT PLACEMENT      3 stents    • EYE SURGERY Bilateral     cataract extraction    • HIP SURGERY Left     x3   • HYSTERECTOMY      partial    • KNEE ARTHROPLASTY Bilateral    • PACEMAKER IMPLANTATION     • TONSILLECTOMY        General Information     Row Name 11/18/21 1435          Physical Therapy Time and Intention    Document Type evaluation  -     Mode of Treatment physical therapy  -     Row Name 11/18/21 1430          General Information    Patient Profile Reviewed yes  -SJ     Prior Level of Function independent:; all household mobility;  gait; transfer; bed mobility  -     Existing Precautions/Restrictions fall; other (see comments)  WBAT  -SJ     Barriers to Rehab medically complex; previous functional deficit  -     Row Name 11/18/21 1435          Living Environment    Lives With grandchild(karyna); other relative(s)  -     Row Name 11/18/21 1435          Home Main Entrance    Number of Stairs, Main Entrance none  -SJ     Row Name 11/18/21 1435          Stairs Within Home, Primary    Stairs, Within Home, Primary ramp to enter  -SJ     Row Name 11/18/21 1435          Cognition    Orientation Status (Cognition) oriented x 4  -SJ     Row Name 11/18/21 1435          Safety Issues, Functional Mobility    Safety Issues Affecting Function (Mobility) insight into deficits/self-awareness; sequencing abilities  -     Impairments Affecting Function (Mobility) endurance/activity tolerance; pain; strength  -           User Key  (r) = Recorded By, (t) = Taken By, (c) = Cosigned By    Initials Name Provider Type    Meghna Bergeron PT Physical Therapist               Mobility     Row Name 11/18/21 1615          Bed Mobility    Comment (Bed Mobility) UIC  -     Row Name 11/18/21 1615          Transfers    Comment (Transfers) cues for hand placement and sequencin  -     Row Name 11/18/21 1615          Sit-Stand Transfer    Sit-Stand [place] (Transfers) contact guard; verbal cues  -     Assistive Device (Sit-Stand Transfers) walker, 4-wheeled  -SJ     Row Name 11/18/21 1615          Gait/Stairs (Locomotion)    [place] Level (Gait) contact guard; 1 person assist; verbal cues; nonverbal cues (demo/gesture)  -     Assistive Device (Gait) walker, 4-wheeled  -SJ     Distance in Feet (Gait) 30ft  -SJ     Deviations/Abnormal Patterns (Gait) bilateral deviations; dimitry decreased; gait speed decreased  -SJ     Bilateral Gait Deviations forward flexed posture; heel strike decreased  -SJ     Comment (Gait/Stairs) Pt amb within room with RW,  distance limited by fatigue. A-fib with activity. O2 sat stable on RA.  -SJ           User Key  (r) = Recorded By, (t) = Taken By, (c) = Cosigned By    Initials Name Provider Type    Meghna Bergeron PT Physical Therapist               Obj/Interventions     Row Name 11/18/21 1616          Range of Motion Comprehensive    General Range of Motion bilateral lower extremity ROM WFL  -SJ     Row Name 11/18/21 1616          Strength Comprehensive (MMT)    General Manual Muscle Testing (MMT) Assessment lower extremity strength deficits identified  -SJ     Comment, General Manual Muscle Testing (MMT) Assessment BLE's 4/5  -SJ           User Key  (r) = Recorded By, (t) = Taken By, (c) = Cosigned By    Initials Name Provider Type    Meghna Bergeron PT Physical Therapist               Goals/Plan     Row Name 11/18/21 1618          Bed Mobility Goal 1 (PT)    Activity/Assistive Device (Bed Mobility Goal 1, PT) sit to supine; supine to sit  -SJ     [place] Level/Cues Needed (Bed Mobility Goal 1, PT) modified independence  -SJ     Time Frame (Bed Mobility Goal 1, PT) long term goal (LTG); 2 weeks  -SJ     Row Name 11/18/21 1618          Transfer Goal 1 (PT)    Activity/Assistive Device (Transfer Goal 1, PT) sit-to-stand/stand-to-sit; bed-to-chair/chair-to-bed; walker, rolling  -SJ     [place] Level/Cues Needed (Transfer Goal 1, PT) modified independence  -SJ     Time Frame (Transfer Goal 1, PT) long term goal (LTG); 2 weeks  -SJ     Row Name 11/18/21 1618          Gait Training Goal 1 (PT)    Activity/Assistive Device (Gait Training Goal 1, PT) gait (walking locomotion); improve balance and speed; increase endurance/gait distance; walker, rolling  -SJ     [place] Level (Gait Training Goal 1, PT) supervision required  -SJ     Distance (Gait Training Goal 1, PT) 200  -SJ     Time Frame (Gait Training Goal 1, PT) long term goal (LTG); 2 weeks  -SJ           User Key  (r) = Recorded By, (t) = Taken By, (c) =  Cosigned By    Initials Name Provider Type    Meghna Bergeron PT Physical Therapist               Clinical Impression     [facility] Name 11/18/21 1616          Pain    Additional Documentation Pain Scale: FACES Pre/Post-Treatment (Group)  -[facility] 11/18/21 1616          Pain Scale: FACES Pre/Post-Treatment    Pain: FACES Scale, Pretreatment 2-->hurts little bit  -SJ     Posttreatment Pain Rating 2-->hurts little bit  -SJ     Pain Location - Side Left  -     Pain Location - Orientation lower  -     Pain Location extremity  -[facility] Name 11/18/21 1616          Plan of Care Review    Plan of Care Reviewed With patient; daughter  -     Outcome Summary PT eval completed. Pt presents with difficulty walking per PLOF. Pt amb within room with RW and CGA, distance limited by fatigue. A-fib with activity. O2 sat stable on RA. PT rec d/c home with HHPT.  -[facility] 11/18/21 1616          Therapy Assessment/Plan (PT)    Patient/Family Therapy Goals Statement (PT) return home  -     Rehab Potential (PT) good, to achieve stated therapy goals  -     Criteria for Skilled Interventions Met (PT) yes; skilled treatment is necessary  -     Predicted Duration of Therapy Intervention (PT) 2wks  -[facility] Name 11/18/21 1616          Vital Signs    Pre Systolic BP Rehab 144  -SJ     Pre Treatment Diastolic BP 77  -SJ     Pretreatment Heart Rate (beats/min) 77  -SJ     Pre SpO2 (%) 95  -SJ     O2 Delivery Pre Treatment room air  -[facility] 11/18/21 1616          Positioning and Restraints    Pre-Treatment Position sitting in chair/recliner  -SJ     Post Treatment Position chair  -SJ     In Chair notified nsg; reclined; call light within reach; encouraged to call for assist; exit alarm on; with family/caregiver; waffle cushion; heels elevated  -           User Key  (r) = Recorded By, (t) = Taken By, (c) = Cosigned By    Initials Name Provider Type    Meghna Bergeron PT Physical Therapist                Outcome Measures     Row Name 11/18/21 1618 11/18/21 0800       How much help from another person do you currently need...    Turning from your back to your side while in flat bed without using bedrails? 3  -SJ 3  -LC    Moving from lying on back to sitting on the side of a flat bed without bedrails? 3  -SJ 3  -LC    Moving to and from a bed to a chair (including a wheelchair)? 3  -SJ 3  -LC    Standing up from a chair using your arms (e.g., wheelchair, bedside chair)? 3  -SJ 3  -LC    Climbing 3-5 steps with a railing? 3  -SJ 2  -LC    To walk in hospital room? 3  -SJ 3  -LC    AM-PAC 6 Clicks Score (PT) 18  -SJ 17  -LC    Row Name 11/18/21 1618          Functional Assessment    Outcome Measure Options AM-PAC 6 Clicks Basic Mobility (PT)  -           User Key  (r) = Recorded By, (t) = Taken By, (c) = Cosigned By    Initials Name Provider Type    Meghna Bergeron, PAVEL Physical Therapist    Danni Degroot RN Registered Nurse                             Physical Therapy Education                 Title: PT OT SLP Therapies (Done)     Topic: Physical Therapy (Done)     Point: Mobility training (Done)     Learning Progress Summary           Patient Acceptance, E, VU,NR by  at 11/18/2021 1619   Family Acceptance, E, VU,NR by  at 11/18/2021 1619                   Point: Home exercise program (Done)     Learning Progress Summary           Patient Acceptance, E, VU,NR by  at 11/18/2021 1619   Family Acceptance, E, VU,NR by  at 11/18/2021 1619                   Point: Body mechanics (Done)     Learning Progress Summary           Patient Acceptance, E, VU,NR by  at 11/18/2021 1619   Family Acceptance, E, VU,NR by  at 11/18/2021 1619                   Point: Precautions (Done)     Learning Progress Summary           Patient Acceptance, E, VU,NR by  at 11/18/2021 1619   Family Acceptance, E, VU,NR by  at 11/18/2021 1619                               User Key     Initials Effective Dates Name Provider  Type Discipline     06/16/21 -  Meghna Plata PT Physical Therapist PT              PT Recommendation and Plan  Planned Therapy Interventions (PT): balance training, bed mobility training, gait training, home exercise program, strengthening, patient/family education, transfer training  Plan of Care Reviewed With: patient, daughter  Outcome Summary: PT eval completed. Pt presents with difficulty walking per PLOF. Pt amb within room with RW and CGA, distance limited by fatigue. A-fib with activity. O2 sat stable on RA. PT rec d/c home with HHPT.     Time Calculation:    PT Charges     Row Name 11/18/21 1619             Time Calculation    Start Time 1435  -      PT Non-Billable Time (min) 46 min  -      PT Received On 11/18/21  -      PT Goal Re-Cert Due Date 11/28/21  -            User Key  (r) = Recorded By, (t) = Taken By, (c) = Cosigned By    Initials Name Provider Type     Meghna Plata PT Physical Therapist              Therapy Charges for Today     Code Description Service Date Service Provider Modifiers Qty    [id number]  PT EVAL MOD COMPLEXITY 4 11/18/2021 Meghna Plata PT GP 1          PT G-Codes  Outcome Measure Options: AM-PAC 6 Clicks Basic Mobility (PT)  AM-PAC 6 Clicks Score (PT): 18  AM-PAC 6 Clicks Score (OT): 16    Meghna Plata PT  11/18/2021

## 2021-11-18 NOTE — DISCHARGE PLACEMENT REQUEST
"David Mead (86 y.o. Female)   Morris Sellers RN Case Manager  [phone]            Date of Birth Social Security Number Address Home Phone MRN    1935  [address] [facility] [address] [phone] [id number]    [employer] Status             [place]        Admission Date Admission Type Admitting Provider Attending Provider Department, Room/Bed    11/15/21 Emergency Yolanda Gomez MD Brown, Hannah, MD [facility] 3F, S320/1    Discharge Date Discharge Disposition Discharge Destination                         Attending Provider: Yolanda Gomez MD    Allergies: Bactrim [Sulfamethoxazole-trimethoprim], Lisinopril, Codeine    Isolation: None   Infection: None   Code Status: CPR   Advance Care Planning Activity    Ht: 162.6 cm (64.02\")   Wt: 86.6 kg (190 lb 14.7 oz)    Admission Cmt: None   Principal Problem: Cellulitis of left lower extremity [L03.116]                 Active Insurance as of 11/15/2021     Primary Coverage     Payor Plan Insurance Group Employer/Plan Group    MEDICARE MEDICARE A & B      Payor Plan Address Payor Plan Phone Number Payor Plan Fax Number Effective Dates    [address] [phone]  7/1/2001 - None Entered    [facility] [id number]       Subscriber Name Subscriber Birth Date Member ID       DAVID MEAD 1935 [id number]           Secondary Coverage     Payor Plan Insurance Group Employer/Plan Group    [place] [employer] [facility]      Payor Plan Address Payor Plan Phone Number Payor Plan Fax Number Effective Dates    [address] [phone]  7/1/2001 - None Entered    [place] [address]       Subscriber Name Subscriber Birth Date Member ID       DAVID MEAD 1935 [id number]           Tertiary Coverage     Payor Plan Insurance Group Employer/Plan Group    [place]      Payor Plan Address Payor Plan Phone Number Payor Plan Fax Number Effective Dates    [address] [phone] "  2020 - None Entered    [facility] [address]       Subscriber Name Subscriber Birth Date Member ID       KARLA MEAD 1935 [id number]                 Emergency Contacts      (Rel.) Home Phone Work Phone Mobile Phone    Frida Vazquez (Daughter) [phone] -- --        [address]  [address] [facility] [address]  Phone:  [phone]  Fax:  [phone] Date: 2021      Ambulatory Referral to Home Health     Patient:  Karla Mead MRN:  [id number]   [address] [facility] [address] :  1935  SSN:    Phone: [phone] Sex:  F      INSURANCE PAYOR PLAN GROUP # SUBSCRIBER ID   Primary:  Secondary:    MEDICARE  [employer] [id number]  [id number]      [id number]  [id number]      Referring Provider Information:  GAGE WARNER Phone: [phone] Fax: [phone]      Referral Information:   # Visits:  999 Referral Type: Home Health [42]   Urgency:  Routine Referral Reason: Specialty Services Required   Start Date: 2021 End Date:  To be determined by Insurer   Diagnosis: Left leg cellulitis (L03.116 [ICD-10-CM] 682.6 [ICD-9-CM])  Food impaction of esophagus, initial encounter (T18.128A [ICD-10-CM] 935.1 [ICD-9-CM])  Pharyngoesophageal dysphagia (R13.14 [ICD-10-CM] 787.24 [ICD-9-CM])  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 (K22.70 [ICD-10-CM] 530.85 [ICD-9-CM])  Cellulitis of left lower extremity (L03.116 [ICD-10-CM] 682.6 [ICD-9-CM])  Diabetes mellitus, type II, insulin dependent (HCC) (E11.9,Z79.4 [ICD-10-CM] 250.00,V58.67 [ICD-9-CM])  Ischemic cardiomyopathy (I25.5 [ICD-10-CM] 414.8 [ICD-9-CM])  Chronic systolic congestive heart failure (HCC) (I50.22 [ICD-10-CM] 428.22,428.0 [ICD-9-CM])  Acute deep vein thrombosis (DVT) of left lower extremity, unspecified vein (HCC) (I82.402 [ICD-10-CM] 453.40 [ICD-9-CM])  CHF (NYHA class IV, ACC/AHA stage D) (HCC) (I50.84 [ICD-10-CM] 428.0 [ICD-9-CM])      Refer to Dept:    Refer to Provider:   Refer to Facility:       Face to Face Visit Date: 11/18/2021  Follow-up provider for Plan of Care? I will be treating the patient on an ongoing basis.  Please send me the Plan of Care for signature.  Follow-up provider: MARCELL MARTINEZ [[id number]]  Reason/Clinical Findings: Cellulitis of Left leg  Describe mobility limitations that make leaving home difficult: Impaired mobility  Nursing/Therapeutic Services Requested: Skilled Nursing  Nursing/Therapeutic Services Requested: Physical Therapy  Nursing/Therapeutic Services Requested: Occupational Therapy  Nursing/Therapeutic Services Requested: Speech Therapy  Skilled nursing orders: Medication education  Skilled nursing orders: Pain management  Skilled nursing orders: Wound care dressing/changes  PT orders: Therapeutic exercise  PT orders: Gait Training  PT orders: Strengthening  PT orders: Home safety assessment  Weight Bearing Status: Full Weight Bearing  Occupational orders: Activities of daily living  Occupational orders: Energy conservation  Occupational orders: Strengthening  Occupational orders: Home safety assessment  SLP orders: Dysphagia therapy  Frequency: 1 Week 1     This document serves as a request of services and does not constitute Insurance authorization or approval of services.  To determine eligibility, please contact the members Insurance carrier to verify and review coverage.     If you have medical questions regarding this request for services. Please contact [address] at [phone] during normal business hours.         Authorizing Provider:Yolanda Gomez MD  Authorizing Provider's NPI: [id number]  Order Entered By: Choco Sellers RN 11/18/2021  9:47 AM     Electronically signed by: Yolanda Gomez MD 11/18/2021  9:47 AM               History & Physical      Kamlesh Gomez DO at 11/15/21 4641              [facility] Medicine Services  HISTORY AND PHYSICAL    Patient Name:  Karla Cristobal  : 1935  MRN: [id number]  Primary Care Physician: Giselle Guzman DO  Date of admission: 11/15/2021    Subjective   Subjective     Chief Complaint:  Left Lower Extremity Cellulitis    HPI:  Karla Cristobal is a 86 y.o. female with a history of atrial fibrillation (on Eliquis), coronary artery disease s/p stents ('05), hypertension, hyperlipidemia, chronic kidney disease, congestive heart failure, insulin-dependent type 2 diabetes, hypothyroidism, and AFTAB who presents to [facility] ED today for complaint of left lower extremity pain.  She states that about a week ago, she was getting out of bed and tripped and fell forward, with her shin landing on the wheel of her walker.  She denies any head injury or loss of consciousness.  She states that over the next day or so, her left shin became more red and tender.  Stating it began to hurt to ambulate.  She says that earlier today she had stopped urgent care facility to have her this evaluated, and they had suggested she come to the ED for treatment.  She denies fever, chills, headache, palpitations, chest pain, shortness of breath, cough, nausea, vomiting, or diarrhea.  Patient has a history of chronic atrial fibrillation with daily Eliquis, as well as a history of DVT/PE.  Patient is a non-smoker.  Denies alcohol abuse or illicit drug use.  Patient has received all doses of the Covid vaccine.      COVID Details:        Symptoms: [x] NONE [] Fever []  Cough [] Shortness of breath [] Change in taste or smell  The patient has a COVID HM Topic on their chart, and they are fully vaccinated.    Review of Systems   Constitutional: Negative.  Negative for chills, diaphoresis, fatigue and fever.   HENT: Negative.  Negative for nosebleeds, postnasal drip and trouble swallowing.    Eyes: Negative.  Negative for photophobia and visual disturbance.   Respiratory: Negative.  Negative for cough, shortness of breath and wheezing.    Cardiovascular: Positive for  leg swelling. Negative for chest pain.   Gastrointestinal: Negative for abdominal pain, diarrhea, nausea and vomiting.   Genitourinary: Negative for dysuria and hematuria.   Musculoskeletal: Negative.  Negative for arthralgias and myalgias.   Skin: Positive for color change and rash.   Neurological: Negative.  Negative for dizziness, syncope, speech difficulty, weakness and headaches.   Hematological: Bruises/bleeds easily.   Psychiatric/Behavioral: Negative for confusion. The patient is not nervous/anxious.         All other systems reviewed and are negative.     Personal History     Past Medical History:   Diagnosis Date   • Acute bronchitis    • Arthritis    • Atrial fibrillation (HCC)    • CAD (coronary artery disease)     s/p MI 2005; 3 stents inserted    • Change in mole    • Change in mole    • Change in nevus 6/16/2016   • Chronic kidney disease     stage IV-sees Dr Bk Mckeon every 3-4 months    • Chronic renal disease, stage 4, severely decreased glomerular filtration rate between 15-29 mL/min/1.73 square meter (HCC)    • Chronic systolic heart failure (HCC)    • Congestive heart disease (HCC)    • Conjunctivitis    • Deep vein thrombosis of lower extremity (HCC)    • Diabetes mellitus (HCC)    • Diabetes mellitus (HCC) 6/16/2016    Impression: 03/15/2016 - blood sugar 166 and hgn a1c 7.3%  is up to date with eye exam  neuropathy with callus, no ulcer  some scratches feet  ur alb due and ordered  no change other than provided samples of toujeo because she feels like lantus worked much better than levemir, she has tried titrating levemir and it is less effective  continue testing and f/u 3-4 months  Impression: 11/23/2015 - bl   • Diabetic foot ulcer (HCC) 6/16/2016   • Dog bite of hand    • Easy bruising    • Endometrial cancer (HCC)     partial hysterectomy; radiation as well    • Foot pain    • Foot pain 6/16/2016    Description: lancinating- worse but not consistent enough to want rx   • Fracture of  hip (HCC)    • Generalized ischemic myocardial dysfunction 6/16/2016   • Glaucoma     drops daily    • Hyperlipidemia    • Hypertension    • Hypothyroidism    • Insomnia    • Ischemic heart disease    • Macular degeneration    • Methicillin resistant Staphylococcus aureus infection 6/16/2016   • Myocardial infarction (HCC) 2005   • Nausea with vomiting    • Pericardial effusion    • Shortness of breath 6/16/2016    Impression: 03/24/2015 - chronic. On 2 l oxygen at night. check cbc, bnp;    • Skin cancer, basal cell     nose, leg    • Skin lesion 6/16/2016    Impression: 03/24/2015 - refer derm for eval- seb kerat ant tib and ?ak medially with redness and irritation;    • Sleep apnea     oxygen at night due to low sats but no cpap   • Type 2 diabetes mellitus ([facility])    • UTI (urinary tract infection) 06/12/2020    currently taking antibiotics-patient's daughter notified Dr Beal's office and office said it should not delay generator change        Past Surgical History:   Procedure Laterality Date   • CARDIAC CATHETERIZATION     • CARDIAC DEFIBRILLATOR PLACEMENT     • CARDIAC ELECTROPHYSIOLOGY PROCEDURE N/A 7/17/2020    Procedure: ICD BIV  generator change- [facility]- 3-6 weeks;  Surgeon: Tano Beal MD;  Location: [facility] INVASIVE LOCATION;  Service: Cardiology;  Laterality: N/A;   • CHOLECYSTECTOMY     • CORONARY ARTERY BYPASS GRAFT  2000   • CORONARY STENT PLACEMENT      3 stents    • EYE SURGERY Bilateral     cataract extraction    • HIP SURGERY Left     x3   • HYSTERECTOMY      partial    • KNEE ARTHROPLASTY Bilateral    • PACEMAKER IMPLANTATION     • TONSILLECTOMY         Family History:  family history includes Breast cancer in her mother and another family member; Cancer in her father; Diabetes in an other family member; Esophageal cancer in an other family member; Hypertension in an other family member; Transient ischemic attack in an other family member. Otherwise pertinent FHx was reviewed and  unremarkable.     Social History:  reports that she has never smoked. She has never used smokeless tobacco. She reports that she does not drink alcohol and does not use drugs.  Social History     Social History Narrative   • Not on file       Medications:  BASAGLAR KWIKPEN, FreeStyle Lokesh 2 Sensor, Insulin Syringe-Needle U-100, apixaban, atorvastatin, carvedilol, cholecalciferol, ciprofloxacin, eszopiclone, furosemide, indapamide, insulin aspart, isosorbide mononitrate, latanoprost, levothyroxine, melatonin, multivitamins-minerals, mupirocin, omeprazole, ondansetron, spironolactone, and timolol    Allergies   Allergen Reactions   • Bactrim [Sulfamethoxazole-Trimethoprim] GI Intolerance   • Lisinopril Cough   • Codeine Rash       Objective   Objective     Vital Signs:   Temp:  [97.1 °F (36.2 °C)] 97.1 °F (36.2 °C)  Heart Rate:  [72] 72  Resp:  [18] 18  BP: (130)/(67) 130/67    Physical Exam  Constitutional:       General: She is not in acute distress.     Appearance: Normal appearance. She is obese.   HENT:      Head: Atraumatic.      Right Ear: External ear normal.      Left Ear: External ear normal.      Nose: Nose normal.   Eyes:      Extraocular Movements: Extraocular movements intact.      Conjunctiva/sclera: Conjunctivae normal.      Pupils: Pupils are equal, round, and reactive to light.   Cardiovascular:      Rate and Rhythm: Normal rate and regular rhythm.      Pulses: Normal pulses.      Heart sounds: Normal heart sounds. No murmur heard.      Pulmonary:      Effort: Pulmonary effort is normal. No respiratory distress.      Breath sounds: Normal breath sounds. No wheezing, rhonchi or rales.   Abdominal:      General: Bowel sounds are normal. There is no distension.      Tenderness: There is no abdominal tenderness. There is no guarding or rebound.   Musculoskeletal:         General: Normal range of motion.      Cervical back: No rigidity.   Skin:     General: Skin is warm and dry.      Coloration: Skin is  not jaundiced.      Findings: Rash (Erythema and warmth of the anterior left lower leg. There is a central area of a palpible fluid-filled mass. Area mildly tender to palpation. No streaking up the leg noted. ) present.   Neurological:      General: No focal deficit present.      Mental Status: She is alert and oriented to person, place, and time.   Psychiatric:         Attention and Perception: Attention normal.         Mood and Affect: Mood normal.         Behavior: Behavior normal.         Thought Content: Thought content normal.          Result Review:  I have personally reviewed the results from the time of this admission to 11/15/21 9:25 PM EST and agree with these findings:  [x]  Laboratory  []  Microbiology  [x]  Radiology  []  EKG/Telemetry   []  Cardiology/Vascular   []  Pathology  []  Old records  []  Other:        LAB RESULTS:      Lab 11/15/21  1926   WBC 5.70   HEMOGLOBIN 13.3   HEMATOCRIT 41.8   PLATELETS 133*   NEUTROS ABS 3.17   IMMATURE GRANS (ABS) 0.01   LYMPHS ABS 1.76   MONOS ABS 0.54   EOS ABS 0.17   MCV 91.7         Lab 11/15/21  2028   SODIUM 144   POTASSIUM 4.1   CHLORIDE 105   CO2 28.0   ANION GAP 11.0   BUN 66*   CREATININE 2.35*   GLUCOSE 111*   CALCIUM 9.0         Lab 11/15/21  2028   TOTAL PROTEIN 6.9   ALBUMIN 3.30*   GLOBULIN 3.6   ALT (SGPT) 8   AST (SGOT) 15   BILIRUBIN 1.1   ALK PHOS 236*         Lab 11/15/21  1926   PROBNP 4,108.0*                 UA    Urinalysis 1/13/21 5/2/21 9/27/21 9/27/21      1116 1116   Ketones, UA Negative Negative     Blood, UA   Negative    Leukocytes, UA Large (3+) (A) Large (3+) (A) Trace (A)    Nitrite, UA   Negative    RBC, UA    None seen   Bacteria, UA    None seen   (A) Abnormal value            Microbiology Results (last 10 days)     ** No results found for the last 240 hours. **          XR Tibia Fibula 2 View Left    Result Date: 11/15/2021  CR Tibia Fibula 2 Vws LT INDICATION: Acute left leg pain. Trauma COMPARISON: None available. FINDINGS:  AP and lateral views of the left tibia/fibula. No definite acute fracture or subluxation. There are changes of total knee arthroplasty. Hardware is intact. There is vascular calcification. No foreign body.     Impression: No acute fracture or subluxation. Signer Name: Angelique Kelley MD  Signed: 11/15/2021 8:23 PM  Workstation Name: [id number]  [facility]          Assessment/Plan   Assessment & Plan       Cellulitis of left lower extremity    Chronic atrial fibrillation (HCC)    CKD (chronic kidney disease), stage IV (HCC)    Diabetic nephropathy (HCC)    Hyperlipidemia LDL goal <100    Essential hypertension    Hypothyroidism    CAD (coronary artery disease)    Chronic systolic congestive heart failure (HCC)    Diabetes mellitus, type II, insulin dependent (HCC)    Karla Cristobal is a 86 y.o. female with a history of atrial fibrillation (on Eliquis), coronary artery disease s/p stents ('05), hypertension, hyperlipidemia, chronic kidney disease, congestive heart failure, insulin-dependent type 2 diabetes, hypothyroidism, and AFTAB who presents to [facility] ED today for complaint of left lower extremity pain.  She states that about a week ago, she was getting out of bed and tripped and fell forward, with her shin landing on the wheel of her walker.  She denies any head injury or loss of consciousness.  She states that over the next day or so, her left shin became more red and tender.    Left Lower Extremity Cellulitis  - XR of leg shows no acute fracture or subluxation.  - WBC, Procal normal  - Blood cultures pending  - LE doppler pending  - Will get Non-contrast CT of the leg to assess fluid-filled area  - Will start Zosyn given hx of T2DM with recent trauma to affected area, Will add Dapto given hx of MRSA bacteremia.  - Repeat labs in the am   -Clinically more c/w hematoma from her injury with chronic AC, however given her risk factors (diabetes, prior MRSA pacemaker lead infection) we will  "cover with broad antibiotics as noted and CT her leg for further delineation of what feels like a fluid-filled collection; if large enough collection identified it may be able to be drained    Chronic atrial fibrillation  CAD  Chronic systolic CHF, compensated  Hx DVT/PE  - Continue Eliquis  - Continue asa, statin  -Prior EF as low as 25%, most recent available data with EF in 2015 of 35-40%    Chronic Kidney Disease (stage IV)  - Creatinine elevated 2.35 - Appears to be chronically elevated  - eGFR 20   - IV fluids  - Avoid nephrotoxic agents  - Repeat labs in the am    Type 2 Diabetes Mellitus, with long-term use of insulin  Diabetic Neuropathy  - Blood glucose 111  - Last A1C was on 9/8/2021 and was 6.6 at that time.  - SSI  - Repeat bmp in the am    Hypertension  Hyperlipidemia  - Continue home Coreg, Lasix, Imdur, and Spironolactone  - Continue statin    Hypothyroidism  - Continue home Synthroid  - Check TSH, T4    Remote Hx MRSA bacteremia with pacemaker lead infection    DVT prophylaxis:  Eliquis    CODE STATUS:  Full Code  Patient is emphatically full code and states to \"go for it\" regardless of surrounding circumstances       This note has been completed as part of a split-shared workflow.   Signature: Electronically signed by Houston Palma PA-C, 11/15/21, 9:26 PM EST        Attending   Admission Attestation       I have seen and examined the patient, performing an independent face-to-face diagnostic evaluation with plan of care reviewed and developed with the advanced practice clinician (APC).      Brief Summary Statement:   Karla Cristobal is a 86 y.o. female with Hx DVT/PE/A. fib on Eliquis, systolic CHF s/p ICD and Hx MRSA bacteremia/infected pacemaker lead, CKD 4, diabetes on insulin who presents for evaluation of a wound to her anterior shin.  Proximately 10 days ago she struck her left shin while trying to get out of bed, she thinks she hit it on her walker.  The area started as a small bump " but has progressively become more red and tender, she was seen in an urgent care center this morning who promptly sent her to the ED.  Denies fever, chills, nausea/vomiting/diarrhea.    Remainder of detailed HPI is as noted by APC and has been reviewed and/or edited by me for completeness.    Attending Physical Exam:  Constitutional: Awake, alert, no acute distress, laying on ED stretcher on the phone  Eyes: PERRL, sclerae anicteric, no conjunctival injection  HENT: NCAT, mucous membranes moist  Neck: Supple, trachea midline  Respiratory: Clear to auscultation bilaterally, nonlabored respirations   Cardiovascular: RRR, no murmurs, rubs, or gallops, palpable radial pulses bilaterally  Gastrointestinal: Positive bowel sounds, soft, nontender, nondistended  Musculoskeletal: No bilateral ankle edema, no clubbing or cyanosis to extremities  Psychiatric: Appropriate affect, cooperative  Neurologic: Speech clear, moving all extremities spontaneously  Skin: LT anterior shin with area of ecchymosis with possible underlying erythema, 5 cm palpable nodule that feels fluid-filled    Brief Assessment/Plan :  See detailed assessment and plan developed with APC which I have reviewed and/or edited for completeness.        Admission Status: I believe that this patient meets OBSERVATION status, however if further evaluation or treatment plans warrant, status may change.  Based upon current information, I predict patient's care encounter to be less than or equal to 2 midnights.      Kamlesh Gomez DO  11/15/21                      Electronically signed by Kamlesh Gomez DO at 11/15/21 2678       Vital Signs (last day)     Date/Time Temp Temp src Pulse Resp BP Patient Position SpO2    11/18/21 0737 97.7 (36.5) Oral 91 16 158/87 Lying 97    11/18/21 0500 -- -- 69 -- -- -- 97    11/18/21 0350 97.6 (36.4) Oral 73 16 159/76 Lying 99    11/18/21 0300 -- -- 83 -- -- -- 98    11/18/21 0100 -- -- 69 -- -- -- 98    11/17/21 2305  98.3 (36.8) Oral 75 16 148/72 Lying 98    11/17/21 2300 -- -- 82 -- -- -- --    11/17/21 1900 -- -- 93 -- -- -- 97    11/17/21 1835 -- -- 114 -- -- -- 95    11/17/21 1830 97.4 (36.3) Oral 80 16 144/70 Lying 89    11/17/21 1825 -- -- 83 -- -- -- --    11/17/21 1800 -- -- 75 -- -- -- 97    11/17/21 1600 -- -- 75 -- -- -- 96    11/17/21 1450 97.6 (36.4) Oral 74 16 155/91 Lying 94    11/17/21 1400 -- -- 73 -- -- -- 97    11/17/21 1234 98 (36.7) Oral 74 16 145/94 Lying 90    11/17/21 1155 -- -- 69 16 132/74 -- 95    11/17/21 1150 -- -- 75 16 129/69 -- 95    11/17/21 1145 -- -- 78 16 117/91 -- 96    11/17/21 1141 97.9 (36.6) Temporal 84 16 126/70 Lying 97    11/17/21 1137 -- -- 71 16 129/63 Lying 95    11/17/21 1031 -- -- 74 -- 140/71 Lying 96    11/17/21 1000 -- -- 75 -- -- -- 95    11/17/21 0800 -- -- 78 -- -- -- --    11/17/21 0735 97.7 (36.5) Oral 69 16 144/70 Lying 94    11/17/21 0350 97.9 (36.6) Oral 69 18 156/64 Lying 95            Current Facility-Administered Medications   Medication Dose Route Frequency Provider Last Rate Last Admin   • acetaminophen (TYLENOL) tablet 650 mg  650 mg Oral Q6H Yolanda Black MD   650 mg at 11/18/21 0614   • apixaban (ELIQUIS) tablet 2.5 mg  2.5 mg Oral Q12H Brunner, Mark I, MD   2.5 mg at 11/18/21 0806   • atorvastatin (LIPITOR) tablet 20 mg  20 mg Oral Nightly Brunner, Mark I, MD   20 mg at 11/17/21 2025   • carvedilol (COREG) tablet 6.25 mg  6.25 mg Oral BID With Meals Brunner, Mark I, MD   6.25 mg at 11/18/21 0807   • cefTRIAXone (ROCEPHIN) 2 g/100 mL 0.9% NS IVPB (MBP)  2 g Intravenous Q24H Brunner, Mark I, MD 0 mL/hr at 11/17/21 0900 2 g at 11/18/21 0807   • DAPTOmycin (CUBICIN) 250 mg in sodium chloride 0.9 % 50 mL IVPB  4 mg/kg (Adjusted) Intravenous Q48H Brunner, Mark I,  mL/hr at 11/17/21 1723 250 mg at 11/17/21 1723   • dextrose (D50W) (25 g/50 mL) IV injection 25 g  25 g Intravenous Q15 Min PRN Brunner, Mark I, MD       • dextrose (GLUTOSE) oral gel 15 g  15 g  Oral Q15 Min PRN Brunner, Mark I, MD       • glucagon (human recombinant) (GLUCAGEN DIAGNOSTIC) injection 1 mg  1 mg Subcutaneous Q15 Min PRN Brunner, Mark I, MD       • insulin lispro (humaLOG) injection 0-7 Units  0-7 Units Subcutaneous TID AC Brunner, Mark I, MD   4 Units at 11/18/21 0807   • isosorbide mononitrate (IMDUR) 24 hr tablet 30 mg  30 mg Oral Daily Brunner, Mark I, MD   30 mg at 11/18/21 0806   • latanoprost (XALATAN) 0.005 % ophthalmic solution 1 drop  1 drop Both Eyes Nightly Brunner, Mark I, MD   1 drop at 11/17/21 2026   • levothyroxine (SYNTHROID, LEVOTHROID) tablet 112 mcg  112 mcg Oral Q AM Brunner, Mark I, MD   112 mcg at 11/18/21 0610   • melatonin tablet 5 mg  5 mg Oral Nightly Brunner, Mark I, MD   5 mg at 11/17/21 2025   • multivitamin with minerals 1 tablet  1 tablet Oral Daily Brunner, Mark I, MD   1 tablet at 11/18/21 0806   • ondansetron (ZOFRAN) tablet 4 mg  4 mg Oral Q6H PRN Brunner, Mark I, MD        Or   • ondansetron (ZOFRAN) injection 4 mg  4 mg Intravenous Q6H PRN Brunner, Mark I, MD   4 mg at 11/17/21 1122   • pantoprazole (PROTONIX) EC tablet 40 mg  40 mg Oral Daily Brunner, Mark I, MD   40 mg at 11/18/21 0806   • sodium chloride 0.9 % flush 10 mL  10 mL Intravenous Q12H Brunner, Mark I, MD   10 mL at 11/18/21 0807   • sodium chloride 0.9 % flush 10 mL  10 mL Intravenous PRN Brunner, Mark I, MD       • timolol (TIMOPTIC) 0.5 % ophthalmic solution 1 drop  1 drop Both Eyes BID Brunner, Mark I, MD   1 drop at 11/18/21 0808        Physician Progress Notes (last 24 hours)      Brunner, Mark I, MD at 11/17/21 1404        Patient declined barium esophagram.  Will arrange EGD with dilation and Armas's biopsies in approximately 1 month.      Will sign off.  Please call with questions or concerns.        Electronically signed by Brunner, Mark I, MD at 11/17/21 0000       Physical Therapy Notes (most recent note)    No notes exist for this encounter.            Occupational Therapy  Notes (most recent note)      Daniel Brothers TALYA, OT at 21 0750          Patient Name: Karla Cristobal  : 1935    MRN: [id number]                              Today's Date: 2021       Admit Date: 11/15/2021    Visit Dx:     ICD-10-CM ICD-9-CM   1. Left leg cellulitis  L03.116 682.6   2. History of insulin dependent diabetes mellitus  Z86.39 V12.29   3. Chronic kidney disease, unspecified CKD stage  N18.9 585.9   4. Chronic a-fib (HCC)  I48.20 427.31   5. Anticoagulated  Z79.01 V58.61     Patient Active Problem List   Diagnosis   • Anemia due to chronic kidney disease   • Chronic atrial fibrillation (HCC)   • CKD (chronic kidney disease), stage IV (HCC)   • Diabetic nephropathy (HCC)   • Diabetic retinopathy (HCC)   • Malignant neoplasm of endometrium (HCC)   • Hyperlipidemia LDL goal <100   • Essential hypertension   • Hypothyroidism   • Insomnia   • Leukocytosis   • Memory impairment   • Nausea   • Pulmonary embolism (HCC)   • Sleep apnea   • Squamous cell carcinoma of skin   • CHF (NYHA class IV, ACC/AHA stage D) (HCC)   • Ischemic heart disease   • CAD (coronary artery disease)   • DVT of lower extremity (deep venous thrombosis) (HCC)   • Chronic systolic congestive heart failure (HCC)   • Morbidly obese (HCC)   • Ischemic cardiomyopathy   • Diabetes mellitus, type II, insulin dependent (HCC)   • Exudative age-related macular degeneration, right eye, with active choroidal neovascularization (HCC)   • Hypoxia   • Cellulitis of left lower extremity   • Left leg cellulitis     Past Medical History:   Diagnosis Date   • Acute bronchitis    • Arthritis    • Atrial fibrillation (HCC)    • CAD (coronary artery disease)     s/p MI ; 3 stents inserted    • Change in mole    • Change in mole    • Change in nevus 2016   • Chronic kidney disease     stage IV-sees Dr Bk Mckeon every 3-4 months    • Chronic renal disease, stage 4, severely decreased glomerular filtration rate between 15-29  mL/min/1.73 square meter ([facility])    • Chronic systolic heart failure ([facility])    • Congestive heart disease ([facility])    • Conjunctivitis    • Deep vein thrombosis of lower extremity ([facility])    • Diabetes mellitus ([facility])    • Diabetes mellitus ([facility]) 6/16/2016    Impression: 03/15/2016 - blood sugar 166 and hgn a1c 7.3%  is up to date with eye exam  neuropathy with callus, no ulcer  some scratches feet  ur alb due and ordered  no change other than provided samples of toujeo because she feels like lantus worked much better than levemir, she has tried titrating levemir and it is less effective  continue testing and f/u 3-4 months  Impression: 11/23/2015 - bl   • Diabetic foot ulcer ([facility]) 6/16/2016   • Dog bite of hand    • Easy bruising    • Endometrial cancer ([facility])     partial hysterectomy; radiation as well    • Foot pain    • Foot pain 6/16/2016    Description: lancinating- worse but not consistent enough to want rx   • Fracture of hip ([facility])    • Generalized ischemic myocardial dysfunction 6/16/2016   • Glaucoma     drops daily    • Hyperlipidemia    • Hypertension    • Hypothyroidism    • Insomnia    • Ischemic heart disease    • Macular degeneration    • Methicillin resistant Staphylococcus aureus infection 6/16/2016   • Myocardial infarction ([facility]) 2005   • Nausea with vomiting    • Pericardial effusion    • Shortness of breath 6/16/2016    Impression: 03/24/2015 - chronic. On 2 l oxygen at night. check cbc, bnp;    • Skin cancer, basal cell     nose, leg    • Skin lesion 6/16/2016    Impression: 03/24/2015 - refer derm for eval- seb kerat ant tib and ?ak medially with redness and irritation;    • Sleep apnea     oxygen at night due to low sats but no cpap   • Type 2 diabetes mellitus ([facility])    • UTI (urinary tract infection) 06/12/2020    currently taking antibiotics-patient's daughter notified Dr Beal's office and office said it should not delay generator change      Past Surgical History:   Procedure Laterality Date    • CARDIAC CATHETERIZATION     • CARDIAC DEFIBRILLATOR PLACEMENT     • CARDIAC ELECTROPHYSIOLOGY PROCEDURE N/A 7/17/2020    Procedure: ICD BIV  generator change- M- 3-6 weeks;  Surgeon: Tano Beal MD;  Location: [facility] INVASIVE LOCATION;  Service: Cardiology;  Laterality: N/A;   • CHOLECYSTECTOMY     • CORONARY ARTERY BYPASS GRAFT  2000   • CORONARY STENT PLACEMENT      3 stents    • EYE SURGERY Bilateral     cataract extraction    • HIP SURGERY Left     x3   • HYSTERECTOMY      partial    • KNEE ARTHROPLASTY Bilateral    • PACEMAKER IMPLANTATION     • TONSILLECTOMY        General Information     Row Name 11/17/21 0842          OT Time and Intention    Document Type evaluation  -CS     Mode of Treatment occupational therapy  -CS     Row Name 11/17/21 0842          General Information    Patient Profile Reviewed yes  -CS     Prior Level of Function independent:; bed mobility; transfer; all household mobility; min assist:; feeding; grooming; bathing; mod assist:; dressing; dependent:; driving  Rollator for mobility in house (ramp to enter), shower chair in walk-in shower for bathing, raised toilet seat and grab bars, Dtr assists 2-3x/wk for bathing and grooming  -CS     Existing Precautions/Restrictions fall; other (see comments)  LLE cellulitus (WBAT), remote B TKA, low vision  -CS     Barriers to Rehab medically complex; previous functional deficit  -CS     Row Name 11/17/21 0842          Living Environment    Lives With grandchild(karyna); other relative(s); other (see comments)  Grandson and his spouse, two grandchildren, Dtr lives an hour away and visits to assist w/ bathing/grooming ADLs  -CS     Row Name 11/17/21 0842          Home Main Entrance    Number of Stairs, Main Entrance none  ramp to enter  -CS     Row Name 11/17/21 0842          Stairs Within Home, Primary    Stairs, Within Home, Primary walk-in shower w/ seating, raised toilet seat and grab bars  -CS     Number of Stairs, Within Home,  Primary none  -CS     Row Name 11/17/21 0842          Cognition    Orientation Status (Cognition) oriented x 4  -CS     Row Name 11/17/21 0842          Safety Issues, Functional Mobility    Impairments Affecting Function (Mobility) endurance/activity tolerance; pain; strength  -CS     Comment, Safety Issues/Impairments (Mobility) demo'd good safety awareness and sequencing w/ rollator use  -CS           User Key  (r) = Recorded By, (t) = Taken By, (c) = Cosigned By    Initials Name Provider Type    CS Daniel Brothers OT Occupational Therapist                 Mobility/ADL's     Row Name 11/17/21 0847          Bed Mobility    Bed Mobility supine-sit; scooting/bridging  -CS     Scooting/Bridging [place] (Bed Mobility) minimum assist (75% patient effort); verbal cues  -CS     Supine-Sit [place] (Bed Mobility) minimum assist (75% patient effort); verbal cues  -CS     Bed Mobility, Safety Issues decreased use of legs for bridging/pushing  -CS     Assistive Device (Bed Mobility) bed rails; head of bed elevated  -CS     Comment (Bed Mobility) appropriate sequencing, assist at trunk during supine-sit, cues for scoot to EOB for improved posture/balance  -CS     Row Name 11/17/21 0847          Transfers    Transfers sit-stand transfer; toilet transfer; bed-chair transfer  -CS     Comment (Transfers) minimal cues for safety awareness and sequencing, good use of grab bars w/ no cues  -CS     Bed-Chair [place] (Transfers) contact guard; verbal cues  -CS     Assistive Device (Bed-Chair Transfers) walker, 4-wheeled  -CS     Sit-Stand [place] (Transfers) contact guard; verbal cues  -CS     [place] Level (Toilet Transfer) contact guard; verbal cues  -CS     Assistive Device (Toilet Transfer) grab bars/safety frame; walker, 4-wheeled; raised toilet seat  -CS     Row Name 11/17/21 0847          Sit-Stand Transfer    Assistive Device (Sit-Stand Transfers) walker, 4-wheeled  -CS     Row Name 11/17/21 0847           Functional Mobility    Functional Mobility- Comment CGA for in-room distance to toilet and recliner  -CS     Row Name 11/17/21 [facility]          Activities of Daily Living    BADL Assessment/Intervention upper body dressing; lower body dressing; grooming; feeding; toileting  -CS     Row Name 11/17/21 [facility]          Upper Body Dressing Assessment/Training    [place] Level (Upper Body Dressing) don; pajama/robe; minimum assist (75% patient effort)  -CS     Position (Upper Body Dressing) edge of bed sitting  -CS     Row Name 11/17/21 [facility]          Lower Body Dressing Assessment/Training    [place] Level (Lower Body Dressing) don; socks; dependent (less than 25% patient effort); verbal cues; nonverbal cues (demo/gesture)  -CS     Position (Lower Body Dressing) edge of bed sitting  -CS     Comment (Lower Body Dressing) reports no shoes/socks at baseline, provided reacher and LHS along w/ education/demo for improved reach to distal BLEs - requires further training, not interested in sock-aid  -CS     Row Name 11/17/21 [facility]          Grooming Assessment/Training    [place] Level (Grooming) wash face, hands; set up; hair care, combing/brushing; maximum assist (25% patient effort)  -CS     Comment (Grooming) Pt reach to posterior head limitted by strength deficit, discussed availability of long handled samuel  -CS     Row Name 11/17/21 [facility]          Self-Feeding Assessment/Training    [place] Level (Feeding) liquids to mouth; scoop food and bring to mouth; independent; prepare tray/open items; moderate assist (50% patient effort)  -CS     Position (Self-Feeding) supported sitting  -CS     Comment (Feeding) difficulty with lidded items  -CS     Row Name 11/17/21 08          Toileting Assessment/Training    [place] Level (Toileting) adjust/manage clothing; perform perineal hygiene; standby assist  -CS     Position (Toileting) supported standing; unsupported sitting  -CS           User Key  (r) = Recorded  By, (t) = Taken By, (c) = Cosigned By    Initials Name Provider Type     Daniel Brothers OT Occupational Therapist               Obj/Interventions     Row Name 11/17/21 0852          Sensory Assessment (Somatosensory)    Sensory Assessment (Somatosensory) UE sensation intact  -     Row Name 11/17/21 0852          Vision Assessment/Intervention    Visual Impairment/Limitations visual/perceptual impairments present; corrective lenses full-time; other (see comments)  macular degeneration  -     Row Name 11/17/21 0852          Range of Motion Comprehensive    General Range of Motion bilateral upper extremity ROM WFL  -CS     Comment, General Range of Motion PROM WFL, AROM limitted by strength deficit  -     Row Name 11/17/21 0852          Strength Comprehensive (MMT)    General Manual Muscle Testing (MMT) Assessment upper extremity strength deficits identified  -     Comment, General Manual Muscle Testing (MMT) Assessment BUE grossly 4-/5  -     Row Name 11/17/21 0852          Shoulder (Therapeutic Exercise)    Shoulder (Therapeutic Exercise) AAROM (active assistive range of motion)  -     Shoulder AAROM (Therapeutic Exercise) bilateral; flexion; extension; scapular elevation; scapular retraction; 10 repetitions; other (see comments)  2 sets - assist to reach full range  -     Row Name 11/17/21 0852          Motor Skills    Motor Skills coordination; functional endurance  -     Coordination bilateral; upper extremity; bimanual skills; WFL  -     Functional Endurance RA throughout session w/ O2 sats remaining >92%, SOA observed at conclusion of activities - resolved quickly, 2Lnc use at baseline at sleep  -     Row Name 11/17/21 0852          Balance    Balance Assessment sitting dynamic balance; sitting static balance; standing static balance; standing dynamic balance  -     Static Sitting Balance WFL; unsupported; sitting, edge of bed  -     Dynamic Sitting Balance WFL; unsupported; sitting,  edge of bed  -CS     Static Standing Balance WFL; supported; standing  -CS     Dynamic Standing Balance mild impairment; supported; standing  -CS     Balance Interventions sitting; sit to stand; standing; occupation based/functional task  -CS     Row Name 11/17/21 0852          Therapeutic Exercise    Therapeutic Exercise shoulder; elbow/forearm  -CS           User Key  (r) = Recorded By, (t) = Taken By, (c) = Cosigned By    Initials Name Provider Type    CS Daniel Brothers, OT Occupational Therapist               Goals/Plan     Row Name 11/17/21 0900          Bed Mobility Goal 1 (OT)    Activity/Assistive Device (Bed Mobility Goal 1, OT) sit to supine; supine to sit; scooting  -CS     [place] Level/Cues Needed (Bed Mobility Goal 1, OT) standby assist  -CS     Time Frame (Bed Mobility Goal 1, OT) long term goal (LTG); 10 days  -CS     Progress/Outcomes (Bed Mobility Goal 1, OT) goal ongoing  -CS     Row Name 11/17/21 0900          Transfer Goal 1 (OT)    Activity/Assistive Device (Transfer Goal 1, OT) sit-to-stand/stand-to-sit; toilet; rollator  -CS     [place] Level/Cues Needed (Transfer Goal 1, OT) modified independence  -CS     Time Frame (Transfer Goal 1, OT) long term goal (LTG); 10 days  -CS     Strategies/Barriers (Transfers Goal 1, OT) rollator in room  -CS     Progress/Outcome (Transfer Goal 1, OT) goal ongoing  -CS     Row Name 11/17/21 0900          Dressing Goal 1 (OT)    Activity/Device (Dressing Goal 1, OT) lower body dressing; long-handled shoe horn; reacher  -CS     [place]/Cues Needed (Dressing Goal 1, OT) set-up required; minimum assist (75% or more patient effort)  -CS     Time Frame (Dressing Goal 1, OT) long term goal (LTG); 10 days  -CS     Strategies/Barriers (Dressing Goal 1, OT) pants, shoes, - no socks at baseline  -CS     Progress/Outcome (Dressing Goal 1, OT) goal ongoing  -CS     Row Name 11/17/21 0900          Strength Goal 1 (OT)    Strength Goal 1 (OT) Pt will tolerate  1/8reps BUE HEP w/ appropriate resistance by discharge to promote increased fxl strength for ADL performance and safe transfers  -CS     Time Frame (Strength Goal 1, OT) long term goal (LTG); 10 days  -CS     Progress/Outcome (Strength Goal 1, OT) goal ongoing  -CS     Row Name 11/17/21 0900          Therapy Assessment/Plan (OT)    Planned Therapy Interventions (OT) functional balance retraining; occupation/activity based interventions; ROM/therapeutic exercise; strengthening exercise; transfer/mobility retraining; adaptive equipment training  -CS           User Key  (r) = Recorded By, (t) = Taken By, (c) = Cosigned By    Initials Name Provider Type    CS Daniel Brothers, OT Occupational Therapist               Clinical Impression     Row Name 11/17/21 0834          Pain Assessment    Additional Documentation Pain Scale: FACES Pre/Post-Treatment (Group)  -CS     Row Name 11/17/21 0862          Pain Scale: FACES Pre/Post-Treatment    Pain: FACES Scale, Pretreatment 2-->hurts little bit  -CS     Posttreatment Pain Rating 4-->hurts little more  -CS     Pain Location - Side Left  -CS     Pain Location - Orientation lower  -CS     Pain Location extremity  -CS     Pre/Posttreatment Pain Comment intermittent pain (zingers), nursing aware and managing, tolerated eval  -CS     Row Name 11/17/21 0891          Plan of Care Review    Plan of Care Reviewed With patient  -CS     Progress no change  OT eval  -CS     Outcome Summary Initial OT evaluation completed. Pt presents w/ LLE pain, decreased activity tolerance, generalized weakness, and mild balance deficit warranting skilled IP OT services to promote return to PLOF. Pt required [place] for bed mobility, CGA for transfers and functional distances w/ rollator, SBA for toileting, and ModA/MARIE for seated grooming and food tray prep. Pt issued AE and demo/training to improve reach to distal BLEs, tolerated BUE AAROM HEP to full shoulder range(s). Recommend d/c to home w/ assist  and HHOT.  -CS     Row Name 11/17/21 0855          Therapy Assessment/Plan (OT)    Rehab Potential (OT) good, to achieve stated therapy goals  -CS     Criteria for Skilled Therapeutic Interventions Met (OT) yes; meets criteria; skilled treatment is necessary  -CS     Therapy Frequency (OT) daily  -CS     Row Name 11/17/21 0855          Therapy Plan Review/Discharge Plan (OT)    Anticipated Discharge Disposition (OT) home with assist; home with home health  -CS     Row Name 11/17/21 0855          Vital Signs    Pre Systolic BP Rehab 144  RN cleared for eval, VSS on RA  -CS     Pre Treatment Diastolic BP 70  -CS     Pretreatment Heart Rate (beats/min) 74  -CS     Pre SpO2 (%) 96  -CS     O2 Delivery Pre Treatment room air  -CS     Intra SpO2 (%) 92  -CS     O2 Delivery Intra Treatment room air  -CS     Post SpO2 (%) 94  -CS     O2 Delivery Post Treatment room air  -CS     Pre Patient Position Supine  -CS     Intra Patient Position Standing  -CS     Post Patient Position Sitting  -CS     Row Name 11/17/21 0855          Positioning and Restraints    Pre-Treatment Position in bed  -CS     Post Treatment Position chair  -CS     In Chair notified nsg; reclined; sitting; call light within reach; encouraged to call for assist; exit alarm on; with family/caregiver; waffle cushion; legs elevated; heels elevated  -CS           User Key  (r) = Recorded By, (t) = Taken By, (c) = Cosigned By    Initials Name Provider Type    CS Daniel Brothers, OT Occupational Therapist               Outcome Measures     Row Name 11/17/21 0902          How much help from another is currently needed...    Putting on and taking off regular lower body clothing? 2  -CS     Bathing (including washing, rinsing, and drying) 2  -CS     Toileting (which includes using toilet bed pan or urinal) 3  -CS     Putting on and taking off regular upper body clothing 3  -CS     Taking care of personal grooming (such as brushing teeth) 3  -CS     Eating meals 3  -CS      AM-PAC 6 Clicks Score (OT) 16  -     Row Name 11/17/21 0834          How much help from another person do you currently need...    Turning from your back to your side while in flat bed without using bedrails? 3  -LC     Moving from lying on back to sitting on the side of a flat bed without bedrails? 3  -LC     Moving to and from a bed to a chair (including a wheelchair)? 3  -LC     Standing up from a chair using your arms (e.g., wheelchair, bedside chair)? 3  -LC     Climbing 3-5 steps with a railing? 2  -LC     To walk in hospital room? 3  -LC     AM-PAC 6 Clicks Score (PT) 17  -     Row Name 11/17/21 0902          Functional Assessment    Outcome Measure Options AM-PAC 6 Clicks Daily Activity (OT)  -           User Key  (r) = Recorded By, (t) = Taken By, (c) = Cosigned By    Initials Name Provider Type    Danni Degroot RN Registered Nurse    Daniel Zelaya OT Occupational Therapist                Occupational Therapy Education                 Title: PT OT SLP Therapies (Done)     Topic: Occupational Therapy (Done)     Point: ADL training (Done)     Description:   Instruct learner(s) on proper safety adaptation and remediation techniques during self care or transfers.   Instruct in proper use of assistive devices.              Learning Progress Summary           Patient Acceptance, E,D, VU,NR by  at 11/17/2021 0902    Comment: OT role, AE for LB dressing, BUE HEP                   Point: Home exercise program (Done)     Description:   Instruct learner(s) on appropriate technique for monitoring, assisting and/or progressing therapeutic exercises/activities.              Learning Progress Summary           Patient Acceptance, E,D, VU,NR by  at 11/17/2021 0902    Comment: OT role, AE for LB dressing, BUE HEP                   Point: Precautions (Done)     Description:   Instruct learner(s) on prescribed precautions during self-care and functional transfers.              Learning Progress  Summary           Patient Acceptance, E,D, VU,NR by  at 11/17/2021 0902    Comment: OT role, AE for LB dressing, BUE HEP                   Point: Body mechanics (Done)     Description:   Instruct learner(s) on proper positioning and spine alignment during self-care, functional mobility activities and/or exercises.              Learning Progress Summary           Patient Acceptance, E,D, VU,NR by  at 11/17/2021 0902    Comment: OT role, AE for LB dressing, BUE HEP                               User Key     Initials Effective Dates Name Provider Type Discipline     06/16/21 -  Daniel Brothers, OT Occupational Therapist OT              OT Recommendation and Plan  Planned Therapy Interventions (OT): functional balance retraining, occupation/activity based interventions, ROM/therapeutic exercise, strengthening exercise, transfer/mobility retraining, adaptive equipment training  Therapy Frequency (OT): daily  Plan of Care Review  Plan of Care Reviewed With: patient  Progress: no change (OT eval)  Outcome Summary: Initial OT evaluation completed. Pt presents w/ LLE pain, decreased activity tolerance, generalized weakness, and mild balance deficit warranting skilled IP OT services to promote return to PLOF. Pt required [place] for bed mobility, CGA for transfers and functional distances w/ rollator, SBA for toileting, and ModA/MARIE for seated grooming and food tray prep. Pt issued AE and demo/training to improve reach to distal BLEs, tolerated BUE AAROM HEP to full shoulder range(s). Recommend d/c to home w/ assist and HHOT.     Time Calculation:    Time Calculation- OT     Row Name 11/17/21 0903             Time Calculation- OT    OT Start Time 0750  -CS      OT Received On 11/17/21  -      OT Goal Re-Cert Due Date 11/27/21  -              Timed Charges    44909 - OT Therapeutic Exercise Minutes 4  -CS      94148 - OT Self Care/Mgmt Minutes 6  -CS              Total Minutes    Timed Charges Total Minutes 10  -CS        Total Minutes 10  -CS            User Key  (r) = Recorded By, (t) = Taken By, (c) = Cosigned By    Initials Name Provider Type    CS Daniel Brothers OT Occupational Therapist              Therapy Charges for Today     Code Description Service Date Service Provider Modifiers Qty    30995325955 HC OT SELF CARE/MGMT/TRAIN EA 15 MIN 11/17/2021 Daniel Brothers OT GO 1    47543381261 HC OT EVAL MOD COMPLEXITY 4 11/17/2021 Daniel Brothers OT GO 1               Daniel Brothers OT  11/17/2021    Electronically signed by Daniel Brothers OT at 11/17/21 0904       Speech Language Pathology Notes (most recent note)    No notes exist for this encounter.

## 2021-11-18 NOTE — CASE MANAGEMENT/SOCIAL WORK
Continued Stay Note  [facility]     Patient Name: Karla Cristobal  MRN: [id number]  Today's Date: 11/18/2021    Admit Date: 11/15/2021     Discharge Plan     Row Name 11/18/21 0952       Plan    Plan Home with [facility] SN/PT/OT/SLP    Patient/Family in Agreement with Plan yes    Plan Comments Spoke to patient at bedside. Plan is home with [facility] SN/PT/OT/SLP. Spoke with Blanca at [facility] and faxed order and report to [phone]. New rollator ordered from Mireya at [facility] due to current rollator only has brakes on one side. CM will continue to follow.    Final Discharge Disposition Code 06 - home with home health care               Discharge Codes    No documentation.               Expected Discharge Date and Time     Expected Discharge Date Expected Discharge Time    Nov 19, 2021             Choco Sellers RN

## 2021-11-19 ENCOUNTER — TELEPHONE (OUTPATIENT)
Dept: INTERNAL MEDICINE | Facility: CLINIC | Age: 86
End: 2021-11-19

## 2021-11-19 ENCOUNTER — READMISSION MANAGEMENT (OUTPATIENT)
Dept: CALL CENTER | Facility: HOSPITAL | Age: 86
End: 2021-11-19

## 2021-11-19 VITALS
HEIGHT: 64 IN | BODY MASS INDEX: 32.59 KG/M2 | OXYGEN SATURATION: 95 % | WEIGHT: 190.92 LBS | RESPIRATION RATE: 18 BRPM | SYSTOLIC BLOOD PRESSURE: 145 MMHG | DIASTOLIC BLOOD PRESSURE: 86 MMHG | TEMPERATURE: 97 F | HEART RATE: 79 BPM

## 2021-11-19 LAB
GLUCOSE BLDC GLUCOMTR-MCNC: 193 MG/DL (ref 70–130)
GLUCOSE BLDC GLUCOMTR-MCNC: 227 MG/DL (ref 70–130)

## 2021-11-19 PROCEDURE — 82962 GLUCOSE BLOOD TEST: CPT

## 2021-11-19 PROCEDURE — A9270 NON-COVERED ITEM OR SERVICE: HCPCS | Performed by: INTERNAL MEDICINE

## 2021-11-19 PROCEDURE — 92610 EVALUATE SWALLOWING FUNCTION: CPT

## 2021-11-19 PROCEDURE — 63710000001 PANTOPRAZOLE 40 MG TABLET DELAYED-RELEASE: Performed by: INTERNAL MEDICINE

## 2021-11-19 PROCEDURE — 63710000001 ACETAMINOPHEN 325 MG TABLET: Performed by: INTERNAL MEDICINE

## 2021-11-19 PROCEDURE — 63710000001 INSULIN LISPRO (HUMAN) PER 5 UNITS: Performed by: PHYSICIAN ASSISTANT

## 2021-11-19 PROCEDURE — 63710000001 LEVOTHYROXINE 112 MCG TABLET: Performed by: INTERNAL MEDICINE

## 2021-11-19 PROCEDURE — 63710000001 APIXABAN 2.5 MG TABLET: Performed by: INTERNAL MEDICINE

## 2021-11-19 PROCEDURE — G0378 HOSPITAL OBSERVATION PER HR: HCPCS

## 2021-11-19 PROCEDURE — 25010000002 CEFTRIAXONE PER 250 MG: Performed by: INTERNAL MEDICINE

## 2021-11-19 PROCEDURE — 63710000001 MULTIVITAMIN WITH MINERALS TABLET: Performed by: INTERNAL MEDICINE

## 2021-11-19 PROCEDURE — 63710000001 ISOSORBIDE MONONITRATE 30 MG TABLET SUSTAINED-RELEASE 24 HOUR: Performed by: INTERNAL MEDICINE

## 2021-11-19 PROCEDURE — 99217 PR OBSERVATION CARE DISCHARGE MANAGEMENT: CPT | Performed by: INTERNAL MEDICINE

## 2021-11-19 PROCEDURE — 63710000001 CARVEDILOL 6.25 MG TABLET: Performed by: INTERNAL MEDICINE

## 2021-11-19 PROCEDURE — A9270 NON-COVERED ITEM OR SERVICE: HCPCS | Performed by: PHYSICIAN ASSISTANT

## 2021-11-19 RX ORDER — OXYCODONE HYDROCHLORIDE 5 MG/1
5 TABLET ORAL EVERY 6 HOURS PRN
Qty: 12 TABLET | Refills: 0 | Status: SHIPPED | OUTPATIENT
Start: 2021-11-19 | End: 2021-11-25

## 2021-11-19 RX ORDER — FUROSEMIDE 40 MG/1
40 TABLET ORAL
Start: 2021-11-19 | End: 2022-01-01 | Stop reason: HOSPADM

## 2021-11-19 RX ORDER — LINEZOLID 600 MG/1
600 TABLET, FILM COATED ORAL 2 TIMES DAILY
Qty: 14 TABLET | Refills: 0 | Status: SHIPPED | OUTPATIENT
Start: 2021-11-19 | End: 2021-11-26

## 2021-11-19 RX ADMIN — SODIUM CHLORIDE 2 G: 900 INJECTION INTRAVENOUS at 09:01

## 2021-11-19 RX ADMIN — MULTIPLE VITAMINS W/ MINERALS TAB 1 TABLET: TAB at 09:01

## 2021-11-19 RX ADMIN — SODIUM CHLORIDE, PRESERVATIVE FREE 10 ML: 5 INJECTION INTRAVENOUS at 09:01

## 2021-11-19 RX ADMIN — INSULIN LISPRO 2 UNITS: 100 INJECTION, SOLUTION INTRAVENOUS; SUBCUTANEOUS at 09:01

## 2021-11-19 RX ADMIN — TIMOLOL MALEATE 1 DROP: 5 SOLUTION/ DROPS OPHTHALMIC at 09:01

## 2021-11-19 RX ADMIN — ISOSORBIDE MONONITRATE 30 MG: 30 TABLET, EXTENDED RELEASE ORAL at 09:01

## 2021-11-19 RX ADMIN — ACETAMINOPHEN 650 MG: 325 TABLET, FILM COATED ORAL at 13:32

## 2021-11-19 RX ADMIN — ACETAMINOPHEN 650 MG: 325 TABLET, FILM COATED ORAL at 05:59

## 2021-11-19 RX ADMIN — PANTOPRAZOLE SODIUM 40 MG: 40 TABLET, DELAYED RELEASE ORAL at 09:01

## 2021-11-19 RX ADMIN — CARVEDILOL 6.25 MG: 6.25 TABLET, FILM COATED ORAL at 11:25

## 2021-11-19 RX ADMIN — LEVOTHYROXINE SODIUM 112 MCG: 112 TABLET ORAL at 05:59

## 2021-11-19 RX ADMIN — APIXABAN 2.5 MG: 2.5 TABLET, FILM COATED ORAL at 09:01

## 2021-11-19 RX ADMIN — INSULIN LISPRO 3 UNITS: 100 INJECTION, SOLUTION INTRAVENOUS; SUBCUTANEOUS at 12:33

## 2021-11-19 NOTE — PLAN OF CARE
Goal Outcome Evaluation:   VSS. Pt A/Ox4. Complaints of leg pain relieved with PRN. Pt resting well this shift. Vpaced. Room air. Pt denies further needs at this time.

## 2021-11-19 NOTE — PROGRESS NOTES
INFECTIOUS DISEASE Progress Note    Karla Cristobal  1935  [id number]    Admission Date: 11/15/2021      Requesting Provider: Kamlesh Gomez DO  Evaluating Physician: Rip Coelho MD    Reason for Consultation: Left lower extremity cellulitis     History of present illness:    11/17/21: Patient is a 86 y.o. female, with PMH afib, CAD, CKD, CHF, DM, seen today for left lower extremity cellulitis. Approximately one week ago, she tripped getting out of bed, hitting her left leg.  She has noted increasing swelling, redness and pain over the past several days, prompting her presentation to the Ed.  She has been afebrile.  CT of left lower extremity with soft tissue edema suggesting cellulitis, no soft tissue gas, with a subcutaneous lesion in the anterolateral mid lower leg, most likely a hematoma measuring 3.5 x 1.3, x2.7 cm.  Xray without fracture.  Duplex negative for DVT. She has been afebrile without leukocytosis.  Admitting labs with WBC 5.7, PCT 0.098, Scr 2.35, lactate 2.5, and now blood cultures positive for Staph spp.   She is currently on Daptomycin and Zosyn and we were consulted for evaluation and treatment.     11/18/21:She underwent EGD yesterday revealing an indentation of the upper esophagus which was nonobstructing and a short segment of Armas's esophagus in the distal esophagus. She has remained afebrile.  Her white blood cell count on 11/17 was 6.5 and a creatinine 2.7. Blood cultures have grown coagulase-negative staph in 2 sets from 11/15.      11/19/2021: She remains afebrile.  She has a history of prior pacemaker infection and still has a pacemaker in place.  She denies having recurrent fevers prior to admission.  She still complains of left lateral calf pain.  She would like to go home today.      Past Medical History:   Diagnosis Date   • Acute bronchitis    • Arthritis    • Atrial fibrillation (HCC)    • CAD (coronary artery disease)     s/p MI 2005; 3 stents inserted    •  Change in mole    • Change in mole    • Change in nevus 6/16/2016   • Chronic kidney disease     stage IV-sees Dr Bk Mckeon every 3-4 months    • Chronic renal disease, stage 4, severely decreased glomerular filtration rate between 15-29 mL/min/1.73 square meter ([facility])    • Chronic systolic heart failure ([facility])    • Congestive heart disease ([facility])    • Conjunctivitis    • Deep vein thrombosis of lower extremity ([facility])    • Diabetes mellitus ([facility])    • Diabetes mellitus ([facility]) 6/16/2016    Impression: 03/15/2016 - blood sugar 166 and hgn a1c 7.3%  is up to date with eye exam  neuropathy with callus, no ulcer  some scratches feet  ur alb due and ordered  no change other than provided samples of toujeo because she feels like lantus worked much better than levemir, she has tried titrating levemir and it is less effective  continue testing and f/u 3-4 months  Impression: 11/23/2015 - bl   • Diabetic foot ulcer ([facility]) 6/16/2016   • Dog bite of hand    • Easy bruising    • Endometrial cancer ([facility])     partial hysterectomy; radiation as well    • Foot pain    • Foot pain 6/16/2016    Description: lancinating- worse but not consistent enough to want rx   • Fracture of hip ([facility])    • Generalized ischemic myocardial dysfunction 6/16/2016   • Glaucoma     drops daily    • Hyperlipidemia    • Hypertension    • Hypothyroidism    • Insomnia    • Ischemic heart disease    • Macular degeneration    • Methicillin resistant Staphylococcus aureus infection 6/16/2016   • Myocardial infarction ([facility]) 2005   • Nausea with vomiting    • Pericardial effusion    • Shortness of breath 6/16/2016    Impression: 03/24/2015 - chronic. On 2 l oxygen at night. check cbc, bnp;    • Skin cancer, basal cell     nose, leg    • Skin lesion 6/16/2016    Impression: 03/24/2015 - refer derm for eval- seb kerat ant tib and ?ak medially with redness and irritation;    • Sleep apnea     oxygen at night due to low sats but no cpap   • Type 2 diabetes mellitus  ([facility])    • UTI (urinary tract infection) 06/12/2020    currently taking antibiotics-patient's daughter notified Dr Beal's office and office said it should not delay generator change        Past Surgical History:   Procedure Laterality Date   • CARDIAC CATHETERIZATION     • CARDIAC DEFIBRILLATOR PLACEMENT     • CARDIAC ELECTROPHYSIOLOGY PROCEDURE N/A 7/17/2020    Procedure: ICD BIV  generator change- SJM- 3-6 weeks;  Surgeon: Tano Beal MD;  Location: [facility] INVASIVE LOCATION;  Service: Cardiology;  Laterality: N/A;   • CHOLECYSTECTOMY     • CORONARY ARTERY BYPASS GRAFT  2000   • CORONARY STENT PLACEMENT      3 stents    • EYE SURGERY Bilateral     cataract extraction    • HIP SURGERY Left     x3   • HYSTERECTOMY      partial    • KNEE ARTHROPLASTY Bilateral    • PACEMAKER IMPLANTATION     • TONSILLECTOMY         Family History   Problem Relation Age of Onset   • Breast cancer Other    • Diabetes Other    • Esophageal cancer Other    • Hypertension Other    • Transient ischemic attack Other    • Breast cancer Mother    • Cancer Father        Social History     Socioeconomic History   • Marital status:    Tobacco Use   • Smoking status: Never Smoker   • Smokeless tobacco: Never Used   Vaping Use   • Vaping Use: Former   Substance and Sexual Activity   • Alcohol use: No   • Drug use: No   • Sexual activity: Defer       Allergies   Allergen Reactions   • Bactrim [Sulfamethoxazole-Trimethoprim] GI Intolerance   • Lisinopril Cough   • Codeine Rash         Medication:    Current Facility-Administered Medications:   •  acetaminophen (TYLENOL) tablet 650 mg, 650 mg, Oral, Q6H PRN, Yolanda Gomez MD, 650 mg at 11/19/21 1332  •  apixaban (ELIQUIS) tablet 2.5 mg, 2.5 mg, Oral, Q12H, Brunner, Mark I, MD, 2.5 mg at 11/19/21 0901  •  atorvastatin (LIPITOR) tablet 20 mg, 20 mg, Oral, Nightly, Brunner, Mark I, MD, 20 mg at 11/18/21 2239  •  carvedilol (COREG) tablet 6.25 mg, 6.25 mg, Oral, BID With Meals,  Brunner, Mark I, MD, 6.25 mg at 11/19/21 1125  •  cefTRIAXone (ROCEPHIN) 2 g/100 mL 0.9% NS IVPB (MBP), 2 g, Intravenous, Q24H, Brunner, Mark I, MD, Stopped at 11/19/21 1000  •  DAPTOmycin (CUBICIN) 250 mg in sodium chloride 0.9 % 50 mL IVPB, 4 mg/kg (Adjusted), Intravenous, Q48H, Brunner, Mark I, MD, Last Rate: 100 mL/hr at 11/17/21 1723, 250 mg at 11/17/21 1723  •  dextrose (D50W) (25 g/50 mL) IV injection 25 g, 25 g, Intravenous, Q15 Min PRN, Brunner, Mark I, MD  •  dextrose (GLUTOSE) oral gel 15 g, 15 g, Oral, Q15 Min PRN, Brunner, Mark I, MD  •  glucagon (human recombinant) (GLUCAGEN DIAGNOSTIC) injection 1 mg, 1 mg, Subcutaneous, Q15 Min PRN, Brunner, Mark I, MD  •  insulin lispro (humaLOG) injection 0-7 Units, 0-7 Units, Subcutaneous, 4x Daily With Meals & Nightly, Houston Palma PA-C, 3 Units at 11/19/21 1233  •  isosorbide mononitrate (IMDUR) 24 hr tablet 30 mg, 30 mg, Oral, Daily, Brunner, Mark I, MD, 30 mg at 11/19/21 0901  •  latanoprost (XALATAN) 0.005 % ophthalmic solution 1 drop, 1 drop, Both Eyes, Nightly, Brunner, Mark I, MD, 1 drop at 11/18/21 2240  •  levothyroxine (SYNTHROID, LEVOTHROID) tablet 112 mcg, 112 mcg, Oral, Q AM, Brunner, Mark I, MD, 112 mcg at 11/19/21 0559  •  melatonin tablet 5 mg, 5 mg, Oral, Nightly, Brunner, Mark I, MD, 5 mg at 11/18/21 2239  •  multivitamin with minerals 1 tablet, 1 tablet, Oral, Daily, Brunner, Mark I, MD, 1 tablet at 11/19/21 0901  •  ondansetron (ZOFRAN) tablet 4 mg, 4 mg, Oral, Q6H PRN **OR** ondansetron (ZOFRAN) injection 4 mg, 4 mg, Intravenous, Q6H PRN, Brunner, Mark I, MD, 4 mg at 11/17/21 1122  •  oxyCODONE (ROXICODONE) immediate release tablet 5 mg, 5 mg, Oral, Q4H PRN, Yolanda Gomez MD, 5 mg at 11/18/21 2239  •  pantoprazole (PROTONIX) EC tablet 40 mg, 40 mg, Oral, Daily, Brunner, Mark I, MD, 40 mg at 11/19/21 0901  •  sodium chloride 0.9 % flush 10 mL, 10 mL, Intravenous, Q12H, Brunner, Mark I, MD, 10 mL at 11/19/21 0901  •  sodium  chloride 0.9 % flush 10 mL, 10 mL, Intravenous, PRN, Brunner, Mark I, MD  •  timolol (TIMOPTIC) 0.5 % ophthalmic solution 1 drop, 1 drop, Both Eyes, BID, Brunner, Mark I, MD, 1 drop at 21 0901    Antibiotics:  Anti-Infectives (From admission, onward)    Ordered     Dose/Rate Route Frequency Start Stop    21 1440  linezolid (ZYVOX) 600 MG tablet        Ordering Provider: Yolanda Gomez MD    600 mg Oral 2 Times Daily 21 0000 21 2359    21 1101  DAPTOmycin (CUBICIN) 250 mg in sodium chloride 0.9 % 50 mL IVPB        Ordering Provider: Brunner, Mark I, MD    4 mg/kg × 67.5 kg (Adjusted)  100 mL/hr over 30 Minutes Intravenous Every 48 Hours 21 1800 21 1759    21 0809  cefTRIAXone (ROCEPHIN) 2 g/100 mL 0.9% NS IVPB (MBP)        Ordering Provider: Brunner, Mark I, MD    2 g  over 30 Minutes Intravenous Every 24 Hours 21 0900 21 0859    11/15/21 2236  piperacillin-tazobactam (ZOSYN) 3.375 g in iso-osmotic dextrose 50 ml (premix)        Ordering Provider: Kamlesh Gomez DO    3.375 g  over 30 Minutes Intravenous Once 11/15/21 2236 21 0209            Review of Systems:  See HPI    Physical Exam:   Vital Signs  Temp (24hrs), Av.8 °F (36.6 °C), Min:97.2 °F (36.2 °C), Max:98.1 °F (36.7 °C)    Temp  Min: 97.2 °F (36.2 °C)  Max: 98.1 °F (36.7 °C)  BP  Min: 128/78  Max: 163/99  Pulse  Min: 69  Max: 98  Resp  Min: 16  Max: 18  SpO2  Min: 94 %  Max: 99 %    GENERAL: Awake and alert, in no acute distress.   HEENT: Normocephalic, atraumatic.  PERRL. EOMI. No conjunctival injection. No icterus. Oropharynx clear without evidence of thrush or exudate.  NECK: Supple   Chest wall: There is a left chest wall pacemaker pocket with no erythema  HEART: RRR; No murmur, rubs, gallops.   LUNGS: Clear to auscultation bilaterally without wheezing, rales, rhonchi. Normal respiratory effort. Nonlabored. No dullness.  ABDOMEN: Soft, nontender, nondistended. . No rebound or  guarding.   EXT:  No cyanosis, clubbing or edema.   :  Without Huang catheter.  MSK:  No joint effusions   SKIN: Warm and dry   Left pretibial erythema, with lateral area of induration, ecchymosis approx 6cm in diameter, no crepitus   NEURO: Oriented to PPT.  PSYCHIATRIC: Normal insight and judgement. Cooperative with PE    Laboratory Data    Results from last 7 days   Lab Units 11/18/21  1352 11/17/21  1448 11/16/21  1403   WBC 10*3/mm3 6.25 6.52 4.63   HEMOGLOBIN g/dL 12.9 14.0 12.5   HEMATOCRIT % 41.4 43.6 38.8   PLATELETS 10*3/mm3 121* 126* 110*     Results from last 7 days   Lab Units 11/18/21  1352   SODIUM mmol/L 135*   POTASSIUM mmol/L 4.3   CHLORIDE mmol/L 98   CO2 mmol/L 22.0   BUN mg/dL 67*   CREATININE mg/dL 2.51*   GLUCOSE mg/dL 364*   CALCIUM mg/dL 8.7     Results from last 7 days   Lab Units 11/15/21  2028   ALK PHOS U/L 236*   BILIRUBIN mg/dL 1.1   ALT (SGPT) U/L 8   AST (SGOT) U/L 15             Results from last 7 days   Lab Units 11/16/21  1403   LACTATE mmol/L 2.2*     Results from last 7 days   Lab Units 11/17/21  1448   CK TOTAL U/L 196*         Estimated Creatinine Clearance: 17.5 mL/min (A) (by C-G formula based on SCr of 2.51 mg/dL (H)).      Microbiology:  Blood Culture   Date Value Ref Range Status   11/15/2021 Abnormal Stain (C)  Preliminary     BCID, PCR   Date Value Ref Range Status   11/15/2021 (A) Negative by BCID PCR. Culture to Follow. Final    Staph spp, not aureus or lugdunesis. Identification by BCID2 PCR.     No results found for: CULTURES, HSVCX, URCX  No results found for: EYECULTURE, GCCX, HSVCULTURE, LABHSV  No results found for: LEGIONELLA, MRSACX, MUMPSCX, MYCOPLASCX  No results found for: NOCARDIACX, STOOLCX  No results found for: THROATCX, UNSTIMCULT, URINECX, CULTURE, VZVCULTUR  No results found for: VIRALCULTU, WOUNDCX        Radiology:  Imaging Results (Last 72 Hours)     ** No results found for the last 72 hours. **            Impression:   1.  Left  Hematoma/cellulitis-this is improving.  I will plan for her to switch to Zyvox today and discharged to home.  She is not on SSRI therapy is a contraindication for Zyvox.  2.  Staph hominis bacteremia-versus skin contaminants.  She could theoretically have a pacemaker infection, resulting in positive blood cultures for staph hominis.  Still, this is not a classic presentation for pacemaker lead infection which generally presents with fevers and no symptomatic source.  She clearly has a symptomatic source for her current illness left leg inflammation.  I do not think her left leg cellulitis resulted in the positive blood cultures for staph hominis.  I think we should observe her when she is off antibiotic therapy and if she develops fevers and has new positive blood cultures for staph hominis, then we will need to evaluate her for pacemaker lead infection.  3.  Afib, on Eliquis   4.  CKD, stage IV  5.  CHF, chronic systolic    6.  Dysphagia-GI is evaluating    PLAN/RECOMMENDATIONS:  1.  Discontinue daptomycin and ceftriaxone  2.  Zyvox 600 mg p.o. twice daily x1 week  3.  Follow-up with me on Tuesday 11/30 at 12:15-this appointment has been made    I discussed her complex situation with Dr. Gomez today.  I discussed her complex situation in detail with her and her daughter today.  I coordinated her care.  I spent over 35 minutes on her care today.       Rip Coelho MD  11/19/2021  14:46 EST

## 2021-11-19 NOTE — PLAN OF CARE
Goal Outcome Evaluation:  Plan of Care Reviewed With: patient, daughter        Progress:  (eval)     SLP evaluation completed. Will address dysphagia. Please see note for further details and recommendations.

## 2021-11-19 NOTE — CASE MANAGEMENT/SOCIAL WORK
Case Management Discharge Note    Final Note:     Ms. Cristobal is medically ready for discharged today. I spoke to Ela with [facility] who advises they can accept her for physical therapy, occupational therapy, speech therapy, and skilled nursing. Ms. Cristobal has identified no further discharge needs. Family will transport her home today by car.             Home Medical Care Coordination complete.    Service Provider Selected Services Address Phone Fax Patient Preferred    [facility]  Home [facility] [address] [address] [phone] --       Internal Comment last updated by Karli Olguin, RN 11/19/2021 1503    Pt, Ot, SLP, SN                             Transportation Services  Private: Car    Final Discharge Disposition Code: 06 - home with home health care

## 2021-11-19 NOTE — TELEPHONE ENCOUNTER
Caller: [facility]        Best call back number: [phone]    What orders are you requesting (i.e. lab or imaging): HOME CARE    In what timeframe would the patient need to come in: 11/23    Where will you receive your lab/imaging services:     Additional notes: PATIENT IS BEING DISCHARGED TODAY. CAN HOME CARE START ON 11/23?

## 2021-11-19 NOTE — DISCHARGE SUMMARY
[facility]  DISCHARGE SUMMARY    Patient Name: Karla Cristobal  : 1935  MRN: [id number]    Date of Admission: 11/15/2021  6:52 PM  Date of Discharge:  2021  Primary Care Physician: Giselle Guzman DO    Consults     Date and Time Order Name Status Description    2021  9:39 AM Inpatient Gastroenterology Consult Completed     2021 12:55 AM Inpatient Infectious Diseases Consult Completed           Hospital Course     Presenting Problem:   Left leg cellulitis [L03.116]    Active Hospital Problems    Diagnosis  POA   • **Cellulitis of left lower extremity [L03.116]  Yes   • Left leg cellulitis [L03.116]  Yes   • Food impaction of esophagus [T18.128A]  No   • Diabetes mellitus, type II, insulin dependent (HCC) [E11.9, Z79.4]  Not Applicable   • Chronic systolic congestive heart failure (HCC) [I50.22]  Yes   • CAD (coronary artery disease) [I25.10]  Yes   • Chronic atrial fibrillation (HCC) [I48.20]  Yes   • CKD (chronic kidney disease), stage IV (HCC) [N18.4]  Yes   • Diabetic nephropathy (HCC) [E11.21]  Yes   • Hyperlipidemia LDL goal <100 [E78.5]  Yes   • Essential hypertension [I10]  Yes   • Hypothyroidism [E03.9]  Yes      Resolved Hospital Problems   No resolved problems to display.          Hospital Course:  Karla Cristobal is a 86 y.o. female with atrial fibrillation (on Eliquis), coronary artery disease s/p stents ('05), hypertension, hyperlipidemia, chronic kidney disease, congestive heart failure, insulin-dependent type 2 diabetes, hypothyroidism, and AFTAB who presented to [facility] ED for complaint of left lower extremity pain after falling and landing with her left shin on the wheel of her walker.  Since then she has had progressive erythema and pain in the left shin.      Left Lower Extremity Cellulitis  Blood cultures positive for staph hominis in 4/4 bottles  -CT LLE suggestive of hematoma with cellulitis.  No gas or foreign  bodies.  -Improved on daptomycin and ceftriaxone-transition to PO linezolid x 7 days and follow up with Dr. Coelho the following week  -Staph hominis on blood culture suspected to be skin contaminant  -TTE without evidence of vegetation  -If she were to have fevers or clinical decline following completion of antibiotics, would have to reconsider possibility of pacemaker infection  -History of MRSA bacteremia/pacemaker lead infection (remote ~2005)     Food impaction  -GI evaluated with EGD.  Showed apparent indentation of upper esophagus from anterior osteophyte and short segment of Armas's esophagus.  GI recommends outpatient EGD with Armas's biopsies and esophageal dilation in 1 month off anticoagulation.  -Patient follows with Dr. Vazquez so advised her and daughter to follow up to discuss repeat endoscopy off anticoagulation     Chronic atrial fibrillation  CAD  Chronic systolic CHF, compensated  Hx DVT/PE  -Continue Eliquis  -Continue asa, statin  -Prior LVEF as low as 25%     Chronic Kidney Disease (stage IV)  -Creatinine at baseline     Type 2 Diabetes Mellitus, with long-term use of insulin  Diabetic Neuropathy  -A1C 7.4%  -SSI     Hypertension  Hyperlipidemia  -Continue home Coreg, Imdur  -Continue statin  -Resume home lasix and spironolactone     Hypothyroidism  -Continue home Synthroid  -TSH, T4 WNL      Discharge Follow Up Recommendations for outpatient labs/diagnostics:  Follow up with PCP 1 week  Follow up with Dr. Coelho after completion of antibiotics  Follow up with Dr. Vazquez to discuss repeat EGD    Day of Discharge     HPI:   Feels better today.  Pain medication has helped.  Wants to get home before the [employer] game.    Review of Systems  Gen- No fevers, chills  CV- No chest pain, palpitations  Resp- No cough, dyspnea  GI- No N/V/D, abd pain      Vital Signs:   Temp:  [97 °F (36.1 °C)-98.1 °F (36.7 °C)] 97 °F (36.1 °C)  Heart Rate:  [69-98] 79  Resp:  [16-18] 18  BP: (128-163)/(78-99) 145/86      Physical Exam:  Constitutional: No acute distress, awake, alert, sitting up in chair  HENT: NCAT, mucous membranes moist  Respiratory: Clear to auscultation bilaterally, respiratory effort normal   Cardiovascular: RRR, no murmurs, rubs, or gallops  Gastrointestinal: Positive bowel sounds, soft, nontender, nondistended  Musculoskeletal: No bilateral ankle edema  Psychiatric: Appropriate affect, cooperative  Neurologic: Cranial Nerves grossly intact to confrontation, speech clear  Skin: Erythema over left shin improving    Pertinent  and/or Most Recent Results     LAB RESULTS:      Lab 11/18/21  1352 11/17/21  1448 11/16/21  1403 11/15/21  2246 11/15/21  2028 11/15/21  1926   WBC 6.25 6.52 4.63  --   --  5.70   HEMOGLOBIN 12.9 14.0 12.5  --   --  13.3   HEMATOCRIT 41.4 43.6 38.8  --   --  41.8   PLATELETS 121* 126* 110*  --   --  133*   NEUTROS ABS 5.50 5.65 2.70  --   --  3.17   IMMATURE GRANS (ABS)  --  0.01 0.01  --   --  0.01   LYMPHS ABS  --  0.65* 1.32  --   --  1.76   MONOS ABS  --  0.20 0.45  --   --  0.54   EOS ABS 0.00 0.00 0.12  --   --  0.17   MCV 93.7 92.0 91.9  --   --  91.7   PROCALCITONIN  --   --   --   --  0.09  --    LACTATE  --   --  2.2* 2.5*  --   --          Lab 11/18/21  1352 11/17/21  1448 11/16/21  1403 11/15/21  2028   SODIUM 135* 137 140 144   POTASSIUM 4.3 4.5 4.2 4.1   CHLORIDE 98 101 102 105   CO2 22.0 22.0 22.0 28.0   ANION GAP 15.0 14.0 16.0* 11.0   BUN 67* 69* 63* 66*   CREATININE 2.51* 2.70* 2.36* 2.35*   GLUCOSE 364* 257* 240* 111*   CALCIUM 8.7 9.1 8.8 9.0   HEMOGLOBIN A1C  --   --  7.40*  --    TSH  --   --  1.950  --          Lab 11/15/21  2028   TOTAL PROTEIN 6.9   ALBUMIN 3.30*   GLOBULIN 3.6   ALT (SGPT) 8   AST (SGOT) 15   BILIRUBIN 1.1   ALK PHOS 236*         Lab 11/15/21  1926   PROBNP 4,108.0*                 Brief Urine Lab Results  (Last result in the past 365 days)      Color   Clarity   Blood   Leuk Est   Nitrite   Protein   CREAT   Urine HCG        09/27/21 1116  Yellow   Clear   Negative   Trace   Negative   Negative               Microbiology Results (last 10 days)     Procedure Component Value - Date/Time    COVID PRE-OP / PRE-PROCEDURE SCREENING ORDER (NO ISOLATION) - Swab, Nasopharynx [[id number]]  (Normal) Collected: 11/15/21 2300    Lab Status: Final result Specimen: Swab from Nasopharynx Updated: 11/16/21 0126    Narrative:      The following orders were created for panel order COVID PRE-OP / PRE-PROCEDURE SCREENING ORDER (NO ISOLATION) - Swab, Nasopharynx.  Procedure                               Abnormality         Status                     ---------                               -----------         ------                     COVID-19 and FLU A/B PCR...[[id number]]  Normal              Final result                 Please view results for these tests on the individual orders.    COVID-19 and FLU A/B PCR - Swab, Nasopharynx [[id number]]  (Normal) Collected: 11/15/21 2300    Lab Status: Final result Specimen: Swab from Nasopharynx Updated: 11/16/21 0126     COVID19 Not Detected     Influenza A PCR Not Detected     Influenza B PCR Not Detected    Narrative:      Fact sheet for providers: https://www.fda.gov/media/452003/download    Fact sheet for patients: https://www.fda.gov/media/732084/download    Test performed by PCR.    Blood Culture - Blood, Arm, Right [[id number]]  (Abnormal)  (Susceptibility) Collected: 11/15/21 2245    Lab Status: Final result Specimen: Blood from Arm, Right Updated: 11/18/21 1019     Blood Culture Staphylococcus hominis ssp hominis     Isolated from Aerobic and Anaerobic Bottles     Gram Stain Anaerobic Bottle Gram positive cocci in pairs and clusters      Aerobic Bottle Gram positive cocci in pairs and clusters    Susceptibility      Staphylococcus hominis ssp hominis     TRUDY     Oxacillin Resistant     Vancomycin Susceptible                         Blood Culture ID, PCR - Blood, Arm, Right [[id number]]  (Abnormal) Collected: 11/15/21 2245     Lab Status: Final result Specimen: Blood from Arm, Right Updated: 11/16/21 1927     BCID, PCR Staph spp, not aureus or lugdunesis. Identification by BCID2 PCR.    Blood Culture - Blood, Arm, Right [[id number]]  (Abnormal) Collected: 11/15/21 2235    Lab Status: Final result Specimen: Blood from Arm, Right Updated: 11/18/21 1020     Blood Culture Staphylococcus hominis ssp hominis     Isolated from Aerobic and Anaerobic Bottles     Gram Stain Anaerobic Bottle Gram positive cocci in pairs and clusters      Aerobic Bottle Gram positive cocci in pairs and clusters    Narrative:      Refer to previous blood culture collected on 11/15/2021 2245 for MICs          Adult Transthoracic Echo Complete W/ Cont if Necessary Per Protocol    Result Date: 11/17/2021  · Left ventricular ejection fraction appears to be 51 - 55%. Left ventricular systolic function is low normal. · Left ventricular diastolic function is consistent with (grade III w/high LAP) restrictive pattern. · The right ventricular cavity is moderate to severely dilated. · The right atrial cavity is mildly dilated. · Moderate tricuspid valve regurgitation is present. · Mild to moderate mitral regurgitation · Estimated right ventricular systolic pressure from tricuspid regurgitation is mildly elevated (35-45 mmHg).      XR Tibia Fibula 2 View Left    Result Date: 11/15/2021  CR Tibia Fibula 2 Vws LT INDICATION: Acute left leg pain. Trauma COMPARISON: None available. FINDINGS: AP and lateral views of the left tibia/fibula. No definite acute fracture or subluxation. There are changes of total knee arthroplasty. Hardware is intact. There is vascular calcification. No foreign body.     No acute fracture or subluxation. Signer Name: Angelique Kelley MD  Signed: 11/15/2021 8:23 PM  Workstation Name: [id number]  [facility]    Duplex Venous Lower Extremity - Left    Result Date: 11/16/2021  · Normal left lower extremity venous duplex scan.      CT Lower  Extremity Left Without Contrast    Result Date: 11/15/2021  CT LE LT WO INDICATION:  Lower extremity cellulitis. Fluid-filled lesion on physical exam. TECHNIQUE: CT of the left lower leg and foot without IV contrast. Coronal and sagittal reconstructions were obtained.  Radiation dose reduction techniques included automated exposure control or exposure modulation based on body size. Count of known CT and cardiac nuc med studies performed in previous 12 months: 0.  COMPARISON:  Radiographs same day FINDINGS: Patient has a left knee arthroplasty. This causes extensive streak artifact in the proximal lower leg. This appears appropriately positioned radiographically. No acute fracture or malalignment is identified. There is no evidence of osteomyelitis. There is extensive arterial calcification. There is generalized skin thickening and subcutaneous fat stranding in the lower leg which may reflect bland edema or cellulitis. In the subcutaneous tissues of the anterolateral mid lower leg, there is a soft tissue lesion that is likely a hematoma. This is superficial to the muscular fascia and measures about 3.5 cm in width by 1.3 cm in depth by about 2.7 cm in height. No soft tissue gas is identified. There are no radiopaque foreign bodies.     1. No acute fracture or malalignment. 2. Generalized soft tissue edema as above suggesting cellulitis or bland edema. No soft tissue gas. 3. Subcutaneous lesion in the anterolateral mid lower leg, most likely a subcutaneous hematoma. It measures 3.5 x 1.3 x 2.7 cm. Signer Name: Josue Lr MD  Signed: 11/15/2021 11:22 PM  Workstation Name: [id number]  [facility]      Results for orders placed during the hospital encounter of 11/15/21    Duplex Venous Lower Extremity - Left    Interpretation Summary  · Normal left lower extremity venous duplex scan.      Results for orders placed during the hospital encounter of 11/15/21    Duplex Venous Lower Extremity -  Left    Interpretation Summary  · Normal left lower extremity venous duplex scan.      Results for orders placed during the hospital encounter of 11/15/21    Adult Transthoracic Echo Complete W/ Cont if Necessary Per Protocol    Interpretation Summary  · Left ventricular ejection fraction appears to be 51 - 55%. Left ventricular systolic function is low normal.  · Left ventricular diastolic function is consistent with (grade III w/high LAP) restrictive pattern.  · The right ventricular cavity is moderate to severely dilated.  · The right atrial cavity is mildly dilated.  · Moderate tricuspid valve regurgitation is present.  · Mild to moderate mitral regurgitation  · Estimated right ventricular systolic pressure from tricuspid regurgitation is mildly elevated (35-45 mmHg).      Plan for Follow-up of Pending Labs/Results: None pending    Discharge Details        Discharge Medications      New Medications      Instructions Start Date   linezolid 600 MG tablet  Commonly known as: ZYVOX   600 mg, Oral, 2 Times Daily      oxyCODONE 5 MG immediate release tablet  Commonly known as: ROXICODONE   Take 1 tablet by mouth Every 6 (Six) Hours As Needed for Severe Pain.         Changes to Medications      Instructions Start Date   carvedilol 25 MG tablet  Commonly known as: COREG  What changed: how much to take   12.5 mg, Oral, 2 Times Daily With Meals      isosorbide mononitrate 30 MG 24 hr tablet  Commonly known as: IMDUR  What changed: when to take this   30 mg, Oral, Daily         Continue These Medications      Instructions Start Date   atorvastatin 20 MG tablet  Commonly known as: LIPITOR   20 mg, Oral, Nightly      BASAGLAR KWIKPEN 100 UNIT/ML injection pen   INJECT 20 UNITS UNDER THE SKIN ONCE DAILY AS DIRECTED      cholecalciferol 25 MCG (1000 UT) tablet  Commonly known as: VITAMIN D3   1,000 Units, Oral, Daily      Eliquis 2.5 MG tablet tablet  Generic drug: apixaban   No dose, route, or frequency recorded.     "  eszopiclone 2 MG tablet  Commonly known as: LUNESTA   TAKE ONE TABLET BY MOUTH IMMEDIATELY BEFORE BEDTIME AS NEEDED FOR SLEEP      FreeStyle Lokesh 2 Sensor misc   1 each, Does not apply, Every 14 Days      furosemide 40 MG tablet  Commonly known as: LASIX   40 mg, Oral, Every 48 Hours      indapamide 2.5 MG tablet  Commonly known as: LOZOL   No dose, route, or frequency recorded.      Insulin Syringe-Needle U-100 31G X 5/16\" 0.5 ML misc  Commonly known as: TRUEplus Insulin Syringe   Use with Insulin Three Times daily      latanoprost 0.005 % ophthalmic solution  Commonly known as: XALATAN   1 drop, Both Eyes, Nightly      levothyroxine 112 MCG tablet  Commonly known as: Synthroid   112 mcg, Oral, Daily      melatonin 5 MG tablet tablet   5 mg, Oral, Nightly      multivitamins-minerals capsule capsule   1 capsule, Oral, 2 Times Daily      mupirocin 2 % ointment  Commonly known as: BACTROBAN   1 application, As Needed      NovoLOG 100 UNIT/ML injection  Generic drug: insulin aspart   INJECT 15 UNITS UNDER THE SKIN AS DIRECTED 3 TIMES DAILY BEFORE MEALS      omeprazole 40 MG capsule  Commonly known as: priLOSEC   omeprazole 40 mg capsule,delayed release      ondansetron 4 MG tablet  Commonly known as: ZOFRAN   4 mg, Oral, Every 12 Hours PRN      spironolactone 25 MG tablet  Commonly known as: ALDACTONE   12.5 mg, Oral, Every Morning      timolol 0.5 % ophthalmic solution  Commonly known as: TIMOPTIC   1 drop, Both Eyes, 2 Times Daily         Stop These Medications    ciprofloxacin 250 MG tablet  Commonly known as: Cipro            Allergies   Allergen Reactions   • Bactrim [Sulfamethoxazole-Trimethoprim] GI Intolerance   • Lisinopril Cough   • Codeine Rash         Discharge Disposition:  [place]Health [place] [facility] [place]    Diet:  Hospital:  Diet Order   Procedures   • Diet Soft Texture; Whole Foods; Thin; Consistent Carbohydrate, Cardiac, Renal          CODE STATUS:    Code Status and Medical Interventions:   Ordered at: " 11/15/21 2229     Code Status (Patient has no pulse and is not breathing):    CPR (Attempt to Resuscitate)     Medical Interventions (Patient has pulse or is breathing):    Full Support       Future Appointments   Date Time Provider Department Center   11/29/2021 11:30 AM Giselle Guzman DO [facility]   12/1/2021  3:00 PM Ashok Ryder MD [id number] [facility]   12/16/2021  1:00 PM Tano Beal MD [facility]   3/9/2022  2:30 PM Giselle Guzman DO [facility]   9/14/2022  2:45 PM Giselle Guzman DO [facility]       Additional Instructions for the Follow-ups that You Need to Schedule     Ambulatory Referral to Home Health   As directed      Face to Face Visit Date: 11/18/2021    Follow-up provider for Plan of Care?: I will be treating the patient on an ongoing basis.  Please send me the Plan of Care for signature.    Follow-up provider: GISELLE GUZMAN [[id number]]    Reason/Clinical Findings: Cellulitis of Left leg    Describe mobility limitations that make leaving home difficult: Impaired mobility    Nursing/Therapeutic Services Requested: Skilled Nursing Physical Therapy Occupational Therapy Speech Therapy    Skilled nursing orders: Medication education Pain management Wound care dressing/changes    PT orders: Therapeutic exercise Gait Training Strengthening Home safety assessment    Weight Bearing Status: Full Weight Bearing    Occupational orders: Activities of daily living Energy conservation Strengthening Home safety assessment    SLP orders: Dysphagia therapy    Frequency: 1 Week 1         Discharge Follow-up with PCP   As directed       Currently Documented PCP:    Giselle Guzman DO    PCP Phone Number:    [phone]     Follow Up Details: 1 week         Discharge Follow-up with Specified Provider: MORIAH Price; 2 Weeks   As directed      To: MORIAH Price    Follow Up: 2 Weeks         Discharge Follow-up with Specified Provider: [facility]elho at [facility]; 2 Weeks    As directed      To: Dr[facility] Coelho at [facility]    Follow Up: 2 Weeks    Follow Up Details: Their office will arrange                     Yolanda Gomez MD  11/19/21      Time Spent on Discharge:  I spent  31  minutes on this discharge activity which included: face-to-face encounter with the patient, reviewing the data in the system, coordination of the care with the nursing staff as well as consultants, documentation, and entering orders.

## 2021-11-19 NOTE — THERAPY EVALUATION
Acute Care - Speech Language Pathology   Swallow Initial Evaluation [facility]   Clinical Swallow Evaluation       Patient Name: Karla Cristobal  : 1935  MRN: [id number]  Today's Date: 2021               Admit Date: 11/15/2021    Visit Dx:     ICD-10-CM ICD-9-CM   1. Left leg cellulitis  L03.116 682.6   2. History of insulin dependent diabetes mellitus  Z86.39 V12.29   3. Chronic kidney disease, unspecified CKD stage  N18.9 585.9   4. Chronic a-fib ([facility])  I48.20 427.31   5. Anticoagulated  Z79.01 V58.61   6. Food impaction of esophagus, initial encounter  T18.128A 935.1   7. Pharyngoesophageal dysphagia  R13.14 787.24   8. 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 Cellulitis of left lower extremity  L03.116 682.6   10. Diabetes mellitus, type II, insulin dependent ([facility])  E11.9 250.00    Z79.4 V58.67   11. Ischemic cardiomyopathy  I25.5 414.8   12. Morbidly obese ([facility])  E66.01 278.01   13. Chronic systolic congestive heart failure ([facility])  I50.22 428.22     428.0   14. Acute deep vein thrombosis (DVT) of left lower extremity, unspecified vein ([facility])  I82.402 453.40   15. CHF (NYHA class IV, ACC/AHA stage D) ([facility])  I50.84 428.0   16. Hypoxia  R09.02 799.02   17. Diabetic retinopathy of both eyes with macular edema associated with diabetes mellitus due to underlying condition, unspecified retinopathy severity ([facility])  E08.311 249.50     362.07     362.01   18. Diabetic nephropathy associated with diabetes mellitus due to underlying condition ([facility])  E08.21 249.40     583.81   19. Impaired functional mobility, balance, gait, and endurance  Z74.09 V49.89     Patient Active Problem List   Diagnosis   • Anemia due to chronic kidney disease   • Chronic atrial fibrillation (HCC)   • CKD (chronic kidney disease), stage IV ([facility])   • Diabetic nephropathy ([facility])   • Diabetic retinopathy ([facility])   • Malignant neoplasm of endometrium ([facility])   • Hyperlipidemia LDL goal <100   • Essential hypertension   •  Hypothyroidism   • Insomnia   • Leukocytosis   • Memory impairment   • Nausea   • Pulmonary embolism (HCC)   • Sleep apnea   • Squamous cell carcinoma of skin   • CHF (NYHA class IV, ACC/AHA stage D) ([facility])   • Ischemic heart disease   • CAD (coronary artery disease)   • DVT of lower extremity (deep venous thrombosis) ([facility])   • Chronic systolic congestive heart failure (HCC)   • Morbidly obese ([facility])   • Ischemic cardiomyopathy   • Diabetes mellitus, type II, insulin dependent ([facility])   • Exudative age-related macular degeneration, right eye, with active choroidal neovascularization ([facility])   • Hypoxia   • Cellulitis of left lower extremity   • Left leg cellulitis   • Food impaction of esophagus     Past Medical History:   Diagnosis Date   • Acute bronchitis    • Arthritis    • Atrial fibrillation ([facility])    • CAD (coronary artery disease)     s/p MI 2005; 3 stents inserted    • Change in mole    • Change in mole    • Change in nevus 6/16/2016   • Chronic kidney disease     stage IV-sees Dr Bk Mckeon every 3-4 months    • Chronic renal disease, stage 4, severely decreased glomerular filtration rate between 15-29 mL/min/1.73 square meter ([facility])    • Chronic systolic heart failure ([facility])    • Congestive heart disease ([facility])    • Conjunctivitis    • Deep vein thrombosis of lower extremity ([facility])    • Diabetes mellitus ([facility])    • Diabetes mellitus ([facility]) 6/16/2016    Impression: 03/15/2016 - blood sugar 166 and hgn a1c 7.3%  is up to date with eye exam  neuropathy with callus, no ulcer  some scratches feet  ur alb due and ordered  no change other than provided samples of toujeo because she feels like lantus worked much better than levemir, she has tried titrating levemir and it is less effective  continue testing and f/u 3-4 months  Impression: 11/23/2015 - bl   • Diabetic foot ulcer ([facility]) 6/16/2016   • Dog bite of hand    • Easy bruising    • Endometrial cancer ([facility])     partial hysterectomy; radiation as well    • Foot pain     • Foot pain 6/16/2016    Description: lancinating- worse but not consistent enough to want rx   • Fracture of hip (HCC)    • Generalized ischemic myocardial dysfunction 6/16/2016   • Glaucoma     drops daily    • Hyperlipidemia    • Hypertension    • Hypothyroidism    • Insomnia    • Ischemic heart disease    • Macular degeneration    • Methicillin resistant Staphylococcus aureus infection 6/16/2016   • Myocardial infarction (HCC) 2005   • Nausea with vomiting    • Pericardial effusion    • Shortness of breath 6/16/2016    Impression: 03/24/2015 - chronic. On 2 l oxygen at night. check cbc, bnp;    • Skin cancer, basal cell     nose, leg    • Skin lesion 6/16/2016    Impression: 03/24/2015 - refer derm for eval- seb kerat ant tib and ?ak medially with redness and irritation;    • Sleep apnea     oxygen at night due to low sats but no cpap   • Type 2 diabetes mellitus (HCC)    • UTI (urinary tract infection) 06/12/2020    currently taking antibiotics-patient's daughter notified Dr Beal's office and office said it should not delay generator change      Past Surgical History:   Procedure Laterality Date   • CARDIAC CATHETERIZATION     • CARDIAC DEFIBRILLATOR PLACEMENT     • CARDIAC ELECTROPHYSIOLOGY PROCEDURE N/A 7/17/2020    Procedure: ICD BIV  generator change- SJM- 3-6 weeks;  Surgeon: Tano Beal MD;  Location: [facility] INVASIVE LOCATION;  Service: Cardiology;  Laterality: N/A;   • CHOLECYSTECTOMY     • CORONARY ARTERY BYPASS GRAFT  2000   • CORONARY STENT PLACEMENT      3 stents    • EYE SURGERY Bilateral     cataract extraction    • HIP SURGERY Left     x3   • HYSTERECTOMY      partial    • KNEE ARTHROPLASTY Bilateral    • PACEMAKER IMPLANTATION     • TONSILLECTOMY         SLP Recommendation and Plan  SLP Swallowing Diagnosis: R/O pharyngeal dysphagia (11/19/21 1420)     Recommended Precautions and Strategies: upright posture during/after eating, small bites of food and sips of liquid (11/19/21  1420)  SLP Rec. for Method of Medication Administration: meds whole, as tolerated (11/19/21 1420)     Monitor for Signs of Aspiration: yes, notify SLP if any concerns (11/19/21 1420)  Recommended Diagnostics: FEES (11/19/21 1420)  Swallow Criteria for Skilled Therapeutic Interventions Met: demonstrates skilled criteria (11/19/21 1420)  Anticipated Discharge Disposition (SLP): home with assist (11/19/21 1420)  Rehab Potential/Prognosis, Swallowing: good, to achieve stated therapy goals (11/19/21 1420)  Therapy Frequency (Swallow): evaluation only (11/19/21 1420)                            Plan of Care Reviewed With: patient, daughter  Progress:  (eval)      SWALLOW EVALUATION (last 72 hours)     SLP Adult Swallow Evaluation     Row Name 11/19/21 1420                   Rehab Evaluation    Document Type evaluation  -AV        Subjective Information no complaints  -AV        Patient Observations alert; cooperative  -AV        Patient/Family/Caregiver Comments/Observations daughter present  -AV        Patient Effort good  -AV                  General Information    Patient Profile Reviewed yes  -AV        Pertinent History Of Current Problem CHD, CHF, HTN, CAD, STage 4 CKD, MI  -AV        Current Method of Nutrition regular textures; thin liquids  -AV        Precautions/Limitations, Vision WFL with corrective lenses  -AV        Precautions/Limitations, Hearing WFL; for purposes of eval  -AV        Prior Level of Function-Communication WFL  -AV        Prior Level of Function-Swallowing esophageal concerns  -AV        Plans/Goals Discussed with patient; family  -AV        Barriers to Rehab none identified  -AV        Patient's Goals for Discharge eat/drink without coughing/choking  -AV        Family Goals for Discharge patient able to eat/drink without coughing/choking  -AV                  Pain    Additional Documentation Pain Scale: FACES Pre/Post-Treatment (Group)  -AV                  Pain Scale: FACES  Pre/Post-Treatment    Pain: FACES Scale, Pretreatment 0-->no hurt  -AV        Posttreatment Pain Rating 0-->no hurt  -AV                  Oral Motor Structure and Function    Dentition Assessment upper dentures/partial in place; lower dentures/partial in place  -AV        Secretion Management WNL/WFL  -AV        Mucosal Quality moist, healthy  -AV        Volitional Swallow WFL  -AV        Volitional Cough WFL  -AV                  Oral Musculature and Cranial Nerve Assessment    Oral Motor General Assessment WFL  -AV                  General Eating/Swallowing Observations    Respiratory Support Currently in Use nasal cannula  -AV        Eating/Swallowing Skills self-fed  -AV        Positioning During Eating upright in chair  -AV        Utensils Used spoon; cup; straw  -AV        Consistencies Trialed regular textures; pureed; thin liquids  -AV                  Clinical Swallow Eval    Oral Prep Phase WFL  -AV        Oral Transit WFL  -AV        Oral Residue WFL  -AV        Pharyngeal Phase suspected pharyngeal impairment  -AV        Esophageal Phase suspected esophageal impairment  -AV        Clinical Swallow Evaluation Summary CSE completed this pm: patient wth consistent coughing/throat clearing with thins throughout eval. Patinet unable to participate in MBS. Agreeable to FEES.  If does not get discharged will complete 11/20, if not would benefit from outpatient FEES.  -AV                  Clinical Impression    SLP Swallowing Diagnosis R/O pharyngeal dysphagia  -AV        Functional Impact risk of aspiration/pneumonia  -AV        Rehab Potential/Prognosis, Swallowing good, to achieve stated therapy goals  -AV        Swallow Criteria for Skilled Therapeutic Interventions Met demonstrates skilled criteria  -AV                  Recommendations    Therapy Frequency (Swallow) evaluation only  -AV        Recommended Diagnostics FEES  -AV        Recommended Precautions and Strategies upright posture during/after  eating; small bites of food and sips of liquid  -AV        Oral Care Recommendations Oral Care BID/PRN  -AV        SLP Rec. for Method of Medication Administration meds whole; as tolerated  -AV        Monitor for Signs of Aspiration yes; notify SLP if any concerns  -AV        Anticipated Discharge Disposition (SLP) home with assist  -AV              User Key  (r) = Recorded By, (t) = Taken By, (c) = Cosigned By    Initials Name Effective Dates    AV Maria Isabel Huang MS CCC-SLP 06/16/21 -                 EDUCATION  The patient has been educated in the following areas:   Dysphagia (Swallowing Impairment) Oral Care/Hydration.              Time Calculation:    Time Calculation- SLP     Row Name 11/19/21 1535             Time Calculation- SLP    SLP Start Time 1440  -AV      SLP Received On 11/19/21  -AV              Untimed Charges    SLP Eval/Re-eval  ST Eval Oral Pharyng Swallow - 04955  -AV      93650-AM Eval Oral Pharyng Swallow Minutes 30  -AV              Total Minutes    Untimed Charges Total Minutes 30  -AV       Total Minutes 30  -AV            User Key  (r) = Recorded By, (t) = Taken By, (c) = Cosigned By    Initials Name Provider Type    AV Maria Isabel Huang MS CCC-SLP Speech and Language Pathologist                Therapy Charges for Today     Code Description Service Date Service Provider Modifiers Qty    [id number] HC ST EVAL ORAL PHARYNG SWALLOW 2 11/19/2021 Maria Isabel Huang MS CCC-SLP GN 1               Maria Isabel Montgomery MS CCC-SLP  11/19/2021

## 2021-11-20 NOTE — OUTREACH NOTE
Prep Survey      Responses   [facility] facility patient discharged from? [place]   Is LACE score < 7 ? No   Emergency Room discharge w/ pulse ox? No   Eligibility [facility]   Date of Admission 11/15/21   Date of Discharge 11/19/21   Discharge Disposition Home-Health Care Sv   Discharge diagnosis LLE cellulitis, Armas's esophagus with food impaction, CHF   Does the patient have one of the following disease processes/diagnoses(primary or secondary)? Other   Does the patient have Home health ordered? Yes   What is the Home health agency?  [facility]   Is there a DME ordered? Yes   What DME was ordered? rollator from [employer]   Prep survey completed? Yes          Yesica Hendrickson RN

## 2021-11-22 ENCOUNTER — TRANSITIONAL CARE MANAGEMENT TELEPHONE ENCOUNTER (OUTPATIENT)
Dept: CALL CENTER | Facility: HOSPITAL | Age: 86
End: 2021-11-22

## 2021-11-22 NOTE — OUTREACH NOTE
Call Center TCM Note      Responses   [facility] patient discharged from? [place]   Does the patient have one of the following disease processes/diagnoses(primary or secondary)? Other   TCM attempt successful? Yes   Call start time 1400   Call end time 1411   Discharge diagnosis LLE cellulitis, Armas's esophagus with food impaction, CHF   Is patient permission given to speak with other caregiver? Yes   List who call center can speak with Frida Vazquez, daughter   Person spoke with today (if not patient) and relationship Frida Vazquez, daughter   Meds reviewed with patient/caregiver? Yes  [New zyvox and oxycodone,  continue usual home meds. ]   Does the patient have all medications ordered at discharge? Yes   Is the patient taking all medications as directed (includes completed medication regime)? Yes   Comments regarding appointments Appt Dr Laquita RODRIGUEZ 11/30/21 and Gastroenterology Dr Ryder 12/1/21   Does the patient have a primary care provider?  Yes   Does the patient have an appointment with their PCP within 7 days of discharge? Greater than 7 days   Comments regarding PCP PCP Giselle Guzman DO. Hospital follow up scheduled for 11/29/21  1130am   Nursing Interventions Verified appointment date/time/provider   Has the patient kept scheduled appointments due by today? N/A   What is the Home health agency?  [facility]   Has home health visited the patient within 72 hours of discharge? Call prior to 72 hours   What DME was ordered? rollator from [employer]   Has all DME been delivered? Yes   Psychosocial issues? No   Did the patient receive a copy of their discharge instructions? Yes   Nursing interventions Reviewed instructions with patient   What is the patient's perception of their health status since discharge? Improving   Is the patient/caregiver able to teach back signs and symptoms related to disease process for when to call PCP? Yes   Is the patient/caregiver able to teach back signs and  symptoms related to disease process for when to call 911? Yes   Is the patient/caregiver able to teach back the hierarchy of who to call/visit for symptoms/problems? PCP, Specialist, Home health nurse, Urgent Care, ED, 911 Yes   If the patient is a current smoker, are they able to teach back resources for cessation? Not a smoker   TCM call completed? Yes   Wrap up additional comments Denies further questions or needs from PCP office today.           Laney Low RN    11/22/2021, 14:12 EST

## 2021-11-23 ENCOUNTER — TELEPHONE (OUTPATIENT)
Dept: INTERNAL MEDICINE | Facility: CLINIC | Age: 86
End: 2021-11-23

## 2021-11-23 ENCOUNTER — PATIENT OUTREACH (OUTPATIENT)
Dept: CASE MANAGEMENT | Facility: OTHER | Age: 86
End: 2021-11-23

## 2021-11-23 NOTE — TELEPHONE ENCOUNTER
JOSSIE WITH [facility] CALLED TO INFORM DR. MARCELL MARTINEZ THAT [facility] WILL NOT BE ABLE TO SERVICE THE PATIENT DUE TO THE FACT THAT THE     PATIENT IS OUT OF THE SERVICE AREA.   JOSSIE STATES THAT THE PATIENT WILL NEED TO BE REASSIGNED TO A HOME HEALTH AGENCY THAT SERVICES [facility]. SHE IS ASKING THAT THIS MATTER BE TAKEN CARE OF WITH URGENCY TO AVOID DELAY IN PATIENT CARE.     JOSSIE CONTACT: [phone]    PATIENT HAS BEEN ADVISED THAT THIS REQUEST HAS BEEN MARKED AS A HIGH PRIORITY, PLEASE ALLOW 48 HOURS FOR OUR CLINICAL TEAM TO FOLLOW UP ON THIS REQUEST.

## 2021-11-23 NOTE — OUTREACH NOTE
Ambulatory Case Management Note    Patient Outreach    Received referral from PCP, Dr. Guzman for assistance in getting home health set up.  Was sent home from hospital with home health, however they do not provide services to her town.    Talked with pt.  States she is alone during day, but ambulates with her walker and is able to go to bathroom and to get something to eat.  She has someone staying with her at night.     Care Coordination    Have reached out to [facility] (do not service her area).  [facility] health is nonmedical, so they would have no skilled nursing.  [facility] does not go over to [place].    Contacted [facility].  [phone].  They will check on this, however they do need actual orders before can officially accept.    Placed a call to [facility] in pt case management, Karli Olguin RN.  Left message.   Talked with Karli Olguin and she will fax the original order to [facility] [phone].   Left message with [facility] to expect the order and to let RN-ACM know if they can accept her.     [facility] contacted RN-ACM and they will run insurance and requested d/c summary and therapy orders, but should be able to accept her.     Karli notified again and she will fax d/c summary and therapy notes.      Patient Outreach    Pt notified regarding [facility].      Care Coordination    2:25 pm  Confirmed with [facility] that they have accepted pt.  Will start nursing this week, however therapy will not start until next week due to holiday.  Will let PCP know.     Tiffany Fuentes RN  Ambulatory Case Management    11/23/2021, 09:56 EST

## 2021-11-23 NOTE — TELEPHONE ENCOUNTER
Have sent Tiffany Huerta, an RN at [facility] a message to see if they can help find a HH that is in the Patient's service area.

## 2021-11-23 NOTE — DISCHARGE PLACEMENT REQUEST
"David Mead (86 y.o. Female)             Date of Birth Social Security Number Address Home Phone MRN    1935  [address] [facility] [address] [phone] [id number]    Sabianist Marital Status             Scientologist        Admission Date Admission Type Admitting Provider Attending Provider Department, Room/Bed    11/15/21 Emergency Yolanda Gomez MD  [facility] 3F, S320/1    Discharge Date Discharge Disposition Discharge Destination          11/19/2021 Home-Health Care c              Attending Provider: (none)   Allergies: Bactrim [Sulfamethoxazole-trimethoprim], Lisinopril, Codeine    Isolation: None   Infection: None   Code Status: Prior   Advance Care Planning Activity    Ht: 162.6 cm (64.02\")   Wt: 86.6 kg (190 lb 14.7 oz)    Admission Cmt: None   Principal Problem: Cellulitis of left lower extremity [L03.116]                 Active Insurance as of 11/15/2021     Primary Coverage     Payor Plan Insurance Group Employer/Plan Group    MEDICARE MEDICARE A & B      Payor Plan Address Payor Plan Phone Number Payor Plan Fax Number Effective Dates    [address] [phone]  7/1/2001 - None Entered    [facility] [address]       Subscriber Name Subscriber Birth Date Member ID       DAVID MEAD 1935 [id number]           Secondary Coverage     Payor Plan Insurance Group Employer/Plan Group    [place] [employer] [facility]      Payor Plan Address Payor Plan Phone Number Payor Plan Fax Number Effective Dates    [address] [phone]  7/1/2001 - None Entered    [place] [address]       Subscriber Name Subscriber Birth Date Member ID       DAVID MEAD 1935 [id number]           Tertiary Coverage     Payor Plan Insurance Group Employer/Plan Group    [place]      Payor Plan Address Payor Plan Phone Number Payor Plan Fax Number Effective Dates    [address] [phone]  7/1/2020 - None Entered    [place] [place] [address]       Subscriber Name Subscriber Birth Date Member ID       KARLA MEAD 1935 [id number]                 Emergency Contacts      (Rel.) Home Phone Work Phone Mobile Phone    Frida Vazquez (Daughter) [phone] -- --          [address]  [address] [facility] [address]  Phone:  [phone]  Fax:  [phone] Date: 2021      Ambulatory Referral to Home Health     Patient:  Karla Mead MRN:  [id number]   [address] [facility] [address] :  1935  SSN:    Phone: [phone] Sex:  F      INSURANCE PAYOR PLAN GROUP # SUBSCRIBER ID   Primary:  Secondary:    MEDICARE  [employer] [id number]  [id number]      [id number]  [id number]      Referring Provider Information:  GAGE WARNER Phone: [phone] Fax: [phone]      Referral Information:   # Visits:  999 Referral Type: Home Health [42]   Urgency:  Routine Referral Reason: Specialty Services Required   Start Date: 2021 End Date:  To be determined by Insurer   Diagnosis: Left leg cellulitis (L03.116 [ICD-10-CM] 682.6 [ICD-9-CM])  Food impaction of esophagus, initial encounter (T18.128A [ICD-10-CM] 935.1 [ICD-9-CM])  Pharyngoesophageal dysphagia (R13.14 [ICD-10-CM] 787.24 [ICD-9-CM])  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 (K22.70 [ICD-10-CM] 530.85 [ICD-9-CM])  Cellulitis of left lower extremity (L03.116 [ICD-10-CM] 682.6 [ICD-9-CM])  Diabetes mellitus, type II, insulin dependent (HCC) (E11.9,Z79.4 [ICD-10-CM] 250.00,V58.67 [ICD-9-CM])  Ischemic cardiomyopathy (I25.5 [ICD-10-CM] 414.8 [ICD-9-CM])  Chronic systolic congestive heart failure (HCC) (I50.22 [ICD-10-CM] 428.22,428.0 [ICD-9-CM])  Acute deep vein thrombosis (DVT) of left lower extremity, unspecified vein (HCC) (I82.402 [ICD-10-CM] 453.40 [ICD-9-CM])  CHF (NYHA class IV, ACC/AHA stage D) (HCC) (I50.84 [ICD-10-CM] 428.0 [ICD-9-CM])      Refer to Dept:   Refer to Provider:   Refer to Facility:        Face to Face Visit Date: 11/18/2021  Follow-up provider for Plan of Care? I will be treating the patient on an ongoing basis.  Please send me the Plan of Care for signature.  Follow-up provider: MARCELL MARTINEZ [[id number]]  Reason/Clinical Findings: Cellulitis of Left leg  Describe mobility limitations that make leaving home difficult: Impaired mobility  Nursing/Therapeutic Services Requested: Skilled Nursing  Nursing/Therapeutic Services Requested: Physical Therapy  Nursing/Therapeutic Services Requested: Occupational Therapy  Nursing/Therapeutic Services Requested: Speech Therapy  Skilled nursing orders: Medication education  Skilled nursing orders: Pain management  Skilled nursing orders: Wound care dressing/changes  PT orders: Therapeutic exercise  PT orders: Gait Training  PT orders: Strengthening  PT orders: Home safety assessment  Weight Bearing Status: Full Weight Bearing  Occupational orders: Activities of daily living  Occupational orders: Energy conservation  Occupational orders: Strengthening  Occupational orders: Home safety assessment  SLP orders: Dysphagia therapy  Frequency: 1 Week 1     This document serves as a request of services and does not constitute Insurance authorization or approval of services.  To determine eligibility, please contact the members Insurance carrier to verify and review coverage.     If you have medical questions regarding this request for services. Please contact [address] at [phone] during normal business hours.         Authorizing Provider:Yolanda Gomez MD  Authorizing Provider's NPI: [id number]  Order Entered By: Choco Sellers RN 11/18/2021  9:47 AM     Electronically signed by: Yolanda Gomez MD 11/18/2021  9:47 AM

## 2021-11-23 NOTE — DISCHARGE PLACEMENT REQUEST
"David Mead (86 y.o. Female)             Date of Birth Social Security Number Address Home Phone MRN    [id number]  [id number] [facility] [address] [phone] [id number]    Hindu Marital Status             Buddhism        Admission Date Admission Type Admitting Provider Attending Provider Department, Room/Bed    11/15/21 Emergency Yolanda Gomez MD  [facility] 3F, S320/1    Discharge Date Discharge Disposition Discharge Destination          11/19/2021 Home-Health Care c              Attending Provider: (none)   Allergies: Bactrim [Sulfamethoxazole-trimethoprim], Lisinopril, Codeine    Isolation: None   Infection: None   Code Status: Prior   Advance Care Planning Activity    Ht: 162.6 cm (64.02\")   Wt: 86.6 kg (190 lb 14.7 oz)    Admission Cmt: None   Principal Problem: Cellulitis of left lower extremity [L03.116]                 Active Insurance as of 11/15/2021     Primary Coverage     Payor Plan Insurance Group Employer/Plan Group    MEDICARE MEDICARE A & B      Payor Plan Address Payor Plan Phone Number Payor Plan Fax Number Effective Dates    [address] [phone]  7/1/2001 - None Entered    [facility] [id number]       Subscriber Name Subscriber Birth Date Member ID       DAVID MEAD 1935 [id number]           Secondary Coverage     Payor Plan Insurance Group Employer/Plan Group    [place] [employer] [facility]      Payor Plan Address Payor Plan Phone Number Payor Plan Fax Number Effective Dates    [address] [phone]  7/1/2001 - None Entered    [place] [address]       Subscriber Name Subscriber Birth Date Member ID       DAVID MEAD 1935 [id number]           Tertiary Coverage     Payor Plan Insurance Group Employer/Plan Group    [place]      Payor Plan Address Payor Plan Phone Number Payor Plan Fax Number Effective Dates    [address] [phone]  7/1/2020 - None Entered    [place] [place] [address]       Subscriber Name Subscriber Birth Date Member ID       KARLA MEAD 1935 [id number]                 Emergency Contacts      (Rel.) Home Phone Work Phone Mobile Phone    Frida Vazquez (Daughter) [phone] -- --               Physical Therapy Notes (most recent note)      Meghna Plata, PT at 21 1435  Version 1 of 1         Patient Name: Karla Mead  : 1935    MRN: [id number]                              Today's Date: 2021       Admit Date: 11/15/2021    Visit Dx:     ICD-10-CM ICD-9-CM   1. Left leg cellulitis  L03.116 682.6   2. History of insulin dependent diabetes mellitus  Z86.39 V12.29   3. Chronic kidney disease, unspecified CKD stage  N18.9 585.9   4. Chronic a-fib ([facility])  I48.20 427.31   5. Anticoagulated  Z79.01 V58.61   6. Food impaction of esophagus, initial encounter  T18.128A 935.1   7. Pharyngoesophageal dysphagia  R13.14 787.24   8. 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 Cellulitis of left lower extremity  L03.116 682.6   10. Diabetes mellitus, type II, insulin dependent ([facility])  E11.9 250.00    Z79.4 V58.67   11. Ischemic cardiomyopathy  I25.5 414.8   12. Morbidly obese ([facility])  E66.01 278.01   13. Chronic systolic congestive heart failure ([facility])  I50.22 428.22     428.0   14. Acute deep vein thrombosis (DVT) of left lower extremity, unspecified vein ([facility])  I82.402 453.40   15. CHF (NYHA class IV, ACC/AHA stage D) ([facility])  I50.84 428.0   16. Hypoxia  R09.02 799.02   17. Diabetic retinopathy of both eyes with macular edema associated with diabetes mellitus due to underlying condition, unspecified retinopathy severity ([facility])  E08.311 249.50     362.07     362.01   18. Diabetic nephropathy associated with diabetes mellitus due to underlying condition ([facility])  E08.21 249.40     583.81   19. Impaired functional mobility, balance, gait, and endurance  Z74.09 V49.89     Patient Active Problem List   Diagnosis   • Anemia due to chronic  kidney disease   • Chronic atrial fibrillation (HCC)   • CKD (chronic kidney disease), stage IV (HCC)   • Diabetic nephropathy (HCC)   • Diabetic retinopathy (HCC)   • Malignant neoplasm of endometrium (HCC)   • Hyperlipidemia LDL goal <100   • Essential hypertension   • Hypothyroidism   • Insomnia   • Leukocytosis   • Memory impairment   • Nausea   • Pulmonary embolism (HCC)   • Sleep apnea   • Squamous cell carcinoma of skin   • CHF (NYHA class IV, ACC/AHA stage D) (HCC)   • Ischemic heart disease   • CAD (coronary artery disease)   • DVT of lower extremity (deep venous thrombosis) (HCC)   • Chronic systolic congestive heart failure (HCC)   • Morbidly obese (HCC)   • Ischemic cardiomyopathy   • Diabetes mellitus, type II, insulin dependent (HCC)   • Exudative age-related macular degeneration, right eye, with active choroidal neovascularization (HCC)   • Hypoxia   • Cellulitis of left lower extremity   • Left leg cellulitis   • Food impaction of esophagus     Past Medical History:   Diagnosis Date   • Acute bronchitis    • Arthritis    • Atrial fibrillation (HCC)    • CAD (coronary artery disease)     s/p MI 2005; 3 stents inserted    • Change in mole    • Change in mole    • Change in nevus 6/16/2016   • Chronic kidney disease     stage IV-sees Dr Bk Mckeon every 3-4 months    • Chronic renal disease, stage 4, severely decreased glomerular filtration rate between 15-29 mL/min/1.73 square meter (HCC)    • Chronic systolic heart failure (HCC)    • Congestive heart disease (HCC)    • Conjunctivitis    • Deep vein thrombosis of lower extremity (HCC)    • Diabetes mellitus (HCC)    • Diabetes mellitus (HCC) 6/16/2016    Impression: 03/15/2016 - blood sugar 166 and hgn a1c 7.3%  is up to date with eye exam  neuropathy with callus, no ulcer  some scratches feet  ur alb due and ordered  no change other than provided samples of toujeo because she feels like lantus worked much better than levemir, she has tried  titrating levemir and it is less effective  continue testing and f/u 3-4 months  Impression: 11/23/2015 - bl   • Diabetic foot ulcer (HCC) 6/16/2016   • Dog bite of hand    • Easy bruising    • Endometrial cancer (HCC)     partial hysterectomy; radiation as well    • Foot pain    • Foot pain 6/16/2016    Description: lancinating- worse but not consistent enough to want rx   • Fracture of hip (HCC)    • Generalized ischemic myocardial dysfunction 6/16/2016   • Glaucoma     drops daily    • Hyperlipidemia    • Hypertension    • Hypothyroidism    • Insomnia    • Ischemic heart disease    • Macular degeneration    • Methicillin resistant Staphylococcus aureus infection 6/16/2016   • Myocardial infarction ([facility]) 2005   • Nausea with vomiting    • Pericardial effusion    • Shortness of breath 6/16/2016    Impression: 03/24/2015 - chronic. On 2 l oxygen at night. check cbc, bnp;    • Skin cancer, basal cell     nose, leg    • Skin lesion 6/16/2016    Impression: 03/24/2015 - refer derm for eval- seb kerat ant tib and ?ak medially with redness and irritation;    • Sleep apnea     oxygen at night due to low sats but no cpap   • Type 2 diabetes mellitus (HCC)    • UTI (urinary tract infection) 06/12/2020    currently taking antibiotics-patient's daughter notified Dr Beal's office and office said it should not delay generator change      Past Surgical History:   Procedure Laterality Date   • CARDIAC CATHETERIZATION     • CARDIAC DEFIBRILLATOR PLACEMENT     • CARDIAC ELECTROPHYSIOLOGY PROCEDURE N/A 7/17/2020    Procedure: ICD BIV  generator change- SJM- 3-6 weeks;  Surgeon: Tano Beal MD;  Location: [facility] INVASIVE LOCATION;  Service: Cardiology;  Laterality: N/A;   • CHOLECYSTECTOMY     • CORONARY ARTERY BYPASS GRAFT  2000   • CORONARY STENT PLACEMENT      3 stents    • EYE SURGERY Bilateral     cataract extraction    • HIP SURGERY Left     x3   • HYSTERECTOMY      partial    • KNEE ARTHROPLASTY Bilateral    •  PACEMAKER IMPLANTATION     • TONSILLECTOMY        General Information     [facility] Name 11/18/21 1435          Physical Therapy Time and Intention    Document Type evaluation  -     Mode of Treatment physical therapy  -[facility] Name 11/18/21 1435          General Information    Patient Profile Reviewed yes  -     Prior Level of Function independent:; all household mobility; gait; transfer; bed mobility  -     Existing Precautions/Restrictions fall; other (see comments)  WBAT  -     Barriers to Rehab medically complex; previous functional deficit  -[facility] Name 11/18/21 1435          Living Environment    Lives With grandchild(karyna); other relative(s)  -[facility] Name 11/18/21 1435          Home Main Entrance    Number of Stairs, Main Entrance none  -[facility] Name 11/18/21 1435          Stairs Within Home, Primary    Stairs, Within Home, Primary ramp to enter  -[facility] Name 11/18/21 1435          Cognition    Orientation Status (Cognition) oriented x 4  -[facility] Name 11/18/21 1435          Safety Issues, Functional Mobility    Safety Issues Affecting Function (Mobility) insight into deficits/self-awareness; sequencing abilities  -     Impairments Affecting Function (Mobility) endurance/activity tolerance; pain; strength  -           User Key  (r) = Recorded By, (t) = Taken By, (c) = Cosigned By    Initials Name Provider Type     Meghna Plata PT Physical Therapist               Mobility     Row Name 11/18/21 1615          Bed Mobility    Comment (Bed Mobility) UIC  -[facility] Name 11/18/21 1615          Transfers    Comment (Transfers) cues for hand placement and sequencin  -SJ     Row Name 11/18/21 1615          Sit-Stand Transfer    Sit-Stand [place] (Transfers) contact guard; verbal cues  -     Assistive Device (Sit-Stand Transfers) walker, 4-wheeled  -[facility] Name 11/18/21 1615          Gait/Stairs (Locomotion)    [place] Level (Gait) contact guard; 1 person assist;  verbal cues; nonverbal cues (demo/gesture)  -SJ     Assistive Device (Gait) walker, 4-wheeled  -SJ     Distance in Feet (Gait) 30ft  -SJ     Deviations/Abnormal Patterns (Gait) bilateral deviations; dimitry decreased; gait speed decreased  -SJ     Bilateral Gait Deviations forward flexed posture; heel strike decreased  -SJ     Comment (Gait/Stairs) Pt amb within room with RW, distance limited by fatigue. A-fib with activity. O2 sat stable on RA.  -SJ           User Key  (r) = Recorded By, (t) = Taken By, (c) = Cosigned By    Initials Name Provider Type    Meghna Bergeron, PT Physical Therapist               Obj/Interventions     Row Name 11/18/21 1616          Range of Motion Comprehensive    General Range of Motion bilateral lower extremity ROM WFL  -SJ     Row Name 11/18/21 1616          Strength Comprehensive (MMT)    General Manual Muscle Testing (MMT) Assessment lower extremity strength deficits identified  -SJ     Comment, General Manual Muscle Testing (MMT) Assessment BLE's 4/5  -SJ           User Key  (r) = Recorded By, (t) = Taken By, (c) = Cosigned By    Initials Name Provider Type    Meghna Bergeron, PT Physical Therapist               Goals/Plan     Row Name 11/18/21 1618          Bed Mobility Goal 1 (PT)    Activity/Assistive Device (Bed Mobility Goal 1, PT) sit to supine; supine to sit  -SJ     [place] Level/Cues Needed (Bed Mobility Goal 1, PT) modified independence  -SJ     Time Frame (Bed Mobility Goal 1, PT) long term goal (LTG); 2 weeks  -SJ     Row Name 11/18/21 1618          Transfer Goal 1 (PT)    Activity/Assistive Device (Transfer Goal 1, PT) sit-to-stand/stand-to-sit; bed-to-chair/chair-to-bed; walker, rolling  -SJ     [place] Level/Cues Needed (Transfer Goal 1, PT) modified independence  -SJ     Time Frame (Transfer Goal 1, PT) long term goal (LTG); 2 weeks  -SJ     Row Name 11/18/21 1618          Gait Training Goal 1 (PT)    Activity/Assistive Device (Gait Training Goal  1, PT) gait (walking locomotion); improve balance and speed; increase endurance/gait distance; walker, rolling  -SJ     [place] Level (Gait Training Goal 1, PT) supervision required  -SJ     Distance (Gait Training Goal 1, PT) 200  -SJ     Time Frame (Gait Training Goal 1, PT) long term goal (LTG); 2 weeks  -           User Key  (r) = Recorded By, (t) = Taken By, (c) = Cosigned By    Initials Name Provider Type     Meghna Plata, PT Physical Therapist               Clinical Impression     [facility] Name 11/18/21 1616          Pain    Additional Documentation Pain Scale: FACES Pre/Post-Treatment (Group)  -[facility] Name 11/18/21 1616          Pain Scale: FACES Pre/Post-Treatment    Pain: FACES Scale, Pretreatment 2-->hurts little bit  -SJ     Posttreatment Pain Rating 2-->hurts little bit  -SJ     Pain Location - Side Left  -SJ     Pain Location - Orientation lower  -     Pain Location extremity  -[facility] Name 11/18/21 1616          Plan of Care Review    Plan of Care Reviewed With patient; daughter  -     Outcome Summary PT eval completed. Pt presents with difficulty walking per PLOF. Pt amb within room with RW and CGA, distance limited by fatigue. A-fib with activity. O2 sat stable on RA. PT rec d/c home with HHPT.  -[facility] Name 11/18/21 1616          Therapy Assessment/Plan (PT)    Patient/Family Therapy Goals Statement (PT) return home  -     Rehab Potential (PT) good, to achieve stated therapy goals  -     Criteria for Skilled Interventions Met (PT) yes; skilled treatment is necessary  -     Predicted Duration of Therapy Intervention (PT) 2wks  -[facility] Name 11/18/21 1616          Vital Signs    Pre Systolic BP Rehab 144  -SJ     Pre Treatment Diastolic BP 77  -SJ     Pretreatment Heart Rate (beats/min) 77  -SJ     Pre SpO2 (%) 95  -SJ     O2 Delivery Pre Treatment room air  -[facility] Name 11/18/21 1616          Positioning and Restraints    Pre-Treatment Position sitting in  chair/recliner  -SJ     Post Treatment Position chair  -SJ     In Chair notified nsg; reclined; call light within reach; encouraged to call for assist; exit alarm on; with family/caregiver; waffle cushion; heels elevated  -           User Key  (r) = Recorded By, (t) = Taken By, (c) = Cosigned By    Initials Name Provider Type    Meghna Bergeron, PT Physical Therapist               Outcome Measures     Row Name 11/18/21 1618 11/18/21 0800       How much help from another person do you currently need...    Turning from your back to your side while in flat bed without using bedrails? 3  -SJ 3  -LC    Moving from lying on back to sitting on the side of a flat bed without bedrails? 3  -SJ 3  -LC    Moving to and from a bed to a chair (including a wheelchair)? 3  -SJ 3  -LC    Standing up from a chair using your arms (e.g., wheelchair, bedside chair)? 3  -SJ 3  -LC    Climbing 3-5 steps with a railing? 3  -SJ 2  -LC    To walk in hospital room? 3  -SJ 3  -LC    AM-PAC 6 Clicks Score (PT) 18  -SJ 17  -LC    Row Name 11/18/21 1618          Functional Assessment    Outcome Measure Options AM-PAC 6 Clicks Basic Mobility (PT)  -           User Key  (r) = Recorded By, (t) = Taken By, (c) = Cosigned By    Initials Name Provider Type    Meghna Bergeron, PAVEL Physical Therapist    Danni Degroot RN Registered Nurse                             Physical Therapy Education                 Title: PT OT SLP Therapies (Done)     Topic: Physical Therapy (Done)     Point: Mobility training (Done)     Learning Progress Summary           Patient Acceptance, E, VU,NR by  at 11/18/2021 1619   Family Acceptance, E, VU,NR by  at 11/18/2021 1619                   Point: Home exercise program (Done)     Learning Progress Summary           Patient Acceptance, E, VU,NR by  at 11/18/2021 1619   Family Acceptance, E, VU,NR by  at 11/18/2021 1619                   Point: Body mechanics (Done)     Learning Progress Summary            Patient Acceptance, E, VU,NR by  at 2021 161   Family Acceptance, E, VU,NR by  at 2021 1619                   Point: Precautions (Done)     Learning Progress Summary           Patient Acceptance, E, VU,NR by  at 2021 161   Family Acceptance, E, VU,NR by  at 2021 161                               User Key     Initials Effective Dates Name Provider Type Discipline     21 -  Meghna Plata PT Physical Therapist PT              PT Recommendation and Plan  Planned Therapy Interventions (PT): balance training, bed mobility training, gait training, home exercise program, strengthening, patient/family education, transfer training  Plan of Care Reviewed With: patient, daughter  Outcome Summary: PT eval completed. Pt presents with difficulty walking per PLOF. Pt amb within room with RW and CGA, distance limited by fatigue. A-fib with activity. O2 sat stable on RA. PT rec d/c home with HHPT.     Time Calculation:    PT Charges     Row Name 21             Time Calculation    Start Time 1435  -      PT Non-Billable Time (min) 46 min  -      PT Received On 21  -      PT Goal Re-Cert Due Date 21  -            User Key  (r) = Recorded By, (t) = Taken By, (c) = Cosigned By    Initials Name Provider Type     Meghna Plata PT Physical Therapist              Therapy Charges for Today     Code Description Service Date Service Provider Modifiers Qty    09727561131 HC PT EVAL MOD COMPLEXITY 4 2021 Meghna Plata PT GP 1          PT G-Codes  Outcome Measure Options: AM-PAC 6 Clicks Basic Mobility (PT)  AM-PAC 6 Clicks Score (PT): 18  AM-PAC 6 Clicks Score (OT): 16    Meghna Plata PT  2021      Electronically signed by Meghna Plata PT at 21 1620          Occupational Therapy Notes (most recent note)      Daniel Brothers, OT at 21 0750          Patient Name: Karla Cristobal  : 1935    MRN: [id number]                               Today's Date: 11/17/2021       Admit Date: 11/15/2021    Visit Dx:     ICD-10-CM ICD-9-CM   1. Left leg cellulitis  L03.116 682.6   2. History of insulin dependent diabetes mellitus  Z86.39 V12.29   3. Chronic kidney disease, unspecified CKD stage  N18.9 585.9   4. Chronic a-fib (HCC)  I48.20 427.31   5. Anticoagulated  Z79.01 V58.61     Patient Active Problem List   Diagnosis   • Anemia due to chronic kidney disease   • Chronic atrial fibrillation (HCC)   • CKD (chronic kidney disease), stage IV (HCC)   • Diabetic nephropathy (HCC)   • Diabetic retinopathy (HCC)   • Malignant neoplasm of endometrium (HCC)   • Hyperlipidemia LDL goal <100   • Essential hypertension   • Hypothyroidism   • Insomnia   • Leukocytosis   • Memory impairment   • Nausea   • Pulmonary embolism (HCC)   • Sleep apnea   • Squamous cell carcinoma of skin   • CHF (NYHA class IV, ACC/AHA stage D) ([facility])   • Ischemic heart disease   • CAD (coronary artery disease)   • DVT of lower extremity (deep venous thrombosis) ([facility])   • Chronic systolic congestive heart failure (HCC)   • Morbidly obese (HCC)   • Ischemic cardiomyopathy   • Diabetes mellitus, type II, insulin dependent (HCC)   • Exudative age-related macular degeneration, right eye, with active choroidal neovascularization (HCC)   • Hypoxia   • Cellulitis of left lower extremity   • Left leg cellulitis     Past Medical History:   Diagnosis Date   • Acute bronchitis    • Arthritis    • Atrial fibrillation (HCC)    • CAD (coronary artery disease)     s/p MI 2005; 3 stents inserted    • Change in mole    • Change in mole    • Change in nevus 6/16/2016   • Chronic kidney disease     stage IV-sees Dr Bk Mckeon every 3-4 months    • Chronic renal disease, stage 4, severely decreased glomerular filtration rate between 15-29 mL/min/1.73 square meter ([facility])    • Chronic systolic heart failure (HCC)    • Congestive heart disease (HCC)    • Conjunctivitis    • Deep vein thrombosis of lower  extremity (HCC)    • Diabetes mellitus (HCC)    • Diabetes mellitus (HCC) 6/16/2016    Impression: 03/15/2016 - blood sugar 166 and hgn a1c 7.3%  is up to date with eye exam  neuropathy with callus, no ulcer  some scratches feet  ur alb due and ordered  no change other than provided samples of toujeo because she feels like lantus worked much better than levemir, she has tried titrating levemir and it is less effective  continue testing and f/u 3-4 months  Impression: 11/23/2015 - bl   • Diabetic foot ulcer (HCC) 6/16/2016   • Dog bite of hand    • Easy bruising    • Endometrial cancer ([facility])     partial hysterectomy; radiation as well    • Foot pain    • Foot pain 6/16/2016    Description: lancinating- worse but not consistent enough to want rx   • Fracture of hip ([facility])    • Generalized ischemic myocardial dysfunction 6/16/2016   • Glaucoma     drops daily    • Hyperlipidemia    • Hypertension    • Hypothyroidism    • Insomnia    • Ischemic heart disease    • Macular degeneration    • Methicillin resistant Staphylococcus aureus infection 6/16/2016   • Myocardial infarction ([facility]) 2005   • Nausea with vomiting    • Pericardial effusion    • Shortness of breath 6/16/2016    Impression: 03/24/2015 - chronic. On 2 l oxygen at night. check cbc, bnp;    • Skin cancer, basal cell     nose, leg    • Skin lesion 6/16/2016    Impression: 03/24/2015 - refer derm for eval- seb kerat ant tib and ?ak medially with redness and irritation;    • Sleep apnea     oxygen at night due to low sats but no cpap   • Type 2 diabetes mellitus ([facility])    • UTI (urinary tract infection) 06/12/2020    currently taking antibiotics-patient's daughter notified Dr Beal's office and office said it should not delay generator change      Past Surgical History:   Procedure Laterality Date   • CARDIAC CATHETERIZATION     • CARDIAC DEFIBRILLATOR PLACEMENT     • CARDIAC ELECTROPHYSIOLOGY PROCEDURE N/A 7/17/2020    Procedure: ICD BIV  generator change-  SJM- 3-6 weeks;  Surgeon: Tano Beal MD;  Location: [facility] INVASIVE LOCATION;  Service: Cardiology;  Laterality: N/A;   • CHOLECYSTECTOMY     • CORONARY ARTERY BYPASS GRAFT  2000   • CORONARY STENT PLACEMENT      3 stents    • EYE SURGERY Bilateral     cataract extraction    • HIP SURGERY Left     x3   • HYSTERECTOMY      partial    • KNEE ARTHROPLASTY Bilateral    • PACEMAKER IMPLANTATION     • TONSILLECTOMY        General Information     Row Name 11/17/21 0842          OT Time and Intention    Document Type evaluation  -CS     Mode of Treatment occupational therapy  -CS     Row Name 11/17/21 0842          General Information    Patient Profile Reviewed yes  -CS     Prior Level of Function independent:; bed mobility; transfer; all household mobility; min assist:; feeding; grooming; bathing; mod assist:; dressing; dependent:; driving  Rollator for mobility in house (ramp to enter), shower chair in walk-in shower for bathing, raised toilet seat and grab bars, Dtr assists 2-3x/wk for bathing and grooming  -CS     Existing Precautions/Restrictions fall; other (see comments)  LLE cellulitus (WBAT), remote B TKA, low vision  -CS     Barriers to Rehab medically complex; previous functional deficit  -CS     Row Name 11/17/21 0842          Living Environment    Lives With grandchild(karyna); other relative(s); other (see comments)  Grandson and his spouse, two grandchildren, Dtr lives an hour away and visits to assist w/ bathing/grooming ADLs  -CS     Row Name 11/17/21 0842          Home Main Entrance    Number of Stairs, Main Entrance none  ramp to enter  -CS     Row Name 11/17/21 0842          Stairs Within Home, Primary    Stairs, Within Home, Primary walk-in shower w/ seating, raised toilet seat and grab bars  -CS     Number of Stairs, Within Home, Primary none  -CS     Row Name 11/17/21 0842          Cognition    Orientation Status (Cognition) oriented x 4  -CS     Row Name 11/17/21 0842          Safety Issues,  Functional Mobility    Impairments Affecting Function (Mobility) endurance/activity tolerance; pain; strength  -CS     Comment, Safety Issues/Impairments (Mobility) demo'd good safety awareness and sequencing w/ rollator use  -CS           User Key  (r) = Recorded By, (t) = Taken By, (c) = Cosigned By    Initials Name Provider Type    CS Daniel Brothers OT Occupational Therapist                 Mobility/ADL's     Row Name 11/17/21 0847          Bed Mobility    Bed Mobility supine-sit; scooting/bridging  -CS     Scooting/Bridging [place] (Bed Mobility) minimum assist (75% patient effort); verbal cues  -CS     Supine-Sit [place] (Bed Mobility) minimum assist (75% patient effort); verbal cues  -CS     Bed Mobility, Safety Issues decreased use of legs for bridging/pushing  -CS     Assistive Device (Bed Mobility) bed rails; head of bed elevated  -CS     Comment (Bed Mobility) appropriate sequencing, assist at trunk during supine-sit, cues for scoot to EOB for improved posture/balance  -CS     Row Name 11/17/21 0847          Transfers    Transfers sit-stand transfer; toilet transfer; bed-chair transfer  -CS     Comment (Transfers) minimal cues for safety awareness and sequencing, good use of grab bars w/ no cues  -CS     Bed-Chair [place] (Transfers) contact guard; verbal cues  -CS     Assistive Device (Bed-Chair Transfers) walker, 4-wheeled  -CS     Sit-Stand [place] (Transfers) contact guard; verbal cues  -CS     [place] Level (Toilet Transfer) contact guard; verbal cues  -CS     Assistive Device (Toilet Transfer) grab bars/safety frame; walker, 4-wheeled; raised toilet seat  -CS     Row Name 11/17/21 0847          Sit-Stand Transfer    Assistive Device (Sit-Stand Transfers) walker, 4-wheeled  -CS     Row Name 11/17/21 0847          Functional Mobility    Functional Mobility- Comment CGA for in-room distance to toilet and recliner  -     Row Name 11/17/21 0847          Activities of Daily  Living    BADL Assessment/Intervention upper body dressing; lower body dressing; grooming; feeding; toileting  -CS     Row Name 11/17/21 08          Upper Body Dressing Assessment/Training    [place] Level (Upper Body Dressing) don; pajama/robe; minimum assist (75% patient effort)  -CS     Position (Upper Body Dressing) edge of bed sitting  -CS     Row Name 11/17/21 [facility]          Lower Body Dressing Assessment/Training    [place] Level (Lower Body Dressing) don; socks; dependent (less than 25% patient effort); verbal cues; nonverbal cues (demo/gesture)  -CS     Position (Lower Body Dressing) edge of bed sitting  -CS     Comment (Lower Body Dressing) reports no shoes/socks at baseline, provided reacher and LHS along w/ education/demo for improved reach to distal BLEs - requires further training, not interested in sock-aid  -CS     Row Name 11/17/21 08          Grooming Assessment/Training    [place] Level (Grooming) wash face, hands; set up; hair care, combing/brushing; maximum assist (25% patient effort)  -CS     Comment (Grooming) Pt reach to posterior head limitted by strength deficit, discussed availability of long handled samuel  -CS     Row Name 11/17/21 08          Self-Feeding Assessment/Training    [place] Level (Feeding) liquids to mouth; scoop food and bring to mouth; independent; prepare tray/open items; moderate assist (50% patient effort)  -CS     Position (Self-Feeding) supported sitting  -CS     Comment (Feeding) difficulty with lidded items  -CS     Row Name 11/17/21 08          Toileting Assessment/Training    [place] Level (Toileting) adjust/manage clothing; perform perineal hygiene; standby assist  -CS     Position (Toileting) supported standing; unsupported sitting  -CS           User Key  (r) = Recorded By, (t) = Taken By, (c) = Cosigned By    Initials Name Provider Type    Daniel Zelaya, OT Occupational Therapist               Obj/Interventions     Row Name  11/17/21 0852          Sensory Assessment (Somatosensory)    Sensory Assessment (Somatosensory) UE sensation intact  -     Row Name 11/17/21 0852          Vision Assessment/Intervention    Visual Impairment/Limitations visual/perceptual impairments present; corrective lenses full-time; other (see comments)  macular degeneration  -     Row Name 11/17/21 0852          Range of Motion Comprehensive    General Range of Motion bilateral upper extremity ROM WFL  -CS     Comment, General Range of Motion PROM WFL, AROM limitted by strength deficit  -     Row Name 11/17/21 0852          Strength Comprehensive (MMT)    General Manual Muscle Testing (MMT) Assessment upper extremity strength deficits identified  -     Comment, General Manual Muscle Testing (MMT) Assessment BUE grossly 4-/5  -     Row Name 11/17/21 0852          Shoulder (Therapeutic Exercise)    Shoulder (Therapeutic Exercise) AAROM (active assistive range of motion)  -     Shoulder AAROM (Therapeutic Exercise) bilateral; flexion; extension; scapular elevation; scapular retraction; 10 repetitions; other (see comments)  2 sets - assist to reach full range  -     Row Name 11/17/21 0852          Motor Skills    Motor Skills coordination; functional endurance  -     Coordination bilateral; upper extremity; bimanual skills; WFL  -     Functional Endurance RA throughout session w/ O2 sats remaining >92%, SOA observed at conclusion of activities - resolved quickly, 2Lnc use at baseline at sleep  -     Row Name 11/17/21 0852          Balance    Balance Assessment sitting dynamic balance; sitting static balance; standing static balance; standing dynamic balance  -     Static Sitting Balance WFL; unsupported; sitting, edge of bed  -     Dynamic Sitting Balance WFL; unsupported; sitting, edge of bed  -     Static Standing Balance WFL; supported; standing  -     Dynamic Standing Balance mild impairment; supported; standing  -CS     Balance  Interventions sitting; sit to stand; standing; occupation based/functional task  -CS     Row Name 11/17/21 0852          Therapeutic Exercise    Therapeutic Exercise shoulder; elbow/forearm  -CS           User Key  (r) = Recorded By, (t) = Taken By, (c) = Cosigned By    Initials Name Provider Type    Daniel Zelaya, OT Occupational Therapist               Goals/Plan     Row Name 11/17/21 0900          Bed Mobility Goal 1 (OT)    Activity/Assistive Device (Bed Mobility Goal 1, OT) sit to supine; supine to sit; scooting  -CS     [place] Level/Cues Needed (Bed Mobility Goal 1, OT) standby assist  -CS     Time Frame (Bed Mobility Goal 1, OT) long term goal (LTG); 10 days  -CS     Progress/Outcomes (Bed Mobility Goal 1, OT) goal ongoing  -CS     Row Name 11/17/21 0900          Transfer Goal 1 (OT)    Activity/Assistive Device (Transfer Goal 1, OT) sit-to-stand/stand-to-sit; toilet; rollator  -CS     [place] Level/Cues Needed (Transfer Goal 1, OT) modified independence  -CS     Time Frame (Transfer Goal 1, OT) long term goal (LTG); 10 days  -CS     Strategies/Barriers (Transfers Goal 1, OT) rollator in room  -CS     Progress/Outcome (Transfer Goal 1, OT) goal ongoing  -CS     Row Name 11/17/21 0900          Dressing Goal 1 (OT)    Activity/Device (Dressing Goal 1, OT) lower body dressing; long-handled shoe horn; reacher  -CS     [place]/Cues Needed (Dressing Goal 1, OT) set-up required; minimum assist (75% or more patient effort)  -CS     Time Frame (Dressing Goal 1, OT) long term goal (LTG); 10 days  -CS     Strategies/Barriers (Dressing Goal 1, OT) pants, shoes, - no socks at baseline  -CS     Progress/Outcome (Dressing Goal 1, OT) goal ongoing  -CS     Row Name 11/17/21 0900          Strength Goal 1 (OT)    Strength Goal 1 (OT) Pt will tolerate 1/8reps BUE HEP w/ appropriate resistance by discharge to promote increased fxl strength for ADL performance and safe transfers  -CS     Time Frame (Strength  Goal 1, OT) long term goal (LTG); 10 days  -CS     Progress/Outcome (Strength Goal 1, OT) goal ongoing  -CS     Row Name 11/17/21 0900          Therapy Assessment/Plan (OT)    Planned Therapy Interventions (OT) functional balance retraining; occupation/activity based interventions; ROM/therapeutic exercise; strengthening exercise; transfer/mobility retraining; adaptive equipment training  -CS           User Key  (r) = Recorded By, (t) = Taken By, (c) = Cosigned By    Initials Name Provider Type    CS Daniel Brothers OT Occupational Therapist               Clinical Impression     Row Name 11/17/21 0855          Pain Assessment    Additional Documentation Pain Scale: FACES Pre/Post-Treatment (Group)  -CS     Row Name 11/17/21 0858          Pain Scale: FACES Pre/Post-Treatment    Pain: FACES Scale, Pretreatment 2-->hurts little bit  -CS     Posttreatment Pain Rating 4-->hurts little more  -CS     Pain Location - Side Left  -CS     Pain Location - Orientation lower  -CS     Pain Location extremity  -CS     Pre/Posttreatment Pain Comment intermittent pain (zingers), nursing aware and managing, tolerated eval  -CS     Row Name 11/17/21 0883          Plan of Care Review    Plan of Care Reviewed With patient  -CS     Progress no change  OT eval  -CS     Outcome Summary Initial OT evaluation completed. Pt presents w/ LLE pain, decreased activity tolerance, generalized weakness, and mild balance deficit warranting skilled IP OT services to promote return to PLOF. Pt required [place] for bed mobility, CGA for transfers and functional distances w/ rollator, SBA for toileting, and ModA/MARIE for seated grooming and food tray prep. Pt issued AE and demo/training to improve reach to distal BLEs, tolerated BUE AAROM HEP to full shoulder range(s). Recommend d/c to home w/ assist and HHOT.  -CS     Row Name 11/17/21 0825          Therapy Assessment/Plan (OT)    Rehab Potential (OT) good, to achieve stated therapy goals  -CS     Criteria  for Skilled Therapeutic Interventions Met (OT) yes; meets criteria; skilled treatment is necessary  -CS     Therapy Frequency (OT) daily  -CS     Row Name 11/17/21 0855          Therapy Plan Review/Discharge Plan (OT)    Anticipated Discharge Disposition (OT) home with assist; home with home health  -CS     Row Name 11/17/21 0855          Vital Signs    Pre Systolic BP Rehab 144  RN cleared for eval, VSS on RA  -CS     Pre Treatment Diastolic BP 70  -CS     Pretreatment Heart Rate (beats/min) 74  -CS     Pre SpO2 (%) 96  -CS     O2 Delivery Pre Treatment room air  -CS     Intra SpO2 (%) 92  -CS     O2 Delivery Intra Treatment room air  -CS     Post SpO2 (%) 94  -CS     O2 Delivery Post Treatment room air  -CS     Pre Patient Position Supine  -CS     Intra Patient Position Standing  -CS     Post Patient Position Sitting  -CS     Row Name 11/17/21 0855          Positioning and Restraints    Pre-Treatment Position in bed  -CS     Post Treatment Position chair  -CS     In Chair notified nsg; reclined; sitting; call light within reach; encouraged to call for assist; exit alarm on; with family/caregiver; waffle cushion; legs elevated; heels elevated  -CS           User Key  (r) = Recorded By, (t) = Taken By, (c) = Cosigned By    Initials Name Provider Type    CS Daniel Brothers, OT Occupational Therapist               Outcome Measures     Row Name 11/17/21 0902          How much help from another is currently needed...    Putting on and taking off regular lower body clothing? 2  -CS     Bathing (including washing, rinsing, and drying) 2  -CS     Toileting (which includes using toilet bed pan or urinal) 3  -CS     Putting on and taking off regular upper body clothing 3  -CS     Taking care of personal grooming (such as brushing teeth) 3  -CS     Eating meals 3  -CS     AM-PAC 6 Clicks Score (OT) 16  -CS     Row Name 11/17/21 0834          How much help from another person do you currently need...    Turning from your back  to your side while in flat bed without using bedrails? 3  -LC     Moving from lying on back to sitting on the side of a flat bed without bedrails? 3  -LC     Moving to and from a bed to a chair (including a wheelchair)? 3  -LC     Standing up from a chair using your arms (e.g., wheelchair, bedside chair)? 3  -LC     Climbing 3-5 steps with a railing? 2  -LC     To walk in hospital room? 3  -LC     AM-PAC 6 Clicks Score (PT) 17  -LC     Row Name 11/17/21 0902          Functional Assessment    Outcome Measure Options AM-PAC 6 Clicks Daily Activity (OT)  -CS           User Key  (r) = Recorded By, (t) = Taken By, (c) = Cosigned By    Initials Name Provider Type    Danni Degroot RN Registered Nurse    Daniel Zelaya OT Occupational Therapist                Occupational Therapy Education                 Title: PT OT SLP Therapies (Done)     Topic: Occupational Therapy (Done)     Point: ADL training (Done)     Description:   Instruct learner(s) on proper safety adaptation and remediation techniques during self care or transfers.   Instruct in proper use of assistive devices.              Learning Progress Summary           Patient Acceptance, E,D, VU,NR by  at 11/17/2021 0902    Comment: OT role, AE for LB dressing, BUE HEP                   Point: Home exercise program (Done)     Description:   Instruct learner(s) on appropriate technique for monitoring, assisting and/or progressing therapeutic exercises/activities.              Learning Progress Summary           Patient Acceptance, E,D, VU,NR by CS at 11/17/2021 0902    Comment: OT role, AE for LB dressing, BUE HEP                   Point: Precautions (Done)     Description:   Instruct learner(s) on prescribed precautions during self-care and functional transfers.              Learning Progress Summary           Patient Acceptance, E,D, VU,NR by  at 11/17/2021 0902    Comment: OT role, AE for LB dressing, BUE HEP                   Point: Body mechanics  (Done)     Description:   Instruct learner(s) on proper positioning and spine alignment during self-care, functional mobility activities and/or exercises.              Learning Progress Summary           Patient Acceptance, E,D, VU,NR by  at 11/17/2021 0902    Comment: OT role, AE for LB dressing, BUE HEP                               User Key     Initials Effective Dates Name Provider Type Discipline     06/16/21 -  Daniel Brothers OT Occupational Therapist OT              OT Recommendation and Plan  Planned Therapy Interventions (OT): functional balance retraining, occupation/activity based interventions, ROM/therapeutic exercise, strengthening exercise, transfer/mobility retraining, adaptive equipment training  Therapy Frequency (OT): daily  Plan of Care Review  Plan of Care Reviewed With: patient  Progress: no change (OT eval)  Outcome Summary: Initial OT evaluation completed. Pt presents w/ LLE pain, decreased activity tolerance, generalized weakness, and mild balance deficit warranting skilled IP OT services to promote return to PLOF. Pt required [place] for bed mobility, CGA for transfers and functional distances w/ rollator, SBA for toileting, and ModA/MARIE for seated grooming and food tray prep. Pt issued AE and demo/training to improve reach to distal BLEs, tolerated BUE AAROM HEP to full shoulder range(s). Recommend d/c to home w/ assist and HHOT.     Time Calculation:    Time Calculation- OT     Row Name 11/17/21 0903             Time Calculation- OT    OT Start Time 0750  -CS      OT Received On 11/17/21  -      OT Goal Re-Cert Due Date 11/27/21  -CS              Timed Charges    26338 - OT Therapeutic Exercise Minutes 4  -CS      75067 - OT Self Care/Mgmt Minutes 6  -CS              Total Minutes    Timed Charges Total Minutes 10  -CS       Total Minutes 10  -CS            User Key  (r) = Recorded By, (t) = Taken By, (c) = Cosigned By    Initials Name Provider Type    CS Daniel Brothers OT  Occupational Therapist              Therapy Charges for Today     Code Description Service Date Service Provider Modifiers Qty    37493882893 HC OT SELF CARE/MGMT/TRAIN EA 15 MIN 2021 Daniel Brothers OT GO 1    93043486068 HC OT EVAL MOD COMPLEXITY 4 2021 Daniel Brothers OT GO 1               Daniel Brothers OT  2021    Electronically signed by Daniel Brothers OT at 21 0904          Discharge Summary      Yolanda Gomez MD at 21 1641              [facility]  DISCHARGE SUMMARY    Patient Name: Karla Cristobal  : 1935  MRN: [id number]    Date of Admission: 11/15/2021  6:52 PM  Date of Discharge:  2021  Primary Care Physician: Giselle Guzman DO    Consults     Date and Time Order Name Status Description    2021  9:39 AM Inpatient Gastroenterology Consult Completed     2021 12:55 AM Inpatient Infectious Diseases Consult Completed           Hospital Course     Presenting Problem:   Left leg cellulitis [L03.116]    Active Hospital Problems    Diagnosis  POA   • **Cellulitis of left lower extremity [L03.116]  Yes   • Left leg cellulitis [L03.116]  Yes   • Food impaction of esophagus [T18.128A]  No   • Diabetes mellitus, type II, insulin dependent (HCC) [E11.9, Z79.4]  Not Applicable   • Chronic systolic congestive heart failure (HCC) [I50.22]  Yes   • CAD (coronary artery disease) [I25.10]  Yes   • Chronic atrial fibrillation (HCC) [I48.20]  Yes   • CKD (chronic kidney disease), stage IV (HCC) [N18.4]  Yes   • Diabetic nephropathy (HCC) [E11.21]  Yes   • Hyperlipidemia LDL goal <100 [E78.5]  Yes   • Essential hypertension [I10]  Yes   • Hypothyroidism [E03.9]  Yes      Resolved Hospital Problems   No resolved problems to display.          Hospital Course:  Karla Cristobal is a 86 y.o. female with atrial fibrillation (on Eliquis), coronary artery disease s/p stents ('05), hypertension, hyperlipidemia, chronic kidney  disease, congestive heart failure, insulin-dependent type 2 diabetes, hypothyroidism, and AFTAB who presented to [facility] ED for complaint of left lower extremity pain after falling and landing with her left shin on the wheel of her walker.  Since then she has had progressive erythema and pain in the left shin.      Left Lower Extremity Cellulitis  Blood cultures positive for staph hominis in 4/4 bottles  -CT LLE suggestive of hematoma with cellulitis.  No gas or foreign bodies.  -Improved on daptomycin and ceftriaxone-transition to PO linezolid x 7 days and follow up with Dr. Coelho the following week  -Staph hominis on blood culture suspected to be skin contaminant  -TTE without evidence of vegetation  -If she were to have fevers or clinical decline following completion of antibiotics, would have to reconsider possibility of pacemaker infection  -History of MRSA bacteremia/pacemaker lead infection (remote ~2005)     Food impaction  -GI evaluated with EGD.  Showed apparent indentation of upper esophagus from anterior osteophyte and short segment of Armas's esophagus.  GI recommends outpatient EGD with Armas's biopsies and esophageal dilation in 1 month off anticoagulation.  -Patient follows with Dr. Vazquez so advised her and daughter to follow up to discuss repeat endoscopy off anticoagulation     Chronic atrial fibrillation  CAD  Chronic systolic CHF, compensated  Hx DVT/PE  -Continue Eliquis  -Continue asa, statin  -Prior LVEF as low as 25%     Chronic Kidney Disease (stage IV)  -Creatinine at baseline     Type 2 Diabetes Mellitus, with long-term use of insulin  Diabetic Neuropathy  -A1C 7.4%  -SSI     Hypertension  Hyperlipidemia  -Continue home Coreg, Imdur  -Continue statin  -Resume home lasix and spironolactone     Hypothyroidism  -Continue home Synthroid  -TSH, T4 WNL      Discharge Follow Up Recommendations for outpatient labs/diagnostics:  Follow up with PCP 1 week  Follow up with Dr. Coelho  after completion of antibiotics  Follow up with Dr. Vazquez to discuss repeat EGD    Day of Discharge     HPI:   Feels better today.  Pain medication has helped.  Wants to get home before the Synchronized game.    Review of Systems  Gen- No fevers, chills  CV- No chest pain, palpitations  Resp- No cough, dyspnea  GI- No N/V/D, abd pain      Vital Signs:   Temp:  [97 °F (36.1 °C)-98.1 °F (36.7 °C)] 97 °F (36.1 °C)  Heart Rate:  [69-98] 79  Resp:  [16-18] 18  BP: (128-163)/(78-99) 145/86     Physical Exam:  Constitutional: No acute distress, awake, alert, sitting up in chair  HENT: NCAT, mucous membranes moist  Respiratory: Clear to auscultation bilaterally, respiratory effort normal   Cardiovascular: RRR, no murmurs, rubs, or gallops  Gastrointestinal: Positive bowel sounds, soft, nontender, nondistended  Musculoskeletal: No bilateral ankle edema  Psychiatric: Appropriate affect, cooperative  Neurologic: Cranial Nerves grossly intact to confrontation, speech clear  Skin: Erythema over left shin improving    Pertinent  and/or Most Recent Results     LAB RESULTS:      Lab 11/18/21  1352 11/17/21  1448 11/16/21  1403 11/15/21  2246 11/15/21  2028 11/15/21  1926   WBC 6.25 6.52 4.63  --   --  5.70   HEMOGLOBIN 12.9 14.0 12.5  --   --  13.3   HEMATOCRIT 41.4 43.6 38.8  --   --  41.8   PLATELETS 121* 126* 110*  --   --  133*   NEUTROS ABS 5.50 5.65 2.70  --   --  3.17   IMMATURE GRANS (ABS)  --  0.01 0.01  --   --  0.01   LYMPHS ABS  --  0.65* 1.32  --   --  1.76   MONOS ABS  --  0.20 0.45  --   --  0.54   EOS ABS 0.00 0.00 0.12  --   --  0.17   MCV 93.7 92.0 91.9  --   --  91.7   PROCALCITONIN  --   --   --   --  0.09  --    LACTATE  --   --  2.2* 2.5*  --   --          Lab 11/18/21  1352 11/17/21  1448 11/16/21  1403 11/15/21  2028   SODIUM 135* 137 140 144   POTASSIUM 4.3 4.5 4.2 4.1   CHLORIDE 98 101 102 105   CO2 22.0 22.0 22.0 28.0   ANION GAP 15.0 14.0 16.0* 11.0   BUN 67* 69* 63* 66*   CREATININE 2.51* 2.70* 2.36* 2.35*    GLUCOSE 364* 257* 240* 111*   CALCIUM 8.7 9.1 8.8 9.0   HEMOGLOBIN A1C  --   --  7.40*  --    TSH  --   --  1.950  --          Lab 11/15/21  2028   TOTAL PROTEIN 6.9   ALBUMIN 3.30*   GLOBULIN 3.6   ALT (SGPT) 8   AST (SGOT) 15   BILIRUBIN 1.1   ALK PHOS 236*         Lab 11/15/21  1926   PROBNP 4,108.0*                 Brief Urine Lab Results  (Last result in the past 365 days)      Color   Clarity   Blood   Leuk Est   Nitrite   Protein   CREAT   Urine HCG        09/27/21 1116 Yellow   Clear   Negative   Trace   Negative   Negative               Microbiology Results (last 10 days)     Procedure Component Value - Date/Time    COVID PRE-OP / PRE-PROCEDURE SCREENING ORDER (NO ISOLATION) - Swab, Nasopharynx [[id number]]  (Normal) Collected: 11/15/21 2300    Lab Status: Final result Specimen: Swab from Nasopharynx Updated: 11/16/21 0126    Narrative:      The following orders were created for panel order COVID PRE-OP / PRE-PROCEDURE SCREENING ORDER (NO ISOLATION) - Swab, Nasopharynx.  Procedure                               Abnormality         Status                     ---------                               -----------         ------                     COVID-19 and FLU A/B PCR...[[id number]]  Normal              Final result                 Please view results for these tests on the individual orders.    COVID-19 and FLU A/B PCR - Swab, Nasopharynx [[id number]]  (Normal) Collected: 11/15/21 2300    Lab Status: Final result Specimen: Swab from Nasopharynx Updated: 11/16/21 0126     COVID19 Not Detected     Influenza A PCR Not Detected     Influenza B PCR Not Detected    Narrative:      Fact sheet for providers: https://www.fda.gov/media/680977/download    Fact sheet for patients: https://www.fda.gov/media/861295/download    Test performed by PCR.    Blood Culture - Blood, Arm, Right [[id number]]  (Abnormal)  (Susceptibility) Collected: 11/15/21 2245    Lab Status: Final result Specimen: Blood from Arm, Right Updated:  11/18/21 1019     Blood Culture Staphylococcus hominis ssp hominis     Isolated from Aerobic and Anaerobic Bottles     Gram Stain Anaerobic Bottle Gram positive cocci in pairs and clusters      Aerobic Bottle Gram positive cocci in pairs and clusters    Susceptibility      Staphylococcus hominis ssp hominis     TRUDY     Oxacillin Resistant     Vancomycin Susceptible                         Blood Culture ID, PCR - Blood, Arm, Right [[id number]]  (Abnormal) Collected: 11/15/21 2245    Lab Status: Final result Specimen: Blood from Arm, Right Updated: 11/16/21 1927     BCID, PCR Staph spp, not aureus or lugdunesis. Identification by BCID2 PCR.    Blood Culture - Blood, Arm, Right [[id number]]  (Abnormal) Collected: 11/15/21 2235    Lab Status: Final result Specimen: Blood from Arm, Right Updated: 11/18/21 1020     Blood Culture Staphylococcus hominis ssp hominis     Isolated from Aerobic and Anaerobic Bottles     Gram Stain Anaerobic Bottle Gram positive cocci in pairs and clusters      Aerobic Bottle Gram positive cocci in pairs and clusters    Narrative:      Refer to previous blood culture collected on 11/15/2021 2245 for MICs          Adult Transthoracic Echo Complete W/ Cont if Necessary Per Protocol    Result Date: 11/17/2021  · Left ventricular ejection fraction appears to be 51 - 55%. Left ventricular systolic function is low normal. · Left ventricular diastolic function is consistent with (grade III w/high LAP) restrictive pattern. · The right ventricular cavity is moderate to severely dilated. · The right atrial cavity is mildly dilated. · Moderate tricuspid valve regurgitation is present. · Mild to moderate mitral regurgitation · Estimated right ventricular systolic pressure from tricuspid regurgitation is mildly elevated (35-45 mmHg).      XR Tibia Fibula 2 View Left    Result Date: 11/15/2021  CR Tibia Fibula 2 Vws LT INDICATION: Acute left leg pain. Trauma COMPARISON: None available. FINDINGS: AP and lateral  views of the left tibia/fibula. No definite acute fracture or subluxation. There are changes of total knee arthroplasty. Hardware is intact. There is vascular calcification. No foreign body.     No acute fracture or subluxation. Signer Name: Angelique Kelley MD  Signed: 11/15/2021 8:23 PM  Workstation Name: [id number]  [facility]    Duplex Venous Lower Extremity - Left    Result Date: 11/16/2021  · Normal left lower extremity venous duplex scan.      CT Lower Extremity Left Without Contrast    Result Date: 11/15/2021  CT LE LT WO INDICATION:  Lower extremity cellulitis. Fluid-filled lesion on physical exam. TECHNIQUE: CT of the left lower leg and foot without IV contrast. Coronal and sagittal reconstructions were obtained.  Radiation dose reduction techniques included automated exposure control or exposure modulation based on body size. Count of known CT and cardiac nuc med studies performed in previous 12 months: 0.  COMPARISON:  Radiographs same day FINDINGS: Patient has a left knee arthroplasty. This causes extensive streak artifact in the proximal lower leg. This appears appropriately positioned radiographically. No acute fracture or malalignment is identified. There is no evidence of osteomyelitis. There is extensive arterial calcification. There is generalized skin thickening and subcutaneous fat stranding in the lower leg which may reflect bland edema or cellulitis. In the subcutaneous tissues of the anterolateral mid lower leg, there is a soft tissue lesion that is likely a hematoma. This is superficial to the muscular fascia and measures about 3.5 cm in width by 1.3 cm in depth by about 2.7 cm in height. No soft tissue gas is identified. There are no radiopaque foreign bodies.     1. No acute fracture or malalignment. 2. Generalized soft tissue edema as above suggesting cellulitis or bland edema. No soft tissue gas. 3. Subcutaneous lesion in the anterolateral mid lower leg, most likely a  subcutaneous hematoma. It measures 3.5 x 1.3 x 2.7 cm. Signer Name: Josue Lr MD  Signed: 11/15/2021 11:22 PM  Workstation Name: [id number]  [facility]      Results for orders placed during the hospital encounter of 11/15/21    Duplex Venous Lower Extremity - Left    Interpretation Summary  · Normal left lower extremity venous duplex scan.      Results for orders placed during the hospital encounter of 11/15/21    Duplex Venous Lower Extremity - Left    Interpretation Summary  · Normal left lower extremity venous duplex scan.      Results for orders placed during the hospital encounter of 11/15/21    Adult Transthoracic Echo Complete W/ Cont if Necessary Per Protocol    Interpretation Summary  · Left ventricular ejection fraction appears to be 51 - 55%. Left ventricular systolic function is low normal.  · Left ventricular diastolic function is consistent with (grade III w/high LAP) restrictive pattern.  · The right ventricular cavity is moderate to severely dilated.  · The right atrial cavity is mildly dilated.  · Moderate tricuspid valve regurgitation is present.  · Mild to moderate mitral regurgitation  · Estimated right ventricular systolic pressure from tricuspid regurgitation is mildly elevated (35-45 mmHg).      Plan for Follow-up of Pending Labs/Results: None pending    Discharge Details        Discharge Medications      New Medications      Instructions Start Date   linezolid 600 MG tablet  Commonly known as: ZYVOX   600 mg, Oral, 2 Times Daily      oxyCODONE 5 MG immediate release tablet  Commonly known as: ROXICODONE   Take 1 tablet by mouth Every 6 (Six) Hours As Needed for Severe Pain.         Changes to Medications      Instructions Start Date   carvedilol 25 MG tablet  Commonly known as: COREG  What changed: how much to take   12.5 mg, Oral, 2 Times Daily With Meals      isosorbide mononitrate 30 MG 24 hr tablet  Commonly known as: IMDUR  What changed: when to take this    "30 mg, Oral, Daily         Continue These Medications      Instructions Start Date   atorvastatin 20 MG tablet  Commonly known as: LIPITOR   20 mg, Oral, Nightly      BASAGLAR KWIKPEN 100 UNIT/ML injection pen   INJECT 20 UNITS UNDER THE SKIN ONCE DAILY AS DIRECTED      cholecalciferol 25 MCG (1000 UT) tablet  Commonly known as: VITAMIN D3   1,000 Units, Oral, Daily      Eliquis 2.5 MG tablet tablet  Generic drug: apixaban   No dose, route, or frequency recorded.      eszopiclone 2 MG tablet  Commonly known as: LUNESTA   TAKE ONE TABLET BY MOUTH IMMEDIATELY BEFORE BEDTIME AS NEEDED FOR SLEEP      FreeStyle Lokesh 2 Sensor misc   1 each, Does not apply, Every 14 Days      furosemide 40 MG tablet  Commonly known as: LASIX   40 mg, Oral, Every 48 Hours      indapamide 2.5 MG tablet  Commonly known as: LOZOL   No dose, route, or frequency recorded.      Insulin Syringe-Needle U-100 31G X 5/16\" 0.5 ML misc  Commonly known as: TRUEplus Insulin Syringe   Use with Insulin Three Times daily      latanoprost 0.005 % ophthalmic solution  Commonly known as: XALATAN   1 drop, Both Eyes, Nightly      levothyroxine 112 MCG tablet  Commonly known as: Synthroid   112 mcg, Oral, Daily      melatonin 5 MG tablet tablet   5 mg, Oral, Nightly      multivitamins-minerals capsule capsule   1 capsule, Oral, 2 Times Daily      mupirocin 2 % ointment  Commonly known as: BACTROBAN   1 application, As Needed      NovoLOG 100 UNIT/ML injection  Generic drug: insulin aspart   INJECT 15 UNITS UNDER THE SKIN AS DIRECTED 3 TIMES DAILY BEFORE MEALS      omeprazole 40 MG capsule  Commonly known as: priLOSEC   omeprazole 40 mg capsule,delayed release      ondansetron 4 MG tablet  Commonly known as: ZOFRAN   4 mg, Oral, Every 12 Hours PRN      spironolactone 25 MG tablet  Commonly known as: ALDACTONE   12.5 mg, Oral, Every Morning      timolol 0.5 % ophthalmic solution  Commonly known as: TIMOPTIC   1 drop, Both Eyes, 2 Times Daily         Stop These " Medications    ciprofloxacin 250 MG tablet  Commonly known as: Cipro            Allergies   Allergen Reactions   • Bactrim [Sulfamethoxazole-Trimethoprim] GI Intolerance   • Lisinopril Cough   • Codeine Rash         [place] [facility] [place]:  Hospital:  Diet Order   Procedures   • Diet Soft Texture; Whole Foods; Thin; Consistent Carbohydrate, Cardiac, Renal          CODE STATUS:    Code Status and Medical Interventions:   Ordered at: 11/15/21 2229     Code Status (Patient has no pulse and is not breathing):    CPR (Attempt to Resuscitate)     Medical Interventions (Patient has pulse or is breathing):    Full Support       Future Appointments   Date Time Provider Department Center   11/29/2021 11:30 AM Giselle Martinez DO [facility]   12/1/2021  3:00 PM Ashok Ryder MD [facility]   12/16/2021  1:00 PM Tano Beal MD [facility]   3/9/2022  2:30 PM Giselle Martinez DO [facility]   9/14/2022  2:45 PM Giselle Martinez DO [facility]       Additional Instructions for the Follow-ups that You Need to Schedule     Ambulatory Referral to Home Health   As directed      Face to Face Visit Date: 11/18/2021    Follow-up provider for Plan of Care?: I will be treating the patient on an ongoing basis.  Please send me the Plan of Care for signature.    Follow-up provider: GISELLE MARTINEZ [[id number]]    Reason/Clinical Findings: Cellulitis of Left leg    Describe mobility limitations that make leaving home difficult: Impaired mobility    Nursing/Therapeutic Services Requested: Skilled Nursing Physical Therapy Occupational Therapy Speech Therapy    Skilled nursing orders: Medication education Pain management Wound care dressing/changes    PT orders: Therapeutic exercise Gait Training Strengthening Home safety assessment    Weight Bearing Status: Full Weight Bearing    Occupational orders: Activities of daily living Energy conservation Strengthening Home  safety assessment    SLP orders: Dysphagia therapy    Frequency: 1 Week 1         Discharge Follow-up with PCP   As directed       Currently Documented PCP:    Giselle Guzman DO    PCP Phone Number:    [phone]     Follow Up Details: 1 week         Discharge Follow-up with Specified Provider: MORIAH Price; 2 Weeks   As directed      To: MORIAH Price    Follow Up: 2 Weeks         Discharge Follow-up with Specified Provider: Dr. Coelho at [facility]; 2 Weeks   As directed      To: Dr. Coelho at [facility]    Follow Up: 2 Weeks    Follow Up Details: Their office will arrange                     Yolanda Gomez MD  11/19/21      Time Spent on Discharge:  I spent  31  minutes on this discharge activity which included: face-to-face encounter with the patient, reviewing the data in the system, coordination of the care with the nursing staff as well as consultants, documentation, and entering orders.          Electronically signed by Yolanda Gomez MD at 11/19/21 3968

## 2021-11-24 ENCOUNTER — PATIENT OUTREACH (OUTPATIENT)
Dept: CASE MANAGEMENT | Facility: OTHER | Age: 86
End: 2021-11-24

## 2021-11-24 NOTE — OUTREACH NOTE
Ambulatory Case Management Note    Patient Outreach    F/u with pt regarding Home Health. She states home health nurse has been out to see her today for start of care, but thinks it was [facility] and not [facility].  Therapy will start next week.  Grandson lives with pt, so not alone.  Will f/u in approx  3 weeks.     Tiffany Fuentes RN  Ambulatory Case Management    11/24/2021, 16:02 EST

## 2021-11-27 PROCEDURE — 93296 REM INTERROG EVL PM/IDS: CPT | Performed by: INTERNAL MEDICINE

## 2021-11-27 PROCEDURE — 93295 DEV INTERROG REMOTE 1/2/MLT: CPT | Performed by: INTERNAL MEDICINE

## 2021-11-30 ENCOUNTER — READMISSION MANAGEMENT (OUTPATIENT)
Dept: CALL CENTER | Facility: HOSPITAL | Age: 86
End: 2021-11-30

## 2021-11-30 NOTE — OUTREACH NOTE
Medical Week 2 Survey      Responses   [facility] patient discharged from? [place]   Does the patient have one of the following disease processes/diagnoses(primary or secondary)? Other   Week 2 attempt successful? Yes   Call start time 1231   Discharge diagnosis LLE cellulitis, Armas's esophagus with food impaction, CHF   Call end time 1235   Person spoke with today (if not patient) and relationship Frida Vazquez, daughter   Meds reviewed with patient/caregiver? Yes   Is the patient having any side effects they believe may be caused by any medication additions or changes? No   Does the patient have all medications ordered at discharge? Yes   Is the patient taking all medications as directed (includes completed medication regime)? Yes   Does the patient have a primary care provider?  Yes   Has the patient kept scheduled appointments due by today? Yes   [facility]   Has home health visited the patient within 72 hours of discharge? Yes   Psychosocial issues? No   Did the patient receive a copy of their discharge instructions? Yes   Nursing interventions Reviewed instructions with patient   What is the patient's perception of their health status since discharge? Improving   Is the patient/caregiver able to teach back signs and symptoms related to disease process for when to call PCP? Yes   Is the patient/caregiver able to teach back signs and symptoms related to disease process for when to call 911? Yes   Is the patient/caregiver able to teach back the hierarchy of who to call/visit for symptoms/problems? PCP, Specialist, Home health nurse, Urgent Care, ED, 911 Yes   Additional teach back comments daughter states antibiotics have been rough on GI system, has finished meds, cellulitis improving,  visiting, on way to ID now for evaluation   Week 2 Call Completed? Yes          Brittany Orellana RN

## 2021-12-03 ENCOUNTER — TELEPHONE (OUTPATIENT)
Dept: INTERNAL MEDICINE | Facility: CLINIC | Age: 86
End: 2021-12-03

## 2021-12-03 NOTE — TELEPHONE ENCOUNTER
Caller: [facility]    Relationship: Home Health    Best call back number: [phone]    What orders are you requesting: PHYSICAL AND OCCUPATIONAL THERAPY 2 TIMES A WEEK FOR 3 WEEKS AND ON THE 4 WEEK THEY WILL REEVALUATE     In what timeframe: ASAP    Additional notes:

## 2021-12-16 NOTE — PROGRESS NOTES
Karla LINO Weatherford  1935  [phone]      12/16/2021    [facility] CARDIOLOGY     Giselle Guzman DO  [address] [address]    Chief Complaint   Patient presents with   • Atrial Fibrillation       Problem List:     1. Ischemic heart disease:  a. History of remote myocardial infarction/CABG x3, [facility] (database insufficient).   b. Left heart catheterization by Dr. Basilio Grossman, 2007, revealing patent LIMA graft/medical therapy, LVEF 30%.  c. Echocardiogram, [facility], 2007: LVEF 25%; moderate/severe mitral regurgitation.  d. Upgrade of pacemaker to [employer]. Jamel BI-V pacemaker per Dr. Hammonds on 10/25/2007.  e. Reported negative Cardiolite stress test in 2010, [facility], Dr. Basilio Grossman (database insufficient).  f. Reported echocardiogram, October 2011, [facility] (database insufficient).   g. Echocardiogram, 08/29/2012: LVEF 35% to 40%, mild mitral regurgitation.  h. History of MRSA pacemaker infection, 2005 (database insufficient).   i. St. Jamel pacemaker, 2005, with upgrade to a [employer]. Jamel BI-V ICD device in 2007, Dr. Hammonds.   j. CRT-D generator exchange by Tano Beal, 09/21/2012, St. Jamel device (Serial# [id number]).  k. Persistent ischemic cardiomyopathy.   l. Echocardiogram, 10/20/2015: LVEF 35% to 40%, moderate mitral regurgitation/tricuspid regurgitation; RVSP 45 mmHg.   2. Chronic class III systolic heart failure.  a. Echocardiogram 11/17/2021 LVEF 51 to 55%, RV dilatation noted, moderate TR, moderate MR,  3. History of persistent atrial fibrillation.  a. CHADS2 score equal to 4.   b. Chronic Coumadin therapy managed by [facility] Mckeon’s office.   4. Pericardial effusion, 2008.  5. History of frequent urinary tract infections.  6. Hypothyroidism.  7. Insulin-dependent diabetes mellitus, type 2.  8. Chronic kidney disease, stage 4, followed by Bk Mckeon.   9. Dyslipidemia.  10. Hypertension.  11. Home O2  "nightly/sleep apnea.  12. History of endometrial cancer, status post partial hysterectomy in 2008 with follow up radiation therapy followed by Dr. Sosa.   13. History of multiple left hip surgeries most recently in 2011 at [facility]      Allergies  Allergies   Allergen Reactions   • Bactrim [Sulfamethoxazole-Trimethoprim] GI Intolerance   • Lisinopril Cough   • Codeine Rash       Current Medications    Current Outpatient Medications:   •  atorvastatin (LIPITOR) 20 MG tablet, Take 1 tablet by mouth Every Night., Disp: 90 tablet, Rfl: 1  •  carvedilol (COREG) 25 MG tablet, Take 0.5 tablets by mouth 2 (Two) Times a Day With Meals. (Patient taking differently: Take 6.25 mg by mouth 2 (Two) Times a Day With Meals.), Disp: 30 tablet, Rfl: 11  •  cholecalciferol (VITAMIN D3) 1000 UNITS tablet, Take 1,000 Units by mouth Daily., Disp: , Rfl:   •  Continuous Blood Gluc Sensor (FreeStyle Lokesh 2 Sensor) misc, 1 each Every 14 (Fourteen) Days., Disp: 2 each, Rfl: 3  •  Eliquis 2.5 MG tablet tablet, , Disp: , Rfl:   •  eszopiclone (LUNESTA) 2 MG tablet, TAKE ONE TABLET BY MOUTH IMMEDIATELY BEFORE BEDTIME AS NEEDED FOR SLEEP, Disp: 30 tablet, Rfl: 2  •  Ferrous Sulfate (IRON PO), Take 1 tablet by mouth Every Other Day., Disp: , Rfl:   •  furosemide (LASIX) 40 MG tablet, Take 1 tablet by mouth Every Other Day., Disp: , Rfl:   •  indapamide (LOZOL) 2.5 MG tablet, , Disp: , Rfl:   •  Insulin Glargine (BASAGLAR KWIKPEN) 100 UNIT/ML injection pen, INJECT 20 UNITS UNDER THE SKIN ONCE DAILY AS DIRECTED, Disp: 15 mL, Rfl: 1  •  Insulin Syringe-Needle U-100 (TRUEplus Insulin Syringe) 31G X 5/16\" 0.5 ML misc, Use with Insulin Three Times daily, Disp: 100 each, Rfl: 5  •  isosorbide mononitrate (IMDUR) 30 MG 24 hr tablet, Take 1 tablet by mouth Daily. (Patient taking differently: Take 30 mg by mouth Every Morning.), Disp: 90 tablet, Rfl: 0  •  latanoprost (XALATAN) 0.005 % ophthalmic solution, Administer 1 drop to both eyes " "Every Night., Disp: , Rfl: 5  •  levothyroxine (Synthroid) 112 MCG tablet, Take 1 tablet by mouth Daily., Disp: 90 tablet, Rfl: 1  •  melatonin 5 MG tablet tablet, Take 5 mg by mouth Every Night., Disp: , Rfl:   •  multivitamins-minerals (PRESERVISION AREDS 2) capsule capsule, Take 1 capsule by mouth 2 (Two) Times a Day., Disp: , Rfl:   •  mupirocin (BACTROBAN) 2 % ointment, 1 application As Needed., Disp: , Rfl:   •  NovoLOG 100 UNIT/ML injection, INJECT 15 UNITS UNDER THE SKIN AS DIRECTED 3 TIMES DAILY BEFORE MEALS, Disp: 20 mL, Rfl: 5  •  omeprazole (priLOSEC) 40 MG capsule, omeprazole 40 mg capsule,delayed release, Disp: , Rfl:   •  ondansetron (ZOFRAN) 4 MG tablet, Take 4 mg by mouth Every 12 (Twelve) Hours As Needed., Disp: , Rfl:   •  spironolactone (ALDACTONE) 25 MG tablet, Take 12.5 mg by mouth Every Morning., Disp: , Rfl:   •  timolol (TIMOPTIC) 0.5 % ophthalmic solution, Administer 1 drop to both eyes 2 (Two) Times a Day., Disp: , Rfl:     History of Present Illness     Pt presents for follow up of CHF/atrial fibrillation/complete heart block/dilated cardiomyopathy. Since the pt has seen us, pt was admitted to [facility] in November secondary to a left leg infection and cellulitis.  She underwent an echocardiogram that demonstrated near normal LVEF at that time.  Since then she has received IV and oral antibiotics with resolution of her cellulitis.  Overall she has done reasonably well all things considered.  She does have chronic dyspnea on exertion which is unchanged.  No chest pain.  No flutters.  No ICD shocks.  No TIA CVA type symptoms.  Blood pressures have been stable for the most part.  She is chronically fatigued.        Vitals:    12/16/21 1236   BP: 124/58   BP Location: Left arm   Patient Position: Sitting   Pulse: 74   SpO2: 97%   Weight: 88.9 kg (196 lb)   Height: 162.6 cm (64\")       PE:  General: NAD  Neck: no JVD, no carotid bruits, no TM  Heart RRR, NL S1, S2, TR and MR murmurs are " appreciated.  Lungs: CTA, no wheezes, rhonchi, or rales  Abd: soft, non-tender, NL BS  Ext: No musculoskeletal deformities, no edema, cyanosis, or clubbing  Psych: normal mood and affect    Diagnostic Data:  Procedures.    [employer] biventricular ICD.  VVIR at 70.    BiV paced 96% of the time.  Acceptable thresholds and impedances.    5.5 years left on the battery.    1. Permanent atrial fibrillation (HCC)    2. AVB (atrioventricular block)    3. Chronic systolic congestive heart failure (HCC)    4. Ischemic cardiomyopathy    5. CKD (chronic kidney disease), stage IV ([facility])          Plan:  1. Permanent Afib/AV block, Asymptomatic rate controlled.  2. Chronic SHF/cardiomyopathy, Class II with normal biventricular pacemaker ICD function.  Most recent LVEF normalized by echocardiogram.  3. CKD stage IV  4. Anticoagulation: Chadsvasc=7, on low-dose Eliquis.      F/up in 6 months

## 2022-01-01 ENCOUNTER — APPOINTMENT (OUTPATIENT)
Dept: NEPHROLOGY | Facility: HOSPITAL | Age: 87
End: 2022-01-01

## 2022-01-01 ENCOUNTER — TRANSCRIBE ORDERS (OUTPATIENT)
Dept: LAB | Facility: HOSPITAL | Age: 87
End: 2022-01-01

## 2022-01-01 ENCOUNTER — OFFICE VISIT (OUTPATIENT)
Dept: INTERNAL MEDICINE | Facility: CLINIC | Age: 87
End: 2022-01-01

## 2022-01-01 ENCOUNTER — OFFICE VISIT (OUTPATIENT)
Dept: ENDOCRINOLOGY | Facility: CLINIC | Age: 87
End: 2022-01-01

## 2022-01-01 ENCOUNTER — TELEPHONE (OUTPATIENT)
Dept: INTERNAL MEDICINE | Facility: CLINIC | Age: 87
End: 2022-01-01

## 2022-01-01 ENCOUNTER — APPOINTMENT (OUTPATIENT)
Dept: GENERAL RADIOLOGY | Facility: HOSPITAL | Age: 87
End: 2022-01-01

## 2022-01-01 ENCOUNTER — APPOINTMENT (OUTPATIENT)
Dept: CARDIOLOGY | Facility: HOSPITAL | Age: 87
End: 2022-01-01

## 2022-01-01 ENCOUNTER — LAB (OUTPATIENT)
Dept: LAB | Facility: HOSPITAL | Age: 87
End: 2022-01-01

## 2022-01-01 ENCOUNTER — OFFICE VISIT (OUTPATIENT)
Dept: CARDIOLOGY | Facility: CLINIC | Age: 87
End: 2022-01-01

## 2022-01-01 ENCOUNTER — HOSPITAL ENCOUNTER (EMERGENCY)
Facility: HOSPITAL | Age: 87
Discharge: HOME | End: 2022-06-11
Attending: EMERGENCY MEDICINE | Admitting: EMERGENCY MEDICINE

## 2022-01-01 ENCOUNTER — OFFICE VISIT (OUTPATIENT)
Dept: CARDIOLOGY | Facility: HOSPITAL | Age: 87
End: 2022-01-01

## 2022-01-01 ENCOUNTER — HOSPITAL ENCOUNTER (EMERGENCY)
Facility: HOSPITAL | Age: 87
Discharge: HOME | End: 2022-09-06
Attending: EMERGENCY MEDICINE | Admitting: EMERGENCY MEDICINE

## 2022-01-01 ENCOUNTER — APPOINTMENT (OUTPATIENT)
Dept: ULTRASOUND IMAGING | Facility: HOSPITAL | Age: 87
End: 2022-01-01

## 2022-01-01 ENCOUNTER — TELEPHONE (OUTPATIENT)
Dept: ENDOCRINOLOGY | Facility: CLINIC | Age: 87
End: 2022-01-01

## 2022-01-01 ENCOUNTER — TELEPHONE (OUTPATIENT)
Dept: CARDIOLOGY | Facility: HOSPITAL | Age: 87
End: 2022-01-01

## 2022-01-01 ENCOUNTER — HOSPITAL ENCOUNTER (EMERGENCY)
Facility: HOSPITAL | Age: 87
Discharge: HOME | End: 2022-10-14
Attending: EMERGENCY MEDICINE | Admitting: EMERGENCY MEDICINE

## 2022-01-01 ENCOUNTER — HOSPITAL ENCOUNTER (INPATIENT)
Facility: HOSPITAL | Age: 87
LOS: 5 days | End: 2022-12-09
Attending: INTERNAL MEDICINE | Admitting: INTERNAL MEDICINE

## 2022-01-01 ENCOUNTER — HOSPITAL ENCOUNTER (INPATIENT)
Facility: HOSPITAL | Age: 87
LOS: 20 days | Discharge: NURSING, REHAB OR LONG-TERM CARE | End: 2022-08-18
Attending: EMERGENCY MEDICINE | Admitting: INTERNAL MEDICINE

## 2022-01-01 ENCOUNTER — PATIENT OUTREACH (OUTPATIENT)
Dept: CASE MANAGEMENT | Facility: OTHER | Age: 87
End: 2022-01-01

## 2022-01-01 ENCOUNTER — HOSPITAL ENCOUNTER (INPATIENT)
Facility: HOSPITAL | Age: 87
LOS: 7 days | End: 2022-12-04
Attending: EMERGENCY MEDICINE | Admitting: INTERNAL MEDICINE

## 2022-01-01 ENCOUNTER — APPOINTMENT (OUTPATIENT)
Dept: CT IMAGING | Facility: HOSPITAL | Age: 87
End: 2022-01-01

## 2022-01-01 ENCOUNTER — ANESTHESIA (OUTPATIENT)
Dept: GASTROENTEROLOGY | Facility: HOSPITAL | Age: 87
End: 2022-01-01

## 2022-01-01 ENCOUNTER — ANESTHESIA EVENT (OUTPATIENT)
Dept: GASTROENTEROLOGY | Facility: HOSPITAL | Age: 87
End: 2022-01-01

## 2022-01-01 VITALS
HEIGHT: 64 IN | BODY MASS INDEX: 33.49 KG/M2 | HEART RATE: 72 BPM | RESPIRATION RATE: 16 BRPM | DIASTOLIC BLOOD PRESSURE: 68 MMHG | WEIGHT: 196.2 LBS | OXYGEN SATURATION: 96 % | TEMPERATURE: 97.6 F | SYSTOLIC BLOOD PRESSURE: 118 MMHG

## 2022-01-01 VITALS
WEIGHT: 168 LBS | TEMPERATURE: 98.4 F | HEIGHT: 65 IN | SYSTOLIC BLOOD PRESSURE: 122 MMHG | HEART RATE: 75 BPM | BODY MASS INDEX: 27.99 KG/M2 | RESPIRATION RATE: 14 BRPM | DIASTOLIC BLOOD PRESSURE: 61 MMHG | OXYGEN SATURATION: 96 %

## 2022-01-01 VITALS
SYSTOLIC BLOOD PRESSURE: 125 MMHG | RESPIRATION RATE: 20 BRPM | DIASTOLIC BLOOD PRESSURE: 70 MMHG | OXYGEN SATURATION: 98 % | TEMPERATURE: 98 F | HEIGHT: 64 IN | WEIGHT: 172 LBS | BODY MASS INDEX: 29.37 KG/M2 | HEART RATE: 74 BPM

## 2022-01-01 VITALS
SYSTOLIC BLOOD PRESSURE: 108 MMHG | WEIGHT: 170.4 LBS | HEIGHT: 64 IN | DIASTOLIC BLOOD PRESSURE: 58 MMHG | OXYGEN SATURATION: 100 % | HEART RATE: 68 BPM | BODY MASS INDEX: 29.09 KG/M2

## 2022-01-01 VITALS
DIASTOLIC BLOOD PRESSURE: 80 MMHG | HEART RATE: 79 BPM | OXYGEN SATURATION: 96 % | BODY MASS INDEX: 32.44 KG/M2 | HEIGHT: 64 IN | SYSTOLIC BLOOD PRESSURE: 120 MMHG | TEMPERATURE: 97 F | WEIGHT: 190 LBS

## 2022-01-01 VITALS
DIASTOLIC BLOOD PRESSURE: 78 MMHG | SYSTOLIC BLOOD PRESSURE: 120 MMHG | BODY MASS INDEX: 30.73 KG/M2 | OXYGEN SATURATION: 98 % | HEIGHT: 64 IN | HEART RATE: 76 BPM | WEIGHT: 180 LBS

## 2022-01-01 VITALS
BODY MASS INDEX: 27.82 KG/M2 | OXYGEN SATURATION: 94 % | RESPIRATION RATE: 16 BRPM | HEIGHT: 65 IN | HEART RATE: 74 BPM | TEMPERATURE: 98.2 F | SYSTOLIC BLOOD PRESSURE: 120 MMHG | WEIGHT: 167 LBS | DIASTOLIC BLOOD PRESSURE: 54 MMHG

## 2022-01-01 VITALS
OXYGEN SATURATION: 100 % | DIASTOLIC BLOOD PRESSURE: 29 MMHG | RESPIRATION RATE: 12 BRPM | BODY MASS INDEX: 29.32 KG/M2 | TEMPERATURE: 97.8 F | WEIGHT: 171.74 LBS | SYSTOLIC BLOOD PRESSURE: 86 MMHG | HEART RATE: 73 BPM | HEIGHT: 64 IN

## 2022-01-01 VITALS
TEMPERATURE: 97.8 F | DIASTOLIC BLOOD PRESSURE: 82 MMHG | HEART RATE: 79 BPM | OXYGEN SATURATION: 99 % | WEIGHT: 168.2 LBS | BODY MASS INDEX: 28.87 KG/M2 | SYSTOLIC BLOOD PRESSURE: 120 MMHG

## 2022-01-01 VITALS
HEIGHT: 64 IN | SYSTOLIC BLOOD PRESSURE: 109 MMHG | TEMPERATURE: 97.8 F | DIASTOLIC BLOOD PRESSURE: 52 MMHG | HEART RATE: 73 BPM | WEIGHT: 197.2 LBS | OXYGEN SATURATION: 100 % | BODY MASS INDEX: 33.67 KG/M2 | RESPIRATION RATE: 18 BRPM

## 2022-01-01 VITALS
WEIGHT: 189 LBS | DIASTOLIC BLOOD PRESSURE: 70 MMHG | HEART RATE: 61 BPM | BODY MASS INDEX: 32.44 KG/M2 | OXYGEN SATURATION: 98 % | SYSTOLIC BLOOD PRESSURE: 100 MMHG

## 2022-01-01 VITALS
WEIGHT: 167.6 LBS | OXYGEN SATURATION: 99 % | HEIGHT: 65 IN | DIASTOLIC BLOOD PRESSURE: 60 MMHG | BODY MASS INDEX: 27.92 KG/M2 | SYSTOLIC BLOOD PRESSURE: 98 MMHG | RESPIRATION RATE: 14 BRPM | HEART RATE: 55 BPM

## 2022-01-01 DIAGNOSIS — N18.4 ACUTE RENAL FAILURE SUPERIMPOSED ON STAGE 4 CHRONIC KIDNEY DISEASE, UNSPECIFIED ACUTE RENAL FAILURE TYPE: ICD-10-CM

## 2022-01-01 DIAGNOSIS — N30.00 ACUTE CYSTITIS WITHOUT HEMATURIA: Primary | ICD-10-CM

## 2022-01-01 DIAGNOSIS — Z86.39 HISTORY OF DIABETES MELLITUS: ICD-10-CM

## 2022-01-01 DIAGNOSIS — I48.20 CHRONIC ATRIAL FIBRILLATION: Primary | ICD-10-CM

## 2022-01-01 DIAGNOSIS — A41.1 SEPSIS DUE TO STAPHYLOCOCCUS EPIDERMIDIS: ICD-10-CM

## 2022-01-01 DIAGNOSIS — N18.4 ANEMIA DUE TO STAGE 4 CHRONIC KIDNEY DISEASE: ICD-10-CM

## 2022-01-01 DIAGNOSIS — I10 ESSENTIAL HYPERTENSION: ICD-10-CM

## 2022-01-01 DIAGNOSIS — R09.02 HYPOXIA: ICD-10-CM

## 2022-01-01 DIAGNOSIS — Z79.4 TYPE 2 DIABETES MELLITUS WITHOUT COMPLICATION, WITH LONG-TERM CURRENT USE OF INSULIN: Primary | ICD-10-CM

## 2022-01-01 DIAGNOSIS — L03.116 CELLULITIS OF LEFT FOOT: ICD-10-CM

## 2022-01-01 DIAGNOSIS — R30.0 DYSURIA: Primary | ICD-10-CM

## 2022-01-01 DIAGNOSIS — R53.1 GENERALIZED WEAKNESS: ICD-10-CM

## 2022-01-01 DIAGNOSIS — Z79.01 CHRONIC ANTICOAGULATION: ICD-10-CM

## 2022-01-01 DIAGNOSIS — F51.01 PRIMARY INSOMNIA: ICD-10-CM

## 2022-01-01 DIAGNOSIS — K92.2 LOWER GI BLEED: Primary | ICD-10-CM

## 2022-01-01 DIAGNOSIS — S32.010A COMPRESSION FRACTURE OF L1 VERTEBRA, INITIAL ENCOUNTER: Primary | ICD-10-CM

## 2022-01-01 DIAGNOSIS — E03.9 ACQUIRED HYPOTHYROIDISM: ICD-10-CM

## 2022-01-01 DIAGNOSIS — R53.81 MALAISE AND FATIGUE: ICD-10-CM

## 2022-01-01 DIAGNOSIS — S80.12XD CONTUSION OF MULTIPLE SITES OF LEFT LEG, SUBSEQUENT ENCOUNTER: ICD-10-CM

## 2022-01-01 DIAGNOSIS — Z86.79 HISTORY OF CHF (CONGESTIVE HEART FAILURE): ICD-10-CM

## 2022-01-01 DIAGNOSIS — N17.9 ACUTE RENAL FAILURE SUPERIMPOSED ON STAGE 4 CHRONIC KIDNEY DISEASE, UNSPECIFIED ACUTE RENAL FAILURE TYPE: ICD-10-CM

## 2022-01-01 DIAGNOSIS — Z99.2 END-STAGE RENAL DISEASE ON HEMODIALYSIS: ICD-10-CM

## 2022-01-01 DIAGNOSIS — I25.10 CORONARY ARTERY DISEASE INVOLVING NATIVE CORONARY ARTERY OF NATIVE HEART WITHOUT ANGINA PECTORIS: ICD-10-CM

## 2022-01-01 DIAGNOSIS — N30.01 ACUTE CYSTITIS WITH HEMATURIA: ICD-10-CM

## 2022-01-01 DIAGNOSIS — Z87.19 HISTORY OF DIVERTICULOSIS: ICD-10-CM

## 2022-01-01 DIAGNOSIS — R53.83 MALAISE AND FATIGUE: ICD-10-CM

## 2022-01-01 DIAGNOSIS — N39.0 ACUTE UTI: Primary | ICD-10-CM

## 2022-01-01 DIAGNOSIS — Z99.2 ESRD (END STAGE RENAL DISEASE) ON DIALYSIS: ICD-10-CM

## 2022-01-01 DIAGNOSIS — I25.5 ISCHEMIC CARDIOMYOPATHY: ICD-10-CM

## 2022-01-01 DIAGNOSIS — Z74.09 IMPAIRED MOBILITY AND ACTIVITIES OF DAILY LIVING: ICD-10-CM

## 2022-01-01 DIAGNOSIS — R41.82 ALTERED MENTAL STATUS, UNSPECIFIED ALTERED MENTAL STATUS TYPE: ICD-10-CM

## 2022-01-01 DIAGNOSIS — D69.6 THROMBOCYTOPENIA: ICD-10-CM

## 2022-01-01 DIAGNOSIS — I48.21 PERMANENT ATRIAL FIBRILLATION: ICD-10-CM

## 2022-01-01 DIAGNOSIS — S22.010A COMPRESSION FRACTURE OF T1 VERTEBRA, INITIAL ENCOUNTER: ICD-10-CM

## 2022-01-01 DIAGNOSIS — E78.5 HYPERLIPIDEMIA LDL GOAL <100: ICD-10-CM

## 2022-01-01 DIAGNOSIS — R31.9 URINARY TRACT INFECTION WITH HEMATURIA, SITE UNSPECIFIED: ICD-10-CM

## 2022-01-01 DIAGNOSIS — D63.1 ANEMIA DUE TO STAGE 4 CHRONIC KIDNEY DISEASE: ICD-10-CM

## 2022-01-01 DIAGNOSIS — Z86.14 PERSONAL HISTORY OF METHICILLIN RESISTANT STAPHYLOCOCCUS AUREUS: Primary | ICD-10-CM

## 2022-01-01 DIAGNOSIS — Z78.9 IMPAIRED MOBILITY AND ACTIVITIES OF DAILY LIVING: ICD-10-CM

## 2022-01-01 DIAGNOSIS — N18.6 END-STAGE RENAL DISEASE ON HEMODIALYSIS: ICD-10-CM

## 2022-01-01 DIAGNOSIS — I50.22 CHRONIC SYSTOLIC CONGESTIVE HEART FAILURE: Primary | ICD-10-CM

## 2022-01-01 DIAGNOSIS — Z86.14 PERSONAL HISTORY OF METHICILLIN RESISTANT STAPHYLOCOCCUS AUREUS: ICD-10-CM

## 2022-01-01 DIAGNOSIS — R11.0 NAUSEA: ICD-10-CM

## 2022-01-01 DIAGNOSIS — I50.22 CHRONIC SYSTOLIC CONGESTIVE HEART FAILURE: ICD-10-CM

## 2022-01-01 DIAGNOSIS — N39.0 ACUTE LOWER UTI: ICD-10-CM

## 2022-01-01 DIAGNOSIS — E11.9 TYPE 2 DIABETES MELLITUS WITHOUT COMPLICATION, WITH LONG-TERM CURRENT USE OF INSULIN: Primary | ICD-10-CM

## 2022-01-01 DIAGNOSIS — Z79.899 HIGH RISK MEDICATION USE: Primary | ICD-10-CM

## 2022-01-01 DIAGNOSIS — T14.8XXA ABRASION: ICD-10-CM

## 2022-01-01 DIAGNOSIS — L03.116 CELLULITIS OF LEFT FOOT: Primary | ICD-10-CM

## 2022-01-01 DIAGNOSIS — N18.6 ESRD (END STAGE RENAL DISEASE) ON DIALYSIS: ICD-10-CM

## 2022-01-01 DIAGNOSIS — S32.030A COMPRESSION FRACTURE OF L3 LUMBAR VERTEBRA, CLOSED, INITIAL ENCOUNTER: ICD-10-CM
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
## 2022-01-01 DIAGNOSIS — F51.04 PSYCHOPHYSIOLOGICAL INSOMNIA: ICD-10-CM

## 2022-01-01 DIAGNOSIS — I25.9 ISCHEMIC HEART DISEASE: ICD-10-CM

## 2022-01-01 DIAGNOSIS — I38 VHD (VALVULAR HEART DISEASE): ICD-10-CM

## 2022-01-01 DIAGNOSIS — N39.0 URINARY TRACT INFECTION WITH HEMATURIA, SITE UNSPECIFIED: ICD-10-CM

## 2022-01-01 DIAGNOSIS — K92.2 GASTROINTESTINAL HEMORRHAGE, UNSPECIFIED GASTROINTESTINAL HEMORRHAGE TYPE: ICD-10-CM

## 2022-01-01 DIAGNOSIS — E86.0 DEHYDRATION: ICD-10-CM

## 2022-01-01 DIAGNOSIS — Z86.79 HISTORY OF ATRIAL FIBRILLATION: ICD-10-CM

## 2022-01-01 LAB
ABO GROUP BLD: NORMAL
ALBUMIN SERPL-MCNC: 2.2 G/DL (ref 3.5–5.2)
ALBUMIN SERPL-MCNC: 2.4 G/DL (ref 3.5–5.2)
ALBUMIN SERPL-MCNC: 2.4 G/DL (ref 3.5–5.2)
ALBUMIN SERPL-MCNC: 2.5 G/DL (ref 3.5–5.2)
ALBUMIN SERPL-MCNC: 2.6 G/DL (ref 3.5–5.2)
ALBUMIN SERPL-MCNC: 2.6 G/DL (ref 3.5–5.2)
ALBUMIN SERPL-MCNC: 2.8 G/DL (ref 3.5–5.2)
ALBUMIN SERPL-MCNC: 2.9 G/DL (ref 3.5–5.2)
ALBUMIN SERPL-MCNC: 3.2 G/DL (ref 3.5–5.2)
ALBUMIN SERPL-MCNC: 3.2 G/DL (ref 3.5–5.2)
ALBUMIN SERPL-MCNC: 3.3 G/DL (ref 3.5–5.2)
ALBUMIN SERPL-MCNC: 3.4 G/DL (ref 3.5–5.2)
ALBUMIN SERPL-MCNC: 3.5 G/DL (ref 3.5–5.2)
ALBUMIN SERPL-MCNC: 3.7 G/DL (ref 3.5–5.2)
ALBUMIN SERPL-MCNC: 3.8 G/DL (ref 3.5–5.2)
ALBUMIN/GLOB SERPL: 0.8 G/DL
ALBUMIN/GLOB SERPL: 0.8 G/DL
ALBUMIN/GLOB SERPL: 0.9 G/DL
ALBUMIN/GLOB SERPL: 0.9 G/DL
ALBUMIN/GLOB SERPL: 1.4 G/DL
ALP SERPL-CCNC: 176 U/L (ref 39–117)
ALP SERPL-CCNC: 214 U/L (ref 39–117)
ALP SERPL-CCNC: 243 U/L (ref 39–117)
ALP SERPL-CCNC: 252 U/L (ref 39–117)
ALP SERPL-CCNC: 301 U/L (ref 39–117)
ALP SERPL-CCNC: 324 U/L (ref 39–117)
ALT SERPL W P-5'-P-CCNC: 12 U/L (ref 1–33)
ALT SERPL W P-5'-P-CCNC: 12 U/L (ref 1–33)
ALT SERPL W P-5'-P-CCNC: 13 U/L (ref 1–33)
ALT SERPL W P-5'-P-CCNC: 13 U/L (ref 1–33)
ALT SERPL W P-5'-P-CCNC: 15 U/L (ref 1–33)
ALT SERPL W P-5'-P-CCNC: 22 U/L (ref 1–33)
ANION GAP SERPL CALCULATED.3IONS-SCNC: 10 MMOL/L (ref 5–15)
ANION GAP SERPL CALCULATED.3IONS-SCNC: 11 MMOL/L (ref 5–15)
ANION GAP SERPL CALCULATED.3IONS-SCNC: 11 MMOL/L (ref 5–15)
ANION GAP SERPL CALCULATED.3IONS-SCNC: 12 MMOL/L (ref 5–15)
ANION GAP SERPL CALCULATED.3IONS-SCNC: 13 MMOL/L (ref 5–15)
ANION GAP SERPL CALCULATED.3IONS-SCNC: 14 MMOL/L (ref 5–15)
ANION GAP SERPL CALCULATED.3IONS-SCNC: 15 MMOL/L (ref 5–15)
ANION GAP SERPL CALCULATED.3IONS-SCNC: 16 MMOL/L (ref 5–15)
ANION GAP SERPL CALCULATED.3IONS-SCNC: 16 MMOL/L (ref 5–15)
ANION GAP SERPL CALCULATED.3IONS-SCNC: 17 MMOL/L (ref 5–15)
ANION GAP SERPL CALCULATED.3IONS-SCNC: 18 MMOL/L (ref 5–15)
ANION GAP SERPL CALCULATED.3IONS-SCNC: 21 MMOL/L (ref 5–15)
ANION GAP SERPL CALCULATED.3IONS-SCNC: 23 MMOL/L (ref 5–15)
ANION GAP SERPL CALCULATED.3IONS-SCNC: 9 MMOL/L (ref 5–15)
ANISOCYTOSIS BLD QL: NORMAL
APTT PPP: 39.6 SECONDS (ref 22–39)
APTT PPP: 46.3 SECONDS (ref 22–39)
AST SERPL-CCNC: 16 U/L (ref 1–32)
AST SERPL-CCNC: 23 U/L (ref 1–32)
AST SERPL-CCNC: 27 U/L (ref 1–32)
AST SERPL-CCNC: 27 U/L (ref 1–32)
AST SERPL-CCNC: 28 U/L (ref 1–32)
AST SERPL-CCNC: 31 U/L (ref 1–32)
ATMOSPHERIC PRESS: ABNORMAL MM[HG]
BACTERIA SPEC AEROBE CULT: ABNORMAL
BACTERIA SPEC AEROBE CULT: NO GROWTH
BACTERIA SPEC AEROBE CULT: NORMAL
BACTERIA UR QL AUTO: ABNORMAL /HPF
BASE EXCESS BLDV CALC-SCNC: -6.4 MMOL/L (ref -2–2)
BASOPHILS # BLD AUTO: 0.02 10*3/MM3 (ref 0–0.2)
BASOPHILS # BLD AUTO: 0.02 10*3/MM3 (ref 0–0.2)
BASOPHILS # BLD AUTO: 0.03 10*3/MM3 (ref 0–0.2)
BASOPHILS # BLD AUTO: 0.04 10*3/MM3 (ref 0–0.2)
BASOPHILS # BLD AUTO: 0.04 10*3/MM3 (ref 0–0.2)
BASOPHILS # BLD AUTO: 0.05 10*3/MM3 (ref 0–0.2)
BASOPHILS # BLD AUTO: 0.06 10*3/MM3 (ref 0–0.2)
BASOPHILS # BLD AUTO: 0.06 10*3/MM3 (ref 0–0.2)
BASOPHILS # BLD AUTO: 0.07 10*3/MM3 (ref 0–0.2)
BASOPHILS NFR BLD AUTO: 0.4 % (ref 0–1.5)
BASOPHILS NFR BLD AUTO: 0.5 % (ref 0–1.5)
BASOPHILS NFR BLD AUTO: 0.6 % (ref 0–1.5)
BASOPHILS NFR BLD AUTO: 0.6 % (ref 0–1.5)
BASOPHILS NFR BLD AUTO: 0.7 % (ref 0–1.5)
BASOPHILS NFR BLD AUTO: 0.9 % (ref 0–1.5)
BASOPHILS NFR BLD AUTO: 1 % (ref 0–1.5)
BDY SITE: ABNORMAL
BH CV ECHO MEAS - AO MAX PG: 5.1 MMHG
BH CV ECHO MEAS - AO MAX PG: 7.1 MMHG
BH CV ECHO MEAS - AO MEAN PG: 3 MMHG
BH CV ECHO MEAS - AO MEAN PG: 4 MMHG
BH CV ECHO MEAS - AO ROOT DIAM: 3 CM
BH CV ECHO MEAS - AO ROOT DIAM: 3 CM
BH CV ECHO MEAS - AO V2 MAX: 113 CM/SEC
BH CV ECHO MEAS - AO V2 MAX: 133.5 CM/SEC
BH CV ECHO MEAS - AO V2 VTI: 26 CM
BH CV ECHO MEAS - AO V2 VTI: 26.8 CM
BH CV ECHO MEAS - AVA(I,D): 1.9 CM2
BH CV ECHO MEAS - AVA(I,D): 2.34 CM2
BH CV ECHO MEAS - EDV(CUBED): 103.8 ML
BH CV ECHO MEAS - EDV(CUBED): 50.7 ML
BH CV ECHO MEAS - EDV(MOD-SP2): 116 ML
BH CV ECHO MEAS - EDV(MOD-SP2): 45.1 ML
BH CV ECHO MEAS - EDV(MOD-SP4): 130 ML
BH CV ECHO MEAS - EDV(MOD-SP4): 49.9 ML
BH CV ECHO MEAS - EF(MOD-BP): 36.9 %
BH CV ECHO MEAS - EF(MOD-BP): 42.6 %
BH CV ECHO MEAS - EF(MOD-SP2): 38.1 %
BH CV ECHO MEAS - EF(MOD-SP2): 42.6 %
BH CV ECHO MEAS - EF(MOD-SP4): 38.3 %
BH CV ECHO MEAS - EF(MOD-SP4): 45.1 %
BH CV ECHO MEAS - ESV(CUBED): 42.9 ML
BH CV ECHO MEAS - ESV(CUBED): 74.1 ML
BH CV ECHO MEAS - ESV(MOD-SP2): 25.9 ML
BH CV ECHO MEAS - ESV(MOD-SP2): 71.8 ML
BH CV ECHO MEAS - ESV(MOD-SP4): 27.4 ML
BH CV ECHO MEAS - ESV(MOD-SP4): 80.2 ML
BH CV ECHO MEAS - FS: 10.6 %
BH CV ECHO MEAS - FS: 5.4 %
BH CV ECHO MEAS - IVS/LVPW: 0.88 CM
BH CV ECHO MEAS - IVS/LVPW: 1.2 CM
BH CV ECHO MEAS - IVSD: 1.15 CM
BH CV ECHO MEAS - IVSD: 1.2 CM
BH CV ECHO MEAS - LA DIMENSION: 4.4 CM
BH CV ECHO MEAS - LA DIMENSION: 4.4 CM
BH CV ECHO MEAS - LAT PEAK E' VEL: 7.6 CM/SEC
BH CV ECHO MEAS - LV MASS(C)D: 129.3 GRAMS
BH CV ECHO MEAS - LV MASS(C)D: 218.3 GRAMS
BH CV ECHO MEAS - LV MAX PG: 1.68 MMHG
BH CV ECHO MEAS - LV MAX PG: 2.6 MMHG
BH CV ECHO MEAS - LV MEAN PG: 1 MMHG
BH CV ECHO MEAS - LV MEAN PG: 1.5 MMHG
BH CV ECHO MEAS - LV V1 MAX: 64.9 CM/SEC
BH CV ECHO MEAS - LV V1 MAX: 80.5 CM/SEC
BH CV ECHO MEAS - LV V1 VTI: 15.7 CM
BH CV ECHO MEAS - LV V1 VTI: 16.5 CM
BH CV ECHO MEAS - LVIDD: 3.7 CM
BH CV ECHO MEAS - LVIDD: 4.7 CM
BH CV ECHO MEAS - LVIDS: 3.5 CM
BH CV ECHO MEAS - LVIDS: 4.2 CM
BH CV ECHO MEAS - LVOT AREA: 3.1 CM2
BH CV ECHO MEAS - LVOT AREA: 3.8 CM2
BH CV ECHO MEAS - LVOT DIAM: 2 CM
BH CV ECHO MEAS - LVOT DIAM: 2.2 CM
BH CV ECHO MEAS - LVPWD: 1 CM
BH CV ECHO MEAS - LVPWD: 1.3 CM
BH CV ECHO MEAS - MED PEAK E' VEL: 6.7 CM/SEC
BH CV ECHO MEAS - MR MAX PG: 59.9 MMHG
BH CV ECHO MEAS - MR MAX VEL: 387 CM/SEC
BH CV ECHO MEAS - MV A MAX VEL: 42.4 CM/SEC
BH CV ECHO MEAS - MV DEC SLOPE: 495 CM/SEC2
BH CV ECHO MEAS - MV DEC SLOPE: 579 CM/SEC2
BH CV ECHO MEAS - MV DEC TIME: 0.15 MSEC
BH CV ECHO MEAS - MV DEC TIME: 0.26 MSEC
BH CV ECHO MEAS - MV E MAX VEL: 126 CM/SEC
BH CV ECHO MEAS - MV E MAX VEL: 127 CM/SEC
BH CV ECHO MEAS - MV E/A: 3
BH CV ECHO MEAS - MV MAX PG: 7.1 MMHG
BH CV ECHO MEAS - MV MEAN PG: 2 MMHG
BH CV ECHO MEAS - MV P1/2T: 71.3 MSEC
BH CV ECHO MEAS - MV V2 VTI: 27.2 CM
BH CV ECHO MEAS - MVA(P1/2T): 3.1 CM2
BH CV ECHO MEAS - MVA(VTI): 1.81 CM2
BH CV ECHO MEAS - PA ACC TIME: 0.1 SEC
BH CV ECHO MEAS - PA ACC TIME: 0.13 SEC
BH CV ECHO MEAS - PA PR(ACCEL): 22.1 MMHG
BH CV ECHO MEAS - PA PR(ACCEL): 36 MMHG
BH CV ECHO MEAS - PA V2 MAX: 101 CM/SEC
BH CV ECHO MEAS - PA V2 MAX: 84.8 CM/SEC
BH CV ECHO MEAS - PI END-D VEL: 135.5 CM/SEC
BH CV ECHO MEAS - RAP SYSTOLE: 10 MMHG
BH CV ECHO MEAS - RAP SYSTOLE: 15 MMHG
BH CV ECHO MEAS - RVSP: 40 MMHG
BH CV ECHO MEAS - RVSP: 48 MMHG
BH CV ECHO MEAS - SV(LVOT): 49.3 ML
BH CV ECHO MEAS - SV(LVOT): 62.7 ML
BH CV ECHO MEAS - SV(MOD-SP2): 19.2 ML
BH CV ECHO MEAS - SV(MOD-SP2): 44.2 ML
BH CV ECHO MEAS - SV(MOD-SP4): 22.5 ML
BH CV ECHO MEAS - SV(MOD-SP4): 49.8 ML
BH CV ECHO MEAS - TAPSE (>1.6): 1.19 CM
BH CV ECHO MEAS - TR MAX PG: 25 MMHG
BH CV ECHO MEAS - TR MAX PG: 38 MMHG
BH CV ECHO MEAS - TR MAX VEL: 252 CM/SEC
BH CV ECHO MEAS - TR MAX VEL: 280 CM/SEC
BH CV ECHO MEASUREMENTS AVERAGE E/E' RATIO: 17.76
BH CV UPPER DUPLEX RIGHT AXILLARY ART EDV: 39 CM/S
BH CV UPPER DUPLEX RIGHT AXILLARY ARTERY PSV: 149 CM/S
BH CV UPPER DUPLEX RIGHT BRACHIA ART EDV: 81 CM/S
BH CV UPPER DUPLEX RIGHT BRACHIAL ART PSV: 246 CM/S
BH CV UPPER DUPLEX RIGHT RADIAL ART PSV: 26 CM/S
BH CV UPPER DUPLEX RIGHT ULNAR ART PSV: 49 CM/S
BH CV UPPER VENOUS LEFT SUBCLAVIAN AUGMENT: NORMAL
BH CV UPPER VENOUS LEFT SUBCLAVIAN COMPRESS: NORMAL
BH CV UPPER VENOUS LEFT SUBCLAVIAN PHASIC: NORMAL
BH CV UPPER VENOUS LEFT SUBCLAVIAN SPONT: NORMAL
BH CV UPPER VENOUS RIGHT AXILLARY AUGMENT: NORMAL
BH CV UPPER VENOUS RIGHT AXILLARY COMPRESS: NORMAL
BH CV UPPER VENOUS RIGHT AXILLARY PHASIC: NORMAL
BH CV UPPER VENOUS RIGHT AXILLARY SPONT: NORMAL
BH CV UPPER VENOUS RIGHT BASILIC UPPER COMPRESS: NORMAL
BH CV UPPER VENOUS RIGHT BRACHIAL COMPRESS: NORMAL
BH CV UPPER VENOUS RIGHT CEPHALIC UPPER COMPRESS: NORMAL
BH CV UPPER VENOUS RIGHT INTERNAL JUGULAR AUGMENT: NORMAL
BH CV UPPER VENOUS RIGHT INTERNAL JUGULAR COMPRESS: NORMAL
BH CV UPPER VENOUS RIGHT INTERNAL JUGULAR PHASIC: NORMAL
BH CV UPPER VENOUS RIGHT INTERNAL JUGULAR SPONT: NORMAL
BH CV UPPER VENOUS RIGHT RADIAL COMPRESS: NORMAL
BH CV UPPER VENOUS RIGHT ULNAR COMPRESS: NORMAL
BH CV XLRA - RV BASE: 3.5 CM
BH CV XLRA - RV BASE: 5.2 CM
BH CV XLRA - RV LENGTH: 7.5 CM
BH CV XLRA - RV LENGTH: 8.7 CM
BH CV XLRA - RV MID: 2.9 CM
BH CV XLRA - RV MID: 4.3 CM
BH CV XLRA - TDI S': 7.4 CM/SEC
BILIRUB BLD-MCNC: NEGATIVE MG/DL
BILIRUB BLD-MCNC: NEGATIVE MG/DL
BILIRUB CONJ SERPL-MCNC: 0.6 MG/DL (ref 0–0.3)
BILIRUB INDIRECT SERPL-MCNC: 0.6 MG/DL
BILIRUB SERPL-MCNC: 0.9 MG/DL (ref 0–1.2)
BILIRUB SERPL-MCNC: 1.2 MG/DL (ref 0–1.2)
BILIRUB SERPL-MCNC: 1.2 MG/DL (ref 0–1.2)
BILIRUB SERPL-MCNC: 1.4 MG/DL (ref 0–1.2)
BILIRUB SERPL-MCNC: 1.4 MG/DL (ref 0–1.2)
BILIRUB SERPL-MCNC: 1.5 MG/DL (ref 0–1.2)
BILIRUB UR QL STRIP: ABNORMAL
BILIRUB UR QL STRIP: NEGATIVE
BILIRUB UR QL STRIP: NEGATIVE
BLD GP AB SCN SERPL QL: NEGATIVE
BODY TEMPERATURE: 37 C
BUN SERPL-MCNC: 100 MG/DL (ref 8–23)
BUN SERPL-MCNC: 103 MG/DL (ref 8–23)
BUN SERPL-MCNC: 106 MG/DL (ref 8–23)
BUN SERPL-MCNC: 106 MG/DL (ref 8–23)
BUN SERPL-MCNC: 107 MG/DL (ref 8–23)
BUN SERPL-MCNC: 110 MG/DL (ref 8–23)
BUN SERPL-MCNC: 113 MG/DL (ref 8–23)
BUN SERPL-MCNC: 117 MG/DL (ref 8–23)
BUN SERPL-MCNC: 119 MG/DL (ref 8–23)
BUN SERPL-MCNC: 12 MG/DL (ref 8–23)
BUN SERPL-MCNC: 131 MG/DL (ref 8–23)
BUN SERPL-MCNC: 135 MG/DL (ref 8–23)
BUN SERPL-MCNC: 137 MG/DL (ref 8–23)
BUN SERPL-MCNC: 21 MG/DL (ref 8–23)
BUN SERPL-MCNC: 25 MG/DL (ref 8–23)
BUN SERPL-MCNC: 27 MG/DL (ref 8–23)
BUN SERPL-MCNC: 27 MG/DL (ref 8–23)
BUN SERPL-MCNC: 33 MG/DL (ref 8–23)
BUN SERPL-MCNC: 37 MG/DL (ref 8–23)
BUN SERPL-MCNC: 37 MG/DL (ref 8–23)
BUN SERPL-MCNC: 40 MG/DL (ref 8–23)
BUN SERPL-MCNC: 42 MG/DL (ref 8–23)
BUN SERPL-MCNC: 43 MG/DL (ref 8–23)
BUN SERPL-MCNC: 47 MG/DL (ref 8–23)
BUN SERPL-MCNC: 48 MG/DL (ref 8–23)
BUN SERPL-MCNC: 54 MG/DL (ref 8–23)
BUN SERPL-MCNC: 54 MG/DL (ref 8–23)
BUN SERPL-MCNC: 58 MG/DL (ref 8–23)
BUN SERPL-MCNC: 86 MG/DL (ref 8–23)
BUN SERPL-MCNC: 93 MG/DL (ref 8–23)
BUN/CREAT SERPL: 10 (ref 7–25)
BUN/CREAT SERPL: 12 (ref 7–25)
BUN/CREAT SERPL: 12.7 (ref 7–25)
BUN/CREAT SERPL: 15.8 (ref 7–25)
BUN/CREAT SERPL: 16.2 (ref 7–25)
BUN/CREAT SERPL: 19.2 (ref 7–25)
BUN/CREAT SERPL: 24.8 (ref 7–25)
BUN/CREAT SERPL: 27.4 (ref 7–25)
BUN/CREAT SERPL: 27.5 (ref 7–25)
BUN/CREAT SERPL: 28.1 (ref 7–25)
BUN/CREAT SERPL: 28.1 (ref 7–25)
BUN/CREAT SERPL: 28.6 (ref 7–25)
BUN/CREAT SERPL: 28.6 (ref 7–25)
BUN/CREAT SERPL: 28.9 (ref 7–25)
BUN/CREAT SERPL: 29.3 (ref 7–25)
BUN/CREAT SERPL: 29.9 (ref 7–25)
BUN/CREAT SERPL: 31.9 (ref 7–25)
BUN/CREAT SERPL: 32.2 (ref 7–25)
BUN/CREAT SERPL: 33.2 (ref 7–25)
BUN/CREAT SERPL: 35.8 (ref 7–25)
BUN/CREAT SERPL: 41.9 (ref 7–25)
BUN/CREAT SERPL: 5 (ref 7–25)
BUN/CREAT SERPL: 6.7 (ref 7–25)
BUN/CREAT SERPL: 6.8 (ref 7–25)
BUN/CREAT SERPL: 7.4 (ref 7–25)
BUN/CREAT SERPL: 7.9 (ref 7–25)
BUN/CREAT SERPL: 8.2 (ref 7–25)
BUN/CREAT SERPL: 8.3 (ref 7–25)
BUN/CREAT SERPL: 8.9 (ref 7–25)
BUN/CREAT SERPL: 9.5 (ref 7–25)
CA-I SERPL ISE-MCNC: 1.12 MMOL/L (ref 1.12–1.32)
CALCIUM SPEC-SCNC: 7.9 MG/DL (ref 8.6–10.5)
CALCIUM SPEC-SCNC: 8.1 MG/DL (ref 8.6–10.5)
CALCIUM SPEC-SCNC: 8.2 MG/DL (ref 8.6–10.5)
CALCIUM SPEC-SCNC: 8.3 MG/DL (ref 8.6–10.5)
CALCIUM SPEC-SCNC: 8.4 MG/DL (ref 8.6–10.5)
CALCIUM SPEC-SCNC: 8.4 MG/DL (ref 8.6–10.5)
CALCIUM SPEC-SCNC: 8.5 MG/DL (ref 8.6–10.5)
CALCIUM SPEC-SCNC: 8.5 MG/DL (ref 8.6–10.5)
CALCIUM SPEC-SCNC: 8.6 MG/DL (ref 8.6–10.5)
CALCIUM SPEC-SCNC: 8.7 MG/DL (ref 8.6–10.5)
CALCIUM SPEC-SCNC: 8.8 MG/DL (ref 8.6–10.5)
CALCIUM SPEC-SCNC: 8.8 MG/DL (ref 8.6–10.5)
CALCIUM SPEC-SCNC: 9 MG/DL (ref 8.6–10.5)
CALCIUM SPEC-SCNC: 9.1 MG/DL (ref 8.6–10.5)
CALCIUM SPEC-SCNC: 9.3 MG/DL (ref 8.6–10.5)
CALCIUM SPEC-SCNC: 9.5 MG/DL (ref 8.6–10.5)
CHLORIDE SERPL-SCNC: 100 MMOL/L (ref 98–107)
CHLORIDE SERPL-SCNC: 101 MMOL/L (ref 98–107)
CHLORIDE SERPL-SCNC: 102 MMOL/L (ref 98–107)
CHLORIDE SERPL-SCNC: 103 MMOL/L (ref 98–107)
CHLORIDE SERPL-SCNC: 104 MMOL/L (ref 98–107)
CHLORIDE SERPL-SCNC: 105 MMOL/L (ref 98–107)
CHLORIDE SERPL-SCNC: 106 MMOL/L (ref 98–107)
CHLORIDE SERPL-SCNC: 108 MMOL/L (ref 98–107)
CHLORIDE SERPL-SCNC: 91 MMOL/L (ref 98–107)
CHLORIDE SERPL-SCNC: 94 MMOL/L (ref 98–107)
CHLORIDE SERPL-SCNC: 95 MMOL/L (ref 98–107)
CHLORIDE SERPL-SCNC: 96 MMOL/L (ref 98–107)
CHLORIDE SERPL-SCNC: 96 MMOL/L (ref 98–107)
CHLORIDE SERPL-SCNC: 97 MMOL/L (ref 98–107)
CHLORIDE SERPL-SCNC: 99 MMOL/L (ref 98–107)
CK SERPL-CCNC: 27 U/L (ref 20–180)
CK SERPL-CCNC: 39 U/L (ref 20–180)
CLARITY UR: ABNORMAL
CLARITY UR: CLEAR
CLARITY, POC: ABNORMAL
CLARITY, POC: CLEAR
CO2 BLDA-SCNC: 20.1 MMOL/L (ref 22–33)
CO2 SERPL-SCNC: 16 MMOL/L (ref 22–29)
CO2 SERPL-SCNC: 19 MMOL/L (ref 22–29)
CO2 SERPL-SCNC: 20 MMOL/L (ref 22–29)
CO2 SERPL-SCNC: 21 MMOL/L (ref 22–29)
CO2 SERPL-SCNC: 21 MMOL/L (ref 22–29)
CO2 SERPL-SCNC: 22 MMOL/L (ref 22–29)
CO2 SERPL-SCNC: 23 MMOL/L (ref 22–29)
CO2 SERPL-SCNC: 24 MMOL/L (ref 22–29)
CO2 SERPL-SCNC: 25 MMOL/L (ref 22–29)
CO2 SERPL-SCNC: 26 MMOL/L (ref 22–29)
CO2 SERPL-SCNC: 28 MMOL/L (ref 22–29)
COHGB MFR BLD: 1.1 %
COLOR UR: ABNORMAL
COLOR UR: YELLOW
CORTIS SERPL-MCNC: 24.46 MCG/DL
CREAT SERPL-MCNC: 2.11 MG/DL (ref 0.57–1)
CREAT SERPL-MCNC: 2.25 MG/DL (ref 0.57–1)
CREAT SERPL-MCNC: 2.34 MG/DL (ref 0.57–1)
CREAT SERPL-MCNC: 2.42 MG/DL (ref 0.57–1)
CREAT SERPL-MCNC: 2.59 MG/DL (ref 0.57–1)
CREAT SERPL-MCNC: 2.6 MG/DL (ref 0.57–1)
CREAT SERPL-MCNC: 2.81 MG/DL (ref 0.57–1)
CREAT SERPL-MCNC: 2.98 MG/DL (ref 0.57–1)
CREAT SERPL-MCNC: 3.06 MG/DL (ref 0.57–1)
CREAT SERPL-MCNC: 3.13 MG/DL (ref 0.57–1)
CREAT SERPL-MCNC: 3.16 MG/DL (ref 0.57–1)
CREAT SERPL-MCNC: 3.27 MG/DL (ref 0.57–1)
CREAT SERPL-MCNC: 3.54 MG/DL (ref 0.57–1)
CREAT SERPL-MCNC: 3.7 MG/DL (ref 0.57–1)
CREAT SERPL-MCNC: 3.73 MG/DL (ref 0.57–1)
CREAT SERPL-MCNC: 3.74 MG/DL (ref 0.57–1)
CREAT SERPL-MCNC: 3.76 MG/DL (ref 0.57–1)
CREAT SERPL-MCNC: 3.8 MG/DL (ref 0.57–1)
CREAT SERPL-MCNC: 3.86 MG/DL (ref 0.57–1)
CREAT SERPL-MCNC: 3.97 MG/DL (ref 0.57–1)
CREAT SERPL-MCNC: 4.02 MG/DL (ref 0.57–1)
CREAT SERPL-MCNC: 4.07 MG/DL (ref 0.57–1)
CREAT SERPL-MCNC: 4.09 MG/DL (ref 0.57–1)
CREAT SERPL-MCNC: 4.47 MG/DL (ref 0.57–1)
CREAT SERPL-MCNC: 4.48 MG/DL (ref 0.57–1)
CREAT SERPL-MCNC: 4.5 MG/DL (ref 0.57–1)
CREAT SERPL-MCNC: 4.67 MG/DL (ref 0.57–1)
CREAT SERPL-MCNC: 5.15 MG/DL (ref 0.57–1)
CREAT SERPL-MCNC: 5.39 MG/DL (ref 0.57–1)
CREAT SERPL-MCNC: 5.69 MG/DL (ref 0.57–1)
CREAT UR-MCNC: 36.1 MG/DL
CRP SERPL-MCNC: 1.66 MG/DL (ref 0–0.5)
CYTOLOGIST CVX/VAG CYTO: NORMAL
D DIMER PPP FEU-MCNC: 0.85 MCGFEU/ML (ref 0.01–0.5)
D DIMER PPP FEU-MCNC: 3.71 MCGFEU/ML (ref 0.01–0.5)
D-LACTATE SERPL-SCNC: 1.7 MMOL/L (ref 0.5–2)
D-LACTATE SERPL-SCNC: 1.8 MMOL/L (ref 0.5–2)
D-LACTATE SERPL-SCNC: 2.4 MMOL/L (ref 0.5–2)
D-LACTATE SERPL-SCNC: 2.7 MMOL/L (ref 0.5–2)
D-LACTATE SERPL-SCNC: 2.9 MMOL/L (ref 0.5–2)
D-LACTATE SERPL-SCNC: 3.3 MMOL/L (ref 0.5–2)
D-LACTATE SERPL-SCNC: 3.4 MMOL/L (ref 0.5–2)
D-LACTATE SERPL-SCNC: 3.8 MMOL/L (ref 0.5–2)
DEPRECATED RDW RBC AUTO: 49.2 FL (ref 37–54)
DEPRECATED RDW RBC AUTO: 49.5 FL (ref 37–54)
DEPRECATED RDW RBC AUTO: 49.7 FL (ref 37–54)
DEPRECATED RDW RBC AUTO: 49.9 FL (ref 37–54)
DEPRECATED RDW RBC AUTO: 50.1 FL (ref 37–54)
DEPRECATED RDW RBC AUTO: 50.2 FL (ref 37–54)
DEPRECATED RDW RBC AUTO: 51.1 FL (ref 37–54)
DEPRECATED RDW RBC AUTO: 52 FL (ref 37–54)
DEPRECATED RDW RBC AUTO: 53.2 FL (ref 37–54)
DEPRECATED RDW RBC AUTO: 54.1 FL (ref 37–54)
DEPRECATED RDW RBC AUTO: 54.4 FL (ref 37–54)
DEPRECATED RDW RBC AUTO: 54.7 FL (ref 37–54)
DEPRECATED RDW RBC AUTO: 55.8 FL (ref 37–54)
DEPRECATED RDW RBC AUTO: 56.8 FL (ref 37–54)
DEPRECATED RDW RBC AUTO: 58 FL (ref 37–54)
DEPRECATED RDW RBC AUTO: 58.3 FL (ref 37–54)
DEPRECATED RDW RBC AUTO: 61.3 FL (ref 37–54)
DEPRECATED RDW RBC AUTO: 66.9 FL (ref 37–54)
DEPRECATED RDW RBC AUTO: 67.2 FL (ref 37–54)
DEPRECATED RDW RBC AUTO: 67.3 FL (ref 37–54)
DEPRECATED RDW RBC AUTO: 67.9 FL (ref 37–54)
DEPRECATED RDW RBC AUTO: 68.6 FL (ref 37–54)
DEPRECATED RDW RBC AUTO: 70.6 FL (ref 37–54)
DEPRECATED RDW RBC AUTO: 73.1 FL (ref 37–54)
DEPRECATED RDW RBC AUTO: 82.2 FL (ref 37–54)
DEVELOPER EXPIRATION DATE: ABNORMAL
DEVELOPER LOT NUMBER: ABNORMAL
DRUGS UR: NORMAL
EGFRCR SERPLBLD CKD-EPI 2021: 10.1 ML/MIN/1.73
EGFRCR SERPLBLD CKD-EPI 2021: 10.2 ML/MIN/1.73
EGFRCR SERPLBLD CKD-EPI 2021: 10.4 ML/MIN/1.73
EGFRCR SERPLBLD CKD-EPI 2021: 10.5 ML/MIN/1.73
EGFRCR SERPLBLD CKD-EPI 2021: 10.9 ML/MIN/1.73
EGFRCR SERPLBLD CKD-EPI 2021: 11.1 ML/MIN/1.73
EGFRCR SERPLBLD CKD-EPI 2021: 11.2 ML/MIN/1.73
EGFRCR SERPLBLD CKD-EPI 2021: 11.3 ML/MIN/1.73
EGFRCR SERPLBLD CKD-EPI 2021: 11.3 ML/MIN/1.73
EGFRCR SERPLBLD CKD-EPI 2021: 11.4 ML/MIN/1.73
EGFRCR SERPLBLD CKD-EPI 2021: 12.1 ML/MIN/1.73
EGFRCR SERPLBLD CKD-EPI 2021: 13.3 ML/MIN/1.73
EGFRCR SERPLBLD CKD-EPI 2021: 13.8 ML/MIN/1.73
EGFRCR SERPLBLD CKD-EPI 2021: 14 ML/MIN/1.73
EGFRCR SERPLBLD CKD-EPI 2021: 14.4 ML/MIN/1.73
EGFRCR SERPLBLD CKD-EPI 2021: 14.8 ML/MIN/1.73
EGFRCR SERPLBLD CKD-EPI 2021: 15.9 ML/MIN/1.73
EGFRCR SERPLBLD CKD-EPI 2021: 17.5 ML/MIN/1.73
EGFRCR SERPLBLD CKD-EPI 2021: 17.6 ML/MIN/1.73
EGFRCR SERPLBLD CKD-EPI 2021: 19 ML/MIN/1.73
EGFRCR SERPLBLD CKD-EPI 2021: 19.8 ML/MIN/1.73
EGFRCR SERPLBLD CKD-EPI 2021: 20.8 ML/MIN/1.73
EGFRCR SERPLBLD CKD-EPI 2021: 22.4 ML/MIN/1.73
EGFRCR SERPLBLD CKD-EPI 2021: 6.8 ML/MIN/1.73
EGFRCR SERPLBLD CKD-EPI 2021: 7.3 ML/MIN/1.73
EGFRCR SERPLBLD CKD-EPI 2021: 7.7 ML/MIN/1.73
EGFRCR SERPLBLD CKD-EPI 2021: 8.6 ML/MIN/1.73
EGFRCR SERPLBLD CKD-EPI 2021: 9 ML/MIN/1.73
EGFRCR SERPLBLD CKD-EPI 2021: 9.1 ML/MIN/1.73
EGFRCR SERPLBLD CKD-EPI 2021: 9.1 ML/MIN/1.73
EOSINOPHIL # BLD AUTO: 0.04 10*3/MM3 (ref 0–0.4)
EOSINOPHIL # BLD AUTO: 0.09 10*3/MM3 (ref 0–0.4)
EOSINOPHIL # BLD AUTO: 0.12 10*3/MM3 (ref 0–0.4)
EOSINOPHIL # BLD AUTO: 0.13 10*3/MM3 (ref 0–0.4)
EOSINOPHIL # BLD AUTO: 0.15 10*3/MM3 (ref 0–0.4)
EOSINOPHIL # BLD AUTO: 0.19 10*3/MM3 (ref 0–0.4)
EOSINOPHIL # BLD AUTO: 0.23 10*3/MM3 (ref 0–0.4)
EOSINOPHIL # BLD AUTO: 0.25 10*3/MM3 (ref 0–0.4)
EOSINOPHIL # BLD AUTO: 0.26 10*3/MM3 (ref 0–0.4)
EOSINOPHIL # BLD AUTO: 0.27 10*3/MM3 (ref 0–0.4)
EOSINOPHIL # BLD AUTO: 0.3 10*3/MM3 (ref 0–0.4)
EOSINOPHIL NFR BLD AUTO: 0.8 % (ref 0.3–6.2)
EOSINOPHIL NFR BLD AUTO: 1.5 % (ref 0.3–6.2)
EOSINOPHIL NFR BLD AUTO: 1.6 % (ref 0.3–6.2)
EOSINOPHIL NFR BLD AUTO: 1.9 % (ref 0.3–6.2)
EOSINOPHIL NFR BLD AUTO: 2 % (ref 0.3–6.2)
EOSINOPHIL NFR BLD AUTO: 2.2 % (ref 0.3–6.2)
EOSINOPHIL NFR BLD AUTO: 3.1 % (ref 0.3–6.2)
EOSINOPHIL NFR BLD AUTO: 3.7 % (ref 0.3–6.2)
EOSINOPHIL NFR BLD AUTO: 3.8 % (ref 0.3–6.2)
EOSINOPHIL NFR BLD AUTO: 3.9 % (ref 0.3–6.2)
EOSINOPHIL NFR BLD AUTO: 3.9 % (ref 0.3–6.2)
EOSINOPHIL NFR BLD AUTO: 4.2 % (ref 0.3–6.2)
EOSINOPHIL NFR BLD AUTO: 4.5 % (ref 0.3–6.2)
EOSINOPHIL SPEC QL MICRO: 0 % EOS/100 CELLS (ref 0–0)
EPAP: 0
ERYTHROCYTE [DISTWIDTH] IN BLOOD BY AUTOMATED COUNT: 15.7 % (ref 12.3–15.4)
ERYTHROCYTE [DISTWIDTH] IN BLOOD BY AUTOMATED COUNT: 15.8 % (ref 12.3–15.4)
ERYTHROCYTE [DISTWIDTH] IN BLOOD BY AUTOMATED COUNT: 15.8 % (ref 12.3–15.4)
ERYTHROCYTE [DISTWIDTH] IN BLOOD BY AUTOMATED COUNT: 15.9 % (ref 12.3–15.4)
ERYTHROCYTE [DISTWIDTH] IN BLOOD BY AUTOMATED COUNT: 16.3 % (ref 12.3–15.4)
ERYTHROCYTE [DISTWIDTH] IN BLOOD BY AUTOMATED COUNT: 16.4 % (ref 12.3–15.4)
ERYTHROCYTE [DISTWIDTH] IN BLOOD BY AUTOMATED COUNT: 16.5 % (ref 12.3–15.4)
ERYTHROCYTE [DISTWIDTH] IN BLOOD BY AUTOMATED COUNT: 16.7 % (ref 12.3–15.4)
ERYTHROCYTE [DISTWIDTH] IN BLOOD BY AUTOMATED COUNT: 17 % (ref 12.3–15.4)
ERYTHROCYTE [DISTWIDTH] IN BLOOD BY AUTOMATED COUNT: 17 % (ref 12.3–15.4)
ERYTHROCYTE [DISTWIDTH] IN BLOOD BY AUTOMATED COUNT: 17.2 % (ref 12.3–15.4)
ERYTHROCYTE [DISTWIDTH] IN BLOOD BY AUTOMATED COUNT: 17.3 % (ref 12.3–15.4)
ERYTHROCYTE [DISTWIDTH] IN BLOOD BY AUTOMATED COUNT: 17.3 % (ref 12.3–15.4)
ERYTHROCYTE [DISTWIDTH] IN BLOOD BY AUTOMATED COUNT: 18.1 % (ref 12.3–15.4)
ERYTHROCYTE [DISTWIDTH] IN BLOOD BY AUTOMATED COUNT: 18.4 % (ref 12.3–15.4)
ERYTHROCYTE [DISTWIDTH] IN BLOOD BY AUTOMATED COUNT: 19.8 % (ref 12.3–15.4)
ERYTHROCYTE [DISTWIDTH] IN BLOOD BY AUTOMATED COUNT: 19.9 % (ref 12.3–15.4)
ERYTHROCYTE [DISTWIDTH] IN BLOOD BY AUTOMATED COUNT: 19.9 % (ref 12.3–15.4)
ERYTHROCYTE [DISTWIDTH] IN BLOOD BY AUTOMATED COUNT: 20 % (ref 12.3–15.4)
ERYTHROCYTE [DISTWIDTH] IN BLOOD BY AUTOMATED COUNT: 20.3 % (ref 12.3–15.4)
ERYTHROCYTE [DISTWIDTH] IN BLOOD BY AUTOMATED COUNT: 20.3 % (ref 12.3–15.4)
ERYTHROCYTE [DISTWIDTH] IN BLOOD BY AUTOMATED COUNT: 20.8 % (ref 12.3–15.4)
ERYTHROCYTE [DISTWIDTH] IN BLOOD BY AUTOMATED COUNT: 21.2 % (ref 12.3–15.4)
ERYTHROCYTE [SEDIMENTATION RATE] IN BLOOD: 45 MM/HR (ref 0–30)
EXPIRATION DATE: ABNORMAL
EXPIRATION DATE: NORMAL
FECAL OCCULT BLOOD SCREEN, POC: POSITIVE
FERRITIN SERPL-MCNC: 267.6 NG/ML (ref 13–150)
FIBRINOGEN PPP-MCNC: 224 MG/DL (ref 220–470)
FLUAV SUBTYP SPEC NAA+PROBE: NOT DETECTED
FLUBV RNA ISLT QL NAA+PROBE: NOT DETECTED
FOLATE SERPL-MCNC: 9.09 NG/ML (ref 4.78–24.2)
GLOBULIN UR ELPH-MCNC: 2.7 GM/DL
GLOBULIN UR ELPH-MCNC: 3.7 GM/DL
GLOBULIN UR ELPH-MCNC: 3.8 GM/DL
GLUCOSE BLDC GLUCOMTR-MCNC: 101 MG/DL (ref 70–130)
GLUCOSE BLDC GLUCOMTR-MCNC: 102 MG/DL (ref 70–130)
GLUCOSE BLDC GLUCOMTR-MCNC: 105 MG/DL (ref 70–130)
GLUCOSE BLDC GLUCOMTR-MCNC: 106 MG/DL (ref 70–130)
GLUCOSE BLDC GLUCOMTR-MCNC: 106 MG/DL (ref 70–130)
GLUCOSE BLDC GLUCOMTR-MCNC: 108 MG/DL (ref 70–130)
GLUCOSE BLDC GLUCOMTR-MCNC: 110 MG/DL (ref 70–130)
GLUCOSE BLDC GLUCOMTR-MCNC: 113 MG/DL (ref 70–130)
GLUCOSE BLDC GLUCOMTR-MCNC: 115 MG/DL (ref 70–130)
GLUCOSE BLDC GLUCOMTR-MCNC: 116 MG/DL (ref 70–130)
GLUCOSE BLDC GLUCOMTR-MCNC: 117 MG/DL (ref 70–130)
GLUCOSE BLDC GLUCOMTR-MCNC: 118 MG/DL (ref 70–130)
GLUCOSE BLDC GLUCOMTR-MCNC: 126 MG/DL (ref 70–130)
GLUCOSE BLDC GLUCOMTR-MCNC: 126 MG/DL (ref 70–130)
GLUCOSE BLDC GLUCOMTR-MCNC: 128 MG/DL (ref 70–130)
GLUCOSE BLDC GLUCOMTR-MCNC: 130 MG/DL (ref 70–130)
GLUCOSE BLDC GLUCOMTR-MCNC: 131 MG/DL (ref 70–130)
GLUCOSE BLDC GLUCOMTR-MCNC: 133 MG/DL (ref 70–130)
GLUCOSE BLDC GLUCOMTR-MCNC: 134 MG/DL (ref 70–130)
GLUCOSE BLDC GLUCOMTR-MCNC: 134 MG/DL (ref 70–130)
GLUCOSE BLDC GLUCOMTR-MCNC: 135 MG/DL (ref 70–130)
GLUCOSE BLDC GLUCOMTR-MCNC: 135 MG/DL (ref 70–130)
GLUCOSE BLDC GLUCOMTR-MCNC: 136 MG/DL (ref 70–130)
GLUCOSE BLDC GLUCOMTR-MCNC: 136 MG/DL (ref 70–130)
GLUCOSE BLDC GLUCOMTR-MCNC: 137 MG/DL (ref 70–130)
GLUCOSE BLDC GLUCOMTR-MCNC: 138 MG/DL (ref 70–130)
GLUCOSE BLDC GLUCOMTR-MCNC: 138 MG/DL (ref 70–130)
GLUCOSE BLDC GLUCOMTR-MCNC: 139 MG/DL (ref 70–130)
GLUCOSE BLDC GLUCOMTR-MCNC: 140 MG/DL (ref 70–130)
GLUCOSE BLDC GLUCOMTR-MCNC: 140 MG/DL (ref 70–130)
GLUCOSE BLDC GLUCOMTR-MCNC: 141 MG/DL (ref 70–130)
GLUCOSE BLDC GLUCOMTR-MCNC: 144 MG/DL (ref 70–130)
GLUCOSE BLDC GLUCOMTR-MCNC: 149 MG/DL (ref 70–130)
GLUCOSE BLDC GLUCOMTR-MCNC: 150 MG/DL (ref 70–130)
GLUCOSE BLDC GLUCOMTR-MCNC: 151 MG/DL (ref 70–130)
GLUCOSE BLDC GLUCOMTR-MCNC: 152 MG/DL (ref 70–130)
GLUCOSE BLDC GLUCOMTR-MCNC: 152 MG/DL (ref 70–130)
GLUCOSE BLDC GLUCOMTR-MCNC: 154 MG/DL (ref 70–130)
GLUCOSE BLDC GLUCOMTR-MCNC: 155 MG/DL (ref 70–130)
GLUCOSE BLDC GLUCOMTR-MCNC: 155 MG/DL (ref 70–130)
GLUCOSE BLDC GLUCOMTR-MCNC: 157 MG/DL (ref 70–130)
GLUCOSE BLDC GLUCOMTR-MCNC: 160 MG/DL (ref 70–130)
GLUCOSE BLDC GLUCOMTR-MCNC: 161 MG/DL (ref 70–130)
GLUCOSE BLDC GLUCOMTR-MCNC: 163 MG/DL (ref 70–130)
GLUCOSE BLDC GLUCOMTR-MCNC: 164 MG/DL (ref 70–130)
GLUCOSE BLDC GLUCOMTR-MCNC: 165 MG/DL (ref 70–130)
GLUCOSE BLDC GLUCOMTR-MCNC: 166 MG/DL (ref 70–130)
GLUCOSE BLDC GLUCOMTR-MCNC: 169 MG/DL (ref 70–130)
GLUCOSE BLDC GLUCOMTR-MCNC: 170 MG/DL (ref 70–130)
GLUCOSE BLDC GLUCOMTR-MCNC: 170 MG/DL (ref 70–130)
GLUCOSE BLDC GLUCOMTR-MCNC: 171 MG/DL (ref 70–130)
GLUCOSE BLDC GLUCOMTR-MCNC: 173 MG/DL (ref 70–130)
GLUCOSE BLDC GLUCOMTR-MCNC: 175 MG/DL (ref 70–130)
GLUCOSE BLDC GLUCOMTR-MCNC: 175 MG/DL (ref 70–130)
GLUCOSE BLDC GLUCOMTR-MCNC: 177 MG/DL (ref 70–130)
GLUCOSE BLDC GLUCOMTR-MCNC: 178 MG/DL (ref 70–130)
GLUCOSE BLDC GLUCOMTR-MCNC: 181 MG/DL (ref 70–130)
GLUCOSE BLDC GLUCOMTR-MCNC: 184 MG/DL (ref 70–130)
GLUCOSE BLDC GLUCOMTR-MCNC: 187 MG/DL (ref 70–130)
GLUCOSE BLDC GLUCOMTR-MCNC: 189 MG/DL (ref 70–130)
GLUCOSE BLDC GLUCOMTR-MCNC: 191 MG/DL (ref 70–130)
GLUCOSE BLDC GLUCOMTR-MCNC: 192 MG/DL (ref 70–130)
GLUCOSE BLDC GLUCOMTR-MCNC: 197 MG/DL (ref 70–130)
GLUCOSE BLDC GLUCOMTR-MCNC: 198 MG/DL (ref 70–130)
GLUCOSE BLDC GLUCOMTR-MCNC: 199 MG/DL (ref 70–130)
GLUCOSE BLDC GLUCOMTR-MCNC: 203 MG/DL (ref 70–130)
GLUCOSE BLDC GLUCOMTR-MCNC: 206 MG/DL (ref 70–130)
GLUCOSE BLDC GLUCOMTR-MCNC: 208 MG/DL (ref 70–130)
GLUCOSE BLDC GLUCOMTR-MCNC: 214 MG/DL (ref 70–130)
GLUCOSE BLDC GLUCOMTR-MCNC: 218 MG/DL (ref 70–130)
GLUCOSE BLDC GLUCOMTR-MCNC: 219 MG/DL (ref 70–130)
GLUCOSE BLDC GLUCOMTR-MCNC: 222 MG/DL (ref 70–130)
GLUCOSE BLDC GLUCOMTR-MCNC: 243 MG/DL (ref 70–130)
GLUCOSE BLDC GLUCOMTR-MCNC: 243 MG/DL (ref 70–130)
GLUCOSE BLDC GLUCOMTR-MCNC: 252 MG/DL (ref 70–130)
GLUCOSE BLDC GLUCOMTR-MCNC: 253 MG/DL (ref 70–130)
GLUCOSE BLDC GLUCOMTR-MCNC: 253 MG/DL (ref 70–130)
GLUCOSE BLDC GLUCOMTR-MCNC: 256 MG/DL (ref 70–130)
GLUCOSE BLDC GLUCOMTR-MCNC: 257 MG/DL (ref 70–130)
GLUCOSE BLDC GLUCOMTR-MCNC: 266 MG/DL (ref 70–130)
GLUCOSE BLDC GLUCOMTR-MCNC: 271 MG/DL (ref 70–130)
GLUCOSE BLDC GLUCOMTR-MCNC: 291 MG/DL (ref 70–130)
GLUCOSE BLDC GLUCOMTR-MCNC: 297 MG/DL (ref 70–130)
GLUCOSE BLDC GLUCOMTR-MCNC: 59 MG/DL (ref 70–130)
GLUCOSE BLDC GLUCOMTR-MCNC: 75 MG/DL (ref 70–130)
GLUCOSE BLDC GLUCOMTR-MCNC: 88 MG/DL (ref 70–130)
GLUCOSE BLDC GLUCOMTR-MCNC: 89 MG/DL (ref 70–130)
GLUCOSE BLDC GLUCOMTR-MCNC: 90 MG/DL (ref 70–130)
GLUCOSE BLDC GLUCOMTR-MCNC: 96 MG/DL (ref 70–130)
GLUCOSE BLDC GLUCOMTR-MCNC: 97 MG/DL (ref 70–130)
GLUCOSE SERPL-MCNC: 101 MG/DL (ref 65–99)
GLUCOSE SERPL-MCNC: 101 MG/DL (ref 65–99)
GLUCOSE SERPL-MCNC: 113 MG/DL (ref 65–99)
GLUCOSE SERPL-MCNC: 117 MG/DL (ref 65–99)
GLUCOSE SERPL-MCNC: 121 MG/DL (ref 65–99)
GLUCOSE SERPL-MCNC: 125 MG/DL (ref 65–99)
GLUCOSE SERPL-MCNC: 130 MG/DL (ref 65–99)
GLUCOSE SERPL-MCNC: 131 MG/DL (ref 65–99)
GLUCOSE SERPL-MCNC: 139 MG/DL (ref 65–99)
GLUCOSE SERPL-MCNC: 146 MG/DL (ref 65–99)
GLUCOSE SERPL-MCNC: 151 MG/DL (ref 65–99)
GLUCOSE SERPL-MCNC: 155 MG/DL (ref 65–99)
GLUCOSE SERPL-MCNC: 159 MG/DL (ref 65–99)
GLUCOSE SERPL-MCNC: 166 MG/DL (ref 65–99)
GLUCOSE SERPL-MCNC: 166 MG/DL (ref 65–99)
GLUCOSE SERPL-MCNC: 167 MG/DL (ref 65–99)
GLUCOSE SERPL-MCNC: 169 MG/DL (ref 65–99)
GLUCOSE SERPL-MCNC: 170 MG/DL (ref 65–99)
GLUCOSE SERPL-MCNC: 174 MG/DL (ref 65–99)
GLUCOSE SERPL-MCNC: 175 MG/DL (ref 65–99)
GLUCOSE SERPL-MCNC: 186 MG/DL (ref 65–99)
GLUCOSE SERPL-MCNC: 189 MG/DL (ref 65–99)
GLUCOSE SERPL-MCNC: 200 MG/DL (ref 65–99)
GLUCOSE SERPL-MCNC: 210 MG/DL (ref 65–99)
GLUCOSE SERPL-MCNC: 213 MG/DL (ref 65–99)
GLUCOSE SERPL-MCNC: 219 MG/DL (ref 65–99)
GLUCOSE SERPL-MCNC: 227 MG/DL (ref 65–99)
GLUCOSE SERPL-MCNC: 232 MG/DL (ref 65–99)
GLUCOSE SERPL-MCNC: 283 MG/DL (ref 65–99)
GLUCOSE SERPL-MCNC: 90 MG/DL (ref 65–99)
GLUCOSE UR STRIP-MCNC: ABNORMAL MG/DL
GLUCOSE UR STRIP-MCNC: NEGATIVE MG/DL
GRAM STN SPEC: NORMAL
GRAM STN SPEC: NORMAL
HAV IGM SERPL QL IA: NORMAL
HBA1C MFR BLD: 5.9 %
HBA1C MFR BLD: 6.1 % (ref 4.8–5.6)
HBA1C MFR BLD: 7.6 %
HBV CORE IGM SERPL QL IA: NORMAL
HBV SURFACE AB SER RIA-ACNC: NORMAL
HBV SURFACE AG SERPL QL IA: NORMAL
HBV SURFACE AG SERPL QL IA: NORMAL
HCO3 BLDV-SCNC: 19 MMOL/L (ref 22–28)
HCT VFR BLD AUTO: 27.4 % (ref 34–46.6)
HCT VFR BLD AUTO: 27.8 % (ref 34–46.6)
HCT VFR BLD AUTO: 28 % (ref 34–46.6)
HCT VFR BLD AUTO: 28.6 % (ref 34–46.6)
HCT VFR BLD AUTO: 28.8 % (ref 34–46.6)
HCT VFR BLD AUTO: 29.1 % (ref 34–46.6)
HCT VFR BLD AUTO: 29.1 % (ref 34–46.6)
HCT VFR BLD AUTO: 29.3 % (ref 34–46.6)
HCT VFR BLD AUTO: 29.7 % (ref 34–46.6)
HCT VFR BLD AUTO: 29.9 % (ref 34–46.6)
HCT VFR BLD AUTO: 30.2 % (ref 34–46.6)
HCT VFR BLD AUTO: 30.4 % (ref 34–46.6)
HCT VFR BLD AUTO: 31.1 % (ref 34–46.6)
HCT VFR BLD AUTO: 31.4 % (ref 34–46.6)
HCT VFR BLD AUTO: 31.5 % (ref 34–46.6)
HCT VFR BLD AUTO: 31.9 % (ref 34–46.6)
HCT VFR BLD AUTO: 32.5 % (ref 34–46.6)
HCT VFR BLD AUTO: 32.5 % (ref 34–46.6)
HCT VFR BLD AUTO: 32.9 % (ref 34–46.6)
HCT VFR BLD AUTO: 32.9 % (ref 34–46.6)
HCT VFR BLD AUTO: 33 % (ref 34–46.6)
HCT VFR BLD AUTO: 33.1 % (ref 34–46.6)
HCT VFR BLD AUTO: 33.1 % (ref 34–46.6)
HCT VFR BLD AUTO: 33.2 % (ref 34–46.6)
HCT VFR BLD AUTO: 33.3 % (ref 34–46.6)
HCT VFR BLD AUTO: 33.4 % (ref 34–46.6)
HCT VFR BLD AUTO: 35.3 % (ref 34–46.6)
HCT VFR BLD AUTO: 36.4 % (ref 34–46.6)
HCT VFR BLD AUTO: 37 % (ref 34–46.6)
HCT VFR BLD AUTO: 37.9 % (ref 34–46.6)
HCT VFR BLD AUTO: 38.2 % (ref 34–46.6)
HCT VFR BLD AUTO: 38.4 % (ref 34–46.6)
HCT VFR BLD AUTO: 39.3 % (ref 34–46.6)
HCT VFR BLD AUTO: 39.7 % (ref 34–46.6)
HCT VFR BLD AUTO: 39.8 % (ref 34–46.6)
HCT VFR BLD AUTO: 40.8 % (ref 34–46.6)
HCT VFR BLD AUTO: 40.8 % (ref 34–46.6)
HCT VFR BLD AUTO: 42.1 % (ref 34–46.6)
HCV AB SER DONR QL: NORMAL
HGB BLD-MCNC: 10 G/DL (ref 12–15.9)
HGB BLD-MCNC: 10.1 G/DL (ref 12–15.9)
HGB BLD-MCNC: 10.2 G/DL (ref 12–15.9)
HGB BLD-MCNC: 10.4 G/DL (ref 12–15.9)
HGB BLD-MCNC: 10.7 G/DL (ref 12–15.9)
HGB BLD-MCNC: 10.8 G/DL (ref 12–15.9)
HGB BLD-MCNC: 10.9 G/DL (ref 12–15.9)
HGB BLD-MCNC: 11 G/DL (ref 12–15.9)
HGB BLD-MCNC: 11 G/DL (ref 12–15.9)
HGB BLD-MCNC: 11.1 G/DL (ref 12–15.9)
HGB BLD-MCNC: 11.1 G/DL (ref 12–15.9)
HGB BLD-MCNC: 11.2 G/DL (ref 12–15.9)
HGB BLD-MCNC: 11.6 G/DL (ref 12–15.9)
HGB BLD-MCNC: 12.3 G/DL (ref 12–15.9)
HGB BLD-MCNC: 12.6 G/DL (ref 12–15.9)
HGB BLD-MCNC: 12.6 G/DL (ref 12–15.9)
HGB BLD-MCNC: 12.8 G/DL (ref 12–15.9)
HGB BLD-MCNC: 12.8 G/DL (ref 12–15.9)
HGB BLD-MCNC: 13.2 G/DL (ref 12–15.9)
HGB BLD-MCNC: 13.3 G/DL (ref 12–15.9)
HGB BLD-MCNC: 13.4 G/DL (ref 12–15.9)
HGB BLD-MCNC: 13.4 G/DL (ref 12–15.9)
HGB BLD-MCNC: 13.8 G/DL (ref 12–15.9)
HGB BLD-MCNC: 9.3 G/DL (ref 12–15.9)
HGB BLD-MCNC: 9.4 G/DL (ref 12–15.9)
HGB BLD-MCNC: 9.4 G/DL (ref 12–15.9)
HGB BLD-MCNC: 9.5 G/DL (ref 12–15.9)
HGB BLD-MCNC: 9.7 G/DL (ref 12–15.9)
HGB BLD-MCNC: 9.8 G/DL (ref 12–15.9)
HGB BLD-MCNC: 9.9 G/DL (ref 12–15.9)
HGB BLDA-MCNC: 13.5 G/DL (ref 14–18)
HGB UR QL STRIP.AUTO: ABNORMAL
HOLD SPECIMEN: NORMAL
HYALINE CASTS UR QL AUTO: ABNORMAL /LPF
IMM GRANULOCYTES # BLD AUTO: 0.01 10*3/MM3 (ref 0–0.05)
IMM GRANULOCYTES # BLD AUTO: 0.02 10*3/MM3 (ref 0–0.05)
IMM GRANULOCYTES # BLD AUTO: 0.03 10*3/MM3 (ref 0–0.05)
IMM GRANULOCYTES # BLD AUTO: 0.03 10*3/MM3 (ref 0–0.05)
IMM GRANULOCYTES # BLD AUTO: 0.04 10*3/MM3 (ref 0–0.05)
IMM GRANULOCYTES # BLD AUTO: 0.05 10*3/MM3 (ref 0–0.05)
IMM GRANULOCYTES # BLD AUTO: 0.05 10*3/MM3 (ref 0–0.05)
IMM GRANULOCYTES # BLD AUTO: 0.06 10*3/MM3 (ref 0–0.05)
IMM GRANULOCYTES # BLD AUTO: 0.06 10*3/MM3 (ref 0–0.05)
IMM GRANULOCYTES # BLD AUTO: 0.08 10*3/MM3 (ref 0–0.05)
IMM GRANULOCYTES # BLD AUTO: 0.09 10*3/MM3 (ref 0–0.05)
IMM GRANULOCYTES NFR BLD AUTO: 0.2 % (ref 0–0.5)
IMM GRANULOCYTES NFR BLD AUTO: 0.4 % (ref 0–0.5)
IMM GRANULOCYTES NFR BLD AUTO: 0.6 % (ref 0–0.5)
IMM GRANULOCYTES NFR BLD AUTO: 0.7 % (ref 0–0.5)
IMM GRANULOCYTES NFR BLD AUTO: 0.7 % (ref 0–0.5)
IMM GRANULOCYTES NFR BLD AUTO: 0.8 % (ref 0–0.5)
IMM GRANULOCYTES NFR BLD AUTO: 0.8 % (ref 0–0.5)
IMM GRANULOCYTES NFR BLD AUTO: 0.9 % (ref 0–0.5)
IMM GRANULOCYTES NFR BLD AUTO: 1 % (ref 0–0.5)
IMM GRANULOCYTES NFR BLD AUTO: 1.2 % (ref 0–0.5)
IMM GRANULOCYTES NFR BLD AUTO: 1.3 % (ref 0–0.5)
INHALED O2 CONCENTRATION: 28 %
INR PPP: 1.98 (ref 0.84–1.13)
INR PPP: 1.99 (ref 0.84–1.13)
INR PPP: 2.02 (ref 0.84–1.13)
INR PPP: 2.04 (ref 0.84–1.13)
INR PPP: 2.15 (ref 0.84–1.13)
IPAP: 0
IRON 24H UR-MRATE: 34 MCG/DL (ref 37–145)
IRON SATN MFR SERPL: 11 % (ref 20–50)
KETONES UR QL STRIP: ABNORMAL
KETONES UR QL STRIP: ABNORMAL
KETONES UR QL STRIP: NEGATIVE
KETONES UR QL: NEGATIVE
KETONES UR QL: NEGATIVE
LDH SERPL-CCNC: 276 U/L (ref 135–214)
LDH SERPL-CCNC: 347 U/L (ref 135–214)
LEFT ATRIUM VOLUME INDEX: 37 ML/M2
LEUKOCYTE EST, POC: ABNORMAL
LEUKOCYTE EST, POC: NEGATIVE
LEUKOCYTE ESTERASE UR QL STRIP.AUTO: ABNORMAL
LIPASE SERPL-CCNC: 24 U/L (ref 13–60)
LV EF 2D ECHO EST: 37 %
LYMPHOCYTES # BLD AUTO: 0.89 10*3/MM3 (ref 0.7–3.1)
LYMPHOCYTES # BLD AUTO: 0.95 10*3/MM3 (ref 0.7–3.1)
LYMPHOCYTES # BLD AUTO: 1.04 10*3/MM3 (ref 0.7–3.1)
LYMPHOCYTES # BLD AUTO: 1.04 10*3/MM3 (ref 0.7–3.1)
LYMPHOCYTES # BLD AUTO: 1.07 10*3/MM3 (ref 0.7–3.1)
LYMPHOCYTES # BLD AUTO: 1.07 10*3/MM3 (ref 0.7–3.1)
LYMPHOCYTES # BLD AUTO: 1.21 10*3/MM3 (ref 0.7–3.1)
LYMPHOCYTES # BLD AUTO: 1.28 10*3/MM3 (ref 0.7–3.1)
LYMPHOCYTES # BLD AUTO: 1.36 10*3/MM3 (ref 0.7–3.1)
LYMPHOCYTES # BLD AUTO: 1.36 10*3/MM3 (ref 0.7–3.1)
LYMPHOCYTES # BLD AUTO: 1.46 10*3/MM3 (ref 0.7–3.1)
LYMPHOCYTES # BLD AUTO: 1.67 10*3/MM3 (ref 0.7–3.1)
LYMPHOCYTES # BLD AUTO: 1.68 10*3/MM3 (ref 0.7–3.1)
LYMPHOCYTES NFR BLD AUTO: 15.2 % (ref 19.6–45.3)
LYMPHOCYTES NFR BLD AUTO: 16.3 % (ref 19.6–45.3)
LYMPHOCYTES NFR BLD AUTO: 18 % (ref 19.6–45.3)
LYMPHOCYTES NFR BLD AUTO: 18 % (ref 19.6–45.3)
LYMPHOCYTES NFR BLD AUTO: 19.2 % (ref 19.6–45.3)
LYMPHOCYTES NFR BLD AUTO: 19.2 % (ref 19.6–45.3)
LYMPHOCYTES NFR BLD AUTO: 20.3 % (ref 19.6–45.3)
LYMPHOCYTES NFR BLD AUTO: 20.7 % (ref 19.6–45.3)
LYMPHOCYTES NFR BLD AUTO: 22.5 % (ref 19.6–45.3)
LYMPHOCYTES NFR BLD AUTO: 24.8 % (ref 19.6–45.3)
LYMPHOCYTES NFR BLD AUTO: 26 % (ref 19.6–45.3)
LYMPHOCYTES NFR BLD AUTO: 30.1 % (ref 19.6–45.3)
LYMPHOCYTES NFR BLD AUTO: 30.8 % (ref 19.6–45.3)
Lab: ABNORMAL
Lab: NORMAL
MAGNESIUM SERPL-MCNC: 1.8 MG/DL (ref 1.6–2.4)
MAGNESIUM SERPL-MCNC: 1.9 MG/DL (ref 1.6–2.4)
MAGNESIUM SERPL-MCNC: 1.9 MG/DL (ref 1.6–2.4)
MAGNESIUM SERPL-MCNC: 2 MG/DL (ref 1.6–2.4)
MAGNESIUM SERPL-MCNC: 2 MG/DL (ref 1.6–2.4)
MAGNESIUM SERPL-MCNC: 2.1 MG/DL (ref 1.6–2.4)
MAGNESIUM SERPL-MCNC: 2.1 MG/DL (ref 1.6–2.4)
MAXIMAL PREDICTED HEART RATE: 134 BPM
MCH RBC QN AUTO: 28.5 PG (ref 26.6–33)
MCH RBC QN AUTO: 28.7 PG (ref 26.6–33)
MCH RBC QN AUTO: 28.8 PG (ref 26.6–33)
MCH RBC QN AUTO: 28.9 PG (ref 26.6–33)
MCH RBC QN AUTO: 29.1 PG (ref 26.6–33)
MCH RBC QN AUTO: 29.2 PG (ref 26.6–33)
MCH RBC QN AUTO: 29.2 PG (ref 26.6–33)
MCH RBC QN AUTO: 29.3 PG (ref 26.6–33)
MCH RBC QN AUTO: 29.3 PG (ref 26.6–33)
MCH RBC QN AUTO: 29.4 PG (ref 26.6–33)
MCH RBC QN AUTO: 29.4 PG (ref 26.6–33)
MCH RBC QN AUTO: 29.5 PG (ref 26.6–33)
MCH RBC QN AUTO: 29.7 PG (ref 26.6–33)
MCH RBC QN AUTO: 29.7 PG (ref 26.6–33)
MCH RBC QN AUTO: 30.1 PG (ref 26.6–33)
MCH RBC QN AUTO: 31.6 PG (ref 26.6–33)
MCH RBC QN AUTO: 31.8 PG (ref 26.6–33)
MCH RBC QN AUTO: 31.9 PG (ref 26.6–33)
MCH RBC QN AUTO: 32 PG (ref 26.6–33)
MCH RBC QN AUTO: 32.2 PG (ref 26.6–33)
MCH RBC QN AUTO: 32.2 PG (ref 26.6–33)
MCH RBC QN AUTO: 32.3 PG (ref 26.6–33)
MCH RBC QN AUTO: 32.3 PG (ref 26.6–33)
MCHC RBC AUTO-ENTMCNC: 29.4 G/DL (ref 31.5–35.7)
MCHC RBC AUTO-ENTMCNC: 31.4 G/DL (ref 31.5–35.7)
MCHC RBC AUTO-ENTMCNC: 31.8 G/DL (ref 31.5–35.7)
MCHC RBC AUTO-ENTMCNC: 32.2 G/DL (ref 31.5–35.7)
MCHC RBC AUTO-ENTMCNC: 32.5 G/DL (ref 31.5–35.7)
MCHC RBC AUTO-ENTMCNC: 32.6 G/DL (ref 31.5–35.7)
MCHC RBC AUTO-ENTMCNC: 32.6 G/DL (ref 31.5–35.7)
MCHC RBC AUTO-ENTMCNC: 32.7 G/DL (ref 31.5–35.7)
MCHC RBC AUTO-ENTMCNC: 32.8 G/DL (ref 31.5–35.7)
MCHC RBC AUTO-ENTMCNC: 32.9 G/DL (ref 31.5–35.7)
MCHC RBC AUTO-ENTMCNC: 33 G/DL (ref 31.5–35.7)
MCHC RBC AUTO-ENTMCNC: 33.2 G/DL (ref 31.5–35.7)
MCHC RBC AUTO-ENTMCNC: 33.4 G/DL (ref 31.5–35.7)
MCHC RBC AUTO-ENTMCNC: 33.5 G/DL (ref 31.5–35.7)
MCHC RBC AUTO-ENTMCNC: 33.6 G/DL (ref 31.5–35.7)
MCHC RBC AUTO-ENTMCNC: 33.6 G/DL (ref 31.5–35.7)
MCHC RBC AUTO-ENTMCNC: 33.8 G/DL (ref 31.5–35.7)
MCHC RBC AUTO-ENTMCNC: 33.8 G/DL (ref 31.5–35.7)
MCHC RBC AUTO-ENTMCNC: 33.9 G/DL (ref 31.5–35.7)
MCV RBC AUTO: 107.4 FL (ref 79–97)
MCV RBC AUTO: 85.5 FL (ref 79–97)
MCV RBC AUTO: 86.3 FL (ref 79–97)
MCV RBC AUTO: 86.6 FL (ref 79–97)
MCV RBC AUTO: 86.9 FL (ref 79–97)
MCV RBC AUTO: 87.4 FL (ref 79–97)
MCV RBC AUTO: 87.5 FL (ref 79–97)
MCV RBC AUTO: 87.5 FL (ref 79–97)
MCV RBC AUTO: 87.8 FL (ref 79–97)
MCV RBC AUTO: 88 FL (ref 79–97)
MCV RBC AUTO: 88.7 FL (ref 79–97)
MCV RBC AUTO: 89.2 FL (ref 79–97)
MCV RBC AUTO: 89.5 FL (ref 79–97)
MCV RBC AUTO: 89.5 FL (ref 79–97)
MCV RBC AUTO: 91.5 FL (ref 79–97)
MCV RBC AUTO: 92.3 FL (ref 79–97)
MCV RBC AUTO: 92.4 FL (ref 79–97)
MCV RBC AUTO: 93 FL (ref 79–97)
MCV RBC AUTO: 95.7 FL (ref 79–97)
MCV RBC AUTO: 96 FL (ref 79–97)
MCV RBC AUTO: 96.1 FL (ref 79–97)
MCV RBC AUTO: 97 FL (ref 79–97)
MCV RBC AUTO: 97.1 FL (ref 79–97)
MCV RBC AUTO: 97.7 FL (ref 79–97)
MCV RBC AUTO: 98.7 FL (ref 79–97)
METHGB BLD QL: 0.3 %
MODALITY: ABNORMAL
MONOCYTES # BLD AUTO: 0.44 10*3/MM3 (ref 0.1–0.9)
MONOCYTES # BLD AUTO: 0.47 10*3/MM3 (ref 0.1–0.9)
MONOCYTES # BLD AUTO: 0.54 10*3/MM3 (ref 0.1–0.9)
MONOCYTES # BLD AUTO: 0.56 10*3/MM3 (ref 0.1–0.9)
MONOCYTES # BLD AUTO: 0.65 10*3/MM3 (ref 0.1–0.9)
MONOCYTES # BLD AUTO: 0.71 10*3/MM3 (ref 0.1–0.9)
MONOCYTES # BLD AUTO: 0.72 10*3/MM3 (ref 0.1–0.9)
MONOCYTES # BLD AUTO: 0.74 10*3/MM3 (ref 0.1–0.9)
MONOCYTES # BLD AUTO: 0.75 10*3/MM3 (ref 0.1–0.9)
MONOCYTES # BLD AUTO: 0.75 10*3/MM3 (ref 0.1–0.9)
MONOCYTES # BLD AUTO: 0.79 10*3/MM3 (ref 0.1–0.9)
MONOCYTES # BLD AUTO: 0.99 10*3/MM3 (ref 0.1–0.9)
MONOCYTES # BLD AUTO: 1.01 10*3/MM3 (ref 0.1–0.9)
MONOCYTES NFR BLD AUTO: 10.2 % (ref 5–12)
MONOCYTES NFR BLD AUTO: 11.5 % (ref 5–12)
MONOCYTES NFR BLD AUTO: 11.5 % (ref 5–12)
MONOCYTES NFR BLD AUTO: 11.6 % (ref 5–12)
MONOCYTES NFR BLD AUTO: 11.7 % (ref 5–12)
MONOCYTES NFR BLD AUTO: 12 % (ref 5–12)
MONOCYTES NFR BLD AUTO: 12.1 % (ref 5–12)
MONOCYTES NFR BLD AUTO: 12.7 % (ref 5–12)
MONOCYTES NFR BLD AUTO: 14 % (ref 5–12)
MONOCYTES NFR BLD AUTO: 15.5 % (ref 5–12)
MONOCYTES NFR BLD AUTO: 16.2 % (ref 5–12)
MONOCYTES NFR BLD AUTO: 8.6 % (ref 5–12)
MONOCYTES NFR BLD AUTO: 9.1 % (ref 5–12)
NEGATIVE CONTROL: NEGATIVE
NEUTROPHILS NFR BLD AUTO: 2.14 10*3/MM3 (ref 1.7–7)
NEUTROPHILS NFR BLD AUTO: 2.84 10*3/MM3 (ref 1.7–7)
NEUTROPHILS NFR BLD AUTO: 2.94 10*3/MM3 (ref 1.7–7)
NEUTROPHILS NFR BLD AUTO: 3.43 10*3/MM3 (ref 1.7–7)
NEUTROPHILS NFR BLD AUTO: 3.54 10*3/MM3 (ref 1.7–7)
NEUTROPHILS NFR BLD AUTO: 3.64 10*3/MM3 (ref 1.7–7)
NEUTROPHILS NFR BLD AUTO: 3.76 10*3/MM3 (ref 1.7–7)
NEUTROPHILS NFR BLD AUTO: 3.85 10*3/MM3 (ref 1.7–7)
NEUTROPHILS NFR BLD AUTO: 3.87 10*3/MM3 (ref 1.7–7)
NEUTROPHILS NFR BLD AUTO: 3.93 10*3/MM3 (ref 1.7–7)
NEUTROPHILS NFR BLD AUTO: 4.16 10*3/MM3 (ref 1.7–7)
NEUTROPHILS NFR BLD AUTO: 4.27 10*3/MM3 (ref 1.7–7)
NEUTROPHILS NFR BLD AUTO: 4.65 10*3/MM3 (ref 1.7–7)
NEUTROPHILS NFR BLD AUTO: 53.3 % (ref 42.7–76)
NEUTROPHILS NFR BLD AUTO: 54.2 % (ref 42.7–76)
NEUTROPHILS NFR BLD AUTO: 56.1 % (ref 42.7–76)
NEUTROPHILS NFR BLD AUTO: 59.7 % (ref 42.7–76)
NEUTROPHILS NFR BLD AUTO: 60.3 % (ref 42.7–76)
NEUTROPHILS NFR BLD AUTO: 61.5 % (ref 42.7–76)
NEUTROPHILS NFR BLD AUTO: 62.5 % (ref 42.7–76)
NEUTROPHILS NFR BLD AUTO: 63.4 % (ref 42.7–76)
NEUTROPHILS NFR BLD AUTO: 64.5 % (ref 42.7–76)
NEUTROPHILS NFR BLD AUTO: 64.8 % (ref 42.7–76)
NEUTROPHILS NFR BLD AUTO: 67.9 % (ref 42.7–76)
NEUTROPHILS NFR BLD AUTO: 69.1 % (ref 42.7–76)
NEUTROPHILS NFR BLD AUTO: 70.1 % (ref 42.7–76)
NITRITE UR QL STRIP: NEGATIVE
NITRITE UR QL STRIP: POSITIVE
NITRITE UR QL STRIP: POSITIVE
NITRITE UR-MCNC: NEGATIVE MG/ML
NITRITE UR-MCNC: NEGATIVE MG/ML
NOTE: ABNORMAL
NRBC BLD AUTO-RTO: 0 /100 WBC (ref 0–0.2)
NRBC BLD AUTO-RTO: 0.3 /100 WBC (ref 0–0.2)
NRBC BLD AUTO-RTO: 0.7 /100 WBC (ref 0–0.2)
NRBC BLD AUTO-RTO: 2.2 /100 WBC (ref 0–0.2)
NT-PROBNP SERPL-MCNC: ABNORMAL PG/ML (ref 0–1800)
OXYHGB MFR BLDV: 86.3 %
PATH INTERP BLD-IMP: NORMAL
PAW @ PEAK INSP FLOW SETTING VENT: 0 CMH2O
PCO2 BLDV: 36.7 MM HG (ref 41–51)
PH BLDV: 7.32 PH UNITS (ref 7.31–7.41)
PH UR STRIP.AUTO: 5.5 [PH] (ref 5–8)
PH UR STRIP.AUTO: 6 [PH] (ref 5–8)
PH UR STRIP.AUTO: <=5 [PH] (ref 5–8)
PH UR: 6 [PH] (ref 5–8)
PH UR: 6 [PH] (ref 5–8)
PHOSPHATE SERPL-MCNC: 2.9 MG/DL (ref 2.5–4.5)
PHOSPHATE SERPL-MCNC: 2.9 MG/DL (ref 2.5–4.5)
PHOSPHATE SERPL-MCNC: 3.9 MG/DL (ref 2.5–4.5)
PHOSPHATE SERPL-MCNC: 3.9 MG/DL (ref 2.5–4.5)
PHOSPHATE SERPL-MCNC: 4 MG/DL (ref 2.5–4.5)
PHOSPHATE SERPL-MCNC: 4.1 MG/DL (ref 2.5–4.5)
PHOSPHATE SERPL-MCNC: 4.2 MG/DL (ref 2.5–4.5)
PHOSPHATE SERPL-MCNC: 4.3 MG/DL (ref 2.5–4.5)
PHOSPHATE SERPL-MCNC: 5.6 MG/DL (ref 2.5–4.5)
PHOSPHATE SERPL-MCNC: 5.7 MG/DL (ref 2.5–4.5)
PHOSPHATE SERPL-MCNC: 5.8 MG/DL (ref 2.5–4.5)
PHOSPHATE SERPL-MCNC: 6.8 MG/DL (ref 2.5–4.5)
PHOSPHATE SERPL-MCNC: 7 MG/DL (ref 2.5–4.5)
PLAT MORPH BLD: NORMAL
PLATELET # BLD AUTO: 101 10*3/MM3 (ref 140–450)
PLATELET # BLD AUTO: 103 10*3/MM3 (ref 140–450)
PLATELET # BLD AUTO: 112 10*3/MM3 (ref 140–450)
PLATELET # BLD AUTO: 128 10*3/MM3 (ref 140–450)
PLATELET # BLD AUTO: 133 10*3/MM3 (ref 140–450)
PLATELET # BLD AUTO: 150 10*3/MM3 (ref 140–450)
PLATELET # BLD AUTO: 151 10*3/MM3 (ref 140–450)
PLATELET # BLD AUTO: 157 10*3/MM3 (ref 140–450)
PLATELET # BLD AUTO: 176 10*3/MM3 (ref 140–450)
PLATELET # BLD AUTO: 186 10*3/MM3 (ref 140–450)
PLATELET # BLD AUTO: 190 10*3/MM3 (ref 140–450)
PLATELET # BLD AUTO: 205 10*3/MM3 (ref 140–450)
PLATELET # BLD AUTO: 212 10*3/MM3 (ref 140–450)
PLATELET # BLD AUTO: 216 10*3/MM3 (ref 140–450)
PLATELET # BLD AUTO: 50 10*3/MM3 (ref 140–450)
PLATELET # BLD AUTO: 61 10*3/MM3 (ref 140–450)
PLATELET # BLD AUTO: 62 10*3/MM3 (ref 140–450)
PLATELET # BLD AUTO: 65 10*3/MM3 (ref 140–450)
PLATELET # BLD AUTO: 68 10*3/MM3 (ref 140–450)
PLATELET # BLD AUTO: 68 10*3/MM3 (ref 140–450)
PLATELET # BLD AUTO: 72 10*3/MM3 (ref 140–450)
PLATELET # BLD AUTO: 75 10*3/MM3 (ref 140–450)
PLATELET # BLD AUTO: 79 10*3/MM3 (ref 140–450)
PLATELET # BLD AUTO: 85 10*3/MM3 (ref 140–450)
PLATELET # BLD AUTO: 90 10*3/MM3 (ref 140–450)
PMV BLD AUTO: 11.1 FL (ref 6–12)
PMV BLD AUTO: 11.1 FL (ref 6–12)
PMV BLD AUTO: 11.2 FL (ref 6–12)
PMV BLD AUTO: 11.4 FL (ref 6–12)
PMV BLD AUTO: 11.5 FL (ref 6–12)
PMV BLD AUTO: 11.8 FL (ref 6–12)
PMV BLD AUTO: 11.8 FL (ref 6–12)
PMV BLD AUTO: 11.9 FL (ref 6–12)
PMV BLD AUTO: 11.9 FL (ref 6–12)
PMV BLD AUTO: 12.2 FL (ref 6–12)
PMV BLD AUTO: 12.3 FL (ref 6–12)
PMV BLD AUTO: 12.7 FL (ref 6–12)
PMV BLD AUTO: 12.9 FL (ref 6–12)
PMV BLD AUTO: 12.9 FL (ref 6–12)
PMV BLD AUTO: 13 FL (ref 6–12)
PMV BLD AUTO: 13.2 FL (ref 6–12)
PMV BLD AUTO: 13.8 FL (ref 6–12)
PMV BLD AUTO: 14.1 FL (ref 6–12)
PMV BLD AUTO: ABNORMAL FL
PMV BLD AUTO: ABNORMAL FL
PO2 BLDV: 60.1 MM HG (ref 27–53)
POSITIVE CONTROL: POSITIVE
POTASSIUM SERPL-SCNC: 3.1 MMOL/L (ref 3.5–5.2)
POTASSIUM SERPL-SCNC: 3.5 MMOL/L (ref 3.5–5.2)
POTASSIUM SERPL-SCNC: 3.5 MMOL/L (ref 3.5–5.2)
POTASSIUM SERPL-SCNC: 3.6 MMOL/L (ref 3.5–5.2)
POTASSIUM SERPL-SCNC: 3.7 MMOL/L (ref 3.5–5.2)
POTASSIUM SERPL-SCNC: 3.8 MMOL/L (ref 3.5–5.2)
POTASSIUM SERPL-SCNC: 3.9 MMOL/L (ref 3.5–5.2)
POTASSIUM SERPL-SCNC: 4 MMOL/L (ref 3.5–5.2)
POTASSIUM SERPL-SCNC: 4.1 MMOL/L (ref 3.5–5.2)
POTASSIUM SERPL-SCNC: 4.2 MMOL/L (ref 3.5–5.2)
POTASSIUM SERPL-SCNC: 4.2 MMOL/L (ref 3.5–5.2)
POTASSIUM SERPL-SCNC: 4.3 MMOL/L (ref 3.5–5.2)
POTASSIUM SERPL-SCNC: 4.5 MMOL/L (ref 3.5–5.2)
POTASSIUM SERPL-SCNC: 4.6 MMOL/L (ref 3.5–5.2)
POTASSIUM SERPL-SCNC: 5.1 MMOL/L (ref 3.5–5.2)
POTASSIUM SERPL-SCNC: 5.6 MMOL/L (ref 3.5–5.2)
PROCALCITONIN SERPL-MCNC: 0.4 NG/ML (ref 0–0.25)
PROCALCITONIN SERPL-MCNC: 0.69 NG/ML (ref 0–0.25)
PROT ?TM UR-MCNC: 19.6 MG/DL
PROT SERPL-MCNC: 6.4 G/DL (ref 6–8.5)
PROT SERPL-MCNC: 6.6 G/DL (ref 6–8.5)
PROT SERPL-MCNC: 6.6 G/DL (ref 6–8.5)
PROT SERPL-MCNC: 6.9 G/DL (ref 6–8.5)
PROT SERPL-MCNC: 7 G/DL (ref 6–8.5)
PROT SERPL-MCNC: 7.2 G/DL (ref 6–8.5)
PROT UR QL STRIP: ABNORMAL
PROT UR STRIP-MCNC: ABNORMAL MG/DL
PROT UR STRIP-MCNC: NEGATIVE MG/DL
PROTHROMBIN TIME: 22.6 SECONDS (ref 11.4–14.4)
PROTHROMBIN TIME: 22.6 SECONDS (ref 11.4–14.4)
PROTHROMBIN TIME: 22.9 SECONDS (ref 11.4–14.4)
PROTHROMBIN TIME: 23.1 SECONDS (ref 11.4–14.4)
PROTHROMBIN TIME: 24.1 SECONDS (ref 11.4–14.4)
PTH-INTACT SERPL-MCNC: 164.7 PG/ML (ref 15–65)
QT INTERVAL: 450 MS
QT INTERVAL: 450 MS
QT INTERVAL: 470 MS
QTC INTERVAL: 506 MS
QTC INTERVAL: 506 MS
QTC INTERVAL: 521 MS
RBC # BLD AUTO: 3.13 10*6/MM3 (ref 3.77–5.28)
RBC # BLD AUTO: 3.2 10*6/MM3 (ref 3.77–5.28)
RBC # BLD AUTO: 3.25 10*6/MM3 (ref 3.77–5.28)
RBC # BLD AUTO: 3.25 10*6/MM3 (ref 3.77–5.28)
RBC # BLD AUTO: 3.3 10*6/MM3 (ref 3.77–5.28)
RBC # BLD AUTO: 3.33 10*6/MM3 (ref 3.77–5.28)
RBC # BLD AUTO: 3.34 10*6/MM3 (ref 3.77–5.28)
RBC # BLD AUTO: 3.39 10*6/MM3 (ref 3.77–5.28)
RBC # BLD AUTO: 3.4 10*6/MM3 (ref 3.77–5.28)
RBC # BLD AUTO: 3.48 10*6/MM3 (ref 3.77–5.28)
RBC # BLD AUTO: 3.5 10*6/MM3 (ref 3.77–5.28)
RBC # BLD AUTO: 3.55 10*6/MM3 (ref 3.77–5.28)
RBC # BLD AUTO: 3.56 10*6/MM3 (ref 3.77–5.28)
RBC # BLD AUTO: 3.71 10*6/MM3 (ref 3.77–5.28)
RBC # BLD AUTO: 3.76 10*6/MM3 (ref 3.77–5.28)
RBC # BLD AUTO: 3.81 10*6/MM3 (ref 3.77–5.28)
RBC # BLD AUTO: 3.96 10*6/MM3 (ref 3.77–5.28)
RBC # BLD AUTO: 3.98 10*6/MM3 (ref 3.77–5.28)
RBC # BLD AUTO: 3.98 10*6/MM3 (ref 3.77–5.28)
RBC # BLD AUTO: 4.14 10*6/MM3 (ref 3.77–5.28)
RBC # BLD AUTO: 4.15 10*6/MM3 (ref 3.77–5.28)
RBC # BLD AUTO: 4.31 10*6/MM3 (ref 3.77–5.28)
RBC # BLD AUTO: 4.36 10*6/MM3 (ref 3.77–5.28)
RBC # BLD AUTO: 4.42 10*6/MM3 (ref 3.77–5.28)
RBC # BLD AUTO: 4.71 10*6/MM3 (ref 3.77–5.28)
RBC # UR STRIP: ABNORMAL /HPF
RBC # UR STRIP: ABNORMAL /UL
RBC # UR STRIP: NEGATIVE /UL
RBC MORPH BLD: NORMAL
REF LAB TEST METHOD: ABNORMAL
RENAL EPI CELLS #/AREA URNS HPF: ABNORMAL /HPF
RETICS # AUTO: 0.05 10*6/MM3 (ref 0.02–0.13)
RETICS/RBC NFR AUTO: 1.29 % (ref 0.7–1.9)
RH BLD: POSITIVE
SARS-COV-2 RDRP RESP QL NAA+PROBE: NORMAL
SARS-COV-2 RNA PNL SPEC NAA+PROBE: DETECTED
SMALL PLATELETS BLD QL SMEAR: NORMAL
SODIUM SERPL-SCNC: 131 MMOL/L (ref 136–145)
SODIUM SERPL-SCNC: 133 MMOL/L (ref 136–145)
SODIUM SERPL-SCNC: 134 MMOL/L (ref 136–145)
SODIUM SERPL-SCNC: 134 MMOL/L (ref 136–145)
SODIUM SERPL-SCNC: 135 MMOL/L (ref 136–145)
SODIUM SERPL-SCNC: 135 MMOL/L (ref 136–145)
SODIUM SERPL-SCNC: 136 MMOL/L (ref 136–145)
SODIUM SERPL-SCNC: 137 MMOL/L (ref 136–145)
SODIUM SERPL-SCNC: 138 MMOL/L (ref 136–145)
SODIUM SERPL-SCNC: 139 MMOL/L (ref 136–145)
SODIUM SERPL-SCNC: 139 MMOL/L (ref 136–145)
SODIUM SERPL-SCNC: 140 MMOL/L (ref 136–145)
SODIUM SERPL-SCNC: 140 MMOL/L (ref 136–145)
SODIUM SERPL-SCNC: 141 MMOL/L (ref 136–145)
SODIUM SERPL-SCNC: 141 MMOL/L (ref 136–145)
SODIUM SERPL-SCNC: 142 MMOL/L (ref 136–145)
SODIUM SERPL-SCNC: 143 MMOL/L (ref 136–145)
SODIUM SERPL-SCNC: 148 MMOL/L (ref 136–145)
SODIUM UR-SCNC: 54 MMOL/L
SP GR UR STRIP: 1.01 (ref 1–1.03)
SP GR UR STRIP: 1.01 (ref 1–1.03)
SP GR UR STRIP: 1.02 (ref 1–1.03)
SP GR UR STRIP: 1.02 (ref 1–1.03)
SP GR UR STRIP: 1.03 (ref 1–1.03)
SP GR UR: 1.01 (ref 1–1.03)
SP GR UR: 1.02 (ref 1–1.03)
SQUAMOUS #/AREA URNS HPF: ABNORMAL /HPF
STRESS TARGET HR: 114 BPM
T&S EXPIRATION DATE: NORMAL
T4 FREE SERPL-MCNC: 1.35 NG/DL (ref 0.93–1.7)
TIBC SERPL-MCNC: 307 MCG/DL (ref 298–536)
TOTAL RATE: 0 BREATHS/MINUTE
TRANS CELLS #/AREA URNS HPF: ABNORMAL /HPF
TRANSFERRIN SERPL-MCNC: 206 MG/DL (ref 200–360)
TROPONIN T SERPL-MCNC: 0.06 NG/ML (ref 0–0.03)
TROPONIN T SERPL-MCNC: 0.06 NG/ML (ref 0–0.03)
TROPONIN T SERPL-MCNC: 0.08 NG/ML (ref 0–0.03)
TSH SERPL DL<=0.05 MIU/L-ACNC: 5.2 UIU/ML (ref 0.27–4.2)
UPPER ARTERIAL LEFT ARM BRACHIAL SYS MAX: 107 MMHG
URATE SERPL-MCNC: 10 MG/DL (ref 2.4–5.7)
UROBILINOGEN UR QL STRIP: ABNORMAL
UROBILINOGEN UR QL: NORMAL
UROBILINOGEN UR QL: NORMAL
UUN 24H UR-MCNC: 502 MG/DL
VIT B12 BLD-MCNC: 774 PG/ML (ref 211–946)
WBC # UR STRIP: ABNORMAL /HPF
WBC MORPH BLD: NORMAL
WBC MORPH BLD: NORMAL
WBC NRBC COR # BLD: 4.02 10*3/MM3 (ref 3.4–10.8)
WBC NRBC COR # BLD: 4.63 10*3/MM3 (ref 3.4–10.8)
WBC NRBC COR # BLD: 4.68 10*3/MM3 (ref 3.4–10.8)
WBC NRBC COR # BLD: 4.69 10*3/MM3 (ref 3.4–10.8)
WBC NRBC COR # BLD: 5.12 10*3/MM3 (ref 3.4–10.8)
WBC NRBC COR # BLD: 5.17 10*3/MM3 (ref 3.4–10.8)
WBC NRBC COR # BLD: 5.17 10*3/MM3 (ref 3.4–10.8)
WBC NRBC COR # BLD: 5.33 10*3/MM3 (ref 3.4–10.8)
WBC NRBC COR # BLD: 5.42 10*3/MM3 (ref 3.4–10.8)
WBC NRBC COR # BLD: 5.43 10*3/MM3 (ref 3.4–10.8)
WBC NRBC COR # BLD: 5.49 10*3/MM3 (ref 3.4–10.8)
WBC NRBC COR # BLD: 5.83 10*3/MM3 (ref 3.4–10.8)
WBC NRBC COR # BLD: 5.9 10*3/MM3 (ref 3.4–10.8)
WBC NRBC COR # BLD: 5.94 10*3/MM3 (ref 3.4–10.8)
WBC NRBC COR # BLD: 5.95 10*3/MM3 (ref 3.4–10.8)
WBC NRBC COR # BLD: 5.96 10*3/MM3 (ref 3.4–10.8)
WBC NRBC COR # BLD: 6.19 10*3/MM3 (ref 3.4–10.8)
WBC NRBC COR # BLD: 6.45 10*3/MM3 (ref 3.4–10.8)
WBC NRBC COR # BLD: 6.5 10*3/MM3 (ref 3.4–10.8)
WBC NRBC COR # BLD: 6.85 10*3/MM3 (ref 3.4–10.8)
WBC NRBC COR # BLD: 7.01 10*3/MM3 (ref 3.4–10.8)
WBC NRBC COR # BLD: 7.08 10*3/MM3 (ref 3.4–10.8)
WBC NRBC COR # BLD: 7.65 10*3/MM3 (ref 3.4–10.8)
WBC NRBC COR # BLD: 7.65 10*3/MM3 (ref 3.4–10.8)
WBC NRBC COR # BLD: 8.62 10*3/MM3 (ref 3.4–10.8)
WHOLE BLOOD HOLD COAG: NORMAL
WHOLE BLOOD HOLD SPECIMEN: NORMAL

## 2022-01-01 PROCEDURE — 96365 THER/PROPH/DIAG IV INF INIT: CPT

## 2022-01-01 PROCEDURE — 82550 ASSAY OF CK (CPK): CPT | Performed by: INTERNAL MEDICINE

## 2022-01-01 PROCEDURE — 84100 ASSAY OF PHOSPHORUS: CPT | Performed by: NURSE PRACTITIONER

## 2022-01-01 PROCEDURE — 25010000002 VANCOMYCIN PER 500 MG

## 2022-01-01 PROCEDURE — 5A1D70Z PERFORMANCE OF URINARY FILTRATION, INTERMITTENT, LESS THAN 6 HOURS PER DAY: ICD-10-PCS | Performed by: INTERNAL MEDICINE

## 2022-01-01 PROCEDURE — 99239 HOSP IP/OBS DSCHRG MGMT >30: CPT | Performed by: INTERNAL MEDICINE

## 2022-01-01 PROCEDURE — 85730 THROMBOPLASTIN TIME PARTIAL: CPT | Performed by: INTERNAL MEDICINE

## 2022-01-01 PROCEDURE — 25010000002 MIDAZOLAM PER 1 MG: Performed by: RADIOLOGY

## 2022-01-01 PROCEDURE — 0 LIDOCAINE 1 % SOLUTION: Performed by: NURSE ANESTHETIST, CERTIFIED REGISTERED

## 2022-01-01 PROCEDURE — 85025 COMPLETE CBC W/AUTO DIFF WBC: CPT | Performed by: INTERNAL MEDICINE

## 2022-01-01 PROCEDURE — 80069 RENAL FUNCTION PANEL: CPT | Performed by: INTERNAL MEDICINE

## 2022-01-01 PROCEDURE — 80048 BASIC METABOLIC PNL TOTAL CA: CPT | Performed by: NURSE PRACTITIONER

## 2022-01-01 PROCEDURE — 25010000002 ONDANSETRON PER 1 MG: Performed by: NURSE PRACTITIONER

## 2022-01-01 PROCEDURE — 85018 HEMOGLOBIN: CPT | Performed by: INTERNAL MEDICINE

## 2022-01-01 PROCEDURE — 97530 THERAPEUTIC ACTIVITIES: CPT

## 2022-01-01 PROCEDURE — 84540 ASSAY OF URINE/UREA-N: CPT | Performed by: INTERNAL MEDICINE

## 2022-01-01 PROCEDURE — 25010000002 HYDROMORPHONE PER 4 MG: Performed by: FAMILY MEDICINE

## 2022-01-01 PROCEDURE — 81001 URINALYSIS AUTO W/SCOPE: CPT | Performed by: INTERNAL MEDICINE

## 2022-01-01 PROCEDURE — 86140 C-REACTIVE PROTEIN: CPT

## 2022-01-01 PROCEDURE — 63710000001 INSULIN LISPRO (HUMAN) PER 5 UNITS: Performed by: RADIOLOGY

## 2022-01-01 PROCEDURE — 85610 PROTHROMBIN TIME: CPT | Performed by: INTERNAL MEDICINE

## 2022-01-01 PROCEDURE — 83970 ASSAY OF PARATHORMONE: CPT | Performed by: INTERNAL MEDICINE

## 2022-01-01 PROCEDURE — 82962 GLUCOSE BLOOD TEST: CPT

## 2022-01-01 PROCEDURE — 72131 CT LUMBAR SPINE W/O DYE: CPT

## 2022-01-01 PROCEDURE — 25010000002 ONDANSETRON PER 1 MG: Performed by: PHYSICIAN ASSISTANT

## 2022-01-01 PROCEDURE — 99213 OFFICE O/P EST LOW 20 MIN: CPT | Performed by: INTERNAL MEDICINE

## 2022-01-01 PROCEDURE — 81001 URINALYSIS AUTO W/SCOPE: CPT | Performed by: PHYSICIAN ASSISTANT

## 2022-01-01 PROCEDURE — 80053 COMPREHEN METABOLIC PANEL: CPT

## 2022-01-01 PROCEDURE — 25010000002 ONDANSETRON PER 1 MG: Performed by: INTERNAL MEDICINE

## 2022-01-01 PROCEDURE — 99232 SBSQ HOSP IP/OBS MODERATE 35: CPT | Performed by: INTERNAL MEDICINE

## 2022-01-01 PROCEDURE — P9047 ALBUMIN (HUMAN), 25%, 50ML: HCPCS | Performed by: INTERNAL MEDICINE

## 2022-01-01 PROCEDURE — G0378 HOSPITAL OBSERVATION PER HR: HCPCS

## 2022-01-01 PROCEDURE — 99284 EMERGENCY DEPT VISIT MOD MDM: CPT

## 2022-01-01 PROCEDURE — 85025 COMPLETE CBC W/AUTO DIFF WBC: CPT | Performed by: EMERGENCY MEDICINE

## 2022-01-01 PROCEDURE — 84145 PROCALCITONIN (PCT): CPT | Performed by: INTERNAL MEDICINE

## 2022-01-01 PROCEDURE — 85014 HEMATOCRIT: CPT | Performed by: INTERNAL MEDICINE

## 2022-01-01 PROCEDURE — 99442 PR PHYS/QHP TELEPHONE EVALUATION 11-20 MIN: CPT | Performed by: NURSE PRACTITIONER

## 2022-01-01 PROCEDURE — 99233 SBSQ HOSP IP/OBS HIGH 50: CPT | Performed by: NURSE PRACTITIONER

## 2022-01-01 PROCEDURE — 81001 URINALYSIS AUTO W/SCOPE: CPT | Performed by: EMERGENCY MEDICINE

## 2022-01-01 PROCEDURE — 76937 US GUIDE VASCULAR ACCESS: CPT | Performed by: RADIOLOGY

## 2022-01-01 PROCEDURE — 82728 ASSAY OF FERRITIN: CPT | Performed by: INTERNAL MEDICINE

## 2022-01-01 PROCEDURE — 63710000001 INSULIN LISPRO (HUMAN) PER 5 UNITS: Performed by: INTERNAL MEDICINE

## 2022-01-01 PROCEDURE — 99233 SBSQ HOSP IP/OBS HIGH 50: CPT | Performed by: INTERNAL MEDICINE

## 2022-01-01 PROCEDURE — 93005 ELECTROCARDIOGRAM TRACING: CPT | Performed by: INTERNAL MEDICINE

## 2022-01-01 PROCEDURE — 93296 REM INTERROG EVL PM/IDS: CPT | Performed by: INTERNAL MEDICINE

## 2022-01-01 PROCEDURE — 85384 FIBRINOGEN ACTIVITY: CPT | Performed by: INTERNAL MEDICINE

## 2022-01-01 PROCEDURE — 86706 HEP B SURFACE ANTIBODY: CPT | Performed by: HOSPITALIST

## 2022-01-01 PROCEDURE — 3051F HG A1C>EQUAL 7.0%<8.0%: CPT | Performed by: INTERNAL MEDICINE

## 2022-01-01 PROCEDURE — 83036 HEMOGLOBIN GLYCOSYLATED A1C: CPT | Performed by: INTERNAL MEDICINE

## 2022-01-01 PROCEDURE — 99214 OFFICE O/P EST MOD 30 MIN: CPT | Performed by: PHYSICIAN ASSISTANT

## 2022-01-01 PROCEDURE — P9612 CATHETERIZE FOR URINE SPEC: HCPCS

## 2022-01-01 PROCEDURE — 25010000002 HYDROMORPHONE PER 4 MG: Performed by: INTERNAL MEDICINE

## 2022-01-01 PROCEDURE — 86850 RBC ANTIBODY SCREEN: CPT

## 2022-01-01 PROCEDURE — 83735 ASSAY OF MAGNESIUM: CPT | Performed by: NURSE PRACTITIONER

## 2022-01-01 PROCEDURE — 93005 ELECTROCARDIOGRAM TRACING: CPT | Performed by: EMERGENCY MEDICINE

## 2022-01-01 PROCEDURE — 94640 AIRWAY INHALATION TREATMENT: CPT

## 2022-01-01 PROCEDURE — 77001 FLUOROGUIDE FOR VEIN DEVICE: CPT | Performed by: RADIOLOGY

## 2022-01-01 PROCEDURE — 80048 BASIC METABOLIC PNL TOTAL CA: CPT | Performed by: INTERNAL MEDICINE

## 2022-01-01 PROCEDURE — 99291 CRITICAL CARE FIRST HOUR: CPT | Performed by: INTERNAL MEDICINE

## 2022-01-01 PROCEDURE — 85025 COMPLETE CBC W/AUTO DIFF WBC: CPT | Performed by: NURSE PRACTITIONER

## 2022-01-01 PROCEDURE — 25010000002 LORAZEPAM PER 2 MG: Performed by: INTERNAL MEDICINE

## 2022-01-01 PROCEDURE — 3044F HG A1C LEVEL LT 7.0%: CPT | Performed by: INTERNAL MEDICINE

## 2022-01-01 PROCEDURE — 93306 TTE W/DOPPLER COMPLETE: CPT

## 2022-01-01 PROCEDURE — 25010000002 CEFTRIAXONE PER 250 MG: Performed by: EMERGENCY MEDICINE

## 2022-01-01 PROCEDURE — 85014 HEMATOCRIT: CPT

## 2022-01-01 PROCEDURE — 0W3P8ZZ CONTROL BLEEDING IN GASTROINTESTINAL TRACT, VIA NATURAL OR ARTIFICIAL OPENING ENDOSCOPIC: ICD-10-PCS | Performed by: INTERNAL MEDICINE

## 2022-01-01 PROCEDURE — 70450 CT HEAD/BRAIN W/O DYE: CPT

## 2022-01-01 PROCEDURE — 93971 EXTREMITY STUDY: CPT

## 2022-01-01 PROCEDURE — 93931 UPPER EXTREMITY STUDY: CPT | Performed by: INTERNAL MEDICINE

## 2022-01-01 PROCEDURE — 82746 ASSAY OF FOLIC ACID SERUM: CPT | Performed by: INTERNAL MEDICINE

## 2022-01-01 PROCEDURE — 85007 BL SMEAR W/DIFF WBC COUNT: CPT | Performed by: PHYSICIAN ASSISTANT

## 2022-01-01 PROCEDURE — 97116 GAIT TRAINING THERAPY: CPT

## 2022-01-01 PROCEDURE — 83540 ASSAY OF IRON: CPT | Performed by: INTERNAL MEDICINE

## 2022-01-01 PROCEDURE — 25010000002 CEFTRIAXONE PER 250 MG: Performed by: INTERNAL MEDICINE

## 2022-01-01 PROCEDURE — 87086 URINE CULTURE/COLONY COUNT: CPT | Performed by: PHYSICIAN ASSISTANT

## 2022-01-01 PROCEDURE — P9047 ALBUMIN (HUMAN), 25%, 50ML: HCPCS

## 2022-01-01 PROCEDURE — 99285 EMERGENCY DEPT VISIT HI MDM: CPT

## 2022-01-01 PROCEDURE — 85027 COMPLETE CBC AUTOMATED: CPT | Performed by: INTERNAL MEDICINE

## 2022-01-01 PROCEDURE — 85027 COMPLETE CBC AUTOMATED: CPT | Performed by: NURSE PRACTITIONER

## 2022-01-01 PROCEDURE — 25010000002 ALBUMIN HUMAN 25% PER 50 ML: Performed by: INTERNAL MEDICINE

## 2022-01-01 PROCEDURE — 93000 ELECTROCARDIOGRAM COMPLETE: CPT | Performed by: PHYSICIAN ASSISTANT

## 2022-01-01 PROCEDURE — 82947 ASSAY GLUCOSE BLOOD QUANT: CPT | Performed by: INTERNAL MEDICINE

## 2022-01-01 PROCEDURE — 93971 EXTREMITY STUDY: CPT | Performed by: INTERNAL MEDICINE

## 2022-01-01 PROCEDURE — 93005 ELECTROCARDIOGRAM TRACING: CPT | Performed by: PHYSICIAN ASSISTANT

## 2022-01-01 PROCEDURE — 87070 CULTURE OTHR SPECIMN AEROBIC: CPT

## 2022-01-01 PROCEDURE — 36558 INSERT TUNNELED CV CATH: CPT | Performed by: RADIOLOGY

## 2022-01-01 PROCEDURE — 80053 COMPREHEN METABOLIC PANEL: CPT | Performed by: EMERGENCY MEDICINE

## 2022-01-01 PROCEDURE — 63710000001 INSULIN LISPRO (HUMAN) PER 5 UNITS

## 2022-01-01 PROCEDURE — 99232 SBSQ HOSP IP/OBS MODERATE 35: CPT | Performed by: HOSPITALIST

## 2022-01-01 PROCEDURE — 87635 SARS-COV-2 COVID-19 AMP PRB: CPT | Performed by: INTERNAL MEDICINE

## 2022-01-01 PROCEDURE — 99222 1ST HOSP IP/OBS MODERATE 55: CPT | Performed by: PHYSICIAN ASSISTANT

## 2022-01-01 PROCEDURE — 99223 1ST HOSP IP/OBS HIGH 75: CPT | Performed by: INTERNAL MEDICINE

## 2022-01-01 PROCEDURE — 83605 ASSAY OF LACTIC ACID: CPT | Performed by: PHYSICIAN ASSISTANT

## 2022-01-01 PROCEDURE — 87040 BLOOD CULTURE FOR BACTERIA: CPT | Performed by: PHYSICIAN ASSISTANT

## 2022-01-01 PROCEDURE — 97161 PT EVAL LOW COMPLEX 20 MIN: CPT

## 2022-01-01 PROCEDURE — 85025 COMPLETE CBC W/AUTO DIFF WBC: CPT

## 2022-01-01 PROCEDURE — 99232 SBSQ HOSP IP/OBS MODERATE 35: CPT | Performed by: NURSE PRACTITIONER

## 2022-01-01 PROCEDURE — 87636 SARSCOV2 & INF A&B AMP PRB: CPT | Performed by: PHYSICIAN ASSISTANT

## 2022-01-01 PROCEDURE — 25010000002 ALBUMIN HUMAN 25% PER 50 ML

## 2022-01-01 PROCEDURE — 87205 SMEAR GRAM STAIN: CPT | Performed by: INTERNAL MEDICINE

## 2022-01-01 PROCEDURE — 25010000002 EPOETIN ALFA-EPBX 10000 UNIT/ML SOLUTION: Performed by: INTERNAL MEDICINE

## 2022-01-01 PROCEDURE — 25010000002 HYDROMORPHONE PER 4 MG: Performed by: PHYSICAL MEDICINE & REHABILITATION

## 2022-01-01 PROCEDURE — 87086 URINE CULTURE/COLONY COUNT: CPT | Performed by: INTERNAL MEDICINE

## 2022-01-01 PROCEDURE — 81003 URINALYSIS AUTO W/O SCOPE: CPT | Performed by: INTERNAL MEDICINE

## 2022-01-01 PROCEDURE — 73130 X-RAY EXAM OF HAND: CPT

## 2022-01-01 PROCEDURE — 84100 ASSAY OF PHOSPHORUS: CPT | Performed by: INTERNAL MEDICINE

## 2022-01-01 PROCEDURE — 97535 SELF CARE MNGMENT TRAINING: CPT

## 2022-01-01 PROCEDURE — 93295 DEV INTERROG REMOTE 1/2/MLT: CPT | Performed by: INTERNAL MEDICINE

## 2022-01-01 PROCEDURE — 99231 SBSQ HOSP IP/OBS SF/LOW 25: CPT | Performed by: INTERNAL MEDICINE

## 2022-01-01 PROCEDURE — 83735 ASSAY OF MAGNESIUM: CPT

## 2022-01-01 PROCEDURE — 25010000002 NA FERRIC GLUC CPLX PER 12.5 MG: Performed by: INTERNAL MEDICINE

## 2022-01-01 PROCEDURE — 36415 COLL VENOUS BLD VENIPUNCTURE: CPT

## 2022-01-01 PROCEDURE — 87186 SC STD MICRODIL/AGAR DIL: CPT | Performed by: PHYSICIAN ASSISTANT

## 2022-01-01 PROCEDURE — 25010000002 MEROPENEM PER 100 MG

## 2022-01-01 PROCEDURE — 85379 FIBRIN DEGRADATION QUANT: CPT | Performed by: INTERNAL MEDICINE

## 2022-01-01 PROCEDURE — 87086 URINE CULTURE/COLONY COUNT: CPT | Performed by: STUDENT IN AN ORGANIZED HEALTH CARE EDUCATION/TRAINING PROGRAM

## 2022-01-01 PROCEDURE — 25010000002 HEPARIN (PORCINE) PER 1000 UNITS: Performed by: INTERNAL MEDICINE

## 2022-01-01 PROCEDURE — 97166 OT EVAL MOD COMPLEX 45 MIN: CPT

## 2022-01-01 PROCEDURE — 93306 TTE W/DOPPLER COMPLETE: CPT | Performed by: INTERNAL MEDICINE

## 2022-01-01 PROCEDURE — 25010000002 HEPARIN (PORCINE) PER 1000 UNITS: Performed by: RADIOLOGY

## 2022-01-01 PROCEDURE — 82330 ASSAY OF CALCIUM: CPT | Performed by: NURSE PRACTITIONER

## 2022-01-01 PROCEDURE — 83615 LACTATE (LD) (LDH) ENZYME: CPT | Performed by: INTERNAL MEDICINE

## 2022-01-01 PROCEDURE — 85014 HEMATOCRIT: CPT | Performed by: PHYSICIAN ASSISTANT

## 2022-01-01 PROCEDURE — 97110 THERAPEUTIC EXERCISES: CPT

## 2022-01-01 PROCEDURE — 45378 DIAGNOSTIC COLONOSCOPY: CPT | Performed by: INTERNAL MEDICINE

## 2022-01-01 PROCEDURE — 85027 COMPLETE CBC AUTOMATED: CPT | Performed by: RADIOLOGY

## 2022-01-01 PROCEDURE — 84443 ASSAY THYROID STIM HORMONE: CPT | Performed by: INTERNAL MEDICINE

## 2022-01-01 PROCEDURE — 25010000002 HEPARIN (PORCINE) PER 1000 UNITS: Performed by: NURSE PRACTITIONER

## 2022-01-01 PROCEDURE — 81003 URINALYSIS AUTO W/O SCOPE: CPT | Performed by: STUDENT IN AN ORGANIZED HEALTH CARE EDUCATION/TRAINING PROGRAM

## 2022-01-01 PROCEDURE — 99231 SBSQ HOSP IP/OBS SF/LOW 25: CPT | Performed by: NURSE PRACTITIONER

## 2022-01-01 PROCEDURE — 25010000002 PHENYLEPHRINE 10 MG/ML SOLUTION: Performed by: NURSE ANESTHETIST, CERTIFIED REGISTERED

## 2022-01-01 PROCEDURE — 85652 RBC SED RATE AUTOMATED: CPT

## 2022-01-01 PROCEDURE — 86900 BLOOD TYPING SEROLOGIC ABO: CPT

## 2022-01-01 PROCEDURE — 83605 ASSAY OF LACTIC ACID: CPT | Performed by: EMERGENCY MEDICINE

## 2022-01-01 PROCEDURE — 80069 RENAL FUNCTION PANEL: CPT | Performed by: NURSE PRACTITIONER

## 2022-01-01 PROCEDURE — 99152 MOD SED SAME PHYS/QHP 5/>YRS: CPT | Performed by: RADIOLOGY

## 2022-01-01 PROCEDURE — 87086 URINE CULTURE/COLONY COUNT: CPT | Performed by: EMERGENCY MEDICINE

## 2022-01-01 PROCEDURE — 85007 BL SMEAR W/DIFF WBC COUNT: CPT | Performed by: EMERGENCY MEDICINE

## 2022-01-01 PROCEDURE — 80048 BASIC METABOLIC PNL TOTAL CA: CPT

## 2022-01-01 PROCEDURE — 63710000001 INSULIN LISPRO (HUMAN) PER 5 UNITS: Performed by: NURSE PRACTITIONER

## 2022-01-01 PROCEDURE — 25010000002 FUROSEMIDE PER 20 MG: Performed by: INTERNAL MEDICINE

## 2022-01-01 PROCEDURE — 84484 ASSAY OF TROPONIN QUANT: CPT | Performed by: INTERNAL MEDICINE

## 2022-01-01 PROCEDURE — 83690 ASSAY OF LIPASE: CPT | Performed by: EMERGENCY MEDICINE

## 2022-01-01 PROCEDURE — 72128 CT CHEST SPINE W/O DYE: CPT

## 2022-01-01 PROCEDURE — 94799 UNLISTED PULMONARY SVC/PX: CPT

## 2022-01-01 PROCEDURE — 82550 ASSAY OF CK (CPK): CPT

## 2022-01-01 PROCEDURE — 97162 PT EVAL MOD COMPLEX 30 MIN: CPT

## 2022-01-01 PROCEDURE — C1750 CATH, HEMODIALYSIS,LONG-TERM: HCPCS | Performed by: RADIOLOGY

## 2022-01-01 PROCEDURE — 87077 CULTURE AEROBIC IDENTIFY: CPT | Performed by: EMERGENCY MEDICINE

## 2022-01-01 PROCEDURE — 25010000002 DIPHENHYDRAMINE PER 50 MG: Performed by: EMERGENCY MEDICINE

## 2022-01-01 PROCEDURE — 84466 ASSAY OF TRANSFERRIN: CPT | Performed by: INTERNAL MEDICINE

## 2022-01-01 PROCEDURE — 99153 MOD SED SAME PHYS/QHP EA: CPT | Performed by: RADIOLOGY

## 2022-01-01 PROCEDURE — 85610 PROTHROMBIN TIME: CPT | Performed by: PHYSICIAN ASSISTANT

## 2022-01-01 PROCEDURE — 25010000002 DIPHENHYDRAMINE PER 50 MG: Performed by: PHYSICIAN ASSISTANT

## 2022-01-01 PROCEDURE — 82570 ASSAY OF URINE CREATININE: CPT | Performed by: INTERNAL MEDICINE

## 2022-01-01 PROCEDURE — 97165 OT EVAL LOW COMPLEX 30 MIN: CPT

## 2022-01-01 PROCEDURE — 87205 SMEAR GRAM STAIN: CPT

## 2022-01-01 PROCEDURE — 83605 ASSAY OF LACTIC ACID: CPT | Performed by: INTERNAL MEDICINE

## 2022-01-01 PROCEDURE — 80069 RENAL FUNCTION PANEL: CPT | Performed by: HOSPITALIST

## 2022-01-01 PROCEDURE — 83735 ASSAY OF MAGNESIUM: CPT | Performed by: INTERNAL MEDICINE

## 2022-01-01 PROCEDURE — 82607 VITAMIN B-12: CPT | Performed by: INTERNAL MEDICINE

## 2022-01-01 PROCEDURE — 99222 1ST HOSP IP/OBS MODERATE 55: CPT | Performed by: INTERNAL MEDICINE

## 2022-01-01 PROCEDURE — 99214 OFFICE O/P EST MOD 30 MIN: CPT | Performed by: INTERNAL MEDICINE

## 2022-01-01 PROCEDURE — 80053 COMPREHEN METABOLIC PANEL: CPT | Performed by: INTERNAL MEDICINE

## 2022-01-01 PROCEDURE — 25010000002 DIPHENHYDRAMINE PER 50 MG: Performed by: PHYSICAL MEDICINE & REHABILITATION

## 2022-01-01 PROCEDURE — 73110 X-RAY EXAM OF WRIST: CPT

## 2022-01-01 PROCEDURE — 82270 OCCULT BLOOD FECES: CPT | Performed by: PHYSICIAN ASSISTANT

## 2022-01-01 PROCEDURE — 25010000002 SULFUR HEXAFLUORIDE MICROSPH 60.7-25 MG RECONSTITUTED SUSPENSION: Performed by: INTERNAL MEDICINE

## 2022-01-01 PROCEDURE — 84484 ASSAY OF TROPONIN QUANT: CPT | Performed by: EMERGENCY MEDICINE

## 2022-01-01 PROCEDURE — 86901 BLOOD TYPING SEROLOGIC RH(D): CPT

## 2022-01-01 PROCEDURE — 87186 SC STD MICRODIL/AGAR DIL: CPT | Performed by: EMERGENCY MEDICINE

## 2022-01-01 PROCEDURE — 84156 ASSAY OF PROTEIN URINE: CPT | Performed by: INTERNAL MEDICINE

## 2022-01-01 PROCEDURE — 84484 ASSAY OF TROPONIN QUANT: CPT | Performed by: PHYSICIAN ASSISTANT

## 2022-01-01 PROCEDURE — 83735 ASSAY OF MAGNESIUM: CPT | Performed by: EMERGENCY MEDICINE

## 2022-01-01 PROCEDURE — P9047 ALBUMIN (HUMAN), 25%, 50ML: HCPCS | Performed by: PHYSICIAN ASSISTANT

## 2022-01-01 PROCEDURE — 71045 X-RAY EXAM CHEST 1 VIEW: CPT

## 2022-01-01 PROCEDURE — 76775 US EXAM ABDO BACK WALL LIM: CPT

## 2022-01-01 PROCEDURE — 83880 ASSAY OF NATRIURETIC PEPTIDE: CPT | Performed by: PHYSICIAN ASSISTANT

## 2022-01-01 PROCEDURE — 93931 UPPER EXTREMITY STUDY: CPT

## 2022-01-01 PROCEDURE — 25010000002 MANNITOL PER 50 ML: Performed by: INTERNAL MEDICINE

## 2022-01-01 PROCEDURE — 99283 EMERGENCY DEPT VISIT LOW MDM: CPT

## 2022-01-01 PROCEDURE — 72125 CT NECK SPINE W/O DYE: CPT

## 2022-01-01 PROCEDURE — 96375 TX/PRO/DX INJ NEW DRUG ADDON: CPT

## 2022-01-01 PROCEDURE — 25010000002 PROPOFOL 10 MG/ML EMULSION: Performed by: NURSE ANESTHETIST, CERTIFIED REGISTERED

## 2022-01-01 PROCEDURE — 84300 ASSAY OF URINE SODIUM: CPT | Performed by: INTERNAL MEDICINE

## 2022-01-01 PROCEDURE — 25010000002 CEFTRIAXONE PER 250 MG: Performed by: PHYSICIAN ASSISTANT

## 2022-01-01 PROCEDURE — 93010 ELECTROCARDIOGRAM REPORT: CPT | Performed by: INTERNAL MEDICINE

## 2022-01-01 PROCEDURE — 84550 ASSAY OF BLOOD/URIC ACID: CPT | Performed by: INTERNAL MEDICINE

## 2022-01-01 PROCEDURE — 84439 ASSAY OF FREE THYROXINE: CPT | Performed by: INTERNAL MEDICINE

## 2022-01-01 PROCEDURE — 80074 ACUTE HEPATITIS PANEL: CPT | Performed by: INTERNAL MEDICINE

## 2022-01-01 PROCEDURE — 80048 BASIC METABOLIC PNL TOTAL CA: CPT | Performed by: PHYSICIAN ASSISTANT

## 2022-01-01 PROCEDURE — 85025 COMPLETE CBC W/AUTO DIFF WBC: CPT | Performed by: PHYSICIAN ASSISTANT

## 2022-01-01 PROCEDURE — 05HP33Z INSERTION OF INFUSION DEVICE INTO RIGHT EXTERNAL JUGULAR VEIN, PERCUTANEOUS APPROACH: ICD-10-PCS | Performed by: RADIOLOGY

## 2022-01-01 PROCEDURE — 84100 ASSAY OF PHOSPHORUS: CPT

## 2022-01-01 PROCEDURE — 85060 BLOOD SMEAR INTERPRETATION: CPT | Performed by: INTERNAL MEDICINE

## 2022-01-01 PROCEDURE — 82533 TOTAL CORTISOL: CPT | Performed by: INTERNAL MEDICINE

## 2022-01-01 PROCEDURE — 25010000002 ALBUMIN HUMAN 25% PER 50 ML: Performed by: PHYSICIAN ASSISTANT

## 2022-01-01 PROCEDURE — 85045 AUTOMATED RETICULOCYTE COUNT: CPT | Performed by: INTERNAL MEDICINE

## 2022-01-01 PROCEDURE — 80069 RENAL FUNCTION PANEL: CPT | Performed by: RADIOLOGY

## 2022-01-01 PROCEDURE — 99214 OFFICE O/P EST MOD 30 MIN: CPT | Performed by: STUDENT IN AN ORGANIZED HEALTH CARE EDUCATION/TRAINING PROGRAM

## 2022-01-01 PROCEDURE — 82805 BLOOD GASES W/O2 SATURATION: CPT

## 2022-01-01 PROCEDURE — 87077 CULTURE AEROBIC IDENTIFY: CPT | Performed by: PHYSICIAN ASSISTANT

## 2022-01-01 PROCEDURE — 85018 HEMOGLOBIN: CPT

## 2022-01-01 PROCEDURE — 83605 ASSAY OF LACTIC ACID: CPT

## 2022-01-01 PROCEDURE — C1894 INTRO/SHEATH, NON-LASER: HCPCS | Performed by: RADIOLOGY

## 2022-01-01 PROCEDURE — 80076 HEPATIC FUNCTION PANEL: CPT | Performed by: INTERNAL MEDICINE

## 2022-01-01 PROCEDURE — 74176 CT ABD & PELVIS W/O CONTRAST: CPT

## 2022-01-01 PROCEDURE — 85018 HEMOGLOBIN: CPT | Performed by: PHYSICIAN ASSISTANT

## 2022-01-01 PROCEDURE — 87340 HEPATITIS B SURFACE AG IA: CPT | Performed by: INTERNAL MEDICINE

## 2022-01-01 PROCEDURE — 76705 ECHO EXAM OF ABDOMEN: CPT

## 2022-01-01 DEVICE — DEV CLIP ENDO RESOLUTION360 CONTRL ROT 235CM: Type: IMPLANTABLE DEVICE | Site: ASCENDING COLON | Status: FUNCTIONAL

## 2022-01-01 RX ORDER — INSULIN LISPRO 100 [IU]/ML
0-7 INJECTION, SOLUTION INTRAVENOUS; SUBCUTANEOUS
Status: DISCONTINUED | OUTPATIENT
Start: 2022-01-01 | End: 2022-01-01 | Stop reason: HOSPADM

## 2022-01-01 RX ORDER — HYDROMORPHONE HCL 110MG/55ML
1.5 PATIENT CONTROLLED ANALGESIA SYRINGE INTRAVENOUS
Status: DISCONTINUED | OUTPATIENT
Start: 2022-01-01 | End: 2022-01-01

## 2022-01-01 RX ORDER — ONDANSETRON 2 MG/ML
4 INJECTION INTRAMUSCULAR; INTRAVENOUS EVERY 6 HOURS PRN
Status: DISCONTINUED | OUTPATIENT
Start: 2022-01-01 | End: 2022-01-01 | Stop reason: HOSPADM

## 2022-01-01 RX ORDER — HYDROMORPHONE HYDROCHLORIDE 1 MG/ML
0.25 INJECTION, SOLUTION INTRAMUSCULAR; INTRAVENOUS; SUBCUTANEOUS EVERY 4 HOURS PRN
Status: DISCONTINUED | OUTPATIENT
Start: 2022-01-01 | End: 2022-01-01

## 2022-01-01 RX ORDER — SODIUM CHLORIDE 0.9 % (FLUSH) 0.9 %
10 SYRINGE (ML) INJECTION AS NEEDED
Status: DISCONTINUED | OUTPATIENT
Start: 2022-01-01 | End: 2022-01-01 | Stop reason: HOSPADM

## 2022-01-01 RX ORDER — MANNITOL 250 MG/ML
25 INJECTION, SOLUTION INTRAVENOUS AS NEEDED
Status: ACTIVE | OUTPATIENT
Start: 2022-01-01 | End: 2022-01-01

## 2022-01-01 RX ORDER — ESZOPICLONE 2 MG/1
TABLET, FILM COATED ORAL
Qty: 30 TABLET | Refills: 0 | Status: SHIPPED | OUTPATIENT
Start: 2022-01-01 | End: 2022-01-01 | Stop reason: SDUPTHER

## 2022-01-01 RX ORDER — MANNITOL 250 MG/ML
25 INJECTION, SOLUTION INTRAVENOUS AS NEEDED
Status: CANCELLED | OUTPATIENT
Start: 2022-01-01 | End: 2022-01-01

## 2022-01-01 RX ORDER — HEPARIN SODIUM 1000 [USP'U]/ML
INJECTION, SOLUTION INTRAVENOUS; SUBCUTANEOUS AS NEEDED
Status: DISCONTINUED | OUTPATIENT
Start: 2022-01-01 | End: 2022-01-01 | Stop reason: HOSPADM

## 2022-01-01 RX ORDER — ALBUMIN (HUMAN) 12.5 G/50ML
12.5 SOLUTION INTRAVENOUS AS NEEDED
Status: ACTIVE | OUTPATIENT
Start: 2022-01-01 | End: 2022-01-01

## 2022-01-01 RX ORDER — INSULIN GLARGINE 100 [IU]/ML
INJECTION, SOLUTION SUBCUTANEOUS
Qty: 15 ML | Refills: 1 | Status: SHIPPED | OUTPATIENT
Start: 2022-01-01 | End: 2022-01-01 | Stop reason: SDUPTHER

## 2022-01-01 RX ORDER — ALBUMIN (HUMAN) 12.5 G/50ML
25 SOLUTION INTRAVENOUS AS NEEDED
Status: CANCELLED | OUTPATIENT
Start: 2022-01-01 | End: 2022-01-01

## 2022-01-01 RX ORDER — LIDOCAINE 50 MG/G
2 PATCH TOPICAL
Status: DISCONTINUED | OUTPATIENT
Start: 2022-01-01 | End: 2022-01-01 | Stop reason: HOSPADM

## 2022-01-01 RX ORDER — ACETAMINOPHEN 325 MG/1
650 TABLET ORAL EVERY 4 HOURS PRN
Status: DISCONTINUED | OUTPATIENT
Start: 2022-01-01 | End: 2022-01-01

## 2022-01-01 RX ORDER — SACCHAROMYCES BOULARDII 250 MG
250 CAPSULE ORAL 2 TIMES DAILY
Status: DISCONTINUED | OUTPATIENT
Start: 2022-01-01 | End: 2022-01-01 | Stop reason: HOSPADM

## 2022-01-01 RX ORDER — PROPOFOL 10 MG/ML
VIAL (ML) INTRAVENOUS AS NEEDED
Status: DISCONTINUED | OUTPATIENT
Start: 2022-01-01 | End: 2022-01-01 | Stop reason: SURG

## 2022-01-01 RX ORDER — ACETAMINOPHEN 650 MG/1
650 SUPPOSITORY RECTAL EVERY 4 HOURS PRN
Status: DISCONTINUED | OUTPATIENT
Start: 2022-01-01 | End: 2022-01-01 | Stop reason: HOSPADM

## 2022-01-01 RX ORDER — LORAZEPAM 2 MG/ML
0.25 INJECTION INTRAMUSCULAR EVERY 6 HOURS PRN
Status: CANCELLED | OUTPATIENT
Start: 2022-01-01 | End: 2022-12-10

## 2022-01-01 RX ORDER — LORAZEPAM 2 MG/ML
0.25 INJECTION INTRAMUSCULAR EVERY 6 HOURS PRN
Status: DISCONTINUED | OUTPATIENT
Start: 2022-01-01 | End: 2022-01-01 | Stop reason: HOSPADM

## 2022-01-01 RX ORDER — OXYCODONE HYDROCHLORIDE 5 MG/1
5 TABLET ORAL EVERY 8 HOURS PRN
Status: CANCELLED | OUTPATIENT
Start: 2022-01-01 | End: 2022-01-01

## 2022-01-01 RX ORDER — ONDANSETRON 4 MG/1
4 TABLET, FILM COATED ORAL EVERY 6 HOURS PRN
Status: DISCONTINUED | OUTPATIENT
Start: 2022-01-01 | End: 2022-01-01 | Stop reason: HOSPADM

## 2022-01-01 RX ORDER — ALBUMIN (HUMAN) 12.5 G/50ML
12.5 SOLUTION INTRAVENOUS ONCE
Status: COMPLETED | OUTPATIENT
Start: 2022-01-01 | End: 2022-01-01

## 2022-01-01 RX ORDER — LATANOPROST 50 UG/ML
1 SOLUTION/ DROPS OPHTHALMIC NIGHTLY
Status: DISCONTINUED | OUTPATIENT
Start: 2022-01-01 | End: 2022-01-01 | Stop reason: HOSPADM

## 2022-01-01 RX ORDER — TIMOLOL MALEATE 5 MG/ML
1 SOLUTION/ DROPS OPHTHALMIC 2 TIMES DAILY
Status: DISCONTINUED | OUTPATIENT
Start: 2022-01-01 | End: 2022-01-01 | Stop reason: HOSPADM

## 2022-01-01 RX ORDER — ISOSORBIDE MONONITRATE 30 MG/1
30 TABLET, EXTENDED RELEASE ORAL EVERY MORNING
Status: DISCONTINUED | OUTPATIENT
Start: 2022-01-01 | End: 2022-01-01

## 2022-01-01 RX ORDER — CARVEDILOL 3.12 MG/1
3.12 TABLET ORAL EVERY 12 HOURS SCHEDULED
Status: DISCONTINUED | OUTPATIENT
Start: 2022-01-01 | End: 2022-01-01 | Stop reason: HOSPADM

## 2022-01-01 RX ORDER — ALBUMIN (HUMAN) 12.5 G/50ML
SOLUTION INTRAVENOUS
Status: COMPLETED
Start: 2022-01-01 | End: 2022-01-01

## 2022-01-01 RX ORDER — DEXTROSE MONOHYDRATE 25 G/50ML
25 INJECTION, SOLUTION INTRAVENOUS
Status: CANCELLED | OUTPATIENT
Start: 2022-01-01

## 2022-01-01 RX ORDER — SACCHAROMYCES BOULARDII 250 MG
250 CAPSULE ORAL 2 TIMES DAILY
Status: CANCELLED | OUTPATIENT
Start: 2022-01-01

## 2022-01-01 RX ORDER — BLOOD SUGAR DIAGNOSTIC
STRIP MISCELLANEOUS
Qty: 200 EACH | Refills: 5 | Status: SHIPPED | OUTPATIENT
Start: 2022-01-01

## 2022-01-01 RX ORDER — HYDROMORPHONE HYDROCHLORIDE 1 MG/ML
0.5 INJECTION, SOLUTION INTRAMUSCULAR; INTRAVENOUS; SUBCUTANEOUS
Status: DISCONTINUED | OUTPATIENT
Start: 2022-01-01 | End: 2022-01-01 | Stop reason: HOSPADM

## 2022-01-01 RX ORDER — ESZOPICLONE 2 MG/1
TABLET, FILM COATED ORAL
Qty: 30 TABLET | Refills: 0 | Status: SHIPPED | OUTPATIENT
Start: 2022-01-01 | End: 2022-01-01

## 2022-01-01 RX ORDER — MANNITOL 250 MG/ML
25 INJECTION, SOLUTION INTRAVENOUS AS NEEDED
Status: DISCONTINUED | OUTPATIENT
Start: 2022-01-01 | End: 2022-01-01 | Stop reason: HOSPADM

## 2022-01-01 RX ORDER — LEVOTHYROXINE SODIUM 0.12 MG/1
125 TABLET ORAL
Status: DISCONTINUED | OUTPATIENT
Start: 2022-01-01 | End: 2022-01-01 | Stop reason: HOSPADM

## 2022-01-01 RX ORDER — MIDODRINE HYDROCHLORIDE 5 MG/1
5 TABLET ORAL 3 TIMES DAILY PRN
COMMUNITY
Start: 2022-01-01

## 2022-01-01 RX ORDER — OXYCODONE HYDROCHLORIDE 5 MG/1
5 TABLET ORAL EVERY 8 HOURS PRN
Status: DISCONTINUED | OUTPATIENT
Start: 2022-01-01 | End: 2022-01-01 | Stop reason: HOSPADM

## 2022-01-01 RX ORDER — INSULIN LISPRO 100 [IU]/ML
0-7 INJECTION, SOLUTION INTRAVENOUS; SUBCUTANEOUS
Status: DISCONTINUED | OUTPATIENT
Start: 2022-01-01 | End: 2022-01-01

## 2022-01-01 RX ORDER — SODIUM CHLORIDE 0.9 % (FLUSH) 0.9 %
10 SYRINGE (ML) INJECTION EVERY 12 HOURS SCHEDULED
Status: DISCONTINUED | OUTPATIENT
Start: 2022-01-01 | End: 2022-01-01 | Stop reason: HOSPADM

## 2022-01-01 RX ORDER — SODIUM CHLORIDE 9 MG/ML
10 INJECTION INTRAVENOUS AS NEEDED
Status: DISCONTINUED | OUTPATIENT
Start: 2022-01-01 | End: 2022-01-01 | Stop reason: HOSPADM

## 2022-01-01 RX ORDER — NICOTINE POLACRILEX 4 MG
15 LOZENGE BUCCAL
Status: DISCONTINUED | OUTPATIENT
Start: 2022-01-01 | End: 2022-01-01 | Stop reason: HOSPADM

## 2022-01-01 RX ORDER — INSULIN ASPART 100 [IU]/ML
10 INJECTION, SOLUTION INTRAVENOUS; SUBCUTANEOUS
Qty: 30 ML | Refills: 1 | Status: SHIPPED | OUTPATIENT
Start: 2022-01-01 | End: 2022-01-01 | Stop reason: HOSPADM

## 2022-01-01 RX ORDER — ALBUMIN (HUMAN) 12.5 G/50ML
12.5 SOLUTION INTRAVENOUS AS NEEDED
Status: DISCONTINUED | OUTPATIENT
Start: 2022-01-01 | End: 2022-01-01

## 2022-01-01 RX ORDER — SODIUM CHLORIDE 9 MG/ML
100 INJECTION, SOLUTION INTRAVENOUS CONTINUOUS
Status: DISCONTINUED | OUTPATIENT
Start: 2022-01-01 | End: 2022-01-01

## 2022-01-01 RX ORDER — ACETAMINOPHEN 500 MG
500 TABLET ORAL EVERY 6 HOURS
Status: DISCONTINUED | OUTPATIENT
Start: 2022-01-01 | End: 2022-01-01

## 2022-01-01 RX ORDER — ALBUMIN (HUMAN) 12.5 G/50ML
SOLUTION INTRAVENOUS
Status: DISCONTINUED
Start: 2022-01-01 | End: 2022-01-01 | Stop reason: HOSPADM

## 2022-01-01 RX ORDER — CEFDINIR 300 MG/1
300 CAPSULE ORAL 2 TIMES DAILY
Qty: 14 CAPSULE | Refills: 0 | Status: SHIPPED | OUTPATIENT
Start: 2022-01-01 | End: 2022-01-01

## 2022-01-01 RX ORDER — AMOXICILLIN 250 MG
2 CAPSULE ORAL NIGHTLY
Status: CANCELLED | OUTPATIENT
Start: 2022-01-01

## 2022-01-01 RX ORDER — SODIUM CHLORIDE 0.9 % (FLUSH) 0.9 %
10 SYRINGE (ML) INJECTION EVERY 12 HOURS SCHEDULED
Status: CANCELLED | OUTPATIENT
Start: 2022-01-01

## 2022-01-01 RX ORDER — FUROSEMIDE 10 MG/ML
20 INJECTION INTRAMUSCULAR; INTRAVENOUS ONCE
Status: COMPLETED | OUTPATIENT
Start: 2022-01-01 | End: 2022-01-01

## 2022-01-01 RX ORDER — LIDOCAINE 50 MG/G
2 PATCH TOPICAL
Status: CANCELLED | OUTPATIENT
Start: 2022-01-01

## 2022-01-01 RX ORDER — INSULIN LISPRO 100 [IU]/ML
0-7 INJECTION, SOLUTION INTRAVENOUS; SUBCUTANEOUS
Refills: 12
Start: 2022-01-01 | End: 2022-01-01 | Stop reason: CLARIF

## 2022-01-01 RX ORDER — SODIUM CHLORIDE 9 MG/ML
40 INJECTION, SOLUTION INTRAVENOUS AS NEEDED
Status: DISCONTINUED | OUTPATIENT
Start: 2022-01-01 | End: 2022-01-01 | Stop reason: HOSPADM

## 2022-01-01 RX ORDER — BISACODYL 10 MG
10 SUPPOSITORY, RECTAL RECTAL ONCE
Status: COMPLETED | OUTPATIENT
Start: 2022-01-01 | End: 2022-01-01

## 2022-01-01 RX ORDER — SODIUM CHLORIDE 9 MG/ML
40 INJECTION, SOLUTION INTRAVENOUS AS NEEDED
Status: CANCELLED | OUTPATIENT
Start: 2022-01-01

## 2022-01-01 RX ORDER — MIDODRINE HYDROCHLORIDE 5 MG/1
5 TABLET ORAL ONCE
Status: COMPLETED | OUTPATIENT
Start: 2022-01-01 | End: 2022-01-01

## 2022-01-01 RX ORDER — ALBUMIN (HUMAN) 12.5 G/50ML
25 SOLUTION INTRAVENOUS ONCE
Status: COMPLETED | OUTPATIENT
Start: 2022-01-01 | End: 2022-01-01

## 2022-01-01 RX ORDER — HEPARIN SODIUM 1000 [USP'U]/ML
1600 INJECTION, SOLUTION INTRAVENOUS; SUBCUTANEOUS AS NEEDED
Status: DISCONTINUED | OUTPATIENT
Start: 2022-01-01 | End: 2022-01-01 | Stop reason: HOSPADM

## 2022-01-01 RX ORDER — MIDODRINE HYDROCHLORIDE 10 MG/1
10 TABLET ORAL ONCE
Status: DISCONTINUED | OUTPATIENT
Start: 2022-01-01 | End: 2022-01-01

## 2022-01-01 RX ORDER — SODIUM CHLORIDE 9 MG/ML
50 INJECTION, SOLUTION INTRAVENOUS CONTINUOUS
Status: DISCONTINUED | OUTPATIENT
Start: 2022-01-01 | End: 2022-01-01

## 2022-01-01 RX ORDER — PANTOPRAZOLE SODIUM 40 MG/1
40 TABLET, DELAYED RELEASE ORAL
Start: 2022-01-01

## 2022-01-01 RX ORDER — ACETAMINOPHEN 650 MG/1
650 SUPPOSITORY RECTAL EVERY 4 HOURS PRN
Status: DISCONTINUED | OUTPATIENT
Start: 2022-01-01 | End: 2022-01-01

## 2022-01-01 RX ORDER — GLYCOPYRROLATE 0.2 MG/ML
0.1 INJECTION INTRAMUSCULAR; INTRAVENOUS EVERY 4 HOURS PRN
Status: DISCONTINUED | OUTPATIENT
Start: 2022-01-01 | End: 2022-01-01 | Stop reason: HOSPADM

## 2022-01-01 RX ORDER — LEVOTHYROXINE SODIUM 112 UG/1
112 TABLET ORAL DAILY
Qty: 30 TABLET | Refills: 0 | Status: SHIPPED | OUTPATIENT
Start: 2022-01-01 | End: 2022-01-01

## 2022-01-01 RX ORDER — BLOOD SUGAR DIAGNOSTIC
STRIP MISCELLANEOUS
Qty: 100 EACH | Refills: 0 | Status: SHIPPED | OUTPATIENT
Start: 2022-01-01 | End: 2022-01-01

## 2022-01-01 RX ORDER — GLYCOPYRROLATE 0.2 MG/ML
0.4 INJECTION INTRAMUSCULAR; INTRAVENOUS EVERY 4 HOURS PRN
Status: DISCONTINUED | OUTPATIENT
Start: 2022-01-01 | End: 2022-01-01 | Stop reason: HOSPADM

## 2022-01-01 RX ORDER — LEVOTHYROXINE SODIUM 112 UG/1
112 TABLET ORAL
Status: DISCONTINUED | OUTPATIENT
Start: 2022-01-01 | End: 2022-01-01 | Stop reason: HOSPADM

## 2022-01-01 RX ORDER — ONDANSETRON 2 MG/ML
4 INJECTION INTRAMUSCULAR; INTRAVENOUS ONCE
Status: COMPLETED | OUTPATIENT
Start: 2022-01-01 | End: 2022-01-01

## 2022-01-01 RX ORDER — ONDANSETRON 2 MG/ML
4 INJECTION INTRAMUSCULAR; INTRAVENOUS EVERY 6 HOURS PRN
Status: CANCELLED | OUTPATIENT
Start: 2022-01-01

## 2022-01-01 RX ORDER — ONDANSETRON 4 MG/1
4 TABLET, FILM COATED ORAL EVERY 12 HOURS PRN
Qty: 10 TABLET | Refills: 0 | Status: SHIPPED | OUTPATIENT
Start: 2022-01-01 | End: 2022-01-01 | Stop reason: HOSPADM

## 2022-01-01 RX ORDER — NICOTINE POLACRILEX 4 MG
15 LOZENGE BUCCAL
Status: CANCELLED | OUTPATIENT
Start: 2022-01-01

## 2022-01-01 RX ORDER — POTASSIUM CHLORIDE 750 MG/1
40 CAPSULE, EXTENDED RELEASE ORAL AS NEEDED
Status: COMPLETED | OUTPATIENT
Start: 2022-01-01 | End: 2022-01-01

## 2022-01-01 RX ORDER — IPRATROPIUM BROMIDE AND ALBUTEROL SULFATE 2.5; .5 MG/3ML; MG/3ML
3 SOLUTION RESPIRATORY (INHALATION) EVERY 4 HOURS PRN
Status: DISCONTINUED | OUTPATIENT
Start: 2022-01-01 | End: 2022-01-01 | Stop reason: HOSPADM

## 2022-01-01 RX ORDER — DIPHENHYDRAMINE HYDROCHLORIDE 50 MG/ML
25 INJECTION INTRAMUSCULAR; INTRAVENOUS ONCE
Status: COMPLETED | OUTPATIENT
Start: 2022-01-01 | End: 2022-01-01

## 2022-01-01 RX ORDER — LORAZEPAM 2 MG/ML
0.5 INJECTION INTRAMUSCULAR EVERY 4 HOURS
Status: DISCONTINUED | OUTPATIENT
Start: 2022-01-01 | End: 2022-01-01 | Stop reason: HOSPADM

## 2022-01-01 RX ORDER — LORAZEPAM 2 MG/ML
0.5 INJECTION INTRAMUSCULAR EVERY 6 HOURS
Status: DISCONTINUED | OUTPATIENT
Start: 2022-01-01 | End: 2022-01-01

## 2022-01-01 RX ORDER — MAGNESIUM CARB/ALUMINUM HYDROX 105-160MG
296 TABLET,CHEWABLE ORAL ONCE
Status: COMPLETED | OUTPATIENT
Start: 2022-01-01 | End: 2022-01-01

## 2022-01-01 RX ORDER — CEFDINIR 300 MG/1
300 CAPSULE ORAL 2 TIMES DAILY
Qty: 14 CAPSULE | Refills: 0 | Status: CANCELLED | OUTPATIENT
Start: 2022-01-01

## 2022-01-01 RX ORDER — LEVOTHYROXINE SODIUM 0.12 MG/1
125 TABLET ORAL
Status: CANCELLED | OUTPATIENT
Start: 2022-01-01

## 2022-01-01 RX ORDER — BISACODYL 10 MG
10 SUPPOSITORY, RECTAL RECTAL DAILY PRN
Status: DISCONTINUED | OUTPATIENT
Start: 2022-01-01 | End: 2022-01-01 | Stop reason: HOSPADM

## 2022-01-01 RX ORDER — ACETAMINOPHEN 325 MG/1
650 TABLET ORAL EVERY 4 HOURS PRN
Status: DISCONTINUED | OUTPATIENT
Start: 2022-01-01 | End: 2022-01-01 | Stop reason: HOSPADM

## 2022-01-01 RX ORDER — LATANOPROST 50 UG/ML
1 SOLUTION/ DROPS OPHTHALMIC NIGHTLY
Status: CANCELLED | OUTPATIENT
Start: 2022-01-01

## 2022-01-01 RX ORDER — ACETAMINOPHEN 160 MG/5ML
650 SOLUTION ORAL EVERY 4 HOURS PRN
Status: DISCONTINUED | OUTPATIENT
Start: 2022-01-01 | End: 2022-01-01 | Stop reason: HOSPADM

## 2022-01-01 RX ORDER — ACETAMINOPHEN 500 MG
500 TABLET ORAL EVERY 6 HOURS
Status: DISCONTINUED | OUTPATIENT
Start: 2022-01-01 | End: 2022-01-01 | Stop reason: HOSPADM

## 2022-01-01 RX ORDER — CASTOR OIL AND BALSAM, PERU 788; 87 MG/G; MG/G
1 OINTMENT TOPICAL EVERY 12 HOURS SCHEDULED
Status: DISCONTINUED | OUTPATIENT
Start: 2022-01-01 | End: 2022-01-01 | Stop reason: HOSPADM

## 2022-01-01 RX ORDER — MIDODRINE HYDROCHLORIDE 10 MG/1
10 TABLET ORAL
Status: DISCONTINUED | OUTPATIENT
Start: 2022-01-01 | End: 2022-01-01 | Stop reason: HOSPADM

## 2022-01-01 RX ORDER — LIDOCAINE HYDROCHLORIDE 10 MG/ML
INJECTION, SOLUTION INFILTRATION; PERINEURAL AS NEEDED
Status: DISCONTINUED | OUTPATIENT
Start: 2022-01-01 | End: 2022-01-01 | Stop reason: SURG

## 2022-01-01 RX ORDER — ATORVASTATIN CALCIUM 20 MG/1
20 TABLET, FILM COATED ORAL NIGHTLY
Qty: 90 TABLET | Refills: 1 | Status: SHIPPED | OUTPATIENT
Start: 2022-01-01

## 2022-01-01 RX ORDER — ATORVASTATIN CALCIUM 20 MG/1
20 TABLET, FILM COATED ORAL NIGHTLY
Status: DISCONTINUED | OUTPATIENT
Start: 2022-01-01 | End: 2022-01-01

## 2022-01-01 RX ORDER — CIPROFLOXACIN 500 MG/1
TABLET, FILM COATED ORAL
Qty: 7 TABLET | Refills: 0 | Status: SHIPPED | OUTPATIENT
Start: 2022-01-01 | End: 2022-01-01 | Stop reason: HOSPADM

## 2022-01-01 RX ORDER — HYDROCODONE BITARTRATE AND ACETAMINOPHEN 7.5; 325 MG/1; MG/1
1 TABLET ORAL EVERY 4 HOURS PRN
Status: DISCONTINUED | OUTPATIENT
Start: 2022-01-01 | End: 2022-01-01

## 2022-01-01 RX ORDER — TIMOLOL MALEATE 5 MG/ML
1 SOLUTION/ DROPS OPHTHALMIC 2 TIMES DAILY
Status: CANCELLED | OUTPATIENT
Start: 2022-01-01

## 2022-01-01 RX ORDER — BISACODYL 10 MG
10 SUPPOSITORY, RECTAL RECTAL DAILY PRN
Status: CANCELLED | OUTPATIENT
Start: 2022-01-01

## 2022-01-01 RX ORDER — POLYVINYL ALCOHOL 14 MG/ML
1 SOLUTION/ DROPS OPHTHALMIC
Status: DISCONTINUED | OUTPATIENT
Start: 2022-01-01 | End: 2022-01-01 | Stop reason: HOSPADM

## 2022-01-01 RX ORDER — CARVEDILOL 3.12 MG/1
3.12 TABLET ORAL 2 TIMES DAILY WITH MEALS
Status: DISCONTINUED | OUTPATIENT
Start: 2022-01-01 | End: 2022-01-01

## 2022-01-01 RX ORDER — HYDROMORPHONE HYDROCHLORIDE 1 MG/ML
0.25 INJECTION, SOLUTION INTRAMUSCULAR; INTRAVENOUS; SUBCUTANEOUS
Status: DISCONTINUED | OUTPATIENT
Start: 2022-01-01 | End: 2022-01-01 | Stop reason: HOSPADM

## 2022-01-01 RX ORDER — ALBUMIN (HUMAN) 12.5 G/50ML
12.5 SOLUTION INTRAVENOUS AS NEEDED
Status: DISCONTINUED | OUTPATIENT
Start: 2022-01-01 | End: 2022-01-01 | Stop reason: HOSPADM

## 2022-01-01 RX ORDER — SCOLOPAMINE TRANSDERMAL SYSTEM 1 MG/1
1 PATCH, EXTENDED RELEASE TRANSDERMAL
Status: DISCONTINUED | OUTPATIENT
Start: 2022-01-01 | End: 2022-01-01 | Stop reason: HOSPADM

## 2022-01-01 RX ORDER — ONDANSETRON 4 MG/1
4 TABLET, FILM COATED ORAL EVERY 6 HOURS PRN
Status: CANCELLED | OUTPATIENT
Start: 2022-01-01

## 2022-01-01 RX ORDER — LORAZEPAM 2 MG/ML
0.5 INJECTION INTRAMUSCULAR EVERY 4 HOURS PRN
Status: DISCONTINUED | OUTPATIENT
Start: 2022-01-01 | End: 2022-01-01 | Stop reason: HOSPADM

## 2022-01-01 RX ORDER — BISACODYL 10 MG
10 SUPPOSITORY, RECTAL RECTAL DAILY
Status: DISCONTINUED | OUTPATIENT
Start: 2022-01-01 | End: 2022-01-01 | Stop reason: HOSPADM

## 2022-01-01 RX ORDER — DEXTROSE MONOHYDRATE 25 G/50ML
25 INJECTION, SOLUTION INTRAVENOUS
Status: DISCONTINUED | OUTPATIENT
Start: 2022-01-01 | End: 2022-01-01 | Stop reason: HOSPADM

## 2022-01-01 RX ORDER — OXYMETAZOLINE HCL 0.05% 0.05 MG/ML
SPRAY NASAL
Qty: 400 EACH | Refills: 1 | Status: SHIPPED | OUTPATIENT
Start: 2022-01-01

## 2022-01-01 RX ORDER — CARVEDILOL 3.12 MG/1
3.12 TABLET ORAL EVERY 12 HOURS SCHEDULED
Status: DISCONTINUED | OUTPATIENT
Start: 2022-01-01 | End: 2022-01-01

## 2022-01-01 RX ORDER — LEVOTHYROXINE SODIUM 112 UG/1
112 TABLET ORAL DAILY
Qty: 30 TABLET | Refills: 0 | Status: SHIPPED | OUTPATIENT
Start: 2022-01-01 | End: 2022-01-01 | Stop reason: SDUPTHER

## 2022-01-01 RX ORDER — MIDAZOLAM HYDROCHLORIDE 1 MG/ML
INJECTION INTRAMUSCULAR; INTRAVENOUS AS NEEDED
Status: DISCONTINUED | OUTPATIENT
Start: 2022-01-01 | End: 2022-01-01 | Stop reason: HOSPADM

## 2022-01-01 RX ORDER — HYDROMORPHONE HYDROCHLORIDE 1 MG/ML
0.5 INJECTION, SOLUTION INTRAMUSCULAR; INTRAVENOUS; SUBCUTANEOUS EVERY 4 HOURS PRN
Status: DISCONTINUED | OUTPATIENT
Start: 2022-01-01 | End: 2022-01-01

## 2022-01-01 RX ORDER — LEVOTHYROXINE SODIUM 112 UG/1
TABLET ORAL
Qty: 90 TABLET | Refills: 1 | Status: SHIPPED | OUTPATIENT
Start: 2022-01-01 | End: 2022-01-01 | Stop reason: DRUGHIGH

## 2022-01-01 RX ORDER — HEPARIN SODIUM 5000 [USP'U]/ML
5000 INJECTION, SOLUTION INTRAVENOUS; SUBCUTANEOUS EVERY 12 HOURS SCHEDULED
Status: DISCONTINUED | OUTPATIENT
Start: 2022-01-01 | End: 2022-01-01

## 2022-01-01 RX ORDER — HYDROMORPHONE HYDROCHLORIDE 1 MG/ML
0.25 INJECTION, SOLUTION INTRAMUSCULAR; INTRAVENOUS; SUBCUTANEOUS EVERY 4 HOURS
Status: DISCONTINUED | OUTPATIENT
Start: 2022-01-01 | End: 2022-01-01 | Stop reason: HOSPADM

## 2022-01-01 RX ORDER — HEPARIN SODIUM 1000 [USP'U]/ML
1600 INJECTION, SOLUTION INTRAVENOUS; SUBCUTANEOUS AS NEEDED
Status: CANCELLED | OUTPATIENT
Start: 2022-01-01

## 2022-01-01 RX ORDER — POTASSIUM CHLORIDE 1.5 G/1.77G
40 POWDER, FOR SOLUTION ORAL AS NEEDED
Status: COMPLETED | OUTPATIENT
Start: 2022-01-01 | End: 2022-01-01

## 2022-01-01 RX ORDER — IPRATROPIUM BROMIDE AND ALBUTEROL SULFATE 2.5; .5 MG/3ML; MG/3ML
3 SOLUTION RESPIRATORY (INHALATION) EVERY 4 HOURS PRN
Status: CANCELLED | OUTPATIENT
Start: 2022-01-01

## 2022-01-01 RX ORDER — OXYMETAZOLINE HCL 0.05% 0.05 MG/ML
SPRAY NASAL
Qty: 400 EACH | Refills: 1 | Status: SHIPPED | OUTPATIENT
Start: 2022-01-01 | End: 2022-01-01 | Stop reason: SDUPTHER

## 2022-01-01 RX ORDER — CARVEDILOL 6.25 MG/1
TABLET ORAL
COMMUNITY
Start: 2022-01-01 | End: 2022-01-01 | Stop reason: SDUPTHER

## 2022-01-01 RX ORDER — SODIUM CHLORIDE 0.9 % (FLUSH) 0.9 %
10 SYRINGE (ML) INJECTION AS NEEDED
Status: CANCELLED | OUTPATIENT
Start: 2022-01-01

## 2022-01-01 RX ORDER — SEVELAMER CARBONATE FOR ORAL SUSPENSION 800 MG/1
800 POWDER, FOR SUSPENSION ORAL
Status: DISCONTINUED | OUTPATIENT
Start: 2022-01-01 | End: 2022-01-01 | Stop reason: HOSPADM

## 2022-01-01 RX ORDER — HYDROMORPHONE HYDROCHLORIDE 1 MG/ML
0.25 INJECTION, SOLUTION INTRAMUSCULAR; INTRAVENOUS; SUBCUTANEOUS
Status: CANCELLED | OUTPATIENT
Start: 2022-01-01 | End: 2022-12-10

## 2022-01-01 RX ORDER — POTASSIUM CHLORIDE 7.45 MG/ML
10 INJECTION INTRAVENOUS
Status: COMPLETED | OUTPATIENT
Start: 2022-01-01 | End: 2022-01-01

## 2022-01-01 RX ORDER — CASTOR OIL AND BALSAM, PERU 788; 87 MG/G; MG/G
1 OINTMENT TOPICAL EVERY 12 HOURS SCHEDULED
Start: 2022-01-01 | End: 2022-01-01

## 2022-01-01 RX ORDER — ACETAMINOPHEN 500 MG
500 TABLET ORAL EVERY 6 HOURS
Status: CANCELLED | OUTPATIENT
Start: 2022-01-01

## 2022-01-01 RX ORDER — PANTOPRAZOLE SODIUM 40 MG/10ML
40 INJECTION, POWDER, LYOPHILIZED, FOR SOLUTION INTRAVENOUS EVERY 12 HOURS SCHEDULED
Status: DISCONTINUED | OUTPATIENT
Start: 2022-01-01 | End: 2022-01-01

## 2022-01-01 RX ORDER — PEG-3350, SODIUM SULFATE, SODIUM CHLORIDE, POTASSIUM CHLORIDE, SODIUM ASCORBATE AND ASCORBIC ACID 7.5-2.691G
1000 KIT ORAL
Status: COMPLETED | OUTPATIENT
Start: 2022-01-01 | End: 2022-01-01

## 2022-01-01 RX ORDER — OMEPRAZOLE 20 MG/1
CAPSULE, DELAYED RELEASE ORAL
COMMUNITY
Start: 2022-01-01 | End: 2022-01-01

## 2022-01-01 RX ORDER — FUROSEMIDE 10 MG/ML
20 INJECTION INTRAMUSCULAR; INTRAVENOUS EVERY 6 HOURS PRN
Status: DISCONTINUED | OUTPATIENT
Start: 2022-01-01 | End: 2022-01-01 | Stop reason: HOSPADM

## 2022-01-01 RX ORDER — INSULIN LISPRO 100 [IU]/ML
4 INJECTION, SOLUTION INTRAVENOUS; SUBCUTANEOUS
Status: DISCONTINUED | OUTPATIENT
Start: 2022-01-01 | End: 2022-01-01 | Stop reason: HOSPADM

## 2022-01-01 RX ORDER — ACETAMINOPHEN 160 MG/5ML
650 SOLUTION ORAL EVERY 4 HOURS PRN
Status: DISCONTINUED | OUTPATIENT
Start: 2022-01-01 | End: 2022-01-01

## 2022-01-01 RX ORDER — GLYCOPYRROLATE 0.2 MG/ML
0.1 INJECTION INTRAMUSCULAR; INTRAVENOUS EVERY 4 HOURS PRN
Status: CANCELLED | OUTPATIENT
Start: 2022-01-01

## 2022-01-01 RX ORDER — ALBUMIN (HUMAN) 12.5 G/50ML
25 SOLUTION INTRAVENOUS AS NEEDED
Status: DISPENSED | OUTPATIENT
Start: 2022-01-01 | End: 2022-01-01

## 2022-01-01 RX ORDER — HEPARIN SODIUM 5000 [USP'U]/ML
5000 INJECTION, SOLUTION INTRAVENOUS; SUBCUTANEOUS EVERY 8 HOURS SCHEDULED
Status: DISCONTINUED | OUTPATIENT
Start: 2022-01-01 | End: 2022-01-01

## 2022-01-01 RX ORDER — NOREPINEPHRINE BIT/0.9 % NACL 8 MG/250ML
.02-.3 INFUSION BOTTLE (ML) INTRAVENOUS
Status: DISCONTINUED | OUTPATIENT
Start: 2022-01-01 | End: 2022-01-01

## 2022-01-01 RX ORDER — GRANULES FOR ORAL 3 G/1
3 POWDER ORAL ONCE
Status: COMPLETED | OUTPATIENT
Start: 2022-01-01 | End: 2022-01-01

## 2022-01-01 RX ORDER — DIPHENHYDRAMINE HYDROCHLORIDE 50 MG/ML
25 INJECTION INTRAMUSCULAR; INTRAVENOUS EVERY 6 HOURS PRN
Status: DISCONTINUED | OUTPATIENT
Start: 2022-01-01 | End: 2022-01-01 | Stop reason: HOSPADM

## 2022-01-01 RX ORDER — ATORVASTATIN CALCIUM 20 MG/1
20 TABLET, FILM COATED ORAL NIGHTLY
Status: DISCONTINUED | OUTPATIENT
Start: 2022-01-01 | End: 2022-01-01 | Stop reason: HOSPADM

## 2022-01-01 RX ORDER — HYDROMORPHONE HYDROCHLORIDE 1 MG/ML
0.25 INJECTION, SOLUTION INTRAMUSCULAR; INTRAVENOUS; SUBCUTANEOUS EVERY 6 HOURS
Status: DISCONTINUED | OUTPATIENT
Start: 2022-01-01 | End: 2022-01-01

## 2022-01-01 RX ORDER — SEVELAMER CARBONATE FOR ORAL SUSPENSION 800 MG/1
800 POWDER, FOR SUSPENSION ORAL
Status: CANCELLED | OUTPATIENT
Start: 2022-01-01

## 2022-01-01 RX ORDER — ALBUMIN (HUMAN) 12.5 G/50ML
12.5 SOLUTION INTRAVENOUS ONCE
Status: DISCONTINUED | OUTPATIENT
Start: 2022-01-01 | End: 2022-01-01

## 2022-01-01 RX ORDER — PANTOPRAZOLE SODIUM 40 MG/1
40 TABLET, DELAYED RELEASE ORAL
Status: CANCELLED | OUTPATIENT
Start: 2022-01-01

## 2022-01-01 RX ORDER — SODIUM CHLORIDE 9 MG/ML
75 INJECTION, SOLUTION INTRAVENOUS CONTINUOUS
Status: ACTIVE | OUTPATIENT
Start: 2022-01-01 | End: 2022-01-01

## 2022-01-01 RX ORDER — INSULIN LISPRO 100 [IU]/ML
4 INJECTION, SOLUTION INTRAVENOUS; SUBCUTANEOUS
Refills: 12
Start: 2022-01-01 | End: 2022-01-01 | Stop reason: ALTCHOICE

## 2022-01-01 RX ORDER — CEPHALEXIN 500 MG/1
500 CAPSULE ORAL 4 TIMES DAILY
Qty: 28 CAPSULE | Refills: 0 | Status: SHIPPED | OUTPATIENT
Start: 2022-01-01 | End: 2022-01-01

## 2022-01-01 RX ORDER — CARVEDILOL 3.12 MG/1
3.12 TABLET ORAL EVERY 12 HOURS SCHEDULED
Start: 2022-01-01

## 2022-01-01 RX ORDER — VANCOMYCIN HYDROCHLORIDE 1 G/200ML
1000 INJECTION, SOLUTION INTRAVENOUS ONCE
Status: COMPLETED | OUTPATIENT
Start: 2022-01-01 | End: 2022-01-01

## 2022-01-01 RX ORDER — ALBUMIN (HUMAN) 12.5 G/50ML
25 SOLUTION INTRAVENOUS AS NEEDED
Status: DISCONTINUED | OUTPATIENT
Start: 2022-01-01 | End: 2022-01-01 | Stop reason: HOSPADM

## 2022-01-01 RX ORDER — INSULIN GLARGINE 100 [IU]/ML
INJECTION, SOLUTION SUBCUTANEOUS
Qty: 15 ML | Refills: 1 | Status: SHIPPED | OUTPATIENT
Start: 2022-01-01 | End: 2022-01-01 | Stop reason: HOSPADM

## 2022-01-01 RX ORDER — SACCHAROMYCES BOULARDII 250 MG
250 CAPSULE ORAL 2 TIMES DAILY
COMMUNITY

## 2022-01-01 RX ORDER — ESZOPICLONE 2 MG/1
2 TABLET, FILM COATED ORAL NIGHTLY PRN
Qty: 30 TABLET | Refills: 2 | Status: SHIPPED | OUTPATIENT
Start: 2022-01-01 | End: 2022-01-01 | Stop reason: HOSPADM

## 2022-01-01 RX ORDER — CIPROFLOXACIN 250 MG/1
250 TABLET, FILM COATED ORAL 2 TIMES DAILY
Qty: 14 TABLET | Refills: 0 | Status: SHIPPED | OUTPATIENT
Start: 2022-01-01 | End: 2022-01-01

## 2022-01-01 RX ORDER — DEXTROMETHORPHAN POLISTIREX 30 MG/5ML
60 SUSPENSION ORAL EVERY 12 HOURS SCHEDULED
Status: DISCONTINUED | OUTPATIENT
Start: 2022-01-01 | End: 2022-01-01 | Stop reason: HOSPADM

## 2022-01-01 RX ORDER — MIDODRINE HYDROCHLORIDE 10 MG/1
10 TABLET ORAL
Status: CANCELLED | OUTPATIENT
Start: 2022-01-01

## 2022-01-01 RX ORDER — PANTOPRAZOLE SODIUM 40 MG/1
40 TABLET, DELAYED RELEASE ORAL
Status: DISCONTINUED | OUTPATIENT
Start: 2022-01-01 | End: 2022-01-01 | Stop reason: HOSPADM

## 2022-01-01 RX ORDER — HYDROCODONE BITARTRATE AND ACETAMINOPHEN 5; 325 MG/1; MG/1
1 TABLET ORAL ONCE
Status: COMPLETED | OUTPATIENT
Start: 2022-01-01 | End: 2022-01-01

## 2022-01-01 RX ORDER — PHENYLEPHRINE HYDROCHLORIDE 10 MG/ML
INJECTION INTRAVENOUS AS NEEDED
Status: DISCONTINUED | OUTPATIENT
Start: 2022-01-01 | End: 2022-01-01 | Stop reason: SURG

## 2022-01-01 RX ORDER — BENZONATATE 100 MG/1
100 CAPSULE ORAL 3 TIMES DAILY PRN
Status: DISCONTINUED | OUTPATIENT
Start: 2022-01-01 | End: 2022-01-01 | Stop reason: HOSPADM

## 2022-01-01 RX ORDER — SODIUM CHLORIDE 9 MG/ML
INJECTION, SOLUTION INTRAVENOUS CONTINUOUS PRN
Status: DISCONTINUED | OUTPATIENT
Start: 2022-01-01 | End: 2022-01-01 | Stop reason: SURG

## 2022-01-01 RX ORDER — AMOXICILLIN 250 MG
2 CAPSULE ORAL NIGHTLY
Status: DISCONTINUED | OUTPATIENT
Start: 2022-01-01 | End: 2022-01-01 | Stop reason: HOSPADM

## 2022-01-01 RX ORDER — CHOLECALCIFEROL (VITAMIN D3) 125 MCG
5 CAPSULE ORAL NIGHTLY PRN
Status: DISCONTINUED | OUTPATIENT
Start: 2022-01-01 | End: 2022-01-01 | Stop reason: HOSPADM

## 2022-01-01 RX ORDER — DIPHENHYDRAMINE HCL 25 MG
25 CAPSULE ORAL EVERY 6 HOURS PRN
Status: DISCONTINUED | OUTPATIENT
Start: 2022-01-01 | End: 2022-01-01 | Stop reason: HOSPADM

## 2022-01-01 RX ADMIN — ATORVASTATIN CALCIUM 20 MG: 20 TABLET, FILM COATED ORAL at 21:30

## 2022-01-01 RX ADMIN — IPRATROPIUM BROMIDE AND ALBUTEROL SULFATE 3 ML: .5; 2.5 SOLUTION RESPIRATORY (INHALATION) at 10:11

## 2022-01-01 RX ADMIN — Medication 10 ML: at 09:12

## 2022-01-01 RX ADMIN — INSULIN LISPRO 4 UNITS: 100 INJECTION, SOLUTION INTRAVENOUS; SUBCUTANEOUS at 12:39

## 2022-01-01 RX ADMIN — CASTOR OIL AND BALSAM, PERU 1 APPLICATION: 788; 87 OINTMENT TOPICAL at 08:14

## 2022-01-01 RX ADMIN — DEXTROMETHORPHAN 60 MG: 30 SUSPENSION, EXTENDED RELEASE ORAL at 08:11

## 2022-01-01 RX ADMIN — DIPHENHYDRAMINE HYDROCHLORIDE 25 MG: 50 INJECTION INTRAMUSCULAR; INTRAVENOUS at 16:12

## 2022-01-01 RX ADMIN — Medication 296 ML: at 08:25

## 2022-01-01 RX ADMIN — LATANOPROST 1 DROP: 50 SOLUTION OPHTHALMIC at 20:57

## 2022-01-01 RX ADMIN — Medication 10 ML: at 09:09

## 2022-01-01 RX ADMIN — LATANOPROST 1 DROP: 50 SOLUTION OPHTHALMIC at 21:14

## 2022-01-01 RX ADMIN — PANTOPRAZOLE SODIUM 40 MG: 40 TABLET, DELAYED RELEASE ORAL at 05:28

## 2022-01-01 RX ADMIN — TIMOLOL MALEATE 1 DROP: 5 SOLUTION/ DROPS OPHTHALMIC at 08:29

## 2022-01-01 RX ADMIN — TIMOLOL MALEATE 1 DROP: 5 SOLUTION/ DROPS OPHTHALMIC at 10:20

## 2022-01-01 RX ADMIN — Medication 250 MG: at 08:28

## 2022-01-01 RX ADMIN — ATORVASTATIN CALCIUM 20 MG: 20 TABLET, FILM COATED ORAL at 20:28

## 2022-01-01 RX ADMIN — PANTOPRAZOLE SODIUM 40 MG: 40 INJECTION, POWDER, FOR SOLUTION INTRAVENOUS at 22:41

## 2022-01-01 RX ADMIN — CASTOR OIL AND BALSAM, PERU 1 APPLICATION: 788; 87 OINTMENT TOPICAL at 21:05

## 2022-01-01 RX ADMIN — FUROSEMIDE 20 MG: 10 INJECTION INTRAMUSCULAR; INTRAVENOUS at 13:11

## 2022-01-01 RX ADMIN — Medication 10 ML: at 08:30

## 2022-01-01 RX ADMIN — ONDANSETRON 4 MG: 2 INJECTION INTRAMUSCULAR; INTRAVENOUS at 22:59

## 2022-01-01 RX ADMIN — TIMOLOL MALEATE 1 DROP: 5 SOLUTION/ DROPS OPHTHALMIC at 08:55

## 2022-01-01 RX ADMIN — INSULIN LISPRO 4 UNITS: 100 INJECTION, SOLUTION INTRAVENOUS; SUBCUTANEOUS at 17:44

## 2022-01-01 RX ADMIN — INSULIN LISPRO 2 UNITS: 100 INJECTION, SOLUTION INTRAVENOUS; SUBCUTANEOUS at 12:19

## 2022-01-01 RX ADMIN — INSULIN LISPRO 2 UNITS: 100 INJECTION, SOLUTION INTRAVENOUS; SUBCUTANEOUS at 08:56

## 2022-01-01 RX ADMIN — INSULIN LISPRO 4 UNITS: 100 INJECTION, SOLUTION INTRAVENOUS; SUBCUTANEOUS at 08:55

## 2022-01-01 RX ADMIN — APIXABAN 2.5 MG: 2.5 TABLET, FILM COATED ORAL at 08:38

## 2022-01-01 RX ADMIN — HYDROMORPHONE HYDROCHLORIDE 0.25 MG: 1 INJECTION, SOLUTION INTRAMUSCULAR; INTRAVENOUS; SUBCUTANEOUS at 17:36

## 2022-01-01 RX ADMIN — OXYCODONE 5 MG: 5 TABLET ORAL at 01:50

## 2022-01-01 RX ADMIN — CASTOR OIL AND BALSAM, PERU 1 APPLICATION: 788; 87 OINTMENT TOPICAL at 08:58

## 2022-01-01 RX ADMIN — CARVEDILOL 3.12 MG: 3.12 TABLET, FILM COATED ORAL at 08:55

## 2022-01-01 RX ADMIN — HYDROMORPHONE HYDROCHLORIDE 0.25 MG: 1 INJECTION, SOLUTION INTRAMUSCULAR; INTRAVENOUS; SUBCUTANEOUS at 05:07

## 2022-01-01 RX ADMIN — HYDROMORPHONE HYDROCHLORIDE 0.25 MG: 1 INJECTION, SOLUTION INTRAMUSCULAR; INTRAVENOUS; SUBCUTANEOUS at 23:39

## 2022-01-01 RX ADMIN — HEPARIN SODIUM 1600 UNITS: 1000 INJECTION INTRAVENOUS; SUBCUTANEOUS at 12:36

## 2022-01-01 RX ADMIN — CARVEDILOL 3.12 MG: 3.12 TABLET, FILM COATED ORAL at 12:50

## 2022-01-01 RX ADMIN — TIMOLOL MALEATE 1 DROP: 5 SOLUTION/ DROPS OPHTHALMIC at 09:53

## 2022-01-01 RX ADMIN — SODIUM ZIRCONIUM CYCLOSILICATE 10 G: 10 POWDER, FOR SUSPENSION ORAL at 17:19

## 2022-01-01 RX ADMIN — PHENYLEPHRINE HYDROCHLORIDE 100 MCG: 10 INJECTION INTRAVENOUS at 12:39

## 2022-01-01 RX ADMIN — ALBUMIN HUMAN 25 G: 0.25 SOLUTION INTRAVENOUS at 21:24

## 2022-01-01 RX ADMIN — SODIUM CHLORIDE 125 MG: 9 INJECTION, SOLUTION INTRAVENOUS at 13:42

## 2022-01-01 RX ADMIN — APIXABAN 2.5 MG: 2.5 TABLET, FILM COATED ORAL at 21:30

## 2022-01-01 RX ADMIN — SEVELAMER CARBONATE 0.8 G: 800 POWDER, FOR SUSPENSION ORAL at 14:41

## 2022-01-01 RX ADMIN — DEXTROMETHORPHAN 60 MG: 30 SUSPENSION, EXTENDED RELEASE ORAL at 21:06

## 2022-01-01 RX ADMIN — Medication 10 MG: at 11:12

## 2022-01-01 RX ADMIN — INSULIN LISPRO 4 UNITS: 100 INJECTION, SOLUTION INTRAVENOUS; SUBCUTANEOUS at 16:55

## 2022-01-01 RX ADMIN — TIMOLOL MALEATE 1 DROP: 5 SOLUTION/ DROPS OPHTHALMIC at 21:56

## 2022-01-01 RX ADMIN — SODIUM CHLORIDE 1 G: 900 INJECTION INTRAVENOUS at 21:52

## 2022-01-01 RX ADMIN — DEXTROMETHORPHAN 60 MG: 30 SUSPENSION, EXTENDED RELEASE ORAL at 21:30

## 2022-01-01 RX ADMIN — APIXABAN 2.5 MG: 2.5 TABLET, FILM COATED ORAL at 19:51

## 2022-01-01 RX ADMIN — PANTOPRAZOLE SODIUM 40 MG: 40 TABLET, DELAYED RELEASE ORAL at 17:26

## 2022-01-01 RX ADMIN — DEXTROMETHORPHAN 60 MG: 30 SUSPENSION, EXTENDED RELEASE ORAL at 12:45

## 2022-01-01 RX ADMIN — MANNITOL 12.5 G: 12.5 INJECTION, SOLUTION INTRAVENOUS at 07:35

## 2022-01-01 RX ADMIN — SEVELAMER CARBONATE 0.8 G: 800 POWDER, FOR SUSPENSION ORAL at 08:28

## 2022-01-01 RX ADMIN — CASTOR OIL AND BALSAM, PERU 1 APPLICATION: 788; 87 OINTMENT TOPICAL at 12:49

## 2022-01-01 RX ADMIN — TIMOLOL MALEATE 1 DROP: 5 SOLUTION/ DROPS OPHTHALMIC at 21:25

## 2022-01-01 RX ADMIN — MIDODRINE HYDROCHLORIDE 10 MG: 10 TABLET ORAL at 05:33

## 2022-01-01 RX ADMIN — LORAZEPAM 0.5 MG: 2 INJECTION INTRAMUSCULAR; INTRAVENOUS at 16:18

## 2022-01-01 RX ADMIN — DEXTROMETHORPHAN 60 MG: 30 SUSPENSION, EXTENDED RELEASE ORAL at 20:09

## 2022-01-01 RX ADMIN — INSULIN LISPRO 2 UNITS: 100 INJECTION, SOLUTION INTRAVENOUS; SUBCUTANEOUS at 17:53

## 2022-01-01 RX ADMIN — BENZONATATE 100 MG: 100 CAPSULE ORAL at 05:26

## 2022-01-01 RX ADMIN — ALBUMIN (HUMAN) 25 G: 12.5 SOLUTION INTRAVENOUS at 08:49

## 2022-01-01 RX ADMIN — HYDROMORPHONE HYDROCHLORIDE 0.25 MG: 1 INJECTION, SOLUTION INTRAMUSCULAR; INTRAVENOUS; SUBCUTANEOUS at 15:58

## 2022-01-01 RX ADMIN — HYDROCODONE BITARTRATE AND ACETAMINOPHEN 1 TABLET: 5; 325 TABLET ORAL at 05:32

## 2022-01-01 RX ADMIN — LORAZEPAM 0.5 MG: 2 INJECTION INTRAMUSCULAR; INTRAVENOUS at 00:56

## 2022-01-01 RX ADMIN — INSULIN LISPRO 4 UNITS: 100 INJECTION, SOLUTION INTRAVENOUS; SUBCUTANEOUS at 17:41

## 2022-01-01 RX ADMIN — APIXABAN 2.5 MG: 2.5 TABLET, FILM COATED ORAL at 12:50

## 2022-01-01 RX ADMIN — HYDROMORPHONE HYDROCHLORIDE 0.25 MG: 1 INJECTION, SOLUTION INTRAMUSCULAR; INTRAVENOUS; SUBCUTANEOUS at 22:53

## 2022-01-01 RX ADMIN — CASTOR OIL AND BALSAM, PERU 1 APPLICATION: 788; 87 OINTMENT TOPICAL at 21:38

## 2022-01-01 RX ADMIN — INSULIN LISPRO 4 UNITS: 100 INJECTION, SOLUTION INTRAVENOUS; SUBCUTANEOUS at 17:03

## 2022-01-01 RX ADMIN — PHENYLEPHRINE HYDROCHLORIDE 100 MCG: 10 INJECTION INTRAVENOUS at 12:32

## 2022-01-01 RX ADMIN — Medication 10 ML: at 21:25

## 2022-01-01 RX ADMIN — LEVOTHYROXINE SODIUM 125 MCG: 125 TABLET ORAL at 04:47

## 2022-01-01 RX ADMIN — Medication 250 MG: at 13:16

## 2022-01-01 RX ADMIN — Medication 250 MG: at 20:27

## 2022-01-01 RX ADMIN — OXYCODONE 5 MG: 5 TABLET ORAL at 18:44

## 2022-01-01 RX ADMIN — LORAZEPAM 0.5 MG: 2 INJECTION INTRAMUSCULAR; INTRAVENOUS at 21:49

## 2022-01-01 RX ADMIN — PANTOPRAZOLE SODIUM 40 MG: 40 TABLET, DELAYED RELEASE ORAL at 17:03

## 2022-01-01 RX ADMIN — DIPHENHYDRAMINE HYDROCHLORIDE 25 MG: 50 INJECTION INTRAMUSCULAR; INTRAVENOUS at 22:27

## 2022-01-01 RX ADMIN — APIXABAN 2.5 MG: 2.5 TABLET, FILM COATED ORAL at 08:55

## 2022-01-01 RX ADMIN — INSULIN LISPRO 2 UNITS: 100 INJECTION, SOLUTION INTRAVENOUS; SUBCUTANEOUS at 12:09

## 2022-01-01 RX ADMIN — MIDODRINE HYDROCHLORIDE 10 MG: 10 TABLET ORAL at 16:30

## 2022-01-01 RX ADMIN — Medication 5 MG: at 19:50

## 2022-01-01 RX ADMIN — INSULIN LISPRO 4 UNITS: 100 INJECTION, SOLUTION INTRAVENOUS; SUBCUTANEOUS at 08:58

## 2022-01-01 RX ADMIN — PANTOPRAZOLE SODIUM 40 MG: 40 TABLET, DELAYED RELEASE ORAL at 17:17

## 2022-01-01 RX ADMIN — TIMOLOL MALEATE 1 DROP: 5 SOLUTION/ DROPS OPHTHALMIC at 08:12

## 2022-01-01 RX ADMIN — INSULIN LISPRO 2 UNITS: 100 INJECTION, SOLUTION INTRAVENOUS; SUBCUTANEOUS at 17:45

## 2022-01-01 RX ADMIN — SODIUM ZIRCONIUM CYCLOSILICATE 10 G: 10 POWDER, FOR SUSPENSION ORAL at 21:22

## 2022-01-01 RX ADMIN — ALBUMIN (HUMAN) 50 G: 12.5 SOLUTION INTRAVENOUS at 07:40

## 2022-01-01 RX ADMIN — INSULIN LISPRO 2 UNITS: 100 INJECTION, SOLUTION INTRAVENOUS; SUBCUTANEOUS at 08:10

## 2022-01-01 RX ADMIN — LORAZEPAM 0.5 MG: 2 INJECTION INTRAMUSCULAR; INTRAVENOUS at 21:44

## 2022-01-01 RX ADMIN — SEVELAMER CARBONATE 0.8 G: 800 POWDER, FOR SUSPENSION ORAL at 13:16

## 2022-01-01 RX ADMIN — Medication 250 MG: at 21:21

## 2022-01-01 RX ADMIN — LATANOPROST 1 DROP: 50 SOLUTION OPHTHALMIC at 20:53

## 2022-01-01 RX ADMIN — Medication 10 ML: at 08:56

## 2022-01-01 RX ADMIN — TIMOLOL MALEATE 1 DROP: 5 SOLUTION/ DROPS OPHTHALMIC at 21:38

## 2022-01-01 RX ADMIN — LEVOTHYROXINE SODIUM 112 MCG: 0.11 TABLET ORAL at 05:34

## 2022-01-01 RX ADMIN — LEVOTHYROXINE SODIUM 125 MCG: 125 TABLET ORAL at 05:36

## 2022-01-01 RX ADMIN — INSULIN LISPRO 2 UNITS: 100 INJECTION, SOLUTION INTRAVENOUS; SUBCUTANEOUS at 08:47

## 2022-01-01 RX ADMIN — APIXABAN 2.5 MG: 2.5 TABLET, FILM COATED ORAL at 21:03

## 2022-01-01 RX ADMIN — LATANOPROST 1 DROP: 50 SOLUTION OPHTHALMIC at 20:28

## 2022-01-01 RX ADMIN — Medication 5 MG: at 20:33

## 2022-01-01 RX ADMIN — SODIUM CHLORIDE 50 ML/HR: 9 INJECTION, SOLUTION INTRAVENOUS at 09:59

## 2022-01-01 RX ADMIN — Medication 10 ML: at 20:54

## 2022-01-01 RX ADMIN — TIMOLOL MALEATE 1 DROP: 5 SOLUTION/ DROPS OPHTHALMIC at 09:45

## 2022-01-01 RX ADMIN — LEVOTHYROXINE SODIUM 112 MCG: 0.11 TABLET ORAL at 06:22

## 2022-01-01 RX ADMIN — CARVEDILOL 3.12 MG: 3.12 TABLET, FILM COATED ORAL at 17:44

## 2022-01-01 RX ADMIN — DEXTROMETHORPHAN 60 MG: 30 SUSPENSION, EXTENDED RELEASE ORAL at 21:00

## 2022-01-01 RX ADMIN — Medication 250 MG: at 08:27

## 2022-01-01 RX ADMIN — MIDODRINE HYDROCHLORIDE 10 MG: 10 TABLET ORAL at 11:09

## 2022-01-01 RX ADMIN — ONDANSETRON 4 MG: 2 INJECTION INTRAMUSCULAR; INTRAVENOUS at 08:31

## 2022-01-01 RX ADMIN — INSULIN LISPRO 4 UNITS: 100 INJECTION, SOLUTION INTRAVENOUS; SUBCUTANEOUS at 18:06

## 2022-01-01 RX ADMIN — Medication 10 ML: at 08:11

## 2022-01-01 RX ADMIN — LATANOPROST 1 DROP: 50 SOLUTION OPHTHALMIC at 19:52

## 2022-01-01 RX ADMIN — TIMOLOL MALEATE 1 DROP: 5 SOLUTION/ DROPS OPHTHALMIC at 01:10

## 2022-01-01 RX ADMIN — SODIUM CHLORIDE 100 ML: 9 INJECTION, SOLUTION INTRAVENOUS at 05:11

## 2022-01-01 RX ADMIN — MIDODRINE HYDROCHLORIDE 10 MG: 10 TABLET ORAL at 12:32

## 2022-01-01 RX ADMIN — PANTOPRAZOLE SODIUM 40 MG: 40 TABLET, DELAYED RELEASE ORAL at 06:22

## 2022-01-01 RX ADMIN — INSULIN LISPRO 4 UNITS: 100 INJECTION, SOLUTION INTRAVENOUS; SUBCUTANEOUS at 09:52

## 2022-01-01 RX ADMIN — HYDROMORPHONE HYDROCHLORIDE 0.25 MG: 1 INJECTION, SOLUTION INTRAMUSCULAR; INTRAVENOUS; SUBCUTANEOUS at 03:21

## 2022-01-01 RX ADMIN — Medication 10 ML: at 08:53

## 2022-01-01 RX ADMIN — LATANOPROST 1 DROP: 50 SOLUTION OPHTHALMIC at 21:02

## 2022-01-01 RX ADMIN — ATORVASTATIN CALCIUM 20 MG: 20 TABLET, FILM COATED ORAL at 20:53

## 2022-01-01 RX ADMIN — SCOPALAMINE 1 PATCH: 1 PATCH, EXTENDED RELEASE TRANSDERMAL at 17:32

## 2022-01-01 RX ADMIN — Medication 5 MG: at 21:36

## 2022-01-01 RX ADMIN — CARVEDILOL 3.12 MG: 3.12 TABLET, FILM COATED ORAL at 08:58

## 2022-01-01 RX ADMIN — ACETAMINOPHEN 500 MG: 500 TABLET ORAL at 17:26

## 2022-01-01 RX ADMIN — ACETAMINOPHEN 325MG 650 MG: 325 TABLET ORAL at 16:42

## 2022-01-01 RX ADMIN — TIMOLOL MALEATE 1 DROP: 5 SOLUTION/ DROPS OPHTHALMIC at 21:30

## 2022-01-01 RX ADMIN — TIMOLOL MALEATE 1 DROP: 5 SOLUTION/ DROPS OPHTHALMIC at 21:14

## 2022-01-01 RX ADMIN — INSULIN LISPRO 2 UNITS: 100 INJECTION, SOLUTION INTRAVENOUS; SUBCUTANEOUS at 17:59

## 2022-01-01 RX ADMIN — ATORVASTATIN CALCIUM 20 MG: 20 TABLET, FILM COATED ORAL at 21:21

## 2022-01-01 RX ADMIN — CARVEDILOL 3.12 MG: 3.12 TABLET, FILM COATED ORAL at 17:51

## 2022-01-01 RX ADMIN — CARVEDILOL 3.12 MG: 3.12 TABLET, FILM COATED ORAL at 17:41

## 2022-01-01 RX ADMIN — TIMOLOL MALEATE 1 DROP: 5 SOLUTION/ DROPS OPHTHALMIC at 12:49

## 2022-01-01 RX ADMIN — PANTOPRAZOLE SODIUM 40 MG: 40 TABLET, DELAYED RELEASE ORAL at 08:53

## 2022-01-01 RX ADMIN — HYDROMORPHONE HYDROCHLORIDE 0.5 MG: 1 INJECTION, SOLUTION INTRAMUSCULAR; INTRAVENOUS; SUBCUTANEOUS at 04:47

## 2022-01-01 RX ADMIN — INSULIN LISPRO 2 UNITS: 100 INJECTION, SOLUTION INTRAVENOUS; SUBCUTANEOUS at 17:13

## 2022-01-01 RX ADMIN — CASTOR OIL AND BALSAM, PERU 1 APPLICATION: 788; 87 OINTMENT TOPICAL at 20:09

## 2022-01-01 RX ADMIN — APIXABAN 2.5 MG: 2.5 TABLET, FILM COATED ORAL at 20:56

## 2022-01-01 RX ADMIN — LORAZEPAM 0.5 MG: 2 INJECTION INTRAMUSCULAR; INTRAVENOUS at 20:18

## 2022-01-01 RX ADMIN — SODIUM CHLORIDE, POTASSIUM CHLORIDE, SODIUM LACTATE AND CALCIUM CHLORIDE 250 ML: 600; 310; 30; 20 INJECTION, SOLUTION INTRAVENOUS at 15:42

## 2022-01-01 RX ADMIN — INSULIN LISPRO 2 UNITS: 100 INJECTION, SOLUTION INTRAVENOUS; SUBCUTANEOUS at 08:37

## 2022-01-01 RX ADMIN — ALBUMIN (HUMAN) 50 G: 0.25 INJECTION, SOLUTION INTRAVENOUS at 07:40

## 2022-01-01 RX ADMIN — INSULIN LISPRO 4 UNITS: 100 INJECTION, SOLUTION INTRAVENOUS; SUBCUTANEOUS at 17:14

## 2022-01-01 RX ADMIN — ACETAMINOPHEN 325MG 650 MG: 325 TABLET ORAL at 05:24

## 2022-01-01 RX ADMIN — PROPOFOL 50 MG: 10 INJECTION, EMULSION INTRAVENOUS at 12:20

## 2022-01-01 RX ADMIN — INSULIN LISPRO 2 UNITS: 100 INJECTION, SOLUTION INTRAVENOUS; SUBCUTANEOUS at 16:55

## 2022-01-01 RX ADMIN — PANTOPRAZOLE SODIUM 40 MG: 40 TABLET, DELAYED RELEASE ORAL at 08:28

## 2022-01-01 RX ADMIN — APIXABAN 2.5 MG: 2.5 TABLET, FILM COATED ORAL at 21:02

## 2022-01-01 RX ADMIN — TIMOLOL MALEATE 1 DROP: 5 SOLUTION/ DROPS OPHTHALMIC at 21:01

## 2022-01-01 RX ADMIN — PANTOPRAZOLE SODIUM 40 MG: 40 TABLET, DELAYED RELEASE ORAL at 17:13

## 2022-01-01 RX ADMIN — Medication 10 ML: at 13:38

## 2022-01-01 RX ADMIN — TIMOLOL MALEATE 1 DROP: 5 SOLUTION/ DROPS OPHTHALMIC at 08:33

## 2022-01-01 RX ADMIN — MIDODRINE HYDROCHLORIDE 10 MG: 10 TABLET ORAL at 11:37

## 2022-01-01 RX ADMIN — LEVOTHYROXINE SODIUM 125 MCG: 125 TABLET ORAL at 05:54

## 2022-01-01 RX ADMIN — HYDROMORPHONE HYDROCHLORIDE 0.5 MG: 1 INJECTION, SOLUTION INTRAMUSCULAR; INTRAVENOUS; SUBCUTANEOUS at 14:13

## 2022-01-01 RX ADMIN — LEVOTHYROXINE SODIUM 112 MCG: 0.11 TABLET ORAL at 07:07

## 2022-01-01 RX ADMIN — TIMOLOL MALEATE 1 DROP: 5 SOLUTION/ DROPS OPHTHALMIC at 21:33

## 2022-01-01 RX ADMIN — Medication 10 ML: at 21:00

## 2022-01-01 RX ADMIN — HEPARIN SODIUM 1600 UNITS: 1000 INJECTION INTRAVENOUS; SUBCUTANEOUS at 11:56

## 2022-01-01 RX ADMIN — APIXABAN 2.5 MG: 2.5 TABLET, FILM COATED ORAL at 21:13

## 2022-01-01 RX ADMIN — INSULIN LISPRO 2 UNITS: 100 INJECTION, SOLUTION INTRAVENOUS; SUBCUTANEOUS at 17:22

## 2022-01-01 RX ADMIN — LEVOTHYROXINE SODIUM 112 MCG: 0.11 TABLET ORAL at 05:28

## 2022-01-01 RX ADMIN — INSULIN LISPRO 2 UNITS: 100 INJECTION, SOLUTION INTRAVENOUS; SUBCUTANEOUS at 12:18

## 2022-01-01 RX ADMIN — LATANOPROST 1 DROP: 50 SOLUTION OPHTHALMIC at 20:07

## 2022-01-01 RX ADMIN — HYDROMORPHONE HYDROCHLORIDE 0.25 MG: 1 INJECTION, SOLUTION INTRAMUSCULAR; INTRAVENOUS; SUBCUTANEOUS at 16:18

## 2022-01-01 RX ADMIN — INSULIN LISPRO 4 UNITS: 100 INJECTION, SOLUTION INTRAVENOUS; SUBCUTANEOUS at 17:22

## 2022-01-01 RX ADMIN — SODIUM ZIRCONIUM CYCLOSILICATE 10 G: 10 POWDER, FOR SUSPENSION ORAL at 20:26

## 2022-01-01 RX ADMIN — CASTOR OIL AND BALSAM, PERU 1 APPLICATION: 788; 87 OINTMENT TOPICAL at 19:52

## 2022-01-01 RX ADMIN — TIMOLOL MALEATE 1 DROP: 5 SOLUTION/ DROPS OPHTHALMIC at 21:46

## 2022-01-01 RX ADMIN — Medication 10 ML: at 21:33

## 2022-01-01 RX ADMIN — CARVEDILOL 3.12 MG: 3.12 TABLET, FILM COATED ORAL at 10:19

## 2022-01-01 RX ADMIN — LORAZEPAM 0.5 MG: 2 INJECTION INTRAMUSCULAR; INTRAVENOUS at 08:26

## 2022-01-01 RX ADMIN — SULFUR HEXAFLUORIDE 2 ML: KIT at 17:33

## 2022-01-01 RX ADMIN — CARVEDILOL 3.12 MG: 3.12 TABLET, FILM COATED ORAL at 18:06

## 2022-01-01 RX ADMIN — ONDANSETRON 4 MG: 2 INJECTION INTRAMUSCULAR; INTRAVENOUS at 10:32

## 2022-01-01 RX ADMIN — LEVOTHYROXINE SODIUM 125 MCG: 125 TABLET ORAL at 05:11

## 2022-01-01 RX ADMIN — SODIUM CHLORIDE 50 ML/HR: 9 INJECTION, SOLUTION INTRAVENOUS at 13:04

## 2022-01-01 RX ADMIN — LEVOTHYROXINE SODIUM 112 MCG: 0.11 TABLET ORAL at 05:46

## 2022-01-01 RX ADMIN — SODIUM ZIRCONIUM CYCLOSILICATE 10 G: 10 POWDER, FOR SUSPENSION ORAL at 20:37

## 2022-01-01 RX ADMIN — LORAZEPAM 0.5 MG: 2 INJECTION INTRAMUSCULAR; INTRAVENOUS at 11:56

## 2022-01-01 RX ADMIN — LATANOPROST 1 DROP: 50 SOLUTION OPHTHALMIC at 21:37

## 2022-01-01 RX ADMIN — INSULIN LISPRO 4 UNITS: 100 INJECTION, SOLUTION INTRAVENOUS; SUBCUTANEOUS at 12:18

## 2022-01-01 RX ADMIN — Medication 5 MG: at 21:32

## 2022-01-01 RX ADMIN — BENZONATATE 100 MG: 100 CAPSULE ORAL at 05:36

## 2022-01-01 RX ADMIN — CARVEDILOL 3.12 MG: 3.12 TABLET, FILM COATED ORAL at 08:38

## 2022-01-01 RX ADMIN — ACETAMINOPHEN 650 MG: 160 SOLUTION ORAL at 06:18

## 2022-01-01 RX ADMIN — TIMOLOL MALEATE 1 DROP: 5 SOLUTION/ DROPS OPHTHALMIC at 21:02

## 2022-01-01 RX ADMIN — ONDANSETRON 4 MG: 2 INJECTION INTRAMUSCULAR; INTRAVENOUS at 21:51

## 2022-01-01 RX ADMIN — LATANOPROST 1 DROP: 50 SOLUTION OPHTHALMIC at 21:25

## 2022-01-01 RX ADMIN — INSULIN LISPRO 4 UNITS: 100 INJECTION, SOLUTION INTRAVENOUS; SUBCUTANEOUS at 12:19

## 2022-01-01 RX ADMIN — ACETAMINOPHEN 500 MG: 500 TABLET ORAL at 09:20

## 2022-01-01 RX ADMIN — MIDODRINE HYDROCHLORIDE 10 MG: 10 TABLET ORAL at 17:39

## 2022-01-01 RX ADMIN — INSULIN LISPRO 2 UNITS: 100 INJECTION, SOLUTION INTRAVENOUS; SUBCUTANEOUS at 09:45

## 2022-01-01 RX ADMIN — CASTOR OIL AND BALSAM, PERU 1 APPLICATION: 788; 87 OINTMENT TOPICAL at 21:32

## 2022-01-01 RX ADMIN — Medication 10 ML: at 21:13

## 2022-01-01 RX ADMIN — CARVEDILOL 3.12 MG: 3.12 TABLET, FILM COATED ORAL at 17:17

## 2022-01-01 RX ADMIN — INSULIN LISPRO 4 UNITS: 100 INJECTION, SOLUTION INTRAVENOUS; SUBCUTANEOUS at 08:16

## 2022-01-01 RX ADMIN — Medication 10 ML: at 08:48

## 2022-01-01 RX ADMIN — ALBUMIN HUMAN 25 G: 0.25 SOLUTION INTRAVENOUS at 08:35

## 2022-01-01 RX ADMIN — ATORVASTATIN CALCIUM 20 MG: 20 TABLET, FILM COATED ORAL at 19:50

## 2022-01-01 RX ADMIN — SODIUM CHLORIDE 75 ML/HR: 9 INJECTION, SOLUTION INTRAVENOUS at 16:01

## 2022-01-01 RX ADMIN — CARVEDILOL 3.12 MG: 3.12 TABLET, FILM COATED ORAL at 08:53

## 2022-01-01 RX ADMIN — PANTOPRAZOLE SODIUM 40 MG: 40 TABLET, DELAYED RELEASE ORAL at 17:21

## 2022-01-01 RX ADMIN — MIDODRINE HYDROCHLORIDE 10 MG: 10 TABLET ORAL at 17:33

## 2022-01-01 RX ADMIN — DEXTROMETHORPHAN 60 MG: 30 SUSPENSION, EXTENDED RELEASE ORAL at 19:49

## 2022-01-01 RX ADMIN — HYDROMORPHONE HYDROCHLORIDE 0.5 MG: 1 INJECTION, SOLUTION INTRAMUSCULAR; INTRAVENOUS; SUBCUTANEOUS at 04:56

## 2022-01-01 RX ADMIN — Medication 10 ML: at 08:29

## 2022-01-01 RX ADMIN — HYDROMORPHONE HYDROCHLORIDE 0.25 MG: 1 INJECTION, SOLUTION INTRAMUSCULAR; INTRAVENOUS; SUBCUTANEOUS at 04:51

## 2022-01-01 RX ADMIN — MIDODRINE HYDROCHLORIDE 10 MG: 10 TABLET ORAL at 17:57

## 2022-01-01 RX ADMIN — DIPHENHYDRAMINE HYDROCHLORIDE 25 MG: 50 INJECTION INTRAMUSCULAR; INTRAVENOUS at 21:52

## 2022-01-01 RX ADMIN — HYDROMORPHONE HYDROCHLORIDE 0.25 MG: 1 INJECTION, SOLUTION INTRAMUSCULAR; INTRAVENOUS; SUBCUTANEOUS at 21:00

## 2022-01-01 RX ADMIN — PANTOPRAZOLE SODIUM 40 MG: 40 TABLET, DELAYED RELEASE ORAL at 09:51

## 2022-01-01 RX ADMIN — MIDODRINE HYDROCHLORIDE 5 MG: 5 TABLET ORAL at 21:49

## 2022-01-01 RX ADMIN — LEVOTHYROXINE SODIUM 125 MCG: 125 TABLET ORAL at 05:13

## 2022-01-01 RX ADMIN — ATORVASTATIN CALCIUM 20 MG: 20 TABLET, FILM COATED ORAL at 21:22

## 2022-01-01 RX ADMIN — TIMOLOL MALEATE 1 DROP: 5 SOLUTION/ DROPS OPHTHALMIC at 20:28

## 2022-01-01 RX ADMIN — HYDROMORPHONE HYDROCHLORIDE 0.5 MG: 1 INJECTION, SOLUTION INTRAMUSCULAR; INTRAVENOUS; SUBCUTANEOUS at 16:29

## 2022-01-01 RX ADMIN — Medication 250 MG: at 07:53

## 2022-01-01 RX ADMIN — INSULIN LISPRO 4 UNITS: 100 INJECTION, SOLUTION INTRAVENOUS; SUBCUTANEOUS at 12:51

## 2022-01-01 RX ADMIN — SULFUR HEXAFLUORIDE 2 ML: KIT at 15:33

## 2022-01-01 RX ADMIN — LEVOTHYROXINE SODIUM 112 MCG: 0.11 TABLET ORAL at 05:52

## 2022-01-01 RX ADMIN — ACETAMINOPHEN 500 MG: 500 TABLET ORAL at 01:49

## 2022-01-01 RX ADMIN — ACETAMINOPHEN 500 MG: 500 TABLET ORAL at 21:21

## 2022-01-01 RX ADMIN — Medication 5 MG: at 21:13

## 2022-01-01 RX ADMIN — ATORVASTATIN CALCIUM 20 MG: 20 TABLET, FILM COATED ORAL at 21:13

## 2022-01-01 RX ADMIN — MEROPENEM 500 MG: 500 INJECTION, POWDER, FOR SOLUTION INTRAVENOUS at 12:21

## 2022-01-01 RX ADMIN — SEVELAMER CARBONATE 0.8 G: 800 POWDER, FOR SUSPENSION ORAL at 17:43

## 2022-01-01 RX ADMIN — PANTOPRAZOLE SODIUM 40 MG: 40 TABLET, DELAYED RELEASE ORAL at 05:36

## 2022-01-01 RX ADMIN — ONDANSETRON 4 MG: 2 INJECTION INTRAMUSCULAR; INTRAVENOUS at 17:43

## 2022-01-01 RX ADMIN — ATORVASTATIN CALCIUM 20 MG: 20 TABLET, FILM COATED ORAL at 21:00

## 2022-01-01 RX ADMIN — SODIUM CHLORIDE 100 ML/HR: 9 INJECTION, SOLUTION INTRAVENOUS at 23:35

## 2022-01-01 RX ADMIN — TIMOLOL MALEATE 1 DROP: 5 SOLUTION/ DROPS OPHTHALMIC at 21:07

## 2022-01-01 RX ADMIN — Medication 10 ML: at 21:30

## 2022-01-01 RX ADMIN — CASTOR OIL AND BALSAM, PERU 1 APPLICATION: 788; 87 OINTMENT TOPICAL at 21:13

## 2022-01-01 RX ADMIN — ATORVASTATIN CALCIUM 20 MG: 20 TABLET, FILM COATED ORAL at 20:07

## 2022-01-01 RX ADMIN — INSULIN LISPRO 2 UNITS: 100 INJECTION, SOLUTION INTRAVENOUS; SUBCUTANEOUS at 08:28

## 2022-01-01 RX ADMIN — ATORVASTATIN CALCIUM 20 MG: 20 TABLET, FILM COATED ORAL at 20:56

## 2022-01-01 RX ADMIN — LATANOPROST 1 DROP: 50 SOLUTION OPHTHALMIC at 21:46

## 2022-01-01 RX ADMIN — HYDROMORPHONE HYDROCHLORIDE 0.25 MG: 1 INJECTION, SOLUTION INTRAMUSCULAR; INTRAVENOUS; SUBCUTANEOUS at 16:12

## 2022-01-01 RX ADMIN — ATORVASTATIN CALCIUM 20 MG: 20 TABLET, FILM COATED ORAL at 21:38

## 2022-01-01 RX ADMIN — TIMOLOL MALEATE 1 DROP: 5 SOLUTION/ DROPS OPHTHALMIC at 12:54

## 2022-01-01 RX ADMIN — BENZONATATE 100 MG: 100 CAPSULE ORAL at 05:25

## 2022-01-01 RX ADMIN — HYDROMORPHONE HYDROCHLORIDE 0.5 MG: 1 INJECTION, SOLUTION INTRAMUSCULAR; INTRAVENOUS; SUBCUTANEOUS at 21:26

## 2022-01-01 RX ADMIN — PANTOPRAZOLE SODIUM 40 MG: 40 TABLET, DELAYED RELEASE ORAL at 05:35

## 2022-01-01 RX ADMIN — Medication 10 ML: at 21:23

## 2022-01-01 RX ADMIN — CASTOR OIL AND BALSAM, PERU 1 APPLICATION: 788; 87 OINTMENT TOPICAL at 21:30

## 2022-01-01 RX ADMIN — ATORVASTATIN CALCIUM 20 MG: 20 TABLET, FILM COATED ORAL at 21:32

## 2022-01-01 RX ADMIN — DEXTROMETHORPHAN 60 MG: 30 SUSPENSION, EXTENDED RELEASE ORAL at 21:37

## 2022-01-01 RX ADMIN — CARVEDILOL 3.12 MG: 3.12 TABLET, FILM COATED ORAL at 12:56

## 2022-01-01 RX ADMIN — Medication 250 MG: at 20:38

## 2022-01-01 RX ADMIN — SODIUM CHLORIDE 50 ML/HR: 9 INJECTION, SOLUTION INTRAVENOUS at 13:21

## 2022-01-01 RX ADMIN — INSULIN LISPRO 4 UNITS: 100 INJECTION, SOLUTION INTRAVENOUS; SUBCUTANEOUS at 17:31

## 2022-01-01 RX ADMIN — TIMOLOL MALEATE 1 DROP: 5 SOLUTION/ DROPS OPHTHALMIC at 20:33

## 2022-01-01 RX ADMIN — PANTOPRAZOLE SODIUM 40 MG: 40 INJECTION, POWDER, FOR SOLUTION INTRAVENOUS at 08:25

## 2022-01-01 RX ADMIN — INSULIN LISPRO 4 UNITS: 100 INJECTION, SOLUTION INTRAVENOUS; SUBCUTANEOUS at 09:45

## 2022-01-01 RX ADMIN — HYDROMORPHONE HYDROCHLORIDE 0.25 MG: 1 INJECTION, SOLUTION INTRAMUSCULAR; INTRAVENOUS; SUBCUTANEOUS at 16:17

## 2022-01-01 RX ADMIN — DEXTROMETHORPHAN 60 MG: 30 SUSPENSION, EXTENDED RELEASE ORAL at 21:32

## 2022-01-01 RX ADMIN — TIMOLOL MALEATE 1 DROP: 5 SOLUTION/ DROPS OPHTHALMIC at 08:28

## 2022-01-01 RX ADMIN — Medication 10 ML: at 08:28

## 2022-01-01 RX ADMIN — LORAZEPAM 0.5 MG: 2 INJECTION INTRAMUSCULAR; INTRAVENOUS at 23:41

## 2022-01-01 RX ADMIN — LATANOPROST 1 DROP: 50 SOLUTION OPHTHALMIC at 22:37

## 2022-01-01 RX ADMIN — TIMOLOL MALEATE 1 DROP: 5 SOLUTION/ DROPS OPHTHALMIC at 09:24

## 2022-01-01 RX ADMIN — DEXTROMETHORPHAN 60 MG: 30 SUSPENSION, EXTENDED RELEASE ORAL at 21:38

## 2022-01-01 RX ADMIN — INSULIN LISPRO 4 UNITS: 100 INJECTION, SOLUTION INTRAVENOUS; SUBCUTANEOUS at 17:53

## 2022-01-01 RX ADMIN — ATORVASTATIN CALCIUM 20 MG: 20 TABLET, FILM COATED ORAL at 20:38

## 2022-01-01 RX ADMIN — MIDODRINE HYDROCHLORIDE 10 MG: 10 TABLET ORAL at 11:41

## 2022-01-01 RX ADMIN — LORAZEPAM 0.5 MG: 2 INJECTION INTRAMUSCULAR; INTRAVENOUS at 11:47

## 2022-01-01 RX ADMIN — Medication 250 MG: at 11:09

## 2022-01-01 RX ADMIN — LATANOPROST 1 DROP: 50 SOLUTION OPHTHALMIC at 20:10

## 2022-01-01 RX ADMIN — ATORVASTATIN CALCIUM 20 MG: 20 TABLET, FILM COATED ORAL at 21:08

## 2022-01-01 RX ADMIN — SODIUM ZIRCONIUM CYCLOSILICATE 10 G: 10 POWDER, FOR SUSPENSION ORAL at 21:08

## 2022-01-01 RX ADMIN — HYDROMORPHONE HYDROCHLORIDE 0.25 MG: 1 INJECTION, SOLUTION INTRAMUSCULAR; INTRAVENOUS; SUBCUTANEOUS at 08:42

## 2022-01-01 RX ADMIN — LATANOPROST 1 DROP: 50 SOLUTION OPHTHALMIC at 21:01

## 2022-01-01 RX ADMIN — ATORVASTATIN CALCIUM 20 MG: 20 TABLET, FILM COATED ORAL at 21:25

## 2022-01-01 RX ADMIN — LORAZEPAM 0.5 MG: 2 INJECTION INTRAMUSCULAR; INTRAVENOUS at 04:09

## 2022-01-01 RX ADMIN — Medication 10 ML: at 20:28

## 2022-01-01 RX ADMIN — PANTOPRAZOLE SODIUM 40 MG: 40 TABLET, DELAYED RELEASE ORAL at 09:20

## 2022-01-01 RX ADMIN — DEXTROMETHORPHAN 60 MG: 30 SUSPENSION, EXTENDED RELEASE ORAL at 20:53

## 2022-01-01 RX ADMIN — INSULIN LISPRO 4 UNITS: 100 INJECTION, SOLUTION INTRAVENOUS; SUBCUTANEOUS at 12:42

## 2022-01-01 RX ADMIN — HYDROMORPHONE HYDROCHLORIDE 0.25 MG: 1 INJECTION, SOLUTION INTRAMUSCULAR; INTRAVENOUS; SUBCUTANEOUS at 03:11

## 2022-01-01 RX ADMIN — SEVELAMER CARBONATE 0.8 G: 800 POWDER, FOR SUSPENSION ORAL at 13:06

## 2022-01-01 RX ADMIN — TIMOLOL MALEATE 1 DROP: 5 SOLUTION/ DROPS OPHTHALMIC at 19:52

## 2022-01-01 RX ADMIN — HYDROCODONE BITARTRATE AND ACETAMINOPHEN 1 TABLET: 7.5; 325 TABLET ORAL at 17:33

## 2022-01-01 RX ADMIN — DEXTROMETHORPHAN 60 MG: 30 SUSPENSION, EXTENDED RELEASE ORAL at 08:58

## 2022-01-01 RX ADMIN — ATORVASTATIN CALCIUM 20 MG: 20 TABLET, FILM COATED ORAL at 21:37

## 2022-01-01 RX ADMIN — INSULIN LISPRO 3 UNITS: 100 INJECTION, SOLUTION INTRAVENOUS; SUBCUTANEOUS at 12:51

## 2022-01-01 RX ADMIN — EPOETIN ALFA-EPBX 10000 UNITS: 10000 INJECTION, SOLUTION INTRAVENOUS; SUBCUTANEOUS at 10:36

## 2022-01-01 RX ADMIN — APIXABAN 2.5 MG: 2.5 TABLET, FILM COATED ORAL at 21:06

## 2022-01-01 RX ADMIN — PANTOPRAZOLE SODIUM 40 MG: 40 TABLET, DELAYED RELEASE ORAL at 17:38

## 2022-01-01 RX ADMIN — DEXTROMETHORPHAN 60 MG: 30 SUSPENSION, EXTENDED RELEASE ORAL at 21:02

## 2022-01-01 RX ADMIN — HYDROMORPHONE HYDROCHLORIDE 0.25 MG: 1 INJECTION, SOLUTION INTRAMUSCULAR; INTRAVENOUS; SUBCUTANEOUS at 23:07

## 2022-01-01 RX ADMIN — LIDOCAINE 2 PATCH: 50 PATCH CUTANEOUS at 13:15

## 2022-01-01 RX ADMIN — OXYCODONE 5 MG: 5 TABLET ORAL at 09:28

## 2022-01-01 RX ADMIN — INSULIN LISPRO 4 UNITS: 100 INJECTION, SOLUTION INTRAVENOUS; SUBCUTANEOUS at 10:19

## 2022-01-01 RX ADMIN — ACETAMINOPHEN 500 MG: 500 TABLET ORAL at 08:27

## 2022-01-01 RX ADMIN — HEPARIN SODIUM 5000 UNITS: 5000 INJECTION INTRAVENOUS; SUBCUTANEOUS at 05:33

## 2022-01-01 RX ADMIN — INSULIN LISPRO 3 UNITS: 100 INJECTION, SOLUTION INTRAVENOUS; SUBCUTANEOUS at 11:55

## 2022-01-01 RX ADMIN — PANTOPRAZOLE SODIUM 40 MG: 40 TABLET, DELAYED RELEASE ORAL at 07:53

## 2022-01-01 RX ADMIN — BENZONATATE 100 MG: 100 CAPSULE ORAL at 04:12

## 2022-01-01 RX ADMIN — LEVOTHYROXINE SODIUM 112 MCG: 0.11 TABLET ORAL at 05:25

## 2022-01-01 RX ADMIN — BENZONATATE 100 MG: 100 CAPSULE ORAL at 18:03

## 2022-01-01 RX ADMIN — CASTOR OIL AND BALSAM, PERU 1 APPLICATION: 788; 87 OINTMENT TOPICAL at 17:53

## 2022-01-01 RX ADMIN — INSULIN LISPRO 4 UNITS: 100 INJECTION, SOLUTION INTRAVENOUS; SUBCUTANEOUS at 17:43

## 2022-01-01 RX ADMIN — ACETAMINOPHEN 325MG 650 MG: 325 TABLET ORAL at 09:52

## 2022-01-01 RX ADMIN — PANTOPRAZOLE SODIUM 40 MG: 40 TABLET, DELAYED RELEASE ORAL at 08:29

## 2022-01-01 RX ADMIN — MIDODRINE HYDROCHLORIDE 10 MG: 10 TABLET ORAL at 16:40

## 2022-01-01 RX ADMIN — LIDOCAINE 2 PATCH: 50 PATCH CUTANEOUS at 08:27

## 2022-01-01 RX ADMIN — EPOETIN ALFA-EPBX 10000 UNITS: 10000 INJECTION, SOLUTION INTRAVENOUS; SUBCUTANEOUS at 13:59

## 2022-01-01 RX ADMIN — Medication 10 ML: at 22:16

## 2022-01-01 RX ADMIN — Medication 10 MG: at 11:37

## 2022-01-01 RX ADMIN — CEFTRIAXONE 1 G: 1 INJECTION, POWDER, FOR SOLUTION INTRAMUSCULAR; INTRAVENOUS at 21:58

## 2022-01-01 RX ADMIN — CASTOR OIL AND BALSAM, PERU 1 APPLICATION: 788; 87 OINTMENT TOPICAL at 20:57

## 2022-01-01 RX ADMIN — LATANOPROST 1 DROP: 50 SOLUTION OPHTHALMIC at 20:37

## 2022-01-01 RX ADMIN — CARVEDILOL 3.12 MG: 3.12 TABLET, FILM COATED ORAL at 20:56

## 2022-01-01 RX ADMIN — ALBUMIN (HUMAN) 50 G: 0.25 INJECTION, SOLUTION INTRAVENOUS at 08:25

## 2022-01-01 RX ADMIN — SENNOSIDES AND DOCUSATE SODIUM 2 TABLET: 50; 8.6 TABLET ORAL at 20:27

## 2022-01-01 RX ADMIN — INSULIN LISPRO 4 UNITS: 100 INJECTION, SOLUTION INTRAVENOUS; SUBCUTANEOUS at 12:49

## 2022-01-01 RX ADMIN — PANTOPRAZOLE SODIUM 40 MG: 40 TABLET, DELAYED RELEASE ORAL at 05:52

## 2022-01-01 RX ADMIN — EPOETIN ALFA-EPBX 10000 UNITS: 10000 INJECTION, SOLUTION INTRAVENOUS; SUBCUTANEOUS at 12:51

## 2022-01-01 RX ADMIN — MIDODRINE HYDROCHLORIDE 10 MG: 10 TABLET ORAL at 17:12

## 2022-01-01 RX ADMIN — HYDROMORPHONE HYDROCHLORIDE 0.25 MG: 1 INJECTION, SOLUTION INTRAMUSCULAR; INTRAVENOUS; SUBCUTANEOUS at 07:49

## 2022-01-01 RX ADMIN — ATORVASTATIN CALCIUM 20 MG: 20 TABLET, FILM COATED ORAL at 20:33

## 2022-01-01 RX ADMIN — TIMOLOL MALEATE 1 DROP: 5 SOLUTION/ DROPS OPHTHALMIC at 20:08

## 2022-01-01 RX ADMIN — CASTOR OIL AND BALSAM, PERU 1 APPLICATION: 788; 87 OINTMENT TOPICAL at 21:14

## 2022-01-01 RX ADMIN — PANTOPRAZOLE SODIUM 40 MG: 40 TABLET, DELAYED RELEASE ORAL at 17:51

## 2022-01-01 RX ADMIN — LORAZEPAM 0.5 MG: 2 INJECTION INTRAMUSCULAR; INTRAVENOUS at 05:09

## 2022-01-01 RX ADMIN — DIPHENHYDRAMINE HYDROCHLORIDE 25 MG: 50 INJECTION, SOLUTION INTRAMUSCULAR; INTRAVENOUS at 18:31

## 2022-01-01 RX ADMIN — CASTOR OIL AND BALSAM, PERU 1 APPLICATION: 788; 87 OINTMENT TOPICAL at 21:01

## 2022-01-01 RX ADMIN — ACETAMINOPHEN 500 MG: 500 TABLET ORAL at 13:07

## 2022-01-01 RX ADMIN — PANTOPRAZOLE SODIUM 40 MG: 40 TABLET, DELAYED RELEASE ORAL at 16:40

## 2022-01-01 RX ADMIN — MIDODRINE HYDROCHLORIDE 10 MG: 10 TABLET ORAL at 17:25

## 2022-01-01 RX ADMIN — LEVOTHYROXINE SODIUM 112 MCG: 0.11 TABLET ORAL at 06:11

## 2022-01-01 RX ADMIN — SEVELAMER CARBONATE 0.8 G: 800 POWDER, FOR SUSPENSION ORAL at 17:12

## 2022-01-01 RX ADMIN — INSULIN LISPRO 3 UNITS: 100 INJECTION, SOLUTION INTRAVENOUS; SUBCUTANEOUS at 17:23

## 2022-01-01 RX ADMIN — MIDODRINE HYDROCHLORIDE 10 MG: 10 TABLET ORAL at 08:28

## 2022-01-01 RX ADMIN — CARVEDILOL 3.12 MG: 3.12 TABLET, FILM COATED ORAL at 17:15

## 2022-01-01 RX ADMIN — VANCOMYCIN HYDROCHLORIDE 1000 MG: 1 INJECTION, SOLUTION INTRAVENOUS at 17:44

## 2022-01-01 RX ADMIN — MIDODRINE HYDROCHLORIDE 10 MG: 10 TABLET ORAL at 09:20

## 2022-01-01 RX ADMIN — CARVEDILOL 3.12 MG: 3.12 TABLET, FILM COATED ORAL at 16:56

## 2022-01-01 RX ADMIN — Medication 250 MG: at 20:07

## 2022-01-01 RX ADMIN — BENZONATATE 100 MG: 100 CAPSULE ORAL at 12:49

## 2022-01-01 RX ADMIN — CARVEDILOL 3.12 MG: 3.12 TABLET, FILM COATED ORAL at 08:11

## 2022-01-01 RX ADMIN — SODIUM CHLORIDE 500 ML: 9 INJECTION, SOLUTION INTRAVENOUS at 23:12

## 2022-01-01 RX ADMIN — DEXTROMETHORPHAN 60 MG: 30 SUSPENSION, EXTENDED RELEASE ORAL at 08:48

## 2022-01-01 RX ADMIN — INSULIN LISPRO 2 UNITS: 100 INJECTION, SOLUTION INTRAVENOUS; SUBCUTANEOUS at 09:53

## 2022-01-01 RX ADMIN — LORAZEPAM 0.5 MG: 2 INJECTION INTRAMUSCULAR; INTRAVENOUS at 07:48

## 2022-01-01 RX ADMIN — ATORVASTATIN CALCIUM 20 MG: 20 TABLET, FILM COATED ORAL at 20:05

## 2022-01-01 RX ADMIN — LATANOPROST 1 DROP: 50 SOLUTION OPHTHALMIC at 21:22

## 2022-01-01 RX ADMIN — Medication 10 ML: at 20:11

## 2022-01-01 RX ADMIN — PANTOPRAZOLE SODIUM 40 MG: 40 TABLET, DELAYED RELEASE ORAL at 17:40

## 2022-01-01 RX ADMIN — LEVOTHYROXINE SODIUM 112 MCG: 0.11 TABLET ORAL at 06:05

## 2022-01-01 RX ADMIN — LEVOTHYROXINE SODIUM 112 MCG: 0.11 TABLET ORAL at 05:35

## 2022-01-01 RX ADMIN — LIDOCAINE 2 PATCH: 50 PATCH CUTANEOUS at 09:28

## 2022-01-01 RX ADMIN — DEXTROSE MONOHYDRATE 25 G: 25 INJECTION, SOLUTION INTRAVENOUS at 10:59

## 2022-01-01 RX ADMIN — INSULIN LISPRO 3 UNITS: 100 INJECTION, SOLUTION INTRAVENOUS; SUBCUTANEOUS at 17:03

## 2022-01-01 RX ADMIN — CARVEDILOL 3.12 MG: 3.12 TABLET, FILM COATED ORAL at 09:53

## 2022-01-01 RX ADMIN — Medication 10 ML: at 20:26

## 2022-01-01 RX ADMIN — CASTOR OIL AND BALSAM, PERU 1 APPLICATION: 788; 87 OINTMENT TOPICAL at 08:51

## 2022-01-01 RX ADMIN — PANTOPRAZOLE SODIUM 40 MG: 40 TABLET, DELAYED RELEASE ORAL at 08:27

## 2022-01-01 RX ADMIN — SEVELAMER CARBONATE 0.8 G: 800 POWDER, FOR SUSPENSION ORAL at 07:54

## 2022-01-01 RX ADMIN — HYDROMORPHONE HYDROCHLORIDE 0.25 MG: 1 INJECTION, SOLUTION INTRAMUSCULAR; INTRAVENOUS; SUBCUTANEOUS at 08:59

## 2022-01-01 RX ADMIN — HYDROMORPHONE HYDROCHLORIDE 0.25 MG: 1 INJECTION, SOLUTION INTRAMUSCULAR; INTRAVENOUS; SUBCUTANEOUS at 11:47

## 2022-01-01 RX ADMIN — TIMOLOL MALEATE 1 DROP: 5 SOLUTION/ DROPS OPHTHALMIC at 08:15

## 2022-01-01 RX ADMIN — LIDOCAINE HYDROCHLORIDE 50 MG: 10 INJECTION, SOLUTION INFILTRATION; PERINEURAL at 12:16

## 2022-01-01 RX ADMIN — SODIUM ZIRCONIUM CYCLOSILICATE 10 G: 10 POWDER, FOR SUSPENSION ORAL at 11:09

## 2022-01-01 RX ADMIN — PANTOPRAZOLE SODIUM 40 MG: 40 TABLET, DELAYED RELEASE ORAL at 17:31

## 2022-01-01 RX ADMIN — HYDROMORPHONE HYDROCHLORIDE 0.25 MG: 1 INJECTION, SOLUTION INTRAMUSCULAR; INTRAVENOUS; SUBCUTANEOUS at 19:45

## 2022-01-01 RX ADMIN — ATORVASTATIN CALCIUM 20 MG: 20 TABLET, FILM COATED ORAL at 01:11

## 2022-01-01 RX ADMIN — LATANOPROST 1 DROP: 50 SOLUTION OPHTHALMIC at 21:56

## 2022-01-01 RX ADMIN — LATANOPROST 1 DROP: 50 SOLUTION OPHTHALMIC at 20:54

## 2022-01-01 RX ADMIN — SODIUM ZIRCONIUM CYCLOSILICATE 10 G: 10 POWDER, FOR SUSPENSION ORAL at 20:07

## 2022-01-01 RX ADMIN — HEPARIN SODIUM 1600 UNITS: 1000 INJECTION, SOLUTION INTRAVENOUS; SUBCUTANEOUS at 10:36

## 2022-01-01 RX ADMIN — INSULIN LISPRO 2 UNITS: 100 INJECTION, SOLUTION INTRAVENOUS; SUBCUTANEOUS at 17:10

## 2022-01-01 RX ADMIN — ALBUMIN (HUMAN) 50 G: 0.25 INJECTION, SOLUTION INTRAVENOUS at 08:20

## 2022-01-01 RX ADMIN — INSULIN LISPRO 2 UNITS: 100 INJECTION, SOLUTION INTRAVENOUS; SUBCUTANEOUS at 17:31

## 2022-01-01 RX ADMIN — INSULIN LISPRO 4 UNITS: 100 INJECTION, SOLUTION INTRAVENOUS; SUBCUTANEOUS at 09:09

## 2022-01-01 RX ADMIN — INSULIN LISPRO 2 UNITS: 100 INJECTION, SOLUTION INTRAVENOUS; SUBCUTANEOUS at 17:25

## 2022-01-01 RX ADMIN — CASTOR OIL AND BALSAM, PERU 1 APPLICATION: 788; 87 OINTMENT TOPICAL at 10:21

## 2022-01-01 RX ADMIN — LIDOCAINE 2 PATCH: 50 PATCH CUTANEOUS at 11:37

## 2022-01-01 RX ADMIN — TIMOLOL MALEATE 1 DROP: 5 SOLUTION/ DROPS OPHTHALMIC at 20:54

## 2022-01-01 RX ADMIN — Medication 10 ML: at 08:34

## 2022-01-01 RX ADMIN — CASTOR OIL AND BALSAM, PERU 1 APPLICATION: 788; 87 OINTMENT TOPICAL at 08:56

## 2022-01-01 RX ADMIN — CASTOR OIL AND BALSAM, PERU 1 APPLICATION: 788; 87 OINTMENT TOPICAL at 08:37

## 2022-01-01 RX ADMIN — APIXABAN 2.5 MG: 2.5 TABLET, FILM COATED ORAL at 21:37

## 2022-01-01 RX ADMIN — IPRATROPIUM BROMIDE AND ALBUTEROL SULFATE 3 ML: .5; 2.5 SOLUTION RESPIRATORY (INHALATION) at 00:57

## 2022-01-01 RX ADMIN — PANTOPRAZOLE SODIUM 40 MG: 40 TABLET, DELAYED RELEASE ORAL at 17:45

## 2022-01-01 RX ADMIN — Medication 10 ML: at 12:52

## 2022-01-01 RX ADMIN — HYDROCODONE BITARTRATE AND ACETAMINOPHEN 1 TABLET: 7.5; 325 TABLET ORAL at 23:10

## 2022-01-01 RX ADMIN — PANTOPRAZOLE SODIUM 40 MG: 40 INJECTION, POWDER, FOR SOLUTION INTRAVENOUS at 08:51

## 2022-01-01 RX ADMIN — SODIUM CHLORIDE 1 G: 9 INJECTION, SOLUTION INTRAVENOUS at 18:11

## 2022-01-01 RX ADMIN — ACETAMINOPHEN 500 MG: 500 TABLET ORAL at 13:16

## 2022-01-01 RX ADMIN — TIMOLOL MALEATE 1 DROP: 5 SOLUTION/ DROPS OPHTHALMIC at 22:37

## 2022-01-01 RX ADMIN — Medication 5 MG: at 21:30

## 2022-01-01 RX ADMIN — SEVELAMER CARBONATE 0.8 G: 800 POWDER, FOR SUSPENSION ORAL at 17:39

## 2022-01-01 RX ADMIN — SODIUM ZIRCONIUM CYCLOSILICATE 10 G: 10 POWDER, FOR SUSPENSION ORAL at 08:28

## 2022-01-01 RX ADMIN — LEVOTHYROXINE SODIUM 112 MCG: 0.11 TABLET ORAL at 06:20

## 2022-01-01 RX ADMIN — SODIUM ZIRCONIUM CYCLOSILICATE 10 G: 10 POWDER, FOR SUSPENSION ORAL at 17:39

## 2022-01-01 RX ADMIN — LORAZEPAM 0.5 MG: 2 INJECTION INTRAMUSCULAR; INTRAVENOUS at 12:24

## 2022-01-01 RX ADMIN — ALBUMIN HUMAN 25 G: 0.25 SOLUTION INTRAVENOUS at 08:49

## 2022-01-01 RX ADMIN — MIDODRINE HYDROCHLORIDE 10 MG: 10 TABLET ORAL at 05:11

## 2022-01-01 RX ADMIN — ACETAMINOPHEN 650 MG: 325 TABLET ORAL at 01:53

## 2022-01-01 RX ADMIN — DIPHENHYDRAMINE HYDROCHLORIDE 25 MG: 50 INJECTION INTRAMUSCULAR; INTRAVENOUS at 21:51

## 2022-01-01 RX ADMIN — HYDROMORPHONE HYDROCHLORIDE 0.25 MG: 1 INJECTION, SOLUTION INTRAMUSCULAR; INTRAVENOUS; SUBCUTANEOUS at 17:33

## 2022-01-01 RX ADMIN — BISACODYL 10 MG: 10 SUPPOSITORY RECTAL at 12:08

## 2022-01-01 RX ADMIN — HYDROMORPHONE HYDROCHLORIDE 0.25 MG: 1 INJECTION, SOLUTION INTRAMUSCULAR; INTRAVENOUS; SUBCUTANEOUS at 21:35

## 2022-01-01 RX ADMIN — TIMOLOL MALEATE 1 DROP: 5 SOLUTION/ DROPS OPHTHALMIC at 09:10

## 2022-01-01 RX ADMIN — HYDROMORPHONE HYDROCHLORIDE 0.25 MG: 1 INJECTION, SOLUTION INTRAMUSCULAR; INTRAVENOUS; SUBCUTANEOUS at 00:10

## 2022-01-01 RX ADMIN — INSULIN LISPRO 4 UNITS: 100 INJECTION, SOLUTION INTRAVENOUS; SUBCUTANEOUS at 12:37

## 2022-01-01 RX ADMIN — LEVOTHYROXINE SODIUM 112 MCG: 0.11 TABLET ORAL at 06:03

## 2022-01-01 RX ADMIN — APIXABAN 2.5 MG: 2.5 TABLET, FILM COATED ORAL at 21:38

## 2022-01-01 RX ADMIN — APIXABAN 2.5 MG: 2.5 TABLET, FILM COATED ORAL at 20:07

## 2022-01-01 RX ADMIN — SEVELAMER CARBONATE 0.8 G: 800 POWDER, FOR SUSPENSION ORAL at 17:57

## 2022-01-01 RX ADMIN — LATANOPROST 1 DROP: 50 SOLUTION OPHTHALMIC at 21:33

## 2022-01-01 RX ADMIN — Medication 10 ML: at 09:53

## 2022-01-01 RX ADMIN — SODIUM CHLORIDE 1 G: 900 INJECTION INTRAVENOUS at 16:40

## 2022-01-01 RX ADMIN — APIXABAN 2.5 MG: 2.5 TABLET, FILM COATED ORAL at 09:08

## 2022-01-01 RX ADMIN — CARVEDILOL 3.12 MG: 3.12 TABLET, FILM COATED ORAL at 08:29

## 2022-01-01 RX ADMIN — APIXABAN 2.5 MG: 2.5 TABLET, FILM COATED ORAL at 20:53

## 2022-01-01 RX ADMIN — DEXTROMETHORPHAN 60 MG: 30 SUSPENSION, EXTENDED RELEASE ORAL at 10:19

## 2022-01-01 RX ADMIN — APIXABAN 2.5 MG: 2.5 TABLET, FILM COATED ORAL at 21:22

## 2022-01-01 RX ADMIN — LORAZEPAM 0.5 MG: 2 INJECTION INTRAMUSCULAR; INTRAVENOUS at 00:10

## 2022-01-01 RX ADMIN — TIMOLOL MALEATE 1 DROP: 5 SOLUTION/ DROPS OPHTHALMIC at 11:09

## 2022-01-01 RX ADMIN — CARVEDILOL 3.12 MG: 3.12 TABLET, FILM COATED ORAL at 17:25

## 2022-01-01 RX ADMIN — ALBUMIN HUMAN 12.5 G: 0.25 SOLUTION INTRAVENOUS at 14:31

## 2022-01-01 RX ADMIN — PHENYLEPHRINE HYDROCHLORIDE 100 MCG: 10 INJECTION INTRAVENOUS at 12:35

## 2022-01-01 RX ADMIN — Medication 10 ML: at 09:22

## 2022-01-01 RX ADMIN — OXYCODONE 5 MG: 5 TABLET ORAL at 12:20

## 2022-01-01 RX ADMIN — PANTOPRAZOLE SODIUM 40 MG: 40 TABLET, DELAYED RELEASE ORAL at 08:11

## 2022-01-01 RX ADMIN — LEVOTHYROXINE SODIUM 112 MCG: 0.11 TABLET ORAL at 06:31

## 2022-01-01 RX ADMIN — TIMOLOL MALEATE 1 DROP: 5 SOLUTION/ DROPS OPHTHALMIC at 08:34

## 2022-01-01 RX ADMIN — MIDODRINE HYDROCHLORIDE 10 MG: 10 TABLET ORAL at 08:27

## 2022-01-01 RX ADMIN — MIDODRINE HYDROCHLORIDE 10 MG: 10 TABLET ORAL at 11:54

## 2022-01-01 RX ADMIN — SEVELAMER CARBONATE 0.8 G: 800 POWDER, FOR SUSPENSION ORAL at 17:52

## 2022-01-01 RX ADMIN — DEXTROMETHORPHAN 60 MG: 30 SUSPENSION, EXTENDED RELEASE ORAL at 08:55

## 2022-01-01 RX ADMIN — INSULIN LISPRO 4 UNITS: 100 INJECTION, SOLUTION INTRAVENOUS; SUBCUTANEOUS at 12:06

## 2022-01-01 RX ADMIN — APIXABAN 2.5 MG: 2.5 TABLET, FILM COATED ORAL at 12:52

## 2022-01-01 RX ADMIN — Medication 10 ML: at 21:37

## 2022-01-01 RX ADMIN — CASTOR OIL AND BALSAM, PERU 1 APPLICATION: 788; 87 OINTMENT TOPICAL at 09:12

## 2022-01-01 RX ADMIN — MIDODRINE HYDROCHLORIDE 10 MG: 10 TABLET ORAL at 07:53

## 2022-01-01 RX ADMIN — SODIUM ZIRCONIUM CYCLOSILICATE 10 G: 10 POWDER, FOR SUSPENSION ORAL at 17:57

## 2022-01-01 RX ADMIN — ALBUMIN (HUMAN) 25 G: 0.25 INJECTION, SOLUTION INTRAVENOUS at 01:18

## 2022-01-01 RX ADMIN — ISOSORBIDE MONONITRATE 30 MG: 30 TABLET, EXTENDED RELEASE ORAL at 08:51

## 2022-01-01 RX ADMIN — APIXABAN 2.5 MG: 2.5 TABLET, FILM COATED ORAL at 12:45

## 2022-01-01 RX ADMIN — INSULIN LISPRO 2 UNITS: 100 INJECTION, SOLUTION INTRAVENOUS; SUBCUTANEOUS at 12:49

## 2022-01-01 RX ADMIN — LEVOTHYROXINE SODIUM 125 MCG: 125 TABLET ORAL at 05:33

## 2022-01-01 RX ADMIN — INSULIN LISPRO 4 UNITS: 100 INJECTION, SOLUTION INTRAVENOUS; SUBCUTANEOUS at 18:03

## 2022-01-01 RX ADMIN — TIMOLOL MALEATE 1 DROP: 5 SOLUTION/ DROPS OPHTHALMIC at 20:10

## 2022-01-01 RX ADMIN — Medication 10 ML: at 08:51

## 2022-01-01 RX ADMIN — INSULIN LISPRO 3 UNITS: 100 INJECTION, SOLUTION INTRAVENOUS; SUBCUTANEOUS at 17:51

## 2022-01-01 RX ADMIN — LATANOPROST 1 DROP: 50 SOLUTION OPHTHALMIC at 20:33

## 2022-01-01 RX ADMIN — Medication 10 ML: at 09:52

## 2022-01-01 RX ADMIN — TIMOLOL MALEATE 1 DROP: 5 SOLUTION/ DROPS OPHTHALMIC at 20:57

## 2022-01-01 RX ADMIN — GLYCOPYRROLATE 0.4 MG: 0.2 INJECTION INTRAMUSCULAR; INTRAVENOUS at 22:32

## 2022-01-01 RX ADMIN — ALBUMIN HUMAN 12.5 G: 0.25 SOLUTION INTRAVENOUS at 15:16

## 2022-01-01 RX ADMIN — HYDROMORPHONE HYDROCHLORIDE 0.25 MG: 1 INJECTION, SOLUTION INTRAMUSCULAR; INTRAVENOUS; SUBCUTANEOUS at 13:19

## 2022-01-01 RX ADMIN — LATANOPROST 1 DROP: 50 SOLUTION OPHTHALMIC at 21:06

## 2022-01-01 RX ADMIN — PROPOFOL 50 MG: 10 INJECTION, EMULSION INTRAVENOUS at 12:26

## 2022-01-01 RX ADMIN — APIXABAN 2.5 MG: 2.5 TABLET, FILM COATED ORAL at 08:53

## 2022-01-01 RX ADMIN — GLYCOPYRROLATE 0.4 MG: 0.2 INJECTION INTRAMUSCULAR; INTRAVENOUS at 04:57

## 2022-01-01 RX ADMIN — PANTOPRAZOLE SODIUM 40 MG: 40 TABLET, DELAYED RELEASE ORAL at 17:25

## 2022-01-01 RX ADMIN — PANTOPRAZOLE SODIUM 40 MG: 40 TABLET, DELAYED RELEASE ORAL at 09:08

## 2022-01-01 RX ADMIN — HYDROMORPHONE HYDROCHLORIDE 0.25 MG: 1 INJECTION, SOLUTION INTRAMUSCULAR; INTRAVENOUS; SUBCUTANEOUS at 12:08

## 2022-01-01 RX ADMIN — Medication 10 ML: at 21:38

## 2022-01-01 RX ADMIN — HYDROMORPHONE HYDROCHLORIDE 0.25 MG: 1 INJECTION, SOLUTION INTRAMUSCULAR; INTRAVENOUS; SUBCUTANEOUS at 11:49

## 2022-01-01 RX ADMIN — LEVOTHYROXINE SODIUM 112 MCG: 0.11 TABLET ORAL at 05:36

## 2022-01-01 RX ADMIN — TIMOLOL MALEATE 1 DROP: 5 SOLUTION/ DROPS OPHTHALMIC at 08:58

## 2022-01-01 RX ADMIN — HYDROMORPHONE HYDROCHLORIDE 0.25 MG: 1 INJECTION, SOLUTION INTRAMUSCULAR; INTRAVENOUS; SUBCUTANEOUS at 20:12

## 2022-01-01 RX ADMIN — INSULIN LISPRO 4 UNITS: 100 INJECTION, SOLUTION INTRAVENOUS; SUBCUTANEOUS at 17:51

## 2022-01-01 RX ADMIN — DEXTROMETHORPHAN 60 MG: 30 SUSPENSION, EXTENDED RELEASE ORAL at 08:38

## 2022-01-01 RX ADMIN — LEVOTHYROXINE SODIUM 112 MCG: 0.11 TABLET ORAL at 05:45

## 2022-01-01 RX ADMIN — POLYETHYLENE GLYCOL 3350, SODIUM SULFATE, SODIUM CHLORIDE, POTASSIUM CHLORIDE, SODIUM ASCORBATE, AND ASCORBIC ACID 1000 ML: KIT at 18:52

## 2022-01-01 RX ADMIN — CARVEDILOL 3.12 MG: 3.12 TABLET, FILM COATED ORAL at 17:31

## 2022-01-01 RX ADMIN — PROPOFOL 50 MG: 10 INJECTION, EMULSION INTRAVENOUS at 12:22

## 2022-01-01 RX ADMIN — PANTOPRAZOLE SODIUM 40 MG: 40 TABLET, DELAYED RELEASE ORAL at 17:52

## 2022-01-01 RX ADMIN — Medication 10 ML: at 10:19

## 2022-01-01 RX ADMIN — CARVEDILOL 3.12 MG: 3.12 TABLET, FILM COATED ORAL at 18:03

## 2022-01-01 RX ADMIN — Medication 10 ML: at 20:38

## 2022-01-01 RX ADMIN — BENZONATATE 100 MG: 100 CAPSULE ORAL at 06:33

## 2022-01-01 RX ADMIN — TIMOLOL MALEATE 1 DROP: 5 SOLUTION/ DROPS OPHTHALMIC at 08:50

## 2022-01-01 RX ADMIN — PANTOPRAZOLE SODIUM 40 MG: 40 TABLET, DELAYED RELEASE ORAL at 17:43

## 2022-01-01 RX ADMIN — ONDANSETRON 4 MG: 2 INJECTION INTRAMUSCULAR; INTRAVENOUS at 17:58

## 2022-01-01 RX ADMIN — PANTOPRAZOLE SODIUM 40 MG: 40 TABLET, DELAYED RELEASE ORAL at 05:45

## 2022-01-01 RX ADMIN — TIMOLOL MALEATE 1 DROP: 5 SOLUTION/ DROPS OPHTHALMIC at 20:37

## 2022-01-01 RX ADMIN — TIMOLOL MALEATE 1 DROP: 5 SOLUTION/ DROPS OPHTHALMIC at 08:40

## 2022-01-01 RX ADMIN — PANTOPRAZOLE SODIUM 40 MG: 40 TABLET, DELAYED RELEASE ORAL at 17:55

## 2022-01-01 RX ADMIN — PANTOPRAZOLE SODIUM 40 MG: 40 TABLET, DELAYED RELEASE ORAL at 18:06

## 2022-01-01 RX ADMIN — MIDODRINE HYDROCHLORIDE 10 MG: 10 TABLET ORAL at 07:51

## 2022-01-01 RX ADMIN — PANTOPRAZOLE SODIUM 40 MG: 40 TABLET, DELAYED RELEASE ORAL at 08:38

## 2022-01-01 RX ADMIN — TIMOLOL MALEATE 1 DROP: 5 SOLUTION/ DROPS OPHTHALMIC at 08:57

## 2022-01-01 RX ADMIN — MIDODRINE HYDROCHLORIDE 10 MG: 10 TABLET ORAL at 11:18

## 2022-01-01 RX ADMIN — CARVEDILOL 3.12 MG: 3.12 TABLET, FILM COATED ORAL at 19:51

## 2022-01-01 RX ADMIN — TIMOLOL MALEATE 1 DROP: 5 SOLUTION/ DROPS OPHTHALMIC at 08:30

## 2022-01-01 RX ADMIN — Medication 10 ML: at 08:15

## 2022-01-01 RX ADMIN — PANTOPRAZOLE SODIUM 40 MG: 40 TABLET, DELAYED RELEASE ORAL at 16:55

## 2022-01-01 RX ADMIN — SODIUM CHLORIDE 1 G: 900 INJECTION INTRAVENOUS at 17:38

## 2022-01-01 RX ADMIN — INSULIN LISPRO 2 UNITS: 100 INJECTION, SOLUTION INTRAVENOUS; SUBCUTANEOUS at 18:03

## 2022-01-01 RX ADMIN — Medication 5 MG: at 21:22

## 2022-01-01 RX ADMIN — PHENYLEPHRINE HYDROCHLORIDE 100 MCG: 10 INJECTION INTRAVENOUS at 12:38

## 2022-01-01 RX ADMIN — LEVOTHYROXINE SODIUM 112 MCG: 0.11 TABLET ORAL at 04:12

## 2022-01-01 RX ADMIN — PANTOPRAZOLE SODIUM 40 MG: 40 TABLET, DELAYED RELEASE ORAL at 17:33

## 2022-01-01 RX ADMIN — SODIUM CHLORIDE 500 ML: 9 INJECTION, SOLUTION INTRAVENOUS at 21:38

## 2022-01-01 RX ADMIN — APIXABAN 2.5 MG: 2.5 TABLET, FILM COATED ORAL at 08:48

## 2022-01-01 RX ADMIN — INSULIN LISPRO 4 UNITS: 100 INJECTION, SOLUTION INTRAVENOUS; SUBCUTANEOUS at 12:10

## 2022-01-01 RX ADMIN — DEXTROMETHORPHAN 60 MG: 30 SUSPENSION, EXTENDED RELEASE ORAL at 20:56

## 2022-01-01 RX ADMIN — LORAZEPAM 0.5 MG: 2 INJECTION INTRAMUSCULAR; INTRAVENOUS at 16:17

## 2022-01-01 RX ADMIN — Medication 250 MG: at 20:26

## 2022-01-01 RX ADMIN — Medication 10 ML: at 09:43

## 2022-01-01 RX ADMIN — PANTOPRAZOLE SODIUM 40 MG: 40 TABLET, DELAYED RELEASE ORAL at 06:02

## 2022-01-01 RX ADMIN — CASTOR OIL AND BALSAM, PERU 1 APPLICATION: 788; 87 OINTMENT TOPICAL at 08:47

## 2022-01-01 RX ADMIN — LATANOPROST 1 DROP: 50 SOLUTION OPHTHALMIC at 20:27

## 2022-01-01 RX ADMIN — INSULIN LISPRO 4 UNITS: 100 INJECTION, SOLUTION INTRAVENOUS; SUBCUTANEOUS at 08:47

## 2022-01-01 RX ADMIN — TIMOLOL MALEATE 1 DROP: 5 SOLUTION/ DROPS OPHTHALMIC at 21:22

## 2022-01-01 RX ADMIN — SODIUM CHLORIDE 1 G: 900 INJECTION INTRAVENOUS at 16:29

## 2022-01-01 RX ADMIN — PANTOPRAZOLE SODIUM 40 MG: 40 TABLET, DELAYED RELEASE ORAL at 08:58

## 2022-01-01 RX ADMIN — APIXABAN 2.5 MG: 2.5 TABLET, FILM COATED ORAL at 20:05

## 2022-01-01 RX ADMIN — Medication 10 ML: at 21:32

## 2022-01-01 RX ADMIN — INSULIN LISPRO 4 UNITS: 100 INJECTION, SOLUTION INTRAVENOUS; SUBCUTANEOUS at 08:39

## 2022-01-01 RX ADMIN — INSULIN LISPRO 4 UNITS: 100 INJECTION, SOLUTION INTRAVENOUS; SUBCUTANEOUS at 08:52

## 2022-01-01 RX ADMIN — ONDANSETRON 4 MG: 2 INJECTION INTRAMUSCULAR; INTRAVENOUS at 11:06

## 2022-01-01 RX ADMIN — Medication 250 MG: at 21:08

## 2022-01-01 RX ADMIN — INSULIN LISPRO 2 UNITS: 100 INJECTION, SOLUTION INTRAVENOUS; SUBCUTANEOUS at 08:15

## 2022-01-01 RX ADMIN — PANTOPRAZOLE SODIUM 40 MG: 40 TABLET, DELAYED RELEASE ORAL at 17:44

## 2022-01-01 RX ADMIN — LATANOPROST 1 DROP: 50 SOLUTION OPHTHALMIC at 01:10

## 2022-01-01 RX ADMIN — LATANOPROST 1 DROP: 50 SOLUTION OPHTHALMIC at 21:30

## 2022-01-01 RX ADMIN — CEFTRIAXONE 1 G: 1 INJECTION, POWDER, FOR SOLUTION INTRAMUSCULAR; INTRAVENOUS at 21:33

## 2022-01-01 RX ADMIN — SODIUM CHLORIDE 500 ML: 9 INJECTION, SOLUTION INTRAVENOUS at 19:55

## 2022-01-01 RX ADMIN — TIMOLOL MALEATE 1 DROP: 5 SOLUTION/ DROPS OPHTHALMIC at 20:53

## 2022-01-01 RX ADMIN — ATORVASTATIN CALCIUM 20 MG: 20 TABLET, FILM COATED ORAL at 21:31

## 2022-01-01 RX ADMIN — POLYETHYLENE GLYCOL 3350, SODIUM SULFATE, SODIUM CHLORIDE, POTASSIUM CHLORIDE, SODIUM ASCORBATE, AND ASCORBIC ACID 1000 ML: KIT at 04:13

## 2022-01-01 RX ADMIN — ACETAMINOPHEN 500 MG: 500 TABLET ORAL at 20:07

## 2022-01-01 RX ADMIN — DEXTROMETHORPHAN 60 MG: 30 SUSPENSION, EXTENDED RELEASE ORAL at 12:52

## 2022-01-01 RX ADMIN — EPOETIN ALFA-EPBX 10000 UNITS: 10000 INJECTION, SOLUTION INTRAVENOUS; SUBCUTANEOUS at 10:31

## 2022-01-01 RX ADMIN — HYDROMORPHONE HYDROCHLORIDE 0.5 MG: 1 INJECTION, SOLUTION INTRAMUSCULAR; INTRAVENOUS; SUBCUTANEOUS at 03:22

## 2022-01-01 RX ADMIN — SODIUM CHLORIDE: 9 INJECTION, SOLUTION INTRAVENOUS at 12:14

## 2022-01-01 RX ADMIN — PROPOFOL 50 MG: 10 INJECTION, EMULSION INTRAVENOUS at 12:16

## 2022-01-01 RX ADMIN — APIXABAN 2.5 MG: 2.5 TABLET, FILM COATED ORAL at 09:51

## 2022-01-01 RX ADMIN — ATORVASTATIN CALCIUM 20 MG: 20 TABLET, FILM COATED ORAL at 22:16

## 2022-01-01 RX ADMIN — PANTOPRAZOLE SODIUM 40 MG: 40 INJECTION, POWDER, FOR SOLUTION INTRAVENOUS at 21:30

## 2022-01-01 RX ADMIN — DEXTROMETHORPHAN 60 MG: 30 SUSPENSION, EXTENDED RELEASE ORAL at 21:13

## 2022-01-01 RX ADMIN — LATANOPROST 1 DROP: 50 SOLUTION OPHTHALMIC at 21:38

## 2022-01-01 RX ADMIN — HEPARIN SODIUM 1600 UNITS: 1000 INJECTION INTRAVENOUS; SUBCUTANEOUS at 10:27

## 2022-01-01 RX ADMIN — CARVEDILOL 3.12 MG: 3.12 TABLET, FILM COATED ORAL at 08:48

## 2022-01-01 RX ADMIN — INSULIN LISPRO 4 UNITS: 100 INJECTION, SOLUTION INTRAVENOUS; SUBCUTANEOUS at 12:01

## 2022-01-01 RX ADMIN — DEXTROMETHORPHAN 60 MG: 30 SUSPENSION, EXTENDED RELEASE ORAL at 12:50

## 2022-01-01 RX ADMIN — Medication 10 ML: at 17:54

## 2022-01-01 RX ADMIN — CASTOR OIL AND BALSAM, PERU 1 APPLICATION: 788; 87 OINTMENT TOPICAL at 21:07

## 2022-01-01 RX ADMIN — ALBUMIN (HUMAN) 25 G: 12.5 SOLUTION INTRAVENOUS at 08:08

## 2022-01-01 RX ADMIN — DEXTROMETHORPHAN 60 MG: 30 SUSPENSION, EXTENDED RELEASE ORAL at 08:52

## 2022-01-01 RX ADMIN — HYDROMORPHONE HYDROCHLORIDE 0.25 MG: 1 INJECTION, SOLUTION INTRAMUSCULAR; INTRAVENOUS; SUBCUTANEOUS at 04:09

## 2022-01-01 RX ADMIN — Medication 10 ML: at 08:39

## 2022-01-01 RX ADMIN — HYDROMORPHONE HYDROCHLORIDE 0.25 MG: 1 INJECTION, SOLUTION INTRAMUSCULAR; INTRAVENOUS; SUBCUTANEOUS at 08:53

## 2022-01-01 RX ADMIN — HYDROMORPHONE HYDROCHLORIDE 0.5 MG: 1 INJECTION, SOLUTION INTRAMUSCULAR; INTRAVENOUS; SUBCUTANEOUS at 23:47

## 2022-01-01 RX ADMIN — ALBUMIN HUMAN 25 G: 0.25 SOLUTION INTRAVENOUS at 08:08

## 2022-01-01 RX ADMIN — PANTOPRAZOLE SODIUM 40 MG: 40 INJECTION, POWDER, FOR SOLUTION INTRAVENOUS at 22:16

## 2022-01-01 RX ADMIN — BENZONATATE 100 MG: 100 CAPSULE ORAL at 05:34

## 2022-01-01 RX ADMIN — ALBUMIN (HUMAN) 25 G: 0.25 INJECTION, SOLUTION INTRAVENOUS at 08:45

## 2022-01-01 RX ADMIN — INSULIN LISPRO 2 UNITS: 100 INJECTION, SOLUTION INTRAVENOUS; SUBCUTANEOUS at 17:41

## 2022-01-01 RX ADMIN — Medication 10 MG: at 08:53

## 2022-01-01 RX ADMIN — ALBUMIN (HUMAN) 25 G: 0.25 INJECTION, SOLUTION INTRAVENOUS at 12:52

## 2022-01-01 RX ADMIN — Medication 5 MG: at 21:25

## 2022-01-01 RX ADMIN — CARVEDILOL 3.12 MG: 3.12 TABLET, FILM COATED ORAL at 09:51

## 2022-01-01 RX ADMIN — SEVELAMER CARBONATE 0.8 G: 800 POWDER, FOR SUSPENSION ORAL at 19:19

## 2022-01-01 RX ADMIN — PANTOPRAZOLE SODIUM 40 MG: 40 TABLET, DELAYED RELEASE ORAL at 13:16

## 2022-01-01 RX ADMIN — Medication 10 ML: at 21:02

## 2022-01-01 RX ADMIN — ATORVASTATIN CALCIUM 20 MG: 20 TABLET, FILM COATED ORAL at 21:02

## 2022-01-01 RX ADMIN — LORAZEPAM 0.5 MG: 2 INJECTION INTRAMUSCULAR; INTRAVENOUS at 04:50

## 2022-01-01 RX ADMIN — SODIUM ZIRCONIUM CYCLOSILICATE 10 G: 10 POWDER, FOR SUSPENSION ORAL at 08:29

## 2022-01-01 RX ADMIN — PANTOPRAZOLE SODIUM 40 MG: 40 INJECTION, POWDER, FOR SOLUTION INTRAVENOUS at 08:56

## 2022-01-01 RX ADMIN — TIMOLOL MALEATE 1 DROP: 5 SOLUTION/ DROPS OPHTHALMIC at 08:51

## 2022-01-01 RX ADMIN — ONDANSETRON 4 MG: 2 INJECTION INTRAMUSCULAR; INTRAVENOUS at 09:23

## 2022-01-01 RX ADMIN — TIMOLOL MALEATE 1 DROP: 5 SOLUTION/ DROPS OPHTHALMIC at 12:45

## 2022-01-01 RX ADMIN — PANTOPRAZOLE SODIUM 40 MG: 40 TABLET, DELAYED RELEASE ORAL at 11:09

## 2022-01-01 RX ADMIN — Medication 10 ML: at 21:44

## 2022-01-01 RX ADMIN — ATORVASTATIN CALCIUM 20 MG: 20 TABLET, FILM COATED ORAL at 21:06

## 2022-01-01 RX ADMIN — INSULIN LISPRO 2 UNITS: 100 INJECTION, SOLUTION INTRAVENOUS; SUBCUTANEOUS at 12:48

## 2022-01-01 RX ADMIN — SODIUM ZIRCONIUM CYCLOSILICATE 10 G: 10 POWDER, FOR SUSPENSION ORAL at 17:33

## 2022-01-01 RX ADMIN — SENNOSIDES AND DOCUSATE SODIUM 2 TABLET: 50; 8.6 TABLET ORAL at 21:22

## 2022-01-01 RX ADMIN — HYDROMORPHONE HYDROCHLORIDE 0.25 MG: 1 INJECTION, SOLUTION INTRAMUSCULAR; INTRAVENOUS; SUBCUTANEOUS at 20:18

## 2022-01-01 RX ADMIN — Medication 10 ML: at 08:12

## 2022-01-01 RX ADMIN — TIMOLOL MALEATE 1 DROP: 5 SOLUTION/ DROPS OPHTHALMIC at 09:12

## 2022-01-01 RX ADMIN — LEVOTHYROXINE SODIUM 112 MCG: 0.11 TABLET ORAL at 06:33

## 2022-01-01 RX ADMIN — GRANULES FOR ORAL SOLUTION 3 G: 3 POWDER ORAL at 19:45

## 2022-01-01 RX ADMIN — LEVOTHYROXINE SODIUM 112 MCG: 0.11 TABLET ORAL at 05:26

## 2022-01-01 RX ADMIN — CARVEDILOL 3.12 MG: 3.12 TABLET, FILM COATED ORAL at 09:08

## 2022-01-01 RX ADMIN — TIMOLOL MALEATE 1 DROP: 5 SOLUTION/ DROPS OPHTHALMIC at 20:27

## 2022-01-01 RX ADMIN — LORAZEPAM 0.5 MG: 2 INJECTION INTRAMUSCULAR; INTRAVENOUS at 17:38

## 2022-01-01 RX ADMIN — PANTOPRAZOLE SODIUM 40 MG: 40 TABLET, DELAYED RELEASE ORAL at 18:03

## 2022-01-01 RX ADMIN — SODIUM ZIRCONIUM CYCLOSILICATE 10 G: 10 POWDER, FOR SUSPENSION ORAL at 16:40

## 2022-01-01 RX ADMIN — PANTOPRAZOLE SODIUM 40 MG: 40 TABLET, DELAYED RELEASE ORAL at 17:12

## 2022-01-01 RX ADMIN — PANTOPRAZOLE SODIUM 40 MG: 40 TABLET, DELAYED RELEASE ORAL at 16:30

## 2022-01-01 RX ADMIN — Medication 250 MG: at 01:11

## 2022-01-01 RX ADMIN — SODIUM CHLORIDE, POTASSIUM CHLORIDE, SODIUM LACTATE AND CALCIUM CHLORIDE 1000 ML: 600; 310; 30; 20 INJECTION, SOLUTION INTRAVENOUS at 19:31

## 2022-01-01 RX ADMIN — INSULIN LISPRO 4 UNITS: 100 INJECTION, SOLUTION INTRAVENOUS; SUBCUTANEOUS at 08:11

## 2022-01-01 RX ADMIN — ATORVASTATIN CALCIUM 20 MG: 20 TABLET, FILM COATED ORAL at 20:26

## 2022-01-01 RX ADMIN — HYDROMORPHONE HYDROCHLORIDE 0.25 MG: 1 INJECTION, SOLUTION INTRAMUSCULAR; INTRAVENOUS; SUBCUTANEOUS at 00:56

## 2022-01-01 RX ADMIN — POTASSIUM CHLORIDE 40 MEQ: 1.5 POWDER, FOR SOLUTION ORAL at 01:17

## 2022-01-01 RX ADMIN — HYDROMORPHONE HYDROCHLORIDE 0.25 MG: 1 INJECTION, SOLUTION INTRAMUSCULAR; INTRAVENOUS; SUBCUTANEOUS at 03:40

## 2022-01-01 RX ADMIN — APIXABAN 2.5 MG: 2.5 TABLET, FILM COATED ORAL at 10:19

## 2022-01-01 RX ADMIN — SEVELAMER CARBONATE 0.8 G: 800 POWDER, FOR SUSPENSION ORAL at 11:09

## 2022-01-01 RX ADMIN — HYDROMORPHONE HYDROCHLORIDE 0.25 MG: 1 INJECTION, SOLUTION INTRAMUSCULAR; INTRAVENOUS; SUBCUTANEOUS at 21:44

## 2022-01-01 RX ADMIN — PANTOPRAZOLE SODIUM 40 MG: 40 INJECTION, POWDER, FOR SOLUTION INTRAVENOUS at 09:43

## 2022-01-01 RX ADMIN — GLYCOPYRROLATE 0.4 MG: 0.2 INJECTION INTRAMUSCULAR; INTRAVENOUS at 12:34

## 2022-01-01 RX ADMIN — BENZONATATE 100 MG: 100 CAPSULE ORAL at 17:28

## 2022-01-01 RX ADMIN — APIXABAN 2.5 MG: 2.5 TABLET, FILM COATED ORAL at 21:32

## 2022-01-01 RX ADMIN — Medication 0.02 MCG/KG/MIN: at 15:00

## 2022-01-01 RX ADMIN — CASTOR OIL AND BALSAM, PERU 1 APPLICATION: 788; 87 OINTMENT TOPICAL at 12:53

## 2022-01-01 RX ADMIN — Medication 10 ML: at 20:33

## 2022-01-01 RX ADMIN — HYDROMORPHONE HYDROCHLORIDE 0.25 MG: 1 INJECTION, SOLUTION INTRAMUSCULAR; INTRAVENOUS; SUBCUTANEOUS at 08:26

## 2022-01-01 RX ADMIN — APIXABAN 2.5 MG: 2.5 TABLET, FILM COATED ORAL at 08:58

## 2022-01-01 RX ADMIN — PANTOPRAZOLE SODIUM 40 MG: 40 INJECTION, POWDER, FOR SOLUTION INTRAVENOUS at 21:25

## 2022-01-01 RX ADMIN — HYDROMORPHONE HYDROCHLORIDE 0.25 MG: 1 INJECTION, SOLUTION INTRAMUSCULAR; INTRAVENOUS; SUBCUTANEOUS at 12:24

## 2022-01-01 RX ADMIN — SODIUM CHLORIDE 75 ML/HR: 9 INJECTION, SOLUTION INTRAVENOUS at 04:27

## 2022-01-01 RX ADMIN — Medication 250 MG: at 09:20

## 2022-01-01 RX ADMIN — ACETAMINOPHEN 325MG 650 MG: 325 TABLET ORAL at 19:49

## 2022-01-14 NOTE — PROGRESS NOTES
"              Internal Medicine Acute Visit     Patient Name: Karla Cristobal  : 1935   MRN: [id number]     Chief Complaint:    Chief Complaint   Patient presents with   • Difficulty Urinating     UTI       History of Present Illness: Karla Cristobal is a 86 y.o. female who presents for increased frequency and urgency of urination  As well as a \"funny feeling\" while urinating that she would not describe as dysuria. She denies fevers, chills, nausea, hematuria, flank pain, suprapubic pain. Symptoms began yesterday. She is allergic to Bactrim. She reports that she is typically treated with ciprofloxacin with improvement in symptoms.     Subjective     I have reviewed and the following portions of the patient's history were updated as appropriate: past family history, past medical history, past social history, past surgical history and problem list.    Allergies:   Allergies   Allergen Reactions   • Bactrim [Sulfamethoxazole-Trimethoprim] GI Intolerance   • Lisinopril Cough   • Codeine Rash     Objective     Physical Exam:  Vital Signs:   Vitals:    22 1405   BP: 120/80   Pulse: 79   Temp: 97 °F (36.1 °C)   SpO2: 96%   Weight: 86.2 kg (190 lb)   Height: 162.6 cm (64\")   PainSc: 0-No pain     Body mass index is 32.61 kg/m².    Physical Exam  Vitals and nursing note reviewed.   Constitutional:       Appearance: Normal appearance. She is not ill-appearing.   Cardiovascular:      Rate and Rhythm: Normal rate and regular rhythm.      Heart sounds: Normal heart sounds.   Pulmonary:      Effort: Pulmonary effort is normal.      Breath sounds: Normal breath sounds. No wheezing, rhonchi or rales.   Abdominal:      General: Abdomen is flat.      Palpations: Abdomen is soft.      Tenderness: There is no abdominal tenderness. There is no right CVA tenderness or left CVA tenderness.   Skin:     General: Skin is warm and dry.   Neurological:      Mental Status: She is alert.   Psychiatric:         Mood and Affect: Mood " normal.         Behavior: Behavior normal.       Assessment / Plan      Assessment/Plan:   Diagnoses and all orders for this visit:    1. Acute cystitis with hematuria (Primary)  Acute illness with systemic symptoms including increased frequency and urgency of urination and dysuria. POC urinalysis consistent with UTI (leaks and blood, though patient denies overt hematuria). Reviewed prior urine culture from 05/02/2021 which was positive for Klebsiella. Will prescribe Ciprofloxacin 250 mg BID for 7 days (typically not my first line treatment but patient reports this is the typical antibiotic that she uses that improves symptoms).     Follow Up:   Return for Next scheduled follow up.    Time:   I spent approximately 15 minutes providing clinical care for this patient; including review of patient's chart and provider documentation, face to face time spent with patient in examination room (obtaining history, performing physical exam, discussing diagnosis and management options), placing orders, and completing patient documentation.     One acute illness with systemic symptoms and prescription drug management completed consistent with moderate MDM.     Addendum (01/17/2022): Urine culture grew mixed urogenital sebas with recommendation to repeat collection if identifying a bacteria is needed.  Unlikely to be useful given antibiotic treatment.    Edelmira Milian MD  [facility]

## 2022-01-18 NOTE — OUTREACH NOTE
Ambulatory Case Management Note    Patient Outreach    Attempted to f/u with pt once Home Health was completed.  4 attempts were made and all 4 attempts were unsuccessful.  Was able to leave message with RN-ACM contact information on all attempts.       Of note, she has had appts with Cardiologist 12/16/21 and with PCP office 1/14/22.      Tiffany Fuentes RN  Ambulatory Case Management    1/18/2022, 13:08 EST

## 2022-03-03 NOTE — TELEPHONE ENCOUNTER
Last Office Visit: 09/08/21  Next Office Visit:03/09/22    Labs completed in past 6 months? yes  Labs completed in past year? yes    Last Refill Date: 01/21/22  Quantity:30  Refills:0    Pharmacy:     Please review pended refill request for any changes needed on refills or quantities. Thank you!

## 2022-03-22 NOTE — PROGRESS NOTES
"Chief Complaint   Patient presents with   • Insomnia     Follow Up   • Hyperlipidemia   • Hypertension       Subjective     Karla Cristobal is a 86 y.o. female.        History of Present Illness     Pt is wanting to make sure she has refills of her maintenance meds before she has a procedure for her mitral valve at the end of April.     She takes lunesta for sleep, takes it nightly. It works well to allow her to sleep through the night. She does get up at night to use the bathroom, uses her walker. No falls.     Has an abrasion on her left shin. Daughter has been putting ointment and bandages on the wound. Has flaky skin that may have been scraped.      Current Outpatient Medications:   •  atorvastatin (LIPITOR) 20 MG tablet, Take 1 tablet by mouth Every Night., Disp: 90 tablet, Rfl: 1  •  AZO-CRANBERRY PO, Take  by mouth 2 (Two) Times a Day., Disp: , Rfl:   •  carvedilol (COREG) 25 MG tablet, Take 0.5 tablets by mouth 2 (Two) Times a Day With Meals. (Patient taking differently: Take 6.25 mg by mouth 2 (Two) Times a Day With Meals.), Disp: 30 tablet, Rfl: 11  •  cholecalciferol (VITAMIN D3) 1000 UNITS tablet, Take 1,000 Units by mouth Daily., Disp: , Rfl:   •  Eliquis 2.5 MG tablet tablet, , Disp: , Rfl:   •  eszopiclone (LUNESTA) 2 MG tablet, TAKE ONE TABLET BY MOUTH AT BEDTIME AS NEEDED, Disp: 30 tablet, Rfl: 0  •  Ferrous Sulfate (IRON PO), Take 1 tablet by mouth Every Other Day., Disp: , Rfl:   •  furosemide (LASIX) 40 MG tablet, Take 1 tablet by mouth Every Other Day., Disp: , Rfl:   •  indapamide (LOZOL) 2.5 MG tablet, , Disp: , Rfl:   •  Insulin Glargine (BASAGLAR KWIKPEN) 100 UNIT/ML injection pen, INJECT 20 UNITS UNDER THE SKIN ONCE DAILY AS DIRECTED, Disp: 15 mL, Rfl: 1  •  Insulin Pen Needle (Unifine Pentips Plus) 31G X 8 MM misc, Use 4 per day to administer insulin, Disp: 400 each, Rfl: 1  •  Insulin Syringe-Needle U-100 (TRUEplus Insulin Syringe) 31G X 5/16\" 0.5 ML misc, USE WITH INSULIN 3 TIMES DAILY- " needs appointment prior to further refills, Disp: 100 each, Rfl: 0  •  isosorbide mononitrate (IMDUR) 30 MG 24 hr tablet, Take 1 tablet by mouth Daily. (Patient taking differently: Take 30 mg by mouth Every Morning.), Disp: 90 tablet, Rfl: 0  •  latanoprost (XALATAN) 0.005 % ophthalmic solution, Administer 1 drop to both eyes Every Night., Disp: , Rfl: 5  •  levothyroxine (Synthroid) 112 MCG tablet, Take 1 tablet by mouth Daily., Disp: 90 tablet, Rfl: 1  •  melatonin 5 MG tablet tablet, Take 5 mg by mouth Every Night., Disp: , Rfl:   •  multivitamins-minerals (PRESERVISION AREDS 2) capsule capsule, Take 1 capsule by mouth 2 (Two) Times a Day., Disp: , Rfl:   •  mupirocin (BACTROBAN) 2 % ointment, 1 application As Needed., Disp: , Rfl:   •  NovoLOG 100 UNIT/ML injection, INJECT 15 UNITS UNDER THE SKIN AS DIRECTED 3 TIMES DAILY BEFORE MEALS, Disp: 20 mL, Rfl: 5  •  omeprazole (priLOSEC) 40 MG capsule, omeprazole 40 mg capsule,delayed release, Disp: , Rfl:   •  ondansetron (ZOFRAN) 4 MG tablet, Take 4 mg by mouth Every 12 (Twelve) Hours As Needed., Disp: , Rfl:   •  saccharomyces boulardii (FLORASTOR) 250 MG capsule, Take 250 mg by mouth 2 (Two) Times a Day., Disp: , Rfl:   •  spironolactone (ALDACTONE) 25 MG tablet, Take 12.5 mg by mouth Every Morning., Disp: , Rfl:   •  timolol (TIMOPTIC) 0.5 % ophthalmic solution, Administer 1 drop to both eyes 2 (Two) Times a Day., Disp: , Rfl:      PMFSH  The following portions of the patient's history were reviewed and updated as appropriate: allergies, current medications, past family history, past medical history, past social history, past surgical history and problem list.    Review of Systems   Constitutional: Negative for activity change, appetite change and fatigue.   HENT: Negative for congestion and rhinorrhea.    Respiratory: Negative for chest tightness and shortness of breath.    Cardiovascular: Negative for chest pain and palpitations.   Gastrointestinal: Negative for  abdominal pain.   Genitourinary: Negative for dysuria.   Musculoskeletal: Negative for arthralgias and myalgias.   Neurological: Negative for dizziness, weakness, light-headedness and headaches.   Psychiatric/Behavioral: Negative for dysphoric mood. The patient is not nervous/anxious.        Objective   /70   Pulse 61   Wt 85.7 kg (189 lb)   LMP  (LMP Unknown) Comment: last mammogram -unsure   SpO2 98%   BMI 32.44 kg/m²     Physical Exam  Vitals and nursing note reviewed.   Constitutional:       Appearance: She is well-developed.   HENT:      Head: Normocephalic and atraumatic.      Right Ear: External ear normal.      Left Ear: External ear normal.   Eyes:      Conjunctiva/sclera: Conjunctivae normal.   Cardiovascular:      Rate and Rhythm: Normal rate and regular rhythm.   Pulmonary:      Effort: Pulmonary effort is normal.      Breath sounds: Normal breath sounds.   Musculoskeletal:         General: Normal range of motion.      Cervical back: Normal range of motion.   Skin:     General: Skin is warm and dry.          Psychiatric:         Behavior: Behavior normal.         ASSESSMENT/PLAN    Diagnoses and all orders for this visit:    1. High risk medication use (Primary)  -     Compliance Drug Analysis, Ur - Urine, Clean Catch; Future  -     Compliance Drug Analysis, Ur - Urine, Clean Catch    2. Psychophysiological insomnia  Assessment & Plan:  Stable with current dose of Lunesta 2 mg qhs. Refill requested from PCP.    The patient has read and signed the [facility] Controlled Substance Contract.  I will continue to see patient for regular follow up appointments.  They are well controlled on their medication.  GEORGETTE is updated every 3 months. The patient is aware of the potential for addiction and dependence.      3. Abrasion  Comments:  Continue to keep abrasion covered with bactroban and bandage.           Return in about 3 months (around 6/22/2022) for Follow up with Dr. Guzman.

## 2022-03-23 NOTE — TELEPHONE ENCOUNTER
Karla Cristobal was seen for follow up and is due for routine refill of Lunesta 2 mg qhs #30, 2RF. Jerzy, ELLE and CS agreement are up to date. If you agree, please send in the attached prescription as ordered. Thank you

## 2022-03-24 NOTE — ASSESSMENT & PLAN NOTE
Stable with current dose of Lunesta 2 mg qhs. Refill requested from PCP.    The patient has read and signed the [facility] Controlled Substance Contract.  I will continue to see patient for regular follow up appointments.  They are well controlled on their medication.  GEORGETTE is updated every 3 months. The patient is aware of the potential for addiction and dependence.

## 2022-04-26 NOTE — TELEPHONE ENCOUNTER
LMOM for pt to call back to let us know which lancets she uses and also to schedule a FU because she is past due

## 2022-04-27 NOTE — TELEPHONE ENCOUNTER
Pt daughter called back states pt needs a prescription on lantus insulin unsure on dose don't see a prescription ever called in on my end. Pt last seen 09/08/21 no f/u scheduled.

## 2022-04-27 NOTE — TELEPHONE ENCOUNTER
SW daughter, Frida. She needs the basal insulin refilled, they weren't sure if it was Lantus or Basaglar. I scheduled f/u for 5/11/22. ROSA ISELA

## 2022-04-29 ENCOUNTER — HOSPITAL ENCOUNTER (OUTPATIENT)
Age: 87
End: 2022-04-29
Payer: MEDICARE

## 2022-04-29 DIAGNOSIS — N18.9: ICD-10-CM

## 2022-04-29 DIAGNOSIS — R06.02: Primary | ICD-10-CM

## 2022-04-29 DIAGNOSIS — E11.9: ICD-10-CM

## 2022-04-29 LAB
HCT VFR BLD CALC: 40.9 % (ref 37–47)
HGB BLD-MCNC: 13.1 G/DL (ref 12.2–16.2)
MCHC RBC-ENTMCNC: 32.2 G/DL (ref 31.8–35.4)
MCV RBC: 90.6 FL (ref 81–99)
MEAN CORPUSCULAR HEMOGLOBIN: 29.2 PG (ref 27–31.2)
NT PRO BRAIN NATRIURETIC PEP.: 3770 PG/ML (ref 0–450)
PLATELET # BLD: 106 K/MM3 (ref 142–424)
RBC # BLD AUTO: 4.51 M/MM3 (ref 4.2–5.4)
WBC # BLD AUTO: 5.7 K/MM3 (ref 4.8–10.8)

## 2022-04-29 PROCEDURE — 36415 COLL VENOUS BLD VENIPUNCTURE: CPT

## 2022-04-29 PROCEDURE — 83880 ASSAY OF NATRIURETIC PEPTIDE: CPT

## 2022-04-29 PROCEDURE — 85025 COMPLETE CBC W/AUTO DIFF WBC: CPT

## 2022-05-11 PROBLEM — C18.2 MALIGNANT TUMOR OF ASCENDING COLON (HCC): Status: ACTIVE | Noted: 2021-03-19

## 2022-05-11 NOTE — ASSESSMENT & PLAN NOTE
bp is well controlled taking coreg - discussed need to take this twice daily   Has appt with Dr Beal and will clarify  Continue other medications

## 2022-05-11 NOTE — PROGRESS NOTES
Karla Cristobal 86 y.o.  CC: Diabetes (Type II)      [place]: Diabetes (Type II)    Blood sugar and 90 day average sugar reviewed  Results for orders placed or performed in visit on 05/11/22   POC Glucose, Blood    Specimen: Blood   Result Value Ref Range    Glucose 160 (A) 70 - 130 mg/dL    Lot Number [id number]     Expiration Date 11-10-22    POC Glycosylated Hemoglobin (Hb A1C)    Specimen: Blood   Result Value Ref Range    Hemoglobin A1C 7.6 %    Lot Number [id number]     Expiration Date 1-17-24      bp is good   Is taking 12 u basaglar daily   Is taking 5-10 units of novolog before meals  Is taking lipitor 20 mg daily   Discussed average sugar 165  Has had problems with leg wounds in interim with delayed healing   Having problems sleeping- was taking sonesta but caused her to have severe fatigue the next day- options reviewed    Allergies   Allergen Reactions   • Bactrim [Sulfamethoxazole-Trimethoprim] GI Intolerance   • Lisinopril Cough   • Codeine Rash       Current Outpatient Medications:   •  atorvastatin (LIPITOR) 20 MG tablet, Take 1 tablet by mouth Every Night., Disp: 90 tablet, Rfl: 1  •  AZO-CRANBERRY PO, Take  by mouth 2 (Two) Times a Day., Disp: , Rfl:   •  carvedilol (COREG) 25 MG tablet, Take 0.5 tablets by mouth 2 (Two) Times a Day With Meals. (Patient taking differently: Take 6.25 mg by mouth 2 (Two) Times a Day With Meals.), Disp: 30 tablet, Rfl: 11  •  cholecalciferol (VITAMIN D3) 1000 UNITS tablet, Take 1,000 Units by mouth Daily., Disp: , Rfl:   •  Eliquis 2.5 MG tablet tablet, , Disp: , Rfl:   •  eszopiclone (LUNESTA) 2 MG tablet, Take 1 tablet by mouth At Night As Needed for Sleep. Take immediately before bedtime (Patient taking differently: Take 2 mg by mouth At Night As Needed for Sleep. Take immediately before bedtime.  Family trying to wean patient of medication.), Disp: 30 tablet, Rfl: 2  •  Ferrous Sulfate (IRON PO), Take 1 tablet by mouth Every Other Day., Disp: , Rfl:   •  furosemide  "(LASIX) 40 MG tablet, Take 1 tablet by mouth Every Other Day., Disp: , Rfl:   •  indapamide (LOZOL) 2.5 MG tablet, , Disp: , Rfl:   •  Insulin Glargine (BASAGLAR KWIKPEN) 100 UNIT/ML injection pen, inject 12 units at bedtime as directed, Disp: 15 mL, Rfl: 1  •  Insulin Pen Needle (Unifine Pentips Plus) 31G X 8 MM misc, Use 4 per day to administer insulin, Disp: 400 each, Rfl: 1  •  Insulin Syringe-Needle U-100 (TRUEplus Insulin Syringe) 31G X 5/16\" 0.5 ML misc, USE WITH INSULIN  TIMES DAILY, Disp: 200 each, Rfl: 5  •  isosorbide mononitrate (IMDUR) 30 MG 24 hr tablet, Take 1 tablet by mouth Daily. (Patient taking differently: Take 30 mg by mouth Every Morning.), Disp: 90 tablet, Rfl: 0  •  latanoprost (XALATAN) 0.005 % ophthalmic solution, Administer 1 drop to both eyes Every Night., Disp: , Rfl: 5  •  levothyroxine (Synthroid) 112 MCG tablet, Take 1 tablet by mouth Daily., Disp: 30 tablet, Rfl: 0  •  melatonin 5 MG tablet tablet, Take 5 mg by mouth Every Night., Disp: , Rfl:   •  multivitamins-minerals (PRESERVISION AREDS 2) capsule capsule, Take 1 capsule by mouth 2 (Two) Times a Day., Disp: , Rfl:   •  mupirocin (BACTROBAN) 2 % ointment, 1 application As Needed., Disp: , Rfl:   •  omeprazole (priLOSEC) 40 MG capsule, omeprazole 40 mg capsule,delayed release, Disp: , Rfl:   •  ondansetron (ZOFRAN) 4 MG tablet, Take 4 mg by mouth Every 12 (Twelve) Hours As Needed., Disp: , Rfl:   •  saccharomyces boulardii (FLORASTOR) 250 MG capsule, Take 250 mg by mouth 2 (Two) Times a Day., Disp: , Rfl:   •  spironolactone (ALDACTONE) 25 MG tablet, Take 12.5 mg by mouth Every Morning., Disp: , Rfl:   •  timolol (TIMOPTIC) 0.5 % ophthalmic solution, Administer 1 drop to both eyes 2 (Two) Times a Day., Disp: , Rfl:   •  insulin aspart (NovoLOG FlexPen) 100 UNIT/ML solution pen-injector sc pen, Inject 10 Units under the skin into the appropriate area as directed 3 (Three) Times a Day With Meals., Disp: 30 mL, Rfl: 1  Patient Active " Problem List    Diagnosis    • Cellulitis of left lower extremity [L03.116]    • Left leg cellulitis [L03.116]    • Food impaction of esophagus [T18.128A]    • Type 2 diabetes mellitus without complication, with long-term current use of insulin (HCC) [E11.9, Z79.4]    • Exudative age-related macular degeneration, right eye, with active choroidal neovascularization (HCC) [H35.3211]    • Hypoxia [R09.02]    • Malignant tumor of ascending colon (HCC) [C18.2]    • Ischemic cardiomyopathy [I25.5]    • Morbidly obese (HCC) [E66.01]    • Chronic systolic congestive heart failure (HCC) [I50.22]    • DVT of lower extremity (deep venous thrombosis) (HCC) [I82.409]    • CAD (coronary artery disease) [I25.10]    • Ischemic heart disease [I25.9]    • Anemia due to chronic kidney disease [N18.9, D63.1]    • Chronic atrial fibrillation (HCC) [I48.20]    • CKD (chronic kidney disease), stage IV (HCC) [N18.4]    • Diabetic nephropathy (HCC) [E11.21]    • Diabetic retinopathy (HCC) [E11.319]    • Malignant neoplasm of endometrium (HCC) [C54.1]    • Hyperlipidemia LDL goal <100 [E78.5]    • Essential hypertension [I10]    • Hypothyroidism [E03.9]    • Insomnia [G47.00]    • Leukocytosis [D72.829]    • Memory impairment [R41.3]    • Nausea [R11.0]    • Pulmonary embolism (HCC) [I26.99]    • Sleep apnea [G47.30]    • Squamous cell carcinoma of skin [C44.92]    • CHF (NYHA class IV, ACC/AHA stage D) ([facility]) [I50.84]      Review of Systems   Constitutional: Positive for activity change, appetite change and unexpected weight change.   HENT: Negative for congestion and rhinorrhea.    Eyes: Negative for discharge and visual disturbance.   Respiratory: Negative for cough and shortness of breath.    Cardiovascular: Positive for leg swelling (mild swelling ). Negative for chest pain.   Gastrointestinal: Positive for nausea.   Endocrine: Negative for cold intolerance.   Musculoskeletal: Positive for arthralgias and gait problem.   Skin: Positive  "for color change and wound (right lateral toe ).   Neurological: Negative for dizziness and light-headedness.   Psychiatric/Behavioral: Positive for sleep disturbance. Negative for agitation.     Social History     Socioeconomic History   • Marital status:    Tobacco Use   • Smoking status: Never Smoker   • Smokeless tobacco: Never Used   Vaping Use   • Vaping Use: Never used   Substance and Sexual Activity   • Alcohol use: No   • Drug use: No   • Sexual activity: Defer     Family History   Problem Relation Age of Onset   • Breast cancer Other    • Diabetes Other    • Esophageal cancer Other    • Hypertension Other    • Transient ischemic attack Other    • Breast cancer Mother    • Cancer Father      /78 (BP Location: Left arm, Patient Position: Sitting, Cuff Size: Adult)   Pulse 76   Ht 162.6 cm (64\")   Wt 81.6 kg (180 lb)   LMP  (LMP Unknown) Comment: last mammogram -unsure   SpO2 98%   BMI 30.90 kg/m²   Physical Exam  Constitutional:       Appearance: Normal appearance.   Eyes:      Extraocular Movements: Extraocular movements intact.      Pupils: Pupils are equal, round, and reactive to light.   Cardiovascular:      Rate and Rhythm: Normal rate.      Heart sounds: Murmur heard.      Comments: Diminished distal pulses   Pulmonary:      Effort: Pulmonary effort is normal.      Breath sounds: Normal breath sounds.   Skin:     General: Skin is warm and dry.   Neurological:      General: No focal deficit present.      Mental Status: She is alert and oriented to person, place, and time.   Psychiatric:         Mood and Affect: Mood normal.         Behavior: Behavior normal.       Results for orders placed or performed in visit on 05/11/22   POC Glucose, Blood    Specimen: Blood   Result Value Ref Range    Glucose 160 (A) 70 - 130 mg/dL    Lot Number [id number]     Expiration Date 11-10-22    POC Glycosylated Hemoglobin (Hb A1C)    Specimen: Blood   Result Value Ref Range    Hemoglobin A1C 7.6 %    Lot " Number [id number]     Expiration Date 1-17-24      Diagnoses and all orders for this visit:    1. Type 2 diabetes mellitus without complication, with long-term current use of insulin (HCC) (Primary)  -     POC Glucose, Blood  -     POC Glycosylated Hemoglobin (Hb A1C)    2. Anemia due to stage 4 chronic kidney disease (HCC)  Assessment & Plan:  Followed by nephrology Dr Mckeon   Also taking oral iron       3. Essential hypertension  Assessment & Plan:  bp is well controlled taking coreg - discussed need to take this twice daily   Has appt with Dr Beal and will clarify  Continue other medications     Orders:  -     Comprehensive Metabolic Panel; Future    4. Acquired hypothyroidism  Assessment & Plan:  Update tfts  Taking levothyroxine 112 mcg daily   Last tsh 1.5 on 11/21    Orders:  -     T4, Free; Future  -     TSH; Future  -     Lipid Panel; Future    5. Hyperlipidemia LDL goal <100  Assessment & Plan:  Is eating low fat diet and taking lipitor 20 mg daily   flp ordered       Other orders  -     atorvastatin (LIPITOR) 20 MG tablet; Take 1 tablet by mouth Every Night.  Dispense: 90 tablet; Refill: 1  -     Insulin Glargine (BASAGLAR KWIKPEN) 100 UNIT/ML injection pen; inject 12 units at bedtime as directed  Dispense: 15 mL; Refill: 1  -     Insulin Pen Needle (Unifine Pentips Plus) 31G X 8 MM misc; Use 4 per day to administer insulin  Dispense: 400 each; Refill: 1  -     levothyroxine (Synthroid) 112 MCG tablet; Take 1 tablet by mouth Daily.  Dispense: 30 tablet; Refill: 0  -     insulin aspart (NovoLOG FlexPen) 100 UNIT/ML solution pen-injector sc pen; Inject 10 Units under the skin into the appropriate area as directed 3 (Three) Times a Day With Meals.  Dispense: 30 mL; Refill: 1  Return in about 4 months (around 9/11/2022) for Recheck.    Rani Lane MD  Signed Rani Lane MD

## 2022-06-11 ENCOUNTER — HOSPITAL ENCOUNTER (EMERGENCY)
Age: 87
Discharge: LEFT BEFORE DISCHARGE | End: 2022-06-11
Payer: MEDICARE

## 2022-06-11 VITALS
HEART RATE: 74 BPM | OXYGEN SATURATION: 98 % | DIASTOLIC BLOOD PRESSURE: 62 MMHG | RESPIRATION RATE: 19 BRPM | TEMPERATURE: 97.4 F | SYSTOLIC BLOOD PRESSURE: 101 MMHG

## 2022-06-11 VITALS
RESPIRATION RATE: 19 BRPM | TEMPERATURE: 97.4 F | SYSTOLIC BLOOD PRESSURE: 101 MMHG | DIASTOLIC BLOOD PRESSURE: 62 MMHG | HEART RATE: 74 BPM | OXYGEN SATURATION: 98 %

## 2022-06-11 VITALS — BODY MASS INDEX: 30.4 KG/M2

## 2022-06-11 DIAGNOSIS — E11.9: ICD-10-CM

## 2022-06-11 DIAGNOSIS — R11.10: ICD-10-CM

## 2022-06-11 DIAGNOSIS — Z79.01: ICD-10-CM

## 2022-06-11 DIAGNOSIS — Z88.8: ICD-10-CM

## 2022-06-11 DIAGNOSIS — Z79.899: ICD-10-CM

## 2022-06-11 DIAGNOSIS — Z85.9: ICD-10-CM

## 2022-06-11 DIAGNOSIS — R41.0: Primary | ICD-10-CM

## 2022-06-11 DIAGNOSIS — Z88.2: ICD-10-CM

## 2022-06-11 DIAGNOSIS — B96.5: ICD-10-CM

## 2022-06-11 LAB
PH,URINE: 5.5 (ref 5–8.5)
SPECIFIC GRAVITY, URINE: 1.01 (ref 1–1.03)
UROBILINOGEN,URINE: 0.2 EU/DL
UTC LEUKOCYTE ESTERASE,URINE: (no result)

## 2022-06-11 PROCEDURE — G0463 HOSPITAL OUTPT CLINIC VISIT: HCPCS

## 2022-06-11 PROCEDURE — 81003 URINALYSIS AUTO W/O SCOPE: CPT

## 2022-06-11 PROCEDURE — 99213 OFFICE O/P EST LOW 20 MIN: CPT

## 2022-06-11 PROCEDURE — 87086 URINE CULTURE/COLONY COUNT: CPT

## 2022-06-11 PROCEDURE — 87088 URINE BACTERIA CULTURE: CPT

## 2022-06-11 PROCEDURE — 87186 SC STD MICRODIL/AGAR DIL: CPT

## 2022-06-11 NOTE — PC.NURSE
YANI LEON SPOKE WITH FAMILY CONCERNING TRANSFERRING PATIENT TO ER FOR FURTHER EVALUATION. FAMILY DECLINED, STATING THEY WOULD RATHER TAKE HER TO [facility] FOR PATIENT'S PCP IS LOCATED.

## 2022-06-11 NOTE — ED PROVIDER NOTES
Subjective   Pt presents with altered mental status.  Approx 4 day history of confusion and forgetfulness.  She doesn't know why she is here and daughter does most of the talking.  There has been no fever, headache, cough, vomiting, diarrhea or focal weakness.  They did a home urine dip last night that was positive for leukocytes so daughter brought her to a [facility] today for abx but the [facility] sent her to ED for sepsis evaluation.  She had a bit of nausea on the way.      History provided by:  Patient      Review of Systems   Constitutional: Negative for fever.   Respiratory: Negative for cough and shortness of breath.    Gastrointestinal: Positive for nausea. Negative for vomiting.   Psychiatric/Behavioral: Positive for confusion.   All other systems reviewed and are negative.      Past Medical History:   Diagnosis Date   • Acute bronchitis    • Arthritis    • Atrial fibrillation (HCC)    • CAD (coronary artery disease)     s/p MI 2005; 3 stents inserted    • Change in mole    • Change in mole    • Change in nevus 6/16/2016   • Chronic kidney disease     stage IV-sees Dr Bk Mckeon every 3-4 months    • Chronic renal disease, stage 4, severely decreased glomerular filtration rate between 15-29 mL/min/1.73 square meter (HCC)    • Chronic systolic heart failure (HCC)    • Congestive heart disease (HCC)    • Conjunctivitis    • Deep vein thrombosis of lower extremity (HCC)    • Diabetes mellitus (HCC)    • Diabetes mellitus (HCC) 6/16/2016    Impression: 03/15/2016 - blood sugar 166 and hgn a1c 7.3%  is up to date with eye exam  neuropathy with callus, no ulcer  some scratches feet  ur alb due and ordered  no change other than provided samples of toujeo because she feels like lantus worked much better than levemir, she has tried titrating levemir and it is less effective  continue testing and f/u 3-4 months  Impression: 11/23/2015 - bl   • Diabetic foot ulcer (HCC) 6/16/2016   • Dog bite of hand    • Easy bruising    •  Endometrial cancer (HCC)     partial hysterectomy; radiation as well    • Foot pain    • Foot pain 6/16/2016    Description: lancinating- worse but not consistent enough to want rx   • Fracture of hip ([facility])    • Generalized ischemic myocardial dysfunction 6/16/2016   • Glaucoma     drops daily    • Hyperlipidemia    • Hypertension    • Hypothyroidism    • Insomnia    • Ischemic heart disease    • Macular degeneration    • Methicillin resistant Staphylococcus aureus infection 6/16/2016   • Myocardial infarction ([facility]) 2005   • Nausea with vomiting    • Pericardial effusion    • Shortness of breath 6/16/2016    Impression: 03/24/2015 - chronic. On 2 l oxygen at night. check cbc, bnp;    • Skin cancer, basal cell     nose, leg    • Skin lesion 6/16/2016    Impression: 03/24/2015 - refer derm for eval- seb kerat ant tib and ?ak medially with redness and irritation;    • Sleep apnea     oxygen at night due to low sats but no cpap   • Type 2 diabetes mellitus ([facility])    • UTI (urinary tract infection) 06/12/2020    currently taking antibiotics-patient's daughter notified Dr Beal's office and office said it should not delay generator change        Allergies   Allergen Reactions   • Bactrim [Sulfamethoxazole-Trimethoprim] GI Intolerance   • Lisinopril Cough   • Codeine Rash       Past Surgical History:   Procedure Laterality Date   • CARDIAC CATHETERIZATION     • CARDIAC DEFIBRILLATOR PLACEMENT     • CARDIAC ELECTROPHYSIOLOGY PROCEDURE N/A 7/17/2020    Procedure: ICD BIV  generator change- [facility]- 3-6 weeks;  Surgeon: Tano Beal MD;  Location:  [facility];  Service: Cardiology;  Laterality: N/A;   • CHOLECYSTECTOMY     • CORONARY ARTERY BYPASS GRAFT  2000   • CORONARY STENT PLACEMENT      3 stents    • ENDOSCOPY N/A 11/17/2021    Procedure: ESOPHAGOGASTRODUODENOSCOPY;  Surgeon: Brunner, Mark I, MD;  Location:  [facility];  Service: Gastroenterology;  Laterality: N/A;   • EYE SURGERY Bilateral      cataract extraction    • HIP SURGERY Left     x3   • HYSTERECTOMY      partial    • KNEE ARTHROPLASTY Bilateral    • PACEMAKER IMPLANTATION     • TONSILLECTOMY         Family History   Problem Relation Age of Onset   • Breast cancer Other    • Diabetes Other    • Esophageal cancer Other    • Hypertension Other    • Transient ischemic attack Other    • Breast cancer Mother    • Cancer Father        Social History     Socioeconomic History   • Marital status:    Tobacco Use   • Smoking status: Never Smoker   • Smokeless tobacco: Never Used   Vaping Use   • Vaping Use: Never used   Substance and Sexual Activity   • Alcohol use: No   • Drug use: No   • Sexual activity: Defer           Objective   Physical Exam  Vitals and nursing note reviewed.   Constitutional:       General: She is not in acute distress.     Appearance: Normal appearance. She is not ill-appearing.   HENT:      Head: Normocephalic and atraumatic.      Mouth/Throat:      Mouth: Mucous membranes are moist.   Eyes:      General: No scleral icterus.        Right eye: No discharge.         Left eye: No discharge.      Conjunctiva/sclera: Conjunctivae normal.   Cardiovascular:      Rate and Rhythm: Normal rate and regular rhythm.      Heart sounds: No murmur heard.  Pulmonary:      Effort: Pulmonary effort is normal. No respiratory distress.      Breath sounds: Normal breath sounds. No wheezing.   Abdominal:      General: Bowel sounds are normal. There is no distension.      Palpations: Abdomen is soft.      Tenderness: There is no abdominal tenderness. There is no guarding or rebound.   Musculoskeletal:         General: No swelling. Normal range of motion.      Cervical back: Normal range of motion and neck supple.   Skin:     General: Skin is warm and dry.      Findings: No rash.   Neurological:      General: No focal deficit present.      Mental Status: She is alert. She is confused.      Comments: No focal weakness, slurred speech or facial droop.    Psychiatric:         Mood and Affect: Mood normal.         Behavior: Behavior normal.         Thought Content: Thought content normal.         Procedures           ED Course         UA positive.  Workup suggests dehydration.  Pt given fluids.  Ceftriaxone ordered but she had some itching while it was going in.  Benadryl given but she had itching despite that so we stopped it.  Fosfomycin instead, with her creatinine clearance a single dose should be adequate.      Patient stable on serial rechecks.  Discussed findings, concerns, plan of care, expected course, reasons to return and followup.  Provided the opportunity to ask questions.                                          MDM  Number of Diagnoses or Management Options     Amount and/or Complexity of Data Reviewed  Clinical lab tests: reviewed and ordered  Decide to obtain previous medical records or to obtain history from someone other than the patient: yes  Obtain history from someone other than the patient: yes  Independent visualization of images, tracings, or specimens: yes        Final diagnoses:   Acute UTI   Dehydration   Altered mental status, unspecified altered mental status type       ED Disposition  ED Disposition     ED Disposition   Discharge    Condition   Stable    Comment   --             Giselle Guzman DO  [address] [address]  [phone]    In 2 days      [facility] Emergency Department  [address] [place] [id number] [phone]    If symptoms worsen         Medication List      Changed    carvedilol 25 MG tablet  Commonly known as: COREG  Take 0.5 tablets by mouth 2 (Two) Times a Day With Meals.  What changed: how much to take     eszopiclone 2 MG tablet  Commonly known as: LUNESTA  Take 1 tablet by mouth At Night As Needed for Sleep. Take immediately before bedtime  What changed: additional instructions     isosorbide mononitrate 30 MG 24 hr tablet  Commonly known as:  IMDUR  Take 1 tablet by mouth Daily.  What changed: when to take this             Noam, Pollo Richmond MD  06/12/22 0009

## 2022-06-14 NOTE — OUTREACH NOTE
AMBULATORY CASE MANAGEMENT NOTE    Name and Relationship of Patient/Support Person: Karla Cristobal - Doug    Patient Outreach    Briefly spoke with patient regarding a recent ER visit. She reported feeling better. She denied questions/needs at this time. She does not feel she needs follow up outreach. Encouraged patient to call [facility] with questions/needs. She voiced understanding.     LUKE WAGNER  Ambulatory Case Management    6/14/2022, 11:32 EDT

## 2022-06-14 NOTE — TELEPHONE ENCOUNTER
----- Message from Giselle Guzman DO sent at 6/14/2022 12:57 PM EDT -----  Is this ordering NP treating her UTI

## 2022-06-14 NOTE — TELEPHONE ENCOUNTER
Spoke with patient and she stated she received medication from [facility] to treat her UTI.

## 2022-06-20 NOTE — PROGRESS NOTES
Subjective   Karla Cristobal is a 86 y.o. female.   Chief Complaint   Patient presents with   • ER follow up     UTI       History of Present Illness   Seen at outside facility and had UA with culture. Grew out Pseudomonas that was sensitive to Cipro and Levaquin.  Was given Rocphin which caused ithing in ed and that was stopped. Was given Fosfomycin.  Hypoxia that has improved with  2L oxygen at night.   The following portions of the patient's history were reviewed and updated as appropriate: allergies, current medications, past family history, past medical history, past social history, past surgical history and problem list.  Past Medical History:   Diagnosis Date   • Acute bronchitis    • Arthritis    • Atrial fibrillation (HCC)    • CAD (coronary artery disease)     s/p MI 2005; 3 stents inserted    • Change in mole    • Change in mole    • Change in nevus 6/16/2016   • Chronic kidney disease     stage IV-sees Dr Bk Mckeon every 3-4 months    • Chronic renal disease, stage 4, severely decreased glomerular filtration rate between 15-29 mL/min/1.73 square meter (HCC)    • Chronic systolic heart failure (HCC)    • Congestive heart disease (HCC)    • Conjunctivitis    • Deep vein thrombosis of lower extremity (HCC)    • Diabetes mellitus (HCC)    • Diabetes mellitus (HCC) 6/16/2016    Impression: 03/15/2016 - blood sugar 166 and hgn a1c 7.3%  is up to date with eye exam  neuropathy with callus, no ulcer  some scratches feet  ur alb due and ordered  no change other than provided samples of toujeo because she feels like lantus worked much better than levemir, she has tried titrating levemir and it is less effective  continue testing and f/u 3-4 months  Impression: 11/23/2015 - bl   • Diabetic foot ulcer (HCC) 6/16/2016   • Dog bite of hand    • Easy bruising    • Endometrial cancer (HCC)     partial hysterectomy; radiation as well    • Foot pain    • Foot pain 6/16/2016    Description: lancinating- worse but not  consistent enough to want rx   • Fracture of hip (HCC)    • Generalized ischemic myocardial dysfunction 6/16/2016   • Glaucoma     drops daily    • Hyperlipidemia    • Hypertension    • Hypothyroidism    • Insomnia    • Ischemic heart disease    • Macular degeneration    • Methicillin resistant Staphylococcus aureus infection 6/16/2016   • Myocardial infarction (HCC) 2005   • Nausea with vomiting    • Pericardial effusion    • Shortness of breath 6/16/2016    Impression: 03/24/2015 - chronic. On 2 l oxygen at night. check cbc, bnp;    • Skin cancer, basal cell     nose, leg    • Skin lesion 6/16/2016    Impression: 03/24/2015 - refer derm for eval- seb kerat ant tib and ?ak medially with redness and irritation;    • Sleep apnea     oxygen at night due to low sats but no cpap   • Type 2 diabetes mellitus (HCC)    • UTI (urinary tract infection) 06/12/2020    currently taking antibiotics-patient's daughter notified Dr Beal's office and office said it should not delay generator change      Past Surgical History:   Procedure Laterality Date   • CARDIAC CATHETERIZATION     • CARDIAC DEFIBRILLATOR PLACEMENT     • CARDIAC ELECTROPHYSIOLOGY PROCEDURE N/A 7/17/2020    Procedure: ICD BIV  generator change- [facility]- 3-6 weeks;  Surgeon: Tano Beal MD;  Location:  [facility];  Service: Cardiology;  Laterality: N/A;   • CHOLECYSTECTOMY     • CORONARY ARTERY BYPASS GRAFT  2000   • CORONARY STENT PLACEMENT      3 stents    • ENDOSCOPY N/A 11/17/2021    Procedure: ESOPHAGOGASTRODUODENOSCOPY;  Surgeon: Brunner, Mark I, MD;  Location:  [facility];  Service: Gastroenterology;  Laterality: N/A;   • EYE SURGERY Bilateral     cataract extraction    • HIP SURGERY Left     x3   • HYSTERECTOMY      partial    • KNEE ARTHROPLASTY Bilateral    • PACEMAKER IMPLANTATION     • TONSILLECTOMY       Family History   Problem Relation Age of Onset   • Breast cancer Other    • Diabetes Other    • Esophageal cancer Other   "  • Hypertension Other    • Transient ischemic attack Other    • Breast cancer Mother    • Cancer Father      Social History     Socioeconomic History   • Marital status:    Tobacco Use   • Smoking status: Never Smoker   • Smokeless tobacco: Never Used   Vaping Use   • Vaping Use: Never used   Substance and Sexual Activity   • Alcohol use: No   • Drug use: No   • Sexual activity: Defer     Current Outpatient Medications on File Prior to Visit   Medication Sig Dispense Refill   • atorvastatin (LIPITOR) 20 MG tablet Take 1 tablet by mouth Every Night. 90 tablet 1   • AZO-CRANBERRY PO Take  by mouth 2 (Two) Times a Day.     • carvedilol (COREG) 25 MG tablet Take 0.5 tablets by mouth 2 (Two) Times a Day With Meals. (Patient taking differently: Take 6.25 mg by mouth 2 (Two) Times a Day With Meals.) 30 tablet 11   • cholecalciferol (VITAMIN D3) 1000 UNITS tablet Take 1,000 Units by mouth Daily.     • Eliquis 2.5 MG tablet tablet      • furosemide (LASIX) 40 MG tablet Take 1 tablet by mouth Every Other Day.     • indapamide (LOZOL) 2.5 MG tablet      • insulin aspart (NovoLOG FlexPen) 100 UNIT/ML solution pen-injector sc pen Inject 10 Units under the skin into the appropriate area as directed 3 (Three) Times a Day With Meals. 30 mL 1   • Insulin Glargine (BASAGLAR KWIKPEN) 100 UNIT/ML injection pen inject 12 units at bedtime as directed 15 mL 1   • Insulin Pen Needle (Unifine Pentips Plus) 31G X 8 MM misc Use 4 per day to administer insulin 400 each 1   • Insulin Syringe-Needle U-100 (TRUEplus Insulin Syringe) 31G X 5/16\" 0.5 ML misc USE WITH INSULIN  TIMES DAILY 200 each 5   • isosorbide mononitrate (IMDUR) 30 MG 24 hr tablet Take 1 tablet by mouth Daily. (Patient taking differently: Take 30 mg by mouth Every Morning.) 90 tablet 0   • latanoprost (XALATAN) 0.005 % ophthalmic solution Administer 1 drop to both eyes Every Night.  5   • levothyroxine (Synthroid) 112 MCG tablet Take 1 tablet by mouth Daily. 30 tablet 0 "   • melatonin 5 MG tablet tablet Take 5 mg by mouth Every Night.     • multivitamins-minerals (PRESERVISION AREDS 2) capsule capsule Take 1 capsule by mouth 2 (Two) Times a Day.     • mupirocin (BACTROBAN) 2 % ointment 1 application As Needed.     • omeprazole (priLOSEC) 40 MG capsule omeprazole 40 mg capsule,delayed release     • saccharomyces boulardii (FLORASTOR) 250 MG capsule Take 250 mg by mouth 2 (Two) Times a Day.     • spironolactone (ALDACTONE) 25 MG tablet Take 12.5 mg by mouth Every Morning.     • timolol (TIMOPTIC) 0.5 % ophthalmic solution Administer 1 drop to both eyes 2 (Two) Times a Day.     • [DISCONTINUED] Ferrous Sulfate (IRON PO) Take 1 tablet by mouth Every Other Day.     • [DISCONTINUED] ondansetron (ZOFRAN) 4 MG tablet Take 4 mg by mouth Every 12 (Twelve) Hours As Needed.     • eszopiclone (LUNESTA) 2 MG tablet Take 1 tablet by mouth At Night As Needed for Sleep. Take immediately before bedtime (Patient taking differently: Take 2 mg by mouth At Night As Needed for Sleep. Take immediately before bedtime.  Family trying to wean patient of medication.) 30 tablet 2     No current facility-administered medications on file prior to visit.       Review of Systems   Constitutional: Negative for fatigue and fever.   Gastrointestinal: Positive for nausea.   Genitourinary: Positive for dysuria.   Psychiatric/Behavioral: Positive for confusion (mild).     /82   Pulse 79   Temp 97.8 °F (36.6 °C)   Wt 76.3 kg (168 lb 3.2 oz)   LMP  (LMP Unknown) Comment: last mammogram -unsure   SpO2 99%   BMI 28.87 kg/m²   Objective   Physical Exam  Vitals reviewed.   Cardiovascular:      Rate and Rhythm: Normal rate and regular rhythm.   Pulmonary:      Effort: No respiratory distress.      Breath sounds: No wheezing.   Neurological:      Mental Status: She is alert.   Psychiatric:         Mood and Affect: Mood normal.         Behavior: Behavior normal.         Assessment & Plan   Diagnoses and all orders for  this visit:    1. Acute lower UTI  -     POCT urinalysis dipstick, automated  -     ciprofloxacin (Cipro) 500 MG tablet; One po qd x 7 days (dose adjusted for her kidney disease)  Dispense: 7 tablet; Refill: 0  Dose adjusted based on her chronic kidney disease  2. Hypoxia  Continue 2 L oxygen at night

## 2022-06-20 NOTE — TELEPHONE ENCOUNTER
Called [facility] at [phone] and left a message to return my call regarding continuation of oxygen. Do they need use to send office notes or do they have an order that needs to be signed. Office number given.

## 2022-06-21 NOTE — TELEPHONE ENCOUNTER
----- Message from Giselle Guzman DO sent at 6/21/2022 11:39 AM EDT -----  Let know urine culture is negative.Stopped the antibiotic prescribed yesterday. If she is having confucion and vomiting. Needs to go to ED near her now

## 2022-06-21 NOTE — TELEPHONE ENCOUNTER
A representative with [facility] returned my phone call and stated that as of right now patient is still current with oxygen so they don't need a renewal at this time. Whenever they need a renewal they will send a form to be signed.

## 2022-06-22 NOTE — TELEPHONE ENCOUNTER
Unable to reach patient. Spoke with Frida patients daughter and relayed message. She verbalized understanding.

## 2022-06-22 NOTE — TELEPHONE ENCOUNTER
Unable to reach patient. Notified patients daughter Frida of lab results. She verbalized understanding. She stated patient is still experiencing come confusion. She stated when they were at the ER they did a lot of tests besides just treating her for the UTI. She is wondering if you have an idea where the confusion is coming from before they travel back to the ED.      Please advise

## 2022-07-14 ENCOUNTER — HOSPITAL ENCOUNTER (OUTPATIENT)
Age: 87
End: 2022-07-14
Payer: MEDICARE

## 2022-07-14 DIAGNOSIS — I12.9: Primary | ICD-10-CM

## 2022-07-14 DIAGNOSIS — N18.4: ICD-10-CM

## 2022-07-14 DIAGNOSIS — E11.29: ICD-10-CM

## 2022-07-14 DIAGNOSIS — D63.1: ICD-10-CM

## 2022-07-14 DIAGNOSIS — R82.90: ICD-10-CM

## 2022-07-14 LAB
ALBUMIN LEVEL: 3.5 G/DL (ref 3.5–5)
ANION GAP SERPL CALC-SCNC: 18.6 MEQ/L (ref 5–15)
BACTERIA URNS QL MICRO: (no result) /LPF
CALCIUM SPEC-MCNC: 9.2 MG/DL (ref 8.4–10.2)
CHLORIDE SPEC-SCNC: 98 MMOL/L (ref 98–107)
CO2 SERPL-SCNC: 27 MMOL/L (ref 22–30)
COLOR UR: YELLOW
CREAT BLD-SCNC: 4.5 MG/DL (ref 0.52–1.04)
ESTIMATED GLOMERULAR FILT RATE: 9 ML/MIN (ref 60–?)
GFR (AFRICAN AMERICAN): 11 ML/MIN (ref 60–?)
GLUCOSE: 91 MG/DL (ref 74–100)
HCT VFR BLD CALC: 40.2 % (ref 37–47)
HGB BLD-MCNC: 12.8 G/DL (ref 12.2–16.2)
LEUKOCYTE ESTERASE UR QL STRIP: (no result)
MCHC RBC-ENTMCNC: 31.7 G/DL (ref 31.8–35.4)
MCV RBC: 92.7 FL (ref 81–99)
MEAN CORPUSCULAR HEMOGLOBIN: 29.4 PG (ref 27–31.2)
MICRO URNS: (no result)
PH UR: 6 [PH] (ref 5–8.5)
PHOSPHOROUS: 5.4 MG/DL (ref 2.5–4.5)
PLATELET # BLD: 157 K/MM3 (ref 142–424)
POTASSIUM: 3.6 MMOL/L (ref 3.5–5.1)
RBC # BLD AUTO: 4.34 M/MM3 (ref 4.2–5.4)
RBC #/AREA URNS HPF: (no result) #/HPF (ref 0–3)
SODIUM SPEC-SCNC: 140 MMOL/L (ref 136–145)
SP GR UR: 1.02 (ref 1–1.03)
SQUAMOUS URNS QL MICRO: (no result) #/HPF (ref 0–5)
UROBILINOGEN UR QL: 0.2 EU/DL
WBC # BLD AUTO: 7.2 K/MM3 (ref 4.8–10.8)
WBC # UR: (no result) #/HPF (ref 0–3)

## 2022-07-14 PROCEDURE — 87186 SC STD MICRODIL/AGAR DIL: CPT

## 2022-07-14 PROCEDURE — 87088 URINE BACTERIA CULTURE: CPT

## 2022-07-14 PROCEDURE — 87086 URINE CULTURE/COLONY COUNT: CPT

## 2022-07-14 PROCEDURE — 85025 COMPLETE CBC W/AUTO DIFF WBC: CPT

## 2022-07-14 PROCEDURE — 81001 URINALYSIS AUTO W/SCOPE: CPT

## 2022-07-14 PROCEDURE — 36415 COLL VENOUS BLD VENIPUNCTURE: CPT

## 2022-07-14 PROCEDURE — 80069 RENAL FUNCTION PANEL: CPT

## 2022-07-15 LAB — BUN SERPL-MCNC: 148 MG/DL (ref 7–17)

## 2022-07-21 NOTE — TELEPHONE ENCOUNTER
----- Message from Giselle Guzman DO sent at 7/21/2022 11:16 AM EDT -----  Is her ordering doctor treating her urine results

## 2022-07-28 PROBLEM — K92.2 GIB (GASTROINTESTINAL BLEEDING): Status: ACTIVE | Noted: 2022-01-01

## 2022-07-29 PROBLEM — E88.09 HYPOALBUMINEMIA: Status: ACTIVE | Noted: 2022-01-01

## 2022-07-29 PROBLEM — N17.9 ACUTE RENAL FAILURE (ARF) (HCC): Status: ACTIVE | Noted: 2022-01-01

## 2022-07-29 PROBLEM — D69.6 THROMBOCYTOPENIA: Status: ACTIVE | Noted: 2022-01-01

## 2022-07-29 PROBLEM — K92.2 LOWER GI BLEED: Status: ACTIVE | Noted: 2022-01-01

## 2022-07-29 PROBLEM — U07.1 COVID-19 VIRUS DETECTED: Status: ACTIVE | Noted: 2022-01-01

## 2022-07-29 PROBLEM — R41.82 AMS (ALTERED MENTAL STATUS): Status: ACTIVE | Noted: 2022-01-01

## 2022-07-29 PROBLEM — D64.9 NORMOCYTIC ANEMIA: Status: ACTIVE | Noted: 2022-01-01

## 2022-08-01 NOTE — PLAN OF CARE
Goal Outcome Evaluation:      Patient is 100% paced rhythm on the monitor.  Patient on 2L NC for comfort.  Patient is alert and oriented times four.  Purewick in use.  Nephrology following patient.  Plan of care; continue treatment and nephrology to follow.  Bed in lowest position and phone and call light in reach.  Will continue to monitor.

## 2022-08-01 NOTE — PROGRESS NOTES
[facility] Medicine Services  PROGRESS NOTE    Patient Name: Karla Cristobal  : 1935  MRN: [id number]    Date of Admission: 2022  Primary Care Physician: Luis Cardenas MD    Subjective   Subjective     CC:  GI bleed    HPI:  Resting in bed no acute distress.  She is a still mildly short of breath but has improved compared to yesterday.  Denies any fever or chills.  No chest pain or palpitation.  No nausea vomiting or diarrhea or abdominal pain.  ROS:  As above    Objective   Objective     Vital Signs:   Temp:  [97.5 °F (36.4 °C)-97.9 °F (36.6 °C)] 97.9 °F (36.6 °C)  Heart Rate:  [72-92] 92  Resp:  [18] 18  BP: (102-135)/(43-70) 102/47  Flow (L/min):  [2] 2     Physical Exam:  Constitutional: No acute distress  HENT: NCAT, mucous membranes moist  Respiratory: Some crackles at bases, improved compared to yesterday's exam,  breathing without labor.    Cardiovascular: RRR, apical systolic murmur, rubs, or gallops  Gastrointestinal: Abdomen is obese.  Positive bowel sounds, soft, nontender, nondistended  Musculoskeletal: No bilateral ankle edema  Psychiatric: Appropriate affect, cooperative  Neurologic: Awake, alert, oriented x3, no focality appreciated, speech clear  Skin: No rashes    Results Reviewed:  LAB RESULTS:      Lab 22  1141 22  0844 22  0022 22  1744 22  1047 22  0742 22  0354 22  0111 22  2148 22  1523   WBC  --   --   --   --   --   --  4.02  --   --   --  5.42   HEMOGLOBIN 10.0* 10.1* 9.7* 10.2* 10.8*   < > 10.7*   < > 11.0*  --  11.1*   HEMATOCRIT 30.4* 30.4* 28.8* 30.4* 32.9*   < > 33.2*   < > 32.5*  --  33.0*   PLATELETS  --   --   --   --   --   --  75*  --   --   --  90*   NEUTROS ABS  --   --   --   --   --   --  2.14  --   --   --  2.94   IMMATURE GRANS (ABS)  --   --   --   --   --   --  0.03  --   --   --  0.02   LYMPHS ABS  --   --   --   --   --   --  1.21  --   --   --  1.67   MONOS  ABS  --   --   --   --   --   --  0.47  --   --   --  0.65   EOS ABS  --   --   --   --   --   --  0.15  --   --   --  0.12   MCV  --   --   --   --   --   --  89.5  --   --   --  87.8   LACTATE  --   --   --   --   --   --   --   --   --  1.8 2.7*   LDH  --   --   --   --   --   --   --   --   --  276*  --    PROTIME  --   --   --   --   --   --  23.1*  --  24.1*  --   --    D DIMER QUANT  --   --   --   --  0.85*  --   --   --   --   --   --     < > = values in this interval not displayed.         Lab 08/01/22  0844 07/31/22  1047 07/30/22  1348 07/29/22 2026 07/29/22 0358 07/28/22 2001   SODIUM 141 143 148*  --  142 139   POTASSIUM 3.5 3.6 3.8  --  4.0 4.2   CHLORIDE 103 106 108*  --  103 99   CO2 25.0 22.0 24.0  --  24.0 22.0   ANION GAP 13.0 15.0 16.0*  --  15.0 18.0*   * 110* 106*  --  113* 131*   CREATININE 3.86* 3.80* 3.54*  --  4.02* 4.47*   EGFR 10.9* 11.1* 12.1*  --  10.4* 9.1*   GLUCOSE 174* 283* 130*  --  101* 113*   CALCIUM 8.1* 8.2* 8.5*  --  8.2* 8.4*   MAGNESIUM 1.9  --   --   --  2.0  --    PHOSPHORUS 4.0  --  5.7* 5.6* 5.8*  --          Lab 07/30/22 1348 07/28/22 2001   TOTAL PROTEIN  --  6.6   ALBUMIN 2.80* 2.90*   GLOBULIN  --  3.7   ALT (SGPT)  --  15   AST (SGOT)  --  27   BILIRUBIN  --  0.9   ALK PHOS  --  176*         Lab 07/29/22 0354 07/28/22  2148   PROTIME 23.1* 24.1*   INR 2.04* 2.15*             Lab 07/29/22  0354 07/28/22 2001 07/28/22  1600   IRON  --  34*  --    IRON SATURATION  --  11*  --    TIBC  --  307  --    TRANSFERRIN  --  206  --    FERRITIN  --  267.60*  --    FOLATE 9.09  --   --    VITAMIN B 12 774  --   --    ABO TYPING  --   --  B   RH TYPING  --   --  Positive   ANTIBODY SCREEN  --   --  Negative         Brief Urine Lab Results  (Last result in the past 365 days)      Color   Clarity   Blood   Leuk Est   Nitrite   Protein   CREAT   Urine HCG        07/30/22 1742 Yellow   Clear   Small (1+)   Trace   Negative   Trace                 Microbiology Results  Abnormal     Procedure Component Value - Date/Time    Eosinophil Smear - Urine, Urine, Clean Catch [[id number]]  (Normal) Collected: 07/30/22 1742    Lab Status: Final result Specimen: Urine, Clean Catch Updated: 07/30/22 1938     Eosinophil Smear 0 % EOS/100 Cells     Narrative:      No eosinophil seen          Adult Transthoracic Echo Complete W/ Cont if Necessary Per Protocol    Result Date: 7/31/2022  · Mild biatrial enlargement. · Moderately reduced right ventricular systolic function noted. · Moderate left ventricular systolic dysfunction, estimated EF 37%. · Left ventricular wall thickness is consistent with mild concentric hypertrophy. · Lambl's excrescences of the aortic valve are noted. There is trace aortic insufficiency, no evidence of aortic stenosis. · Moderate mitral regurgitation. · Moderate to severe tricuspid regurgitation with RVSP 48 mmHg. · Moderate pulmonic insufficiency.      XR Chest 1 View    Result Date: 7/31/2022  DATE OF EXAM: 7/31/2022 10:37 AM  PROCEDURE: XR CHEST 1 VW-  INDICATIONS: sob; K92.2-Gastrointestinal hemorrhage, unspecified; Z79.01-Long term (current) use of anticoagulants; Z86.79-Personal history of other diseases of the circulatory system; R53.1-Weakness; R53.81-Other malaise; R53.83-Other fatigue; R11.0-Nausea; N17.9-Acute kidney failure, unspecified; N18.4-Chronic kidney disease, stage 4 (severe); D69.6-Thrombocytopenia, unspecified; Z86.79-Personal history of  COMPARISON: 06/05/2015  TECHNIQUE: Single radiographic AP view of the chest was obtained.  FINDINGS: An AICD device is present. Sternotomy wires are noted. The heart is enlarged. There are no definite infiltrates or obvious pleural effusions. Slight prominence of central pulmonary vascular markings may reflect some vascular congestion.      Impression: 1.  Cardiomegaly. 2.  There is slight prominence of central pulmonary vasculature that may relate to some vascular congestion.  This report was finalized on 7/31/2022  11:40 AM by Bk Sanon MD.        Results for orders placed during the hospital encounter of 07/28/22    Adult Transthoracic Echo Complete W/ Cont if Necessary Per Protocol    Interpretation Summary  · Mild biatrial enlargement.  · Moderately reduced right ventricular systolic function noted.  · Moderate left ventricular systolic dysfunction, estimated EF 37%.  · Left ventricular wall thickness is consistent with mild concentric hypertrophy.  · Lambl's excrescences of the aortic valve are noted. There is trace aortic insufficiency, no evidence of aortic stenosis.  · Moderate mitral regurgitation.  · Moderate to severe tricuspid regurgitation with RVSP 48 mmHg.  · Moderate pulmonic insufficiency.      I have reviewed the medications:  Scheduled Meds:atorvastatin, 20 mg, Oral, Nightly  insulin lispro, 0-7 Units, Subcutaneous, TID AC  Insulin Lispro, 4 Units, Subcutaneous, TID With Meals  latanoprost, 1 drop, Both Eyes, Nightly  levothyroxine, 112 mcg, Oral, Q AM  pantoprazole, 40 mg, Oral, BID AC  sodium chloride, 10 mL, Intravenous, Q12H  timolol, 1 drop, Both Eyes, BID      Continuous Infusions:   PRN Meds:.•  acetaminophen **OR** acetaminophen **OR** acetaminophen  •  dextrose  •  dextrose  •  glucagon (human recombinant)  •  melatonin  •  ondansetron  •  sodium chloride    Assessment & Plan   Assessment & Plan     Active Hospital Problems    Diagnosis  POA   • **GIB (gastrointestinal bleeding) [K92.2]  Yes   • Normocytic anemia [D64.9]  Unknown   • Acute renal failure (ARF) (HCC) [N17.9]  Unknown   • COVID-19 virus detected [U07.1]  Unknown   • Thrombocytopenia (HCC) [D69.6]  Unknown   • Hypoalbuminemia [E88.09]  Unknown   • AMS (altered mental status) [R41.82]  Unknown   • Lower GI bleed [K92.2]  Yes   • Type 2 diabetes mellitus without complication, with long-term current use of insulin (HCC) [E11.9, Z79.4]  Not Applicable   • Chronic systolic congestive heart failure (HCC) [I50.22]  Yes   • CAD (coronary  artery disease) [I25.10]  Yes   • Chronic atrial fibrillation (HCC) [I48.20]  Yes   • CKD (chronic kidney disease), stage IV (HCC) [N18.4]  Yes   • Hyperlipidemia LDL goal <100 [E78.5]  Yes   • Essential hypertension [I10]  Yes   • Hypothyroidism [E03.9]  Yes   • Pulmonary embolism (HCC) [I26.99]  Yes   • CHF (NYHA class IV, ACC/AHA stage D) (HCC) [I50.84]  Yes      Resolved Hospital Problems   No resolved problems to display.        Brief Hospital Course to date:  Karla Cristobal is a 87 y.o. female with past medical history of anemia, A. fib on Eliquis, CKD stage IV, HLD, hypertension, hypothyroid, CAD s/p stents, diabetes type 2, and systolic heart failure (2L NC at night) who presents to the ED for rectal bleeding. Last colonoscopy was in 2021.       *Acute GI bleed.  CT scan shows extensive diverticulosis.  Hemoglobin and hematocrit being monitored.  Had colonoscopy which showed sigmoid diverticulosis.  Also AVM in the proximal ascending colon with adherent clots which seems to be the source of bleeding.  3 endoclips applied for hemostasis.  Hemoglobin and hematocrit are pretty stable.    *Anemia secondary to acute blood loss.    *Acute kidney injury on chronic kidney disease.  Nephrology is following, creatinine has marginally improved since admission.    *COVID-19 positive.  Patient is asymptomatic and currently no treatment.  LDH and ferritin are elevated.      *Shortness of breath secondary to pulmonary edema due to acute on chronic systolic and diastolic heart failure.  Patient was given 1 dose of Lasix IV yesterday.  Symptoms have improved and today's lab shows no significant change in creatinine.  -We will ask cardiology to see the patient today.    *Valvular heart disease and diastolic heart failure.  --Last echo which was done on 11/17/2021 shows normal ejection fraction.  Repeat echocardiogram which was done yesterday shows ejection fraction of 37%, apical and septal dyskinesia.  --This is likely also  shows bilateral atrial cavity mild dilation.  Moderate to severe tricuspid valve regurgitation, estimated right ventricular systolic pressure is between 45 to 55 mmHg.  This study also shows moderate pulmonic valve regurgitation and also moderate mitral valve regurgitation.     Expected Discharge Location and Transportation: home  Expected Discharge Date: when H/H stable, renal function is stable    DVT prophylaxis:  No DVT prophylaxis order currently exists.     AM-PAC 6 Clicks Score (PT): 11 (07/31/22 1340)    CODE STATUS:   Code Status and Medical Interventions:   Ordered at: 07/28/22 5915     Level Of Support Discussed With:    Patient     Code Status (Patient has no pulse and is not breathing):    CPR (Attempt to Resuscitate)     Medical Interventions (Patient has pulse or is breathing):    Full Support       Dony Russell MD  08/01/22

## 2022-08-01 NOTE — PROGRESS NOTES
"   LOS: 3 days    Patient Care Team:  Luis Cardenas MD as PCP - General (Nephrology)    Chief Complaint:  Gi bleed    Subjective     Interval History:   No acute events overnight. On room air.     Review of Systems:   No CP  , fever, chills, rigors, rash, N/V, Constipation.         Objective     Vital Sign Min/Max for last 24 hours  Temp  Min: 97.5 °F (36.4 °C)  Max: 97.9 °F (36.6 °C)   BP  Min: 102/47  Max: 135/63   Pulse  Min: 72  Max: 92   Resp  Min: 18  Max: 18   SpO2  Min: 92 %  Max: 100 %   Flow (L/min)  Min: 2  Max: 2   Weight  Min: 77.6 kg (171 lb 1.6 oz)  Max: 77.7 kg (171 lb 4.8 oz)     Flowsheet Rows    Flowsheet Row First Filed Value   Admission Height 162.6 cm (64\") Documented at 07/28/2022 1451   Admission Weight 76.2 kg (168 lb) Documented at 07/28/2022 1451          I/O this shift:  In: -   Out: 500 [Urine:500]  I/O last 3 completed shifts:  In: 560 [P.O.:560]  Out: 1600 [Urine:1600]    Physical Exam:    Gen: Alert, NAD   HENT: NC, AT, EOMI   NECK: Supple, no JVD, Trachea midline   LUNGS: CTA bilaterally, non labored respirtation   CVS: S1/S2 audible, RRR, no murmur   Abd: Soft, NT, ND, BS+   Ext: No pedal edema, no cyanosis   CNS: Alert, No focal deficit noted grossly  Psy: Cooperative  Skin: Warm, dry and intact      WBC No results found for: WBC   HGB Hemoglobin   Date Value Ref Range Status   08/01/2022 10.0 (L) 12.0 - 15.9 g/dL Final   08/01/2022 10.1 (L) 12.0 - 15.9 g/dL Final   08/01/2022 9.7 (L) 12.0 - 15.9 g/dL Final   07/31/2022 10.2 (L) 12.0 - 15.9 g/dL Final   07/31/2022 10.8 (L) 12.0 - 15.9 g/dL Final   07/31/2022 11.0 (L) 12.0 - 15.9 g/dL Final   07/30/2022 11.6 (L) 12.0 - 15.9 g/dL Final   07/30/2022 11.1 (L) 12.0 - 15.9 g/dL Final   07/30/2022 11.2 (L) 12.0 - 15.9 g/dL Final   07/30/2022 10.9 (L) 12.0 - 15.9 g/dL Final   07/29/2022 10.7 (L) 12.0 - 15.9 g/dL Final      HCT Hematocrit   Date Value Ref Range Status   08/01/2022 30.4 (L) 34.0 - 46.6 % Final   08/01/2022 30.4 (L) 34.0 - " 46.6 % Final   08/01/2022 28.8 (L) 34.0 - 46.6 % Final   07/31/2022 30.4 (L) 34.0 - 46.6 % Final   07/31/2022 32.9 (L) 34.0 - 46.6 % Final   07/31/2022 33.1 (L) 34.0 - 46.6 % Final   07/30/2022 35.3 34.0 - 46.6 % Final   07/30/2022 33.4 (L) 34.0 - 46.6 % Final   07/30/2022 32.9 (L) 34.0 - 46.6 % Final   07/30/2022 33.3 (L) 34.0 - 46.6 % Final   07/29/2022 32.5 (L) 34.0 - 46.6 % Final      Platlets No results found for: LABPLAT   MCV No results found for: MCV       Sodium Sodium   Date Value Ref Range Status   08/01/2022 141 136 - 145 mmol/L Final   07/31/2022 143 136 - 145 mmol/L Final   07/30/2022 148 (H) 136 - 145 mmol/L Final      Potassium Potassium   Date Value Ref Range Status   08/01/2022 3.5 3.5 - 5.2 mmol/L Final   07/31/2022 3.6 3.5 - 5.2 mmol/L Final   07/30/2022 3.8 3.5 - 5.2 mmol/L Final      Chloride Chloride   Date Value Ref Range Status   08/01/2022 103 98 - 107 mmol/L Final   07/31/2022 106 98 - 107 mmol/L Final   07/30/2022 108 (H) 98 - 107 mmol/L Final      CO2 CO2   Date Value Ref Range Status   08/01/2022 25.0 22.0 - 29.0 mmol/L Final   07/31/2022 22.0 22.0 - 29.0 mmol/L Final   07/30/2022 24.0 22.0 - 29.0 mmol/L Final      BUN BUN   Date Value Ref Range Status   08/01/2022 106 (H) 8 - 23 mg/dL Final   07/31/2022 110 (H) 8 - 23 mg/dL Final   07/30/2022 106 (H) 8 - 23 mg/dL Final      Creatinine Creatinine   Date Value Ref Range Status   08/01/2022 3.86 (H) 0.57 - 1.00 mg/dL Final   07/31/2022 3.80 (H) 0.57 - 1.00 mg/dL Final   07/30/2022 3.54 (H) 0.57 - 1.00 mg/dL Final      Calcium Calcium   Date Value Ref Range Status   08/01/2022 8.1 (L) 8.6 - 10.5 mg/dL Final   07/31/2022 8.2 (L) 8.6 - 10.5 mg/dL Final   07/30/2022 8.5 (L) 8.6 - 10.5 mg/dL Final      PO4 No results found for: CAPO4   Albumin Albumin   Date Value Ref Range Status   07/30/2022 2.80 (L) 3.50 - 5.20 g/dL Final      Magnesium Magnesium   Date Value Ref Range Status   08/01/2022 1.9 1.6 - 2.4 mg/dL Final      Uric Acid Uric Acid    Date Value Ref Range Status   07/29/2022 10.0 (H) 2.4 - 5.7 mg/dL Final     Comment:     Falsely depressed results may occur on samples drawn from patients receiving N-Acetylcysteine (NAC) or Metamizole.           Results Review:     I reviewed the patient's new clinical results.    atorvastatin, 20 mg, Oral, Nightly  insulin lispro, 0-7 Units, Subcutaneous, TID AC  Insulin Lispro, 4 Units, Subcutaneous, TID With Meals  latanoprost, 1 drop, Both Eyes, Nightly  levothyroxine, 112 mcg, Oral, Q AM  pantoprazole, 40 mg, Oral, BID AC  sodium chloride, 10 mL, Intravenous, Q12H  timolol, 1 drop, Both Eyes, BID           Medication Review:     Assessment & Plan       GIB (gastrointestinal bleeding)    Chronic atrial fibrillation (HCC)    CKD (chronic kidney disease), stage IV (HCC)    Hyperlipidemia LDL goal <100    Essential hypertension    Hypothyroidism    Pulmonary embolism (HCC)    CHF (NYHA class IV, ACC/AHA stage D) (HCC)    CAD (coronary artery disease)    Chronic systolic congestive heart failure (HCC)    Type 2 diabetes mellitus without complication, with long-term current use of insulin (HCC)    Normocytic anemia    Acute renal failure (ARF) (HCC)    COVID-19 virus detected    Thrombocytopenia (HCC)    Hypoalbuminemia    AMS (altered mental status)    Lower GI bleed      1- ROBBI on CKD stage IV - Likely pre-renal azotemia for volume depletion. Some improvement with IV fluids. FeUrea 49.7% suggestive of intrinsic renal disease. Scr stable at 3.8   2 CKD stage IV -baseline Scr 2.5 range recently.   3- GI bleed   4- Low albumin level   5- Iron def anemia Tsat 11%  6- Renal cysts   7- Diastolic CHF - EF 55%     Plan:  -  Encourage oral hydration.   - Monitor I/O   - Avoid nephrotoxic agents.   - Renal diet.   - Avoid nephrotoxic agents.   - Adjust meds per renal function   - No emergent need of RRT.     Darryl Ledesma MD  08/01/22  13:15 EDT

## 2022-08-01 NOTE — CONSULTS
[facility] Consult/H&P Note      Referring Provider: No ref. provider found  Primary Provider:  Luis Cardenas MD  Reason for Consultation: Acute on chronic systolic heart failure    PROBLEM LIST:  1. Mixed Systolic and Diastolic Heart Failure   a. On 2L NC at night  b. S/p ICD implant, 7/2022  c. Echo 11/15/21: EF 51-55%, grade III diastolic dysfunction, moderate to severely dilated RV, RA mildly dilated, moderate TR, mild to moderate MR, RVSP 35-45 mmHg.   d. Echo 7/2022: EF 37%, mild LVH, labmbl's excrescences of the aortic valve, trace AI, moderate MR, moderate to severe TR, RVSP 48 mmHg, moderate pulmonary insufficiency  2. Atrial fibrillation  a. On Eliquis  3. Hypertension  4. Hyperlipidemia  5. Hypothyroidism  6. Coronary artery disease  a. S/p stents and MI in 2015- data deficit.   7. Diabetes, type II  8. Chronic kidney disease  9. History of DVT PE  10. Sleep apnea  11. GI bleeding      HISTORY OF PRESENT ILLNESS:  Karla Cristobal is a 87 y.o. female with the above noted pmhx who presented with complaints of rectal bleeding. CT A/P demonstrated extensive diverticulosis. GI was consulted and patient underwent C-scope which revealed angiodysplasia in the ascending colon hemorrhage that was treated with three endoclips. H&H have remained stable since. GI has cleared her to resume anticoagulation if needed. Also of note, nephrology is following patient for ROBBI on CKD, likely pre-renal azotemia due to volume depletion. During this admission, she had a CXR which was concerning for pulmonary edema. Subsequently, she had an echocardiogram which revealed a newly reduced EF of 37%, was 50-55% in November however we do have records of a JUANITA done in March 2022 where her EF was 35% with severe mitral regurgitation and moderate tricuspid regurgitation.  She was assessed for MitraClip at that time and ultimately was not pursued.. She was given a one time dose of IV lasix with bump in her creatinine. Cardiology  has been consulted for acute systolic heart failure.  She denies resting dyspnea.      Cardiology has been consulted for acute on chronic systolic heart failure. Patient denies SOA, PND, orthopnea, CP, edema.        REVIEW OF SYSTEMS  Pertinent positives are listed in the HPI and all other ROS are negative.      SOCIAL HISTORY:   reports that she has never smoked. She has never used smokeless tobacco. She reports that she does not drink alcohol and does not use drugs.     FAMILY HISTORY:  family history includes Breast cancer in her mother and another family member; Cancer in her father; Diabetes in an other family member; Esophageal cancer in an other family member; Hypertension in an other family member; Transient ischemic attack in an other family member.     CURRENT MEDICATIONS:      Current Facility-Administered Medications:   •  acetaminophen (TYLENOL) tablet 650 mg, 650 mg, Oral, Q4H PRN **OR** acetaminophen (TYLENOL) 160 MG/5ML solution 650 mg, 650 mg, Oral, Q4H PRN **OR** acetaminophen (TYLENOL) suppository 650 mg, 650 mg, Rectal, Q4H PRN, Brunner, Mark I, MD  •  albumin human 25 % IV SOLN 12.5 g, 12.5 g, Intravenous, Once, Baltazar, Darryl Longo MD  •  atorvastatin (LIPITOR) tablet 20 mg, 20 mg, Oral, Nightly, Brunner, Mark I, MD, 20 mg at 07/31/22 2125  •  dextrose (D50W) (25 g/50 mL) IV injection 25 g, 25 g, Intravenous, Q15 Min PRN, Brunner, Mark I, MD  •  dextrose (GLUTOSE) oral gel 15 g, 15 g, Oral, Q15 Min PRN, Brunner, Mark I, MD  •  glucagon (human recombinant) (GLUCAGEN DIAGNOSTIC) injection 1 mg, 1 mg, Intramuscular, Q15 Min PRN, Brunner, Mark I, MD  •  Insulin Lispro (humaLOG) injection 0-7 Units, 0-7 Units, Subcutaneous, TID AC, Brunner, Mark I, MD, 4 Units at 08/01/22 1242  •  Insulin Lispro (humaLOG) injection 4 Units, 4 Units, Subcutaneous, TID With Meals, Dony Russell MD, 4 Units at 08/01/22 1242  •  latanoprost (XALATAN) 0.005 % ophthalmic solution 1 drop, 1 drop, Both Eyes, Nightly,  "Brunner, Mark I, MD, 1 drop at 07/31/22 2125  •  levothyroxine (SYNTHROID, LEVOTHROID) tablet 112 mcg, 112 mcg, Oral, Q AM, Brunner, Mark I, MD, 112 mcg at 08/01/22 0620  •  melatonin tablet 5 mg, 5 mg, Oral, Nightly PRN, Brunner, Mark I, MD, 5 mg at 07/31/22 2125  •  ondansetron (ZOFRAN) injection 4 mg, 4 mg, Intravenous, Q6H PRN, Brunner, Mark I, MD, 4 mg at 07/30/22 0831  •  pantoprazole (PROTONIX) EC tablet 40 mg, 40 mg, Oral, BID AC, Dony Russell MD  •  sodium chloride 0.9 % flush 10 mL, 10 mL, Intravenous, PRN, Brunner, Mark I, MD  •  sodium chloride 0.9 % flush 10 mL, 10 mL, Intravenous, Q12H, Brunner, Mark I, MD, 10 mL at 08/01/22 0943  •  timolol (TIMOPTIC) 0.5 % ophthalmic solution 1 drop, 1 drop, Both Eyes, BID, Brunner, Mark I, MD, 1 drop at 08/01/22 0945     Allergies:  Bactrim [sulfamethoxazole-trimethoprim], Lisinopril, Rocephin [ceftriaxone], and Codeine    Objective     Vital Sign Min/Max for last 24 hours  Temp  Min: 97.5 °F (36.4 °C)  Max: 97.9 °F (36.6 °C)   BP  Min: 102/47  Max: 135/63   Pulse  Min: 72  Max: 92   Resp  Min: 18  Max: 18   SpO2  Min: 92 %  Max: 100 %   No data recorded   Weight  Min: 77.6 kg (171 lb 1.6 oz)  Max: 77.7 kg (171 lb 4.8 oz)     Flowsheet Rows    Flowsheet Row First Filed Value   Admission Height 162.6 cm (64\") Documented at 07/28/2022 1451   Admission Weight 76.2 kg (168 lb) Documented at 07/28/2022 1451          PHYSICAL EXAM:  GENERAL: This is a well-developed, well-nourished, female who is in no acute distress.   SKIN: Pink and warm   HEENT: Head is normocephalic and atraumatic. Mucous membranes are pink and moist.   NECK: There is no jugular venous distention at 30°.  LUNGS: Clear to auscultation bilaterally without wheezing, rhonchi, or rales noted.   CARDIOVASCULAR: The heart has a regular rate with a normal S1 and S2. There is 2/6 murmur, no gallop, rub, or click appreciated. The PMI is nondisplaced.  ABDOMEN: Soft and nondistended with positive bowel " sounds x4.   PERIPHERAL VASCULAR:  Posterior tibial and dorsalis pedis pulses are 1+ and symmetrical. There is no peripheral edema.   PSYCH: Normal mood and affect.      EKG:    Tele: V paced    LABS:      Lab Results   Component Value Date    WBC 4.02 07/29/2022    HGB 10.0 (L) 08/01/2022    HCT 30.4 (L) 08/01/2022    MCV 89.5 07/29/2022    PLT 75 (L) 07/29/2022     Lab Results   Component Value Date    GLUCOSE 174 (H) 08/01/2022     (H) 08/01/2022    CREATININE 3.86 (H) 08/01/2022    EGFRIFNONA 18 (L) 11/18/2021    BCR 27.5 (H) 08/01/2022    K 3.5 08/01/2022    CO2 25.0 08/01/2022    CALCIUM 8.1 (L) 08/01/2022    ALBUMIN 2.80 (L) 07/30/2022    AST 27 07/28/2022    ALT 15 07/28/2022     Lab Results   Component Value Date    CKTOTAL 27 07/29/2022    TROPONINT 0.057 (C) 06/11/2022     Lab Results   Component Value Date    INR 2.04 (H) 07/29/2022    INR 2.15 (H) 07/28/2022    INR 2.39 (H) 07/14/2020    PROTIME 23.1 (H) 07/29/2022    PROTIME 24.1 (H) 07/28/2022    PROTIME 25.7 (H) 07/14/2020     Lab Results   Component Value Date    CHOL 89 09/08/2021    CHLPL 79 (L) 03/26/2021    TRIG 61 09/08/2021    HDL 40 09/08/2021    LDL 35 09/08/2021        Results Review: I reviewed the patient's new clinical results.      ASSESSMENT:    1.  Mixed Systolic and Diastolic Heart Failure   a. On 2L NC at night  b. S/p ICD implant  c. JUANITA 3/2022: severe MR  d. Being evaluated for Mitral Clip with Dr. Tali dudley Echo 7/2022: EF 37%, mild LVH, labmbl's excrescences of the aortic valve, trace AI, moderate MR, moderate to severe TR, RVSP 48 mmHg, moderate pulmonary insufficiency  2. Mitral regurgitation  3. Atrial fibrillation  a. On Eliquis at home, held due to GIB  4. Hypertension  5. Hyperlipidemia  6. Coronary artery disease  a. S/p stents and MI in 2015- data deficit.   7. Diabetes, type II  8. ROBBI on Chronic kidney disease  9. History of DVT PE  10. Sleep apnea  11. Acute GIB, angiodysplasia, s/p endoclip  Anemia  a. H&H  stable  b. Cleared by GI to resume anticoagulation  12. COVID 19  13. [place] biventricular ICD in place    PLAN:    1. Start Coreg 3.125 mg bid for GDMT.   2. Will defer ischemic evaluation since it appears that she may have chronic systolic heart failure ejection fraction not significantly changed from March 22 JUANITA, currently has COVID and ROBBI on CKD  3. Hold diuresis for now given ROBBI. Nephrology to assist with maintaining euvolemia.   4. Continue statin for HLD  5. Resume Eliquis 2.5 mg twice daily now.  She has 100% A. fib burden on most recent device check..  And monitor for rebleeding   6. Will need to follow up with local cardiologist regarding moderate to severe MR, she states ultimately MitraClip was not perfused Padmini.  She was not deemed a surgical candidate.        Electronically signed by NESS Bender, 08/01/22, 1:52 PM EDT.    Patient was seen and examined in face-to-face encounter.  She appears to have a chronic cardiomyopathy with history of CAD and prior CABG.  EF 37%, it was 35% on a JUANITA in March 2022.  She was admitted with GI bleeding and treated for angiodysplasia in the ascending colon with an Endo Clip.  After GI evaluation is recommended she could resume anticoagulation if indicated.  [id number] 6, will start Eliquis 2.5 mg twice daily    We will resume Coreg at a lower dose of 3.125 mg twice daily for medical therapy for heart failure.  We will hold on any ischemic evaluation given COVID-positive status and worsening ROBBI on CKD.  Hold diuretics at this time due to worsening renal function.  Nephrology following.    She was evaluated at [facility] earlier this year for MitraClip but ultimately was not pursued.  She was not felt to be a surgical candidate.

## 2022-08-02 NOTE — PROGRESS NOTES
Cardiology interim note    Discussed patient with her RN and discussed plan of care with patient herself over the phone.  Tolerating Coreg and Eliquis with stable hemoglobin and blood pressure.  Renal function has worsened today and the idea of dialysis has been broached by nephrology.  The patient does not believe she is interested in dialysis at this time.  If Eliquis needs to be held for any procedures regarding dialysis access it can be.  I did discuss with the patient that her CKD and CHF makes volume removal difficult without worsening of GFR.  If creatinine continues to worsen and she does not desire dialysis palliative consult may be appropriate.    Will follow peripherally for now.  Please call with questions

## 2022-08-02 NOTE — PROGRESS NOTES
[facility] Medicine Services  PROGRESS NOTE    Patient Name: Karla Cristobal  : 1935  MRN: [id number]    Date of Admission: 2022  Primary Care Physician: Luis Cardenas MD    Subjective   Subjective     CC:  GI bleed    HPI:  Resting in bed no acute distress.  Bleeding has improved and she is overall comfortable.  Denies any fever or chills.  No chest pain or palpitation.  No nausea vomiting or diarrhea or abdominal pain.  ROS:  As above    Objective   Objective     Vital Signs:   Temp:  [98.3 °F (36.8 °C)-99.2 °F (37.3 °C)] 98.7 °F (37.1 °C)  Heart Rate:  [74-94] 78  Resp:  [16-18] 16  BP: (110-139)/(52-60) 131/54  Flow (L/min):  [2] 2     Physical Exam:  Constitutional: No acute distress  HENT: NCAT, mucous membranes moist  Respiratory: Some crackles at bases, improved,  breathing without labor.    Cardiovascular: Pacemaker pulse, apical systolic murmur, no rubs, or gallops  Gastrointestinal: Abdomen is obese.  Positive bowel sounds, soft, nontender, nondistended  Musculoskeletal: No bilateral ankle edema  Psychiatric: Appropriate affect, cooperative  Neurologic: Awake, alert, oriented x3, no focality appreciated, speech clear  Skin: No rashes    Results Reviewed:  LAB RESULTS:      Lab 22  0702 22  0024 22  1849 22  1141 22  0844 22  1744 22  1047 22  0742 22  0354 22  0111 22  2148 22  1523   WBC 6.19  --   --   --   --   --   --   --  4.02  --   --   --  5.42   HEMOGLOBIN 10.1*  10.1* 10.0* 9.9* 10.0* 10.1*   < > 10.8*   < > 10.7*   < > 11.0*  --  11.1*   HEMATOCRIT 30.2*  30.2* 29.1* 29.3* 30.4* 30.4*   < > 32.9*   < > 33.2*   < > 32.5*  --  33.0*   PLATELETS 112*  --   --   --   --   --   --   --  75*  --   --   --  90*   NEUTROS ABS 3.93  --   --   --   --   --   --   --  2.14  --   --   --  2.94   IMMATURE GRANS (ABS) 0.05  --   --   --   --   --   --   --  0.03  --   --   --  0.02    LYMPHS ABS 1.28  --   --   --   --   --   --   --  1.21  --   --   --  1.67   MONOS ABS 0.71  --   --   --   --   --   --   --  0.47  --   --   --  0.65   EOS ABS 0.19  --   --   --   --   --   --   --  0.15  --   --   --  0.12   MCV 86.3  --   --   --   --   --   --   --  89.5  --   --   --  87.8   LACTATE  --   --   --   --   --   --   --   --   --   --   --  1.8 2.7*   LDH  --   --   --   --   --   --   --   --   --   --   --  276*  --    PROTIME  --   --   --   --   --   --   --   --  23.1*  --  24.1*  --   --    D DIMER QUANT  --   --   --   --   --   --  0.85*  --   --   --   --   --   --     < > = values in this interval not displayed.         Lab 08/02/22  0702 08/01/22  0844 07/31/22  1047 07/30/22  1348 07/29/22 2026 07/29/22  0358   SODIUM 140 141 143 148*  --  142   POTASSIUM 3.8 3.5 3.6 3.8  --  4.0   CHLORIDE 103 103 106 108*  --  103   CO2 24.0 25.0 22.0 24.0  --  24.0   ANION GAP 13.0 13.0 15.0 16.0*  --  15.0   * 106* 110* 106*  --  113*   CREATININE 4.09* 3.86* 3.80* 3.54*  --  4.02*   EGFR 10.1* 10.9* 11.1* 12.1*  --  10.4*   GLUCOSE 175* 174* 283* 130*  --  101*   CALCIUM 8.3* 8.1* 8.2* 8.5*  --  8.2*   MAGNESIUM  --  1.9  --   --   --  2.0   PHOSPHORUS  --  4.0  --  5.7* 5.6* 5.8*         Lab 07/30/22  1348 07/28/22 2001   TOTAL PROTEIN  --  6.6   ALBUMIN 2.80* 2.90*   GLOBULIN  --  3.7   ALT (SGPT)  --  15   AST (SGOT)  --  27   BILIRUBIN  --  0.9   ALK PHOS  --  176*         Lab 07/29/22  0354 07/28/22  2148   PROTIME 23.1* 24.1*   INR 2.04* 2.15*             Lab 07/29/22  0354 07/28/22 2001 07/28/22  1600   IRON  --  34*  --    IRON SATURATION  --  11*  --    TIBC  --  307  --    TRANSFERRIN  --  206  --    FERRITIN  --  267.60*  --    FOLATE 9.09  --   --    VITAMIN B 12 774  --   --    ABO TYPING  --   --  B   RH TYPING  --   --  Positive   ANTIBODY SCREEN  --   --  Negative         Brief Urine Lab Results  (Last result in the past 365 days)      Color   Clarity   Blood   Leuk  Est   Nitrite   Protein   CREAT   Urine HCG        07/30/22 1742 Yellow   Clear   Small (1+)   Trace   Negative   Trace                 Microbiology Results Abnormal     Procedure Component Value - Date/Time    Eosinophil Smear - Urine, Urine, Clean Catch [[id number]]  (Normal) Collected: 07/30/22 1742    Lab Status: Final result Specimen: Urine, Clean Catch Updated: 07/30/22 1938     Eosinophil Smear 0 % EOS/100 Cells     Narrative:      No eosinophil seen          Adult Transthoracic Echo Complete W/ Cont if Necessary Per Protocol    Result Date: 7/31/2022  · Mild biatrial enlargement. · Moderately reduced right ventricular systolic function noted. · Moderate left ventricular systolic dysfunction, estimated EF 37%. · Left ventricular wall thickness is consistent with mild concentric hypertrophy. · Lambl's excrescences of the aortic valve are noted. There is trace aortic insufficiency, no evidence of aortic stenosis. · Moderate mitral regurgitation. · Moderate to severe tricuspid regurgitation with RVSP 48 mmHg. · Moderate pulmonic insufficiency.        Results for orders placed during the hospital encounter of 07/28/22    Adult Transthoracic Echo Complete W/ Cont if Necessary Per Protocol    Interpretation Summary  · Mild biatrial enlargement.  · Moderately reduced right ventricular systolic function noted.  · Moderate left ventricular systolic dysfunction, estimated EF 37%.  · Left ventricular wall thickness is consistent with mild concentric hypertrophy.  · Lambl's excrescences of the aortic valve are noted. There is trace aortic insufficiency, no evidence of aortic stenosis.  · Moderate mitral regurgitation.  · Moderate to severe tricuspid regurgitation with RVSP 48 mmHg.  · Moderate pulmonic insufficiency.      I have reviewed the medications:  Scheduled Meds:apixaban, 2.5 mg, Oral, Q12H  atorvastatin, 20 mg, Oral, Nightly  carvedilol, 3.125 mg, Oral, BID With Meals  insulin lispro, 0-7 Units, Subcutaneous, TID  AC  Insulin Lispro, 4 Units, Subcutaneous, TID With Meals  latanoprost, 1 drop, Both Eyes, Nightly  levothyroxine, 112 mcg, Oral, Q AM  pantoprazole, 40 mg, Oral, BID AC  sodium chloride, 10 mL, Intravenous, Q12H  timolol, 1 drop, Both Eyes, BID      Continuous Infusions:sodium chloride, 50 mL/hr, Last Rate: 50 mL/hr (08/02/22 1304)      PRN Meds:.•  acetaminophen **OR** acetaminophen **OR** acetaminophen  •  dextrose  •  dextrose  •  glucagon (human recombinant)  •  melatonin  •  ondansetron  •  sodium chloride    Assessment & Plan   Assessment & Plan     Active Hospital Problems    Diagnosis  POA   • **GIB (gastrointestinal bleeding) [K92.2]  Yes   • Normocytic anemia [D64.9]  Unknown   • Acute renal failure (ARF) (HCC) [N17.9]  Unknown   • COVID-19 virus detected [U07.1]  Unknown   • Thrombocytopenia (HCC) [D69.6]  Unknown   • Hypoalbuminemia [E88.09]  Unknown   • AMS (altered mental status) [R41.82]  Unknown   • Lower GI bleed [K92.2]  Yes   • Type 2 diabetes mellitus without complication, with long-term current use of insulin (HCC) [E11.9, Z79.4]  Not Applicable   • Chronic systolic congestive heart failure (HCC) [I50.22]  Yes   • CAD (coronary artery disease) [I25.10]  Yes   • Chronic atrial fibrillation (HCC) [I48.20]  Yes   • CKD (chronic kidney disease), stage IV (HCC) [N18.4]  Yes   • Hyperlipidemia LDL goal <100 [E78.5]  Yes   • Essential hypertension [I10]  Yes   • Hypothyroidism [E03.9]  Yes   • Pulmonary embolism (HCC) [I26.99]  Yes   • CHF (NYHA class IV, ACC/AHA stage D) (HCC) [I50.84]  Yes      Resolved Hospital Problems   No resolved problems to display.        Brief Hospital Course to date:  Karla Cristobal is a 87 y.o. female with past medical history of anemia, A. fib on Eliquis, CKD stage IV, HLD, hypertension, hypothyroid, CAD s/p stents, diabetes type 2, and systolic heart failure (2L NC at night) who presents to the ED for rectal bleeding. Last colonoscopy was in 2021.       *Acute GI bleed.   CT scan shows extensive diverticulosis.  Hemoglobin and hematocrit being monitored.  Had colonoscopy which showed sigmoid diverticulosis.  Also AVM in the proximal ascending colon with adherent clots which seems to be the source of bleeding.  3 endoclips applied for hemostasis.  Hemoglobin and hematocrit are pretty stable.    *Anemia secondary to acute blood loss.    *Acute kidney injury on chronic kidney disease.  Nephrology is following.  BUN is 117 and creatinine is 4.09 today.  Nephrology thinks that patient is going to need dialysis although patient has declined at this point.    *COVID-19 positive.  Patient is asymptomatic and currently no treatment.  LDH and ferritin are elevated.      *Shortness of breath secondary to pulmonary edema due to acute on chronic systolic and diastolic heart failure.  Patient improved with 1 dose of Lasix.  -Cardiology  has seen the patient.    *Valvular heart disease and heart failure.  --Last echo which was done on 11/17/2021 shows normal ejection fraction.  Repeat echocardiogram which was done on 7/31 shows ejection fraction of 37%, apical and septal dyskinesia.  --This study also shows bilateral atrial cavity mild dilation.  Moderate to severe tricuspid valve regurgitation, estimated right ventricular systolic pressure is between 45 to 55 mmHg.  This study also shows moderate pulmonic valve regurgitation and also moderate mitral valve regurgitation.    PLAN:  - continue current care  - may need HD  - home when more stable and OK with nephrology.     Expected Discharge Location and Transportation: home  Expected Discharge Date: when H/H stable, renal function is stable    DVT prophylaxis:  Medical DVT prophylaxis orders are present.     AM-PAC 6 Clicks Score (PT): 11 (07/31/22 1340)    CODE STATUS:   Code Status and Medical Interventions:   Ordered at: 07/28/22 4953     Level Of Support Discussed With:    Patient     Code Status (Patient has no pulse and is not breathing):     CPR (Attempt to Resuscitate)     Medical Interventions (Patient has pulse or is breathing):    Full Support       Dony Russell MD  08/02/22

## 2022-08-02 NOTE — PLAN OF CARE
Goal Outcome Evaluation:         Patient is 100% paced on the monitor and on 1L NC for comfort.  Patient is alert and oriented times four.  Purewick in use.  Normal Saline at 50 ml/hr started per nephrology.  Patient spoke with Dr. Wilson on the phone.  Daughter Frida was called so that the patient could discuss possibly starting dialysis.  Bed in lowest position and phone and call light in use.  No complaints per patient.

## 2022-08-02 NOTE — CASE MANAGEMENT/SOCIAL WORK
"Continued Stay Note  [facility]     Patient Name: Karla Cristobal  MRN: [id number]  Today's Date: 8/2/2022    Admit Date: 7/28/2022     Discharge Plan     Row Name 08/02/22 1413       Plan    Plan CM update    Plan Comments Patient is not medically ready for discharge - cardiology and nephrology are following. Per Dr Russell's note: \"home when more stable and OK with nephrology.\" PT and OT are following and recommend SNF at dc - will discuss with patient and family - If she goes to SNF, she will not be able to dc until 8/8 which is  10 days past her positive test. CM will continue to follow - matheus [phone]               Discharge Codes    No documentation.                     Matheus Rodarte RN    "

## 2022-08-02 NOTE — PLAN OF CARE
Goal Outcome Evaluation:           Progress: improving  Outcome Evaluation: VSS, Paced on the monitor, 2LNC. No acute events overnight. Will continue to monitor.

## 2022-08-02 NOTE — PROGRESS NOTES
"   LOS: 4 days    Patient Care Team:  Luis Cardenas MD as PCP - General (Nephrology)    Chief Complaint:  Gi bleed    Subjective     Interval History:   No acute events overnight. On room air.     Review of Systems:   No CP  , fever, chills, rigors, rash, N/V, Constipation.         Objective     Vital Sign Min/Max for last 24 hours  Temp  Min: 98.3 °F (36.8 °C)  Max: 99.2 °F (37.3 °C)   BP  Min: 102/47  Max: 139/58   Pulse  Min: 74  Max: 94   Resp  Min: 16  Max: 18   SpO2  Min: 98 %  Max: 100 %   Flow (L/min)  Min: 2  Max: 2   Weight  Min: 76.9 kg (169 lb 8 oz)  Max: 76.9 kg (169 lb 8 oz)     Flowsheet Rows    Flowsheet Row First Filed Value   Admission Height 162.6 cm (64\") Documented at 07/28/2022 1451   Admission Weight 76.2 kg (168 lb) Documented at 07/28/2022 1451          I/O this shift:  In: 240 [P.O.:240]  Out: -   I/O last 3 completed shifts:  In: -   Out: 1850 [Urine:1850]    Physical Exam:    Gen: Alert, NAD   HENT: NC, AT, EOMI   NECK: Supple, no JVD, Trachea midline   LUNGS: CTA bilaterally, non labored respirtation   CVS: S1/S2 audible, RRR, no murmur   Abd: Soft, NT, ND, BS+   Ext: No pedal edema, no cyanosis   CNS: Alert, No focal deficit noted grossly  Psy: Cooperative  Skin: Warm, dry and intact      WBC WBC   Date Value Ref Range Status   08/02/2022 6.19 3.40 - 10.80 10*3/mm3 Final      HGB Hemoglobin   Date Value Ref Range Status   08/02/2022 10.1 (L) 12.0 - 15.9 g/dL Final   08/02/2022 10.1 (L) 12.0 - 15.9 g/dL Final   08/02/2022 10.0 (L) 12.0 - 15.9 g/dL Final   08/01/2022 9.9 (L) 12.0 - 15.9 g/dL Final   08/01/2022 10.0 (L) 12.0 - 15.9 g/dL Final   08/01/2022 10.1 (L) 12.0 - 15.9 g/dL Final   08/01/2022 9.7 (L) 12.0 - 15.9 g/dL Final   07/31/2022 10.2 (L) 12.0 - 15.9 g/dL Final   07/31/2022 10.8 (L) 12.0 - 15.9 g/dL Final   07/31/2022 11.0 (L) 12.0 - 15.9 g/dL Final   07/30/2022 11.6 (L) 12.0 - 15.9 g/dL Final   07/30/2022 11.1 (L) 12.0 - 15.9 g/dL Final      HCT Hematocrit   Date Value Ref " Range Status   08/02/2022 30.2 (L) 34.0 - 46.6 % Final   08/02/2022 30.2 (L) 34.0 - 46.6 % Final   08/02/2022 29.1 (L) 34.0 - 46.6 % Final   08/01/2022 29.3 (L) 34.0 - 46.6 % Final   08/01/2022 30.4 (L) 34.0 - 46.6 % Final   08/01/2022 30.4 (L) 34.0 - 46.6 % Final   08/01/2022 28.8 (L) 34.0 - 46.6 % Final   07/31/2022 30.4 (L) 34.0 - 46.6 % Final   07/31/2022 32.9 (L) 34.0 - 46.6 % Final   07/31/2022 33.1 (L) 34.0 - 46.6 % Final   07/30/2022 35.3 34.0 - 46.6 % Final   07/30/2022 33.4 (L) 34.0 - 46.6 % Final      Platlets No results found for: LABPLAT   MCV MCV   Date Value Ref Range Status   08/02/2022 86.3 79.0 - 97.0 fL Final          Sodium Sodium   Date Value Ref Range Status   08/02/2022 140 136 - 145 mmol/L Final   08/01/2022 141 136 - 145 mmol/L Final   07/31/2022 143 136 - 145 mmol/L Final   07/30/2022 148 (H) 136 - 145 mmol/L Final      Potassium Potassium   Date Value Ref Range Status   08/02/2022 3.8 3.5 - 5.2 mmol/L Final   08/01/2022 3.5 3.5 - 5.2 mmol/L Final   07/31/2022 3.6 3.5 - 5.2 mmol/L Final   07/30/2022 3.8 3.5 - 5.2 mmol/L Final      Chloride Chloride   Date Value Ref Range Status   08/02/2022 103 98 - 107 mmol/L Final   08/01/2022 103 98 - 107 mmol/L Final   07/31/2022 106 98 - 107 mmol/L Final   07/30/2022 108 (H) 98 - 107 mmol/L Final      CO2 CO2   Date Value Ref Range Status   08/02/2022 24.0 22.0 - 29.0 mmol/L Final   08/01/2022 25.0 22.0 - 29.0 mmol/L Final   07/31/2022 22.0 22.0 - 29.0 mmol/L Final   07/30/2022 24.0 22.0 - 29.0 mmol/L Final      BUN BUN   Date Value Ref Range Status   08/02/2022 117 (H) 8 - 23 mg/dL Final   08/01/2022 106 (H) 8 - 23 mg/dL Final   07/31/2022 110 (H) 8 - 23 mg/dL Final   07/30/2022 106 (H) 8 - 23 mg/dL Final      Creatinine Creatinine   Date Value Ref Range Status   08/02/2022 4.09 (H) 0.57 - 1.00 mg/dL Final   08/01/2022 3.86 (H) 0.57 - 1.00 mg/dL Final   07/31/2022 3.80 (H) 0.57 - 1.00 mg/dL Final   07/30/2022 3.54 (H) 0.57 - 1.00 mg/dL Final       Calcium Calcium   Date Value Ref Range Status   08/02/2022 8.3 (L) 8.6 - 10.5 mg/dL Final   08/01/2022 8.1 (L) 8.6 - 10.5 mg/dL Final   07/31/2022 8.2 (L) 8.6 - 10.5 mg/dL Final   07/30/2022 8.5 (L) 8.6 - 10.5 mg/dL Final      PO4 No results found for: CAPO4   Albumin Albumin   Date Value Ref Range Status   07/30/2022 2.80 (L) 3.50 - 5.20 g/dL Final      Magnesium Magnesium   Date Value Ref Range Status   08/01/2022 1.9 1.6 - 2.4 mg/dL Final      Uric Acid No results found for: URICACID        Results Review:     I reviewed the patient's new clinical results.    apixaban, 2.5 mg, Oral, Q12H  atorvastatin, 20 mg, Oral, Nightly  carvedilol, 3.125 mg, Oral, BID With Meals  insulin lispro, 0-7 Units, Subcutaneous, TID AC  Insulin Lispro, 4 Units, Subcutaneous, TID With Meals  latanoprost, 1 drop, Both Eyes, Nightly  levothyroxine, 112 mcg, Oral, Q AM  pantoprazole, 40 mg, Oral, BID AC  sodium chloride, 10 mL, Intravenous, Q12H  timolol, 1 drop, Both Eyes, BID           Medication Review:     Assessment & Plan       GIB (gastrointestinal bleeding)    Chronic atrial fibrillation (HCC)    CKD (chronic kidney disease), stage IV (HCC)    Hyperlipidemia LDL goal <100    Essential hypertension    Hypothyroidism    Pulmonary embolism (HCC)    CHF (NYHA class IV, ACC/AHA stage D) (HCC)    CAD (coronary artery disease)    Chronic systolic congestive heart failure (HCC)    Type 2 diabetes mellitus without complication, with long-term current use of insulin (HCC)    Normocytic anemia    Acute renal failure (ARF) (HCC)    COVID-19 virus detected    Thrombocytopenia (HCC)    Hypoalbuminemia    AMS (altered mental status)    Lower GI bleed      1- ROBBI on CKD stage IV - Likely pre-renal azotemia for volume depletion. Some improvement with IV fluids. FeUrea 49.7% suggestive of intrinsic renal disease. Worsening.   2 CKD stage IV -baseline Scr 2.5 range recently.   3- GI bleed   4- Low albumin level   5- Iron def anemia Tsat 11%  6-  Renal cysts   7- Systolic CHF - Recent Echo showing EF 37% now.     Plan:  - Challenge with IV fluids today. If SOA and not tolerating will hold IV fluids.   - Monitor I/O   - Avoid nephrotoxic agents.   - Renal diet.   - Avoid nephrotoxic agents.   - Adjust meds per renal function   - No emergent need of RRT. Heading towards dialysis. Discussed with patient, she states that she will not do dialysis but also mention she can change her decision. Will keep her NPO at midnight.     Darryl Ledesma MD  08/02/22  09:32 EDT

## 2022-08-03 NOTE — PROGRESS NOTES
"   LOS: 5 days    Patient Care Team:  Luis Cardenas MD as PCP - General (Nephrology)    Chief Complaint:  Gi bleed    Subjective     Interval History:   No acute events overnight.on 1 lit NC     Review of Systems:   No CP  , fever, chills, rigors, rash, N/V, Constipation.         Objective     Vital Sign Min/Max for last 24 hours  Temp  Min: 98.2 °F (36.8 °C)  Max: 98.5 °F (36.9 °C)   BP  Min: 108/70  Max: 130/59   Pulse  Min: 70  Max: 102   Resp  Min: 16  Max: 18   SpO2  Min: 98 %  Max: 99 %   Flow (L/min)  Min: 1  Max: 2   Weight  Min: 78.3 kg (172 lb 9.6 oz)  Max: 78.3 kg (172 lb 9.6 oz)     Flowsheet Rows    Flowsheet Row First Filed Value   Admission Height 162.6 cm (64\") Documented at 07/28/2022 1451   Admission Weight 76.2 kg (168 lb) Documented at 07/28/2022 1451          No intake/output data recorded.  I/O last 3 completed shifts:  In: 1061 [P.O.:240; I.V.:821]  Out: 1150 [Urine:1150]    Physical Exam:    Gen: Alert, NAD   HENT: NC, AT, EOMI   NECK: Supple, no JVD, Trachea midline   LUNGS: CTA bilaterally, non labored respirtation   CVS: S1/S2 audible, RRR, no murmur   Abd: Soft, NT, ND, BS+   Ext: No pedal edema, no cyanosis   CNS: Alert, No focal deficit noted grossly  Psy: Cooperative  Skin: Warm, dry and intact      WBC WBC   Date Value Ref Range Status   08/02/2022 6.19 3.40 - 10.80 10*3/mm3 Final      HGB Hemoglobin   Date Value Ref Range Status   08/03/2022 9.8 (L) 12.0 - 15.9 g/dL Final   08/02/2022 9.8 (L) 12.0 - 15.9 g/dL Final   08/02/2022 9.8 (L) 12.0 - 15.9 g/dL Final   08/02/2022 9.7 (L) 12.0 - 15.9 g/dL Final   08/02/2022 10.1 (L) 12.0 - 15.9 g/dL Final   08/02/2022 10.1 (L) 12.0 - 15.9 g/dL Final   08/02/2022 10.0 (L) 12.0 - 15.9 g/dL Final   08/01/2022 9.9 (L) 12.0 - 15.9 g/dL Final   08/01/2022 10.0 (L) 12.0 - 15.9 g/dL Final   08/01/2022 10.1 (L) 12.0 - 15.9 g/dL Final   08/01/2022 9.7 (L) 12.0 - 15.9 g/dL Final   07/31/2022 10.2 (L) 12.0 - 15.9 g/dL Final      HCT Hematocrit   Date " Value Ref Range Status   08/03/2022 29.1 (L) 34.0 - 46.6 % Final   08/02/2022 29.3 (L) 34.0 - 46.6 % Final   08/02/2022 30.2 (L) 34.0 - 46.6 % Final   08/02/2022 29.3 (L) 34.0 - 46.6 % Final   08/02/2022 30.2 (L) 34.0 - 46.6 % Final   08/02/2022 30.2 (L) 34.0 - 46.6 % Final   08/02/2022 29.1 (L) 34.0 - 46.6 % Final   08/01/2022 29.3 (L) 34.0 - 46.6 % Final   08/01/2022 30.4 (L) 34.0 - 46.6 % Final   08/01/2022 30.4 (L) 34.0 - 46.6 % Final   08/01/2022 28.8 (L) 34.0 - 46.6 % Final   07/31/2022 30.4 (L) 34.0 - 46.6 % Final      Platlets No results found for: LABPLAT   MCV MCV   Date Value Ref Range Status   08/02/2022 86.3 79.0 - 97.0 fL Final          Sodium Sodium   Date Value Ref Range Status   08/03/2022 137 136 - 145 mmol/L Final   08/02/2022 140 136 - 145 mmol/L Final   08/01/2022 141 136 - 145 mmol/L Final      Potassium Potassium   Date Value Ref Range Status   08/03/2022 3.8 3.5 - 5.2 mmol/L Final   08/02/2022 3.8 3.5 - 5.2 mmol/L Final   08/01/2022 3.5 3.5 - 5.2 mmol/L Final      Chloride Chloride   Date Value Ref Range Status   08/03/2022 100 98 - 107 mmol/L Final   08/02/2022 103 98 - 107 mmol/L Final   08/01/2022 103 98 - 107 mmol/L Final      CO2 CO2   Date Value Ref Range Status   08/03/2022 23.0 22.0 - 29.0 mmol/L Final   08/02/2022 24.0 22.0 - 29.0 mmol/L Final   08/01/2022 25.0 22.0 - 29.0 mmol/L Final      BUN BUN   Date Value Ref Range Status   08/03/2022 119 (H) 8 - 23 mg/dL Final   08/02/2022 117 (H) 8 - 23 mg/dL Final   08/01/2022 106 (H) 8 - 23 mg/dL Final      Creatinine Creatinine   Date Value Ref Range Status   08/03/2022 3.70 (H) 0.57 - 1.00 mg/dL Final   08/02/2022 4.09 (H) 0.57 - 1.00 mg/dL Final   08/01/2022 3.86 (H) 0.57 - 1.00 mg/dL Final      Calcium Calcium   Date Value Ref Range Status   08/03/2022 7.9 (L) 8.6 - 10.5 mg/dL Final   08/02/2022 8.3 (L) 8.6 - 10.5 mg/dL Final   08/01/2022 8.1 (L) 8.6 - 10.5 mg/dL Final      PO4 No results found for: CAPO4   Albumin No results found for:  ALBUMIN   Magnesium Magnesium   Date Value Ref Range Status   08/01/2022 1.9 1.6 - 2.4 mg/dL Final      Uric Acid No results found for: URICACID        Results Review:     I reviewed the patient's new clinical results.    apixaban, 2.5 mg, Oral, Q12H  atorvastatin, 20 mg, Oral, Nightly  carvedilol, 3.125 mg, Oral, BID With Meals  ferric gluconate, 125 mg, Intravenous, Once  insulin lispro, 0-7 Units, Subcutaneous, TID AC  Insulin Lispro, 4 Units, Subcutaneous, TID With Meals  latanoprost, 1 drop, Both Eyes, Nightly  levothyroxine, 112 mcg, Oral, Q AM  pantoprazole, 40 mg, Oral, BID AC  sodium chloride, 10 mL, Intravenous, Q12H  timolol, 1 drop, Both Eyes, BID      sodium chloride, 50 mL/hr, Last Rate: 50 mL/hr (08/03/22 0959)        Medication Review:     Assessment & Plan       GIB (gastrointestinal bleeding)    Chronic atrial fibrillation (HCC)    CKD (chronic kidney disease), stage IV (HCC)    Hyperlipidemia LDL goal <100    Essential hypertension    Hypothyroidism    Pulmonary embolism (HCC)    CHF (NYHA class IV, ACC/AHA stage D) (HCC)    CAD (coronary artery disease)    Chronic systolic congestive heart failure (HCC)    Type 2 diabetes mellitus without complication, with long-term current use of insulin (HCC)    Normocytic anemia    Acute renal failure (ARF) (HCC)    COVID-19 virus detected    Thrombocytopenia (HCC)    Hypoalbuminemia    AMS (altered mental status)    Lower GI bleed      1- ROBBI on CKD stage IV - Likely pre-renal azotemia for volume depletion. Some improvement with IV fluids. FeUrea 49.7% suggestive of intrinsic renal disease. Worsening.   2 CKD stage IV -baseline Scr 2.5 range recently.   3- GI bleed   4- Low albumin level   5- Iron def anemia Tsat 11%  6- Renal cysts   7- Systolic CHF - Recent Echo showing EF 37% now.     Plan:  - Continue with gentle IV hydration.    - Monitor I/O   - Avoid nephrotoxic agents.   - Renal diet.   - IV iron  - Avoid nephrotoxic agents.   - Adjust meds per renal  function   - No emergent need of RRT. Heading towards dialysis. Discussed with patient and daughter over the phone. She has discussed with her mother and her nephrologist(Dr Mckeon) and will do dialysis if needed,  Will keep her NPO at midnight in case we need dialysis access.     Darryl Ledesma MD  08/03/22  11:33 EDT

## 2022-08-03 NOTE — PLAN OF CARE
Goal Outcome Evaluation:  Plan of Care Reviewed With: patient        Progress: declining  Outcome Evaluation: Pt. with increased fatigue today with many rest periods needed.  Pt. mod A with extra time and effort to EOB, unable to stand with severat attempts, max A into bed.  Good participation with TE.  Continue OT POC as tolerates.

## 2022-08-03 NOTE — THERAPY TREATMENT NOTE
Patient Name: Karla Cristobal  : 1935    MRN: [id number]                              Today's Date: 8/3/2022       Admit Date: 2022    Visit Dx:     ICD-10-CM ICD-9-CM   1. Lower GI bleed  K92.2 578.9   2. Chronic anticoagulation  Z79.01 V58.61   3. History of atrial fibrillation  Z86.79 V12.59   4. Generalized weakness  R53.1 780.79   5. Malaise and fatigue  R53.81 780.79    R53.83    6. Nausea  R11.0 787.02   7. Acute renal failure superimposed on stage 4 chronic kidney disease, unspecified acute renal failure type (HCC)  N17.9 584.9    N18.4 585.4   8. Thrombocytopenia ([facility])  D69.6 287.5   9. History of CHF (congestive heart failure)  Z86.79 V12.59   10. History of diabetes mellitus  Z86.39 V12.29   11. History of diverticulosis  Z87.19 V12.79   12. Gastrointestinal hemorrhage, unspecified gastrointestinal hemorrhage type  K92.2 578.9     Patient Active Problem List   Diagnosis   • Chronic atrial fibrillation (HCC)   • CKD (chronic kidney disease), stage IV (HCC)   • Diabetic nephropathy (HCC)   • Diabetic retinopathy (HCC)   • Malignant neoplasm of endometrium (HCC)   • Hyperlipidemia LDL goal <100   • Essential hypertension   • Hypothyroidism   • Insomnia   • Leukocytosis   • Memory impairment   • Nausea   • Pulmonary embolism (HCC)   • Sleep apnea   • Squamous cell carcinoma of skin   • CHF (NYHA class IV, ACC/AHA stage D) ([facility])   • Ischemic heart disease   • CAD (coronary artery disease)   • DVT of lower extremity (deep venous thrombosis) (HCC)   • Chronic systolic congestive heart failure (HCC)   • Morbidly obese (HCC)   • Ischemic cardiomyopathy   • Type 2 diabetes mellitus without complication, with long-term current use of insulin (HCC)   • Exudative age-related macular degeneration, right eye, with active choroidal neovascularization (HCC)   • Hypoxia   • Cellulitis of left lower extremity   • Left leg cellulitis   • Food impaction of esophagus   • Malignant tumor of ascending colon (HCC)   •  GIB (gastrointestinal bleeding)   • Normocytic anemia   • Acute renal failure (ARF) ([facility])   • COVID-19 virus detected   • Thrombocytopenia ([facility])   • Hypoalbuminemia   • AMS (altered mental status)   • Lower GI bleed     Past Medical History:   Diagnosis Date   • Acute bronchitis    • Arthritis    • Atrial fibrillation ([facility])    • CAD (coronary artery disease)     s/p MI 2005; 3 stents inserted    • Change in mole    • Change in mole    • Change in nevus 6/16/2016   • Chronic kidney disease     stage IV-sees Dr Bk Mckeon every 3-4 months    • Chronic renal disease, stage 4, severely decreased glomerular filtration rate between 15-29 mL/min/1.73 square meter ([facility])    • Chronic systolic heart failure ([facility])    • Congestive heart disease ([facility])    • Conjunctivitis    • Deep vein thrombosis of lower extremity ([facility])    • Diabetes mellitus ([facility])    • Diabetes mellitus ([facility]) 6/16/2016    Impression: 03/15/2016 - blood sugar 166 and hgn a1c 7.3%  is up to date with eye exam  neuropathy with callus, no ulcer  some scratches feet  ur alb due and ordered  no change other than provided samples of toujeo because she feels like lantus worked much better than levemir, she has tried titrating levemir and it is less effective  continue testing and f/u 3-4 months  Impression: 11/23/2015 - bl   • Diabetic foot ulcer ([facility]) 6/16/2016   • Dog bite of hand    • Easy bruising    • Endometrial cancer ([facility])     partial hysterectomy; radiation as well    • Foot pain    • Foot pain 6/16/2016    Description: lancinating- worse but not consistent enough to want rx   • Fracture of hip ([facility])    • Generalized ischemic myocardial dysfunction 6/16/2016   • Glaucoma     drops daily    • Hyperlipidemia    • Hypertension    • Hypothyroidism    • Insomnia    • Ischemic heart disease    • Macular degeneration    • Methicillin resistant Staphylococcus aureus infection 6/16/2016   • Myocardial infarction ([facility]) 2005   • Nausea with vomiting    • Pericardial  effusion    • Shortness of breath 6/16/2016    Impression: 03/24/2015 - chronic. On 2 l oxygen at night. check cbc, bnp;    • Skin cancer, basal cell     nose, leg    • Skin lesion 6/16/2016    Impression: 03/24/2015 - refer derm for eval- seb kerat ant tib and ?ak medially with redness and irritation;    • Sleep apnea     oxygen at night due to low sats but no cpap   • Type 2 diabetes mellitus (HCC)    • UTI (urinary tract infection) 06/12/2020    currently taking antibiotics-patient's daughter notified Dr Beal's office and office said it should not delay generator change      Past Surgical History:   Procedure Laterality Date   • CARDIAC CATHETERIZATION     • CARDIAC DEFIBRILLATOR PLACEMENT     • CARDIAC ELECTROPHYSIOLOGY PROCEDURE N/A 7/17/2020    Procedure: ICD BIV  generator change- [facility]- 3-6 weeks;  Surgeon: Tano Beal MD;  Location:  [facility];  Service: Cardiology;  Laterality: N/A;   • CHOLECYSTECTOMY     • COLONOSCOPY N/A 7/30/2022    Procedure: COLONOSCOPY;  Surgeon: Brunner, Mark I, MD;  Location:  [facility];  Service: Gastroenterology;  Laterality: N/A;  WITH 3 RESOLUTION CLIPS   • CORONARY ARTERY BYPASS GRAFT  2000   • CORONARY STENT PLACEMENT      3 stents    • ENDOSCOPY N/A 11/17/2021    Procedure: ESOPHAGOGASTRODUODENOSCOPY;  Surgeon: Brunner, Mark I, MD;  Location:  [facility];  Service: Gastroenterology;  Laterality: N/A;   • EYE SURGERY Bilateral     cataract extraction    • HIP SURGERY Left     x3   • HYSTERECTOMY      partial    • KNEE ARTHROPLASTY Bilateral    • PACEMAKER IMPLANTATION     • TONSILLECTOMY        General Information     Row Name 08/03/22 1219          OT Time and Intention    Document Type therapy note (daily note)  -IFRAH     Mode of Treatment individual therapy;occupational therapy  -IFRAH     Row Name 08/03/22 1219          General Information    Patient Profile Reviewed yes  -IFRAH     Existing Precautions/Restrictions fall;oxygen therapy  device and L/min  -IFRAH     Barriers to Rehab medically complex;previous functional deficit  -     Row Name 08/03/22 1219          Cognition    Orientation Status (Cognition) oriented x 3  -IFRAH     Row Name 08/03/22 1219          Safety Issues, Functional Mobility    Safety Issues Affecting Function (Mobility) safety precaution awareness;safety precautions follow-through/compliance  -     Impairments Affecting Function (Mobility) endurance/activity tolerance;strength;range of motion (ROM)  -           User Key  (r) = Recorded By, (t) = Taken By, (c) = Cosigned By    Initials Name Provider Type    Sabi Hernandez, OT Occupational Therapist                 Mobility/ADL's     Row Name 08/03/22 1220          Bed Mobility    Bed Mobility supine-sit;sit-supine;scooting/bridging  -     Scooting/Bridging [place] (Bed Mobility) dependent (less than 25% patient effort);2 person assist  to HOB, mod A to EOB  -IFRAH     Supine-Sit [place] (Bed Mobility) moderate assist (50% patient effort);verbal cues;nonverbal cues (demo/gesture)  -IFRAH     Sit-Supine [place] (Bed Mobility) maximum assist (25% patient effort);verbal cues;nonverbal cues (demo/gesture)  -     Bed Mobility, Safety Issues decreased use of arms for pushing/pulling;decreased use of legs for bridging/pushing;impaired trunk control for bed mobility  -     Assistive Device (Bed Mobility) bed rails;draw sheet;head of bed elevated  -IFRAH     Comment, (Bed Mobility) cues to sequence, assist with LE's, trunk and scooting  -     Row Name 08/03/22 1220          Transfers    Comment, (Transfers) attempted stand without success several times with maximal assist from therapist, had to return supine  -     Row Name 08/03/22 1220          Activities of Daily Living    BADL Assessment/Intervention grooming  -     Row Name 08/03/22 1220          Grooming Assessment/Training    [place] Level (Grooming) wash face, hands;set up  -IFRAH     Position  (Grooming) sitting up in bed  -           User Key  (r) = Recorded By, (t) = Taken By, (c) = Cosigned By    Initials Name Provider Type    Sabi Hernandez OT Occupational Therapist               Obj/Interventions     [facility] Name 08/03/22 [facility]2          Shoulder (Therapeutic Exercise)    Shoulder AROM (Therapeutic Exercise) 10 repetitions  B shoulders small circling  -     Row Name 08/03/22 1222          Elbow/Forearm (Therapeutic Exercise)    Elbow/Forearm (Therapeutic Exercise) AROM (active range of motion)  -     Elbow/Forearm AROM (Therapeutic Exercise) bilateral;flexion;extension;supination;pronation;supine;10 repetitions  -     Row Name 08/03/22 1222          Wrist (Therapeutic Exercise)    Wrist (Therapeutic Exercise) AROM (active range of motion)  -     Wrist AROM (Therapeutic Exercise) bilateral;flexion;extension;10 repetitions  -[facility] Name 08/03/22 1222          Motor Skills    Motor Skills functional endurance  -     Functional Endurance 02 sats good, but feeling SOA with fatigue and many rest periods needed  -     Therapeutic Exercise shoulder;elbow/forearm;wrist  BLE hip abd/add AAROM, ankle pumps, glutteal set with knee press  -[facility] Name 08/03/22 1222          Balance    Static Sitting Balance standby assist  -     Position, Sitting Balance unsupported;sitting edge of bed  -           User Key  (r) = Recorded By, (t) = Taken By, (c) = Cosigned By    Initials Name Provider Type    Sabi Hernandez OT Occupational Therapist               Goals/Plan     [facility] Name 08/03/22 1226          Bed Mobility Goal 1 (OT)    Progress/Outcomes (Bed Mobility Goal 1, OT) goal ongoing  -[facility] Name 08/03/22 1226          Transfer Goal 1 (OT)    Progress/Outcome (Transfer Goal 1, OT) progress slower than expected  -[facility] Name 08/03/22 1226          Dressing Goal 1 (OT)    Progress/Outcome (Dressing Goal 1, OT) goal ongoing  -[facility] Name 08/03/22 1226          Toileting Goal 1  (OT)    Progress/Outcome (Toileting Goal 1, OT) goal ongoing  -IFRAH           User Key  (r) = Recorded By, (t) = Taken By, (c) = Cosigned By    Initials Name Provider Type    Sabi Hernandez, MARIELOS Occupational Therapist               Clinical Impression     Row Name 08/03/22 1223          Pain Assessment    Pretreatment Pain Rating 0/10 - no pain  -IFRAH     Posttreatment Pain Rating 0/10 - no pain  -IFRAH     Row Name 08/03/22 1223          Plan of Care Review    Plan of Care Reviewed With patient  -IFRAH     Progress declining  -IFRAH     Outcome Evaluation Pt. with increased fatigue today with many rest periods needed.  Pt. mod A with extra time and effort to EOB, unable to stand with severat attempts, max A into bed.  Good participation with TE.  Continue OT POC as tolerates.  -IFRAH     Row Name 08/03/22 1223          Therapy Plan Review/Discharge Plan (OT)    Anticipated Discharge Disposition (OT) skilled nursing facility  -     Row Name 08/03/22 1223          Vital Signs    Pre Systolic BP Rehab 130  -IFRAH     Pre Treatment Diastolic BP 59  -IFRAH     Post Systolic BP Rehab 131  -IFRAH     Post Treatment Diastolic BP 69  -IFRAH     Pretreatment Heart Rate (beats/min) 69  -IFRAH     Posttreatment Heart Rate (beats/min) 77  -IFRAH     Pre SpO2 (%) 92  -IFRAH     O2 Delivery Pre Treatment room air  -IFRAH     O2 Delivery Intra Treatment room air  -IFRAH     Post SpO2 (%) 98  -IFRAH     O2 Delivery Post Treatment room air  -IFRAH     Pre Patient Position Supine  -IFRAH     Intra Patient Position Standing  -IFRAH     Post Patient Position Supine  -IFRAH     Row Name 08/03/22 1223          Positioning and Restraints    Pre-Treatment Position in bed  -IFRAH     Post Treatment Position bed  -IFRAH     In Bed supine;call light within reach;encouraged to call for assist;exit alarm on;waffle boots/both  -IFRAH           User Key  (r) = Recorded By, (t) = Taken By, (c) = Cosigned By    Initials Name Provider Type    Sabi Hernandez, OT Occupational Therapist               Outcome  Measures     Row Name 08/03/22 1229          How much help from another is currently needed...    Putting on and taking off regular lower body clothing? 1  -IFRAH     Bathing (including washing, rinsing, and drying) 2  -IFRAH     Toileting (which includes using toilet bed pan or urinal) 1  -IFRAH     Putting on and taking off regular upper body clothing 3  -IFRAH     Taking care of personal grooming (such as brushing teeth) 3  -IFRAH     Eating meals 4  -IFRAH     AM-PAC 6 Clicks Score (OT) 14  -IFRAH     Row Name 08/03/22 1229          Functional Assessment    Outcome Measure Options AM-PAC 6 Clicks Daily Activity (OT)  -IFRAH           User Key  (r) = Recorded By, (t) = Taken By, (c) = Cosigned By    Initials Name Provider Type    Sabi Hernandez, MARIELOS Occupational Therapist                Occupational Therapy Education                 Title: PT OT SLP Therapies (Done)     Topic: Occupational Therapy (Done)     Point: ADL training (Done)     Description:   Instruct learner(s) on proper safety adaptation and remediation techniques during self care or transfers.   Instruct in proper use of assistive devices.              Learning Progress Summary           Patient Acceptance, E,D, VU,NR by IFRAH at 8/3/2022 1229    Comment: bed mobility, TE, trasnfer technique and safety    Acceptance, E,TB, VU by BT at 8/2/2022 1834    Acceptance, E,TB, VU by BT at 8/1/2022 1808    Acceptance, E,D, VU,NR by MB at 7/31/2022 1341    Acceptance, E, NR by JR at 7/31/2022 1115    Comment: Educated pt regarding role of therapy                   Point: Home exercise program (Done)     Description:   Instruct learner(s) on appropriate technique for monitoring, assisting and/or progressing therapeutic exercises/activities.              Learning Progress Summary           Patient Acceptance, E,D, VU,NR by IFRAH at 8/3/2022 1229    Comment: bed mobility, TE, trasnfer technique and safety    Acceptance, E,TB, VU by BT at 8/2/2022 1834    Acceptance, E,TB, VU by BT at  8/1/2022 1808    Acceptance, E, NR by  at 7/31/2022 1115    Comment: Educated pt regarding role of therapy                   Point: Precautions (Done)     Description:   Instruct learner(s) on prescribed precautions during self-care and functional transfers.              Learning Progress Summary           Patient Acceptance, E,D, VU,NR by IFRAH at 8/3/2022 1229    Comment: bed mobility, TE, trasnfer technique and safety    Acceptance, E,TB, VU by BT at 8/2/2022 1834    Acceptance, E,TB, VU by BT at 8/1/2022 1808                   Point: Body mechanics (Done)     Description:   Instruct learner(s) on proper positioning and spine alignment during self-care, functional mobility activities and/or exercises.              Learning Progress Summary           Patient Acceptance, E,D, VU,NR by IFRAH at 8/3/2022 1229    Comment: bed mobility, TE, trasnfer technique and safety    Acceptance, E,TB, VU by BT at 8/2/2022 1834    Acceptance, E,TB, VU by BT at 8/1/2022 1808                               User Key     Initials Effective Dates Name Provider Type Discipline     06/16/21 -  Sabi Lim, OT Occupational Therapist OT    JR 06/16/21 -  Charisse Llamas, OT Occupational Therapist OT    MB 06/16/21 -  Qiana Isabel, PT Physical Therapist PT    BT 12/30/20 -  Anuradha Allen, RN Registered Nurse Nurse              OT Recommendation and Plan     Plan of Care Review  Plan of Care Reviewed With: patient  Progress: declining  Outcome Evaluation: Pt. with increased fatigue today with many rest periods needed.  Pt. mod A with extra time and effort to EOB, unable to stand with severat attempts, max A into bed.  Good participation with TE.  Continue OT POC as tolerates.     Time Calculation:    Time Calculation- OT     Row Name 08/03/22 1230             Time Calculation- OT    OT Start Time 1132  -IFRAH      OT Received On 08/03/22  -IFRAH      OT Goal Re-Cert Due Date 08/10/22  -IFRAH              Timed Charges    15861 - OT  Therapeutic Exercise Minutes 15  -IFRAH      33509 - OT Therapeutic Activity Minutes 15  -IFRAH      08296 - OT Self Care/Mgmt Minutes 2  -IFRAH              Total Minutes    Timed Charges Total Minutes 32  -IFRAH       Total Minutes 32  -IFRAH            User Key  (r) = Recorded By, (t) = Taken By, (c) = Cosigned By    Initials Name Provider Type    Sabi Hernandez OT Occupational Therapist              Therapy Charges for Today     Code Description Service Date Service Provider Modifiers Qty    [id number]  OT THER PROC EA 15 MIN 8/3/2022 Sabi Lim OT GO 1    [id number]  OT THERAPEUTIC ACT EA 15 MIN 8/3/2022 Sabi Lim OT GO 1               Sabi Lim OT  8/3/2022

## 2022-08-03 NOTE — PROGRESS NOTES
[facility] Medicine Services  PROGRESS NOTE    Patient Name: Karla Cristobal  : 1935  MRN: [id number]    Date of Admission: 2022  Primary Care Physician: Luis Cardenas MD    Subjective   Subjective     CC:  F/U GI bleed    HPI:  Feeling better.  No hematochezia.  Some cough.  Breathing is better.    ROS:  Gen- No fevers  Resp- As above  GI- No hematochezia      Objective   Objective     Vital Signs:   Temp:  [98.2 °F (36.8 °C)-98.5 °F (36.9 °C)] 98.2 °F (36.8 °C)  Heart Rate:  [] 85  Resp:  [16-20] 20  BP: (108-131)/(56-70) 131/69  Flow (L/min):  [1] 1     Physical Exam:  Constitutional: No acute distress, awake, alert, laying in bed  HENT: NCAT, mucous membranes moist  Respiratory: Respiratory effort normal   Cardiovascular: RRR, paced on telemetry with occasional PVC  Musculoskeletal: No bilateral ankle edema  Psychiatric: Appropriate affect, cooperative  Neurologic: Speech clear and fluent  Skin: No rashes on exposed surfaces    Results Reviewed:  LAB RESULTS:      Lab 22  0642 22  2352 22  1923 22  1451 22  0702 22  1744 22  1047 22  0742 22  0354 22  0111 22  2148 22  1523   WBC  --   --   --   --  6.19  --   --   --  4.02  --   --   --  5.42   HEMOGLOBIN 9.8* 9.8* 9.8* 9.7* 10.1*  10.1*   < > 10.8*   < > 10.7*   < > 11.0*  --  11.1*   HEMATOCRIT 29.1* 29.3* 30.2* 29.3* 30.2*  30.2*   < > 32.9*   < > 33.2*   < > 32.5*  --  33.0*   PLATELETS  --   --   --   --  112*  --   --   --  75*  --   --   --  90*   NEUTROS ABS  --   --   --   --  3.93  --   --   --  2.14  --   --   --  2.94   IMMATURE GRANS (ABS)  --   --   --   --  0.05  --   --   --  0.03  --   --   --  0.02   LYMPHS ABS  --   --   --   --  1.28  --   --   --  1.21  --   --   --  1.67   MONOS ABS  --   --   --   --  0.71  --   --   --  0.47  --   --   --  0.65   EOS ABS  --   --   --   --  0.19  --   --   --  0.15  --    --   --  0.12   MCV  --   --   --   --  86.3  --   --   --  89.5  --   --   --  87.8   LACTATE  --   --   --   --   --   --   --   --   --   --   --  1.8 2.7*   LDH  --   --   --   --   --   --   --   --   --   --   --  276*  --    PROTIME  --   --   --   --   --   --   --   --  23.1*  --  24.1*  --   --    D DIMER QUANT  --   --   --   --   --   --  0.85*  --   --   --   --   --   --     < > = values in this interval not displayed.         Lab 08/03/22  0642 08/02/22  0702 08/01/22  0844 07/31/22  1047 07/30/22  1348 07/29/22 2026 07/29/22  0358   SODIUM 137 140 141 143 148*  --  142   POTASSIUM 3.8 3.8 3.5 3.6 3.8  --  4.0   CHLORIDE 100 103 103 106 108*  --  103   CO2 23.0 24.0 25.0 22.0 24.0  --  24.0   ANION GAP 14.0 13.0 13.0 15.0 16.0*  --  15.0   * 117* 106* 110* 106*  --  113*   CREATININE 3.70* 4.09* 3.86* 3.80* 3.54*  --  4.02*   EGFR 11.4* 10.1* 10.9* 11.1* 12.1*  --  10.4*   GLUCOSE 189* 175* 174* 283* 130*  --  101*   CALCIUM 7.9* 8.3* 8.1* 8.2* 8.5*  --  8.2*   MAGNESIUM  --   --  1.9  --   --   --  2.0   PHOSPHORUS  --   --  4.0  --  5.7* 5.6* 5.8*         Lab 07/30/22  1348 07/28/22 2001   TOTAL PROTEIN  --  6.6   ALBUMIN 2.80* 2.90*   GLOBULIN  --  3.7   ALT (SGPT)  --  15   AST (SGOT)  --  27   BILIRUBIN  --  0.9   ALK PHOS  --  176*         Lab 07/29/22  0354 07/28/22  2148   PROTIME 23.1* 24.1*   INR 2.04* 2.15*             Lab 07/29/22  0354 07/28/22 2001 07/28/22  1600   IRON  --  34*  --    IRON SATURATION  --  11*  --    TIBC  --  307  --    TRANSFERRIN  --  206  --    FERRITIN  --  267.60*  --    FOLATE 9.09  --   --    VITAMIN B 12 774  --   --    ABO TYPING  --   --  B   RH TYPING  --   --  Positive   ANTIBODY SCREEN  --   --  Negative         Brief Urine Lab Results  (Last result in the past 365 days)      Color   Clarity   Blood   Leuk Est   Nitrite   Protein   CREAT   Urine HCG        07/30/22 1742 Yellow   Clear   Small (1+)   Trace   Negative   Trace                  Microbiology Results Abnormal     Procedure Component Value - Date/Time    Eosinophil Smear - Urine, Urine, Clean Catch [[id number]]  (Normal) Collected: 07/30/22 1742    Lab Status: Final result Specimen: Urine, Clean Catch Updated: 07/30/22 1938     Eosinophil Smear 0 % EOS/100 Cells     Narrative:      No eosinophil seen          No radiology results from the last 24 hrs    Results for orders placed during the hospital encounter of 07/28/22    Adult Transthoracic Echo Complete W/ Cont if Necessary Per Protocol    Interpretation Summary  · Mild biatrial enlargement.  · Moderately reduced right ventricular systolic function noted.  · Moderate left ventricular systolic dysfunction, estimated EF 37%.  · Left ventricular wall thickness is consistent with mild concentric hypertrophy.  · Lambl's excrescences of the aortic valve are noted. There is trace aortic insufficiency, no evidence of aortic stenosis.  · Moderate mitral regurgitation.  · Moderate to severe tricuspid regurgitation with RVSP 48 mmHg.  · Moderate pulmonic insufficiency.      I have reviewed the medications:  Scheduled Meds:apixaban, 2.5 mg, Oral, Q12H  atorvastatin, 20 mg, Oral, Nightly  carvedilol, 3.125 mg, Oral, BID With Meals  ferric gluconate, 125 mg, Intravenous, Once  insulin lispro, 0-7 Units, Subcutaneous, TID AC  Insulin Lispro, 4 Units, Subcutaneous, TID With Meals  latanoprost, 1 drop, Both Eyes, Nightly  levothyroxine, 112 mcg, Oral, Q AM  pantoprazole, 40 mg, Oral, BID AC  sodium chloride, 10 mL, Intravenous, Q12H  timolol, 1 drop, Both Eyes, BID      Continuous Infusions:sodium chloride, 50 mL/hr, Last Rate: 50 mL/hr (08/03/22 0959)      PRN Meds:.•  acetaminophen **OR** acetaminophen **OR** acetaminophen  •  dextrose  •  dextrose  •  glucagon (human recombinant)  •  melatonin  •  ondansetron  •  sodium chloride    Assessment & Plan   Assessment & Plan     Active Hospital Problems    Diagnosis  POA   • **GIB (gastrointestinal  bleeding) [K92.2]  Yes   • Normocytic anemia [D64.9]  Unknown   • Acute renal failure (ARF) (HCC) [N17.9]  Unknown   • COVID-19 virus detected [U07.1]  Unknown   • Thrombocytopenia (HCC) [D69.6]  Unknown   • Hypoalbuminemia [E88.09]  Unknown   • AMS (altered mental status) [R41.82]  Unknown   • Lower GI bleed [K92.2]  Yes   • Type 2 diabetes mellitus without complication, with long-term current use of insulin (HCC) [E11.9, Z79.4]  Not Applicable   • Chronic systolic congestive heart failure (HCC) [I50.22]  Yes   • CAD (coronary artery disease) [I25.10]  Yes   • Chronic atrial fibrillation (HCC) [I48.20]  Yes   • CKD (chronic kidney disease), stage IV (HCC) [N18.4]  Yes   • Hyperlipidemia LDL goal <100 [E78.5]  Yes   • Essential hypertension [I10]  Yes   • Hypothyroidism [E03.9]  Yes   • Pulmonary embolism (HCC) [I26.99]  Yes   • CHF (NYHA class IV, ACC/AHA stage D) (HCC) [I50.84]  Yes      Resolved Hospital Problems   No resolved problems to display.        Brief Hospital Course to date:  Karla Cristobal is a 87 y.o. female with anemia, atrial fibrillation on Eliquis, CKD stage IV, HLD, hypertension, hypothyroidism, CAD s/p stents, diabetes type 2, and systolic heart failure (2L NC at night) who presented to the ED for rectal bleeding. 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  3 endoclips were applied for hemostasis.  H/H has been stable since.  She was incidentally found to be COVID positive and had acute kidney injury for which nephrology has been following and discussing initiation of dialysis.    This patient's problems and plans were partially entered by my partner and updated as appropriate by me 08/03/22.      Bleeding colonic AVM  Acute blood loss anemia  -H/H stable now    ROBBI on CKD IV  -Nephrology following  -Continue IV fluids  -NPO at midnight in case dialysis access is  needed    COVID-19 positive  -Asymptomatic      Mixed systolic and diastolic CHF  Moderate to severe MR  Persistent atrial fibrillation   -Seen by cardiology and started on carvedilol  -On eliquis 2.5mg BID-hold in case of need for dialysis access     Expected Discharge Location and Transportation: home  Expected Discharge Date: 8/9    DVT prophylaxis:  Medical DVT prophylaxis orders are present.     AM-PAC 6 Clicks Score (PT): 11 (07/31/22 1340)    CODE STATUS:   Code Status and Medical Interventions:   Ordered at: 07/28/22 7951     Level Of Support Discussed With:    Patient     Code Status (Patient has no pulse and is not breathing):    CPR (Attempt to Resuscitate)     Medical Interventions (Patient has pulse or is breathing):    Full Support       Yolanda Gomez MD  08/03/22

## 2022-08-03 NOTE — PLAN OF CARE
Goal Outcome Evaluation:      Patient is 100% Paced, and on 1L NC.  Patient is alert and oriented times four.  Purewick in use.  Plan of care; Dr. Ledesma spoke with daughter and patient in regards to dialysis, patient might consider dialysis if receiving fluids doesn't help.  Patient received iron.  Daughter would like for patient to follow up with Mike at [facility] for nephrology follow up appointment.  Bed in lowest position and phone and call light in use.

## 2022-08-03 NOTE — CASE MANAGEMENT/SOCIAL WORK
"Continued Stay Note  [facility]     Patient Name: Karla Cristobal  MRN: [id number]  Today's Date: 8/3/2022    Admit Date: 7/28/2022     Discharge Plan     Row Name 08/03/22 1250       Plan    Plan CM update    Plan Comments Patient is not ready for discharge at this time. Per nephrology: \"Heading towards dialysis. Discussed with patient and daughter over the phone. She has discussed with her mother and her nephrologist(Dr Mckeon) and will do dialysis if needed,  Will keep her NPO at midnight in case we need dialysis access.\"  Patient was at home with her daughter prior to admission and required minimal assist. OT today was unable to get the patient to stand -she will very likely need rehab but will have to wait for covid isolation and further HD details before referrals can be made -CM will continue to follow -matheus [phone]    Final Discharge Disposition Code 30 - still a patient               Discharge Codes    No documentation.                     Matheus Rodarte RN    "

## 2022-08-04 NOTE — PLAN OF CARE
Goal Outcome Evaluation:  Plan of Care Reviewed With: patient        Progress: declining  Outcome Evaluation: Patient continues to be limited by weakness, poor activity tolerance, and pain. She required increased assistance today with transfers, demonstating difficulty coming to full standing. Pt is limited by reports of pain in B feet when standing that she states has been present for several weeks. Pt SOA with all activity but VSS on RA. Will continue to progress as tolerated.

## 2022-08-04 NOTE — THERAPY TREATMENT NOTE
Patient Name: Karla Cristobal  : 1935    MRN: [id number]                              Today's Date: 2022       Admit Date: 2022    Visit Dx:     ICD-10-CM ICD-9-CM   1. Lower GI bleed  K92.2 578.9   2. Chronic anticoagulation  Z79.01 V58.61   3. History of atrial fibrillation  Z86.79 V12.59   4. Generalized weakness  R53.1 780.79   5. Malaise and fatigue  R53.81 780.79    R53.83    6. Nausea  R11.0 787.02   7. Acute renal failure superimposed on stage 4 chronic kidney disease, unspecified acute renal failure type (HCC)  N17.9 584.9    N18.4 585.4   8. Thrombocytopenia ([facility])  D69.6 287.5   9. History of CHF (congestive heart failure)  Z86.79 V12.59   10. History of diabetes mellitus  Z86.39 V12.29   11. History of diverticulosis  Z87.19 V12.79   12. Gastrointestinal hemorrhage, unspecified gastrointestinal hemorrhage type  K92.2 578.9     Patient Active Problem List   Diagnosis   • Chronic atrial fibrillation (HCC)   • CKD (chronic kidney disease), stage IV (HCC)   • Diabetic nephropathy (HCC)   • Diabetic retinopathy (HCC)   • Malignant neoplasm of endometrium (HCC)   • Hyperlipidemia LDL goal <100   • Essential hypertension   • Hypothyroidism   • Insomnia   • Leukocytosis   • Memory impairment   • Nausea   • Pulmonary embolism (HCC)   • Sleep apnea   • Squamous cell carcinoma of skin   • CHF (NYHA class IV, ACC/AHA stage D) ([facility])   • Ischemic heart disease   • CAD (coronary artery disease)   • DVT of lower extremity (deep venous thrombosis) (HCC)   • Chronic systolic congestive heart failure (HCC)   • Morbidly obese (HCC)   • Ischemic cardiomyopathy   • Type 2 diabetes mellitus without complication, with long-term current use of insulin (HCC)   • Exudative age-related macular degeneration, right eye, with active choroidal neovascularization (HCC)   • Hypoxia   • Cellulitis of left lower extremity   • Left leg cellulitis   • Food impaction of esophagus   • Malignant tumor of ascending colon (HCC)   •  GIB (gastrointestinal bleeding)   • Normocytic anemia   • Acute renal failure (ARF) ([facility])   • COVID-19 virus detected   • Thrombocytopenia ([facility])   • Hypoalbuminemia   • AMS (altered mental status)   • Lower GI bleed     Past Medical History:   Diagnosis Date   • Acute bronchitis    • Arthritis    • Atrial fibrillation ([facility])    • CAD (coronary artery disease)     s/p MI 2005; 3 stents inserted    • Change in mole    • Change in mole    • Change in nevus 6/16/2016   • Chronic kidney disease     stage IV-sees Dr Bk Mckeon every 3-4 months    • Chronic renal disease, stage 4, severely decreased glomerular filtration rate between 15-29 mL/min/1.73 square meter ([facility])    • Chronic systolic heart failure ([facility])    • Congestive heart disease ([facility])    • Conjunctivitis    • Deep vein thrombosis of lower extremity ([facility])    • Diabetes mellitus ([facility])    • Diabetes mellitus ([facility]) 6/16/2016    Impression: 03/15/2016 - blood sugar 166 and hgn a1c 7.3%  is up to date with eye exam  neuropathy with callus, no ulcer  some scratches feet  ur alb due and ordered  no change other than provided samples of toujeo because she feels like lantus worked much better than levemir, she has tried titrating levemir and it is less effective  continue testing and f/u 3-4 months  Impression: 11/23/2015 - bl   • Diabetic foot ulcer ([facility]) 6/16/2016   • Dog bite of hand    • Easy bruising    • Endometrial cancer ([facility])     partial hysterectomy; radiation as well    • Foot pain    • Foot pain 6/16/2016    Description: lancinating- worse but not consistent enough to want rx   • Fracture of hip ([facility])    • Generalized ischemic myocardial dysfunction 6/16/2016   • Glaucoma     drops daily    • Hyperlipidemia    • Hypertension    • Hypothyroidism    • Insomnia    • Ischemic heart disease    • Macular degeneration    • Methicillin resistant Staphylococcus aureus infection 6/16/2016   • Myocardial infarction ([facility]) 2005   • Nausea with vomiting    • Pericardial  effusion    • Shortness of breath 6/16/2016    Impression: 03/24/2015 - chronic. On 2 l oxygen at night. check cbc, bnp;    • Skin cancer, basal cell     nose, leg    • Skin lesion 6/16/2016    Impression: 03/24/2015 - refer derm for eval- seb kerat ant tib and ?ak medially with redness and irritation;    • Sleep apnea     oxygen at night due to low sats but no cpap   • Type 2 diabetes mellitus (HCC)    • UTI (urinary tract infection) 06/12/2020    currently taking antibiotics-patient's daughter notified Dr Beal's office and office said it should not delay generator change      Past Surgical History:   Procedure Laterality Date   • CARDIAC CATHETERIZATION     • CARDIAC DEFIBRILLATOR PLACEMENT     • CARDIAC ELECTROPHYSIOLOGY PROCEDURE N/A 7/17/2020    Procedure: ICD BIV  generator change- [facility]- 3-6 weeks;  Surgeon: Tano Beal MD;  Location:  [facility];  Service: Cardiology;  Laterality: N/A;   • CHOLECYSTECTOMY     • COLONOSCOPY N/A 7/30/2022    Procedure: COLONOSCOPY;  Surgeon: Brunner, Mark I, MD;  Location:  [facility];  Service: Gastroenterology;  Laterality: N/A;  WITH 3 RESOLUTION CLIPS   • CORONARY ARTERY BYPASS GRAFT  2000   • CORONARY STENT PLACEMENT      3 stents    • ENDOSCOPY N/A 11/17/2021    Procedure: ESOPHAGOGASTRODUODENOSCOPY;  Surgeon: Brunner, Mark I, MD;  Location:  [facility];  Service: Gastroenterology;  Laterality: N/A;   • EYE SURGERY Bilateral     cataract extraction    • HIP SURGERY Left     x3   • HYSTERECTOMY      partial    • KNEE ARTHROPLASTY Bilateral    • PACEMAKER IMPLANTATION     • TONSILLECTOMY        General Information     Row Name 08/04/22 1322          Physical Therapy Time and Intention    Document Type therapy note (daily note)  -NS     Mode of Treatment individual therapy;physical therapy  -NS     Row Name 08/04/22 1322          General Information    Patient Profile Reviewed yes  -NS     Existing Precautions/Restrictions fall;oxygen  therapy device and L/min  -NS     Row Name 08/04/22 1322          Cognition    Orientation Status (Cognition) oriented x 3  -NS     Row Name 08/04/22 1322          Safety Issues, Functional Mobility    Safety Issues Affecting Function (Mobility) insight into deficits/self-awareness;judgment;problem-solving;safety precaution awareness;safety precautions follow-through/compliance;sequencing abilities  -NS     Impairments Affecting Function (Mobility) balance;coordination;endurance/activity tolerance;strength;postural/trunk control;shortness of breath;motor planning;pain  -NS     Comment, Safety Issues/Impairments (Mobility) Limited in activity due to pain in feet.  -NS           User Key  (r) = Recorded By, (t) = Taken By, (c) = Cosigned By    Initials Name Provider Type    Terri Perdue PT Physical Therapist               Mobility     Row Name 08/04/22 1322          Bed Mobility    Bed Mobility supine-sit  -NS     Supine-Sit [place] (Bed Mobility) moderate assist (50% patient effort);2 person assist;verbal cues  -NS     Assistive Device (Bed Mobility) bed rails;draw sheet;head of bed elevated  -NS     Comment, (Bed Mobility) Assist required at BLEs and trunk. VCs for sequencing.  -NS     Row Name 08/04/22 1322          Transfers    Comment, (Transfers) VCs for hand and foot placement. Pt demonstrated difficulty achieving upright standing due to weakness. Pt pivoted to the chair, complaining of B feet pain with WBing. She states this has been present for several weeks.  -NS     Row Name 08/04/22 1322          Bed-Chair Transfer    Bed-Chair [place] (Transfers) maximum assist (25% patient effort);2 person assist  -NS     Assistive Device (Bed-Chair Transfers) other (see comments)  BUE support  -NS     Row Name 08/04/22 1322          Sit-Stand Transfer    Sit-Stand [place] (Transfers) maximum assist (25% patient effort);2 person assist  -NS     Assistive Device (Sit-Stand Transfers) other (see  comments)  BUE support  -NS     Row Name 08/04/22 1322          Gait/Stairs (Locomotion)    Comment, (Gait/Stairs) Not appropriate to assess at this time.  -NS           User Key  (r) = Recorded By, (t) = Taken By, (c) = Cosigned By    Initials Name Provider Type    Terri Perdue PT Physical Therapist               Obj/Interventions     Row Name 08/04/22 1322          Motor Skills    Therapeutic Exercise hip;knee;ankle  -NS     Row Name 08/04/22 1322          Hip (Therapeutic Exercise)    Hip (Therapeutic Exercise) isometric exercises;strengthening exercise  -NS     Hip Isometrics (Therapeutic Exercise) bilateral;gluteal sets;10 repetitions  -NS     Hip Strengthening (Therapeutic Exercise) bilateral;marching while seated;external rotation;internal rotation;10 repetitions  -NS     Row Name 08/04/22 1322          Knee (Therapeutic Exercise)    Knee (Therapeutic Exercise) isometric exercises  -NS     Knee Isometrics (Therapeutic Exercise) bilateral;quad sets;10 repetitions  -NS     Knee Strengthening (Therapeutic Exercise) bilateral;LAQ (long arc quad);10 repetitions  -NS     [facility] Name 08/04/22 1322          Ankle (Therapeutic Exercise)    Ankle (Therapeutic Exercise) AROM (active range of motion)  -NS     Ankle AROM (Therapeutic Exercise) bilateral;dorsiflexion;plantarflexion;10 repetitions  -NS     Row Name 08/04/22 1322          Balance    Balance Assessment sitting static balance;sitting dynamic balance;standing static balance;standing dynamic balance  -NS     Static Sitting Balance standby assist  -NS     Dynamic Sitting Balance contact guard  -NS     Position, Sitting Balance unsupported;sitting edge of bed  -NS     Static Standing Balance maximum assist;2-person assist  -NS     Dynamic Standing Balance maximum assist;2-person assist  -NS     Position/Device Used, Standing Balance supported  -NS           User Key  (r) = Recorded By, (t) = Taken By, (c) = Cosigned By    Initials Name Provider Type    MONA Hendrickson  PAVEL Murray Physical Therapist               Goals/Plan    No documentation.                Clinical Impression     Row Name 08/04/22 1322          Pain    Pretreatment Pain Rating 0/10 - no pain  -NS     Posttreatment Pain Rating 0/10 - no pain  -NS     Row Name 08/04/22 1322          Plan of Care Review    Plan of Care Reviewed With patient  -NS     Progress declining  -NS     Outcome Evaluation Patient continues to be limited by weakness, poor activity tolerance, and pain. She required increased assistance today with transfers, demonstating difficulty coming to full standing. Pt is limited by reports of pain in B feet when standing that she states has been present for several weeks. Pt SOA with all activity but VSS on RA. Will continue to progress as tolerated.  -NS     Row Name 08/04/22 1322          Vital Signs    Pre Systolic BP Rehab --  VSS- RN cleared for PT  -NS     Pre SpO2 (%) 97  -NS     O2 Delivery Pre Treatment room air  -NS     Intra SpO2 (%) 96  -NS     O2 Delivery Intra Treatment room air  -NS     Post SpO2 (%) 97  -NS     O2 Delivery Post Treatment room air  -NS     Pre Patient Position Supine  -NS     Intra Patient Position Standing  -NS     Post Patient Position Sitting  -NS     Row Name 08/04/22 1322          Positioning and Restraints    Pre-Treatment Position in bed  -NS     Post Treatment Position chair  -NS     In Chair notified nsg;reclined;call light within reach;encouraged to call for assist;exit alarm on;legs elevated;waffle cushion;on mechanical lift sling;heels elevated;LUE elevated  -NS           User Key  (r) = Recorded By, (t) = Taken By, (c) = Cosigned By    Initials Name Provider Type    Terri Perdue PT Physical Therapist               Outcome Measures     Row Name 08/04/22 1322          How much help from another person do you currently need...    Turning from your back to your side while in flat bed without using bedrails? 2  -NS     Moving from lying on back to sitting on  the side of a flat bed without bedrails? 2  -NS     Moving to and from a bed to a chair (including a wheelchair)? 2  -NS     Standing up from a chair using your arms (e.g., wheelchair, bedside chair)? 2  -NS     Climbing 3-5 steps with a railing? 1  -NS     To walk in hospital room? 1  -NS     AM-PAC 6 Clicks Score (PT) 10  -NS     Highest level of mobility 4 --> Transferred to chair/commode  -NS     Row Name 08/04/22 1322          Functional Assessment    Outcome Measure Options AM-PAC 6 Clicks Basic Mobility (PT)  -NS           User Key  (r) = Recorded By, (t) = Taken By, (c) = Cosigned By    Initials Name Provider Type    Terri Perdue PT Physical Therapist                             Physical Therapy Education                 Title: PT OT SLP Therapies (In Progress)     Topic: Physical Therapy (In Progress)     Point: Mobility training (In Progress)     Learning Progress Summary           Patient Acceptance, E, NR by NS at 8/4/2022 1416    Acceptance, E,TB, VU by BT at 8/2/2022 1834    Acceptance, E,TB, VU by BT at 8/1/2022 1808    Acceptance, E,D, VU,NR by MB at 7/31/2022 1341                   Point: Home exercise program (In Progress)     Learning Progress Summary           Patient Acceptance, E, NR by NS at 8/4/2022 1416    Acceptance, E,TB, VU by BT at 8/2/2022 1834    Acceptance, E,TB, VU by BT at 8/1/2022 1808    Acceptance, E,D, VU,NR by MB at 7/31/2022 1341                   Point: Body mechanics (In Progress)     Learning Progress Summary           Patient Acceptance, E, NR by NS at 8/4/2022 1416    Acceptance, E,TB, VU by BT at 8/2/2022 1834    Acceptance, E,TB, VU by BT at 8/1/2022 1808    Acceptance, E,D, VU,NR by MB at 7/31/2022 1341                   Point: Precautions (In Progress)     Learning Progress Summary           Patient Acceptance, E, NR by NS at 8/4/2022 1416    Acceptance, E,TB, VU by BT at 8/2/2022 1834    Acceptance, E,TB, VU by BT at 8/1/2022 1808    Acceptance, E,D, VU,NR by MB  at 7/31/2022 1341                               User Key     Initials Effective Dates Name Provider Type Discipline    MB 06/16/21 -  Qiana Isabel PT Physical Therapist PT    BT 12/30/20 -  Anuradha Allen, RN Registered Nurse Nurse    NS 06/16/21 -  Terri Hendrickson PT Physical Therapist PT              PT Recommendation and Plan     Plan of Care Reviewed With: patient  Progress: declining  Outcome Evaluation: Patient continues to be limited by weakness, poor activity tolerance, and pain. She required increased assistance today with transfers, demonstating difficulty coming to full standing. Pt is limited by reports of pain in B feet when standing that she states has been present for several weeks. Pt SOA with all activity but VSS on RA. Will continue to progress as tolerated.     Time Calculation:    PT Charges     Row Name 08/04/22 1322             Time Calculation    Start Time 1322  -NS      PT Received On 08/04/22  -NS      PT Goal Re-Cert Due Date 08/10/22  -NS              Timed Charges    36210 - PT Therapeutic Exercise Minutes 12  -NS      90058 - PT Therapeutic Activity Minutes 15  -NS              Total Minutes    Timed Charges Total Minutes 27  -NS       Total Minutes 27  -NS            User Key  (r) = Recorded By, (t) = Taken By, (c) = Cosigned By    Initials Name Provider Type    NS Terri Hendrickson PT Physical Therapist              Therapy Charges for Today     Code Description Service Date Service Provider Modifiers Qty    82806798383 HC PT THERAPEUTIC ACT EA 15 MIN 8/4/2022 Terri Hendrickson, PT GP 1    05114799398 HC PT THER PROC EA 15 MIN 8/4/2022 Terri Hendrickson, PT GP 1    18578879964 HC PT THER SUPP EA 15 MIN 8/4/2022 Terri Hendrickson, PT GP 2          PT G-Codes  Outcome Measure Options: AM-PAC 6 Clicks Basic Mobility (PT)  AM-PAC 6 Clicks Score (PT): 10  AM-PAC 6 Clicks Score (OT): 14    Terri Hendrickson PT  8/4/2022

## 2022-08-04 NOTE — PLAN OF CARE
VSS, pt has had decent UOP throughout shift with no overt complaints. Will continue to monitor.  Problem: Adult Inpatient Plan of Care  Goal: Plan of Care Review  Outcome: Ongoing, Progressing  Goal: Patient-Specific Goal (Individualized)  Outcome: Ongoing, Progressing  Goal: Absence of Hospital-Acquired Illness or Injury  Outcome: Ongoing, Progressing  Intervention: Identify and Manage Fall Risk  Recent Flowsheet Documentation  Taken 8/4/2022 0000 by Jeremiah Lazar RN  Safety Promotion/Fall Prevention:   activity supervised   assistive device/personal items within reach   clutter free environment maintained   fall prevention program maintained   nonskid shoes/slippers when out of bed  Taken 8/3/2022 2200 by Jeremiah Lazar RN  Safety Promotion/Fall Prevention:   activity supervised   assistive device/personal items within reach   clutter free environment maintained   fall prevention program maintained   nonskid shoes/slippers when out of bed  Taken 8/3/2022 2000 by Jeremiah Lazar RN  Safety Promotion/Fall Prevention:   activity supervised   clutter free environment maintained   assistive device/personal items within reach   fall prevention program maintained   nonskid shoes/slippers when out of bed  Intervention: Prevent Skin Injury  Recent Flowsheet Documentation  Taken 8/4/2022 0000 by Jeremiah Lazar RN  Body Position: position changed independently  Taken 8/3/2022 2200 by Jeremiah Lazar RN  Body Position: position changed independently  Skin Protection:   adhesive use limited   incontinence pads utilized   tubing/devices free from skin contact  Taken 8/3/2022 2000 by Jeremiah Lazar RN  Body Position: position changed independently  Skin Protection:   adhesive use limited   incontinence pads utilized   tubing/devices free from skin contact  Intervention: Prevent and Manage VTE (Venous Thromboembolism) Risk  Recent Flowsheet Documentation  Taken 8/4/2022 0000 by Jeremiah Lazar RN  Activity Management: activity adjusted per  tolerance  Taken 8/3/2022 2200 by Jeremiah Lazar RN  Activity Management: activity adjusted per tolerance  Taken 8/3/2022 2000 by Jeremiah Lazar RN  Activity Management: activity adjusted per tolerance  VTE Prevention/Management: bleeding risk factor(s) identified  Intervention: Prevent Infection  Recent Flowsheet Documentation  Taken 8/4/2022 0000 by Jeremiah Lazar RN  Infection Prevention: environmental surveillance performed  Taken 8/3/2022 2200 by Jeremiah Lazar RN  Infection Prevention: environmental surveillance performed  Taken 8/3/2022 2000 by Jeremiah Lazar RN  Infection Prevention: environmental surveillance performed  Goal: Optimal Comfort and Wellbeing  Outcome: Ongoing, Progressing  Goal: Readiness for Transition of Care  Outcome: Ongoing, Progressing     Problem: Asthma Comorbidity  Goal: Maintenance of Asthma Control  Outcome: Ongoing, Progressing  Intervention: Maintain Asthma Symptom Control  Recent Flowsheet Documentation  Taken 8/4/2022 0000 by Jeremiah Lazar RN  Medication Review/Management: medications reviewed  Taken 8/3/2022 2200 by Jeremiah Lazar RN  Medication Review/Management: medications reviewed  Taken 8/3/2022 2000 by Jeremiah Lazar RN  Medication Review/Management: medications reviewed     Problem: Behavioral Health Comorbidity  Goal: Maintenance of Behavioral Health Symptom Control  Outcome: Ongoing, Progressing  Intervention: Maintain Behavioral Health Symptom Control  Recent Flowsheet Documentation  Taken 8/4/2022 0000 by Jeremiah Lazar RN  Medication Review/Management: medications reviewed  Taken 8/3/2022 2200 by Jeremiah Lazar RN  Medication Review/Management: medications reviewed  Taken 8/3/2022 2000 by Jeremiah Lazar RN  Medication Review/Management: medications reviewed     Problem: COPD (Chronic Obstructive Pulmonary Disease) Comorbidity  Goal: Maintenance of COPD Symptom Control  Outcome: Ongoing, Progressing  Intervention: Maintain COPD-Symptom Control  Recent Flowsheet Documentation  Taken  8/4/2022 0000 by Jeremiah Lazar RN  Medication Review/Management: medications reviewed  Taken 8/3/2022 2200 by Jeremiah Lazar RN  Medication Review/Management: medications reviewed  Taken 8/3/2022 2000 by Jeremiah Lazar RN  Medication Review/Management: medications reviewed     Problem: Diabetes Comorbidity  Goal: Blood Glucose Level Within Targeted Range  Outcome: Ongoing, Progressing     Problem: Heart Failure Comorbidity  Goal: Maintenance of Heart Failure Symptom Control  Outcome: Ongoing, Progressing  Intervention: Maintain Heart Failure-Management  Recent Flowsheet Documentation  Taken 8/4/2022 0000 by Jeremiah Lazar RN  Medication Review/Management: medications reviewed  Taken 8/3/2022 2200 by Jeremiah Lazar RN  Medication Review/Management: medications reviewed  Taken 8/3/2022 2000 by Jeremiah Lazar RN  Medication Review/Management: medications reviewed     Problem: Hypertension Comorbidity  Goal: Blood Pressure in Desired Range  Outcome: Ongoing, Progressing  Intervention: Maintain Blood Pressure Management  Recent Flowsheet Documentation  Taken 8/4/2022 0000 by Jereimah Lazar RN  Medication Review/Management: medications reviewed  Taken 8/3/2022 2200 by Jeremiah Lazar RN  Medication Review/Management: medications reviewed  Taken 8/3/2022 2000 by Jeremiah Lazar RN  Medication Review/Management: medications reviewed     Problem: Obstructive Sleep Apnea Risk or Actual Comorbidity Management  Goal: Unobstructed Breathing During Sleep  Outcome: Ongoing, Progressing     Problem: Osteoarthritis Comorbidity  Goal: Maintenance of Osteoarthritis Symptom Control  Outcome: Ongoing, Progressing  Intervention: Maintain Osteoarthritis Symptom Control  Recent Flowsheet Documentation  Taken 8/4/2022 0000 by Jeremiah Lazar RN  Activity Management: activity adjusted per tolerance  Medication Review/Management: medications reviewed  Taken 8/3/2022 2200 by Jeremiah Lazar RN  Activity Management: activity adjusted per tolerance  Medication  Review/Management: medications reviewed  Taken 8/3/2022 2000 by Jeremiah Lazar RN  Activity Management: activity adjusted per tolerance  Medication Review/Management: medications reviewed     Problem: Pain Chronic (Persistent) (Comorbidity Management)  Goal: Acceptable Pain Control and Functional Ability  Outcome: Ongoing, Progressing  Intervention: Manage Persistent Pain  Recent Flowsheet Documentation  Taken 8/4/2022 0000 by Jeremiah Lazar RN  Medication Review/Management: medications reviewed  Taken 8/3/2022 2200 by Jeremiah Lazar RN  Medication Review/Management: medications reviewed  Taken 8/3/2022 2000 by Jeremiah Lazar RN  Medication Review/Management: medications reviewed     Problem: Seizure Disorder Comorbidity  Goal: Maintenance of Seizure Control  Outcome: Ongoing, Progressing     Problem: Fall Injury Risk  Goal: Absence of Fall and Fall-Related Injury  Outcome: Ongoing, Progressing  Intervention: Identify and Manage Contributors  Recent Flowsheet Documentation  Taken 8/4/2022 0000 by Jeremiah Lazar RN  Medication Review/Management: medications reviewed  Taken 8/3/2022 2200 by Jeremiah Lazar RN  Medication Review/Management: medications reviewed  Taken 8/3/2022 2000 by Jeremiah Lazar RN  Medication Review/Management: medications reviewed  Intervention: Promote Injury-Free Environment  Recent Flowsheet Documentation  Taken 8/4/2022 0000 by Jeremiah Lazar RN  Safety Promotion/Fall Prevention:   activity supervised   assistive device/personal items within reach   clutter free environment maintained   fall prevention program maintained   nonskid shoes/slippers when out of bed  Taken 8/3/2022 2200 by Jeremiah Lazar RN  Safety Promotion/Fall Prevention:   activity supervised   assistive device/personal items within reach   clutter free environment maintained   fall prevention program maintained   nonskid shoes/slippers when out of bed  Taken 8/3/2022 2000 by Jeremiah Lazar RN  Safety Promotion/Fall Prevention:   activity  supervised   clutter free environment maintained   assistive device/personal items within reach   fall prevention program maintained   nonskid shoes/slippers when out of bed     Problem: Skin Injury Risk Increased  Goal: Skin Health and Integrity  Outcome: Ongoing, Progressing  Intervention: Optimize Skin Protection  Recent Flowsheet Documentation  Taken 8/3/2022 2200 by Jeremiah Lazar RN  Pressure Reduction Techniques: frequent weight shift encouraged  Head of Bed (HOB) Positioning: HOB at 20-30 degrees  Pressure Reduction Devices: pressure-redistributing mattress utilized  Skin Protection:   adhesive use limited   incontinence pads utilized   tubing/devices free from skin contact  Taken 8/3/2022 2000 by Jeremiah Lazar RN  Pressure Reduction Techniques: frequent weight shift encouraged  Pressure Reduction Devices: pressure-redistributing mattress utilized  Skin Protection:   adhesive use limited   incontinence pads utilized   tubing/devices free from skin contact     Problem: Adjustment to Illness (Gastrointestinal Bleeding)  Goal: Optimal Coping with Acute Illness  Outcome: Ongoing, Progressing     Problem: Bleeding (Gastrointestinal Bleeding)  Goal: Hemostasis  Outcome: Ongoing, Progressing     Problem: Social Isolation  Goal: Social Connection Supported  Outcome: Ongoing, Progressing     Problem: Fluid and Electrolyte Imbalance (Acute Kidney Injury/Impairment)  Goal: Fluid and Electrolyte Balance  Outcome: Ongoing, Progressing     Problem: Oral Intake Inadequate (Acute Kidney Injury/Impairment)  Goal: Optimal Nutrition Intake  Outcome: Ongoing, Progressing     Problem: Renal Function Impairment (Acute Kidney Injury/Impairment)  Goal: Effective Renal Function  Outcome: Ongoing, Progressing  Intervention: Monitor and Support Renal Function  Recent Flowsheet Documentation  Taken 8/4/2022 0000 by Jeermiah Lazar RN  Medication Review/Management: medications reviewed  Taken 8/3/2022 2200 by Jeremiah Lazar RN  Medication  Review/Management: medications reviewed  Taken 8/3/2022 2000 by Jeremiah Lazar, RN  Medication Review/Management: medications reviewed   Goal Outcome Evaluation:

## 2022-08-04 NOTE — PROGRESS NOTES
[facility]  PROGRESS NOTE    Patient Name: Karla Cristobal  : 1935  MRN: [id number]    Date of Admission: 2022  Primary Care Physician: Luis Cardenas MD    Subjective   Subjective     CC:  F/U GI bleed    HPI:  She feels OK.  No breathing issues.    ROS:  Gen- No fevers  Resp- As above  GI- No bloody stool      Objective   Objective     Vital Signs:   Temp:  [98 °F (36.7 °C)-98.2 °F (36.8 °C)] 98 °F (36.7 °C)  Heart Rate:  [74-78] 77  Resp:  [16-20] 18  BP: (109-123)/(49-67) 111/50  Flow (L/min):  [1] 1     Physical Exam:  Constitutional: No acute distress, awake, alert, sitting up in chair eating dinner  HENT: NCAT, mucous membranes moist  Respiratory: Respiratory effort normal   Cardiovascular: RRR  Psychiatric: Appropriate affect, cooperative  Neurologic: Speech clear and fluent  Skin: No rashes on exposed surfaces    Results Reviewed:  LAB RESULTS:      Lab 22  0505 22  0642 22  2352 22  1923 22  1451 22  0702 22  1744 22  1047 22  0742 22  0354 22  0111 22  2148 22   WBC 6.85  --   --   --   --  6.19  --   --   --  4.02  --   --   --    HEMOGLOBIN 9.4* 9.8* 9.8* 9.8* 9.7* 10.1*  10.1*   < > 10.8*   < > 10.7*   < > 11.0*  --    HEMATOCRIT 27.8* 29.1* 29.3* 30.2* 29.3* 30.2*  30.2*   < > 32.9*   < > 33.2*   < > 32.5*  --    PLATELETS 128*  --   --   --   --  112*  --   --   --  75*  --   --   --    NEUTROS ABS 4.65  --   --   --   --  3.93  --   --   --  2.14  --   --   --    IMMATURE GRANS (ABS) 0.06*  --   --   --   --  0.05  --   --   --  0.03  --   --   --    LYMPHS ABS 1.04  --   --   --   --  1.28  --   --   --  1.21  --   --   --    MONOS ABS 0.79  --   --   --   --  0.71  --   --   --  0.47  --   --   --    EOS ABS 0.27  --   --   --   --  0.19  --   --   --  0.15  --   --   --    MCV 85.5  --   --   --   --  86.3  --   --   --  89.5  --   --   --    LACTATE  --    --   --   --   --   --   --   --   --   --   --   --  1.8   LDH  --   --   --   --   --   --   --   --   --   --   --   --  276*   PROTIME 22.6*  --   --   --   --   --   --   --   --  23.1*  --  24.1*  --    APTT 46.3*  --   --   --   --   --   --   --   --   --   --   --   --    D DIMER QUANT  --   --   --   --   --   --   --  0.85*  --   --   --   --   --     < > = values in this interval not displayed.         Lab 08/04/22  0505 08/03/22  0642 08/02/22  0702 08/01/22  0844 07/31/22  1047 07/30/22  1348 07/29/22 2026 07/29/22  0358   SODIUM 136 137 140 141 143 148*  --  142   POTASSIUM 3.9 3.8 3.8 3.5 3.6 3.8  --  4.0   CHLORIDE 101 100 103 103 106 108*  --  103   CO2 24.0 23.0 24.0 25.0 22.0 24.0  --  24.0   ANION GAP 11.0 14.0 13.0 13.0 15.0 16.0*  --  15.0   * 119* 117* 106* 110* 106*  --  113*   CREATININE 3.74* 3.70* 4.09* 3.86* 3.80* 3.54*  --  4.02*   EGFR 11.3* 11.4* 10.1* 10.9* 11.1* 12.1*  --  10.4*   GLUCOSE 213* 189* 175* 174* 283* 130*  --  101*   CALCIUM 8.3* 7.9* 8.3* 8.1* 8.2* 8.5*  --  8.2*   MAGNESIUM  --   --   --  1.9  --   --   --  2.0   PHOSPHORUS 4.3  --   --  4.0  --  5.7* 5.6* 5.8*         Lab 08/04/22  0505 07/30/22  1348 07/28/22 2001   TOTAL PROTEIN  --   --  6.6   ALBUMIN 2.40* 2.80* 2.90*   GLOBULIN  --   --  3.7   ALT (SGPT)  --   --  15   AST (SGOT)  --   --  27   BILIRUBIN  --   --  0.9   ALK PHOS  --   --  176*         Lab 08/04/22  0505 07/29/22 0354 07/28/22  2148   PROTIME 22.6* 23.1* 24.1*   INR 1.99* 2.04* 2.15*             Lab 07/29/22  0354 07/28/22 2001 07/28/22  1600   IRON  --  34*  --    IRON SATURATION  --  11*  --    TIBC  --  307  --    TRANSFERRIN  --  206  --    FERRITIN  --  267.60*  --    FOLATE 9.09  --   --    VITAMIN B 12 774  --   --    ABO TYPING  --   --  B   RH TYPING  --   --  Positive   ANTIBODY SCREEN  --   --  Negative         Brief Urine Lab Results  (Last result in the past 365 days)      Color   Clarity   Blood   Leuk Est   Nitrite    Protein   CREAT   Urine HCG        07/30/22 1742 Yellow   Clear   Small (1+)   Trace   Negative   Trace                 Microbiology Results Abnormal     Procedure Component Value - Date/Time    Eosinophil Smear - Urine, Urine, Clean Catch [[id number]]  (Normal) Collected: 07/30/22 1742    Lab Status: Final result Specimen: Urine, Clean Catch Updated: 07/30/22 1938     Eosinophil Smear 0 % EOS/100 Cells     Narrative:      No eosinophil seen          No radiology results from the last 24 hrs    Results for orders placed during the hospital encounter of 07/28/22    Adult Transthoracic Echo Complete W/ Cont if Necessary Per Protocol    Interpretation Summary  · Mild biatrial enlargement.  · Moderately reduced right ventricular systolic function noted.  · Moderate left ventricular systolic dysfunction, estimated EF 37%.  · Left ventricular wall thickness is consistent with mild concentric hypertrophy.  · Lambl's excrescences of the aortic valve are noted. There is trace aortic insufficiency, no evidence of aortic stenosis.  · Moderate mitral regurgitation.  · Moderate to severe tricuspid regurgitation with RVSP 48 mmHg.  · Moderate pulmonic insufficiency.      I have reviewed the medications:  Scheduled Meds:atorvastatin, 20 mg, Oral, Nightly  carvedilol, 3.125 mg, Oral, BID With Meals  insulin lispro, 0-7 Units, Subcutaneous, TID AC  Insulin Lispro, 4 Units, Subcutaneous, TID With Meals  latanoprost, 1 drop, Both Eyes, Nightly  levothyroxine, 112 mcg, Oral, Q AM  pantoprazole, 40 mg, Oral, BID AC  sodium chloride, 10 mL, Intravenous, Q12H  timolol, 1 drop, Both Eyes, BID      Continuous Infusions:   PRN Meds:.•  acetaminophen **OR** acetaminophen **OR** acetaminophen  •  dextrose  •  dextrose  •  glucagon (human recombinant)  •  melatonin  •  ondansetron  •  sodium chloride    Assessment & Plan   Assessment & Plan     Active Hospital Problems    Diagnosis  POA   • **GIB (gastrointestinal bleeding) [K92.2]  Yes   •  Normocytic anemia [D64.9]  Unknown   • Acute renal failure (ARF) (HCC) [N17.9]  Unknown   • COVID-19 virus detected [U07.1]  Unknown   • Thrombocytopenia (HCC) [D69.6]  Unknown   • Hypoalbuminemia [E88.09]  Unknown   • AMS (altered mental status) [R41.82]  Unknown   • Lower GI bleed [K92.2]  Yes   • Type 2 diabetes mellitus without complication, with long-term current use of insulin (HCC) [E11.9, Z79.4]  Not Applicable   • Chronic systolic congestive heart failure (HCC) [I50.22]  Yes   • CAD (coronary artery disease) [I25.10]  Yes   • Chronic atrial fibrillation (HCC) [I48.20]  Yes   • CKD (chronic kidney disease), stage IV (HCC) [N18.4]  Yes   • Hyperlipidemia LDL goal <100 [E78.5]  Yes   • Essential hypertension [I10]  Yes   • Hypothyroidism [E03.9]  Yes   • Pulmonary embolism (HCC) [I26.99]  Yes   • CHF (NYHA class IV, ACC/AHA stage D) (HCC) [I50.84]  Yes      Resolved Hospital Problems   No resolved problems to display.        Brief Hospital Course to date:  Karla Cristobal is a 87 y.o. female with anemia, atrial fibrillation on Eliquis, CKD stage IV, HLD, hypertension, hypothyroidism, CAD s/p stents, diabetes type 2, and systolic heart failure (2L NC at night) who presented to the ED for rectal bleeding. 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  3 endoclips were applied for hemostasis.  H/H has been stable since.  She was incidentally found to be COVID positive and had acute kidney injury for which nephrology has been following and discussing initiation of dialysis.    This patient's problems and plans were partially entered by my partner and updated as appropriate by me 08/04/22.      Bleeding colonic AVM  Acute blood loss anemia  -H/H stable now    ROBBI on CKD IV  -Nephrology following  -Stopped IV fluids  -NPO at midnight in case dialysis access is needed    COVID-19  positive  -Asymptomatic      Mixed systolic and diastolic CHF  Moderate to severe MR  Persistent atrial fibrillation   -Seen by cardiology and started on carvedilol  -On eliquis 2.5mg BID-hold in case of need for dialysis access     Expected Discharge Location and Transportation: home  Expected Discharge Date: 8/9    DVT prophylaxis:  No DVT prophylaxis order currently exists.     AM-PAC 6 Clicks Score (PT): 10 (08/04/22 2522)    CODE STATUS:   Code Status and Medical Interventions:   Ordered at: 07/28/22 5691     Level Of Support Discussed With:    Patient     Code Status (Patient has no pulse and is not breathing):    CPR (Attempt to Resuscitate)     Medical Interventions (Patient has pulse or is breathing):    Full Support       Yolanda Gomez MD  08/04/22

## 2022-08-05 NOTE — PROGRESS NOTES
[facility] Medicine Services  PROGRESS NOTE    Patient Name: Karla Cristobal  : 1935  MRN: [id number]    Date of Admission: 2022  Primary Care Physician: Luis Cardenas MD    Subjective   Subjective     CC:  Follow up GI Bleed     HPI:  Patient seen resting in bed in no apparent distress. No acute events overnight per nursing. She is feeling about the same today. Aware that she may need dialysis pending nephrology recommendations. No new complaints at this time.     ROS:  Gen- No fevers, chills  CV- No chest pain, palpitations  Resp- No cough, dyspnea  GI- No N/V/D, abd pain    Objective   Objective     Vital Signs:   Temp:  [97.9 °F (36.6 °C)-98.5 °F (36.9 °C)] 97.9 °F (36.6 °C)  Heart Rate:  [73-79] 73  Resp:  [18-20] 20  BP: (111-123)/(39-72) 113/57     Physical Exam:  Constitutional: No acute distress, awake, alert  HENT: NCAT, mucous membranes moist  Respiratory: grossly clear, diminished in bases, respiratory effort normal   Cardiovascular: RRR, no murmurs, cap refill brisk   Gastrointestinal: Positive bowel sounds, soft, nontender, nondistended  Musculoskeletal: trace BLE edema   Psychiatric: flat affect, cooperative  Neurologic: Oriented x 3, moves all extremities, speech clear  Skin: warm, dry, no visible rash     Results Reviewed:  LAB RESULTS:      Lab 22  0455 22  0505 22  0642 22  2352 22  1923 22  1451 22  0702 22  1744 22  1047   WBC 5.96 6.85  --   --   --   --  6.19  --   --    HEMOGLOBIN 9.3* 9.4* 9.8* 9.8* 9.8*   < > 10.1*  10.1*   < > 10.8*   HEMATOCRIT 27.4* 27.8* 29.1* 29.3* 30.2*   < > 30.2*  30.2*   < > 32.9*   PLATELETS 151 128*  --   --   --   --  112*  --   --    NEUTROS ABS 3.87 4.65  --   --   --   --  3.93  --   --    IMMATURE GRANS (ABS) 0.04 0.06*  --   --   --   --  0.05  --   --    LYMPHS ABS 1.07 1.04  --   --   --   --  1.28  --   --    MONOS ABS 0.72 0.79  --   --   --   --  0.71  --    --    EOS ABS 0.23 0.27  --   --   --   --  0.19  --   --    MCV 87.5 85.5  --   --   --   --  86.3  --   --    PROTIME  --  22.6*  --   --   --   --   --   --   --    APTT  --  46.3*  --   --   --   --   --   --   --    D DIMER QUANT  --   --   --   --   --   --   --   --  0.85*    < > = values in this interval not displayed.         Lab 08/05/22  0455 08/04/22  0505 08/03/22  0642 08/02/22  0702 08/01/22  0844 07/31/22  1047 07/30/22  1348 07/29/22 2026   SODIUM 136 136 137 140 141   < > 148*  --    POTASSIUM 4.0 3.9 3.8 3.8 3.5   < > 3.8  --    CHLORIDE 102 101 100 103 103   < > 108*  --    CO2 25.0 24.0 23.0 24.0 25.0   < > 24.0  --    ANION GAP 9.0 11.0 14.0 13.0 13.0   < > 16.0*  --    * 107* 119* 117* 106*   < > 106*  --    CREATININE 3.76* 3.74* 3.70* 4.09* 3.86*   < > 3.54*  --    EGFR 11.2* 11.3* 11.4* 10.1* 10.9*   < > 12.1*  --    GLUCOSE 219* 213* 189* 175* 174*   < > 130*  --    CALCIUM 8.7 8.3* 7.9* 8.3* 8.1*   < > 8.5*  --    MAGNESIUM  --   --   --   --  1.9  --   --   --    PHOSPHORUS 4.2 4.3  --   --  4.0  --  5.7* 5.6*    < > = values in this interval not displayed.         Lab 08/05/22 0455 08/04/22  0505 07/30/22  1348   ALBUMIN 2.50* 2.40* 2.80*         Lab 08/04/22  0505   PROTIME 22.6*   INR 1.99*                 Brief Urine Lab Results  (Last result in the past 365 days)      Color   Clarity   Blood   Leuk Est   Nitrite   Protein   CREAT   Urine HCG        07/30/22 1742 Yellow   Clear   Small (1+)   Trace   Negative   Trace                 Microbiology Results Abnormal     Procedure Component Value - Date/Time    Eosinophil Smear - Urine, Urine, Clean Catch [[id number]]  (Normal) Collected: 07/30/22 1742    Lab Status: Final result Specimen: Urine, Clean Catch Updated: 07/30/22 1938     Eosinophil Smear 0 % EOS/100 Cells     Narrative:      No eosinophil seen          No radiology results from the last 24 hrs    Results for orders placed during the hospital encounter of  07/28/22    Adult Transthoracic Echo Complete W/ Cont if Necessary Per Protocol    Interpretation Summary  · Mild biatrial enlargement.  · Moderately reduced right ventricular systolic function noted.  · Moderate left ventricular systolic dysfunction, estimated EF 37%.  · Left ventricular wall thickness is consistent with mild concentric hypertrophy.  · Lambl's excrescences of the aortic valve are noted. There is trace aortic insufficiency, no evidence of aortic stenosis.  · Moderate mitral regurgitation.  · Moderate to severe tricuspid regurgitation with RVSP 48 mmHg.  · Moderate pulmonic insufficiency.      I have reviewed the medications:  Scheduled Meds:atorvastatin, 20 mg, Oral, Nightly  carvedilol, 3.125 mg, Oral, BID With Meals  insulin lispro, 0-7 Units, Subcutaneous, TID AC  Insulin Lispro, 4 Units, Subcutaneous, TID With Meals  latanoprost, 1 drop, Both Eyes, Nightly  levothyroxine, 112 mcg, Oral, Q AM  pantoprazole, 40 mg, Oral, BID AC  sodium chloride, 10 mL, Intravenous, Q12H  timolol, 1 drop, Both Eyes, BID      Continuous Infusions:   PRN Meds:.•  acetaminophen **OR** acetaminophen **OR** acetaminophen  •  dextrose  •  dextrose  •  glucagon (human recombinant)  •  melatonin  •  ondansetron  •  sodium chloride    Assessment & Plan   Assessment & Plan     Active Hospital Problems    Diagnosis  POA   • **GIB (gastrointestinal bleeding) [K92.2]  Yes   • Normocytic anemia [D64.9]  Unknown   • Acute renal failure (ARF) (HCC) [N17.9]  Unknown   • COVID-19 virus detected [U07.1]  Unknown   • Thrombocytopenia (HCC) [D69.6]  Unknown   • Hypoalbuminemia [E88.09]  Unknown   • AMS (altered mental status) [R41.82]  Unknown   • Lower GI bleed [K92.2]  Yes   • Type 2 diabetes mellitus without complication, with long-term current use of insulin (HCC) [E11.9, Z79.4]  Not Applicable   • Chronic systolic congestive heart failure (HCC) [I50.22]  Yes   • CAD (coronary artery disease) [I25.10]  Yes   • Chronic atrial  fibrillation (HCC) [I48.20]  Yes   • CKD (chronic kidney disease), stage IV (HCC) [N18.4]  Yes   • Hyperlipidemia LDL goal <100 [E78.5]  Yes   • Essential hypertension [I10]  Yes   • Hypothyroidism [E03.9]  Yes   • Pulmonary embolism (HCC) [I26.99]  Yes   • CHF (NYHA class IV, ACC/AHA stage D) (HCC) [I50.84]  Yes      Resolved Hospital Problems   No resolved problems to display.        Brief Hospital Course to date:  Karla Cristobal is a 87 y.o. female with anemia, atrial fibrillation on Eliquis, CKD stage IV, HLD, hypertension, hypothyroidism, CAD s/p stents, diabetes type 2, and systolic heart failure (2L NC at night) who presented to the ED on 7/28/2022 for rectal bleeding. 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  3 endoclips were applied for hemostasis.  H/H has been stable since.  She was incidentally found to be COVID positive and had acute kidney injury for which nephrology has been following and discussing initiation of dialysis.     This patient's problems and plans were partially entered by my partner and updated as appropriate by me 08/05/22.      Bleeding colonic AVM  Acute blood loss anemia  -Colonoscopy revealed sigmoid diverticulosis and AVM in proximal ascending colon with adherent clots, likely the source of bleeding.  3 endoclips were applied for hemostasis  -s/p IV Iron  -H/H stable   -AM labs     ROBBI on CKD IV  -Nephrology following  -Stopped IV fluids, encourage PO   -Renal Diet     COVID-19 positive  -Asymptomatic    -out of Isolation on 8/8     Mixed systolic and diastolic CHF  Moderate to severe MR  Persistent atrial fibrillation   -Seen by cardiology and started on carvedilol  -Eliquis currently on hold in case of need for dialysis access     Expected Discharge Location and Transportation: home  Expected Discharge Date: 8/9    DVT prophylaxis:  No DVT prophylaxis  order currently exists.     AM-PAC 6 Clicks Score (PT): 10 (08/04/22 2033)    CODE STATUS:   Code Status and Medical Interventions:   Ordered at: 07/28/22 8870     Level Of Support Discussed With:    Patient     Code Status (Patient has no pulse and is not breathing):    CPR (Attempt to Resuscitate)     Medical Interventions (Patient has pulse or is breathing):    Full Support       NESS Foss  08/05/22

## 2022-08-05 NOTE — CASE MANAGEMENT/SOCIAL WORK
Continued Stay Note  [facility]     Patient Name: Karla Cristobal  MRN: [id number]  Today's Date: 8/5/2022    Admit Date: 7/28/2022     Discharge Plan     Row Name 08/05/22 1641       Plan    Plan ongoing, home at d/c    Plan Comments CM continues to follow for discharge plans. Plan was home with family. The problem now is beginning HD or not. CM will see patient on Monday for discharge needs. Rebekah    Final Discharge Disposition Code 01 - home or self-care;30 - still a patient               Discharge Codes    No documentation.                     Qiana Araujo RN

## 2022-08-05 NOTE — PLAN OF CARE
Goal Outcome Evaluation:  Plan of Care Reviewed With: patient, daughter        Progress: no change  Outcome Evaluation: VSS, A-paced via the monitor.  Has denied pain or SOA while on room air.  Patient was NPO for Dialysis catheter placement but the plan changed and the patient is not getting a Dialysis catheter at this time.  Blood sugars 170-190, SS Insulin given.  Continues in contact/airborne precations r/t Covid +.  Will continue with current POC.

## 2022-08-06 NOTE — PROCEDURES
15.5 Italian by 19 cm palindrome tunneled dialysis catheter placed without complication.  Both ports flushed and aspirated easily, and were terminally packed with 1000 units/mL heparin.  The catheter is ready for immediate use.  Full dictation to follow.

## 2022-08-06 NOTE — PROGRESS NOTES
[facility] Medicine Services  PROGRESS NOTE    Patient Name: Karla Cristobal  : 1935  MRN: [id number]    Date of Admission: 2022  Primary Care Physician: Luis Cardenas MD    Subjective   Subjective     CC:  Follow up GI Bleed     HPI:  Patient seen resting in bed.  She is ill-appearing.  She has had significant nausea since taking her morning medications.  She has vomited twice.  Currently dry heaving.  Nurse administered antiemetic.     ROS:  Gen- No fevers, chills  CV- No chest pain, palpitations  Resp- No cough, dyspnea  GI- +N/V, no abd pain      Objective   Objective     Vital Signs:   Temp:  [97.9 °F (36.6 °C)] 97.9 °F (36.6 °C)  Heart Rate:  [69-93] 89  Resp:  [18-20] 18  BP: (102-125)/(57-68) 111/68     Physical Exam:  Constitutional: No acute distress, awake, alert, ill-appearing   HENT: NCAT, mucous membranes moist  Respiratory: diminished in bases, respiratory effort normal   Cardiovascular: RRR, no murmurs, palpable pedal pulses bilaterally, cap refill brisk   Gastrointestinal: Positive bowel sounds, soft, nontender, nondistended  Musculoskeletal: No BLE edema   Psychiatric: Appropriate affect, cooperative  Neurologic: Oriented x 3, moves all extremities, speech clear  Skin: warm, dry, no visible rash     Results Reviewed:  LAB RESULTS:      Lab 22  0516 22  0455 22  0505 22  0642 22  2352 22  1451 22  0702 22  1744 22  1047   WBC 5.17 5.96 6.85  --   --   --  6.19  --   --    HEMOGLOBIN 9.4* 9.3* 9.4* 9.8* 9.8*   < > 10.1*  10.1*   < > 10.8*   HEMATOCRIT 28.0* 27.4* 27.8* 29.1* 29.3*   < > 30.2*  30.2*   < > 32.9*   PLATELETS 150 151 128*  --   --   --  112*  --   --    NEUTROS ABS  --  3.87 4.65  --   --   --  3.93  --   --    IMMATURE GRANS (ABS)  --  0.04 0.06*  --   --   --  0.05  --   --    LYMPHS ABS  --  1.07 1.04  --   --   --  1.28  --   --    MONOS ABS  --  0.72 0.79  --   --   --  0.71  --   --     EOS ABS  --  0.23 0.27  --   --   --  0.19  --   --    MCV 87.5 87.5 85.5  --   --   --  86.3  --   --    PROTIME  --   --  22.6*  --   --   --   --   --   --    APTT  --   --  46.3*  --   --   --   --   --   --    D DIMER QUANT  --   --   --   --   --   --   --   --  0.85*    < > = values in this interval not displayed.         Lab 08/06/22  0516 08/05/22 0455 08/04/22  0505 08/03/22  0642 08/02/22  0702 08/01/22  0844 07/31/22  1047 07/30/22  1348   SODIUM 137 136 136 137 140 141   < > 148*   POTASSIUM 3.8 4.0 3.9 3.8 3.8 3.5   < > 3.8   CHLORIDE 101 102 101 100 103 103   < > 108*   CO2 22.0 25.0 24.0 23.0 24.0 25.0   < > 24.0   ANION GAP 14.0 9.0 11.0 14.0 13.0 13.0   < > 16.0*   * 103* 107* 119* 117* 106*   < > 106*   CREATININE 4.07* 3.76* 3.74* 3.70* 4.09* 3.86*   < > 3.54*   EGFR 10.2* 11.2* 11.3* 11.4* 10.1* 10.9*   < > 12.1*   GLUCOSE 169* 219* 213* 189* 175* 174*   < > 130*   CALCIUM 8.7 8.7 8.3* 7.9* 8.3* 8.1*   < > 8.5*   MAGNESIUM 2.0  --   --   --   --  1.9  --   --    PHOSPHORUS 4.2 4.2 4.3  --   --  4.0  --  5.7*    < > = values in this interval not displayed.         Lab 08/06/22  0516 08/05/22 0455 08/04/22  0505 07/30/22  1348   ALBUMIN 2.20* 2.50* 2.40* 2.80*         Lab 08/04/22  0505   PROTIME 22.6*   INR 1.99*                 Brief Urine Lab Results  (Last result in the past 365 days)      Color   Clarity   Blood   Leuk Est   Nitrite   Protein   CREAT   Urine HCG        07/30/22 1742 Yellow   Clear   Small (1+)   Trace   Negative   Trace                 Microbiology Results Abnormal     Procedure Component Value - Date/Time    Eosinophil Smear - Urine, Urine, Clean Catch [[id number]]  (Normal) Collected: 07/30/22 1742    Lab Status: Final result Specimen: Urine, Clean Catch Updated: 07/30/22 1938     Eosinophil Smear 0 % EOS/100 Cells     Narrative:      No eosinophil seen          No radiology results from the last 24 hrs    Results for orders placed during the hospital encounter of  07/28/22    Adult Transthoracic Echo Complete W/ Cont if Necessary Per Protocol    Interpretation Summary  · Mild biatrial enlargement.  · Moderately reduced right ventricular systolic function noted.  · Moderate left ventricular systolic dysfunction, estimated EF 37%.  · Left ventricular wall thickness is consistent with mild concentric hypertrophy.  · Lambl's excrescences of the aortic valve are noted. There is trace aortic insufficiency, no evidence of aortic stenosis.  · Moderate mitral regurgitation.  · Moderate to severe tricuspid regurgitation with RVSP 48 mmHg.  · Moderate pulmonic insufficiency.      I have reviewed the medications:  Scheduled Meds:atorvastatin, 20 mg, Oral, Nightly  carvedilol, 3.125 mg, Oral, BID With Meals  dextromethorphan polistirex ER, 60 mg, Oral, Q12H  insulin lispro, 0-7 Units, Subcutaneous, TID AC  Insulin Lispro, 4 Units, Subcutaneous, TID With Meals  latanoprost, 1 drop, Both Eyes, Nightly  levothyroxine, 112 mcg, Oral, Q AM  pantoprazole, 40 mg, Oral, BID AC  sodium chloride, 10 mL, Intravenous, Q12H  timolol, 1 drop, Both Eyes, BID      Continuous Infusions:   PRN Meds:.•  acetaminophen **OR** acetaminophen **OR** acetaminophen  •  benzonatate  •  dextrose  •  dextrose  •  glucagon (human recombinant)  •  melatonin  •  ondansetron  •  sodium chloride    Assessment & Plan   Assessment & Plan     Active Hospital Problems    Diagnosis  POA   • **GIB (gastrointestinal bleeding) [K92.2]  Yes   • Normocytic anemia [D64.9]  Unknown   • Acute renal failure (ARF) (HCC) [N17.9]  Unknown   • COVID-19 virus detected [U07.1]  Unknown   • Thrombocytopenia (HCC) [D69.6]  Unknown   • Hypoalbuminemia [E88.09]  Unknown   • AMS (altered mental status) [R41.82]  Unknown   • Lower GI bleed [K92.2]  Yes   • Type 2 diabetes mellitus without complication, with long-term current use of insulin (HCC) [E11.9, Z79.4]  Not Applicable   • Chronic systolic congestive heart failure (HCC) [I50.22]  Yes   •  CAD (coronary artery disease) [I25.10]  Yes   • Chronic atrial fibrillation (HCC) [I48.20]  Yes   • CKD (chronic kidney disease), stage IV (HCC) [N18.4]  Yes   • Hyperlipidemia LDL goal <100 [E78.5]  Yes   • Essential hypertension [I10]  Yes   • Hypothyroidism [E03.9]  Yes   • Pulmonary embolism (HCC) [I26.99]  Yes   • CHF (NYHA class IV, ACC/AHA stage D) (HCC) [I50.84]  Yes      Resolved Hospital Problems   No resolved problems to display.        Brief Hospital Course to date:  Karla Cristobal is a 87 y.o. female with anemia, atrial fibrillation on Eliquis, CKD stage IV, HLD, hypertension, hypothyroidism, CAD s/p stents, diabetes type 2, and systolic heart failure (2L NC at night) who presented to the ED on 7/28/2022 for rectal bleeding. 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  3 endoclips were applied for hemostasis.  H/H has been stable since.  She was incidentally found to be COVID positive and had acute kidney injury for which nephrology has been following and discussing initiation of dialysis.     This patient's problems and plans were partially entered by my partner and updated as appropriate by me 08/06/22.      Bleeding colonic AVM  Acute blood loss anemia  -Colonoscopy revealed sigmoid diverticulosis and AVM in proximal ascending colon with adherent clots, likely the source of bleeding.  3 endoclips were applied for hemostasis  -s/p IV Iron  -Hgb 9.4 this AM, stable      ROBBI on CKD IV  -Nephrology following  -Stopped IV fluids, encourage PO   -Renal Diet  -Cr increased to 4.07 this AM  -Agreeable to temporary dialysis if needed'  -Renal panel in AM      N/V  -Zofran PRN     COVID-19 positive  -Asymptomatic    -out of Isolation on 8/8     Mixed systolic and diastolic CHF  Moderate to severe MR  Persistent atrial fibrillation   -Seen by cardiology and started on carvedilol  -Eliquis  currently on hold in case of need for dialysis access     Expected Discharge Location and Transportation: home  Expected Discharge Date: 8/9     DVT prophylaxis:  No DVT prophylaxis order currently exists.     AM-PAC 6 Clicks Score (PT): 10 (08/04/22 2033)    CODE STATUS:   Code Status and Medical Interventions:   Ordered at: 07/28/22 6321     Level Of Support Discussed With:    Patient     Code Status (Patient has no pulse and is not breathing):    CPR (Attempt to Resuscitate)     Medical Interventions (Patient has pulse or is breathing):    Full Support       Remigio Fraga, NESS  08/06/22

## 2022-08-06 NOTE — PROGRESS NOTES
"   LOS: 8 days    Patient Care Team:  Luis Cardenas MD as PCP - General (Nephrology)    Chief Complaint:  Gi bleed    Subjective     Interval History:   Renal function worse today. Appers lethargic c/o nausea and did vomit x1. Symptoms improved with zofran    Review of Systems:   No CP  , fever, chills, rigors, rash, N/V, Constipation.         Objective     Vital Sign Min/Max for last 24 hours  Temp  Min: 97.9 °F (36.6 °C)  Max: 98.2 °F (36.8 °C)   BP  Min: 102/59  Max: 127/71   Pulse  Min: 69  Max: 93   Resp  Min: 18  Max: 20   SpO2  Min: 95 %  Max: 98 %   No data recorded   Weight  Min: 81.2 kg (179 lb)  Max: 81.2 kg (179 lb)     Flowsheet Rows    Flowsheet Row First Filed Value   Admission Height 162.6 cm (64\") Documented at 07/28/2022 1451   Admission Weight 76.2 kg (168 lb) Documented at 07/28/2022 1451          I/O this shift:  In: -   Out: 50 [Emesis/NG output:50]  I/O last 3 completed shifts:  In: -   Out: 300 [Urine:300]    Physical Exam:    Gen: Alert, NAD   HENT: NC, AT, EOMI   NECK: Supple, no JVD, Trachea midline   LUNGS: CTA bilaterally, non labored respirtation   CVS: S1/S2 audible, RRR, no murmur   Abd: Soft, NT, ND, BS+   Ext: No pedal edema, no cyanosis   CNS: Alert, No focal deficit noted grossly  Psy: Cooperative  Skin: Warm, dry and intact      WBC WBC   Date Value Ref Range Status   08/06/2022 5.17 3.40 - 10.80 10*3/mm3 Final   08/05/2022 5.96 3.40 - 10.80 10*3/mm3 Final   08/04/2022 6.85 3.40 - 10.80 10*3/mm3 Final      HGB Hemoglobin   Date Value Ref Range Status   08/06/2022 9.4 (L) 12.0 - 15.9 g/dL Final   08/05/2022 9.3 (L) 12.0 - 15.9 g/dL Final   08/04/2022 9.4 (L) 12.0 - 15.9 g/dL Final      HCT Hematocrit   Date Value Ref Range Status   08/06/2022 28.0 (L) 34.0 - 46.6 % Final   08/05/2022 27.4 (L) 34.0 - 46.6 % Final   08/04/2022 27.8 (L) 34.0 - 46.6 % Final      Platlets No results found for: LABPLAT   MCV MCV   Date Value Ref Range Status   08/06/2022 87.5 79.0 - 97.0 fL Final "   08/05/2022 87.5 79.0 - 97.0 fL Final   08/04/2022 85.5 79.0 - 97.0 fL Final          Sodium Sodium   Date Value Ref Range Status   08/06/2022 137 136 - 145 mmol/L Final   08/05/2022 136 136 - 145 mmol/L Final   08/04/2022 136 136 - 145 mmol/L Final      Potassium Potassium   Date Value Ref Range Status   08/06/2022 3.8 3.5 - 5.2 mmol/L Final   08/05/2022 4.0 3.5 - 5.2 mmol/L Final   08/04/2022 3.9 3.5 - 5.2 mmol/L Final      Chloride Chloride   Date Value Ref Range Status   08/06/2022 101 98 - 107 mmol/L Final   08/05/2022 102 98 - 107 mmol/L Final   08/04/2022 101 98 - 107 mmol/L Final      CO2 CO2   Date Value Ref Range Status   08/06/2022 22.0 22.0 - 29.0 mmol/L Final   08/05/2022 25.0 22.0 - 29.0 mmol/L Final   08/04/2022 24.0 22.0 - 29.0 mmol/L Final      BUN BUN   Date Value Ref Range Status   08/06/2022 135 (H) 8 - 23 mg/dL Final   08/05/2022 103 (H) 8 - 23 mg/dL Final   08/04/2022 107 (H) 8 - 23 mg/dL Final      Creatinine Creatinine   Date Value Ref Range Status   08/06/2022 4.07 (H) 0.57 - 1.00 mg/dL Final   08/05/2022 3.76 (H) 0.57 - 1.00 mg/dL Final   08/04/2022 3.74 (H) 0.57 - 1.00 mg/dL Final      Calcium Calcium   Date Value Ref Range Status   08/06/2022 8.7 8.6 - 10.5 mg/dL Final   08/05/2022 8.7 8.6 - 10.5 mg/dL Final   08/04/2022 8.3 (L) 8.6 - 10.5 mg/dL Final      PO4 No results found for: CAPO4   Albumin Albumin   Date Value Ref Range Status   08/06/2022 2.20 (L) 3.50 - 5.20 g/dL Final   08/05/2022 2.50 (L) 3.50 - 5.20 g/dL Final   08/04/2022 2.40 (L) 3.50 - 5.20 g/dL Final      Magnesium Magnesium   Date Value Ref Range Status   08/06/2022 2.0 1.6 - 2.4 mg/dL Final      Uric Acid No results found for: URICACID        Results Review:     I reviewed the patient's new clinical results.    atorvastatin, 20 mg, Oral, Nightly  carvedilol, 3.125 mg, Oral, BID With Meals  dextromethorphan polistirex ER, 60 mg, Oral, Q12H  insulin lispro, 0-7 Units, Subcutaneous, TID AC  Insulin Lispro, 4 Units,  Subcutaneous, TID With Meals  latanoprost, 1 drop, Both Eyes, Nightly  levothyroxine, 112 mcg, Oral, Q AM  pantoprazole, 40 mg, Oral, BID AC  sodium chloride, 10 mL, Intravenous, Q12H  timolol, 1 drop, Both Eyes, BID           Medication Review:     Assessment & Plan       GIB (gastrointestinal bleeding)    Chronic atrial fibrillation (HCC)    CKD (chronic kidney disease), stage IV (HCC)    Hyperlipidemia LDL goal <100    Essential hypertension    Hypothyroidism    Pulmonary embolism (HCC)    CHF (NYHA class IV, ACC/AHA stage D) (HCC)    CAD (coronary artery disease)    Chronic systolic congestive heart failure (HCC)    Type 2 diabetes mellitus without complication, with long-term current use of insulin (HCC)    Normocytic anemia    Acute renal failure (ARF) (HCC)    COVID-19 virus detected    Thrombocytopenia (HCC)    Hypoalbuminemia    AMS (altered mental status)    Lower GI bleed      1- ROBBI on CKD stage IV - Likely pre-renal azotemia for volume depletion. Some improvement with IV fluids. FeUrea 49.7% suggestive of intrinsic renal disease. Worsening.   2 CKD stage IV -baseline Scr 2.5 range recently.   3- GI bleed   4- Low albumin level   5- Iron def anemia Tsat 11%  6- Renal cysts   7- Systolic CHF - Recent Echo showing EF 37% now.     Plan:   Given worsening labs and developing nausea and vomiting. Will obtain TDC today and possible HD afterwards  Luis Cardenas MD  08/06/22  13:40 EDT

## 2022-08-06 NOTE — PLAN OF CARE
Goal Outcome Evaluation:           VSS, 96% on RA. Denies pain/discomfort. Resting comfortably in bed. Will continue to monitor. Will continue to monitor.

## 2022-08-06 NOTE — NURSING NOTE
[facility] Nursing Consult: Left buttock; DTI?  Found during am skin assesment    Patient in bed, has had an episode of emesis with bright yellow liquid in basin and on gown and chin. Skin assessed and the following noted:    1.Wound Assessment    Wound Type: Suspected deep tissue pressure injury, possibly medical device related (saline flush or IV line cap)    Location: Left gluteal    Measurements: 1 x 1 x 0 cm  Wound Bed: Circular, well-defined, purple, nonblanchable with red blanchable periwound skin    Wound Edges: Open      Drainage: None    Odor: None noted    Pain: Patient nauseous and did not report respond when palpated    Care provided: Assessed and requested assistance with fecal incontinence care x nurses aide or nurse.  Recommendation(s) for management of wound: Cleanse with normal saline, pat dry, apply thin layer of Venelex to wound bed, allowed to dry, cover with thick coat of zinc barrier cream twice daily.  Image:            2.Wound Assessment    Wound Type: IAD with partial thickness skin loss  Location: Left sacrum   measurements: 1 x 3 x 0.1 cm  Wound Bed: Maroon/red blanchable   wound Edges: Open  Periwound Skin: Blanchable    Drainage: None    Odor: None noted    Pain: None expressed but patient is nauseous at this time    Care provided: Assessed    Recommendation(s) for management of wound: Cleanse with pH balance foaming cleanser, pat dry and apply thick coat of zinc barrier cream to patient's bottom including sacral spine twice daily and after any incontinence.    Image:          Pressure Injury Prevention Protocol (initiate for Cornel Score of 18 or less): Most recent Cornel Scale score: 18    *Please apply waffle mattress overlay if not already completed    *Keep skin dry, turn q 2 hr, keep heels elevated and offloaded with offloading heel boots.     *Apply z-guard to bottom BID and as needed with incontinence episodes.    *Apply silicone foam border dressing to bony prominences.      *Follow  C.A.R.E protocol if medical devices (Bipap, sr, Ng tube, etc) are being used.    Discussed plan of care with  RN.      Thank you for consulting the WOC Nurse.  WOC Team will plan to follow-up.  If alteration to skin integrity or change in wound bed presentation, please contact WOC team.

## 2022-08-06 NOTE — PLAN OF CARE
Goal Outcome Evaluation:  Plan of Care Reviewed With: patient, daughter        Progress: declining  Outcome Evaluation: Patient A+Ox4 with periods of confusion.  Has denied pain or SOA on room air.  Reports not feeling well this am and at 1030 was dry heaving and PRN Zofran given with relief but 2 hours later vomited aprox 50ml.  Dr. Dennis notified of her condition.  Patient and family gave consent for a Dialysis catheter which was placed in her right chest and is currently having dialysis.  Patient denies nausea at this time but did not eat dinner r/t fatigue and lack of appetite.  Patients daughter was called and updated on the pts condition and POC.  Will continue with current POC.

## 2022-08-06 NOTE — PLAN OF CARE
Problem: Device-Related Complication Risk (Hemodialysis)  Goal: Safe, Effective Therapy Delivery  Outcome: Ongoing, Progressing     Problem: Hemodynamic Instability (Hemodialysis)  Goal: Effective Tissue Perfusion  Outcome: Ongoing, Progressing     Problem: Infection (Hemodialysis)  Goal: Absence of Infection Signs and Symptoms  Outcome: Ongoing, Progressing   Goal Outcome Evaluation:      Pt currently receiving hemodialysis tx. V/S remain stable from the start of tx. No s/s of dialysis related infection noted. Will cont to monitor to prevent  adverse events

## 2022-08-06 NOTE — PROGRESS NOTES
"   LOS: 7 days    Patient Care Team:  Luis Cardenas MD as PCP - General (Nephrology)    Chief Complaint:  Gi bleed    Subjective     Interval History:   Stable clinical status. BUN slowly improving. Other labs stable.     Review of Systems:   No CP  , fever, chills, rigors, rash, N/V, Constipation.         Objective     Vital Sign Min/Max for last 24 hours  Temp  Min: 97.9 °F (36.6 °C)  Max: 98.5 °F (36.9 °C)   BP  Min: 113/57  Max: 125/65   Pulse  Min: 69  Max: 93   Resp  Min: 18  Max: 20   SpO2  Min: 96 %  Max: 98 %   No data recorded   Weight  Min: 80.7 kg (178 lb)  Max: 80.7 kg (178 lb)     Flowsheet Rows    Flowsheet Row First Filed Value   Admission Height 162.6 cm (64\") Documented at 07/28/2022 1451   Admission Weight 76.2 kg (168 lb) Documented at 07/28/2022 1451          No intake/output data recorded.  No intake/output data recorded.    Physical Exam:    Gen: Alert, NAD   HENT: NC, AT, EOMI   NECK: Supple, no JVD, Trachea midline   LUNGS: CTA bilaterally, non labored respirtation   CVS: S1/S2 audible, RRR, no murmur   Abd: Soft, NT, ND, BS+   Ext: No pedal edema, no cyanosis   CNS: Alert, No focal deficit noted grossly  Psy: Cooperative  Skin: Warm, dry and intact      WBC WBC   Date Value Ref Range Status   08/05/2022 5.96 3.40 - 10.80 10*3/mm3 Final   08/04/2022 6.85 3.40 - 10.80 10*3/mm3 Final      HGB Hemoglobin   Date Value Ref Range Status   08/05/2022 9.3 (L) 12.0 - 15.9 g/dL Final   08/04/2022 9.4 (L) 12.0 - 15.9 g/dL Final   08/03/2022 9.8 (L) 12.0 - 15.9 g/dL Final   08/02/2022 9.8 (L) 12.0 - 15.9 g/dL Final      HCT Hematocrit   Date Value Ref Range Status   08/05/2022 27.4 (L) 34.0 - 46.6 % Final   08/04/2022 27.8 (L) 34.0 - 46.6 % Final   08/03/2022 29.1 (L) 34.0 - 46.6 % Final   08/02/2022 29.3 (L) 34.0 - 46.6 % Final      Platlets No results found for: LABPLAT   MCV MCV   Date Value Ref Range Status   08/05/2022 87.5 79.0 - 97.0 fL Final   08/04/2022 85.5 79.0 - 97.0 fL Final    "       Sodium Sodium   Date Value Ref Range Status   08/05/2022 136 136 - 145 mmol/L Final   08/04/2022 136 136 - 145 mmol/L Final   08/03/2022 137 136 - 145 mmol/L Final      Potassium Potassium   Date Value Ref Range Status   08/05/2022 4.0 3.5 - 5.2 mmol/L Final   08/04/2022 3.9 3.5 - 5.2 mmol/L Final   08/03/2022 3.8 3.5 - 5.2 mmol/L Final      Chloride Chloride   Date Value Ref Range Status   08/05/2022 102 98 - 107 mmol/L Final   08/04/2022 101 98 - 107 mmol/L Final   08/03/2022 100 98 - 107 mmol/L Final      CO2 CO2   Date Value Ref Range Status   08/05/2022 25.0 22.0 - 29.0 mmol/L Final   08/04/2022 24.0 22.0 - 29.0 mmol/L Final   08/03/2022 23.0 22.0 - 29.0 mmol/L Final      BUN BUN   Date Value Ref Range Status   08/05/2022 103 (H) 8 - 23 mg/dL Final   08/04/2022 107 (H) 8 - 23 mg/dL Final   08/03/2022 119 (H) 8 - 23 mg/dL Final      Creatinine Creatinine   Date Value Ref Range Status   08/05/2022 3.76 (H) 0.57 - 1.00 mg/dL Final   08/04/2022 3.74 (H) 0.57 - 1.00 mg/dL Final   08/03/2022 3.70 (H) 0.57 - 1.00 mg/dL Final      Calcium Calcium   Date Value Ref Range Status   08/05/2022 8.7 8.6 - 10.5 mg/dL Final   08/04/2022 8.3 (L) 8.6 - 10.5 mg/dL Final   08/03/2022 7.9 (L) 8.6 - 10.5 mg/dL Final      PO4 No results found for: CAPO4   Albumin Albumin   Date Value Ref Range Status   08/05/2022 2.50 (L) 3.50 - 5.20 g/dL Final   08/04/2022 2.40 (L) 3.50 - 5.20 g/dL Final      Magnesium No results found for: MG   Uric Acid No results found for: URICACID        Results Review:     I reviewed the patient's new clinical results.    atorvastatin, 20 mg, Oral, Nightly  carvedilol, 3.125 mg, Oral, BID With Meals  insulin lispro, 0-7 Units, Subcutaneous, TID AC  Insulin Lispro, 4 Units, Subcutaneous, TID With Meals  latanoprost, 1 drop, Both Eyes, Nightly  levothyroxine, 112 mcg, Oral, Q AM  pantoprazole, 40 mg, Oral, BID AC  sodium chloride, 10 mL, Intravenous, Q12H  timolol, 1 drop, Both Eyes, BID            Medication Review:     Assessment & Plan       GIB (gastrointestinal bleeding)    Chronic atrial fibrillation (HCC)    CKD (chronic kidney disease), stage IV (HCC)    Hyperlipidemia LDL goal <100    Essential hypertension    Hypothyroidism    Pulmonary embolism (HCC)    CHF (NYHA class IV, ACC/AHA stage D) (HCC)    CAD (coronary artery disease)    Chronic systolic congestive heart failure (HCC)    Type 2 diabetes mellitus without complication, with long-term current use of insulin (HCC)    Normocytic anemia    Acute renal failure (ARF) (HCC)    COVID-19 virus detected    Thrombocytopenia (HCC)    Hypoalbuminemia    AMS (altered mental status)    Lower GI bleed      1- ROBBI on CKD stage IV - Likely pre-renal azotemia for volume depletion. Some improvement with IV fluids. FeUrea 49.7% suggestive of intrinsic renal disease. Worsening.   2 CKD stage IV -baseline Scr 2.5 range recently.   3- GI bleed   4- Low albumin level   5- Iron def anemia Tsat 11%  6- Renal cysts   7- Systolic CHF - Recent Echo showing EF 37% now.     Plan:  - D/C IV fluids encourage po intake.   - Monitor I/O   - Avoid nephrotoxic agents.   - Renal diet.   - IV iron  - Avoid nephrotoxic agents.   - Adjust meds per renal function   - No emergent need of RRT. Discussed with patient and daughter over the phone. Will monitor renal function over the weekend to assess need for TDC placement by next week. If renal function remains stable then she can be discharge to rehab with close f/u with outpatient Nephrologist.    Luis Cardenas MD  08/05/22  20:09 EDT

## 2022-08-07 NOTE — PROGRESS NOTES
"   LOS: 9 days    Patient Care Team:  Luis Cardenas MD as PCP - General (Nephrology)    Chief Complaint:  Gi bleed    Subjective     Interval History:   HD yesterday tolerated well. N/V improved. Feeling better. Labs better     Review of Systems:   No CP  , fever, chills, rigors, rash, N/V, Constipation.         Objective     Vital Sign Min/Max for last 24 hours  Temp  Min: 96.4 °F (35.8 °C)  Max: 98.4 °F (36.9 °C)   BP  Min: 101/56  Max: 131/72   Pulse  Min: 73  Max: 81   Resp  Min: 16  Max: 20   SpO2  Min: 96 %  Max: 99 %   No data recorded   No data recorded     Flowsheet Rows    Flowsheet Row First Filed Value   Admission Height 162.6 cm (64\") Documented at 07/28/2022 1451   Admission Weight 76.2 kg (168 lb) Documented at 07/28/2022 1451          I/O this shift:  In: 237 [P.O.:237]  Out: -   I/O last 3 completed shifts:  In: -   Out: 350 [Urine:300; Emesis/NG output:50]    Physical Exam:    Gen: Alert, NAD   HENT: NC, AT, EOMI   NECK: Supple, no JVD, Trachea midline   LUNGS: CTA bilaterally, non labored respirtation   CVS: S1/S2 audible, RRR, no murmur   Abd: Soft, NT, ND, BS+   Ext: No pedal edema, no cyanosis   CNS: Alert, No focal deficit noted grossly  Psy: Cooperative  Skin: Warm, dry and intact      WBC WBC   Date Value Ref Range Status   08/07/2022 4.68 3.40 - 10.80 10*3/mm3 Final   08/06/2022 5.17 3.40 - 10.80 10*3/mm3 Final   08/05/2022 5.96 3.40 - 10.80 10*3/mm3 Final      HGB Hemoglobin   Date Value Ref Range Status   08/07/2022 9.5 (L) 12.0 - 15.9 g/dL Final   08/06/2022 9.4 (L) 12.0 - 15.9 g/dL Final   08/05/2022 9.3 (L) 12.0 - 15.9 g/dL Final      HCT Hematocrit   Date Value Ref Range Status   08/07/2022 28.6 (L) 34.0 - 46.6 % Final   08/06/2022 28.0 (L) 34.0 - 46.6 % Final   08/05/2022 27.4 (L) 34.0 - 46.6 % Final      Platlets No results found for: LABPLAT   MCV MCV   Date Value Ref Range Status   08/07/2022 88.0 79.0 - 97.0 fL Final   08/06/2022 87.5 79.0 - 97.0 fL Final   08/05/2022 87.5 79.0 " - 97.0 fL Final          Sodium Sodium   Date Value Ref Range Status   08/07/2022 138 136 - 145 mmol/L Final   08/06/2022 137 136 - 145 mmol/L Final   08/05/2022 136 136 - 145 mmol/L Final      Potassium Potassium   Date Value Ref Range Status   08/07/2022 4.1 3.5 - 5.2 mmol/L Final   08/06/2022 3.8 3.5 - 5.2 mmol/L Final   08/05/2022 4.0 3.5 - 5.2 mmol/L Final      Chloride Chloride   Date Value Ref Range Status   08/07/2022 105 98 - 107 mmol/L Final   08/06/2022 101 98 - 107 mmol/L Final   08/05/2022 102 98 - 107 mmol/L Final      CO2 CO2   Date Value Ref Range Status   08/07/2022 21.0 (L) 22.0 - 29.0 mmol/L Final   08/06/2022 22.0 22.0 - 29.0 mmol/L Final   08/05/2022 25.0 22.0 - 29.0 mmol/L Final      BUN BUN   Date Value Ref Range Status   08/07/2022 86 (H) 8 - 23 mg/dL Final   08/06/2022 135 (H) 8 - 23 mg/dL Final   08/05/2022 103 (H) 8 - 23 mg/dL Final      Creatinine Creatinine   Date Value Ref Range Status   08/07/2022 3.06 (H) 0.57 - 1.00 mg/dL Final   08/06/2022 4.07 (H) 0.57 - 1.00 mg/dL Final   08/05/2022 3.76 (H) 0.57 - 1.00 mg/dL Final      Calcium Calcium   Date Value Ref Range Status   08/07/2022 8.5 (L) 8.6 - 10.5 mg/dL Final   08/06/2022 8.7 8.6 - 10.5 mg/dL Final   08/05/2022 8.7 8.6 - 10.5 mg/dL Final      PO4 No results found for: CAPO4   Albumin Albumin   Date Value Ref Range Status   08/07/2022 2.40 (L) 3.50 - 5.20 g/dL Final   08/06/2022 2.20 (L) 3.50 - 5.20 g/dL Final   08/05/2022 2.50 (L) 3.50 - 5.20 g/dL Final      Magnesium Magnesium   Date Value Ref Range Status   08/06/2022 2.0 1.6 - 2.4 mg/dL Final      Uric Acid No results found for: URICACID        Results Review:     I reviewed the patient's new clinical results.    apixaban, 2.5 mg, Oral, Q12H  atorvastatin, 20 mg, Oral, Nightly  carvedilol, 3.125 mg, Oral, BID With Meals  castor oil-balsam peru, 1 application, Topical, Q12H  dextromethorphan polistirex ER, 60 mg, Oral, Q12H  insulin lispro, 0-7 Units, Subcutaneous, TID  AC  Insulin Lispro, 4 Units, Subcutaneous, TID With Meals  latanoprost, 1 drop, Both Eyes, Nightly  levothyroxine, 112 mcg, Oral, Q AM  pantoprazole, 40 mg, Oral, BID AC  sodium chloride, 10 mL, Intravenous, Q12H  timolol, 1 drop, Both Eyes, BID           Medication Review:     Assessment & Plan       GIB (gastrointestinal bleeding)    Chronic atrial fibrillation (HCC)    CKD (chronic kidney disease), stage IV (HCC)    Hyperlipidemia LDL goal <100    Essential hypertension    Hypothyroidism    Pulmonary embolism (HCC)    CHF (NYHA class IV, ACC/AHA stage D) (HCC)    CAD (coronary artery disease)    Chronic systolic congestive heart failure (HCC)    Type 2 diabetes mellitus without complication, with long-term current use of insulin (HCC)    Normocytic anemia    Acute renal failure (ARF) (HCC)    COVID-19 virus detected    Thrombocytopenia (HCC)    Hypoalbuminemia    AMS (altered mental status)    Lower GI bleed      1- ROBBI on CKD stage IV - Thought to be hemodynamic injuryvs progression of CKD to ESRD. Started on HD on 8/6/22.   2 CKD stage IV -baseline Scr 2.5 range  3- GI bleed   4- Low albumin level   5- Iron def anemia Tsat 11%. S/p iron supplementation. Will start BENY on HD days   6- Renal cysts   7- Systolic CHF - Recent Echo showing EF 37% now.     Plan:   No indication of dialysis today. Plan for HD tomorrow.  EBNY on HD days  Will need outpatient HD chair setup  Luis Cardenas MD  08/07/22  12:31 EDT

## 2022-08-07 NOTE — PLAN OF CARE
Goal Outcome Evaluation:  Plan of Care Reviewed With: patient, daughter        Progress: improving  Outcome Evaluation: VSS, A-Paced via the monitor.  Is A+Ox3, has denied SOA (on room air), pain or N/V.  Reports she feels fine today except for being extremely lethargic.  Patient reports she does not want to eat today but did drink Novasource with her meals.  No changes to the DTI on her left buttock, tx completed per MD orders.  Patient turned every 2 hours and PRN, rests on a waffle mattress, does not want black boots but allows heels to be floated on the bed with use of pillows.  Patient scheduled for Dialysis 8-8-22.  Patients daughter was called and updated on the patients condition.  Patient to be removed from Contact/Airborne precautions 8-8-22.  Will continue with current POC.

## 2022-08-07 NOTE — PROGRESS NOTES
[facility] Medicine Services  PROGRESS NOTE    Patient Name: Karla Cristobal  : 1935  MRN: [id number]    Date of Admission: 2022  Primary Care Physician: Luis Cardenas MD    Subjective   Subjective     CC:  Follow-up GI bleed, ARF    HPI:  Patient seen resting in bed with eyes closed.  She awakens to stimulation.  She had temporary dialysis catheter placed yesterday and underwent hemodialysis afterwards.  She has been very tired since.  No additional complaints at this time.    ROS:  Gen- No fevers, chills  CV- No chest pain, palpitations  Resp- No cough, dyspnea  GI- No N/V/D, abd pain    Objective   Objective     Vital Signs:   Temp:  [96.4 °F (35.8 °C)-98.3 °F (36.8 °C)] 98.2 °F (36.8 °C)  Heart Rate:  [73-81] 80  Resp:  [16-20] 16  BP: (101-131)/(56-72) 118/60     Physical Exam:  Constitutional: No acute distress, drowsy, ill-appearing   HENT: NCAT, mucous membranes moist  Respiratory: diminished in bases, respiratory effort normal on RA  Cardiovascular: RRR, no murmurs, cap refill brisk, right chest dialysis catheter in place   Gastrointestinal: Positive bowel sounds, soft, nontender, nondistended  Musculoskeletal: No BLE edema   Psychiatric: Appropriate affect, cooperative  Neurologic: Oriented x 3, confused at times, moves all extremities, speech clear  Skin: warm, dry, no visible rash     Results Reviewed:  LAB RESULTS:      Lab 22  0717 22  0516 22  0455 22  0505 22  0642 22  1451 22  0702 22  1744 22  1047   WBC 4.68 5.17 5.96 6.85  --   --  6.19  --   --    HEMOGLOBIN 9.5* 9.4* 9.3* 9.4* 9.8*   < > 10.1*  10.1*   < > 10.8*   HEMATOCRIT 28.6* 28.0* 27.4* 27.8* 29.1*   < > 30.2*  30.2*   < > 32.9*   PLATELETS 157 150 151 128*  --   --  112*  --   --    NEUTROS ABS  --   --  3.87 4.65  --   --  3.93  --   --    IMMATURE GRANS (ABS)  --   --  0.04 0.06*  --   --  0.05  --   --    LYMPHS ABS  --   --  1.07 1.04  --    --  1.28  --   --    MONOS ABS  --   --  0.72 0.79  --   --  0.71  --   --    EOS ABS  --   --  0.23 0.27  --   --  0.19  --   --    MCV 88.0 87.5 87.5 85.5  --   --  86.3  --   --    PROTIME  --   --   --  22.6*  --   --   --   --   --    APTT  --   --   --  46.3*  --   --   --   --   --    D DIMER QUANT  --   --   --   --   --   --   --   --  0.85*    < > = values in this interval not displayed.         Lab 08/06/22  0516 08/05/22  0455 08/04/22  0505 08/03/22  0642 08/02/22  0702 08/01/22  0844   SODIUM 137 136 136 137 140 141   POTASSIUM 3.8 4.0 3.9 3.8 3.8 3.5   CHLORIDE 101 102 101 100 103 103   CO2 22.0 25.0 24.0 23.0 24.0 25.0   ANION GAP 14.0 9.0 11.0 14.0 13.0 13.0   * 103* 107* 119* 117* 106*   CREATININE 4.07* 3.76* 3.74* 3.70* 4.09* 3.86*   EGFR 10.2* 11.2* 11.3* 11.4* 10.1* 10.9*   GLUCOSE 169* 219* 213* 189* 175* 174*   CALCIUM 8.7 8.7 8.3* 7.9* 8.3* 8.1*   MAGNESIUM 2.0  --   --   --   --  1.9   PHOSPHORUS 4.2 4.2 4.3  --   --  4.0         Lab 08/06/22  0516 08/05/22  0455 08/04/22  0505   ALBUMIN 2.20* 2.50* 2.40*         Lab 08/04/22  0505   PROTIME 22.6*   INR 1.99*                 Brief Urine Lab Results  (Last result in the past 365 days)      Color   Clarity   Blood   Leuk Est   Nitrite   Protein   CREAT   Urine HCG        07/30/22 1742 Yellow   Clear   Small (1+)   Trace   Negative   Trace                 Microbiology Results Abnormal     Procedure Component Value - Date/Time    Eosinophil Smear - Urine, Urine, Clean Catch [[id number]]  (Normal) Collected: 07/30/22 1742    Lab Status: Final result Specimen: Urine, Clean Catch Updated: 07/30/22 1938     Eosinophil Smear 0 % EOS/100 Cells     Narrative:      No eosinophil seen          Invasive peripheral vascular study    Result Date: 8/6/2022  Clinical indication: 87-year-old female with a chronic kidney disease, need for long-term dialysis. : Dr. Washburn Given Conscious sedation: Moderate sedation was provided by me for a total  of 23 minutes.  A total of 1 mg of Versed was administered intravenously.  The patient's vital signs were monitored throughout the procedure and recorded in the patient's medical record by the nurse. Procedures: 1.  Ultrasound and fluoroscopic guided tunneled dialysis catheter placement 2.  Conscious Sedation Complication: None apparent Technique: Formal written consent for the procedure was obtained from the patient/family after explaining risks to include bleeding, infection, injury to vasculature/adjacent organs, and need for additional surgery/procedures. 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  There is a prominent right external jugular vein present.  The patient has a left-sided pacemaker in place.  Given the aforementioned findings, cannulation and placement through the external jugular vein was deemed indicated. Pertinent ultrasound images were stored in the PACS system for documentation.  The right neck and upper chest were then prepped and draped in the usual, sterile technique.  Local anesthesia with 1% lidocaine infiltration was achieved.  Utilizing aseptic precautions and real-time ultrasound guidance, the right external jugular vein was accessed utilizing a micropuncture technique.  An incision was then created at the exit site location, and a cuffed tunneled dialysis catheter of appropriate length (15.5 Martiniquais by 19 cm cuff to tip Palindrome catheter) was tunneled from the exit site to the venotomy site without difficulty.  The micropuncture sheath was then exchanged over wire for a peel-away sheath which was placed into the right external jugular vein under fluoroscopic guidance without difficulty.  The dialysis catheter was then advanced into the venous system through the peel-away sheath, which was then removed without difficulty.  Fluoroscopic interrogation revealed appropriate placement of the tunneled dialysis  catheter, with the tips is situated near the cavoatrial junction. The catheter aspirated and flushed well and was terminally packed with 1000 units/cc of heparin.  The catheter was secured to the skin utilizing a nonabsorbable suture and a CHG dressing was applied.  The venotomy site was closed utilizing Dermabond, and an aseptic dressing was applied to the venotomy site.  The patient was transferred to the recovery area and was discharged from the department in stable condition. Impression: Successful ultrasound and fluoroscopic guided right external jugular vein route cuffed tunneled dialysis catheter placement, as detailed above.  Both ports of the dialysis catheter flushed and aspirated easily, and the catheter is ready for immediate use.      Results for orders placed during the hospital encounter of 07/28/22    Adult Transthoracic Echo Complete W/ Cont if Necessary Per Protocol    Interpretation Summary  · Mild biatrial enlargement.  · Moderately reduced right ventricular systolic function noted.  · Moderate left ventricular systolic dysfunction, estimated EF 37%.  · Left ventricular wall thickness is consistent with mild concentric hypertrophy.  · Lambl's excrescences of the aortic valve are noted. There is trace aortic insufficiency, no evidence of aortic stenosis.  · Moderate mitral regurgitation.  · Moderate to severe tricuspid regurgitation with RVSP 48 mmHg.  · Moderate pulmonic insufficiency.      I have reviewed the medications:  Scheduled Meds:atorvastatin, 20 mg, Oral, Nightly  carvedilol, 3.125 mg, Oral, BID With Meals  castor oil-balsam peru, 1 application, Topical, Q12H  dextromethorphan polistirex ER, 60 mg, Oral, Q12H  insulin lispro, 0-7 Units, Subcutaneous, TID AC  Insulin Lispro, 4 Units, Subcutaneous, TID With Meals  latanoprost, 1 drop, Both Eyes, Nightly  levothyroxine, 112 mcg, Oral, Q AM  pantoprazole, 40 mg, Oral, BID AC  sodium chloride, 10 mL, Intravenous, Q12H  timolol, 1 drop, Both  Eyes, BID      Continuous Infusions:   PRN Meds:.•  acetaminophen **OR** acetaminophen **OR** acetaminophen  •  albumin human  •  benzonatate  •  dextrose  •  dextrose  •  glucagon (human recombinant)  •  melatonin  •  ondansetron  •  sodium chloride    Assessment & Plan   Assessment & Plan     Active Hospital Problems    Diagnosis  POA   • **GIB (gastrointestinal bleeding) [K92.2]  Yes   • Normocytic anemia [D64.9]  Unknown   • Acute renal failure (ARF) (HCC) [N17.9]  Unknown   • COVID-19 virus detected [U07.1]  Unknown   • Thrombocytopenia (HCC) [D69.6]  Unknown   • Hypoalbuminemia [E88.09]  Unknown   • AMS (altered mental status) [R41.82]  Unknown   • Lower GI bleed [K92.2]  Yes   • Type 2 diabetes mellitus without complication, with long-term current use of insulin (HCC) [E11.9, Z79.4]  Not Applicable   • Chronic systolic congestive heart failure (HCC) [I50.22]  Yes   • CAD (coronary artery disease) [I25.10]  Yes   • Chronic atrial fibrillation (HCC) [I48.20]  Yes   • CKD (chronic kidney disease), stage IV (HCC) [N18.4]  Yes   • Hyperlipidemia LDL goal <100 [E78.5]  Yes   • Essential hypertension [I10]  Yes   • Hypothyroidism [E03.9]  Yes   • Pulmonary embolism (HCC) [I26.99]  Yes   • CHF (NYHA class IV, ACC/AHA stage D) (HCC) [I50.84]  Yes      Resolved Hospital Problems   No resolved problems to display.        Brief Hospital Course to date:  Karla Cristobal is a 87 y.o. female with anemia, atrial fibrillation on Eliquis, CKD stage IV, HLD, hypertension, hypothyroidism, CAD s/p stents, diabetes type 2, and systolic heart failure (2L NC at night) who presented to the ED on 7/28/2022 for rectal bleeding. 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  3 endoclips were applied for hemostasis.  H/H has been stable since.  She was incidentally found to be COVID positive and had acute kidney injury  for which nephrology has been following. She had temporary dialysis catheter placed on 8/6/2022 and was started on hemodialysis.      This patient's problems and plans were partially entered by my partner and updated as appropriate by me 08/07/22.    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  3 endoclips were applied for hemostasis  -s/p IV Iron  -Hgb 9.5 this AM, stable   -Eliquis resumed 8/7     ROBBI on CKD IV  -Nephrology following  -Stopped IV fluids, encourage PO   -Renal Diet  -s/p temporary dialysis catheter on 8/6/2022 by IR  -Hemodialysis per nephrology   -Cr 3.06 this AM   -Renal panel in AM      N/V  -Zofran PRN      COVID-19 positive  -Asymptomatic    -out of Isolation on 8/8     Mixed systolic and diastolic CHF  Moderate to severe MR  Persistent atrial fibrillation   -Seen by cardiology and started on carvedilol  -Resume Eliquis      Expected Discharge Location and Transportation: home  Expected Discharge Date: 8/9    DVT prophylaxis:  No DVT prophylaxis order currently exists.     AM-PAC 6 Clicks Score (PT): 10 (08/04/22 2033)    CODE STATUS:   Code Status and Medical Interventions:   Ordered at: 07/28/22 2343     Level Of Support Discussed With:    Patient     Code Status (Patient has no pulse and is not breathing):    CPR (Attempt to Resuscitate)     Medical Interventions (Patient has pulse or is breathing):    Full Support       Remigio Fraga, NESS  08/07/22

## 2022-08-08 NOTE — CASE MANAGEMENT/SOCIAL WORK
Continued Stay Note  [facility]     Patient Name: Karla Cristobal  MRN: [id number]  Today's Date: 8/8/2022    Admit Date: 7/28/2022     Discharge Plan     Row Name 08/08/22 1227       Plan    Plan plan is still home with dtjohnson Galicia    Patient/Family in Agreement with Plan yes    Plan Comments I called and spoke with patient's dtr Frida, patient in HD to discuss after hospital plans. Plan is still home with dtjohnson Galicia and I asked her about me setting up OP HD and theres only one [place] Frida wants to call her nephrologist  to see where he might want her to go. I am waiting on a call back from dtr. CM following. Rebekah    Final Discharge Disposition Code 01 - home or self-care               Discharge Codes    No documentation.                     Qiana Araujo, RN

## 2022-08-08 NOTE — PROGRESS NOTES
[facility] Medicine Services  PROGRESS NOTE    Patient Name: Karla Cristobal  : 1935  MRN: [id number]    Date of Admission: 2022  Primary Care Physician: Luis Cardenas MD    Subjective   Subjective     CC:  Follow-up GI bleed, ARF    HPI:  She feels tired.  Nausea is much better after starting HD.    ROS:  Gen- No fevers  GI- As above    Objective   Objective     Vital Signs:   Temp:  [98 °F (36.7 °C)-98.3 °F (36.8 °C)] 98.3 °F (36.8 °C)  Heart Rate:  [73-85] 80  Resp:  [18] 18  BP: (116-141)/(62-71) 116/62     Physical Exam:  Constitutional: No acute distress, drowsy, chronically ill-appearing, laying in bed   HENT: NCAT, mucous membranes moist  Respiratory: Respiratory effort normal on RA  Cardiovascular: RRR  Musculoskeletal: No BLE edema   Psychiatric: Depressed affect, cooperative  Neurologic: Speech clear and fluent  Skin: No rashes on exposed surfaces    Results Reviewed:  LAB RESULTS:      Lab 22  0717 22  0516 22  0455 22  0505 22  0642 22  1451 22  0702   WBC 4.68 5.17 5.96 6.85  --   --  6.19   HEMOGLOBIN 9.5* 9.4* 9.3* 9.4* 9.8*   < > 10.1*  10.1*   HEMATOCRIT 28.6* 28.0* 27.4* 27.8* 29.1*   < > 30.2*  30.2*   PLATELETS 157 150 151 128*  --   --  112*   NEUTROS ABS  --   --  3.87 4.65  --   --  3.93   IMMATURE GRANS (ABS)  --   --  0.04 0.06*  --   --  0.05   LYMPHS ABS  --   --  1.07 1.04  --   --  1.28   MONOS ABS  --   --  0.72 0.79  --   --  0.71   EOS ABS  --   --  0.23 0.27  --   --  0.19   MCV 88.0 87.5 87.5 85.5  --   --  86.3   PROTIME  --   --   --  22.6*  --   --   --    APTT  --   --   --  46.3*  --   --   --     < > = values in this interval not displayed.         Lab 22  0717 22  0516 22  0455 22  0505 22  0642   SODIUM 138 137 136 136 137   POTASSIUM 4.1 3.8 4.0 3.9 3.8   CHLORIDE 105 101 102 101 100   CO2 21.0* 22.0 25.0 24.0 23.0   ANION GAP 12.0 14.0 9.0 11.0 14.0   BUN 86*  135* 103* 107* 119*   CREATININE 3.06* 4.07* 3.76* 3.74* 3.70*   EGFR 14.4* 10.2* 11.2* 11.3* 11.4*   GLUCOSE 170* 169* 219* 213* 189*   CALCIUM 8.5* 8.7 8.7 8.3* 7.9*   MAGNESIUM  --  2.0  --   --   --    PHOSPHORUS 3.9 4.2 4.2 4.3  --          Lab 08/07/22  0717 08/06/22  0516 08/05/22  0455 08/04/22  0505   ALBUMIN 2.40* 2.20* 2.50* 2.40*         Lab 08/04/22  0505   PROTIME 22.6*   INR 1.99*                 Brief Urine Lab Results  (Last result in the past 365 days)      Color   Clarity   Blood   Leuk Est   Nitrite   Protein   CREAT   Urine HCG        07/30/22 1742 Yellow   Clear   Small (1+)   Trace   Negative   Trace                 Microbiology Results Abnormal     Procedure Component Value - Date/Time    Eosinophil Smear - Urine, Urine, Clean Catch [[id number]]  (Normal) Collected: 07/30/22 1742    Lab Status: Final result Specimen: Urine, Clean Catch Updated: 07/30/22 1938     Eosinophil Smear 0 % EOS/100 Cells     Narrative:      No eosinophil seen          Invasive peripheral vascular study    Result Date: 8/6/2022  Clinical indication: 87-year-old female with a chronic kidney disease, need for long-term dialysis. : Dr. Washburn Given Conscious sedation: Moderate sedation was provided by me for a total of 23 minutes.  A total of 1 mg of Versed was administered intravenously.  The patient's vital signs were monitored throughout the procedure and recorded in the patient's medical record by the nurse. Procedures: 1.  Ultrasound and fluoroscopic guided tunneled dialysis catheter placement 2.  Conscious Sedation Complication: None apparent Technique: Formal written consent for the procedure was obtained from the patient/family after explaining risks to include bleeding, infection, injury to vasculature/adjacent organs, and need for additional surgery/procedures. 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  There is a prominent  right external jugular vein present.  The patient has a left-sided pacemaker in place.  Given the aforementioned findings, cannulation and placement through the external jugular vein was deemed indicated. Pertinent ultrasound images were stored in the PACS system for documentation.  The right neck and upper chest were then prepped and draped in the usual, sterile technique.  Local anesthesia with 1% lidocaine infiltration was achieved.  Utilizing aseptic precautions and real-time ultrasound guidance, the right external jugular vein was accessed utilizing a micropuncture technique.  An incision was then created at the exit site location, and a cuffed tunneled dialysis catheter of appropriate length (15.5 Kisw[place]ili by 19 cm cuff to tip Palindrome catheter) was tunneled from the exit site to the venotomy site without difficulty.  The micropuncture sheath was then exchanged over wire for a peel-away sheath which was placed into the right external jugular vein under fluoroscopic guidance without difficulty.  The dialysis catheter was then advanced into the venous system through the peel-away sheath, which was then removed without difficulty.  Fluoroscopic interrogation revealed appropriate placement of the tunneled dialysis catheter, with the tips is situated near the cavoatrial junction. The catheter aspirated and flushed well and was terminally packed with 1000 units/cc of heparin.  The catheter was secured to the skin utilizing a nonabsorbable suture and a CHG dressing was applied.  The venotomy site was closed utilizing Dermabond, and an aseptic dressing was applied to the venotomy site.  The patient was transferred to the recovery area and was discharged from the department in stable condition. Impression: Successful ultrasound and fluoroscopic guided right external jugular vein route cuffed tunneled dialysis catheter placement, as detailed above.  Both ports of the dialysis catheter flushed and aspirated easily, and  the catheter is ready for immediate use.      Results for orders placed during the hospital encounter of 07/28/22    Adult Transthoracic Echo Complete W/ Cont if Necessary Per Protocol    Interpretation Summary  · Mild biatrial enlargement.  · Moderately reduced right ventricular systolic function noted.  · Moderate left ventricular systolic dysfunction, estimated EF 37%.  · Left ventricular wall thickness is consistent with mild concentric hypertrophy.  · Lambl's excrescences of the aortic valve are noted. There is trace aortic insufficiency, no evidence of aortic stenosis.  · Moderate mitral regurgitation.  · Moderate to severe tricuspid regurgitation with RVSP 48 mmHg.  · Moderate pulmonic insufficiency.      I have reviewed the medications:  Scheduled Meds:apixaban, 2.5 mg, Oral, Q12H  atorvastatin, 20 mg, Oral, Nightly  carvedilol, 3.125 mg, Oral, BID With Meals  castor oil-balsam peru, 1 application, Topical, Q12H  dextromethorphan polistirex ER, 60 mg, Oral, Q12H  insulin lispro, 0-7 Units, Subcutaneous, TID AC  Insulin Lispro, 4 Units, Subcutaneous, TID With Meals  latanoprost, 1 drop, Both Eyes, Nightly  levothyroxine, 112 mcg, Oral, Q AM  pantoprazole, 40 mg, Oral, BID AC  sodium chloride, 10 mL, Intravenous, Q12H  timolol, 1 drop, Both Eyes, BID      Continuous Infusions:   PRN Meds:.•  acetaminophen **OR** acetaminophen **OR** acetaminophen  •  benzonatate  •  dextrose  •  dextrose  •  glucagon (human recombinant)  •  melatonin  •  ondansetron  •  sodium chloride    Assessment & Plan   Assessment & Plan     Active Hospital Problems    Diagnosis  POA   • **GIB (gastrointestinal bleeding) [K92.2]  Yes   • Normocytic anemia [D64.9]  Unknown   • Acute renal failure (ARF) (HCC) [N17.9]  Unknown   • COVID-19 virus detected [U07.1]  Unknown   • Thrombocytopenia (HCC) [D69.6]  Unknown   • Hypoalbuminemia [E88.09]  Unknown   • AMS (altered mental status) [R41.82]  Unknown   • Lower GI bleed [K92.2]  Yes   • Type  2 diabetes mellitus without complication, with long-term current use of insulin (HCC) [E11.9, Z79.4]  Not Applicable   • Chronic systolic congestive heart failure (HCC) [I50.22]  Yes   • CAD (coronary artery disease) [I25.10]  Yes   • Chronic atrial fibrillation (HCC) [I48.20]  Yes   • CKD (chronic kidney disease), stage IV (HCC) [N18.4]  Yes   • Hyperlipidemia LDL goal <100 [E78.5]  Yes   • Essential hypertension [I10]  Yes   • Hypothyroidism [E03.9]  Yes   • Pulmonary embolism (HCC) [I26.99]  Yes   • CHF (NYHA class IV, ACC/AHA stage D) (HCC) [I50.84]  Yes      Resolved Hospital Problems   No resolved problems to display.        Brief Hospital Course to date:  Karla Cristobal is a 87 y.o. female with anemia, atrial fibrillation on Eliquis, CKD stage IV, HLD, hypertension, hypothyroidism, CAD s/p stents, diabetes type 2, and systolic heart failure (2L NC at night) who presented to the ED on 7/28/2022 for rectal bleeding. 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  3 endoclips were applied for hemostasis.  H/H has been stable since.  She was incidentally found to be COVID positive and had acute kidney injury for which nephrology has been following. She had temporary dialysis catheter placed on 8/6/2022 and was started on hemodialysis.      This patient's problems and plans were partially entered by my partner and updated as appropriate by me 08/08/22.    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  3 endoclips were applied for hemostasis  -s/p IV Iron  -Eliquis resumed 8/7     ROBBI on CKD IV  -Nephrology following  -Started on HD 8/6, plans for TRS schedule     N/V  -Zofran PRN      COVID-19 positive  -Asymptomatic    -Out of Isolation on 8/8     Mixed systolic  and diastolic CHF  Moderate to severe MR  Persistent atrial fibrillation   -Seen by cardiology and started on carvedilol  -Resumed Eliquis      Expected Discharge Location and Transportation: home  Expected Discharge Date: 8/10    DVT prophylaxis:  Medical DVT prophylaxis orders are present.     AM-PAC 6 Clicks Score (PT): 10 (08/04/22 2033)    CODE STATUS:   Code Status and Medical Interventions:   Ordered at: 07/28/22 7236     Level Of Support Discussed With:    Patient     Code Status (Patient has no pulse and is not breathing):    CPR (Attempt to Resuscitate)     Medical Interventions (Patient has pulse or is breathing):    Full Support       Yolanda Gomez MD  08/08/22

## 2022-08-08 NOTE — PROGRESS NOTES
"   LOS: 10 days    Patient Care Team:  Luis Cardenas MD as PCP - General (Nephrology)    Chief Complaint:  Gi bleed    Subjective     Interval History:   Plan for HD session today. C/o fatigue and lethargy. No n/v or cp no new labs today  Review of Systems:   No CP  , fever, chills, rigors, rash, N/V, Constipation.         Objective     Vital Sign Min/Max for last 24 hours  Temp  Min: 98 °F (36.7 °C)  Max: 98.3 °F (36.8 °C)   BP  Min: 116/62  Max: 141/71   Pulse  Min: 73  Max: 85   Resp  Min: 18  Max: 18   SpO2  Min: 95 %  Max: 99 %   No data recorded   Weight  Min: 81.3 kg (179 lb 3.7 oz)  Max: 81.3 kg (179 lb 3.7 oz)     Flowsheet Rows    Flowsheet Row First Filed Value   Admission Height 162.6 cm (64\") Documented at 07/28/2022 1451   Admission Weight 76.2 kg (168 lb) Documented at 07/28/2022 1451          No intake/output data recorded.  I/O last 3 completed shifts:  In: 237 [P.O.:237]  Out: 550 [Urine:550]    Physical Exam:    Gen: Alert, NAD   HENT: NC, AT, EOMI   NECK: Supple, no JVD, Trachea midline   LUNGS: CTA bilaterally, non labored respirtation   CVS: S1/S2 audible, RRR, no murmur   Abd: Soft, NT, ND, BS+   Ext: No pedal edema, no cyanosis   CNS: Alert, No focal deficit noted grossly  Psy: Cooperative  Skin: Warm, dry and intact      WBC WBC   Date Value Ref Range Status   08/07/2022 4.68 3.40 - 10.80 10*3/mm3 Final   08/06/2022 5.17 3.40 - 10.80 10*3/mm3 Final      HGB Hemoglobin   Date Value Ref Range Status   08/07/2022 9.5 (L) 12.0 - 15.9 g/dL Final   08/06/2022 9.4 (L) 12.0 - 15.9 g/dL Final      HCT Hematocrit   Date Value Ref Range Status   08/07/2022 28.6 (L) 34.0 - 46.6 % Final   08/06/2022 28.0 (L) 34.0 - 46.6 % Final      Platlets No results found for: LABPLAT   MCV MCV   Date Value Ref Range Status   08/07/2022 88.0 79.0 - 97.0 fL Final   08/06/2022 87.5 79.0 - 97.0 fL Final          Sodium Sodium   Date Value Ref Range Status   08/07/2022 138 136 - 145 mmol/L Final   08/06/2022 137 136 - " 145 mmol/L Final      Potassium Potassium   Date Value Ref Range Status   08/07/2022 4.1 3.5 - 5.2 mmol/L Final   08/06/2022 3.8 3.5 - 5.2 mmol/L Final      Chloride Chloride   Date Value Ref Range Status   08/07/2022 105 98 - 107 mmol/L Final   08/06/2022 101 98 - 107 mmol/L Final      CO2 CO2   Date Value Ref Range Status   08/07/2022 21.0 (L) 22.0 - 29.0 mmol/L Final   08/06/2022 22.0 22.0 - 29.0 mmol/L Final      BUN BUN   Date Value Ref Range Status   08/07/2022 86 (H) 8 - 23 mg/dL Final   08/06/2022 135 (H) 8 - 23 mg/dL Final      Creatinine Creatinine   Date Value Ref Range Status   08/07/2022 3.06 (H) 0.57 - 1.00 mg/dL Final   08/06/2022 4.07 (H) 0.57 - 1.00 mg/dL Final      Calcium Calcium   Date Value Ref Range Status   08/07/2022 8.5 (L) 8.6 - 10.5 mg/dL Final   08/06/2022 8.7 8.6 - 10.5 mg/dL Final      PO4 No results found for: CAPO4   Albumin Albumin   Date Value Ref Range Status   08/07/2022 2.40 (L) 3.50 - 5.20 g/dL Final   08/06/2022 2.20 (L) 3.50 - 5.20 g/dL Final      Magnesium Magnesium   Date Value Ref Range Status   08/06/2022 2.0 1.6 - 2.4 mg/dL Final      Uric Acid No results found for: URICACID        Results Review:     I reviewed the patient's new clinical results.    apixaban, 2.5 mg, Oral, Q12H  atorvastatin, 20 mg, Oral, Nightly  carvedilol, 3.125 mg, Oral, BID With Meals  castor oil-balsam peru, 1 application, Topical, Q12H  dextromethorphan polistirex ER, 60 mg, Oral, Q12H  insulin lispro, 0-7 Units, Subcutaneous, TID AC  Insulin Lispro, 4 Units, Subcutaneous, TID With Meals  latanoprost, 1 drop, Both Eyes, Nightly  levothyroxine, 112 mcg, Oral, Q AM  pantoprazole, 40 mg, Oral, BID AC  sodium chloride, 10 mL, Intravenous, Q12H  timolol, 1 drop, Both Eyes, BID           Medication Review:     Assessment & Plan       GIB (gastrointestinal bleeding)    Chronic atrial fibrillation (HCC)    CKD (chronic kidney disease), stage IV (HCC)    Hyperlipidemia LDL goal <100    Essential  hypertension    Hypothyroidism    Pulmonary embolism (HCC)    CHF (NYHA class IV, ACC/AHA stage D) (HCC)    CAD (coronary artery disease)    Chronic systolic congestive heart failure (HCC)    Type 2 diabetes mellitus without complication, with long-term current use of insulin (HCC)    Normocytic anemia    Acute renal failure (ARF) (HCC)    COVID-19 virus detected    Thrombocytopenia (HCC)    Hypoalbuminemia    AMS (altered mental status)    Lower GI bleed      1- ROBBI on CKD stage IV - Thought to be hemodynamic injuryvs progression of CKD to ESRD. Started on HD on 8/6/22.   2 CKD stage IV -baseline Scr 2.5 range  3- GI bleed   4- Low albumin level   5- Iron def anemia Tsat 11%. S/p iron supplementation. Will start BENY on HD days   6- Renal cysts   7- Systolic CHF - Recent Echo showing EF 37% now.     Plan:   3rd HD session tomorrow.   BENY on HD days  Will need outpatient HD chair setup  Luis Cardenas MD  08/08/22  13:30 EDT

## 2022-08-08 NOTE — NURSING NOTE
"Reason for Wound, Ostomy and Continence (WOC) Nursing Consult: \"Follow-up to left buttock DTI possible\"    Patient asleep, got her to moan and groan when I tried to wake her up, never really opened eyes.  Family/support person not present.      Has faded quite a bit since last WOC's assessment, wound edges are now not well-defined and scattered, appears to be superficial, more \"rough\" than surrounding skin,?  MASD versus fungal, area of concern midline coccyx is most likely due ITd, right Gluteal directly across from left Gluteal presents with old MASD injury and is blanchable.    1.Wound Assessment  Wound Type: Possible pressure injury  Location: Left lower Gluteal  Measurements: 0.6 x 0.6 x 0 cm    Wound Bed: blanchable, non-blanchable and purple, light brown   Wound Edges: Irregular  Periwound Skin: intact, dry and pink   Drainage Characteristics/Odor: none  Drainage Amount: none  Pain: No   Care provided: The wound was cleansed with soap blue wipes, Venelex and Z guard applied  Wound Image:           Recommendation(s) for management of wound:   -Refer to wound care orders for specific instructions on how to treat/manage wound.  -Continue to apply Venelex and Z guard to these areas    Needs to be addressed: Will order Low Air Loss specialty bed as patient was not in covered precautions upon my visit    Most recent Cornel Scale score:  Sensory Perception: 4-->no impairment  Moisture: 3-->occasionally moist  Activity: 3-->walks occasionally  Mobility: 3-->slightly limited  Nutrition: 3-->adequate  Friction and Shear: 1-->problem  Cornel Score: 17 (08/07/22 2055)     Specialty support surface: Low Air Loss (CHARLINE) Mattress       Pressure Injury Prevention Protocol (initiate for Cornel Score of 18 or less):   *Keep skin dry, turn q 2 hr, keep heels elevated and offloaded with offloading heel boots.    *Apply z-guard to bottom and bony prominences daily and as needed with incontinence episodes.  *Follow C.A.R.E protocol " if medical devices (Bipap, sr, Ng tube, etc) are being used.     All skin interventions in place.  Head to toe assessment completed. Heels and bottom blanchable, intact and offloaded.  Discussed plan of care with RN.    Thank you for consulting the WOC Nurse.  WOC Team will continue to follow.  Please re consult if the wound(s) worsens.

## 2022-08-08 NOTE — CONSULTS
Clinical Nutrition   Reason For Visit: Follow-up protocol    Patient Name: Karla Cristobal  YOB: 1935  MRN: [id number]  Date of Encounter: 08/08/22 16:02 EDT  Admission date: 7/28/2022      Nutrition Assessment     Admission Problem List:  GIB  ROBBI on CKD stage IV  Normocytic anemia  COVID-19 virus detected  AMS  Renal lesions    Applicable PMH:  HTN, HLD  PAF  CKD stage IV  CAD s/pt stents  T2DM  CHF  MI  MVR  Anemia      Applicable Nutrition-Related Information:  Residing with daughter prior to admission      Applicable medical tests/procedures since admission:  Colonoscopy,  Placed on O2, dialysis cath placed 8/6/22       Reported/Observed/Food/Nutrition Related History   Patient in isolation.  Discussed meal intake with nursing.  History: Patient was residing with daughter's nephew and his family prior to moving in with daughter Hx of decreased appetite and PO intake for a while now.  No known food allergies and no difficulty chewing/swallowing.     Anthropometrics   Height: 64 in  Weight: 179 lb 3.7 oz    (fluid shifts related to HD ?)    BMI: 30.75  BMI classification: Obese Class I: 30-34.9kg/m2   IBW: 120 lbs    Weight change: Unintentional weight loss of 49 lbs (23%) x 16 months; loss of 26 lbs (12%) x 4 months.    Nutrition Focused Physical Exam     Unable to perform exam due to: patient in covid/airborne isolation    Labs reviewed   Labs reviewed: Yes    Medications reviewed   Medications reviewed: Yes  Scheduled: insulin, protonix, lipitor    Current Nutrition Prescription   PO: Diet Regular; Thin; Cardiac, Consistent Carbohydrate, Renal  Oral Nutrition Supplement: Novasource BID     Average PO intake: missing intake data but those documented noted about 25% of meals and 25% of supplement.      Nutrition Diagnosis     Problem Unintended weight loss   Etiology Poor appetite   Signs/Symptoms Unintentional weight loss of 49 lbs (23%) x 16 months; loss of 26 lbs (12%) x 4 months.   Resolved ...  no further weight loss this admission     Problem Inadequate oral intake    Etiology Renal disease, changes in mental status by hx    Signs/Symptoms Poor intake as documented approx 25% averaged    New - ongoing     Problem Altered nutrition related lab values    Etiology Declining renal function    Signs/Symptoms Start of HD, renal diet ordered    New Ongoing     Goal:   General: Nutrition to support treatment, Improve nutrition related labs, Provide information regarding MNT therapy  PO: Increase intake, encourage po intake     Intervention   Intervention: Follow treatment progress, Care plan reviewed, Encourage intake, Supplement provided    - Novasource Renal  daily       Monitoring/Evaluation:   Monitoring/Evaluation: Per protocol, I&O, PO intake, Supplement intake, Pertinent labs, Weight, GI status, Symptoms    Florinda Guzman, RD  Time Spent: 45 min

## 2022-08-09 NOTE — CASE MANAGEMENT/SOCIAL WORK
Continued Stay Note  [facility]     Patient Name: Karla Cristobal  MRN: [id number]  Today's Date: 8/9/2022    Admit Date: 7/28/2022     Discharge Plan     Row Name 08/09/22 0832       Plan    Plan working on setting up OP HD/[facility]    Patient/Family in Agreement with Plan yes    Plan Comments I talked with honorio Galicia and decision made to use [facility] in [facility]. I called [facility] spoke with Racquel and she faxed me an admission packet. I will fill that out today and fax back to begin process of getting a chair time for OP HD. Once this is secured then patient can be discharged if medically ready. Micaela    Final Discharge Disposition Code 01 - home or self-care               Discharge Codes    No documentation.                     Qiana Araujo RN

## 2022-08-09 NOTE — PROGRESS NOTES
[facility] Medicine Services  PROGRESS NOTE    Patient Name: Karla Cristobal  : 1935  MRN: [id number]    Date of Admission: 2022  Primary Care Physician: Luis Cardenas MD    Subjective   Subjective     CC:  Acute renal failure / GI bleed    HPI:   Sleepy looking.  On dialysis today.  Was told this is the 3rd dialysis for her.  No bleeding reported.  Reported to be off isolation.      ROS:  Gen- denies chills, denies fevers  GI- no abdominal pain  CV- no chest pain    Objective   Objective     Vital Signs:   Temp:  [98.2 °F (36.8 °C)] 98.2 °F (36.8 °C)  Heart Rate:  [] 74  Resp:  [18] 18  BP: (112-139)/(55-94) 131/65     Physical Exam:  Constitutional: lying in bed on dialysis, No acute distress  HENT: NCAT, dry tongue  Respiratory: weak inspiratory effort, grossly clear  Cardiovascular: RRR  Musculoskeletal: No BLE edema   Psychiatric: Depressed affect, cooperative  Neurologic: Speech clear and fluent  Skin: No rashes on exposed surfaces    Results Reviewed:  LAB RESULTS:      Lab 22  0424 22  1347 22  0717 22  0516 22  0455 22  0505   WBC 5.83 5.33 4.68 5.17 5.96 6.85   HEMOGLOBIN 10.0* 9.7* 9.5* 9.4* 9.3* 9.4*   HEMATOCRIT 30.4* 29.7* 28.6* 28.0* 27.4* 27.8*   PLATELETS 212 190 157 150 151 128*   NEUTROS ABS 3.76  --   --   --  3.87 4.65   IMMATURE GRANS (ABS) 0.06*  --   --   --  0.04 0.06*   LYMPHS ABS 0.95  --   --   --  1.07 1.04   MONOS ABS 0.74  --   --   --  0.72 0.79   EOS ABS 0.26  --   --   --  0.23 0.27   MCV 87.4 89.2 88.0 87.5 87.5 85.5   PROTIME  --   --   --   --   --  22.6*   APTT  --   --   --   --   --  46.3*         Lab 22  0424 22  1347 22  0717 22  0516 22  0455   SODIUM 140 141 138 137 136   POTASSIUM 3.9 3.9 4.1 3.8 4.0   CHLORIDE 106 106 105 101 102   CO2 24.0 22.0 21.0* 22.0 25.0   ANION GAP 10.0 13.0 12.0 14.0 9.0   BUN 58* 100* 86* 135* 103*   CREATININE 2.34* 3.13* 3.06* 4.07*  3.76*   EGFR 19.8* 14.0* 14.4* 10.2* 11.2*   GLUCOSE 166* 125* 170* 169* 219*   CALCIUM 8.7 8.6 8.5* 8.7 8.7   MAGNESIUM  --  2.1  --  2.0  --    PHOSPHORUS 2.9 4.1 3.9 4.2 4.2         Lab 08/09/22  0424 08/08/22  1347 08/07/22  0717 08/06/22  0516 08/05/22  0455   ALBUMIN 2.50* 2.60* 2.40* 2.20* 2.50*         Lab 08/04/22  0505   PROTIME 22.6*   INR 1.99*                 Brief Urine Lab Results  (Last result in the past 365 days)      Color   Clarity   Blood   Leuk Est   Nitrite   Protein   CREAT   Urine HCG        07/30/22 1742 Yellow   Clear   Small (1+)   Trace   Negative   Trace                 Microbiology Results Abnormal     Procedure Component Value - Date/Time    Eosinophil Smear - Urine, Urine, Clean Catch [[id number]]  (Normal) Collected: 07/30/22 1742    Lab Status: Final result Specimen: Urine, Clean Catch Updated: 07/30/22 1938     Eosinophil Smear 0 % EOS/100 Cells     Narrative:      No eosinophil seen          No radiology results from the last 24 hrs    Results for orders placed during the hospital encounter of 07/28/22    Adult Transthoracic Echo Complete W/ Cont if Necessary Per Protocol    Interpretation Summary  · Mild biatrial enlargement.  · Moderately reduced right ventricular systolic function noted.  · Moderate left ventricular systolic dysfunction, estimated EF 37%.  · Left ventricular wall thickness is consistent with mild concentric hypertrophy.  · Lambl's excrescences of the aortic valve are noted. There is trace aortic insufficiency, no evidence of aortic stenosis.  · Moderate mitral regurgitation.  · Moderate to severe tricuspid regurgitation with RVSP 48 mmHg.  · Moderate pulmonic insufficiency.      I have reviewed the medications:  Scheduled Meds:apixaban, 2.5 mg, Oral, Q12H  atorvastatin, 20 mg, Oral, Nightly  carvedilol, 3.125 mg, Oral, BID With Meals  castor oil-balsam peru, 1 application, Topical, Q12H  dextromethorphan polistirex ER, 60 mg, Oral, Q12H  epoetin shukri/shukri-epbx,  10,000 Units, Subcutaneous, Once per day on Tue Thu Sat  insulin lispro, 0-7 Units, Subcutaneous, TID AC  Insulin Lispro, 4 Units, Subcutaneous, TID With Meals  latanoprost, 1 drop, Both Eyes, Nightly  levothyroxine, 112 mcg, Oral, Q AM  pantoprazole, 40 mg, Oral, BID AC  sodium chloride, 10 mL, Intravenous, Q12H  timolol, 1 drop, Both Eyes, BID      Continuous Infusions:   PRN Meds:.•  acetaminophen **OR** acetaminophen **OR** acetaminophen  •  albumin human  •  albumin human  •  benzonatate  •  dextrose  •  dextrose  •  glucagon (human recombinant)  •  melatonin  •  ondansetron  •  sodium chloride    Assessment & Plan   Assessment & Plan     Active Hospital Problems    Diagnosis  POA   • **GIB (gastrointestinal bleeding) [K92.2]  Yes   • Normocytic anemia [D64.9]  Unknown   • Acute renal failure (ARF) (HCC) [N17.9]  Unknown   • COVID-19 virus detected [U07.1]  Unknown   • Thrombocytopenia (HCC) [D69.6]  Unknown   • Hypoalbuminemia [E88.09]  Unknown   • AMS (altered mental status) [R41.82]  Unknown   • Lower GI bleed [K92.2]  Yes   • Type 2 diabetes mellitus without complication, with long-term current use of insulin (HCC) [E11.9, Z79.4]  Not Applicable   • Chronic systolic congestive heart failure (HCC) [I50.22]  Yes   • CAD (coronary artery disease) [I25.10]  Yes   • Chronic atrial fibrillation (HCC) [I48.20]  Yes   • CKD (chronic kidney disease), stage IV (HCC) [N18.4]  Yes   • Hyperlipidemia LDL goal <100 [E78.5]  Yes   • Essential hypertension [I10]  Yes   • Hypothyroidism [E03.9]  Yes   • Pulmonary embolism (HCC) [I26.99]  Yes   • CHF (NYHA class IV, ACC/AHA stage D) (HCC) [I50.84]  Yes      Resolved Hospital Problems   No resolved problems to display.        Brief Hospital Course to date:  Karla Cristobal is a 87 y.o. female with anemia, atrial fibrillation on Eliquis, CKD stage IV, HLD, hypertension, hypothyroidism, CAD s/p stents, diabetes type 2, and systolic heart failure (2L NC at night) who presented to  the ED on 7/28/2022 for rectal bleeding. 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  3 endoclips were applied for hemostasis.  H/H has been stable since.  She was incidentally found to be COVID positive and had acute kidney injury for which nephrology has been following. She had temporary dialysis catheter placed on 8/6/2022 and was started on hemodialysis.      This patient's problems and plans were partially entered by my partner and updated as appropriate by me 08/09/22.    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  3 endoclips were applied for hemostasis  -s/p IV Iron  -Eliquis resumed 8/7, missed am doses yesterday and today  -has only received 3 out of the last 5 doses  -on low dose apixaban 2.5 mg every 12 hours for atrial fibrillation     ROBBI on CKD IV  -Nephrology consulted  -Started on HD 8/6  -setting up outpatient dialysis  -sounds like this is the third dialysis since starting     N/V  -Zofran PRN      COVID-19 positive  -Asymptomatic    -Out of Isolation on 8/8     Mixed systolic and diastolic CHF  Moderate to severe MR  Persistent atrial fibrillation   -Seen by cardiology and started on carvedilol  -Resumed Eliquis      Expected Discharge Location and Transportation: home  Expected Discharge Date: 8/10    DVT prophylaxis:  Medical DVT prophylaxis orders are present.     AM-PAC 6 Clicks Score (PT): 10 (08/04/22 2033)    CODE STATUS:   Code Status and Medical Interventions:   Ordered at: 07/28/22 2058     Level Of Support Discussed With:    Patient     Code Status (Patient has no pulse and is not breathing):    CPR (Attempt to Resuscitate)     Medical Interventions (Patient has pulse or is breathing):    Full Support       Prashanth Maravilla  MD  08/09/22

## 2022-08-09 NOTE — PROGRESS NOTES
"   LOS: 11 days    Patient Care Team:  Luis Cardenas MD as PCP - General (Nephrology)    Chief Complaint:  Gi bleed    Subjective     Interval History:   Seen on HD tolerating well so far. Goal UF 1.5 liter as tolerated. Bp stable. Good access pressure.     Review of Systems:   No CP  , fever, chills, rigors, rash, N/V, Constipation.         Objective     Vital Sign Min/Max for last 24 hours  Temp  Min: 98 °F (36.7 °C)  Max: 98.2 °F (36.8 °C)   BP  Min: 112/94  Max: 139/66   Pulse  Min: 62  Max: 87   Resp  Min: 16  Max: 18   SpO2  Min: 96 %  Max: 100 %   No data recorded   Weight  Min: 81.3 kg (179 lb 2 oz)  Max: 81.3 kg (179 lb 2 oz)     Flowsheet Rows    Flowsheet Row First Filed Value   Admission Height 162.6 cm (64\") Documented at 07/28/2022 1451   Admission Weight 76.2 kg (168 lb) Documented at 07/28/2022 1451          No intake/output data recorded.  No intake/output data recorded.    Physical Exam:    Gen: Alert, NAD   HENT: NC, AT, EOMI   NECK: Supple, no JVD, Trachea midline   LUNGS: CTA bilaterally, non labored respirtation   CVS: S1/S2 audible, RRR, no murmur   Abd: Soft, NT, ND, BS+   Ext: No pedal edema, no cyanosis   CNS: Alert, No focal deficit noted grossly  Psy: Cooperative  Skin: Warm, dry and intact      WBC WBC   Date Value Ref Range Status   08/09/2022 5.83 3.40 - 10.80 10*3/mm3 Final   08/08/2022 5.33 3.40 - 10.80 10*3/mm3 Final   08/07/2022 4.68 3.40 - 10.80 10*3/mm3 Final      HGB Hemoglobin   Date Value Ref Range Status   08/09/2022 10.0 (L) 12.0 - 15.9 g/dL Final   08/08/2022 9.7 (L) 12.0 - 15.9 g/dL Final   08/07/2022 9.5 (L) 12.0 - 15.9 g/dL Final      HCT Hematocrit   Date Value Ref Range Status   08/09/2022 30.4 (L) 34.0 - 46.6 % Final   08/08/2022 29.7 (L) 34.0 - 46.6 % Final   08/07/2022 28.6 (L) 34.0 - 46.6 % Final      Platlets No results found for: LABPLAT   MCV MCV   Date Value Ref Range Status   08/09/2022 87.4 79.0 - 97.0 fL Final   08/08/2022 89.2 79.0 - 97.0 fL Final "   08/07/2022 88.0 79.0 - 97.0 fL Final          Sodium Sodium   Date Value Ref Range Status   08/09/2022 140 136 - 145 mmol/L Final   08/08/2022 141 136 - 145 mmol/L Final   08/07/2022 138 136 - 145 mmol/L Final      Potassium Potassium   Date Value Ref Range Status   08/09/2022 3.9 3.5 - 5.2 mmol/L Final   08/08/2022 3.9 3.5 - 5.2 mmol/L Final   08/07/2022 4.1 3.5 - 5.2 mmol/L Final      Chloride Chloride   Date Value Ref Range Status   08/09/2022 106 98 - 107 mmol/L Final   08/08/2022 106 98 - 107 mmol/L Final   08/07/2022 105 98 - 107 mmol/L Final      CO2 CO2   Date Value Ref Range Status   08/09/2022 24.0 22.0 - 29.0 mmol/L Final   08/08/2022 22.0 22.0 - 29.0 mmol/L Final   08/07/2022 21.0 (L) 22.0 - 29.0 mmol/L Final      BUN BUN   Date Value Ref Range Status   08/09/2022 58 (H) 8 - 23 mg/dL Final   08/08/2022 100 (H) 8 - 23 mg/dL Final   08/07/2022 86 (H) 8 - 23 mg/dL Final      Creatinine Creatinine   Date Value Ref Range Status   08/09/2022 2.34 (H) 0.57 - 1.00 mg/dL Final   08/08/2022 3.13 (H) 0.57 - 1.00 mg/dL Final   08/07/2022 3.06 (H) 0.57 - 1.00 mg/dL Final      Calcium Calcium   Date Value Ref Range Status   08/09/2022 8.7 8.6 - 10.5 mg/dL Final   08/08/2022 8.6 8.6 - 10.5 mg/dL Final   08/07/2022 8.5 (L) 8.6 - 10.5 mg/dL Final      PO4 No results found for: CAPO4   Albumin Albumin   Date Value Ref Range Status   08/09/2022 2.50 (L) 3.50 - 5.20 g/dL Final   08/08/2022 2.60 (L) 3.50 - 5.20 g/dL Final   08/07/2022 2.40 (L) 3.50 - 5.20 g/dL Final      Magnesium Magnesium   Date Value Ref Range Status   08/08/2022 2.1 1.6 - 2.4 mg/dL Final      Uric Acid No results found for: URICACID        Results Review:     I reviewed the patient's new clinical results.    apixaban, 2.5 mg, Oral, Q12H  atorvastatin, 20 mg, Oral, Nightly  carvedilol, 3.125 mg, Oral, BID With Meals  castor oil-balsam peru, 1 application, Topical, Q12H  dextromethorphan polistirex ER, 60 mg, Oral, Q12H  epoetin shukri/shukri-epbx, 10,000  Units, Subcutaneous, Once per day on Tue Thu Sat  insulin lispro, 0-7 Units, Subcutaneous, TID AC  Insulin Lispro, 4 Units, Subcutaneous, TID With Meals  latanoprost, 1 drop, Both Eyes, Nightly  levothyroxine, 112 mcg, Oral, Q AM  pantoprazole, 40 mg, Oral, BID AC  sodium chloride, 10 mL, Intravenous, Q12H  timolol, 1 drop, Both Eyes, BID           Medication Review:     Assessment & Plan       GIB (gastrointestinal bleeding)    Chronic atrial fibrillation (HCC)    CKD (chronic kidney disease), stage IV (HCC)    Hyperlipidemia LDL goal <100    Essential hypertension    Hypothyroidism    Pulmonary embolism (HCC)    CHF (NYHA class IV, ACC/AHA stage D) (HCC)    CAD (coronary artery disease)    Chronic systolic congestive heart failure (HCC)    Type 2 diabetes mellitus without complication, with long-term current use of insulin (HCC)    Normocytic anemia    Acute renal failure (ARF) (HCC)    COVID-19 virus detected    Thrombocytopenia (HCC)    Hypoalbuminemia    AMS (altered mental status)    Lower GI bleed      1- ROBBI on CKD stage IV - Thought to be hemodynamic injury progression of CKD to ESRD. Started on HD on 8/6/22.   2 CKD stage IV -baseline Scr 2.5 range  3- GI bleed   4- Low albumin level   5- Iron def anemia Tsat 11%. S/p iron supplementation. Will start BENY on HD days   6- Renal cysts   7- Systolic CHF - Recent Echo showing EF 37% now.     Plan:  Continue HD per TTS miranda.   BENY on HD days  Will need outpatient HD chair setup  Luis Cardenas MD  08/09/22  13:08 EDT

## 2022-08-09 NOTE — PLAN OF CARE
Problem: Device-Related Complication Risk (Hemodialysis)  Goal: Safe, Effective Therapy Delivery  Outcome: Ongoing, Progressing     Problem: Hemodynamic Instability (Hemodialysis)  Goal: Effective Tissue Perfusion  Outcome: Ongoing, Progressing     Problem: Infection (Hemodialysis)  Goal: Absence of Infection Signs and Symptoms  Outcome: Ongoing, Progressing   Goal Outcome Evaluation:   Pt received Hd without complications, V/S stable and no s/s of dialysis related infection

## 2022-08-10 NOTE — PROGRESS NOTES
"   LOS: 12 days    Patient Care Team:  Luis Cardenas MD as PCP - General (Nephrology)    Chief Complaint:  Gi bleed    Subjective     Interval History:   HD yesterday tolerated well. Doing well today no active complaints.     Review of Systems:   No CP  , fever, chills, rigors, rash, N/V, Constipation.         Objective     Vital Sign Min/Max for last 24 hours  Temp  Min: 98 °F (36.7 °C)  Max: 98.8 °F (37.1 °C)   BP  Min: 100/77  Max: 135/59   Pulse  Min: 72  Max: 83   Resp  Min: 16  Max: 18   SpO2  Min: 95 %  Max: 100 %   No data recorded   Weight  Min: 92 kg (202 lb 14.4 oz)  Max: 92 kg (202 lb 14.4 oz)     Flowsheet Rows    Flowsheet Row First Filed Value   Admission Height 162.6 cm (64\") Documented at 07/28/2022 1451   Admission Weight 76.2 kg (168 lb) Documented at 07/28/2022 1451          No intake/output data recorded.  No intake/output data recorded.    Physical Exam:    Gen: Alert, NAD   HENT: NC, AT, EOMI   NECK: Supple, no JVD, Trachea midline   LUNGS: CTA bilaterally, non labored respirtation   CVS: S1/S2 audible, RRR, no murmur   Abd: Soft, NT, ND, BS+   Ext: No pedal edema, no cyanosis   CNS: Alert, No focal deficit noted grossly  Psy: Cooperative  Skin: Warm, dry and intact      WBC WBC   Date Value Ref Range Status   08/09/2022 5.83 3.40 - 10.80 10*3/mm3 Final   08/08/2022 5.33 3.40 - 10.80 10*3/mm3 Final      HGB Hemoglobin   Date Value Ref Range Status   08/09/2022 10.0 (L) 12.0 - 15.9 g/dL Final   08/08/2022 9.7 (L) 12.0 - 15.9 g/dL Final      HCT Hematocrit   Date Value Ref Range Status   08/09/2022 30.4 (L) 34.0 - 46.6 % Final   08/08/2022 29.7 (L) 34.0 - 46.6 % Final      Platlets No results found for: LABPLAT   MCV MCV   Date Value Ref Range Status   08/09/2022 87.4 79.0 - 97.0 fL Final   08/08/2022 89.2 79.0 - 97.0 fL Final          Sodium Sodium   Date Value Ref Range Status   08/09/2022 140 136 - 145 mmol/L Final   08/08/2022 141 136 - 145 mmol/L Final      Potassium Potassium   Date Value " Ref Range Status   08/09/2022 3.9 3.5 - 5.2 mmol/L Final   08/08/2022 3.9 3.5 - 5.2 mmol/L Final      Chloride Chloride   Date Value Ref Range Status   08/09/2022 106 98 - 107 mmol/L Final   08/08/2022 106 98 - 107 mmol/L Final      CO2 CO2   Date Value Ref Range Status   08/09/2022 24.0 22.0 - 29.0 mmol/L Final   08/08/2022 22.0 22.0 - 29.0 mmol/L Final      BUN BUN   Date Value Ref Range Status   08/09/2022 58 (H) 8 - 23 mg/dL Final   08/08/2022 100 (H) 8 - 23 mg/dL Final      Creatinine Creatinine   Date Value Ref Range Status   08/09/2022 2.34 (H) 0.57 - 1.00 mg/dL Final   08/08/2022 3.13 (H) 0.57 - 1.00 mg/dL Final      Calcium Calcium   Date Value Ref Range Status   08/09/2022 8.7 8.6 - 10.5 mg/dL Final   08/08/2022 8.6 8.6 - 10.5 mg/dL Final      PO4 No results found for: CAPO4   Albumin Albumin   Date Value Ref Range Status   08/09/2022 2.50 (L) 3.50 - 5.20 g/dL Final   08/08/2022 2.60 (L) 3.50 - 5.20 g/dL Final      Magnesium Magnesium   Date Value Ref Range Status   08/08/2022 2.1 1.6 - 2.4 mg/dL Final      Uric Acid No results found for: URICACID        Results Review:     I reviewed the patient's new clinical results.    apixaban, 2.5 mg, Oral, Q12H  atorvastatin, 20 mg, Oral, Nightly  carvedilol, 3.125 mg, Oral, BID With Meals  castor oil-balsam peru, 1 application, Topical, Q12H  dextromethorphan polistirex ER, 60 mg, Oral, Q12H  epoetin shukri/shukri-epbx, 10,000 Units, Subcutaneous, Once per day on Tue Thu Sat  insulin lispro, 0-7 Units, Subcutaneous, TID AC  Insulin Lispro, 4 Units, Subcutaneous, TID With Meals  latanoprost, 1 drop, Both Eyes, Nightly  levothyroxine, 112 mcg, Oral, Q AM  pantoprazole, 40 mg, Oral, BID AC  sodium chloride, 10 mL, Intravenous, Q12H  timolol, 1 drop, Both Eyes, BID           Medication Review:     Assessment & Plan       GIB (gastrointestinal bleeding)    Chronic atrial fibrillation (HCC)    CKD (chronic kidney disease), stage IV (HCC)    Hyperlipidemia LDL goal <100     Essential hypertension    Hypothyroidism    Pulmonary embolism (HCC)    CHF (NYHA class IV, ACC/AHA stage D) (HCC)    CAD (coronary artery disease)    Chronic systolic congestive heart failure (HCC)    Type 2 diabetes mellitus without complication, with long-term current use of insulin (HCC)    Normocytic anemia    Acute renal failure (ARF) (HCC)    COVID-19 virus detected    Thrombocytopenia (HCC)    Hypoalbuminemia    AMS (altered mental status)    Lower GI bleed      1- ROBBI on CKD stage IV - Thought to be hemodynamic injury progression of CKD to ESRD. Started on HD on 8/6/22.   2 CKD stage IV -baseline Scr 2.5 range  3- GI bleed   4- Low albumin level   5- Iron def anemia Tsat 11%. S/p iron supplementation. On  BENY on HD days   6- Renal cysts   7- Systolic CHF - Recent Echo showing EF 37% now. Volume status stable.  8. BMD: Check PTH level.     Plan:  Continue HD per TTS miranda.   BENY on HD days  Will need outpatient HD chair setup. Case management working on rehab place with HD chair.    Luis Cardenas MD  08/10/22  11:55 EDT

## 2022-08-10 NOTE — THERAPY PROGRESS REPORT/RE-CERT
Patient Name: Karla Cristobal  : 1935    MRN: [id number]                              Today's Date: 8/10/2022       Admit Date: 2022    Visit Dx:     ICD-10-CM ICD-9-CM   1. Lower GI bleed  K92.2 578.9   2. Chronic anticoagulation  Z79.01 V58.61   3. History of atrial fibrillation  Z86.79 V12.59   4. Generalized weakness  R53.1 780.79   5. Malaise and fatigue  R53.81 780.79    R53.83    6. Nausea  R11.0 787.02   7. Acute renal failure superimposed on stage 4 chronic kidney disease, unspecified acute renal failure type (HCC)  N17.9 584.9    N18.4 585.4   8. Thrombocytopenia ([facility])  D69.6 287.5   9. History of CHF (congestive heart failure)  Z86.79 V12.59   10. History of diabetes mellitus  Z86.39 V12.29   11. History of diverticulosis  Z87.19 V12.79   12. Gastrointestinal hemorrhage, unspecified gastrointestinal hemorrhage type  K92.2 578.9     Patient Active Problem List   Diagnosis   • Chronic atrial fibrillation (HCC)   • CKD (chronic kidney disease), stage IV (HCC)   • Diabetic nephropathy (HCC)   • Diabetic retinopathy (HCC)   • Malignant neoplasm of endometrium (HCC)   • Hyperlipidemia LDL goal <100   • Essential hypertension   • Hypothyroidism   • Insomnia   • Leukocytosis   • Memory impairment   • Nausea   • Pulmonary embolism (HCC)   • Sleep apnea   • Squamous cell carcinoma of skin   • CHF (NYHA class IV, ACC/AHA stage D) ([facility])   • Ischemic heart disease   • CAD (coronary artery disease)   • DVT of lower extremity (deep venous thrombosis) (HCC)   • Chronic systolic congestive heart failure (HCC)   • Morbidly obese (HCC)   • Ischemic cardiomyopathy   • Type 2 diabetes mellitus without complication, with long-term current use of insulin (HCC)   • Exudative age-related macular degeneration, right eye, with active choroidal neovascularization (HCC)   • Hypoxia   • Cellulitis of left lower extremity   • Left leg cellulitis   • Food impaction of esophagus   • Malignant tumor of ascending colon (HCC)   •  GIB (gastrointestinal bleeding)   • Normocytic anemia   • Acute renal failure (ARF) ([facility])   • COVID-19 virus detected   • Thrombocytopenia ([facility])   • Hypoalbuminemia   • AMS (altered mental status)   • Lower GI bleed     Past Medical History:   Diagnosis Date   • Acute bronchitis    • Arthritis    • Atrial fibrillation ([facility])    • CAD (coronary artery disease)     s/p MI 2005; 3 stents inserted    • Change in mole    • Change in mole    • Change in nevus 6/16/2016   • Chronic kidney disease     stage IV-sees Dr Bk Mckeon every 3-4 months    • Chronic renal disease, stage 4, severely decreased glomerular filtration rate between 15-29 mL/min/1.73 square meter ([facility])    • Chronic systolic heart failure ([facility])    • Congestive heart disease ([facility])    • Conjunctivitis    • Deep vein thrombosis of lower extremity ([facility])    • Diabetes mellitus ([facility])    • Diabetes mellitus ([facility]) 6/16/2016    Impression: 03/15/2016 - blood sugar 166 and hgn a1c 7.3%  is up to date with eye exam  neuropathy with callus, no ulcer  some scratches feet  ur alb due and ordered  no change other than provided samples of toujeo because she feels like lantus worked much better than levemir, she has tried titrating levemir and it is less effective  continue testing and f/u 3-4 months  Impression: 11/23/2015 - bl   • Diabetic foot ulcer ([facility]) 6/16/2016   • Dog bite of hand    • Easy bruising    • Endometrial cancer ([facility])     partial hysterectomy; radiation as well    • Foot pain    • Foot pain 6/16/2016    Description: lancinating- worse but not consistent enough to want rx   • Fracture of hip ([facility])    • Generalized ischemic myocardial dysfunction 6/16/2016   • Glaucoma     drops daily    • Hyperlipidemia    • Hypertension    • Hypothyroidism    • Insomnia    • Ischemic heart disease    • Macular degeneration    • Methicillin resistant Staphylococcus aureus infection 6/16/2016   • Myocardial infarction ([facility]) 2005   • Nausea with vomiting    • Pericardial  effusion    • Shortness of breath 6/16/2016    Impression: 03/24/2015 - chronic. On 2 l oxygen at night. check cbc, bnp;    • Skin cancer, basal cell     nose, leg    • Skin lesion 6/16/2016    Impression: 03/24/2015 - refer derm for eval- seb kerat ant tib and ?ak medially with redness and irritation;    • Sleep apnea     oxygen at night due to low sats but no cpap   • Type 2 diabetes mellitus (HCC)    • UTI (urinary tract infection) 06/12/2020    currently taking antibiotics-patient's daughter notified Dr Beal's office and office said it should not delay generator change      Past Surgical History:   Procedure Laterality Date   • CARDIAC CATHETERIZATION     • CARDIAC DEFIBRILLATOR PLACEMENT     • CARDIAC ELECTROPHYSIOLOGY PROCEDURE N/A 7/17/2020    Procedure: ICD BIV  generator change- [facility]- 3-6 weeks;  Surgeon: Tano Beal MD;  Location:  [facility] INVASIVE LOCATION;  Service: Cardiology;  Laterality: N/A;   • CHOLECYSTECTOMY     • COLONOSCOPY N/A 7/30/2022    Procedure: COLONOSCOPY;  Surgeon: Brunner, Mark I, MD;  Location:  [facility] ENDOSCOPY;  Service: Gastroenterology;  Laterality: N/A;  WITH 3 RESOLUTION CLIPS   • CORONARY ARTERY BYPASS GRAFT  2000   • CORONARY STENT PLACEMENT      3 stents    • ENDOSCOPY N/A 11/17/2021    Procedure: ESOPHAGOGASTRODUODENOSCOPY;  Surgeon: Brunner, Mark I, MD;  Location:  [facility];  Service: Gastroenterology;  Laterality: N/A;   • EYE SURGERY Bilateral     cataract extraction    • HIP SURGERY Left     x3   • HYSTERECTOMY      partial    • KNEE ARTHROPLASTY Bilateral    • PACEMAKER IMPLANTATION     • TONSILLECTOMY        General Information     Row Name 08/10/22 1124          OT Time and Intention    Document Type progress note/recertification  -MA     Mode of Treatment occupational therapy  -MA     Row Name 08/10/22 1124          General Information    Patient Profile Reviewed yes  -MA     Existing Precautions/Restrictions fall  -MA     Barriers to Rehab  medically complex;previous functional deficit  -MA     Row Name 08/10/22 1124          Cognition    Orientation Status (Cognition) oriented x 3  -MA     Row Name 08/10/22 1124          Safety Issues, Functional Mobility    Safety Issues Affecting Function (Mobility) awareness of need for assistance;insight into deficits/self-awareness;safety precaution awareness;safety precautions follow-through/compliance;sequencing abilities  -MA     Impairments Affecting Function (Mobility) balance;coordination;endurance/activity tolerance;strength;postural/trunk control;shortness of breath;motor planning;pain  -MA           User Key  (r) = Recorded By, (t) = Taken By, (c) = Cosigned By    Initials Name Provider Type    Janelle Moncada OT Occupational Therapist                 Mobility/ADL's     Row Name 08/10/22 1124          Bed Mobility    Bed Mobility supine-sit  -MA     Supine-Sit [place] (Bed Mobility) moderate assist (50% patient effort);2 person assist;verbal cues  -MA     Bed Mobility, Safety Issues decreased use of arms for pushing/pulling;decreased use of legs for bridging/pushing;impaired trunk control for bed mobility  -MA     Assistive Device (Bed Mobility) bed rails;draw sheet;head of bed elevated  -MA     Comment, (Bed Mobility) Pt c/o dizziness once sitting EOB, BP checked and stable. Extended time required sitting EOB.  -MA     Row Name 08/10/22 1124          Transfers    Transfers sit-stand transfer;stand-sit transfer  -MA     Comment, (Transfers) Pt max Ax2 for STS w/ BUE support, VC's for sequencing and hand placement.  -MA     Sit-Stand [place] (Transfers) maximum assist (25% patient effort);2 person assist;verbal cues  -MA     Stand-Sit [place] (Transfers) maximum assist (25% patient effort);2 person assist;verbal cues  -MA     Row Name 08/10/22 1124          Sit-Stand Transfer    Assistive Device (Sit-Stand Transfers) other (see comments)  -MA     Row Name 08/10/22 1124           Stand-Sit Transfer    Assistive Device (Stand-Sit Transfers) other (see comments)  -MA     Row Name 08/10/22 1124          Functional Mobility    Functional Mobility- Comment Deferred to PT  -MA     Row Name 08/10/22 1124          Activities of Daily Living    BADL Assessment/Intervention lower body dressing  -MA     Row Name 08/10/22 1124          Lower Body Dressing Assessment/Training    [place] Level (Lower Body Dressing) don;socks;dependent (less than 25% patient effort);verbal cues  -MA     Position (Lower Body Dressing) supine  -MA           User Key  (r) = Recorded By, (t) = Taken By, (c) = Cosigned By    Initials Name Provider Type    Janelle Moncada OT Occupational Therapist               Obj/Interventions     Row Name 08/10/22 1126          Balance    Balance Assessment sitting static balance;standing static balance;sit to stand dynamic balance  -MA     Static Sitting Balance contact guard  -MA     Position, Sitting Balance unsupported;sitting edge of bed  -MA     Sit to Stand Dynamic Balance maximum assist;2-person assist;verbal cues  -MA     Static Standing Balance maximum assist;2-person assist;verbal cues  -MA     Position/Device Used, Standing Balance supported;other (see comments)  BUE support  -MA     Balance Interventions sitting;sit to stand;occupation based/functional task  -MA           User Key  (r) = Recorded By, (t) = Taken By, (c) = Cosigned By    Initials Name Provider Type    Janelle Moncada OT Occupational Therapist               Goals/Plan     Row Name 08/10/22 1129          Bed Mobility Goal 1 (OT)    Activity/Assistive Device (Bed Mobility Goal 1, OT) sit to supine;supine to sit  -MA     [place] Level/Cues Needed (Bed Mobility Goal 1, OT) minimum assist (75% or more patient effort);verbal cues required  -MA     Time Frame (Bed Mobility Goal 1, OT) long term goal (LTG);10 days  -MA     Progress/Outcomes (Bed Mobility Goal 1, OT) progress slower than expected;goal  revised this date  -MA     Row Name 08/10/22 1129          Transfer Goal 1 (OT)    Activity/Assistive Device (Transfer Goal 1, OT) sit-to-stand/stand-to-sit;bed-to-chair/chair-to-bed;commode, bedside without drop arms;walker, rolling  -MA     [place] Level/Cues Needed (Transfer Goal 1, OT) moderate assist (50-74% patient effort);verbal cues required  -MA     Time Frame (Transfer Goal 1, OT) long term goal (LTG);10 days  -MA     Progress/Outcome (Transfer Goal 1, OT) progress slower than expected;goal revised this date  -MA     Row Name 08/10/22 1129          Dressing Goal 1 (OT)    Activity/Device (Dressing Goal 1, OT) lower body dressing  don/doff socks w/ AAD  -MA     [place]/Cues Needed (Dressing Goal 1, OT) moderate assist (50-74% patient effort);verbal cues required  -MA     Time Frame (Dressing Goal 1, OT) long term goal (LTG);10 days  -MA     Progress/Outcome (Dressing Goal 1, OT) progress slower than expected;goal revised this date  -MA     Row Name 08/10/22 1129          Toileting Goal 1 (OT)    Activity/Device (Toileting Goal 1, OT) adjust/manage clothing;perform perineal hygiene;commode, bedside without drop arms  -MA     [place] Level/Cues Needed (Toileting Goal 1, OT) moderate assist (50-74% patient effort);verbal cues required  -MA     Time Frame (Toileting Goal 1, OT) long term goal (LTG);10 days  -MA     Progress/Outcome (Toileting Goal 1, OT) progress slower than expected;goal revised this date  -MA           User Key  (r) = Recorded By, (t) = Taken By, (c) = Cosigned By    Initials Name Provider Type    Janelle Moncada OT Occupational Therapist               Clinical Impression     Row Name 08/10/22 1127          Pain Assessment    Pretreatment Pain Rating 0/10 - no pain  -MA     Posttreatment Pain Rating 0/10 - no pain  -MA     Row Name 08/10/22 1127          Plan of Care Review    Plan of Care Reviewed With patient  -MA     Progress no change  -MA     Outcome Evaluation OT  re-cert complete. Pt presents w/ generalized weakness, decreased activity tolerance, and balance deficits limiting her ADL independence. Pt mod Ax2 for supine-to-sit, max Ax2 for STS, dep for LBD. Goals updated this date to reflect pt's current level of function. Continue to rec SNF at d/c.  -MA     Row Name 08/10/22 1127          Therapy Assessment/Plan (OT)    Rehab Potential (OT) fair, will monitor progress closely  -MA     Criteria for Skilled Therapeutic Interventions Met (OT) yes;meets criteria;skilled treatment is necessary  -MA     Therapy Frequency (OT) daily  -MA     Row Name 08/10/22 1127          Therapy Plan Review/Discharge Plan (OT)    Anticipated Discharge Disposition (OT) skilled nursing facility  -MA     Row Name 08/10/22 1127          Vital Signs    Pre Systolic BP Rehab --  VSS - RN cleared OT  -MA     O2 Delivery Pre Treatment room air  -MA     O2 Delivery Intra Treatment room air  -MA     O2 Delivery Post Treatment room air  -MA     Pre Patient Position Supine  -MA     Intra Patient Position Standing  -MA     Post Patient Position Sitting  -MA     Row Name 08/10/22 1127          Positioning and Restraints    Pre-Treatment Position in bed  -MA     Post Treatment Position bed  -MA     In Bed sitting EOB;with PT  -MA           User Key  (r) = Recorded By, (t) = Taken By, (c) = Cosigned By    Initials Name Provider Type    Janelle Moncada, MARIELOS Occupational Therapist               Outcome Measures     Row Name 08/10/22 1131          How much help from another is currently needed...    Putting on and taking off regular lower body clothing? 1  -MA     Bathing (including washing, rinsing, and drying) 2  -MA     Toileting (which includes using toilet bed pan or urinal) 1  -MA     Putting on and taking off regular upper body clothing 2  -MA     Taking care of personal grooming (such as brushing teeth) 2  -MA     Eating meals 3  -MA     AM-PAC 6 Clicks Score (OT) 11  -MA     Row Name 08/10/22 1131           Functional Assessment    Outcome Measure Options AM-PAC 6 Clicks Daily Activity (OT)  -MA           User Key  (r) = Recorded By, (t) = Taken By, (c) = Cosigned By    Initials Name Provider Type    Janelle Moncada OT Occupational Therapist                Occupational Therapy Education                 Title: PT OT SLP Therapies (In Progress)     Topic: Occupational Therapy (In Progress)     Point: ADL training (In Progress)     Description:   Instruct learner(s) on proper safety adaptation and remediation techniques during self care or transfers.   Instruct in proper use of assistive devices.              Learning Progress Summary           Patient Acceptance, E, NR by MA at 8/10/2022 1131      Show all documentation for this point (5)                 Point: Home exercise program (Done)     Description:   Instruct learner(s) on appropriate technique for monitoring, assisting and/or progressing therapeutic exercises/activities.              Learning Progress Summary           Patient Acceptance, E,D, VU,NR by IFRAH at 8/3/2022 1229    Comment: bed mobility, TE, trasnfer technique and safety      Show all documentation for this point (3)                 Point: Precautions (In Progress)     Description:   Instruct learner(s) on prescribed precautions during self-care and functional transfers.              Learning Progress Summary           Patient Acceptance, E, NR by MA at 8/10/2022 1131      Show all documentation for this point (3)                 Point: Body mechanics (In Progress)     Description:   Instruct learner(s) on proper positioning and spine alignment during self-care, functional mobility activities and/or exercises.              Learning Progress Summary           Patient Acceptance, E, NR by MA at 8/10/2022 1131      Show all documentation for this point (3)                             User Key     Initials Effective Dates Name Provider Type Alivia RG 06/16/21 -  Sabi Lim OT  Occupational Therapist OT    MA 11/19/20 -  Janelle Leiva OT Occupational Therapist OT              OT Recommendation and Plan  Therapy Frequency (OT): daily  Plan of Care Review  Plan of Care Reviewed With: patient  Progress: no change  Outcome Evaluation: OT re-cert complete. Pt presents w/ generalized weakness, decreased activity tolerance, and balance deficits limiting her ADL independence. Pt mod Ax2 for supine-to-sit, max Ax2 for STS, dep for LBD. Goals updated this date to reflect pt's current level of function. Continue to rec SNF at d/c.     Time Calculation:    Time Calculation- OT     Row Name 08/10/22 1132             Time Calculation- OT    OT Start Time 1028  -MA      OT Received On 08/10/22  -MA      OT Goal Re-Cert Due Date 08/20/22  -MA              Timed Charges    02327 - OT Therapeutic Activity Minutes 8  -MA      27189 - OT Self Care/Mgmt Minutes 5  -MA              Total Minutes    Timed Charges Total Minutes 13  -MA       Total Minutes 13  -MA            User Key  (r) = Recorded By, (t) = Taken By, (c) = Cosigned By    Initials Name Provider Type    Janelle Moncada OT Occupational Therapist              Therapy Charges for Today     Code Description Service Date Service Provider Modifiers Qty    [id number] HC OT THERAPEUTIC ACT EA 15 MIN 8/10/2022 Janelle Leiva OT GO 1               Janelle Leiva OT  8/10/2022

## 2022-08-10 NOTE — DISCHARGE PLACEMENT REQUEST
"  Updated HD info and Hepatitis panel     Thank you     Neris Rodarte Rn/CM   [phone]     David Mead (87 y.o. Female)             Date of Birth   1935    Social Security Number       [facility] [place] [address]    Home Phone   [phone]    MRN   [id number]       Alevism   Mandaeism    Marital Status                               Admission Date   7/28/22    Admission Type   Emergency    Admitting Provider   Prashanth Maravilla MD    Attending Provider   Prashanth Maravilla MD    Department, Room/Bed   [facility] 6A, N619/1       Discharge Date       Discharge Disposition       Discharge Destination                               Attending Provider: Prashanth Maravilla MD    Allergies: Bactrim [Sulfamethoxazole-trimethoprim], Lisinopril, Rocephin [Ceftriaxone], Codeine    Isolation: None   Infection: COVID (History) (08/08/22)   Code Status: CPR   Advance Care Planning Activity    Ht: 162.6 cm (64.02\")   Wt: 92 kg (202 lb 14.4 oz)    Admission Cmt: None   Principal Problem: GIB (gastrointestinal bleeding) [K92.2]                 Active Insurance as of 7/28/2022     Primary Coverage     Payor Plan Insurance Group Employer/Plan Group    MEDICARE MEDICARE A & B      Payor Plan Address Payor Plan Phone Number Payor Plan Fax Number Effective Dates    [address] [phone]  7/1/2001 - None Entered    [facility] [address]       Subscriber Name Subscriber Birth Date Member ID       DAVID MEAD 1935 [id number]           Secondary Coverage     Payor Plan Insurance Group Employer/Plan Group    [facility]      Payor Plan Address Payor Plan Phone Number Payor Plan Fax Number Effective Dates    [address] [phone]  7/1/2001 - None Entered    [place] [address]       Subscriber Name Subscriber Birth Date Member ID       DAVID MEAD 1935 [id number]           Tertiary Coverage     Payor Plan Insurance Group " Employer/Plan Group    [place]      Payor Plan Address Payor Plan Phone Number Payor Plan Fax Number Effective Dates    [address] [phone]  7/1/2020 - None Entered    [facility] [address]       Subscriber Name Subscriber Birth Date Member ID       DAVID MEAD 1935 [id number]                 Emergency Contacts      (Rel.) Home Phone Work Phone Mobile Phone    Frida Vazquez (Daughter) [phone] -- --             Wu Gil MD    Physician   Neurosurgery   Procedures      Signed   Date of Service:  08/06/22 1608   Creation Time:  08/06/22 1608              Signed              Show:Clear all  [x]Manual[]Template[]Copied    Added by:  [x]Wu Gil MD      []Hover for details    15.5 Palau[place] by 19 cm palindrome tunneled dialysis catheter placed without complication.  Both ports flushed and aspirated easily, and were terminally packed with 1000 units/mL heparin.  The catheter is ready for immediate use.  Full dictation to follow.                        Lab Results (last 24 hours)     Procedure Component Value Units Date/Time    POC Glucose Once [[id number]]  (Abnormal) Collected: 08/10/22 1208    Specimen: Blood Updated: 08/10/22 1210     Glucose 203 mg/dL      Comment: Meter: [id number] : [id number] Vinegar Linda       POC Glucose Once [[id number]]  (Abnormal) Collected: 08/10/22 0745    Specimen: Blood Updated: 08/10/22 0750     Glucose 136 mg/dL      Comment: Meter: [id number] : [id number] Vinegar Linda       Hepatitis Panel, Acute [[id number]]  (Normal) Collected: 08/10/22 0358    Specimen: Blood Updated: 08/10/22 0644     Hepatitis B Surface Ag Non-Reactive     Hep A IgM Non-Reactive     Hep B C IgM Non-Reactive     Hepatitis C Ab Non-Reactive    Narrative:      Results may be falsely decreased if patient taking Biotin.     POC Glucose Once [[id number]]  (Abnormal) Collected: 08/09/22 1657    Specimen: Blood Updated: 08/09/22 1702     Glucose 187  "mg/dL      Comment: Meter: [id number] : [id number] Dariel Luis Tavarez MD    Physician   Nephrology   Progress Notes      Signed   Date of Service:  08/09/22 1308   Creation Time:  08/09/22 1308              Signed        Expand All Collapse All[]Expand All by Default        Show:Clear all  [x]Manual[x]Template[x]Copied    Added by:  [x]Luis Cardenas MD      []Hover for details        LOS: 11 days    Patient Care Team:  Luis Cardenas MD as PCP - General (Nephrology)     Chief Complaint:  Gi bleed        Subjective         Interval History:   Seen on HD tolerating well so far. Goal UF 1.5 liter as tolerated. Bp stable. Good access pressure.      Review of Systems:   No CP  , fever, chills, rigors, rash, N/V, Constipation.                  Objective         Vital Sign Min/Max for last 24 hours  Temp  Min: 98 °F (36.7 °C)  Max: 98.2 °F (36.8 °C)   BP  Min: 112/94  Max: 139/66   Pulse  Min: 62  Max: 87   Resp  Min: 16  Max: 18   SpO2  Min: 96 %  Max: 100 %   No data recorded   Weight  Min: 81.3 kg (179 lb 2 oz)  Max: 81.3 kg (179 lb 2 oz)          Flowsheet Rows    Flowsheet Row First Filed Value   Admission Height 162.6 cm (64\") Documented at 07/28/2022 1451   Admission Weight 76.2 kg (168 lb) Documented at 07/28/2022 1451             No intake/output data recorded.  No intake/output data recorded.     Physical Exam:     Gen: Alert, NAD   HENT: NC, AT, EOMI   NECK: Supple, no JVD, Trachea midline   LUNGS: CTA bilaterally, non labored respirtation   CVS: S1/S2 audible, RRR, no murmur   Abd: Soft, NT, ND, BS+   Ext: No pedal edema, no cyanosis   CNS: Alert, No focal deficit noted grossly  Psy: Cooperative  Skin: Warm, dry and intact        WBC       WBC   Date Value Ref Range Status   08/09/2022 5.83 3.40 - 10.80 10*3/mm3 Final   08/08/2022 5.33 3.40 - 10.80 10*3/mm3 Final   08/07/2022 4.68 3.40 - 10.80 10*3/mm3 Final       HGB       Hemoglobin   Date Value Ref Range Status   08/09/2022 10.0 " (L) 12.0 - 15.9 g/dL Final   08/08/2022 9.7 (L) 12.0 - 15.9 g/dL Final   08/07/2022 9.5 (L) 12.0 - 15.9 g/dL Final       HCT       Hematocrit   Date Value Ref Range Status   08/09/2022 30.4 (L) 34.0 - 46.6 % Final   08/08/2022 29.7 (L) 34.0 - 46.6 % Final   08/07/2022 28.6 (L) 34.0 - 46.6 % Final       Platlets No results found for: LABPLAT   MCV       MCV   Date Value Ref Range Status   08/09/2022 87.4 79.0 - 97.0 fL Final   08/08/2022 89.2 79.0 - 97.0 fL Final   08/07/2022 88.0 79.0 - 97.0 fL Final             Sodium       Sodium   Date Value Ref Range Status   08/09/2022 140 136 - 145 mmol/L Final   08/08/2022 141 136 - 145 mmol/L Final   08/07/2022 138 136 - 145 mmol/L Final       Potassium       Potassium   Date Value Ref Range Status   08/09/2022 3.9 3.5 - 5.2 mmol/L Final   08/08/2022 3.9 3.5 - 5.2 mmol/L Final   08/07/2022 4.1 3.5 - 5.2 mmol/L Final       Chloride       Chloride   Date Value Ref Range Status   08/09/2022 106 98 - 107 mmol/L Final   08/08/2022 106 98 - 107 mmol/L Final   08/07/2022 105 98 - 107 mmol/L Final       CO2       CO2   Date Value Ref Range Status   08/09/2022 24.0 22.0 - 29.0 mmol/L Final   08/08/2022 22.0 22.0 - 29.0 mmol/L Final   08/07/2022 21.0 (L) 22.0 - 29.0 mmol/L Final       BUN       BUN   Date Value Ref Range Status   08/09/2022 58 (H) 8 - 23 mg/dL Final   08/08/2022 100 (H) 8 - 23 mg/dL Final   08/07/2022 86 (H) 8 - 23 mg/dL Final       Creatinine       Creatinine   Date Value Ref Range Status   08/09/2022 2.34 (H) 0.57 - 1.00 mg/dL Final   08/08/2022 3.13 (H) 0.57 - 1.00 mg/dL Final   08/07/2022 3.06 (H) 0.57 - 1.00 mg/dL Final       Calcium       Calcium   Date Value Ref Range Status   08/09/2022 8.7 8.6 - 10.5 mg/dL Final   08/08/2022 8.6 8.6 - 10.5 mg/dL Final   08/07/2022 8.5 (L) 8.6 - 10.5 mg/dL Final       PO4 No results found for: CAPO4   Albumin       Albumin   Date Value Ref Range Status   08/09/2022 2.50 (L) 3.50 - 5.20 g/dL Final   08/08/2022 2.60 (L) 3.50 -  5.20 g/dL Final   08/07/2022 2.40 (L) 3.50 - 5.20 g/dL Final       Magnesium       Magnesium   Date Value Ref Range Status   08/08/2022 2.1 1.6 - 2.4 mg/dL Final       Uric Acid No results found for: URICACID                    Results Review:                I reviewed the patient's new clinical results.     apixaban, 2.5 mg, Oral, Q12H  atorvastatin, 20 mg, Oral, Nightly  carvedilol, 3.125 mg, Oral, BID With Meals  castor oil-balsam peru, 1 application, Topical, Q12H  dextromethorphan polistirex ER, 60 mg, Oral, Q12H  epoetin shukri/shukri-epbx, 10,000 Units, Subcutaneous, Once per day on Tue Thu Sat  insulin lispro, 0-7 Units, Subcutaneous, TID AC  Insulin Lispro, 4 Units, Subcutaneous, TID With Meals  latanoprost, 1 drop, Both Eyes, Nightly  levothyroxine, 112 mcg, Oral, Q AM  pantoprazole, 40 mg, Oral, BID AC  sodium chloride, 10 mL, Intravenous, Q12H  timolol, 1 drop, Both Eyes, BID           Medication Review:            Assessment & Plan          GIB (gastrointestinal bleeding)    Chronic atrial fibrillation (HCC)    CKD (chronic kidney disease), stage IV (HCC)    Hyperlipidemia LDL goal <100    Essential hypertension    Hypothyroidism    Pulmonary embolism (HCC)    CHF (NYHA class IV, ACC/AHA stage D) (HCC)    CAD (coronary artery disease)    Chronic systolic congestive heart failure (HCC)    Type 2 diabetes mellitus without complication, with long-term current use of insulin (HCC)    Normocytic anemia    Acute renal failure (ARF) (HCC)    COVID-19 virus detected    Thrombocytopenia (HCC)    Hypoalbuminemia    AMS (altered mental status)    Lower GI bleed        1- ROBBI on CKD stage IV - Thought to be hemodynamic injury progression of CKD to ESRD. Started on HD on 8/6/22.   2 CKD stage IV -baseline Scr 2.5 range  3- GI bleed   4- Low albumin level   5- Iron def anemia Tsat 11%. S/p iron supplementation. Will start BENY on HD days   6- Renal cysts   7- Systolic CHF - Recent Echo showing EF 37%  now.     Plan:  Continue HD per TTS miranda.   BENY on HD days  Will need outpatient HD chair setup  Luis Cardenas MD  22  13:08 EDT                                [address]  [address] [facility] [address]  Phone:  [phone]  Fax:  [phone]        Patient:     Karla Cristobal MRN:  [id number]   [address] [facility] [address] :  1935  SSN:    Phone: [phone] Sex:  F      INSURANCE PAYOR PLAN GROUP # SUBSCRIBER ID   Primary:  Secondary:    MEDICARE  [employer] [id number]  [id number]      [id number]  [id number]   Admitting Diagnosis: GIB (gastrointestinal bleeding) [K92.2]  Order Date:  Aug 10, 2022        Hemodialysis Inpatient       (Order ID: [id number])     Diagnosis:         Priority:  Routine Expected Date:   Expiration Date:        Interval:  Once Count:    Comments: Change k bath to 3 k if k <4  Duration of Treatment: 3.5 Hours  Access Site: Tunneled Dialysis Catheter  Dialyzer: Revaclear  DFR: 700 mL/min  Dialysate Temperature (C): 36  BFR-As tolerated to a maximum of: 400 mL/min  Dialysate Solution Bath: K+ = 2 mEq, Ca = 2.5mEq  Bicarb: 30 meq  Na+: 137 meq  Fluid Removal: 1.5-2     Specimen Type:   Specimen Source:   Specimen Taken Date:   Specimen Taken Time:                  Authorizing Provider:Luis Cardenas MD  Authorizing Provider's NPI: [id number]  Order Entered By: Luis Cardenas MD 8/10/2022 11:58 AM     Electronically signed by: Luis Cardenas MD 8/10/2022 11:58 AM

## 2022-08-10 NOTE — PLAN OF CARE
Goal Outcome Evaluation:   VSS. A paced. RA. No complaints of pain. Orientation waxes and wanes. No further complaints at this time.

## 2022-08-10 NOTE — CASE MANAGEMENT/SOCIAL WORK
Continued Stay Note  [facility]     Patient Name: Karla Cristobal  MRN: [id number]  Today's Date: 8/10/2022    Admit Date: 7/28/2022     Discharge Plan     Row Name 08/10/22 1325       Plan    Plan CM update    Plan Comments Spoke with Caleb regarding patient's chair time- updated notes and Hepatitis panel was needed. This information was faxed to caleb at [phone]. Once they have the updated info, they will send to the clinic in [place] and a chair time will be given. Anticipate chair time given by late this afternoon or early tomorrow- matheus [phone]    Final Discharge Disposition Code 01 - home or self-care               Discharge Codes    No documentation.                     Matheus Rodarte RN

## 2022-08-10 NOTE — PLAN OF CARE
Goal Outcome Evaluation:  Plan of Care Reviewed With: patient        Progress: no change  Outcome Evaluation: OT re-cert complete. Pt presents w/ generalized weakness, decreased activity tolerance, and balance deficits limiting her ADL independence. Pt mod Ax2 for supine-to-sit, max Ax2 for STS, dep for LBD. Goals updated this date to reflect pt's current level of function. Continue to rec SNF at d/c.

## 2022-08-10 NOTE — THERAPY PROGRESS REPORT/RE-CERT
Patient Name: Karla Cristobal  : 1935    MRN: [id number]                              Today's Date: 8/10/2022       Admit Date: 2022    Visit Dx:     ICD-10-CM ICD-9-CM   1. Lower GI bleed  K92.2 578.9   2. Chronic anticoagulation  Z79.01 V58.61   3. History of atrial fibrillation  Z86.79 V12.59   4. Generalized weakness  R53.1 780.79   5. Malaise and fatigue  R53.81 780.79    R53.83    6. Nausea  R11.0 787.02   7. Acute renal failure superimposed on stage 4 chronic kidney disease, unspecified acute renal failure type (HCC)  N17.9 584.9    N18.4 585.4   8. Thrombocytopenia ([facility])  D69.6 287.5   9. History of CHF (congestive heart failure)  Z86.79 V12.59   10. History of diabetes mellitus  Z86.39 V12.29   11. History of diverticulosis  Z87.19 V12.79   12. Gastrointestinal hemorrhage, unspecified gastrointestinal hemorrhage type  K92.2 578.9     Patient Active Problem List   Diagnosis   • Chronic atrial fibrillation (HCC)   • CKD (chronic kidney disease), stage IV (HCC)   • Diabetic nephropathy (HCC)   • Diabetic retinopathy (HCC)   • Malignant neoplasm of endometrium (HCC)   • Hyperlipidemia LDL goal <100   • Essential hypertension   • Hypothyroidism   • Insomnia   • Leukocytosis   • Memory impairment   • Nausea   • Pulmonary embolism (HCC)   • Sleep apnea   • Squamous cell carcinoma of skin   • CHF (NYHA class IV, ACC/AHA stage D) ([facility])   • Ischemic heart disease   • CAD (coronary artery disease)   • DVT of lower extremity (deep venous thrombosis) (HCC)   • Chronic systolic congestive heart failure (HCC)   • Morbidly obese (HCC)   • Ischemic cardiomyopathy   • Type 2 diabetes mellitus without complication, with long-term current use of insulin (HCC)   • Exudative age-related macular degeneration, right eye, with active choroidal neovascularization (HCC)   • Hypoxia   • Cellulitis of left lower extremity   • Left leg cellulitis   • Food impaction of esophagus   • Malignant tumor of ascending colon (HCC)   •  GIB (gastrointestinal bleeding)   • Normocytic anemia   • Acute renal failure (ARF) ([facility])   • COVID-19 virus detected   • Thrombocytopenia ([facility])   • Hypoalbuminemia   • AMS (altered mental status)   • Lower GI bleed     Past Medical History:   Diagnosis Date   • Acute bronchitis    • Arthritis    • Atrial fibrillation ([facility])    • CAD (coronary artery disease)     s/p MI 2005; 3 stents inserted    • Change in mole    • Change in mole    • Change in nevus 6/16/2016   • Chronic kidney disease     stage IV-sees Dr Bk Mckeon every 3-4 months    • Chronic renal disease, stage 4, severely decreased glomerular filtration rate between 15-29 mL/min/1.73 square meter ([facility])    • Chronic systolic heart failure ([facility])    • Congestive heart disease ([facility])    • Conjunctivitis    • Deep vein thrombosis of lower extremity ([facility])    • Diabetes mellitus ([facility])    • Diabetes mellitus ([facility]) 6/16/2016    Impression: 03/15/2016 - blood sugar 166 and hgn a1c 7.3%  is up to date with eye exam  neuropathy with callus, no ulcer  some scratches feet  ur alb due and ordered  no change other than provided samples of toujeo because she feels like lantus worked much better than levemir, she has tried titrating levemir and it is less effective  continue testing and f/u 3-4 months  Impression: 11/23/2015 - bl   • Diabetic foot ulcer ([facility]) 6/16/2016   • Dog bite of hand    • Easy bruising    • Endometrial cancer ([facility])     partial hysterectomy; radiation as well    • Foot pain    • Foot pain 6/16/2016    Description: lancinating- worse but not consistent enough to want rx   • Fracture of hip ([facility])    • Generalized ischemic myocardial dysfunction 6/16/2016   • Glaucoma     drops daily    • Hyperlipidemia    • Hypertension    • Hypothyroidism    • Insomnia    • Ischemic heart disease    • Macular degeneration    • Methicillin resistant Staphylococcus aureus infection 6/16/2016   • Myocardial infarction ([facility]) 2005   • Nausea with vomiting    • Pericardial  effusion    • Shortness of breath 6/16/2016    Impression: 03/24/2015 - chronic. On 2 l oxygen at night. check cbc, bnp;    • Skin cancer, basal cell     nose, leg    • Skin lesion 6/16/2016    Impression: 03/24/2015 - refer derm for eval- seb kerat ant tib and ?ak medially with redness and irritation;    • Sleep apnea     oxygen at night due to low sats but no cpap   • Type 2 diabetes mellitus (HCC)    • UTI (urinary tract infection) 06/12/2020    currently taking antibiotics-patient's daughter notified Dr Beal's office and office said it should not delay generator change      Past Surgical History:   Procedure Laterality Date   • CARDIAC CATHETERIZATION     • CARDIAC DEFIBRILLATOR PLACEMENT     • CARDIAC ELECTROPHYSIOLOGY PROCEDURE N/A 7/17/2020    Procedure: ICD BIV  generator change- [facility]- 3-6 weeks;  Surgeon: Tano Beal MD;  Location:  [facility] INVASIVE LOCATION;  Service: Cardiology;  Laterality: N/A;   • CHOLECYSTECTOMY     • COLONOSCOPY N/A 7/30/2022    Procedure: COLONOSCOPY;  Surgeon: Brunner, Mark I, MD;  Location:  [facility] ENDOSCOPY;  Service: Gastroenterology;  Laterality: N/A;  WITH 3 RESOLUTION CLIPS   • CORONARY ARTERY BYPASS GRAFT  2000   • CORONARY STENT PLACEMENT      3 stents    • ENDOSCOPY N/A 11/17/2021    Procedure: ESOPHAGOGASTRODUODENOSCOPY;  Surgeon: Brunner, Mark I, MD;  Location:  [facility] ENDOSCOPY;  Service: Gastroenterology;  Laterality: N/A;   • EYE SURGERY Bilateral     cataract extraction    • HIP SURGERY Left     x3   • HYSTERECTOMY      partial    • KNEE ARTHROPLASTY Bilateral    • PACEMAKER IMPLANTATION     • TONSILLECTOMY        General Information     Row Name 08/10/22 1110          Physical Therapy Time and Intention    Document Type progress note/recertification;therapy note (daily note)  -MB     Mode of Treatment physical therapy  -MB     Row Name 08/10/22 1110          General Information    Patient Profile Reviewed yes  -MB     Existing Precautions/Restrictions fall   -MB     Barriers to Rehab medically complex;previous functional deficit  -MB     Row Name 08/10/22 1110          Cognition    Orientation Status (Cognition) oriented x 3  -MB     Row Name 08/10/22 1110          Safety Issues, Functional Mobility    Safety Issues Affecting Function (Mobility) awareness of need for assistance;insight into deficits/self-awareness;positioning of assistive device;problem-solving;safety precaution awareness;safety precautions follow-through/compliance;sequencing abilities  -MB     Impairments Affecting Function (Mobility) balance;coordination;endurance/activity tolerance;strength;postural/trunk control;shortness of breath;motor planning;pain  -MB           User Key  (r) = Recorded By, (t) = Taken By, (c) = Cosigned By    Initials Name Provider Type    Qiana Camacho, PT Physical Therapist               Mobility     Row Name 08/10/22 1110          Bed Mobility    Comment, (Bed Mobility) Pt. received sitting EOB.  -MB     Row Name 08/10/22 1110          Transfers    Comment, (Transfers) Pt. required consistent VCs/tactile cues for set up, safe hand placement, and sequencing. Pt. able to stand ~80% upright. Pt. quickly c/o fatigue and requests to sit down despite encouragement.  -MB     Row Name 08/10/22 1110          Bed-Chair Transfer    Bed-Chair [place] (Transfers) maximum assist (25% patient effort);2 person assist;verbal cues;nonverbal cues (demo/gesture)  -MB     Assistive Device (Bed-Chair Transfers) other (see comments)  BUE support  -MB     Row Name 08/10/22 1110          Sit-Stand Transfer    Sit-Stand [place] (Transfers) maximum assist (25% patient effort);2 person assist;verbal cues;nonverbal cues (demo/gesture)  -MB     Assistive Device (Sit-Stand Transfers) other (see comments)  BUE support  -MB     Row Name 08/10/22 1110          Gait/Stairs (Locomotion)    [place] Level (Gait) maximum assist (25% patient effort);2 person assist;verbal cues;nonverbal  cues (demo/gesture)  -MB     Assistive Device (Gait) other (see comments)  BUE support  -MB     Distance in Feet (Gait) 2  -MB     Deviations/Abnormal Patterns (Gait) dimitry decreased;gait speed decreased;stride length decreased;weight shifting decreased  -MB     Bilateral Gait Deviations forward flexed posture;heel strike decreased  -MB     Comment, (Gait/Stairs) Pt. amb to chair w/ max A x 2 to shift weight and VCs for step sequencing. Pt. limited by weakness and dec safety awareness; attempts to sit prematurely.  -MB           User Key  (r) = Recorded By, (t) = Taken By, (c) = Cosigned By    Initials Name Provider Type    MB Qiana Isabel, PT Physical Therapist               Obj/Interventions     Row Name 08/10/22 1550          Motor Skills    Therapeutic Exercise hip;knee;ankle  -MB     Row Name 08/10/22 1550          Hip (Therapeutic Exercise)    Hip Isometrics (Therapeutic Exercise) bilateral;gluteal sets;10 repetitions  -MB     Hip Strengthening (Therapeutic Exercise) bilateral;heel slides;aBduction;10 repetitions  -MB     Row Name 08/10/22 1550          Knee (Therapeutic Exercise)    Knee Isometrics (Therapeutic Exercise) bilateral;quad sets;10 repetitions  -MB     Knee Strengthening (Therapeutic Exercise) bilateral;LAQ (long arc quad);10 repetitions  -MB     Row Name 08/10/22 1550          Ankle (Therapeutic Exercise)    Ankle (Therapeutic Exercise) AROM (active range of motion)  -MB     Ankle AROM (Therapeutic Exercise) bilateral;dorsiflexion;plantarflexion;10 repetitions  -MB     Row Name 08/10/22 1550          Balance    Balance Assessment sitting static balance;standing static balance;standing dynamic balance  -MB     Static Sitting Balance contact guard  -MB     Position, Sitting Balance unsupported;sitting edge of bed  -MB     Static Standing Balance maximum assist;2-person assist  -MB     Dynamic Standing Balance maximum assist;2-person assist  -MB     Position/Device Used, Standing Balance  supported;other (see comments)  BUE support  -MB     Balance Interventions standing;sit to stand;weight shifting activity  -MB           User Key  (r) = Recorded By, (t) = Taken By, (c) = Cosigned By    Initials Name Provider Type    Qiana Camacho, PT Physical Therapist               Goals/Plan     Row Name 08/10/22 1554          Bed Mobility Goal 1 (PT)    Activity/Assistive Device (Bed Mobility Goal 1, PT) sit to supine/supine to sit  -MB     [place] Level/Cues Needed (Bed Mobility Goal 1, PT) minimum assist (75% or more patient effort)  -MB     Time Frame (Bed Mobility Goal 1, PT) 10 days  -MB     Progress/Outcomes (Bed Mobility Goal 1, PT) goal revised this date;progress slower than expected;medical status inhibiting progress  -MB     Row Name 08/10/22 1554          Transfer Goal 1 (PT)    Activity/Assistive Device (Transfer Goal 1, PT) sit-to-stand/stand-to-sit;walker, rolling;bed-to-chair/chair-to-bed  -MB     [place] Level/Cues Needed (Transfer Goal 1, PT) moderate assist (50-74% patient effort)  -MB     Time Frame (Transfer Goal 1, PT) 10 days  -MB     Progress/Outcome (Transfer Goal 1, PT) progress slower than expected;goal revised this date  -MB     Row Name 08/10/22 1557          Gait Training Goal 1 (PT)    Activity/Assistive Device (Gait Training Goal 1, PT) gait (walking locomotion);decrease fall risk;diminish gait deviation;improve balance and speed;increase endurance/gait distance;walker, rolling  -MB     [place] Level (Gait Training Goal 1, PT) moderate assist (50-74% patient effort)  -MB     Distance (Gait Training Goal 1, PT) 15  -MB     Time Frame (Gait Training Goal 1, PT) 10 days  -MB     Progress/Outcome (Gait Training Goal 1, PT) goal revised this date;progress slower than expected  -MB     Row Name 08/10/22 1559          Stairs Goal 1 (PT)    Activity/Assistive Device (Stairs Goal 1, PT) stairs, all skills;using handrail, left;using handrail, right  -MB      [place] Level/Cues Needed (Stairs Goal 1, PT) contact guard required  -MB     Progress/Outcome (Stairs Goal 1, PT) goal no longer appropriate  -MB     Row Name 08/10/22 9144          Patient Education Goal (PT)    Activity (Patient Education Goal, PT) HEP  -MB     [place]/Cues/Accuracy (Memory Goal 2, PT) demonstrates adequately  -MB     Time Frame (Patient Education Goal, PT) 10 days  -MB     Progress/Outcome (Patient Education Goal, PT) goal ongoing  -MB     Row Name 08/10/22 5964          Therapy Assessment/Plan (PT)    Planned Therapy Interventions (PT) balance training;bed mobility training;gait training;home exercise program;patient/family education;postural re-education;ROM (range of motion);stair training;strengthening;transfer training  -MB           User Key  (r) = Recorded By, (t) = Taken By, (c) = Cosigned By    Initials Name Provider Type    Qiana Camacho, PT Physical Therapist               Clinical Impression     Row Name 08/10/22 0743          Pain    Additional Documentation Pain Scale: FACES Pre/Post-Treatment (Group)  -MB     Row Name 08/10/22 0399          Pain Scale: FACES Pre/Post-Treatment    Pain: FACES Scale, Pretreatment 2-->hurts little bit  -MB     Posttreatment Pain Rating 4-->hurts little more  -MB     Pain Location - Side/Orientation Bilateral  -MB     Pain Location lower  -MB     Pain Location - extremity  -MB     Row Name 08/10/22 7259          Plan of Care Review    Plan of Care Reviewed With patient  -MB     Progress no change  -MB     Outcome Evaluation PT recert completed. Patient limited by weakness, impaired balance, and decreased activity tolerance. Goals modified to reflect current level of function. She required max A x 2 for transfers and to ambulate 2ft to chair w/ UE support. PT recommends SNF rehab at D/C.  -MB     Row Name 08/10/22 6951          Therapy Assessment/Plan (PT)    Rehab Potential (PT) fair, will monitor progress closely  -MB     Criteria  for Skilled Interventions Met (PT) yes;meets criteria;skilled treatment is necessary  -MB     Therapy Frequency (PT) daily  -MB     Row Name 08/10/22 1553          Vital Signs    Pre Patient Position Sitting  -MB     Intra Patient Position Standing  -MB     Post Patient Position Sitting  -MB     Row Name 08/10/22 1553          Positioning and Restraints    Pre-Treatment Position in bed  -MB     Post Treatment Position chair  -MB     In Chair notified nsg;reclined;call light within reach;encouraged to call for assist;exit alarm on;waffle cushion;on mechanical lift sling;legs elevated;heels elevated;LUE elevated  -MB           User Key  (r) = Recorded By, (t) = Taken By, (c) = Cosigned By    Initials Name Provider Type    Qiana Camacho, PT Physical Therapist               Outcome Measures     Row Name 08/10/22 1559          How much help from another person do you currently need...    Turning from your back to your side while in flat bed without using bedrails? 2  -MB     Moving from lying on back to sitting on the side of a flat bed without bedrails? 2  -MB     Moving to and from a bed to a chair (including a wheelchair)? 2  -MB     Standing up from a chair using your arms (e.g., wheelchair, bedside chair)? 2  -MB     Climbing 3-5 steps with a railing? 1  -MB     To walk in hospital room? 1  -MB     AM-PAC 6 Clicks Score (PT) 10  -MB     Highest level of mobility 4 --> Transferred to chair/commode  -MB     Row Name 08/10/22 1559 08/10/22 1131       Functional Assessment    Outcome Measure Options AM-PAC 6 Clicks Basic Mobility (PT)  -MB AM-PAC 6 Clicks Daily Activity (OT)  -MA          User Key  (r) = Recorded By, (t) = Taken By, (c) = Cosigned By    Initials Name Provider Type    Qiana Camacho, PT Physical Therapist    Janelle Moncada, MARIELOS Occupational Therapist                             Physical Therapy Education                 Title: PT OT SLP Therapies (In Progress)     Topic: Physical  Therapy (In Progress)     Point: Mobility training (In Progress)     Learning Progress Summary           Patient Acceptance, E, NR by NS at 8/4/2022 1416      Show all documentation for this point (3)                 Point: Home exercise program (In Progress)     Learning Progress Summary           Patient Acceptance, E, NR by NS at 8/4/2022 1416      Show all documentation for this point (3)                 Point: Body mechanics (In Progress)     Learning Progress Summary           Patient Acceptance, E, NR by NS at 8/4/2022 1416      Show all documentation for this point (3)                 Point: Precautions (In Progress)     Learning Progress Summary           Patient Acceptance, E, NR by NS at 8/4/2022 1416      Show all documentation for this point (3)                             User Key     Initials Effective Dates Name Provider Type Discipline    NS 06/16/21 -  Terri Hendrickson, PT Physical Therapist PT              PT Recommendation and Plan  Planned Therapy Interventions (PT): balance training, bed mobility training, gait training, home exercise program, patient/family education, postural re-education, ROM (range of motion), stair training, strengthening, transfer training  Plan of Care Reviewed With: patient  Progress: no change  Outcome Evaluation: PT recert completed. Patient limited by weakness, impaired balance, and decreased activity tolerance. Goals modified to reflect current level of function. She required max A x 2 for transfers and to ambulate 2ft to chair w/ UE support. PT recommends SNF rehab at D/C.     Time Calculation:    PT Charges     Row Name 08/10/22 1559             Time Calculation    Start Time 1110  -MB      PT Received On 08/10/22  -MB      PT Goal Re-Cert Due Date 08/20/22  -MB              Time Calculation- PT    Total Timed Code Minutes- PT 36 minute(s)  -MB              Timed Charges    41622 - PT Therapeutic Exercise Minutes 36  -MB              Total Minutes    Timed Charges  Total Minutes 36  -MB       Total Minutes 36  -MB            User Key  (r) = Recorded By, (t) = Taken By, (c) = Cosigned By    Initials Name Provider Type    Qiana Camacho, PT Physical Therapist              Therapy Charges for Today     Code Description Service Date Service Provider Modifiers Qty    [id number]  PT THER PROC EA 15 MIN 8/10/2022 Qiana Isabel, PT GP 2          PT G-Codes  Outcome Measure Options: AM-PAC 6 Clicks Basic Mobility (PT)  AM-PAC 6 Clicks Score (PT): 10  AM-PAC 6 Clicks Score (OT): 11    Qiana Isabel PT  8/10/2022

## 2022-08-10 NOTE — PROGRESS NOTES
[facility] Medicine Services  PROGRESS NOTE    Patient Name: Karla Cristobal  : 1935  MRN: [id number]    Date of Admission: 2022  Primary Care Physician: Luis Cardenas MD    Subjective   Subjective     CC:   GI blood loss / ARF    HPI:   Daughter in room today.  No bleeding reported.  She is sitting up in chair and without complaint.  Daughter says she has questions for Nephrologist    ROS:  Gen- denies chills, denies fevers  GI- no abdominal pain  CV- no chest pain    Objective   Objective     Vital Signs:   Temp:  [98 °F (36.7 °C)-98.8 °F (37.1 °C)] 98 °F (36.7 °C)  Heart Rate:  [73-97] 88  Resp:  [16-18] 18  BP: ()/(55-81) 114/56     Physical Exam:  Constitutional:  No acute distress, sitting up in chair today  HENT: NCAT, dry tongue  Respiratory: weak inspiratory effort, grossly clear  Cardiovascular: RRR  Musculoskeletal: No BLE edema   Psychiatric:  Cooperative, normal affect  Neurologic: Speech clear and fluent  Skin: dry skin    Results Reviewed:  LAB RESULTS:      Lab 22  0424 22  1347 22  0717 22  0516 22  0455 22  0505   WBC 5.83 5.33 4.68 5.17 5.96 6.85   HEMOGLOBIN 10.0* 9.7* 9.5* 9.4* 9.3* 9.4*   HEMATOCRIT 30.4* 29.7* 28.6* 28.0* 27.4* 27.8*   PLATELETS 212 190 157 150 151 128*   NEUTROS ABS 3.76  --   --   --  3.87 4.65   IMMATURE GRANS (ABS) 0.06*  --   --   --  0.04 0.06*   LYMPHS ABS 0.95  --   --   --  1.07 1.04   MONOS ABS 0.74  --   --   --  0.72 0.79   EOS ABS 0.26  --   --   --  0.23 0.27   MCV 87.4 89.2 88.0 87.5 87.5 85.5   PROTIME  --   --   --   --   --  22.6*   APTT  --   --   --   --   --  46.3*         Lab 22  0424 22  1347 22  0717 22  0516 22  0455   SODIUM 140 141 138 137 136   POTASSIUM 3.9 3.9 4.1 3.8 4.0   CHLORIDE 106 106 105 101 102   CO2 24.0 22.0 21.0* 22.0 25.0   ANION GAP 10.0 13.0 12.0 14.0 9.0   BUN 58* 100* 86* 135* 103*   CREATININE 2.34* 3.13* 3.06* 4.07*  3.76*   EGFR 19.8* 14.0* 14.4* 10.2* 11.2*   GLUCOSE 166* 125* 170* 169* 219*   CALCIUM 8.7 8.6 8.5* 8.7 8.7   MAGNESIUM  --  2.1  --  2.0  --    PHOSPHORUS 2.9 4.1 3.9 4.2 4.2         Lab 08/09/22  0424 08/08/22  1347 08/07/22  0717 08/06/22  0516 08/05/22  0455   ALBUMIN 2.50* 2.60* 2.40* 2.20* 2.50*         Lab 08/04/22  0505   PROTIME 22.6*   INR 1.99*                 Brief Urine Lab Results  (Last result in the past 365 days)      Color   Clarity   Blood   Leuk Est   Nitrite   Protein   CREAT   Urine HCG        07/30/22 1742 Yellow   Clear   Small (1+)   Trace   Negative   Trace                 Microbiology Results Abnormal     Procedure Component Value - Date/Time    Eosinophil Smear - Urine, Urine, Clean Catch [[id number]]  (Normal) Collected: 07/30/22 1742    Lab Status: Final result Specimen: Urine, Clean Catch Updated: 07/30/22 1938     Eosinophil Smear 0 % EOS/100 Cells     Narrative:      No eosinophil seen          No radiology results from the last 24 hrs    Results for orders placed during the hospital encounter of 07/28/22    Adult Transthoracic Echo Complete W/ Cont if Necessary Per Protocol    Interpretation Summary  · Mild biatrial enlargement.  · Moderately reduced right ventricular systolic function noted.  · Moderate left ventricular systolic dysfunction, estimated EF 37%.  · Left ventricular wall thickness is consistent with mild concentric hypertrophy.  · Lambl's excrescences of the aortic valve are noted. There is trace aortic insufficiency, no evidence of aortic stenosis.  · Moderate mitral regurgitation.  · Moderate to severe tricuspid regurgitation with RVSP 48 mmHg.  · Moderate pulmonic insufficiency.      I have reviewed the medications:  Scheduled Meds:apixaban, 2.5 mg, Oral, Q12H  atorvastatin, 20 mg, Oral, Nightly  carvedilol, 3.125 mg, Oral, BID With Meals  castor oil-balsam peru, 1 application, Topical, Q12H  dextromethorphan polistirex ER, 60 mg, Oral, Q12H  epoetin shukri/shukri-epbx,  10,000 Units, Subcutaneous, Once per day on Tue Thu Sat  insulin lispro, 0-7 Units, Subcutaneous, TID AC  Insulin Lispro, 4 Units, Subcutaneous, TID With Meals  latanoprost, 1 drop, Both Eyes, Nightly  levothyroxine, 112 mcg, Oral, Q AM  pantoprazole, 40 mg, Oral, BID AC  sodium chloride, 10 mL, Intravenous, Q12H  timolol, 1 drop, Both Eyes, BID      Continuous Infusions:   PRN Meds:.•  acetaminophen **OR** acetaminophen **OR** acetaminophen  •  albumin human  •  benzonatate  •  dextrose  •  dextrose  •  glucagon (human recombinant)  •  melatonin  •  ondansetron  •  sodium chloride    Assessment & Plan   Assessment & Plan     Active Hospital Problems    Diagnosis  POA   • **GIB (gastrointestinal bleeding) [K92.2]  Yes   • Normocytic anemia [D64.9]  Unknown   • Acute renal failure (ARF) (HCC) [N17.9]  Unknown   • COVID-19 virus detected [U07.1]  Unknown   • Thrombocytopenia (HCC) [D69.6]  Unknown   • Hypoalbuminemia [E88.09]  Unknown   • AMS (altered mental status) [R41.82]  Unknown   • Lower GI bleed [K92.2]  Yes   • Type 2 diabetes mellitus without complication, with long-term current use of insulin (HCC) [E11.9, Z79.4]  Not Applicable   • Chronic systolic congestive heart failure (HCC) [I50.22]  Yes   • CAD (coronary artery disease) [I25.10]  Yes   • Chronic atrial fibrillation (HCC) [I48.20]  Yes   • CKD (chronic kidney disease), stage IV (HCC) [N18.4]  Yes   • Hyperlipidemia LDL goal <100 [E78.5]  Yes   • Essential hypertension [I10]  Yes   • Hypothyroidism [E03.9]  Yes   • Pulmonary embolism (HCC) [I26.99]  Yes   • CHF (NYHA class IV, ACC/AHA stage D) (HCC) [I50.84]  Yes      Resolved Hospital Problems   No resolved problems to display.        Brief Hospital Course to date:  Karla Cristobal is a 87 y.o. female with anemia, atrial fibrillation on Eliquis, CKD stage IV, HLD, hypertension, hypothyroidism, CAD s/p stents, diabetes type 2, and systolic heart failure (2L NC at night) who presented to the ED on  7/28/2022 for rectal bleeding. 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  3 endoclips were applied for hemostasis.  H/H has been stable since.  She was incidentally found to be COVID positive and had acute kidney injury for which nephrology has been following. She had temporary dialysis catheter placed on 8/6/2022 and was started on hemodialysis.      This patient's problems and plans were partially entered by my partner and updated as appropriate by me 08/10/22.    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  3 endoclips were applied for hemostasis  -s/p IV Iron  -Eliquis resumed 8/7, missed am doses on 8/8 and 8/9  -has only received 5 out of the last 7 doses  -on low dose apixaban 2.5 mg every 12 hours for atrial fibrillation     ROBBI on CKD IV  -Nephrology consulted  -Started on Hemodialysis this admission on 8/6  -working on outpatient dialysis chair (New)  -has access in Right Upper Chest     N/V  -Zofran as needed     COVID-19 positive  -Asymptomatic    -Out of Isolation on 8/8     Mixed systolic and diastolic CHF  Moderate to severe MR  Persistent atrial fibrillation   -Seen by cardiology and started on carvedilol but held a few times 8/8 and 8/9  -apixaban restarted on 8/7 but has missed a few doses on 8/8 and 8/9     Expected Discharge Location and Transportation: home with new Dialysis Chair appt  Expected Discharge Date: 8/11    DVT prophylaxis:  Medical DVT prophylaxis orders are present.     AM-PAC 6 Clicks Score (PT): 10 (08/10/22 3881)    CODE STATUS:   Code Status and Medical Interventions:   Ordered at: 07/28/22 3527     Level Of Support Discussed With:    Patient     Code Status (Patient has no pulse and is not breathing):    CPR (Attempt to  Resuscitate)     Medical Interventions (Patient has pulse or is breathing):    Full Support       Prashanth Maravilla MD  08/10/22

## 2022-08-10 NOTE — PLAN OF CARE
Goal Outcome Evaluation:  Plan of Care Reviewed With: patient        Progress: no change  Outcome Evaluation: PT recert completed. Patient limited by weakness, impaired balance, and decreased activity tolerance. Goals modified to reflect current level of function. She required max A x 2 for transfers and to ambulate 2ft to chair w/ UE support. PT recommends SNF rehab at D/C.

## 2022-08-11 NOTE — CASE MANAGEMENT/SOCIAL WORK
Continued Stay Note  [facility]     Patient Name: Karla Cristobal  MRN: [id number]  Today's Date: 8/11/2022    Admit Date: 7/28/2022     Discharge Plan     Row Name 08/11/22 1635       Plan    Plan CM update-    Plan Comments Patient and daughter have now decided patient should go to rehab before returning home with her daughter - Referral sent to [facility] so hopefully she can continue with HD plans at [facility] in [facility]. Awaiting Hep B surface antibody to result so it can be sent to [facility] for a confirmed chair time. - CM will continue to follow - matheus [phone]               Discharge Codes    No documentation.                     Matheus Rodarte RN

## 2022-08-11 NOTE — PLAN OF CARE
Goal Outcome Evaluation:  Plan of Care Reviewed With: patient   Patient is alert and oriented x2-3 with confusion. VSS. Lungs diminished, Sp02 95% on RA. Denies pain and discomfort. Plan is to transfer home to families home after dialysis today.     Progress: improving

## 2022-08-11 NOTE — PLAN OF CARE
Goal Outcome Evaluation:  Plan of Care Reviewed With: patient           Outcome Evaluation: Pt alert with confusion, VSS, RA, Sat. 98%, no c/o pain at this time. Continue monitoring.

## 2022-08-11 NOTE — PROGRESS NOTES
"   LOS: 13 days    Patient Care Team:  Luis Cardenas MD as PCP - General (Nephrology)    Chief Complaint:  Gi bleed    Subjective     Interval History:   Patient seen on dialysis no new complaints.    Review of Systems:   Patient denies shortness of breath, chest pain, dysuria, hematuria, nausea, vomiting.          Objective     Vital Sign Min/Max for last 24 hours  Temp  Min: 96.6 °F (35.9 °C)  Max: 98.6 °F (37 °C)   BP  Min: 99/81  Max: 152/69   Pulse  Min: 72  Max: 97   Resp  Min: 16  Max: 18   SpO2  Min: 95 %  Max: 99 %   No data recorded   Weight  Min: 92.5 kg (204 lb)  Max: 92.5 kg (204 lb)     Flowsheet Rows    Flowsheet Row First Filed Value   Admission Height 162.6 cm (64\") Documented at 07/28/2022 1451   Admission Weight 76.2 kg (168 lb) Documented at 07/28/2022 1451          No intake/output data recorded.  I/O last 3 completed shifts:  In: -   Out: 200 [Urine:200]    Physical Exam:    General Appearance: Alert, oriented, no obvious distress.  Eyes: PER, EOMI.  Neck: Supple no JVD.  Lungs: Clear auscultation, no rales rhonchi's, equal chest movement, nonlabored.  Heart: No gallop, murmur, rub, RRR.  Abdomen: Soft, nontender, positive bowel sounds, no organomegaly.  Extremities: No edema, no cyanosis.  Neuro: No focal deficit, moving all extremities, alert oriented X 3  Tunnel catheter in place.      WBC WBC   Date Value Ref Range Status   08/09/2022 5.83 3.40 - 10.80 10*3/mm3 Final   08/08/2022 5.33 3.40 - 10.80 10*3/mm3 Final      HGB Hemoglobin   Date Value Ref Range Status   08/09/2022 10.0 (L) 12.0 - 15.9 g/dL Final   08/08/2022 9.7 (L) 12.0 - 15.9 g/dL Final      HCT Hematocrit   Date Value Ref Range Status   08/09/2022 30.4 (L) 34.0 - 46.6 % Final   08/08/2022 29.7 (L) 34.0 - 46.6 % Final      Platlets No results found for: LABPLAT   MCV MCV   Date Value Ref Range Status   08/09/2022 87.4 79.0 - 97.0 fL Final   08/08/2022 89.2 79.0 - 97.0 fL Final          Sodium Sodium   Date Value Ref Range " Status   08/11/2022 133 (L) 136 - 145 mmol/L Final   08/09/2022 140 136 - 145 mmol/L Final   08/08/2022 141 136 - 145 mmol/L Final      Potassium Potassium   Date Value Ref Range Status   08/11/2022 3.7 3.5 - 5.2 mmol/L Final   08/09/2022 3.9 3.5 - 5.2 mmol/L Final   08/08/2022 3.9 3.5 - 5.2 mmol/L Final      Chloride Chloride   Date Value Ref Range Status   08/11/2022 97 (L) 98 - 107 mmol/L Final   08/09/2022 106 98 - 107 mmol/L Final   08/08/2022 106 98 - 107 mmol/L Final      CO2 CO2   Date Value Ref Range Status   08/11/2022 26.0 22.0 - 29.0 mmol/L Final   08/09/2022 24.0 22.0 - 29.0 mmol/L Final   08/08/2022 22.0 22.0 - 29.0 mmol/L Final      BUN BUN   Date Value Ref Range Status   08/11/2022 54 (H) 8 - 23 mg/dL Final   08/09/2022 58 (H) 8 - 23 mg/dL Final   08/08/2022 100 (H) 8 - 23 mg/dL Final      Creatinine Creatinine   Date Value Ref Range Status   08/11/2022 2.81 (H) 0.57 - 1.00 mg/dL Final   08/09/2022 2.34 (H) 0.57 - 1.00 mg/dL Final   08/08/2022 3.13 (H) 0.57 - 1.00 mg/dL Final      Calcium Calcium   Date Value Ref Range Status   08/11/2022 8.3 (L) 8.6 - 10.5 mg/dL Final   08/09/2022 8.7 8.6 - 10.5 mg/dL Final   08/08/2022 8.6 8.6 - 10.5 mg/dL Final      PO4 No results found for: CAPO4   Albumin Albumin   Date Value Ref Range Status   08/11/2022 2.50 (L) 3.50 - 5.20 g/dL Final   08/09/2022 2.50 (L) 3.50 - 5.20 g/dL Final   08/08/2022 2.60 (L) 3.50 - 5.20 g/dL Final      Magnesium Magnesium   Date Value Ref Range Status   08/08/2022 2.1 1.6 - 2.4 mg/dL Final      Uric Acid No results found for: URICACID        Results Review:     I reviewed the patient's new clinical results.    apixaban, 2.5 mg, Oral, Q12H  atorvastatin, 20 mg, Oral, Nightly  carvedilol, 3.125 mg, Oral, BID With Meals  castor oil-balsam peru, 1 application, Topical, Q12H  dextromethorphan polistirex ER, 60 mg, Oral, Q12H  epoetin shukri/shukri-epbx, 10,000 Units, Subcutaneous, Once per day on Tue Thu Sat  insulin lispro, 0-7 Units,  Subcutaneous, TID AC  Insulin Lispro, 4 Units, Subcutaneous, TID With Meals  latanoprost, 1 drop, Both Eyes, Nightly  levothyroxine, 112 mcg, Oral, Q AM  pantoprazole, 40 mg, Oral, BID AC  sodium chloride, 10 mL, Intravenous, Q12H  timolol, 1 drop, Both Eyes, BID           Medication Review:     Assessment & Plan       GIB (gastrointestinal bleeding)    Chronic atrial fibrillation (HCC)    CKD (chronic kidney disease), stage IV (HCC)    Hyperlipidemia LDL goal <100    Essential hypertension    Hypothyroidism    Pulmonary embolism (HCC)    CHF (NYHA class IV, ACC/AHA stage D) (HCC)    CAD (coronary artery disease)    Chronic systolic congestive heart failure (HCC)    Type 2 diabetes mellitus without complication, with long-term current use of insulin (HCC)    Normocytic anemia    Acute renal failure (ARF) (HCC)    COVID-19 virus detected    Thrombocytopenia (HCC)    Hypoalbuminemia    AMS (altered mental status)    Lower GI bleed      1- ROBBI on CKD stage IV - Thought to be hemodynamic injury progression of CKD to ESRD. Started on HD on 8/6/22.   2. CKD stage IV -baseline Scr 2.5 range  3- GI bleed   4- Low albumin level   5- Iron def anemia Tsat 11%. S/p iron supplementation. On  BENY on HD days   6- Renal cysts   7- Systolic CHF - Recent Echo showing EF 37% now. Volume status stable.  8. BMD: Check PTH level.     Plan:  Continue with the current hemodialysis Tuesday Thursday Saturday.  BENY on dialysis days  Renal diet with fluid restriction less than 1500 mL/day.  Will need outpatient hemodialysis chair.  Case management working with rehab placement with HD.  Case discussed with the daughter Frida on [phone] in detail regards to patient's future plan.    Albert Sharma MD  08/11/22  09:19 EDT

## 2022-08-11 NOTE — PLAN OF CARE
Goal Outcome Evaluation: Scheduled HD completed. Pt tolerated well. Pt maintain a cough during tx. Primary nurse aware. Changed dressing to antimicrobial. UF: 2.3L removed. BLP:74.5. Called report nae Jarvis RN.

## 2022-08-11 NOTE — PROGRESS NOTES
[facility] Medicine Services  PROGRESS NOTE    Patient Name: Karla Cristobal  : 1935  MRN: [id number]    Date of Admission: 2022  Primary Care Physician: Luis Cardenas MD    Subjective   Subjective     CC:   GI blood loss / ARF    HPI:    Patient returns from dialysis.  Patient alert and orient x3, pleasant, talkative.  Patient denies any signs of bleeding.  No issues reported overnight.      ROS:  Gen- No fevers, chills  CV- No chest pain, palpitations  Resp- No cough, dyspnea  GI- No N/V/D, abd pain      Objective   Objective     Vital Signs:   Temp:  [96.6 °F (35.9 °C)-98.6 °F (37 °C)] 97.9 °F (36.6 °C)  Heart Rate:  [69-92] 77  Resp:  [16-18] 18  BP: (103-152)/(45-69) 119/55     Physical Exam:  Constitutional: Awake, alert, NAD  HENT: NCAT, mucous membranes moist  Respiratory: Clear, diminished in the bases, nonlabored  Cardiovascular: RRR, no murmurs, rubs, or gallop  Gastrointestinal: Positive bowel sounds, soft, nontender, nondistended  Musculoskeletal: No bilateral ankle edema  Psychiatric: Appropriate affect, cooperative  Neurologic: Oriented x 3, strength symmetric in all extremities, Cranial Nerves grossly intact to confrontation, speech clear  Skin: No rashes    Results Reviewed:  LAB RESULTS:      Lab 22  0424 22  1347 22  0717 22  0516 22  0455   WBC 5.83 5.33 4.68 5.17 5.96   HEMOGLOBIN 10.0* 9.7* 9.5* 9.4* 9.3*   HEMATOCRIT 30.4* 29.7* 28.6* 28.0* 27.4*   PLATELETS 212 190 157 150 151   NEUTROS ABS 3.76  --   --   --  3.87   IMMATURE GRANS (ABS) 0.06*  --   --   --  0.04   LYMPHS ABS 0.95  --   --   --  1.07   MONOS ABS 0.74  --   --   --  0.72   EOS ABS 0.26  --   --   --  0.23   MCV 87.4 89.2 88.0 87.5 87.5         Lab 22  0827 22  0424 22  1347 22  0717 22  0516   SODIUM 133* 140 141 138 137   POTASSIUM 3.7 3.9 3.9 4.1 3.8   CHLORIDE 97* 106 106 105 101   CO2 26.0 24.0 22.0 21.0* 22.0   ANION GAP  10.0 10.0 13.0 12.0 14.0   BUN 54* 58* 100* 86* 135*   CREATININE 2.81* 2.34* 3.13* 3.06* 4.07*   EGFR 15.9* 19.8* 14.0* 14.4* 10.2*   GLUCOSE 139* 166* 125* 170* 169*   CALCIUM 8.3* 8.7 8.6 8.5* 8.7   MAGNESIUM  --   --  2.1  --  2.0   PHOSPHORUS 2.9 2.9 4.1 3.9 4.2         Lab 08/11/22  0827 08/09/22  0424 08/08/22  1347 08/07/22  0717 08/06/22  0516   ALBUMIN 2.50* 2.50* 2.60* 2.40* 2.20*                     Brief Urine Lab Results  (Last result in the past 365 days)      Color   Clarity   Blood   Leuk Est   Nitrite   Protein   CREAT   Urine HCG        07/30/22 1742 Yellow   Clear   Small (1+)   Trace   Negative   Trace                 Microbiology Results Abnormal     Procedure Component Value - Date/Time    Eosinophil Smear - Urine, Urine, Clean Catch [[id number]]  (Normal) Collected: 07/30/22 1742    Lab Status: Final result Specimen: Urine, Clean Catch Updated: 07/30/22 1938     Eosinophil Smear 0 % EOS/100 Cells     Narrative:      No eosinophil seen          No radiology results from the last 24 hrs    Results for orders placed during the hospital encounter of 07/28/22    Adult Transthoracic Echo Complete W/ Cont if Necessary Per Protocol    Interpretation Summary  · Mild biatrial enlargement.  · Moderately reduced right ventricular systolic function noted.  · Moderate left ventricular systolic dysfunction, estimated EF 37%.  · Left ventricular wall thickness is consistent with mild concentric hypertrophy.  · Lambl's excrescences of the aortic valve are noted. There is trace aortic insufficiency, no evidence of aortic stenosis.  · Moderate mitral regurgitation.  · Moderate to severe tricuspid regurgitation with RVSP 48 mmHg.  · Moderate pulmonic insufficiency.      I have reviewed the medications:  Scheduled Meds:apixaban, 2.5 mg, Oral, Q12H  atorvastatin, 20 mg, Oral, Nightly  carvedilol, 3.125 mg, Oral, BID With Meals  castor oil-balsam peru, 1 application, Topical, Q12H  dextromethorphan polistirex ER, 60  mg, Oral, Q12H  epoetin shukri/shukri-epbx, 10,000 Units, Subcutaneous, Once per day on Tue Thu Sat  insulin lispro, 0-7 Units, Subcutaneous, TID AC  Insulin Lispro, 4 Units, Subcutaneous, TID With Meals  latanoprost, 1 drop, Both Eyes, Nightly  levothyroxine, 112 mcg, Oral, Q AM  pantoprazole, 40 mg, Oral, BID AC  sodium chloride, 10 mL, Intravenous, Q12H  timolol, 1 drop, Both Eyes, BID      Continuous Infusions:   PRN Meds:.•  acetaminophen **OR** acetaminophen **OR** acetaminophen  •  albumin human  •  benzonatate  •  dextrose  •  dextrose  •  glucagon (human recombinant)  •  melatonin  •  ondansetron  •  sodium chloride    Assessment & Plan   Assessment & Plan     Active Hospital Problems    Diagnosis  POA   • **GIB (gastrointestinal bleeding) [K92.2]  Yes   • Normocytic anemia [D64.9]  Unknown   • Acute renal failure (ARF) (HCC) [N17.9]  Unknown   • COVID-19 virus detected [U07.1]  Unknown   • Thrombocytopenia (HCC) [D69.6]  Unknown   • Hypoalbuminemia [E88.09]  Unknown   • AMS (altered mental status) [R41.82]  Unknown   • Lower GI bleed [K92.2]  Yes   • Type 2 diabetes mellitus without complication, with long-term current use of insulin (HCC) [E11.9, Z79.4]  Not Applicable   • Chronic systolic congestive heart failure (HCC) [I50.22]  Yes   • CAD (coronary artery disease) [I25.10]  Yes   • Chronic atrial fibrillation (HCC) [I48.20]  Yes   • CKD (chronic kidney disease), stage IV (HCC) [N18.4]  Yes   • Hyperlipidemia LDL goal <100 [E78.5]  Yes   • Essential hypertension [I10]  Yes   • Hypothyroidism [E03.9]  Yes   • Pulmonary embolism (HCC) [I26.99]  Yes   • CHF (NYHA class IV, ACC/AHA stage D) (HCC) [I50.84]  Yes      Resolved Hospital Problems   No resolved problems to display.     Brief Hospital Course to date:  Karla Cristobal is a 87 y.o. female with anemia, atrial fibrillation on Eliquis, CKD stage IV, HLD, hypertension, hypothyroidism, CAD s/p stents, diabetes type 2, and systolic heart failure (2L NC at  night) who presented to the ED on 7/28/2022 for rectal bleeding. 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  3 endoclips were applied for hemostasis.  H/H has been stable since.  She was incidentally found to be COVID positive and had acute kidney injury for which nephrology has been following. She had temporary dialysis catheter placed on 8/6/2022 and was started on hemodialysis.      This patient's problems and plans were partially entered by my partner and updated as appropriate by me 08/11/22.    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  3 endoclips were applied for hemostasis  -s/p IV Iron  -Eliquis resumed 8/7, missed am doses on 8/8 and 8/9  -has only received 5 out of the last 7 doses  -on low dose apixaban 2.5 mg every 12 hours for atrial fibrillation     ROBBI on CKD IV  -Nephrology consulted  -Started on Hemodialysis this admission on 8/6  -working on outpatient dialysis chair (New)  -has access in Right Upper Chest     N/V  -Zofran as needed     COVID-19 positive  -Asymptomatic    -Out of Isolation on 8/8     Mixed systolic and diastolic CHF  Moderate to severe MR  Persistent atrial fibrillation   -Seen by cardiology and started on carvedilol but held a few times due to low normal BP   -apixaban restarted on 8/7 but has missed a few doses on 8/8 and 8/9     Expected Discharge Location and Transportation: home with new Dialysis Chair appt  Expected Discharge Date: 8/13    DVT prophylaxis:  Medical DVT prophylaxis orders are present.     AM-PAC 6 Clicks Score (PT): 10 (08/10/22 2895)    CODE STATUS:   Code Status and Medical Interventions:   Ordered at: 07/28/22 9997     Level Of Support Discussed With:    Patient     Code Status (Patient has no  pulse and is not breathing):    CPR (Attempt to Resuscitate)     Medical Interventions (Patient has pulse or is breathing):    Full Support       Peg Cornejo, NESS  08/11/22

## 2022-08-11 NOTE — DISCHARGE PLACEMENT REQUEST
"  Please see referral for short term rehab   She will also be going to [facility] [place] for HD on MWF     Thank you   Neris Rodarte RN/CM -[phone]          David Mead (87 y.o. Female)             Date of Birth   1935    Social Security Number       Address   [address] [facility] [address]    Home Phone   [phone]    MRN   [id number]       Evangelical   Hinduism    Marital Status                               Admission Date   7/28/22    Admission Type   Emergency    Admitting Provider   Prashanth Maravilla MD    Attending Provider   Prashanth Maravilla MD    Department, Room/Bed   [facility] 6A, N619/1       Discharge Date       Discharge Disposition       Discharge Destination                               Attending Provider: Prashanth Maravilla MD    Allergies: Bactrim [Sulfamethoxazole-trimethoprim], Lisinopril, Rocephin [Ceftriaxone], Codeine    Isolation: None   Infection: COVID (History) (08/08/22)   Code Status: CPR   Advance Care Planning Activity    Ht: 162.6 cm (64.02\")   Wt: 92.5 kg (204 lb)    Admission Cmt: None   Principal Problem: GIB (gastrointestinal bleeding) [K92.2]                 Active Insurance as of 7/28/2022     Primary Coverage     Payor Plan Insurance Group Employer/Plan Group    MEDICARE MEDICARE A & B      Payor Plan Address Payor Plan Phone Number Payor Plan Fax Number Effective Dates    [address] [phone]  7/1/2001 - None Entered    [facility] [id number]       Subscriber Name Subscriber Birth Date Member ID       DAVID MEAD 1935 [id number]           Secondary Coverage     Payor Plan Insurance Group Employer/Plan Group    [employer] [employer] [facility]      Payor Plan Address Payor Plan Phone Number Payor Plan Fax Number Effective Dates    [address] [phone]  7/1/2001 - None Entered    [place] [address]       Subscriber Name Subscriber Birth Date Member ID       DAVID MEAD 1935 [id number]     " "      Tertiary Coverage     Payor Plan Insurance Group Employer/Plan Group    [place]      Payor Plan Address Payor Plan Phone Number Payor Plan Fax Number Effective Dates    [address] [phone]  2020 - None Entered    [place] [address]       Subscriber Name Subscriber Birth Date Member ID       KARLA MEAD 1935 [id number]                 Emergency Contacts      (Rel.) Home Phone Work Phone Mobile Phone    Frida Vazquez (Daughter) [phone] -- --               History & Physical      Kellie Lee DO at 22 [phone]              [facility] Medicine Services  HISTORY AND PHYSICAL    Patient Name: Karla Mead  : 1935  MRN: [id number]  Primary Care Physician: Giselle Guzman DO  Date of admission: 2022    Subjective   Subjective     Chief Complaint:  GI bleed    HPI:  Karla Mead is a 87 y.o. female with past medical history of anemia, A. fib on Eliquis, CKD stage IV, HLD, hypertension, hypothyroid, CAD s/p stents, diabetes type 2, and systolic heart failure (2L NC at night) who presents to the ED for rectal bleeding. This morning, patient had a bloody BM with streaks of bright red blood. Later, she had another BM with dark red and bright red clots. Patient has been staying with daughter this past week because she has been feeling bad. Denies sick contacts. Last colonoscopy was  where she had polyps and a \"spot in her stomach that was bleeding\". Denies external hemorrhoids.     Of note, patient follows with cardiology at [facility] outpatient where they had started patient on daily lasix for pulmonary edema and cough. PCP has held Lasix for past 2 weeks due to dizziness, dehydration and worsening kidney function. Denies fever, decreased appetite, chest pain, abdominal pain, nausea, vomiting, diarrhea, dysuria. Will admit to hospital medicine for further management.       Review of Systems   Constitutional: " Negative for appetite change and fever.   HENT: Negative for congestion and trouble swallowing.    Eyes: Negative.    Respiratory: Positive for shortness of breath. Negative for cough.    Cardiovascular: Negative for chest pain and leg swelling.   Gastrointestinal: Positive for blood in stool. Negative for abdominal pain, diarrhea and nausea.   Endocrine: Negative.    Genitourinary: Negative for difficulty urinating and dysuria.   Musculoskeletal: Negative.    Skin: Negative.    Neurological: Positive for dizziness. Negative for headaches.   Psychiatric/Behavioral: Positive for confusion. Negative for agitation.        All other systems reviewed and are negative.     Personal History     Past Medical History:   Diagnosis Date   • Acute bronchitis    • Arthritis    • Atrial fibrillation (HCC)    • CAD (coronary artery disease)     s/p MI 2005; 3 stents inserted    • Change in mole    • Change in mole    • Change in nevus 6/16/2016   • Chronic kidney disease     stage IV-sees Dr Bk Mckeon every 3-4 months    • Chronic renal disease, stage 4, severely decreased glomerular filtration rate between 15-29 mL/min/1.73 square meter (HCC)    • Chronic systolic heart failure (HCC)    • Congestive heart disease (HCC)    • Conjunctivitis    • Deep vein thrombosis of lower extremity (HCC)    • Diabetes mellitus (HCC)    • Diabetes mellitus (HCC) 6/16/2016    Impression: 03/15/2016 - blood sugar 166 and hgn a1c 7.3%  is up to date with eye exam  neuropathy with callus, no ulcer  some scratches feet  ur alb due and ordered  no change other than provided samples of toujeo because she feels like lantus worked much better than levemir, she has tried titrating levemir and it is less effective  continue testing and f/u 3-4 months  Impression: 11/23/2015 - bl   • Diabetic foot ulcer (HCC) 6/16/2016   • Dog bite of hand    • Easy bruising    • Endometrial cancer (HCC)     partial hysterectomy; radiation as well    • Foot pain    •  Foot pain 6/16/2016    Description: lancinating- worse but not consistent enough to want rx   • Fracture of hip (HCC)    • Generalized ischemic myocardial dysfunction 6/16/2016   • Glaucoma     drops daily    • Hyperlipidemia    • Hypertension    • Hypothyroidism    • Insomnia    • Ischemic heart disease    • Macular degeneration    • Methicillin resistant Staphylococcus aureus infection 6/16/2016   • Myocardial infarction (HCC) 2005   • Nausea with vomiting    • Pericardial effusion    • Shortness of breath 6/16/2016    Impression: 03/24/2015 - chronic. On 2 l oxygen at night. check cbc, bnp;    • Skin cancer, basal cell     nose, leg    • Skin lesion 6/16/2016    Impression: 03/24/2015 - refer derm for eval- seb kerat ant tib and ?ak medially with redness and irritation;    • Sleep apnea     oxygen at night due to low sats but no cpap   • Type 2 diabetes mellitus ([facility])    • UTI (urinary tract infection) 06/12/2020    currently taking antibiotics-patient's daughter notified Dr Beal's office and office said it should not delay generator change          Oncology Problem List:  Malignant tumor of ascending colon (HCC) (03/19/2021; Status: Active)  Malignant neoplasm of endometrium (HCC) (06/16/2016; Status: Active)  Squamous cell carcinoma of skin (06/16/2016; Status: Active)       Past Surgical History:   Procedure Laterality Date   • CARDIAC CATHETERIZATION     • CARDIAC DEFIBRILLATOR PLACEMENT     • CARDIAC ELECTROPHYSIOLOGY PROCEDURE N/A 7/17/2020    Procedure: ICD BIV  generator change- [facility]- 3-6 weeks;  Surgeon: Tano Beal MD;  Location: [facility] INVASIVE LOCATION;  Service: Cardiology;  Laterality: N/A;   • CHOLECYSTECTOMY     • CORONARY ARTERY BYPASS GRAFT  2000   • CORONARY STENT PLACEMENT      3 stents    • ENDOSCOPY N/A 11/17/2021    Procedure: ESOPHAGOGASTRODUODENOSCOPY;  Surgeon: Brunner, Mark I, MD;  Location: [facility] ENDOSCOPY;  Service: Gastroenterology;  Laterality: N/A;   • EYE SURGERY  Bilateral     cataract extraction    • HIP SURGERY Left     x3   • HYSTERECTOMY      partial    • KNEE ARTHROPLASTY Bilateral    • PACEMAKER IMPLANTATION     • TONSILLECTOMY         Family History:  family history includes Breast cancer in her mother and another family member; Cancer in her father; Diabetes in an other family member; Esophageal cancer in an other family member; Hypertension in an other family member; Transient ischemic attack in an other family member. Otherwise pertinent FHx was reviewed and unremarkable.     Social History:  reports that she has never smoked. She has never used smokeless tobacco. She reports that she does not drink alcohol and does not use drugs.  Social History     Social History Narrative   • Not on file       Medications:  BASAGLAR KWIKPEN, Cranberry, Insulin Pen Needle, Insulin Syringe-Needle U-100, apixaban, atorvastatin, carvedilol, cholecalciferol, ciprofloxacin, eszopiclone, furosemide, indapamide, insulin aspart, isosorbide mononitrate, latanoprost, levothyroxine, melatonin, multivitamins-minerals, mupirocin, omeprazole, ondansetron, saccharomyces boulardii, spironolactone, and timolol    Allergies   Allergen Reactions   • Bactrim [Sulfamethoxazole-Trimethoprim] GI Intolerance   • Lisinopril Cough   • Rocephin [Ceftriaxone] Itching   • Codeine Rash       Objective   Objective     Vital Signs:   Temp:  [98.2 °F (36.8 °C)] 98.2 °F (36.8 °C)  Heart Rate:  [70-74] 71  Resp:  [16] 16  BP: (112-128)/(55-77) 122/63    Physical Exam  Vitals reviewed.   Constitutional:       General: She is not in acute distress.     Appearance: She is normal weight.   HENT:      Head: Normocephalic.      Nose: Nose normal. No congestion.      Mouth/Throat:      Mouth: Mucous membranes are moist.   Eyes:      Extraocular Movements: Extraocular movements intact.      Pupils: Pupils are equal, round, and reactive to light.   Cardiovascular:      Rate and Rhythm: Normal rate and regular rhythm.       Pulses: Normal pulses.      Heart sounds: Normal heart sounds. No murmur heard.  Pulmonary:      Effort: Pulmonary effort is normal. No respiratory distress.      Breath sounds: Decreased breath sounds present.   Abdominal:      General: Abdomen is flat. Bowel sounds are normal. There is no distension.      Palpations: Abdomen is soft.      Tenderness: There is no abdominal tenderness.   Musculoskeletal:      Cervical back: Normal range of motion. No rigidity.      Right lower le+ Edema present.      Left lower le+ Edema present.   Skin:     General: Skin is warm and dry.      Capillary Refill: Capillary refill takes less than 2 seconds.   Neurological:      General: No focal deficit present.      Mental Status: She is alert. Mental status is at baseline.      Motor: Weakness present.   Psychiatric:         Mood and Affect: Mood normal.         Behavior: Behavior normal.         Thought Content: Thought content normal.          Results Reviewed:  I have personally reviewed most recent indicated data and agree with findings including:  [x]  Laboratory  [x]  Radiology  [x]  EKG/Telemetry  []  Pathology  []  Cardiac/Vascular Studies  [x]  Old records  []  Other:  Most pertinent findings include:      LAB RESULTS:      Lab 22  0111 22  2148 22  1523   WBC  --   --   --  5.42   HEMOGLOBIN 10.4* 11.0*  --  11.1*   HEMATOCRIT 31.1* 32.5*  --  33.0*   PLATELETS  --   --   --  90*   NEUTROS ABS  --   --   --  2.94   IMMATURE GRANS (ABS)  --   --   --  0.02   LYMPHS ABS  --   --   --  1.67   MONOS ABS  --   --   --  0.65   EOS ABS  --   --   --  0.12   MCV  --   --   --  87.8   LACTATE  --   --  1.8 2.7*   PROTIME  --  24.1*  --   --          Lab 22   SODIUM 139   POTASSIUM 4.2   CHLORIDE 99   CO2 22.0   ANION GAP 18.0*   *   CREATININE 4.47*   EGFR 9.1*   GLUCOSE 113*   CALCIUM 8.4*         Lab 22   TOTAL PROTEIN 6.6   ALBUMIN 2.90*   GLOBULIN 3.7   ALT  (SGPT) 15   AST (SGOT) 27   BILIRUBIN 0.9   ALK PHOS 176*         Lab 07/28/22 2148   PROTIME 24.1*   INR 2.15*             Lab 07/28/22  1600   ABO TYPING B   RH TYPING Positive   ANTIBODY SCREEN Negative         Brief Urine Lab Results  (Last result in the past 365 days)      Color   Clarity   Blood   Leuk Est   Nitrite   Protein   CREAT   Urine HCG        06/20/22 1507 Yellow   Clear   Negative   Negative   Negative   Negative               Microbiology Results (last 10 days)     Procedure Component Value - Date/Time    COVID PRE-OP / PRE-PROCEDURE SCREENING ORDER (NO ISOLATION) - Swab, Nasopharynx [[id number]]  (Abnormal) Collected: 07/28/22 2148    Lab Status: Final result Specimen: Swab from Nasopharynx Updated: 07/28/22 2257    Narrative:      The following orders were created for panel order COVID PRE-OP / PRE-PROCEDURE SCREENING ORDER (NO ISOLATION) - Swab, Nasopharynx.  Procedure                               Abnormality         Status                     ---------                               -----------         ------                     COVID-19 and FLU A/B PCR...[[id number]]  Abnormal            Final result                 Please view results for these tests on the individual orders.    COVID-19 and FLU A/B PCR - Swab, Nasopharynx [[id number]]  (Abnormal) Collected: 07/28/22 2148    Lab Status: Final result Specimen: Swab from Nasopharynx Updated: 07/28/22 2257     COVID19 Detected     Influenza A PCR Not Detected     Influenza B PCR Not Detected    Narrative:      Fact sheet for providers: https://www.fda.gov/media/075404/download    Fact sheet for patients: https://www.fda.gov/media/613371/download    Test performed by PCR.  Influenza A and Influenza B negative results should be considered presumptive in samples that have a positive SARS-CoV-2 result.    Competitive inhibition studies showed that SARS-CoV-2 virus, when present at concentrations above 3.6E+04 copies/mL, can inhibit the detection and  amplification of influenza A and influenza B virus RNA if present at or below 1.8E+02 copies/mL or 4.9E+02 copies/mL, respectively, and may lead to false negative influenza virus results. If co-infection with influenza A or influenza B virus is suspected in samples with a positive SARS-CoV-2 result, the sample should be re-tested with another FDA cleared, approved, or authorized influenza test, if influenza virus detection would change clinical management.          CT Abdomen Pelvis Without Contrast    Result Date: 7/28/2022  EXAM: CT ABDOMEN PELVIS WO CONTRAST-  DATE OF EXAM: 7/28/2022 8:59 PM  INDICATION: Lower GI bleed, nausea, chronic anticoagulation.   COMPARISON: PET/CT dated 7/7/2009  TECHNIQUE: Contiguous axial CT images were obtained from the lung bases to the pubic symphysis without contrast. Sagittal and coronal reconstructions were performed.  Automated exposure control and iterative reconstruction methods were used.  FINDINGS: The lack of intravenous contrast limits the evaluation of visceral and vascular structures.  The heart is enlarged. There is partial visualization of ICD leads. The lung bases appear clear.  The liver is normal in size. There are multiple low-density lesions within the liver likely representing small cysts. The gallbladder is surgically absent. There is chronic dilation of the extra hepatic bile duct which can be seen with physiologic changes after cholecystectomy. Correlate clinically for abnormal liver function tests or elevated bilirubin.  The spleen is normal in size. There is a right adrenal nodule measuring 3.2 cm which is similar in size dating back to 2009. The long-term stability suggests a benign or indolent process however the Hounsfield unit density is above the diagnostic cut off for a lipid rich adenoma. The left adrenal gland is unremarkable. The pancreas appears stable from prior.  The kidneys are symmetric in size with bilateral renal cortical thinning. There are  multiple partially exophytic lesions with intermediate density likely representing hemorrhagic or proteinaceous cysts. Solid neoplasm is felt to be less likely but not excluded. The largest lesion measures 2.0 cm arising from the posterior aspect of the right kidney with Hounsfield unit density of 48. There is no hydronephrosis or hydroureter. The urinary bladder is fluid-filled without wall thickening. The uterus is surgically absent.  There is a small sliding-type hiatal hernia. The stomach and duodenum are normal in caliber and configuration. There are no abnormally dilated loops of small bowel to suggest small bowel obstruction or small bowel inflammation. There is extensive sigmoid diverticulosis without evidence of acute diverticulitis. There is no free fluid or free air.  The aorta is normal in caliber without evidence of aneurysm formation. There is dense atherosclerotic calcification involving the aorta and branch vasculature. There is no mesenteric, retroperitoneal, or pelvic lymphadenopathy by size criteria. There is a left total hip prosthesis. There are degenerative changes of the lumbar spine.      Impression: 1. Extensive sigmoid diverticulosis without evidence of acute diverticulitis. 2. Multiple partially exophytic intermediate and hyperdense renal lesions, favored to represent hemorrhagic or proteinaceous cysts. Largest measures 2.0 cm arising from the posterior inferior aspect of the right kidney. Initial evaluation with renal ultrasound could confirm their cystic nature with MRI being most definitive. 3. Right adrenal nodule, similar in size dating back to 2009 compatible with a benign etiology.   This report was finalized on 7/28/2022 9:17 PM by Jim Flowers MD.        Results for orders placed during the hospital encounter of 11/15/21    Adult Transthoracic Echo Complete W/ Cont if Necessary Per Protocol    Interpretation Summary  · Left ventricular ejection fraction appears to be 51 - 55%.  Left ventricular systolic function is low normal.  · Left ventricular diastolic function is consistent with (grade III w/high LAP) restrictive pattern.  · The right ventricular cavity is moderate to severely dilated.  · The right atrial cavity is mildly dilated.  · Moderate tricuspid valve regurgitation is present.  · Mild to moderate mitral regurgitation  · Estimated right ventricular systolic pressure from tricuspid regurgitation is mildly elevated (35-45 mmHg).      Assessment & Plan   Assessment & Plan       GIB (gastrointestinal bleeding)    Chronic atrial fibrillation (HCC)    CKD (chronic kidney disease), stage IV (HCC)    Hyperlipidemia LDL goal <100    Essential hypertension    Hypothyroidism    Pulmonary embolism (HCC)    CHF (NYHA class IV, ACC/AHA stage D) (HCC)    CAD (coronary artery disease)    Chronic systolic congestive heart failure (HCC)    Type 2 diabetes mellitus without complication, with long-term current use of insulin (HCC)    Normocytic anemia    Acute renal failure (ARF) (HCC)    COVID-19 virus detected    Thrombocytopenia (HCC)    Hypoalbuminemia    AMS (altered mental status)      Karla Cristobal is a 87 y.o. female with past medical history of anemia, A. fib on Eliquis, CKD stage IV, HLD, hypertension, hypothyroid, CAD s/p stents, diabetes type 2, and systolic heart failure (2L NC at night) who presents to the ED for rectal bleeding. Last colonoscopy was in 2021.    GI Bleed  Normocytic anemia  -CT shows extensive sigmoid diverticulosis  -Trend H/H Q4 hr  -Transfuse for hemoglobin less than 7  -Hemoglobin decreased from recent studies  -Anemia studies  -Fecal occult positive  -Protonix IV BID  -500ml fluid given in ED  -NPO at midnight  -Consult GI    Acute renal failure on CKD stage 4  Renal Lesions  Altered mental status  -Creatinine 4.47, baseline 2.5-2.7  -, likely causing confusion  -Avoid nephrotoxic agents  -Strict I/Os  -IVFs overnight  -Consult nephrology for a.m.  -Renal  ultrasound  -Follow acute urinary retention protocol  -Urine studies    COVID-19  -Positive 7/28  -Asymptomatic, may qualify for 3 day course of remdesivir   -Not candidate for Paxlovid due to decreased GFR  -Monitor closely from hypoxia    Elevated Lactate, Resolved  -2.7 on admission  -500ml fluid given in ED  -Repeat 1.8    Afib  -Hold Eliquis in setting of GI bleed  -INR 2.15    DM type 2  -HgbA1C in 5/2022 is 7.6  -SSI while inpatient  -ACHS finger sticks    Systolic heart failure  HTN  -Echo 11/2021 shows EF 51-55%  -Wears 2L at night  -Continue home imdur  -Hold lasix and coreg, aldactone in setting of GI bleed  -Monitor volume status closely while giving IVF's for ARF  - Strict intake and output, daily weight    CAD s/p stents  HLD  -Continue home statin    Hypoalbuminemia   -Albumin 2.9  -Consult nutrition    Thrombocytopenia  - Present on prior labs  - A.m. labs make  - May need hematology referral outpatient    Hypothyroid  -Continue home synthroid    DVT prophylaxis:  SCDs    CODE STATUS:  FULL  Level Of Support Discussed With: Patient  Code Status (Patient has no pulse and is not breathing): CPR (Attempt to Resuscitate)  Medical Interventions (Patient has pulse or is breathing): Full Support      This note has been completed as part of a split-shared workflow.     Signature: Electronically signed by NESS Dominique, 07/28/22, 11:36 PM EDT.      Attending   Admission Attestation       I have seen and examined the patient, performing an independent face-to-face diagnostic evaluation with plan of care reviewed and developed with the advanced practice clinician (APC).      Brief Summary Statement:   Karla Cristobal is a 87 y.o. female With a PMH significant for diabetes mellitus type 2, CAD, CKD stage IV following with nephrology outpatient, atrial fibrillation on Eliquis, systolic CHF s/p pacemaker and defibrillator, history of DVT who comes to the ED due to rectal bleeding.  Patient is exhibiting  confusion, does not really remember the events of the day.  Her daughter at bedside assists with HPI.  Daughter says that patient has had increased confusion for over a month.  Today patient had a bowel movements containing large amount of bright red blood.  Shortly thereafter she had another bowel movement with large dark red blood clots.  Her daughter was concerned and brought her to the ED.  Patient complained of a headache earlier today which resolved after taking Tylenol.  She denies abdominal pain, cough, fever, vomiting, diarrhea, change in taste or smell.  She has had decreased appetite, fatigue and shortness of breath which are chronic for her.  Patient follows with nephrology outpatient, was noted to have worsening serum creatinine and Nataliia.  Accordingly multiple adjustments were made to her home medication list.    Remainder of detailed HPI is as noted by APC and has been reviewed and/or edited by me for completeness.    Attending Physical Exam:  Constitutional: Awake, alert  Eyes: PERRLA, sclerae anicteric, no conjunctival injection  HENT: NCAT, mucous membranes moist  Neck: Supple, no thyromegaly, no lymphadenopathy, trachea midline  Respiratory: Clear to auscultation bilaterally, nonlabored respirations   Cardiovascular: RRR, no murmurs, rubs, or gallops, palpable pedal pulses bilaterally  Gastrointestinal: Positive bowel sounds, soft, nontender, nondistended  Musculoskeletal: No bilateral ankle edema, no clubbing or cyanosis to extremities  Psychiatric: Appropriate affect, cooperative  Neurologic: Oriented to person, hospital and year.  Not oriented to month. Strength symmetric in all extremities, Cranial Nerves grossly intact to confrontation, speech clear  Skin: No rashes      Brief Assessment/Plan :  See detailed assessment and plan developed with APC which I have reviewed and/or edited for completeness.      Kellie Lee DO  07/29/22                          Electronically signed by Rosa  Kellie LONG DO at 22 0208          Physician Progress Notes (most recent note)      Peg Cornejo APRN at 22 1405              [facility] Medicine Services  PROGRESS NOTE    Patient Name: Karla Cristobal  : 1935  MRN: [id number]    Date of Admission: 2022  Primary Care Physician: Luis Cardenas MD    Subjective   Subjective     CC:   GI blood loss / ARF    HPI:    Patient returns from dialysis.  Patient alert and orient x3, pleasant, talkative.  Patient denies any signs of bleeding.  No issues reported overnight.      ROS:  Gen- No fevers, chills  CV- No chest pain, palpitations  Resp- No cough, dyspnea  GI- No N/V/D, abd pain      Objective   Objective     Vital Signs:   Temp:  [96.6 °F (35.9 °C)-98.6 °F (37 °C)] 97.9 °F (36.6 °C)  Heart Rate:  [69-92] 77  Resp:  [16-18] 18  BP: (103-152)/(45-69) 119/55     Physical Exam:  Constitutional: Awake, alert, NAD  HENT: NCAT, mucous membranes moist  Respiratory: Clear, diminished in the bases, nonlabored  Cardiovascular: RRR, no murmurs, rubs, or gallop  Gastrointestinal: Positive bowel sounds, soft, nontender, nondistended  Musculoskeletal: No bilateral ankle edema  Psychiatric: Appropriate affect, cooperative  Neurologic: Oriented x 3, strength symmetric in all extremities, Cranial Nerves grossly intact to confrontation, speech clear  Skin: No rashes    Results Reviewed:  LAB RESULTS:      Lab 22  0424 22  1347 22  0717 22  0516 22  0455   WBC 5.83 5.33 4.68 5.17 5.96   HEMOGLOBIN 10.0* 9.7* 9.5* 9.4* 9.3*   HEMATOCRIT 30.4* 29.7* 28.6* 28.0* 27.4*   PLATELETS 212 190 157 150 151   NEUTROS ABS 3.76  --   --   --  3.87   IMMATURE GRANS (ABS) 0.06*  --   --   --  0.04   LYMPHS ABS 0.95  --   --   --  1.07   MONOS ABS 0.74  --   --   --  0.72   EOS ABS 0.26  --   --   --  0.23   MCV 87.4 89.2 88.0 87.5 87.5         Lab 22  0827 22  0424 22  1347 22  0717 22  0516    SODIUM 133* 140 141 138 137   POTASSIUM 3.7 3.9 3.9 4.1 3.8   CHLORIDE 97* 106 106 105 101   CO2 26.0 24.0 22.0 21.0* 22.0   ANION GAP 10.0 10.0 13.0 12.0 14.0   BUN 54* 58* 100* 86* 135*   CREATININE 2.81* 2.34* 3.13* 3.06* 4.07*   EGFR 15.9* 19.8* 14.0* 14.4* 10.2*   GLUCOSE 139* 166* 125* 170* 169*   CALCIUM 8.3* 8.7 8.6 8.5* 8.7   MAGNESIUM  --   --  2.1  --  2.0   PHOSPHORUS 2.9 2.9 4.1 3.9 4.2         Lab 08/11/22  0827 08/09/22  0424 08/08/22  1347 08/07/22  0717 08/06/22  0516   ALBUMIN 2.50* 2.50* 2.60* 2.40* 2.20*                     Brief Urine Lab Results  (Last result in the past 365 days)      Color   Clarity   Blood   Leuk Est   Nitrite   Protein   CREAT   Urine HCG        07/30/22 1742 Yellow   Clear   Small (1+)   Trace   Negative   Trace                 Microbiology Results Abnormal     Procedure Component Value - Date/Time    Eosinophil Smear - Urine, Urine, Clean Catch [[id number]]  (Normal) Collected: 07/30/22 1742    Lab Status: Final result Specimen: Urine, Clean Catch Updated: 07/30/22 1938     Eosinophil Smear 0 % EOS/100 Cells     Narrative:      No eosinophil seen          No radiology results from the last 24 hrs    Results for orders placed during the hospital encounter of 07/28/22    Adult Transthoracic Echo Complete W/ Cont if Necessary Per Protocol    Interpretation Summary  · Mild biatrial enlargement.  · Moderately reduced right ventricular systolic function noted.  · Moderate left ventricular systolic dysfunction, estimated EF 37%.  · Left ventricular wall thickness is consistent with mild concentric hypertrophy.  · Lambl's excrescences of the aortic valve are noted. There is trace aortic insufficiency, no evidence of aortic stenosis.  · Moderate mitral regurgitation.  · Moderate to severe tricuspid regurgitation with RVSP 48 mmHg.  · Moderate pulmonic insufficiency.      I have reviewed the medications:  Scheduled Meds:apixaban, 2.5 mg, Oral, Q12H  atorvastatin, 20 mg, Oral,  Nightly  carvedilol, 3.125 mg, Oral, BID With Meals  castor oil-balsam peru, 1 application, Topical, Q12H  dextromethorphan polistirex ER, 60 mg, Oral, Q12H  epoetin shukri/shukri-epbx, 10,000 Units, Subcutaneous, Once per day on Tue Thu Sat  insulin lispro, 0-7 Units, Subcutaneous, TID AC  Insulin Lispro, 4 Units, Subcutaneous, TID With Meals  latanoprost, 1 drop, Both Eyes, Nightly  levothyroxine, 112 mcg, Oral, Q AM  pantoprazole, 40 mg, Oral, BID AC  sodium chloride, 10 mL, Intravenous, Q12H  timolol, 1 drop, Both Eyes, BID      Continuous Infusions:   PRN Meds:.•  acetaminophen **OR** acetaminophen **OR** acetaminophen  •  albumin human  •  benzonatate  •  dextrose  •  dextrose  •  glucagon (human recombinant)  •  melatonin  •  ondansetron  •  sodium chloride    Assessment & Plan   Assessment & Plan     Active Hospital Problems    Diagnosis  POA   • **GIB (gastrointestinal bleeding) [K92.2]  Yes   • Normocytic anemia [D64.9]  Unknown   • Acute renal failure (ARF) (HCC) [N17.9]  Unknown   • COVID-19 virus detected [U07.1]  Unknown   • Thrombocytopenia (HCC) [D69.6]  Unknown   • Hypoalbuminemia [E88.09]  Unknown   • AMS (altered mental status) [R41.82]  Unknown   • Lower GI bleed [K92.2]  Yes   • Type 2 diabetes mellitus without complication, with long-term current use of insulin (HCC) [E11.9, Z79.4]  Not Applicable   • Chronic systolic congestive heart failure (HCC) [I50.22]  Yes   • CAD (coronary artery disease) [I25.10]  Yes   • Chronic atrial fibrillation (HCC) [I48.20]  Yes   • CKD (chronic kidney disease), stage IV (HCC) [N18.4]  Yes   • Hyperlipidemia LDL goal <100 [E78.5]  Yes   • Essential hypertension [I10]  Yes   • Hypothyroidism [E03.9]  Yes   • Pulmonary embolism (HCC) [I26.99]  Yes   • CHF (NYHA class IV, ACC/AHA stage D) (HCC) [I50.84]  Yes      Resolved Hospital Problems   No resolved problems to display.     Brief Hospital Course to date:  Karla Cristobal is a 87 y.o. female with anemia, atrial  fibrillation on Eliquis, CKD stage IV, HLD, hypertension, hypothyroidism, CAD s/p stents, diabetes type 2, and systolic heart failure (2L NC at night) who presented to the ED on 7/28/2022 for rectal bleeding. 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  3 endoclips were applied for hemostasis.  H/H has been stable since.  She was incidentally found to be COVID positive and had acute kidney injury for which nephrology has been following. She had temporary dialysis catheter placed on 8/6/2022 and was started on hemodialysis.      This patient's problems and plans were partially entered by my partner and updated as appropriate by me 08/11/22.    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  3 endoclips were applied for hemostasis  -s/p IV Iron  -Eliquis resumed 8/7, missed am doses on 8/8 and 8/9  -has only received 5 out of the last 7 doses  -on low dose apixaban 2.5 mg every 12 hours for atrial fibrillation     ROBBI on CKD IV  -Nephrology consulted  -Started on Hemodialysis this admission on 8/6  -working on outpatient dialysis chair (New)  -has access in Right Upper Chest     N/V  -Zofran as needed     COVID-19 positive  -Asymptomatic    -Out of Isolation on 8/8     Mixed systolic and diastolic CHF  Moderate to severe MR  Persistent atrial fibrillation   -Seen by cardiology and started on carvedilol but held a few times due to low normal BP   -apixaban restarted on 8/7 but has missed a few doses on 8/8 and 8/9     Expected Discharge Location and Transportation: home with new Dialysis Chair appt  Expected Discharge Date: 8/13    DVT prophylaxis:  Medical DVT prophylaxis orders are present.     AM-PAC 6 Clicks Score (PT): 10 (08/10/22 0288)    CODE STATUS:   Code  Status and Medical Interventions:   Ordered at: 22 2343     Level Of Support Discussed With:    Patient     Code Status (Patient has no pulse and is not breathing):    CPR (Attempt to Resuscitate)     Medical Interventions (Patient has pulse or is breathing):    Full Support       NESS Diallo  22              Electronically signed by Peg Cornejo APRN at 22 1411          Physical Therapy Notes (most recent note)      Qiana Isabel, PT at 08/10/22 1110  Version 1 of 1         Patient Name: Karla Cristobal  : 1935    MRN: [id number]                              Today's Date: 8/10/2022       Admit Date: 2022    Visit Dx:     ICD-10-CM ICD-9-CM   1. Lower GI bleed  K92.2 578.9   2. Chronic anticoagulation  Z79.01 V58.61   3. History of atrial fibrillation  Z86.79 V12.59   4. Generalized weakness  R53.1 780.79   5. Malaise and fatigue  R53.81 780.79    R53.83    6. Nausea  R11.0 787.02   7. Acute renal failure superimposed on stage 4 chronic kidney disease, unspecified acute renal failure type (HCC)  N17.9 584.9    N18.4 585.4   8. Thrombocytopenia (HCC)  D69.6 287.5   9. History of CHF (congestive heart failure)  Z86.79 V12.59   10. History of diabetes mellitus  Z86.39 V12.29   11. History of diverticulosis  Z87.19 V12.79   12. Gastrointestinal hemorrhage, unspecified gastrointestinal hemorrhage type  K92.2 578.9     Patient Active Problem List   Diagnosis   • Chronic atrial fibrillation (HCC)   • CKD (chronic kidney disease), stage IV (HCC)   • Diabetic nephropathy (HCC)   • Diabetic retinopathy (HCC)   • Malignant neoplasm of endometrium (HCC)   • Hyperlipidemia LDL goal <100   • Essential hypertension   • Hypothyroidism   • Insomnia   • Leukocytosis   • Memory impairment   • Nausea   • Pulmonary embolism (HCC)   • Sleep apnea   • Squamous cell carcinoma of skin   • CHF (NYHA class IV, ACC/AHA stage D) (HCC)   • Ischemic heart disease   • CAD (coronary artery disease)    • DVT of lower extremity (deep venous thrombosis) (HCC)   • Chronic systolic congestive heart failure (HCC)   • Morbidly obese (HCC)   • Ischemic cardiomyopathy   • Type 2 diabetes mellitus without complication, with long-term current use of insulin (HCC)   • Exudative age-related macular degeneration, right eye, with active choroidal neovascularization (HCC)   • Hypoxia   • Cellulitis of left lower extremity   • Left leg cellulitis   • Food impaction of esophagus   • Malignant tumor of ascending colon (HCC)   • GIB (gastrointestinal bleeding)   • Normocytic anemia   • Acute renal failure (ARF) (HCC)   • COVID-19 virus detected   • Thrombocytopenia (HCC)   • Hypoalbuminemia   • AMS (altered mental status)   • Lower GI bleed     Past Medical History:   Diagnosis Date   • Acute bronchitis    • Arthritis    • Atrial fibrillation (HCC)    • CAD (coronary artery disease)     s/p MI 2005; 3 stents inserted    • Change in mole    • Change in mole    • Change in nevus 6/16/2016   • Chronic kidney disease     stage IV-sees Dr Bk Mckeon every 3-4 months    • Chronic renal disease, stage 4, severely decreased glomerular filtration rate between 15-29 mL/min/1.73 square meter (HCC)    • Chronic systolic heart failure (HCC)    • Congestive heart disease (HCC)    • Conjunctivitis    • Deep vein thrombosis of lower extremity (HCC)    • Diabetes mellitus (HCC)    • Diabetes mellitus (HCC) 6/16/2016    Impression: 03/15/2016 - blood sugar 166 and hgn a1c 7.3%  is up to date with eye exam  neuropathy with callus, no ulcer  some scratches feet  ur alb due and ordered  no change other than provided samples of toujeo because she feels like lantus worked much better than levemir, she has tried titrating levemir and it is less effective  continue testing and f/u 3-4 months  Impression: 11/23/2015 - bl   • Diabetic foot ulcer (HCC) 6/16/2016   • Dog bite of hand    • Easy bruising    • Endometrial cancer ([facility])     partial  hysterectomy; radiation as well    • Foot pain    • Foot pain 6/16/2016    Description: lancinating- worse but not consistent enough to want rx   • Fracture of hip (HCC)    • Generalized ischemic myocardial dysfunction 6/16/2016   • Glaucoma     drops daily    • Hyperlipidemia    • Hypertension    • Hypothyroidism    • Insomnia    • Ischemic heart disease    • Macular degeneration    • Methicillin resistant Staphylococcus aureus infection 6/16/2016   • Myocardial infarction (HCC) 2005   • Nausea with vomiting    • Pericardial effusion    • Shortness of breath 6/16/2016    Impression: 03/24/2015 - chronic. On 2 l oxygen at night. check cbc, bnp;    • Skin cancer, basal cell     nose, leg    • Skin lesion 6/16/2016    Impression: 03/24/2015 - refer derm for eval- seb kerat ant tib and ?ak medially with redness and irritation;    • Sleep apnea     oxygen at night due to low sats but no cpap   • Type 2 diabetes mellitus (HCC)    • UTI (urinary tract infection) 06/12/2020    currently taking antibiotics-patient's daughter notified Dr Beal's office and office said it should not delay generator change      Past Surgical History:   Procedure Laterality Date   • CARDIAC CATHETERIZATION     • CARDIAC DEFIBRILLATOR PLACEMENT     • CARDIAC ELECTROPHYSIOLOGY PROCEDURE N/A 7/17/2020    Procedure: ICD BIV  generator change- SJM- 3-6 weeks;  Surgeon: Tano Beal MD;  Location: [facility] INVASIVE LOCATION;  Service: Cardiology;  Laterality: N/A;   • CHOLECYSTECTOMY     • COLONOSCOPY N/A 7/30/2022    Procedure: COLONOSCOPY;  Surgeon: Brunner, Mark I, MD;  Location:  [facility];  Service: Gastroenterology;  Laterality: N/A;  WITH 3 RESOLUTION CLIPS   • CORONARY ARTERY BYPASS GRAFT  2000   • CORONARY STENT PLACEMENT      3 stents    • ENDOSCOPY N/A 11/17/2021    Procedure: ESOPHAGOGASTRODUODENOSCOPY;  Surgeon: Brunner, Mark I, MD;  Location:  [facility];  Service: Gastroenterology;  Laterality: N/A;   • EYE  SURGERY Bilateral     cataract extraction    • HIP SURGERY Left     x3   • HYSTERECTOMY      partial    • KNEE ARTHROPLASTY Bilateral    • PACEMAKER IMPLANTATION     • TONSILLECTOMY        General Information     Row Name 08/10/22 1110          Physical Therapy Time and Intention    Document Type progress note/recertification;therapy note (daily note)  -MB     Mode of Treatment physical therapy  -MB     Row Name 08/10/22 1110          General Information    Patient Profile Reviewed yes  -MB     Existing Precautions/Restrictions fall  -MB     Barriers to Rehab medically complex;previous functional deficit  -MB     Row Name 08/10/22 1110          Cognition    Orientation Status (Cognition) oriented x 3  -MB     Row Name 08/10/22 1110          Safety Issues, Functional Mobility    Safety Issues Affecting Function (Mobility) awareness of need for assistance;insight into deficits/self-awareness;positioning of assistive device;problem-solving;safety precaution awareness;safety precautions follow-through/compliance;sequencing abilities  -MB     Impairments Affecting Function (Mobility) balance;coordination;endurance/activity tolerance;strength;postural/trunk control;shortness of breath;motor planning;pain  -MB           User Key  (r) = Recorded By, (t) = Taken By, (c) = Cosigned By    Initials Name Provider Type    MB Qiana Isabel, PT Physical Therapist               Mobility     Row Name 08/10/22 1110          Bed Mobility    Comment, (Bed Mobility) Pt. received sitting EOB.  -MB     Row Name 08/10/22 1110          Transfers    Comment, (Transfers) Pt. required consistent VCs/tactile cues for set up, safe hand placement, and sequencing. Pt. able to stand ~80% upright. Pt. quickly c/o fatigue and requests to sit down despite encouragement.  -MB     Row Name 08/10/22 1110          Bed-Chair Transfer    Bed-Chair [place] (Transfers) maximum assist (25% patient effort);2 person assist;verbal cues;nonverbal cues  (demo/gesture)  -MB     Assistive Device (Bed-Chair Transfers) other (see comments)  BUE support  -MB     Row Name 08/10/22 1110          Sit-Stand Transfer    Sit-Stand [place] (Transfers) maximum assist (25% patient effort);2 person assist;verbal cues;nonverbal cues (demo/gesture)  -MB     Assistive Device (Sit-Stand Transfers) other (see comments)  BUE support  -MB     Row Name 08/10/22 1110          Gait/Stairs (Locomotion)    [place] Level (Gait) maximum assist (25% patient effort);2 person assist;verbal cues;nonverbal cues (demo/gesture)  -MB     Assistive Device (Gait) other (see comments)  BUE support  -MB     Distance in Feet (Gait) 2  -MB     Deviations/Abnormal Patterns (Gait) dimitry decreased;gait speed decreased;stride length decreased;weight shifting decreased  -MB     Bilateral Gait Deviations forward flexed posture;heel strike decreased  -MB     Comment, (Gait/Stairs) Pt. amb to chair w/ max A x 2 to shift weight and VCs for step sequencing. Pt. limited by weakness and dec safety awareness; attempts to sit prematurely.  -MB           User Key  (r) = Recorded By, (t) = Taken By, (c) = Cosigned By    Initials Name Provider Type    Qiana Camacho, PT Physical Therapist               Obj/Interventions     Row Name 08/10/22 1550          Motor Skills    Therapeutic Exercise hip;knee;ankle  -MB     Row Name 08/10/22 1550          Hip (Therapeutic Exercise)    Hip Isometrics (Therapeutic Exercise) bilateral;gluteal sets;10 repetitions  -MB     Hip Strengthening (Therapeutic Exercise) bilateral;heel slides;aBduction;10 repetitions  -MB     Row Name 08/10/22 1550          Knee (Therapeutic Exercise)    Knee Isometrics (Therapeutic Exercise) bilateral;quad sets;10 repetitions  -MB     Knee Strengthening (Therapeutic Exercise) bilateral;LAQ (long arc quad);10 repetitions  -MB     Row Name 08/10/22 1550          Ankle (Therapeutic Exercise)    Ankle (Therapeutic Exercise) AROM (active range of  motion)  -MB     Ankle AROM (Therapeutic Exercise) bilateral;dorsiflexion;plantarflexion;10 repetitions  -MB     Row Name 08/10/22 8760          Balance    Balance Assessment sitting static balance;standing static balance;standing dynamic balance  -MB     Static Sitting Balance contact guard  -MB     Position, Sitting Balance unsupported;sitting edge of bed  -MB     Static Standing Balance maximum assist;2-person assist  -MB     Dynamic Standing Balance maximum assist;2-person assist  -MB     Position/Device Used, Standing Balance supported;other (see comments)  BUE support  -MB     Balance Interventions standing;sit to stand;weight shifting activity  -MB           User Key  (r) = Recorded By, (t) = Taken By, (c) = Cosigned By    Initials Name Provider Type    MB Qiana Isabel, PT Physical Therapist               Goals/Plan     Row Name 08/10/22 3860          Bed Mobility Goal 1 (PT)    Activity/Assistive Device (Bed Mobility Goal 1, PT) sit to supine/supine to sit  -MB     [place] Level/Cues Needed (Bed Mobility Goal 1, PT) minimum assist (75% or more patient effort)  -MB     Time Frame (Bed Mobility Goal 1, PT) 10 days  -MB     Progress/Outcomes (Bed Mobility Goal 1, PT) goal revised this date;progress slower than expected;medical status inhibiting progress  -MB     Row Name 08/10/22 0363          Transfer Goal 1 (PT)    Activity/Assistive Device (Transfer Goal 1, PT) sit-to-stand/stand-to-sit;walker, rolling;bed-to-chair/chair-to-bed  -MB     [place] Level/Cues Needed (Transfer Goal 1, PT) moderate assist (50-74% patient effort)  -MB     Time Frame (Transfer Goal 1, PT) 10 days  -MB     Progress/Outcome (Transfer Goal 1, PT) progress slower than expected;goal revised this date  -MB     Row Name 08/10/22 3303          Gait Training Goal 1 (PT)    Activity/Assistive Device (Gait Training Goal 1, PT) gait (walking locomotion);decrease fall risk;diminish gait deviation;improve balance and  speed;increase endurance/gait distance;walker, rolling  -MB     [place] Level (Gait Training Goal 1, PT) moderate assist (50-74% patient effort)  -MB     Distance (Gait Training Goal 1, PT) 15  -MB     Time Frame (Gait Training Goal 1, PT) 10 days  -MB     Progress/Outcome (Gait Training Goal 1, PT) goal revised this date;progress slower than expected  -MB     Row Name 08/10/22 1554          Stairs Goal 1 (PT)    Activity/Assistive Device (Stairs Goal 1, PT) stairs, all skills;using handrail, left;using handrail, right  -MB     [place] Level/Cues Needed (Stairs Goal 1, PT) contact guard required  -MB     Progress/Outcome (Stairs Goal 1, PT) goal no longer appropriate  -MB     Row Name 08/10/22 1554          Patient Education Goal (PT)    Activity (Patient Education Goal, PT) HEP  -MB     [place]/Cues/Accuracy (Memory Goal 2, PT) demonstrates adequately  -MB     Time Frame (Patient Education Goal, PT) 10 days  -MB     Progress/Outcome (Patient Education Goal, PT) goal ongoing  -MB     Row Name 08/10/22 1554          Therapy Assessment/Plan (PT)    Planned Therapy Interventions (PT) balance training;bed mobility training;gait training;home exercise program;patient/family education;postural re-education;ROM (range of motion);stair training;strengthening;transfer training  -MB           User Key  (r) = Recorded By, (t) = Taken By, (c) = Cosigned By    Initials Name Provider Type    Qiana Camacho, PT Physical Therapist               Clinical Impression     Row Name 08/10/22 1553          Pain    Additional Documentation Pain Scale: FACES Pre/Post-Treatment (Group)  -MB     [facility] Name 08/10/22 1553          Pain Scale: FACES Pre/Post-Treatment    Pain: FACES Scale, Pretreatment 2-->hurts little bit  -MB     Posttreatment Pain Rating 4-->hurts little more  -MB     Pain Location - Side/Orientation Bilateral  -MB     Pain Location lower  -MB     Pain Location - extremity  -MB     Row Name 08/10/22 1557           Plan of Care Review    Plan of Care Reviewed With patient  -MB     Progress no change  -MB     Outcome Evaluation PT recert completed. Patient limited by weakness, impaired balance, and decreased activity tolerance. Goals modified to reflect current level of function. She required max A x 2 for transfers and to ambulate 2ft to chair w/ UE support. PT recommends SNF rehab at D/C.  -MB     Row Name 08/10/22 1553          Therapy Assessment/Plan (PT)    Rehab Potential (PT) fair, will monitor progress closely  -MB     Criteria for Skilled Interventions Met (PT) yes;meets criteria;skilled treatment is necessary  -MB     Therapy Frequency (PT) daily  -MB     Row Name 08/10/22 1553          Vital Signs    Pre Patient Position Sitting  -MB     Intra Patient Position Standing  -MB     Post Patient Position Sitting  -MB     Row Name 08/10/22 1551          Positioning and Restraints    Pre-Treatment Position in bed  -MB     Post Treatment Position chair  -MB     In Chair notified nsg;reclined;call light within reach;encouraged to call for assist;exit alarm on;waffle cushion;on mechanical lift sling;legs elevated;heels elevated;LUE elevated  -MB           User Key  (r) = Recorded By, (t) = Taken By, (c) = Cosigned By    Initials Name Provider Type    Qiana Camacho, PT Physical Therapist               Outcome Measures     Row Name 08/10/22 7783          How much help from another person do you currently need...    Turning from your back to your side while in flat bed without using bedrails? 2  -MB     Moving from lying on back to sitting on the side of a flat bed without bedrails? 2  -MB     Moving to and from a bed to a chair (including a wheelchair)? 2  -MB     Standing up from a chair using your arms (e.g., wheelchair, bedside chair)? 2  -MB     Climbing 3-5 steps with a railing? 1  -MB     To walk in hospital room? 1  -MB     AM-PAC 6 Clicks Score (PT) 10  -MB     Highest level of mobility 4 -->  Transferred to chair/commode  -MB     Row Name 08/10/22 1559 08/10/22 1131       Functional Assessment    Outcome Measure Options AM-PAC 6 Clicks Basic Mobility (PT)  -MB AM-PAC 6 Clicks Daily Activity (OT)  -MA          User Key  (r) = Recorded By, (t) = Taken By, (c) = Cosigned By    Initials Name Provider Type    Qiana Camacho, PT Physical Therapist    Janelle Moncada OT Occupational Therapist                             Physical Therapy Education                 Title: PT OT SLP Therapies (In Progress)     Topic: Physical Therapy (In Progress)     Point: Mobility training (In Progress)     Learning Progress Summary           Patient Acceptance, E, NR by NS at 8/4/2022 1416      Show all documentation for this point (3)                 Point: Home exercise program (In Progress)     Learning Progress Summary           Patient Acceptance, E, NR by NS at 8/4/2022 1416      Show all documentation for this point (3)                 Point: Body mechanics (In Progress)     Learning Progress Summary           Patient Acceptance, E, NR by NS at 8/4/2022 1416      Show all documentation for this point (3)                 Point: Precautions (In Progress)     Learning Progress Summary           Patient Acceptance, E, NR by NS at 8/4/2022 1416      Show all documentation for this point (3)                             User Key     Initials Effective Dates Name Provider Type Discipline    NS 06/16/21 -  Terri Hendrickson, PT Physical Therapist PT              PT Recommendation and Plan  Planned Therapy Interventions (PT): balance training, bed mobility training, gait training, home exercise program, patient/family education, postural re-education, ROM (range of motion), stair training, strengthening, transfer training  Plan of Care Reviewed With: patient  Progress: no change  Outcome Evaluation: PT recert completed. Patient limited by weakness, impaired balance, and decreased activity tolerance. Goals modified to  reflect current level of function. She required max A x 2 for transfers and to ambulate 2ft to chair w/ UE support. PT recommends SNF rehab at D/C.     Time Calculation:    PT Charges     Row Name 08/10/22 1559             Time Calculation    Start Time 1110  -MB      PT Received On 08/10/22  -MB      PT Goal Re-Cert Due Date 22  -MB              Time Calculation- PT    Total Timed Code Minutes- PT 36 minute(s)  -MB              Timed Charges    65645 - PT Therapeutic Exercise Minutes 36  -MB              Total Minutes    Timed Charges Total Minutes 36  -MB       Total Minutes 36  -MB            User Key  (r) = Recorded By, (t) = Taken By, (c) = Cosigned By    Initials Name Provider Type    Qiana Camacho, PT Physical Therapist              Therapy Charges for Today     Code Description Service Date Service Provider Modifiers Qty    [id number] HC PT THER PROC EA 15 MIN 8/10/2022 Qiana Isabel, PT GP 2          PT G-Codes  Outcome Measure Options: AM-PAC 6 Clicks Basic Mobility (PT)  AM-PAC 6 Clicks Score (PT): 10  AM-PAC 6 Clicks Score (OT): 11    Qiana Isabel PT  8/10/2022      Electronically signed by Qiana Isabel, PT at 08/10/22 1600          Occupational Therapy Notes (most recent note)      Janelle Leiva, OT at 08/10/22 1028          Patient Name: Karla Cristobal  : 1935    MRN: [id number]                              Today's Date: 8/10/2022       Admit Date: 2022    Visit Dx:     ICD-10-CM ICD-9-CM   1. Lower GI bleed  K92.2 578.9   2. Chronic anticoagulation  Z79.01 V58.61   3. History of atrial fibrillation  Z86.79 V12.59   4. Generalized weakness  R53.1 780.79   5. Malaise and fatigue  R53.81 780.79    R53.83    6. Nausea  R11.0 787.02   7. Acute renal failure superimposed on stage 4 chronic kidney disease, unspecified acute renal failure type (HCC)  N17.9 584.9    N18.4 585.4   8. Thrombocytopenia (HCC)  D69.6 287.5   9. History of CHF (congestive heart  failure)  Z86.79 V12.59   10. History of diabetes mellitus  Z86.39 V12.29   11. History of diverticulosis  Z87.19 V12.79   12. Gastrointestinal hemorrhage, unspecified gastrointestinal hemorrhage type  K92.2 578.9     Patient Active Problem List   Diagnosis   • Chronic atrial fibrillation (HCC)   • CKD (chronic kidney disease), stage IV (HCC)   • Diabetic nephropathy (HCC)   • Diabetic retinopathy (HCC)   • Malignant neoplasm of endometrium (HCC)   • Hyperlipidemia LDL goal <100   • Essential hypertension   • Hypothyroidism   • Insomnia   • Leukocytosis   • Memory impairment   • Nausea   • Pulmonary embolism (HCC)   • Sleep apnea   • Squamous cell carcinoma of skin   • CHF (NYHA class IV, ACC/AHA stage D) (HCC)   • Ischemic heart disease   • CAD (coronary artery disease)   • DVT of lower extremity (deep venous thrombosis) (HCC)   • Chronic systolic congestive heart failure (HCC)   • Morbidly obese (HCC)   • Ischemic cardiomyopathy   • Type 2 diabetes mellitus without complication, with long-term current use of insulin (HCC)   • Exudative age-related macular degeneration, right eye, with active choroidal neovascularization (HCC)   • Hypoxia   • Cellulitis of left lower extremity   • Left leg cellulitis   • Food impaction of esophagus   • Malignant tumor of ascending colon (HCC)   • GIB (gastrointestinal bleeding)   • Normocytic anemia   • Acute renal failure (ARF) (HCC)   • COVID-19 virus detected   • Thrombocytopenia (HCC)   • Hypoalbuminemia   • AMS (altered mental status)   • Lower GI bleed     Past Medical History:   Diagnosis Date   • Acute bronchitis    • Arthritis    • Atrial fibrillation (HCC)    • CAD (coronary artery disease)     s/p MI 2005; 3 stents inserted    • Change in mole    • Change in mole    • Change in nevus 6/16/2016   • Chronic kidney disease     stage IV-sees Dr Bk Mckeon every 3-4 months    • Chronic renal disease, stage 4, severely decreased glomerular filtration rate between 15-29  mL/min/1.73 square meter ([facility])    • Chronic systolic heart failure ([facility])    • Congestive heart disease ([facility])    • Conjunctivitis    • Deep vein thrombosis of lower extremity ([facility])    • Diabetes mellitus ([facility])    • Diabetes mellitus ([facility]) 6/16/2016    Impression: 03/15/2016 - blood sugar 166 and hgn a1c 7.3%  is up to date with eye exam  neuropathy with callus, no ulcer  some scratches feet  ur alb due and ordered  no change other than provided samples of toujeo because she feels like lantus worked much better than levemir, she has tried titrating levemir and it is less effective  continue testing and f/u 3-4 months  Impression: 11/23/2015 - bl   • Diabetic foot ulcer ([facility]) 6/16/2016   • Dog bite of hand    • Easy bruising    • Endometrial cancer ([facility])     partial hysterectomy; radiation as well    • Foot pain    • Foot pain 6/16/2016    Description: lancinating- worse but not consistent enough to want rx   • Fracture of hip ([facility])    • Generalized ischemic myocardial dysfunction 6/16/2016   • Glaucoma     drops daily    • Hyperlipidemia    • Hypertension    • Hypothyroidism    • Insomnia    • Ischemic heart disease    • Macular degeneration    • Methicillin resistant Staphylococcus aureus infection 6/16/2016   • Myocardial infarction ([facility]) 2005   • Nausea with vomiting    • Pericardial effusion    • Shortness of breath 6/16/2016    Impression: 03/24/2015 - chronic. On 2 l oxygen at night. check cbc, bnp;    • Skin cancer, basal cell     nose, leg    • Skin lesion 6/16/2016    Impression: 03/24/2015 - refer derm for eval- seb kerat ant tib and ?ak medially with redness and irritation;    • Sleep apnea     oxygen at night due to low sats but no cpap   • Type 2 diabetes mellitus ([facility])    • UTI (urinary tract infection) 06/12/2020    currently taking antibiotics-patient's daughter notified Dr Beal's office and office said it should not delay generator change      Past Surgical History:   Procedure Laterality Date    • CARDIAC CATHETERIZATION     • CARDIAC DEFIBRILLATOR PLACEMENT     • CARDIAC ELECTROPHYSIOLOGY PROCEDURE N/A 7/17/2020    Procedure: ICD BIV  generator change- SJM- 3-6 weeks;  Surgeon: Tano Beal MD;  Location:  [facility];  Service: Cardiology;  Laterality: N/A;   • CHOLECYSTECTOMY     • COLONOSCOPY N/A 7/30/2022    Procedure: COLONOSCOPY;  Surgeon: Brunner, Mark I, MD;  Location:  [facility];  Service: Gastroenterology;  Laterality: N/A;  WITH 3 RESOLUTION CLIPS   • CORONARY ARTERY BYPASS GRAFT  2000   • CORONARY STENT PLACEMENT      3 stents    • ENDOSCOPY N/A 11/17/2021    Procedure: ESOPHAGOGASTRODUODENOSCOPY;  Surgeon: Brunner, Mark I, MD;  Location:  [facility];  Service: Gastroenterology;  Laterality: N/A;   • EYE SURGERY Bilateral     cataract extraction    • HIP SURGERY Left     x3   • HYSTERECTOMY      partial    • KNEE ARTHROPLASTY Bilateral    • PACEMAKER IMPLANTATION     • TONSILLECTOMY        General Information     Row Name 08/10/22 1124          OT Time and Intention    Document Type progress note/recertification  -MA     Mode of Treatment occupational therapy  -MA     Row Name 08/10/22 1124          General Information    Patient Profile Reviewed yes  -MA     Existing Precautions/Restrictions fall  -MA     Barriers to Rehab medically complex;previous functional deficit  -MA     Row Name 08/10/22 1124          Cognition    Orientation Status (Cognition) oriented x 3  -MA     Row Name 08/10/22 1124          Safety Issues, Functional Mobility    Safety Issues Affecting Function (Mobility) awareness of need for assistance;insight into deficits/self-awareness;safety precaution awareness;safety precautions follow-through/compliance;sequencing abilities  -MA     Impairments Affecting Function (Mobility) balance;coordination;endurance/activity tolerance;strength;postural/trunk control;shortness of breath;motor planning;pain  -MA           User Key  (r) = Recorded By,  (t) = Taken By, (c) = Cosigned By    Initials Name Provider Type    Janelle Moncada OT Occupational Therapist                 Mobility/ADL's     Row Name 08/10/22 1124          Bed Mobility    Bed Mobility supine-sit  -MA     Supine-Sit Counce (Bed Mobility) moderate assist (50% patient effort);2 person assist;verbal cues  -MA     Bed Mobility, Safety Issues decreased use of arms for pushing/pulling;decreased use of legs for bridging/pushing;impaired trunk control for bed mobility  -MA     Assistive Device (Bed Mobility) bed rails;draw sheet;head of bed elevated  -MA     Comment, (Bed Mobility) Pt c/o dizziness once sitting EOB, BP checked and stable. Extended time required sitting EOB.  -MA     Row Name 08/10/22 [facility]          Transfers    Transfers sit-stand transfer;stand-sit transfer  -MA     Comment, (Transfers) Pt max Ax2 for STS w/ BUE support, VC's for sequencing and hand placement.  -MA     Sit-Stand Counce (Transfers) maximum assist (25% patient effort);2 person assist;verbal cues  -MA     Stand-Sit Counce (Transfers) maximum assist (25% patient effort);2 person assist;verbal cues  -MA     Row Name 08/10/22 1124          Sit-Stand Transfer    Assistive Device (Sit-Stand Transfers) other (see comments)  -MA     Row Name 08/10/22 [facility]          Stand-Sit Transfer    Assistive Device (Stand-Sit Transfers) other (see comments)  -MA     Row Name 08/10/22 112          Functional Mobility    Functional Mobility- Comment Deferred to PT  -MA     Row Name 08/10/22 1124          Activities of Daily Living    BADL Assessment/Intervention lower body dressing  -MA     Row Name 08/10/22 [facility]          Lower Body Dressing Assessment/Training    [place] Level (Lower Body Dressing) don;socks;dependent (less than 25% patient effort);verbal cues  -MA     Position (Lower Body Dressing) supine  -MA           User Key  (r) = Recorded By, (t) = Taken By, (c) = Cosigned By    Initials Name Provider Type     Janelle Moncada OT Occupational Therapist               Obj/Interventions     Row Name 08/10/22 1126          Balance    Balance Assessment sitting static balance;standing static balance;sit to stand dynamic balance  -MA     Static Sitting Balance contact guard  -MA     Position, Sitting Balance unsupported;sitting edge of bed  -MA     Sit to Stand Dynamic Balance maximum assist;2-person assist;verbal cues  -MA     Static Standing Balance maximum assist;2-person assist;verbal cues  -MA     Position/Device Used, Standing Balance supported;other (see comments)  BUE support  -MA     Balance Interventions sitting;sit to stand;occupation based/functional task  -MA           User Key  (r) = Recorded By, (t) = Taken By, (c) = Cosigned By    Initials Name Provider Type    Janelle Moncada OT Occupational Therapist               Goals/Plan     Row Name 08/10/22 1129          Bed Mobility Goal 1 (OT)    Activity/Assistive Device (Bed Mobility Goal 1, OT) sit to supine;supine to sit  -MA     [place] Level/Cues Needed (Bed Mobility Goal 1, OT) minimum assist (75% or more patient effort);verbal cues required  -MA     Time Frame (Bed Mobility Goal 1, OT) long term goal (LTG);10 days  -MA     Progress/Outcomes (Bed Mobility Goal 1, OT) progress slower than expected;goal revised this date  -MA     Row Name 08/10/22 1129          Transfer Goal 1 (OT)    Activity/Assistive Device (Transfer Goal 1, OT) sit-to-stand/stand-to-sit;bed-to-chair/chair-to-bed;commode, bedside without drop arms;walker, rolling  -MA     [place] Level/Cues Needed (Transfer Goal 1, OT) moderate assist (50-74% patient effort);verbal cues required  -MA     Time Frame (Transfer Goal 1, OT) long term goal (LTG);10 days  -MA     Progress/Outcome (Transfer Goal 1, OT) progress slower than expected;goal revised this date  -MA     Row Name 08/10/22 1129          Dressing Goal 1 (OT)    Activity/Device (Dressing Goal 1, OT) lower body dressing  don/doff  socks w/ AAD  -MA     [place]/Cues Needed (Dressing Goal 1, OT) moderate assist (50-74% patient effort);verbal cues required  -MA     Time Frame (Dressing Goal 1, OT) long term goal (LTG);10 days  -MA     Progress/Outcome (Dressing Goal 1, OT) progress slower than expected;goal revised this date  -MA     Row Name 08/10/22 1129          Toileting Goal 1 (OT)    Activity/Device (Toileting Goal 1, OT) adjust/manage clothing;perform perineal hygiene;commode, bedside without drop arms  -MA     [place] Level/Cues Needed (Toileting Goal 1, OT) moderate assist (50-74% patient effort);verbal cues required  -MA     Time Frame (Toileting Goal 1, OT) long term goal (LTG);10 days  -MA     Progress/Outcome (Toileting Goal 1, OT) progress slower than expected;goal revised this date  -MA           User Key  (r) = Recorded By, (t) = Taken By, (c) = Cosigned By    Initials Name Provider Type    Janelle Moncada, MARIELOS Occupational Therapist               Clinical Impression     Row Name 08/10/22 1127          Pain Assessment    Pretreatment Pain Rating 0/10 - no pain  -MA     Posttreatment Pain Rating 0/10 - no pain  -MA     Row Name 08/10/22 1127          Plan of Care Review    Plan of Care Reviewed With patient  -MA     Progress no change  -MA     Outcome Evaluation OT re-cert complete. Pt presents w/ generalized weakness, decreased activity tolerance, and balance deficits limiting her ADL independence. Pt mod Ax2 for supine-to-sit, max Ax2 for STS, dep for LBD. Goals updated this date to reflect pt's current level of function. Continue to rec SNF at d/c.  -MA     Row Name 08/10/22 1127          Therapy Assessment/Plan (OT)    Rehab Potential (OT) fair, will monitor progress closely  -MA     Criteria for Skilled Therapeutic Interventions Met (OT) yes;meets criteria;skilled treatment is necessary  -MA     Therapy Frequency (OT) daily  -MA     Row Name 08/10/22 1127          Therapy Plan Review/Discharge Plan (OT)     Anticipated Discharge Disposition (OT) skilled nursing facility  -MA     Row Name 08/10/22 1127          Vital Signs    Pre Systolic BP Rehab --  VSS - RN cleared OT  -MA     O2 Delivery Pre Treatment room air  -MA     O2 Delivery Intra Treatment room air  -MA     O2 Delivery Post Treatment room air  -MA     Pre Patient Position Supine  -MA     Intra Patient Position Standing  -MA     Post Patient Position Sitting  -MA     Row Name 08/10/22 1127          Positioning and Restraints    Pre-Treatment Position in bed  -MA     Post Treatment Position bed  -MA     In Bed sitting EOB;with PT  -MA           User Key  (r) = Recorded By, (t) = Taken By, (c) = Cosigned By    Initials Name Provider Type    Janelle Moncada OT Occupational Therapist               Outcome Measures     Row Name 08/10/22 1131          How much help from another is currently needed...    Putting on and taking off regular lower body clothing? 1  -MA     Bathing (including washing, rinsing, and drying) 2  -MA     Toileting (which includes using toilet bed pan or urinal) 1  -MA     Putting on and taking off regular upper body clothing 2  -MA     Taking care of personal grooming (such as brushing teeth) 2  -MA     Eating meals 3  -MA     AM-PAC 6 Clicks Score (OT) 11  -MA     Row Name 08/10/22 1131          Functional Assessment    Outcome Measure Options AM-PAC 6 Clicks Daily Activity (OT)  -MA           User Key  (r) = Recorded By, (t) = Taken By, (c) = Cosigned By    Initials Name Provider Type    Janelle Moncada OT Occupational Therapist                Occupational Therapy Education                 Title: PT OT SLP Therapies (In Progress)     Topic: Occupational Therapy (In Progress)     Point: ADL training (In Progress)     Description:   Instruct learner(s) on proper safety adaptation and remediation techniques during self care or transfers.   Instruct in proper use of assistive devices.              Learning Progress Summary            Patient Acceptance, E, NR by MA at 8/10/2022 1131      Show all documentation for this point (5)                 Point: Home exercise program (Done)     Description:   Instruct learner(s) on appropriate technique for monitoring, assisting and/or progressing therapeutic exercises/activities.              Learning Progress Summary           Patient Acceptance, E,D, VU,NR by IFRAH at 8/3/2022 1229    Comment: bed mobility, TE, trasnfer technique and safety      Show all documentation for this point (3)                 Point: Precautions (In Progress)     Description:   Instruct learner(s) on prescribed precautions during self-care and functional transfers.              Learning Progress Summary           Patient Acceptance, E, NR by MA at 8/10/2022 1131      Show all documentation for this point (3)                 Point: Body mechanics (In Progress)     Description:   Instruct learner(s) on proper positioning and spine alignment during self-care, functional mobility activities and/or exercises.              Learning Progress Summary           Patient Acceptance, E, NR by MA at 8/10/2022 1131      Show all documentation for this point (3)                             User Key     Initials Effective Dates Name Provider Type Discipline    IFRAH 06/16/21 -  Sabi Lim, OT Occupational Therapist OT    MA 11/19/20 -  Janelle Leiva OT Occupational Therapist OT              OT Recommendation and Plan  Therapy Frequency (OT): daily  Plan of Care Review  Plan of Care Reviewed With: patient  Progress: no change  Outcome Evaluation: OT re-cert complete. Pt presents w/ generalized weakness, decreased activity tolerance, and balance deficits limiting her ADL independence. Pt mod Ax2 for supine-to-sit, max Ax2 for STS, dep for LBD. Goals updated this date to reflect pt's current level of function. Continue to rec SNF at d/c.     Time Calculation:    Time Calculation- OT     Row Name 08/10/22 1132             Time Calculation- OT     OT Start Time 1028  -MA      OT Received On 08/10/22  -MA      OT Goal Re-Cert Due Date 08/20/22  -MA              Timed Charges    88731 - OT Therapeutic Activity Minutes 8  -MA      53101 - OT Self Care/Mgmt Minutes 5  -MA              Total Minutes    Timed Charges Total Minutes 13  -MA       Total Minutes 13  -MA            User Key  (r) = Recorded By, (t) = Taken By, (c) = Cosigned By    Initials Name Provider Type    MA Janelle Leiva OT Occupational Therapist              Therapy Charges for Today     Code Description Service Date Service Provider Modifiers Qty    [id number]  OT THERAPEUTIC ACT EA 15 MIN 8/10/2022 Janelle Leiva OT GO 1               Janelle Leiva OT  8/10/2022    Electronically signed by Janelle Leiva OT at 08/10/22 1133

## 2022-08-12 NOTE — PROGRESS NOTES
"   LOS: 14 days    Patient Care Team:  Luis Cardenas MD as PCP - General (Nephrology)    Chief Complaint:  Gi bleed    Subjective     Interval History:   No new events no added distress    Review of Systems:   Denies any nausea, vomiting, melena, abdominal pain, nausea, vomiting.          Objective     Vital Sign Min/Max for last 24 hours  Temp  Min: 97.8 °F (36.6 °C)  Max: 98.8 °F (37.1 °C)   BP  Min: 101/58  Max: 114/59   Pulse  Min: 73  Max: 81   Resp  Min: 16  Max: 18   SpO2  Min: 94 %  Max: 99 %   No data recorded   Weight  Min: 91.2 kg (201 lb 1.6 oz)  Max: 91.2 kg (201 lb 1.6 oz)     Flowsheet Rows    Flowsheet Row First Filed Value   Admission Height 162.6 cm (64\") Documented at 07/28/2022 1451   Admission Weight 76.2 kg (168 lb) Documented at 07/28/2022 1451          I/O this shift:  In: 320 [P.O.:320]  Out: -   I/O last 3 completed shifts:  In: -   Out: 425 [Urine:425]    Physical Exam:    General Appearance: Awake, alert, oriented x3 no obvious distress laying comfortably in bed.  Eyes: PER, EOMI.  Neck: Supple no JVD.  Lungs: Clear auscultation, no rales rhonchi's, equal chest movement, nonlabored.  Heart: No gallop, murmur, rub, RRR.  Abdomen: Soft, nontender, positive bowel sounds, no organomegaly.  Extremities: No edema, no cyanosis.  Neuro: No focal deficit, moving all extremities, alert oriented X 3  Tunnel catheter in place.      WBC WBC   Date Value Ref Range Status   08/12/2022 6.50 3.40 - 10.80 10*3/mm3 Final      HGB Hemoglobin   Date Value Ref Range Status   08/12/2022 9.8 (L) 12.0 - 15.9 g/dL Final      HCT Hematocrit   Date Value Ref Range Status   08/12/2022 29.9 (L) 34.0 - 46.6 % Final      Platlets No results found for: LABPLAT   MCV MCV   Date Value Ref Range Status   08/12/2022 89.5 79.0 - 97.0 fL Final          Sodium Sodium   Date Value Ref Range Status   08/11/2022 133 (L) 136 - 145 mmol/L Final      Potassium Potassium   Date Value Ref Range Status   08/11/2022 3.7 3.5 - 5.2 mmol/L " Final      Chloride Chloride   Date Value Ref Range Status   08/11/2022 97 (L) 98 - 107 mmol/L Final      CO2 CO2   Date Value Ref Range Status   08/11/2022 26.0 22.0 - 29.0 mmol/L Final      BUN BUN   Date Value Ref Range Status   08/11/2022 54 (H) 8 - 23 mg/dL Final      Creatinine Creatinine   Date Value Ref Range Status   08/11/2022 2.81 (H) 0.57 - 1.00 mg/dL Final      Calcium Calcium   Date Value Ref Range Status   08/11/2022 8.3 (L) 8.6 - 10.5 mg/dL Final      PO4 No results found for: CAPO4   Albumin Albumin   Date Value Ref Range Status   08/11/2022 2.50 (L) 3.50 - 5.20 g/dL Final      Magnesium No results found for: MG   Uric Acid No results found for: URICACID        Results Review:     I reviewed the patient's new clinical results.    apixaban, 2.5 mg, Oral, Q12H  atorvastatin, 20 mg, Oral, Nightly  carvedilol, 3.125 mg, Oral, BID With Meals  castor oil-balsam peru, 1 application, Topical, Q12H  dextromethorphan polistirex ER, 60 mg, Oral, Q12H  epoetin shukri/shukri-epbx, 10,000 Units, Subcutaneous, Once per day on Tue Thu Sat  insulin lispro, 0-7 Units, Subcutaneous, TID AC  Insulin Lispro, 4 Units, Subcutaneous, TID With Meals  latanoprost, 1 drop, Both Eyes, Nightly  levothyroxine, 112 mcg, Oral, Q AM  pantoprazole, 40 mg, Oral, BID AC  sodium chloride, 10 mL, Intravenous, Q12H  timolol, 1 drop, Both Eyes, BID           Medication Review:     Assessment & Plan       GIB (gastrointestinal bleeding)    Chronic atrial fibrillation (HCC)    CKD (chronic kidney disease), stage IV (HCC)    Hyperlipidemia LDL goal <100    Essential hypertension    Hypothyroidism    Pulmonary embolism (HCC)    CHF (NYHA class IV, ACC/AHA stage D) (HCC)    CAD (coronary artery disease)    Chronic systolic congestive heart failure (HCC)    Type 2 diabetes mellitus without complication, with long-term current use of insulin (HCC)    Normocytic anemia    Acute renal failure (ARF) ([facility])    COVID-19 virus detected    Thrombocytopenia  (HCC)    Hypoalbuminemia    AMS (altered mental status)    Lower GI bleed      1- ROBBI on CKD stage IV - Thought to be hemodynamic injury progression of CKD to ESRD. Started on HD on 8/6/22.   2. CKD stage IV -baseline Scr 2.5 range  3- GI bleed   4- Low albumin level   5- Iron def anemia Tsat 11%. S/p iron supplementation. On  BENY on HD days   6- Renal cysts   7- Systolic CHF - Recent Echo showing EF 37% now. Volume status stable.  8. BMD: Check PTH level.     Plan:  Dialysis tomorrow continued Tuesday Thursday Saturday.  BENY on dialysis days  Renal diet with fluid restriction less than 1500 mL/day.  Will need outpatient hemodialysis chair.  Case management working with rehab placement with HD.  Case discussed with the daughter Frida on [phone] in detail regards to patient's future plan.    Albert Sharma MD  08/12/22  15:19 EDT

## 2022-08-12 NOTE — PLAN OF CARE
Goal Outcome Evaluation:  Plan of Care Reviewed With: patient    Patient is alert and oriented x3 with intermittent confusion. VSS. Lungs diminished, Sp02 99% on RA. Denies pain and discomfort. Pt required PRN Tessalon for increased episodes of coughing. Call light within reach.   Progress: improving

## 2022-08-12 NOTE — CASE MANAGEMENT/SOCIAL WORK
Continued Stay Note  [facility]     Patient Name: Karla Cristobal  MRN: [id number]  Today's Date: 8/12/2022    Admit Date: 7/28/2022     Discharge Plan     Row Name 08/12/22 1500       Plan    Plan CM update- [facility]    Plan Comments Surface B antibody result has been faxed to [facility] in [place] per their request - now waiting on final chair time at the [facility]. Left another message with Johan at [facility] to request an update on the referral faxed yesterday. Awaiting callback regarding referral status. CM will continue to follow -neris [phone]    Final Discharge Disposition Code 03 - skilled nursing facility (SNF)               Discharge Codes    No documentation.                     Neris Rodarte RN

## 2022-08-12 NOTE — DISCHARGE PLACEMENT REQUEST
"Hep B surface antibody attached     Thank you     Neris Rodarte Rn/-[phone]      David Mead (87 y.o. Female)             Date of Birth   1935    Social Security Number       [place]   [facility] [address] [address]    Home Phone   [phone]    MRN   [id number]       Yazdanism   Latter-day    Marital Status                               Admission Date   7/28/22    Admission Type   Emergency    Admitting Provider   Prashanth Maravilla MD    Attending Provider   Prashanth Maravilla MD    Department, Room/Bed   [facility] 6A, N619/1       Discharge Date       Discharge Disposition       Discharge Destination                               Attending Provider: Prashanth Maravilla MD    Allergies: Bactrim [Sulfamethoxazole-trimethoprim], Lisinopril, Rocephin [Ceftriaxone], Codeine    Isolation: None   Infection: COVID (History) (08/08/22)   Code Status: CPR   Advance Care Planning Activity    Ht: 162.6 cm (64.02\")   Wt: 91.2 kg (201 lb 1.6 oz)    Admission Cmt: None   Principal Problem: GIB (gastrointestinal bleeding) [K92.2]                 Active Insurance as of 7/28/2022     Primary Coverage     Payor Plan Insurance Group Employer/Plan Group    MEDICARE MEDICARE A & B      Payor Plan Address Payor Plan Phone Number Payor Plan Fax Number Effective Dates    [address] [phone]  7/1/2001 - None Entered    [facility] [id number]       Subscriber Name Subscriber Birth Date Member ID       DAVID MEAD 1935 [id number]           Secondary Coverage     Payor Plan Insurance Group Employer/Plan Group    [facility]      Payor Plan Address Payor Plan Phone Number Payor Plan Fax Number Effective Dates    [address] [phone]  7/1/2001 - None Entered    [place] [address]       Subscriber Name Subscriber Birth Date Member ID       DAVID MEAD 1935 [id number]           Tertiary Coverage     Payor Plan Insurance Group " Employer/Plan Group    [place]      Payor Plan Address Payor Plan Phone Number Payor Plan Fax Number Effective Dates    [address] [phone]  7/1/2020 - None Entered    [facility] [id number]       Subscriber Name Subscriber Birth Date Member ID       DAVID MEAD 1935 [id number]                 Emergency Contacts      (Rel.) Home Phone Work Phone Mobile Phone    Frida Vazquez (Daughter) [phone] -- --              Lab Results (last 24 hours)     Procedure Component Value Units Date/Time    Basic Metabolic Panel [[id number]] Updated: 08/12/22 1357    Specimen: Blood     CBC & Differential [[id number]]  (Abnormal) Collected: 08/12/22 1201    Specimen: Blood Updated: 08/12/22 1353    Narrative:      The following orders were created for panel order CBC & Differential.  Procedure                               Abnormality         Status                     ---------                               -----------         ------                     CBC Auto Differential[[id number]]        Abnormal            Final result                 Please view results for these tests on the individual orders.    CBC Auto Differential [[id number]]  (Abnormal) Collected: 08/12/22 1201    Specimen: Blood Updated: 08/12/22 1353     WBC 6.50 10*3/mm3      RBC 3.34 10*6/mm3      Hemoglobin 9.8 g/dL      Hematocrit 29.9 %      MCV 89.5 fL      MCH 29.3 pg      MCHC 32.8 g/dL      RDW 17.0 %      RDW-SD 54.1 fl      MPV 11.5 fL      Platelets 133 10*3/mm3      Neutrophil % 56.1 %      Lymphocyte % 22.5 %      Monocyte % 15.5 %      Eosinophil % 3.8 %      Basophil % 0.9 %      Immature Grans % 1.2 %      Neutrophils, Absolute 3.64 10*3/mm3      Lymphocytes, Absolute 1.46 10*3/mm3      Monocytes, Absolute 1.01 10*3/mm3      Eosinophils, Absolute 0.25 10*3/mm3      Basophils, Absolute 0.06 10*3/mm3      Immature Grans, Absolute 0.08 10*3/mm3      nRBC 0.0 /100 WBC     POC Glucose Once [[id number]]   (Abnormal) Collected: 08/12/22 1145    Specimen: Blood Updated: 08/12/22 1248     Glucose 151 mg/dL      Comment: Meter: [id number] : [id number] Benja BABIN       POC Glucose Once [[id number]]  (Abnormal) Collected: 08/12/22 0637    Specimen: Blood Updated: 08/12/22 0644     Glucose 150 mg/dL      Comment: Meter: [id number] : [id number] Benja BABIN       Hepatitis B Surface Antibody [[id number]]  (Normal) Collected: 08/11/22 1833    Specimen: Blood Updated: 08/12/22 0037     Hep B S Ab Non-Reactive    Narrative:      Results may be falsely decreased if patient taking Biotin.      POC Glucose Once [[id number]]  (Abnormal) Collected: 08/11/22 1617    Specimen: Blood Updated: 08/11/22 1618     Glucose 154 mg/dL      Comment: Meter: [id number] : [id number] Amara Allen

## 2022-08-12 NOTE — PLAN OF CARE
Goal Outcome Evaluation:  Plan of Care Reviewed With: patient        Progress: no change  Outcome Evaluation: Patient had a good day. VSS, room air. No complaints of pain. Alert and oriented. Plan is HD tomorrow per orders. Order received that IV can be left out. Possible rehab placement this weekend, referrals pending.

## 2022-08-12 NOTE — PROGRESS NOTES
[facility]  PROGRESS NOTE    Patient Name: Karla Cristobal  : 1935  MRN: [id number]    Date of Admission: 2022  Primary Care Physician: Luis Cardenas MD    Subjective   Subjective     CC:   GI blood loss / ARF    HPI:    Patient resting in bed, completed breakfast.  Patient states she feels tired; otherwise, patient denies any issues today.    ROS:  Gen- No fevers, chills  CV- No chest pain, palpitations  Resp- No cough, dyspnea  GI- No N/V/D, abd pain    Objective   Objective     Vital Signs:   Temp:  [97.9 °F (36.6 °C)-98.8 °F (37.1 °C)] 98.1 °F (36.7 °C)  Heart Rate:  [69-82] 81  Resp:  [16-18] 16  BP: (106-119)/(45-80) 114/59     Physical Exam:  Constitutional: Awake, alert, NAD  HENT: NCAT, mucous membranes moist  Respiratory: Clear to auscultation bilaterally, nonlabored respirations   Cardiovascular: RRR, no murmurs, rubs, or gallops.  Gastrointestinal: Positive bowel sounds, soft, nontender, nondistended  Musculoskeletal: No bilateral ankle edema  Psychiatric: Appropriate affect, cooperative  Neurologic: Oriented x 3, strength symmetric in all extremities, Cranial Nerves grossly intact to confrontation, speech clear  Skin: No rashes.  Dialysis catheter to the right chest wall without signs and symptoms of infection.      Results Reviewed:  LAB RESULTS:      Lab 22  1347 22  0717 22  0516   WBC 5.83 5.33 4.68 5.17   HEMOGLOBIN 10.0* 9.7* 9.5* 9.4*   HEMATOCRIT 30.4* 29.7* 28.6* 28.0*   PLATELETS 212 190 157 150   NEUTROS ABS 3.76  --   --   --    IMMATURE GRANS (ABS) 0.06*  --   --   --    LYMPHS ABS 0.95  --   --   --    MONOS ABS 0.74  --   --   --    EOS ABS 0.26  --   --   --    MCV 87.4 89.2 88.0 87.5         Lab 22  0827 224 22  1347 22  0717 22  0516   SODIUM 133* 140 141 138 137   POTASSIUM 3.7 3.9 3.9 4.1 3.8   CHLORIDE 97* 106 106 105 101   CO2 26.0 24.0 22.0 21.0* 22.0    ANION GAP 10.0 10.0 13.0 12.0 14.0   BUN 54* 58* 100* 86* 135*   CREATININE 2.81* 2.34* 3.13* 3.06* 4.07*   EGFR 15.9* 19.8* 14.0* 14.4* 10.2*   GLUCOSE 139* 166* 125* 170* 169*   CALCIUM 8.3* 8.7 8.6 8.5* 8.7   MAGNESIUM  --   --  2.1  --  2.0   PHOSPHORUS 2.9 2.9 4.1 3.9 4.2         Lab 08/11/22  0827 08/09/22  0424 08/08/22  1347 08/07/22  0717 08/06/22  0516   ALBUMIN 2.50* 2.50* 2.60* 2.40* 2.20*                     Brief Urine Lab Results  (Last result in the past 365 days)      Color   Clarity   Blood   Leuk Est   Nitrite   Protein   CREAT   Urine HCG        07/30/22 1742 Yellow   Clear   Small (1+)   Trace   Negative   Trace                 Microbiology Results Abnormal     Procedure Component Value - Date/Time    Eosinophil Smear - Urine, Urine, Clean Catch [[id number]]  (Normal) Collected: 07/30/22 1742    Lab Status: Final result Specimen: Urine, Clean Catch Updated: 07/30/22 1938     Eosinophil Smear 0 % EOS/100 Cells     Narrative:      No eosinophil seen          No radiology results from the last 24 hrs    Results for orders placed during the hospital encounter of 07/28/22    Adult Transthoracic Echo Complete W/ Cont if Necessary Per Protocol    Interpretation Summary  · Mild biatrial enlargement.  · Moderately reduced right ventricular systolic function noted.  · Moderate left ventricular systolic dysfunction, estimated EF 37%.  · Left ventricular wall thickness is consistent with mild concentric hypertrophy.  · Lambl's excrescences of the aortic valve are noted. There is trace aortic insufficiency, no evidence of aortic stenosis.  · Moderate mitral regurgitation.  · Moderate to severe tricuspid regurgitation with RVSP 48 mmHg.  · Moderate pulmonic insufficiency.      I have reviewed the medications:  Scheduled Meds:apixaban, 2.5 mg, Oral, Q12H  atorvastatin, 20 mg, Oral, Nightly  carvedilol, 3.125 mg, Oral, BID With Meals  castor oil-balsam peru, 1 application, Topical, Q12H  dextromethorphan  polistirex ER, 60 mg, Oral, Q12H  epoetin shukri/shukri-epbx, 10,000 Units, Subcutaneous, Once per day on Tue Thu Sat  insulin lispro, 0-7 Units, Subcutaneous, TID AC  Insulin Lispro, 4 Units, Subcutaneous, TID With Meals  latanoprost, 1 drop, Both Eyes, Nightly  levothyroxine, 112 mcg, Oral, Q AM  pantoprazole, 40 mg, Oral, BID AC  sodium chloride, 10 mL, Intravenous, Q12H  timolol, 1 drop, Both Eyes, BID      Continuous Infusions:   PRN Meds:.•  acetaminophen **OR** acetaminophen **OR** acetaminophen  •  benzonatate  •  dextrose  •  dextrose  •  glucagon (human recombinant)  •  melatonin  •  ondansetron  •  sodium chloride    Assessment & Plan   Assessment & Plan     Active Hospital Problems    Diagnosis  POA   • **GIB (gastrointestinal bleeding) [K92.2]  Yes   • Normocytic anemia [D64.9]  Unknown   • Acute renal failure (ARF) (HCC) [N17.9]  Unknown   • COVID-19 virus detected [U07.1]  Unknown   • Thrombocytopenia (HCC) [D69.6]  Unknown   • Hypoalbuminemia [E88.09]  Unknown   • AMS (altered mental status) [R41.82]  Unknown   • Lower GI bleed [K92.2]  Yes   • Type 2 diabetes mellitus without complication, with long-term current use of insulin (HCC) [E11.9, Z79.4]  Not Applicable   • Chronic systolic congestive heart failure (HCC) [I50.22]  Yes   • CAD (coronary artery disease) [I25.10]  Yes   • Chronic atrial fibrillation (HCC) [I48.20]  Yes   • CKD (chronic kidney disease), stage IV (HCC) [N18.4]  Yes   • Hyperlipidemia LDL goal <100 [E78.5]  Yes   • Essential hypertension [I10]  Yes   • Hypothyroidism [E03.9]  Yes   • Pulmonary embolism (HCC) [I26.99]  Yes   • CHF (NYHA class IV, ACC/AHA stage D) (HCC) [I50.84]  Yes      Resolved Hospital Problems   No resolved problems to display.     Brief Hospital Course to date:  Karla Cristobal is a 87 y.o. female with anemia, atrial fibrillation on Eliquis, CKD stage IV, HLD, hypertension, hypothyroidism, CAD s/p stents, diabetes type 2, and systolic heart failure (2L NC at  night) who presented to the ED on 7/28/2022 for rectal bleeding. 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  3 endoclips were applied for hemostasis.  H/H has been stable since. She continues on PPI BID.  She was incidentally found to be COVID positive and had acute kidney injury for which nephrology has been following. She had temporary dialysis catheter placed on 8/6/2022 and was started on hemodialysis.      This patient's problems and plans were partially entered by my partner and updated as appropriate by me 08/12/22.    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  3 endoclips were applied for hemostasis  -s/p IV Iron  -Eliquis resumed 8/7, missed am doses on 8/8 and 8/9  -has only received 5 out of the last 7 doses  -on low dose apixaban 2.5 mg every 12 hours for atrial fibrillation  -PPI BID     ROBBI on CKD IV  -Nephrology consulted  -Started on Hemodialysis this admission on 8/6  -working on outpatient dialysis chair (New)  -has access in Right Upper Chest     N/V  -Zofran as needed    DMII  - A1C 05/2022 7.6  - Meal time and SSI-adjust as needed    Hypothyroidism  -levothyroxine     COVID-19 positive  -Asymptomatic    -Out of Isolation on 8/8     Mixed systolic and diastolic CHF  Moderate to severe MR  Persistent atrial fibrillation   -Seen by cardiology and started on carvedilol but held a few times due to low normal BP   -apixaban restarted on 8/7 but has missed a few doses on 8/8 and 8/9     Expected Discharge Location and Transportation: Plan to go to rehab. Will need new Dialysis Chair appt  Expected Discharge Date: 8/15    DVT prophylaxis:  Medical DVT prophylaxis orders are present.     AM-PAC 6 Clicks Score (PT): 10 (08/10/22 1807)    CODE  STATUS:   Code Status and Medical Interventions:   Ordered at: 07/28/22 1782     Level Of Support Discussed With:    Patient     Code Status (Patient has no pulse and is not breathing):    CPR (Attempt to Resuscitate)     Medical Interventions (Patient has pulse or is breathing):    Full Support       NESS Diallo  08/12/22

## 2022-08-12 NOTE — NURSING NOTE
"Reason for Wound, Ostomy and Continence (WOC) Nursing Consult: \"Follow-up to bilateral Gluteal and coccyx\"    Patient awake and alert, nurse present in room.  Family/support person not present.      Skin assessed and the following noted: Bilateral Gluteal most likely from Gluteal's \"kissing\" against the other causing friction breakdown.  Areas are fully blanchable, rough in appearance, slightly blistered skin.  Almost more of a dark brown thin purple in person.  Same goes with coccyx, scaly, darker brown to purple but blanchable.    1.Wound Assessment  Wound Type: Abrasion/friction  Location: Bilateral gluteal and coccyx  Measurements: Measured  Wound Bed: blanchable and purple, almost a darker brown than purple in person  Wound Edges: Irregular  Periwound Skin: blanchable and blistered   Drainage Characteristics/Odor: none  Drainage Amount: none  Pain: No   Care provided: The wound was cleansed with foam soap and blue wipes, Venelex and Z guard applied  Wound Image:         Recommendation(s) for management of wound:   -Refer to wound care orders for specific instructions on how to treat/manage wound.  Most recent Cornel Scale score:  Sensory Perception: 3-->slightly limited  Moisture: 3-->occasionally moist  Activity: 2-->chairfast  Mobility: 2-->very limited  Nutrition: 3-->adequate  Friction and Shear: 2-->potential problem  Cornel Score: 15 (08/12/22 0800)   Specialty support surface: Low Air Loss (CHARLINE) Mattress     Pressure Injury Prevention Protocol (initiate for Cornel Score of 18 or less):   *Keep skin dry, turn q 2 hr, keep heels elevated and offloaded with offloading heel boots.    *Apply z-guard to bottom and bony prominences daily and as needed with incontinence episodes.  *Follow C.A.R.E protocol if medical devices (Bipap, sr, Ng tube, etc) are being used.   All skin interventions in place.  Head to toe assessment completed. Heels and bottom blanchable, intact and offloaded.  Discussed plan of care " with RN.    Thank you for consulting the WOC Nurse.  WOC Team will continue to follow.  Please re consult if the wound(s) worsens.

## 2022-08-13 NOTE — THERAPY TREATMENT NOTE
Patient Name: Karla Cristobal  : 1935    MRN: [id number]                              Today's Date: 2022       Admit Date: 2022    Visit Dx:     ICD-10-CM ICD-9-CM   1. Lower GI bleed  K92.2 578.9   2. Chronic anticoagulation  Z79.01 V58.61   3. History of atrial fibrillation  Z86.79 V12.59   4. Generalized weakness  R53.1 780.79   5. Malaise and fatigue  R53.81 780.79    R53.83    6. Nausea  R11.0 787.02   7. Acute renal failure superimposed on stage 4 chronic kidney disease, unspecified acute renal failure type (HCC)  N17.9 584.9    N18.4 585.4   8. Thrombocytopenia ([facility])  D69.6 287.5   9. History of CHF (congestive heart failure)  Z86.79 V12.59   10. History of diabetes mellitus  Z86.39 V12.29   11. History of diverticulosis  Z87.19 V12.79   12. Gastrointestinal hemorrhage, unspecified gastrointestinal hemorrhage type  K92.2 578.9     Patient Active Problem List   Diagnosis   • Chronic atrial fibrillation (HCC)   • CKD (chronic kidney disease), stage IV (HCC)   • Diabetic nephropathy (HCC)   • Diabetic retinopathy (HCC)   • Malignant neoplasm of endometrium (HCC)   • Hyperlipidemia LDL goal <100   • Essential hypertension   • Hypothyroidism   • Insomnia   • Leukocytosis   • Memory impairment   • Nausea   • Pulmonary embolism (HCC)   • Sleep apnea   • Squamous cell carcinoma of skin   • CHF (NYHA class IV, ACC/AHA stage D) ([facility])   • Ischemic heart disease   • CAD (coronary artery disease)   • DVT of lower extremity (deep venous thrombosis) (HCC)   • Chronic systolic congestive heart failure (HCC)   • Morbidly obese (HCC)   • Ischemic cardiomyopathy   • Type 2 diabetes mellitus without complication, with long-term current use of insulin (HCC)   • Exudative age-related macular degeneration, right eye, with active choroidal neovascularization (HCC)   • Hypoxia   • Cellulitis of left lower extremity   • Left leg cellulitis   • Food impaction of esophagus   • Malignant tumor of ascending colon (HCC)   •  GIB (gastrointestinal bleeding)   • Normocytic anemia   • Acute renal failure (ARF) ([facility])   • COVID-19 virus detected   • Thrombocytopenia ([facility])   • Hypoalbuminemia   • AMS (altered mental status)   • Lower GI bleed     Past Medical History:   Diagnosis Date   • Acute bronchitis    • Arthritis    • Atrial fibrillation ([facility])    • CAD (coronary artery disease)     s/p MI 2005; 3 stents inserted    • Change in mole    • Change in mole    • Change in nevus 6/16/2016   • Chronic kidney disease     stage IV-sees Dr Bk Mckeon every 3-4 months    • Chronic renal disease, stage 4, severely decreased glomerular filtration rate between 15-29 mL/min/1.73 square meter ([facility])    • Chronic systolic heart failure ([facility])    • Congestive heart disease ([facility])    • Conjunctivitis    • Deep vein thrombosis of lower extremity ([facility])    • Diabetes mellitus ([facility])    • Diabetes mellitus ([facility]) 6/16/2016    Impression: 03/15/2016 - blood sugar 166 and hgn a1c 7.3%  is up to date with eye exam  neuropathy with callus, no ulcer  some scratches feet  ur alb due and ordered  no change other than provided samples of toujeo because she feels like lantus worked much better than levemir, she has tried titrating levemir and it is less effective  continue testing and f/u 3-4 months  Impression: 11/23/2015 - bl   • Diabetic foot ulcer ([facility]) 6/16/2016   • Dog bite of hand    • Easy bruising    • Endometrial cancer ([facility])     partial hysterectomy; radiation as well    • Foot pain    • Foot pain 6/16/2016    Description: lancinating- worse but not consistent enough to want rx   • Fracture of hip ([facility])    • Generalized ischemic myocardial dysfunction 6/16/2016   • Glaucoma     drops daily    • Hyperlipidemia    • Hypertension    • Hypothyroidism    • Insomnia    • Ischemic heart disease    • Macular degeneration    • Methicillin resistant Staphylococcus aureus infection 6/16/2016   • Myocardial infarction ([facility]) 2005   • Nausea with vomiting    • Pericardial  effusion    • Shortness of breath 6/16/2016    Impression: 03/24/2015 - chronic. On 2 l oxygen at night. check cbc, bnp;    • Skin cancer, basal cell     nose, leg    • Skin lesion 6/16/2016    Impression: 03/24/2015 - refer derm for eval- seb kerat ant tib and ?ak medially with redness and irritation;    • Sleep apnea     oxygen at night due to low sats but no cpap   • Type 2 diabetes mellitus (HCC)    • UTI (urinary tract infection) 06/12/2020    currently taking antibiotics-patient's daughter notified Dr Beal's office and office said it should not delay generator change      Past Surgical History:   Procedure Laterality Date   • CARDIAC CATHETERIZATION     • CARDIAC DEFIBRILLATOR PLACEMENT     • CARDIAC ELECTROPHYSIOLOGY PROCEDURE N/A 7/17/2020    Procedure: ICD BIV  generator change- [facility]- 3-6 weeks;  Surgeon: Tano Beal MD;  Location:  [facility];  Service: Cardiology;  Laterality: N/A;   • CHOLECYSTECTOMY     • COLONOSCOPY N/A 7/30/2022    Procedure: COLONOSCOPY;  Surgeon: Brunner, Mark I, MD;  Location:  [facility];  Service: Gastroenterology;  Laterality: N/A;  WITH 3 RESOLUTION CLIPS   • CORONARY ARTERY BYPASS GRAFT  2000   • CORONARY STENT PLACEMENT      3 stents    • ENDOSCOPY N/A 11/17/2021    Procedure: ESOPHAGOGASTRODUODENOSCOPY;  Surgeon: Brunner, Mark I, MD;  Location:  [facility];  Service: Gastroenterology;  Laterality: N/A;   • EYE SURGERY Bilateral     cataract extraction    • HIP SURGERY Left     x3   • HYSTERECTOMY      partial    • KNEE ARTHROPLASTY Bilateral    • PACEMAKER IMPLANTATION     • TONSILLECTOMY        General Information     Row Name 08/13/22 1403          OT Time and Intention    Document Type therapy note (daily note)  -JR     Mode of Treatment occupational therapy  -JR     Row Name 08/13/22 1403          General Information    Patient Profile Reviewed yes  -JR     Existing Precautions/Restrictions fall  -JR     Barriers to Rehab medically  complex;previous functional deficit  -     Row Name 08/13/22 1403          Cognition    Orientation Status (Cognition) oriented x 3  -[facility] Name 08/13/22 1403          Safety Issues, Functional Mobility    Safety Issues Affecting Function (Mobility) awareness of need for assistance;insight into deficits/self-awareness  -     Impairments Affecting Function (Mobility) strength;grasp;endurance/activity tolerance;balance;postural/trunk control;range of motion (ROM);shortness of breath  -           User Key  (r) = Recorded By, (t) = Taken By, (c) = Cosigned By    Initials Name Provider Type    Charisse Rider, OT Occupational Therapist                 Mobility/ADL's     Row Name 08/13/22 1404          Bed Mobility    Comment, (Bed Mobility) Pt declined EOB/OOB due to just returning from dialysis. Pt agreeable to ex in bed.  -[facility] Name 08/13/22 1404          Activities of Daily Living    BADL Assessment/Intervention grooming  -[facility] Name 08/13/22 1404          Grooming Assessment/Training    [place] Level (Grooming) wash face, hands;set up;hair care, combing/brushing;maximum assist (25% patient effort);verbal cues  -     Position (Grooming) sitting up in bed  -           User Key  (r) = Recorded By, (t) = Taken By, (c) = Cosigned By    Initials Name Provider Type    Charisse Rider, OT Occupational Therapist               Obj/Interventions     [facility] Name 08/13/22 1406          Shoulder (Therapeutic Exercise)    Shoulder (Therapeutic Exercise) AAROM (active assistive range of motion)  -     Shoulder AAROM (Therapeutic Exercise) bilateral;flexion;extension;10 repetitions  -[facility] Name 08/13/22 1406          Elbow/Forearm (Therapeutic Exercise)    Elbow/Forearm (Therapeutic Exercise) AAROM (active assistive range of motion)  -     Elbow/Forearm AROM (Therapeutic Exercise) bilateral;flexion;extension;10 repetitions  -[facility] Name 08/13/22 1406          Hand (Therapeutic  Exercise)    Hand AROM/AAROM (Therapeutic Exercise) bilateral;AAROM (active assistive range of motion);finger flexion;finger extension;10 repetitions  -[facility] Name 08/13/22 1406          Motor Skills    Therapeutic Exercise shoulder;elbow/forearm;hand  -           User Key  (r) = Recorded By, (t) = Taken By, (c) = Cosigned By    Initials Name Provider Type     Charisse Llamas, OT Occupational Therapist               Goals/Plan    No documentation.                Clinical Impression     Row Name 08/13/22 1407          Pain Assessment    Additional Documentation Pain Scale: FACES Pre/Post-Treatment (Group)  -JR     Row Name 08/13/22 1407          Pain Scale: FACES Pre/Post-Treatment    Pain: FACES Scale, Pretreatment 2-->hurts little bit  -JR     Posttreatment Pain Rating 2-->hurts little bit  -     Pain Location generalized  -JR     Row Name 08/13/22 1407          Plan of Care Review    Plan of Care Reviewed With patient  -     Outcome Evaluation Limited treatment session this date due to fatigue following dialysis. Recommend continued skilled OT services and transfer to SNF at d/c.  -[facility] 08/13/22 1407          Therapy Assessment/Plan (OT)    Patient/Family Therapy Goal Statement (OT) go home  -     Rehab Potential (OT) fair, will monitor progress closely  -JR     Row Name 08/13/22 1407          Therapy Plan Review/Discharge Plan (OT)    Anticipated Discharge Disposition (OT) skilled nursing facility  -JR     Row Name 08/13/22 1400          Vital Signs    Pre Systolic BP Rehab 128  -JR     Pre Treatment Diastolic BP 64  -JR     Post Systolic BP Rehab 114  -JR     Post Treatment Diastolic BP 71  -JR     Pretreatment Heart Rate (beats/min) 77  -JR     Posttreatment Heart Rate (beats/min) 77  -JR     Pre SpO2 (%) 100  -JR     O2 Delivery Pre Treatment room air  -JR     Post SpO2 (%) 96  -JR     O2 Delivery Post Treatment room air  -     Pre Patient Position Supine  -JR     Intra Patient  Position Supine  -JR     Post Patient Position Supine  -JR     Row Name 08/13/22 1407          Positioning and Restraints    Pre-Treatment Position in bed  -JR     Post Treatment Position bed  -JR     In Bed notified nsg;supine;call light within reach;encouraged to call for assist;exit alarm on  -JR           User Key  (r) = Recorded By, (t) = Taken By, (c) = Cosigned By    Initials Name Provider Type    Charisse Rider OT Occupational Therapist               Outcome Measures     Row Name 08/13/22 1409          How much help from another is currently needed...    Putting on and taking off regular lower body clothing? 1  -JR     Bathing (including washing, rinsing, and drying) 1  -JR     Toileting (which includes using toilet bed pan or urinal) 1  -JR     Putting on and taking off regular upper body clothing 2  -JR     Taking care of personal grooming (such as brushing teeth) 2  -JR     Eating meals 3  -JR     AM-PAC 6 Clicks Score (OT) 10  -JR     Row Name 08/13/22 1409          Functional Assessment    Outcome Measure Options AM-PAC 6 Clicks Daily Activity (OT)  -JR           User Key  (r) = Recorded By, (t) = Taken By, (c) = Cosigned By    Initials Name Provider Type    Charisse Rider OT Occupational Therapist                Occupational Therapy Education                 Title: PT OT SLP Therapies (In Progress)     Topic: Occupational Therapy (In Progress)     Point: ADL training (In Progress)     Description:   Instruct learner(s) on proper safety adaptation and remediation techniques during self care or transfers.   Instruct in proper use of assistive devices.              Learning Progress Summary           Patient Acceptance, E, NR by  at 8/13/2022 3170    Comment: Educated pt regarding therex      Show all documentation for this point (7)                 Point: Home exercise program (In Progress)     Description:   Instruct learner(s) on appropriate technique for monitoring, assisting and/or  progressing therapeutic exercises/activities.              Learning Progress Summary           Patient Acceptance, E, NR by  at 8/13/2022 1345    Comment: Educated pt regarding therex      Show all documentation for this point (6)                 Point: Precautions (In Progress)     Description:   Instruct learner(s) on prescribed precautions during self-care and functional transfers.              Learning Progress Summary           Patient Acceptance, E, NR by MA at 8/10/2022 1131      Show all documentation for this point (3)                 Point: Body mechanics (In Progress)     Description:   Instruct learner(s) on proper positioning and spine alignment during self-care, functional mobility activities and/or exercises.              Learning Progress Summary           Patient Acceptance, E, NR by MA at 8/10/2022 1131      Show all documentation for this point (3)                             User Key     Initials Effective Dates Name Provider Type Discipline     06/16/21 -  Charisse Llamas, OT Occupational Therapist OT    MA 11/19/20 -  Janelle Leiva, OT Occupational Therapist OT              OT Recommendation and Plan  Planned Therapy Interventions (OT): activity tolerance training, adaptive equipment training, BADL retraining, functional balance retraining, patient/caregiver education/training, transfer/mobility retraining, strengthening exercise, ROM/therapeutic exercise, occupation/activity based interventions  Therapy Frequency (OT): daily  Plan of Care Review  Plan of Care Reviewed With: patient  Outcome Evaluation: Limited treatment session this date due to fatigue following dialysis. Recommend continued skilled OT services and transfer to SNF at d/c.     Time Calculation:    Time Calculation- OT     Row Name 08/13/22 1410             Time Calculation- OT    OT Start Time 1345  -      OT Received On 08/13/22  -              Timed Charges    10616 - OT Self Care/Mgmt Minutes 11  -               Total Minutes    Timed Charges Total Minutes 11  -JR       Total Minutes 11  -JR            User Key  (r) = Recorded By, (t) = Taken By, (c) = Cosigned By    Initials Name Provider Type    Charisse Rider OT Occupational Therapist              Therapy Charges for Today     Code Description Service Date Service Provider Modifiers Qty    [id number]  OT SELF CARE/MGMT/TRAIN EA 15 MIN 8/13/2022 Charisse Llamas OT GO 1               Charisse Llamas OT  8/13/2022

## 2022-08-13 NOTE — PROGRESS NOTES
"   LOS: 15 days    Patient Care Team:  Luis Cardenas MD as PCP - General (Nephrology)    Chief Complaint:  Gi bleed    Subjective   Interval History:   No new events or distress.  Patient seen on dialysis comfortable.    Review of Systems:   Denies nausea vomiting chest pain shortness of breath.    Objective     Vital Sign Min/Max for last 24 hours  Temp  Min: 97.8 °F (36.6 °C)  Max: 98.3 °F (36.8 °C)   BP  Min: 101/58  Max: 123/65   Pulse  Min: 75  Max: 86   Resp  Min: 16  Max: 18   SpO2  Min: 94 %  Max: 99 %   No data recorded   Weight  Min: 91.6 kg (202 lb)  Max: 91.6 kg (202 lb)     Flowsheet Rows    Flowsheet Row First Filed Value   Admission Height 162.6 cm (64\") Documented at 07/28/2022 1451   Admission Weight 76.2 kg (168 lb) Documented at 07/28/2022 1451          No intake/output data recorded.  I/O last 3 completed shifts:  In: 520 [P.O.:520]  Out: -     Physical Exam:    General Appearance: Awake alert oriented x3 comfortable, no distress  Eyes: PER, EOMI.  Neck: Supple no JVD.  Lungs: Clear auscultation, no rales rhonchi's, equal chest movement, nonlabored.  Heart: No gallop, murmur, rub, RRR.  Abdomen: Soft, nontender, positive bowel sounds, no organomegaly.  Extremities: No edema, no cyanosis.  Neuro: No focal deficit, moving all extremities, alert oriented X 3  Tunnel catheter in place.      WBC WBC   Date Value Ref Range Status   08/13/2022 7.08 3.40 - 10.80 10*3/mm3 Final   08/12/2022 6.50 3.40 - 10.80 10*3/mm3 Final      HGB Hemoglobin   Date Value Ref Range Status   08/13/2022 10.0 (L) 12.0 - 15.9 g/dL Final   08/12/2022 9.8 (L) 12.0 - 15.9 g/dL Final      HCT Hematocrit   Date Value Ref Range Status   08/13/2022 31.4 (L) 34.0 - 46.6 % Final   08/12/2022 29.9 (L) 34.0 - 46.6 % Final      Platlets No results found for: LABPLAT   MCV MCV   Date Value Ref Range Status   08/13/2022 92.4 79.0 - 97.0 fL Final   08/12/2022 89.5 79.0 - 97.0 fL Final          Sodium Sodium   Date Value Ref Range Status "   08/13/2022 133 (L) 136 - 145 mmol/L Final   08/12/2022 133 (L) 136 - 145 mmol/L Final   08/11/2022 133 (L) 136 - 145 mmol/L Final      Potassium Potassium   Date Value Ref Range Status   08/13/2022 4.0 3.5 - 5.2 mmol/L Final     Comment:     Slight hemolysis detected by analyzer. Results may be affected.   08/12/2022 4.2 3.5 - 5.2 mmol/L Final   08/11/2022 3.7 3.5 - 5.2 mmol/L Final      Chloride Chloride   Date Value Ref Range Status   08/13/2022 100 98 - 107 mmol/L Final   08/12/2022 99 98 - 107 mmol/L Final   08/11/2022 97 (L) 98 - 107 mmol/L Final      CO2 CO2   Date Value Ref Range Status   08/13/2022 20.0 (L) 22.0 - 29.0 mmol/L Final   08/12/2022 24.0 22.0 - 29.0 mmol/L Final   08/11/2022 26.0 22.0 - 29.0 mmol/L Final      BUN BUN   Date Value Ref Range Status   08/13/2022 47 (H) 8 - 23 mg/dL Final   08/12/2022 42 (H) 8 - 23 mg/dL Final   08/11/2022 54 (H) 8 - 23 mg/dL Final      Creatinine Creatinine   Date Value Ref Range Status   08/13/2022 2.98 (H) 0.57 - 1.00 mg/dL Final   08/12/2022 2.59 (H) 0.57 - 1.00 mg/dL Final   08/11/2022 2.81 (H) 0.57 - 1.00 mg/dL Final      Calcium Calcium   Date Value Ref Range Status   08/13/2022 8.1 (L) 8.6 - 10.5 mg/dL Final   08/12/2022 8.2 (L) 8.6 - 10.5 mg/dL Final   08/11/2022 8.3 (L) 8.6 - 10.5 mg/dL Final      PO4 No results found for: CAPO4   Albumin Albumin   Date Value Ref Range Status   08/11/2022 2.50 (L) 3.50 - 5.20 g/dL Final      Magnesium No results found for: MG   Uric Acid No results found for: URICACID        Results Review:     I reviewed the patient's new clinical results.    apixaban, 2.5 mg, Oral, Q12H  atorvastatin, 20 mg, Oral, Nightly  carvedilol, 3.125 mg, Oral, BID With Meals  castor oil-balsam peru, 1 application, Topical, Q12H  dextromethorphan polistirex ER, 60 mg, Oral, Q12H  epoetin shukri/shukri-epbx, 10,000 Units, Subcutaneous, Once per day on Tue Thu Sat  insulin lispro, 0-7 Units, Subcutaneous, TID AC  Insulin Lispro, 4 Units, Subcutaneous,  TID With Meals  latanoprost, 1 drop, Both Eyes, Nightly  levothyroxine, 112 mcg, Oral, Q AM  pantoprazole, 40 mg, Oral, BID AC  sodium chloride, 10 mL, Intravenous, Q12H  timolol, 1 drop, Both Eyes, BID           Medication Review:     Assessment & Plan       GIB (gastrointestinal bleeding)    Chronic atrial fibrillation (HCC)    CKD (chronic kidney disease), stage IV (HCC)    Hyperlipidemia LDL goal <100    Essential hypertension    Hypothyroidism    Pulmonary embolism (HCC)    CHF (NYHA class IV, ACC/AHA stage D) (HCC)    CAD (coronary artery disease)    Chronic systolic congestive heart failure (HCC)    Type 2 diabetes mellitus without complication, with long-term current use of insulin (HCC)    Normocytic anemia    Acute renal failure (ARF) (HCC)    COVID-19 virus detected    Thrombocytopenia (HCC)    Hypoalbuminemia    AMS (altered mental status)    Lower GI bleed      1- ROBBI on CKD stage IV - Thought to be hemodynamic injury progression of CKD to ESRD. Started on HD on 8/6/22.   2. CKD stage IV -baseline Scr 2.5 range  3- GI bleed   4- Low albumin level   5- Iron def anemia Tsat 11%. S/p iron supplementation. On  BENY on HD days   6- Renal cysts   7- Systolic CHF - Recent Echo showing EF 37% now. Volume status stable.  8. BMD: Check PTH level.     Plan:  Dialysis in progress.  Continued dialysis Tuesday Thursday Saturday.  BENY on dialysis days  Renal diet with fluid restriction less than 1500 mL/day.  Will need outpatient hemodialysis chair.  Case management working with rehab placement with HD.  Case discussed with the daughter Frida on [phone] in detail regards to patient's future plan.    Albert Sharma MD  08/13/22  08:25 EDT

## 2022-08-13 NOTE — PLAN OF CARE
Goal Outcome Evaluation:  Plan of Care Reviewed With: patient, daughter        Progress: no change  Outcome Evaluation: Patient resting this shift. VSS, room air. No complaints of pain or SOA. HD this shift. Plan is rehab placement.

## 2022-08-13 NOTE — PLAN OF CARE
Goal Outcome Evaluation:  Plan of Care Reviewed With: patient           Outcome Evaluation: Limited treatment session this date due to fatigue following dialysis. Recommend continued skilled OT services and transfer to SNF at d/c.

## 2022-08-13 NOTE — PLAN OF CARE
Goal Outcome Evaluation:  Plan of Care Reviewed With: patient    Patient is alert and oriented x3 with intermittently forgetful/confused. VSS throughout this shift. Lungs diminished, Sp02 99% on RA, intermittent cough. Denies pain and discomfort. Pt scheduled for dialysis today. Pending placement for rehab.     Progress: improving

## 2022-08-13 NOTE — PROGRESS NOTES
[facility] Medicine Services  PROGRESS NOTE    Patient Name: Karla Cristobal  : 1935  MRN: [id number]    Date of Admission: 2022  Primary Care Physician: Luis Cardenas MD    Subjective   Subjective     CC:   Weakness following dialysis    HPI:    Patient resting in bed, eating dinner.  Feels pretty good now but was tired after the dialysis today.  Not much ambulation.  Has been laying in the bed.  Missed physical therapy today due to being tired.  Her daughter is concerned about her mobility and wants to encourage this all she can.    ROS:  MSK- is ambulating  CV- No chest pain  Resp-chronic dyspnea baseline  GI- no diarrhea or constipation, good appetite  - no oliguria or dysuria  Neuro-denies insomnia  Objective   Objective     Vital Signs:   Temp:  [97.6 °F (36.4 °C)-98.3 °F (36.8 °C)] 97.6 °F (36.4 °C)  Heart Rate:  [73-86] 77  Resp:  [16-18] 16  BP: (107-128)/(52-76) 107/52     Physical Exam:  Constitutional: No acute distress, awake, alert  HENT: NCAT, mucous membranes moist  Respiratory: Clear to auscultation bilaterally, respiratory effort normal   Cardiovascular: RRR, no murmurs, rubs, or gallops  Gastrointestinal: Positive bowel sounds, soft, nontender, nondistended  Musculoskeletal: No bilateral ankle edema  Psychiatric: Appropriate affect, cooperative  Neurologic:  speech clear and coherent  Skin: No rashes      Results Reviewed:  LAB RESULTS:      Lab 22  0635 22  1201 22  0424 22  1347 22  0717   WBC 7.08 6.50 5.83 5.33 4.68   HEMOGLOBIN 10.0* 9.8* 10.0* 9.7* 9.5*   HEMATOCRIT 31.4* 29.9* 30.4* 29.7* 28.6*   PLATELETS 186 133* 212 190 157   NEUTROS ABS 4.27 3.64 3.76  --   --    IMMATURE GRANS (ABS) 0.09* 0.08* 0.06*  --   --    LYMPHS ABS 1.36 1.46 0.95  --   --    MONOS ABS 0.99* 1.01* 0.74  --   --    EOS ABS 0.30 0.25 0.26  --   --    MCV 92.4 89.5 87.4 89.2 88.0         Lab 22  0635 22  [facility] [place] 22  0827  08/09/22 0424 08/08/22  1347 08/07/22  0717   SODIUM 133* 133* 133* 140 141 138   POTASSIUM 4.0 4.2 3.7 3.9 3.9 4.1   CHLORIDE 100 99 97* 106 106 105   CO2 20.0* 24.0 26.0 24.0 22.0 21.0*   ANION GAP 13.0 10.0 10.0 10.0 13.0 12.0   BUN 47* 42* 54* 58* 100* 86*   CREATININE 2.98* 2.59* 2.81* 2.34* 3.13* 3.06*   EGFR 14.8* 17.5* 15.9* 19.8* 14.0* 14.4*   GLUCOSE 159* 200* 139* 166* 125* 170*   CALCIUM 8.1* 8.2* 8.3* 8.7 8.6 8.5*   MAGNESIUM  --   --   --   --  2.1  --    PHOSPHORUS  --   --  2.9 2.9 4.1 3.9         Lab 08/11/22 0827 08/09/22 0424 08/08/22  1347 08/07/22  0717   ALBUMIN 2.50* 2.50* 2.60* 2.40*                     Brief Urine Lab Results  (Last result in the past 365 days)      Color   Clarity   Blood   Leuk Est   Nitrite   Protein   CREAT   Urine HCG        07/30/22 1742 Yellow   Clear   Small (1+)   Trace   Negative   Trace                 Microbiology Results Abnormal     Procedure Component Value - Date/Time    Eosinophil Smear - Urine, Urine, Clean Catch [[id number]]  (Normal) Collected: 07/30/22 1742    Lab Status: Final result Specimen: Urine, Clean Catch Updated: 07/30/22 1938     Eosinophil Smear 0 % EOS/100 Cells     Narrative:      No eosinophil seen          No radiology results from the last 24 hrs    Results for orders placed during the hospital encounter of 07/28/22    Adult Transthoracic Echo Complete W/ Cont if Necessary Per Protocol    Interpretation Summary  · Mild biatrial enlargement.  · Moderately reduced right ventricular systolic function noted.  · Moderate left ventricular systolic dysfunction, estimated EF 37%.  · Left ventricular wall thickness is consistent with mild concentric hypertrophy.  · Lambl's excrescences of the aortic valve are noted. There is trace aortic insufficiency, no evidence of aortic stenosis.  · Moderate mitral regurgitation.  · Moderate to severe tricuspid regurgitation with RVSP 48 mmHg.  · Moderate pulmonic insufficiency.      I have reviewed the  medications:  Scheduled Meds:apixaban, 2.5 mg, Oral, Q12H  atorvastatin, 20 mg, Oral, Nightly  carvedilol, 3.125 mg, Oral, Q12H  castor oil-balsam peru, 1 application, Topical, Q12H  dextromethorphan polistirex ER, 60 mg, Oral, Q12H  epoetin shukri/shukri-epbx, 10,000 Units, Subcutaneous, Once per day on Tue Thu Sat  insulin lispro, 0-7 Units, Subcutaneous, TID AC  Insulin Lispro, 4 Units, Subcutaneous, TID With Meals  latanoprost, 1 drop, Both Eyes, Nightly  levothyroxine, 112 mcg, Oral, Q AM  pantoprazole, 40 mg, Oral, BID AC  sodium chloride, 10 mL, Intravenous, Q12H  timolol, 1 drop, Both Eyes, BID      Continuous Infusions:   PRN Meds:.•  acetaminophen **OR** acetaminophen **OR** acetaminophen  •  albumin human  •  benzonatate  •  dextrose  •  dextrose  •  glucagon (human recombinant)  •  melatonin  •  ondansetron  •  sodium chloride    Assessment & Plan   Assessment & Plan     Active Hospital Problems    Diagnosis  POA   • **GIB (gastrointestinal bleeding) [K92.2]  Yes   • Normocytic anemia [D64.9]  Unknown   • Acute renal failure (ARF) (HCC) [N17.9]  Unknown   • COVID-19 virus detected [U07.1]  Unknown   • Thrombocytopenia (HCC) [D69.6]  Unknown   • Hypoalbuminemia [E88.09]  Unknown   • AMS (altered mental status) [R41.82]  Unknown   • Lower GI bleed [K92.2]  Yes   • Type 2 diabetes mellitus without complication, with long-term current use of insulin (HCC) [E11.9, Z79.4]  Not Applicable   • Chronic systolic congestive heart failure (HCC) [I50.22]  Yes   • CAD (coronary artery disease) [I25.10]  Yes   • Chronic atrial fibrillation (HCC) [I48.20]  Yes   • CKD (chronic kidney disease), stage IV (HCC) [N18.4]  Yes   • Hyperlipidemia LDL goal <100 [E78.5]  Yes   • Essential hypertension [I10]  Yes   • Hypothyroidism [E03.9]  Yes   • Pulmonary embolism (HCC) [I26.99]  Yes   • CHF (NYHA class IV, ACC/AHA stage D) ([facility]) [I50.84]  Yes      Resolved Hospital Problems   No resolved problems to display.     Brief Hospital  Course to date:  Karla Cristobal is a 87 y.o. female with anemia, atrial fibrillation on Eliquis, CKD stage IV, HLD, hypertension, hypothyroidism, CAD s/p stents, diabetes type 2, and systolic heart failure (2L NC at night) who presented to the ED on 7/28/2022 for rectal bleeding. 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  3 endoclips were applied for hemostasis.  H/H has been stable since. She continues on PPI BID.  She was incidentally found to be COVID positive and had acute kidney injury for which nephrology has been following. She had temporary dialysis catheter placed on 8/6/2022 and was started on hemodialysis.      This patient's problems and plans were partially entered by my partner and updated as appropriate by me 08/13/22.    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  3 endoclips were applied for hemostasis  -s/p IV Iron  -Eliquis resumed 8/7, missed am doses on 8/8 and 8/9  -on low dose apixaban 2.5 mg every 12 hours for atrial fibrillation  -PPI BID     ROBBI on CKD IV  -Nephrology consulted  -Started on Hemodialysis this admission on 8/6  -working on outpatient dialysis chair (New) Tuesday Thursday Saturday  -has access in Right Upper Chest     N/V  -Zofran as needed    DMII  - A1C 05/2022 7.6  - Meal time and SSI-adjust as needed    Hypothyroidism  -levothyroxine     COVID-19 positive  -Asymptomatic    -Out of Isolation on 8/8     Mixed systolic and diastolic CHF  Moderate to severe MR  Persistent atrial fibrillation   -Seen by cardiology and started on carvedilol but held a few times due to low normal BP   -apixaban restarted on 8/7 but has missed a few doses on 8/8 and 8/9     Expected Discharge Location and Transportation: Plan to go to  rehab. Will need new Dialysis Chair appt  Expected Discharge Date: 8/15    DVT prophylaxis:  Medical DVT prophylaxis orders are present.     AM-PAC 6 Clicks Score (PT): 10 (08/10/22 0108)    CODE STATUS:   Code Status and Medical Interventions:   Ordered at: 07/28/22 2116     Level Of Support Discussed With:    Patient     Code Status (Patient has no pulse and is not breathing):    CPR (Attempt to Resuscitate)     Medical Interventions (Patient has pulse or is breathing):    Full Support       Armando Jacobson DO  08/13/22

## 2022-08-13 NOTE — PLAN OF CARE
Problem: Device-Related Complication Risk (Hemodialysis)  Goal: Safe, Effective Therapy Delivery  Outcome: Ongoing, Progressing     Problem: Hemodynamic Instability (Hemodialysis)  Goal: Effective Tissue Perfusion  Outcome: Ongoing, Progressing     Problem: Infection (Hemodialysis)  Goal: Absence of Infection Signs and Symptoms  Outcome: Ongoing, Progressing   Goal Outcome Evaluation:               HD completed, tolerated well. UF goal reached, Report to Ameena primary nurse.

## 2022-08-14 NOTE — PROGRESS NOTES
"   LOS: 16 days    Patient Care Team:  Luis Cardenas MD as PCP - General (Nephrology)    Chief Complaint:  Gi bleed    Subjective   Interval History:   No new events no added distress.       Review of Systems:   Denies any nausea, vomiting, shortness of breath, chest pain, diarrhea.    Objective     Vital Sign Min/Max for last 24 hours  Temp  Min: 97.4 °F (36.3 °C)  Max: 98.2 °F (36.8 °C)   BP  Min: 100/53  Max: 128/64   Pulse  Min: 76  Max: 83   Resp  Min: 16  Max: 18   SpO2  Min: 97 %  Max: 100 %   No data recorded   Weight  Min: 91.8 kg (202 lb 6.4 oz)  Max: 91.8 kg (202 lb 6.4 oz)     Flowsheet Rows    Flowsheet Row First Filed Value   Admission Height 162.6 cm (64\") Documented at 07/28/2022 1451   Admission Weight 76.2 kg (168 lb) Documented at 07/28/2022 1451          No intake/output data recorded.  I/O last 3 completed shifts:  In: 440 [P.O.:440]  Out: 2100 [Other:2100]    Physical Exam:    General Appearance: Awake alert oriented x3 no obvious distress laying comfortable  Eyes: PER, EOMI.  Neck: Supple no JVD.  Lungs: Clear auscultation, no rales rhonchi's, equal chest movement, nonlabored.  Heart: No gallop, murmur, rub, RRR.  Abdomen: Soft, nontender, positive bowel sounds, no organomegaly.  Extremities: No edema, no cyanosis.  Neuro: No focal deficit, moving all extremities, alert oriented X 3  Tunnel catheter in place.      WBC WBC   Date Value Ref Range Status   08/14/2022 7.01 3.40 - 10.80 10*3/mm3 Final   08/13/2022 7.08 3.40 - 10.80 10*3/mm3 Final   08/12/2022 6.50 3.40 - 10.80 10*3/mm3 Final      HGB Hemoglobin   Date Value Ref Range Status   08/14/2022 10.4 (L) 12.0 - 15.9 g/dL Final   08/13/2022 10.0 (L) 12.0 - 15.9 g/dL Final   08/12/2022 9.8 (L) 12.0 - 15.9 g/dL Final      HCT Hematocrit   Date Value Ref Range Status   08/14/2022 31.5 (L) 34.0 - 46.6 % Final   08/13/2022 31.4 (L) 34.0 - 46.6 % Final   08/12/2022 29.9 (L) 34.0 - 46.6 % Final      Platlets No results found for: LABPLAT   MCV MCV "   Date Value Ref Range Status   08/14/2022 88.7 79.0 - 97.0 fL Final   08/13/2022 92.4 79.0 - 97.0 fL Final   08/12/2022 89.5 79.0 - 97.0 fL Final          Sodium Sodium   Date Value Ref Range Status   08/14/2022 137 136 - 145 mmol/L Final   08/13/2022 133 (L) 136 - 145 mmol/L Final   08/12/2022 133 (L) 136 - 145 mmol/L Final      Potassium Potassium   Date Value Ref Range Status   08/14/2022 4.6 3.5 - 5.2 mmol/L Final     Comment:     Slight hemolysis detected by analyzer. Results may be affected.   08/13/2022 4.0 3.5 - 5.2 mmol/L Final     Comment:     Slight hemolysis detected by analyzer. Results may be affected.   08/12/2022 4.2 3.5 - 5.2 mmol/L Final      Chloride Chloride   Date Value Ref Range Status   08/14/2022 104 98 - 107 mmol/L Final   08/13/2022 100 98 - 107 mmol/L Final   08/12/2022 99 98 - 107 mmol/L Final      CO2 CO2   Date Value Ref Range Status   08/14/2022 21.0 (L) 22.0 - 29.0 mmol/L Final   08/13/2022 20.0 (L) 22.0 - 29.0 mmol/L Final   08/12/2022 24.0 22.0 - 29.0 mmol/L Final      BUN BUN   Date Value Ref Range Status   08/14/2022 27 (H) 8 - 23 mg/dL Final   08/13/2022 47 (H) 8 - 23 mg/dL Final   08/12/2022 42 (H) 8 - 23 mg/dL Final      Creatinine Creatinine   Date Value Ref Range Status   08/14/2022 2.25 (H) 0.57 - 1.00 mg/dL Final   08/13/2022 2.98 (H) 0.57 - 1.00 mg/dL Final   08/12/2022 2.59 (H) 0.57 - 1.00 mg/dL Final      Calcium Calcium   Date Value Ref Range Status   08/14/2022 8.1 (L) 8.6 - 10.5 mg/dL Final   08/13/2022 8.1 (L) 8.6 - 10.5 mg/dL Final   08/12/2022 8.2 (L) 8.6 - 10.5 mg/dL Final      PO4 No results found for: CAPO4   Albumin No results found for: ALBUMIN   Magnesium No results found for: MG   Uric Acid No results found for: URICACID        Results Review:     I reviewed the patient's new clinical results.    apixaban, 2.5 mg, Oral, Q12H  atorvastatin, 20 mg, Oral, Nightly  carvedilol, 3.125 mg, Oral, Q12H  castor oil-balsam peru, 1 application, Topical,  Q12H  dextromethorphan polistirex ER, 60 mg, Oral, Q12H  epoetin shukri/shukri-epbx, 10,000 Units, Subcutaneous, Once per day on Tue Thu Sat  insulin lispro, 0-7 Units, Subcutaneous, TID AC  Insulin Lispro, 4 Units, Subcutaneous, TID With Meals  latanoprost, 1 drop, Both Eyes, Nightly  levothyroxine, 112 mcg, Oral, Q AM  pantoprazole, 40 mg, Oral, BID AC  sodium chloride, 10 mL, Intravenous, Q12H  timolol, 1 drop, Both Eyes, BID           Medication Review:     Assessment & Plan       GIB (gastrointestinal bleeding)    Chronic atrial fibrillation (HCC)    CKD (chronic kidney disease), stage IV (HCC)    Hyperlipidemia LDL goal <100    Essential hypertension    Hypothyroidism    Pulmonary embolism (HCC)    CHF (NYHA class IV, ACC/AHA stage D) (HCC)    CAD (coronary artery disease)    Chronic systolic congestive heart failure (HCC)    Type 2 diabetes mellitus without complication, with long-term current use of insulin (HCC)    Normocytic anemia    Acute renal failure (ARF) (HCC)    COVID-19 virus detected    Thrombocytopenia (HCC)    Hypoalbuminemia    AMS (altered mental status)    Lower GI bleed      1- ROBBI on CKD stage IV - Thought to be hemodynamic injury progression of CKD to ESRD. Started on HD on 8/6/22.   2. CKD stage IV -baseline Scr 2.5 range  3- GI bleed   4- Low albumin level   5- Iron def anemia Tsat 11%. S/p iron supplementation. On  BENY on HD days   6- Renal cysts   7- Systolic CHF - Recent Echo showing EF 37% now. Volume status stable.  8. BMD: Check PTH level.     Plan:  Continue with dialysis Tuesday Thursday Saturday.  BENY on dialysis days  Renal diet with fluid restriction less than 1500 mL/day.  Will need outpatient hemodialysis chair.  Case management working with rehab placement with HD.  Case discussed with the daughter Frida on [phone] in detail regards to patient's future plan.    Albert Sharma MD  08/14/22  10:36 EDT

## 2022-08-14 NOTE — PROGRESS NOTES
[facility] Medicine Services  PROGRESS NOTE    Patient Name: Karla Cristobal  : 1935  MRN: [id number]    Date of Admission: 2022  Primary Care Physician: Luis Cardenas MD    Subjective   Subjective     CC:   Weakness following dialysis    HPI:    CBC and BMP reviewed.  On room air.  Nephrology note reviewed who had updated the patient's daughter Frida. I called Frida and updated her as well.    ROS:  MSK- is ambulating  CV- No chest pain  Resp-chronic dyspnea baseline  GI- no diarrhea or constipation, good appetite  - no oliguria or dysuria  Neuro-denies insomnia    Objective   Objective     Vital Signs:   Temp:  [97.3 °F (36.3 °C)-98.2 °F (36.8 °C)] 97.7 °F (36.5 °C)  Heart Rate:  [69-81] 76  Resp:  [16-18] 16  BP: (100-119)/(52-58) 108/58     Physical Exam:  Constitutional: No acute distress, awake, alert  HENT: NCAT, mucous membranes moist  Respiratory: Clear to auscultation bilaterally, respiratory effort normal   Cardiovascular: RRR, no murmurs, rubs, or gallops  Gastrointestinal: Positive bowel sounds, soft, nontender, nondistended  Musculoskeletal: No bilateral ankle edema  Psychiatric: Appropriate affect, cooperative  Neurologic:  speech clear and coherent  Skin: No rashes      Results Reviewed:  LAB RESULTS:      Lab 22  0631 22  0635 22  1201 22  0424 22  1347   WBC 7.01 7.08 6.50 5.83 5.33   HEMOGLOBIN 10.4* 10.0* 9.8* 10.0* 9.7*   HEMATOCRIT 31.5* 31.4* 29.9* 30.4* 29.7*   PLATELETS 205 186 133* 212 190   NEUTROS ABS  --  4.27 3.64 3.76  --    IMMATURE GRANS (ABS)  --  0.09* 0.08* 0.06*  --    LYMPHS ABS  --  1.36 1.46 0.95  --    MONOS ABS  --  0.99* 1.01* 0.74  --    EOS ABS  --  0.30 0.25 0.26  --    MCV 88.7 92.4 89.5 87.4 89.2         Lab 22  0631 22  0635 22  2004 22  0827 22  0424 22  1347   SODIUM 137 133* 133* 133* 140 141   POTASSIUM 4.6 4.0 4.2 3.7 3.9 3.9   CHLORIDE 104 100 99 97* 106  106   CO2 21.0* 20.0* 24.0 26.0 24.0 22.0   ANION GAP 12.0 13.0 10.0 10.0 10.0 13.0   BUN 27* 47* 42* 54* 58* 100*   CREATININE 2.25* 2.98* 2.59* 2.81* 2.34* 3.13*   EGFR 20.8* 14.8* 17.5* 15.9* 19.8* 14.0*   GLUCOSE 146* 159* 200* 139* 166* 125*   CALCIUM 8.1* 8.1* 8.2* 8.3* 8.7 8.6   MAGNESIUM  --   --   --   --   --  2.1   PHOSPHORUS  --   --   --  2.9 2.9 4.1         Lab 08/11/22  0827 08/09/22  0424 08/08/22  1347   ALBUMIN 2.50* 2.50* 2.60*                     Brief Urine Lab Results  (Last result in the past 365 days)      Color   Clarity   Blood   Leuk Est   Nitrite   Protein   CREAT   Urine HCG        07/30/22 1742 Yellow   Clear   Small (1+)   Trace   Negative   Trace                 Microbiology Results Abnormal     Procedure Component Value - Date/Time    Eosinophil Smear - Urine, Urine, Clean Catch [[id number]]  (Normal) Collected: 07/30/22 1742    Lab Status: Final result Specimen: Urine, Clean Catch Updated: 07/30/22 1938     Eosinophil Smear 0 % EOS/100 Cells     Narrative:      No eosinophil seen          No radiology results from the last 24 hrs    Results for orders placed during the hospital encounter of 07/28/22    Adult Transthoracic Echo Complete W/ Cont if Necessary Per Protocol    Interpretation Summary  · Mild biatrial enlargement.  · Moderately reduced right ventricular systolic function noted.  · Moderate left ventricular systolic dysfunction, estimated EF 37%.  · Left ventricular wall thickness is consistent with mild concentric hypertrophy.  · Lambl's excrescences of the aortic valve are noted. There is trace aortic insufficiency, no evidence of aortic stenosis.  · Moderate mitral regurgitation.  · Moderate to severe tricuspid regurgitation with RVSP 48 mmHg.  · Moderate pulmonic insufficiency.      I have reviewed the medications:  Scheduled Meds:apixaban, 2.5 mg, Oral, Q12H  atorvastatin, 20 mg, Oral, Nightly  carvedilol, 3.125 mg, Oral, Q12H  castor oil-balsam peru, 1 application,  Topical, Q12H  dextromethorphan polistirex ER, 60 mg, Oral, Q12H  epoetin shukri/shukri-epbx, 10,000 Units, Subcutaneous, Once per day on Tue Thu Sat  insulin lispro, 0-7 Units, Subcutaneous, TID AC  Insulin Lispro, 4 Units, Subcutaneous, TID With Meals  latanoprost, 1 drop, Both Eyes, Nightly  levothyroxine, 112 mcg, Oral, Q AM  pantoprazole, 40 mg, Oral, BID AC  sodium chloride, 10 mL, Intravenous, Q12H  timolol, 1 drop, Both Eyes, BID      Continuous Infusions:   PRN Meds:.•  acetaminophen **OR** acetaminophen **OR** acetaminophen  •  benzonatate  •  dextrose  •  dextrose  •  glucagon (human recombinant)  •  melatonin  •  ondansetron  •  sodium chloride    Assessment & Plan   Assessment & Plan     Active Hospital Problems    Diagnosis  POA   • **GIB (gastrointestinal bleeding) [K92.2]  Yes   • Normocytic anemia [D64.9]  Unknown   • Acute renal failure (ARF) (HCC) [N17.9]  Unknown   • COVID-19 virus detected [U07.1]  Unknown   • Thrombocytopenia (HCC) [D69.6]  Unknown   • Hypoalbuminemia [E88.09]  Unknown   • AMS (altered mental status) [R41.82]  Unknown   • Lower GI bleed [K92.2]  Yes   • Type 2 diabetes mellitus without complication, with long-term current use of insulin (HCC) [E11.9, Z79.4]  Not Applicable   • Chronic systolic congestive heart failure (HCC) [I50.22]  Yes   • CAD (coronary artery disease) [I25.10]  Yes   • Chronic atrial fibrillation (HCC) [I48.20]  Yes   • CKD (chronic kidney disease), stage IV (HCC) [N18.4]  Yes   • Hyperlipidemia LDL goal <100 [E78.5]  Yes   • Essential hypertension [I10]  Yes   • Hypothyroidism [E03.9]  Yes   • Pulmonary embolism (HCC) [I26.99]  Yes   • CHF (NYHA class IV, ACC/AHA stage D) (HCC) [I50.84]  Yes      Resolved Hospital Problems   No resolved problems to display.     Brief Hospital Course to date:  Karla Cristobal is a 87 y.o. female with anemia, atrial fibrillation on Eliquis, CKD stage IV, HLD, hypertension, hypothyroidism, CAD s/p stents, diabetes type 2, and  systolic heart failure (2L NC at night) who presented to the ED on 7/28/2022 for rectal bleeding. 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  3 endoclips were applied for hemostasis.  H/H has been stable since. She continues on PPI BID.  She was incidentally found to be COVID positive and had acute kidney injury for which nephrology has been following. She had temporary dialysis catheter placed on 8/6/2022 and was started on hemodialysis.      This patient's problems and plans were partially entered by my partner and updated as appropriate by me 08/14/22.    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  3 endoclips were applied for hemostasis  -s/p IV Iron  -Eliquis resumed 8/7, missed am doses on 8/8 and 8/9  -on low dose apixaban 2.5 mg every 12 hours for atrial fibrillation  -PPI BID     ROBBI on CKD IV  -Nephrology consulted  -Started on Hemodialysis this admission on 8/6  -working on outpatient dialysis chair (New) Tuesday Thursday Saturday  -has access in Right Upper Chest     N/V  -Zofran as needed    DMII  - A1C 05/2022 7.6  - Meal time and SSI-adjust as needed    Hypothyroidism  -levothyroxine     COVID-19 positive  -Asymptomatic    -Out of Isolation on 8/8     Mixed systolic and diastolic CHF  Moderate to severe MR  Persistent atrial fibrillation   -Seen by cardiology and started on carvedilol but held a few times due to low normal BP   -apixaban restarted on 8/7 but has missed a few doses on 8/8 and 8/9     Expected Discharge Location and Transportation: Plan to go to rehab. Will need new Dialysis Chair appt  Expected Discharge Date: 8/15    DVT prophylaxis:  Medical DVT prophylaxis orders are present.     AM-PAC 6 Clicks Score (PT): 10 (08/14/22  0805)    CODE STATUS:   Code Status and Medical Interventions:   Ordered at: 07/28/22 9390     Level Of Support Discussed With:    Patient     Code Status (Patient has no pulse and is not breathing):    CPR (Attempt to Resuscitate)     Medical Interventions (Patient has pulse or is breathing):    Full Support       Armando Jacobson DO  08/14/22

## 2022-08-15 NOTE — PROGRESS NOTES
"   LOS: 17 days    Patient Care Team:  Luis Cardenas MD as PCP - General (Nephrology)    Chief Complaint:  Gi bleed    Subjective   Interval History:   No new events no added distress.  Patient stable without any complaints.       Review of Systems:   Denies nausea, vomiting, chest pain, shortness of breath, no dysuria or gross hematuria.    Objective     Vital Sign Min/Max for last 24 hours  Temp  Min: 98 °F (36.7 °C)  Max: 98.2 °F (36.8 °C)   BP  Min: 95/50  Max: 137/39   Pulse  Min: 73  Max: 79   Resp  Min: 18  Max: 18   SpO2  Min: 96 %  Max: 100 %   No data recorded   No data recorded     Flowsheet Rows    Flowsheet Row First Filed Value   Admission Height 162.6 cm (64\") Documented at 07/28/2022 1451   Admission Weight 76.2 kg (168 lb) Documented at 07/28/2022 1451          No intake/output data recorded.  I/O last 3 completed shifts:  In: 800 [P.O.:800]  Out: -     Physical Exam:    General Appearance: Awake, alert, oriented x3 no obvious distress laying comfortable  Eyes: PER, EOMI.  Neck: Supple no JVD.  Lungs: Clear auscultation, no rales rhonchi's, equal chest movement, nonlabored.  Heart: No gallop, murmur, rub, RRR.  Abdomen: Soft, nontender, positive bowel sounds, no organomegaly.  Extremities: No edema, no cyanosis.  Neuro: No focal deficit, moving all extremities, alert oriented X 3  Tunnel catheter in place.      WBC WBC   Date Value Ref Range Status   08/15/2022 7.65 3.40 - 10.80 10*3/mm3 Final   08/14/2022 7.01 3.40 - 10.80 10*3/mm3 Final   08/13/2022 7.08 3.40 - 10.80 10*3/mm3 Final      HGB Hemoglobin   Date Value Ref Range Status   08/15/2022 10.4 (L) 12.0 - 15.9 g/dL Final   08/14/2022 10.4 (L) 12.0 - 15.9 g/dL Final   08/13/2022 10.0 (L) 12.0 - 15.9 g/dL Final      HCT Hematocrit   Date Value Ref Range Status   08/15/2022 33.1 (L) 34.0 - 46.6 % Final   08/14/2022 31.5 (L) 34.0 - 46.6 % Final   08/13/2022 31.4 (L) 34.0 - 46.6 % Final      Platlets No results found for: LABPLAT   MCV MCV   Date " Value Ref Range Status   08/15/2022 93.0 79.0 - 97.0 fL Final   08/14/2022 88.7 79.0 - 97.0 fL Final   08/13/2022 92.4 79.0 - 97.0 fL Final          Sodium Sodium   Date Value Ref Range Status   08/15/2022 134 (L) 136 - 145 mmol/L Final   08/14/2022 137 136 - 145 mmol/L Final   08/13/2022 133 (L) 136 - 145 mmol/L Final   08/12/2022 133 (L) 136 - 145 mmol/L Final      Potassium Potassium   Date Value Ref Range Status   08/15/2022 4.3 3.5 - 5.2 mmol/L Final   08/14/2022 4.6 3.5 - 5.2 mmol/L Final     Comment:     Slight hemolysis detected by analyzer. Results may be affected.   08/13/2022 4.0 3.5 - 5.2 mmol/L Final     Comment:     Slight hemolysis detected by analyzer. Results may be affected.   08/12/2022 4.2 3.5 - 5.2 mmol/L Final      Chloride Chloride   Date Value Ref Range Status   08/15/2022 100 98 - 107 mmol/L Final   08/14/2022 104 98 - 107 mmol/L Final   08/13/2022 100 98 - 107 mmol/L Final   08/12/2022 99 98 - 107 mmol/L Final      CO2 CO2   Date Value Ref Range Status   08/15/2022 23.0 22.0 - 29.0 mmol/L Final   08/14/2022 21.0 (L) 22.0 - 29.0 mmol/L Final   08/13/2022 20.0 (L) 22.0 - 29.0 mmol/L Final   08/12/2022 24.0 22.0 - 29.0 mmol/L Final      BUN BUN   Date Value Ref Range Status   08/15/2022 40 (H) 8 - 23 mg/dL Final   08/14/2022 27 (H) 8 - 23 mg/dL Final   08/13/2022 47 (H) 8 - 23 mg/dL Final   08/12/2022 42 (H) 8 - 23 mg/dL Final      Creatinine Creatinine   Date Value Ref Range Status   08/15/2022 3.16 (H) 0.57 - 1.00 mg/dL Final   08/14/2022 2.25 (H) 0.57 - 1.00 mg/dL Final   08/13/2022 2.98 (H) 0.57 - 1.00 mg/dL Final   08/12/2022 2.59 (H) 0.57 - 1.00 mg/dL Final      Calcium Calcium   Date Value Ref Range Status   08/15/2022 8.6 8.6 - 10.5 mg/dL Final   08/14/2022 8.1 (L) 8.6 - 10.5 mg/dL Final   08/13/2022 8.1 (L) 8.6 - 10.5 mg/dL Final   08/12/2022 8.2 (L) 8.6 - 10.5 mg/dL Final      PO4 No results found for: CAPO4   Albumin Albumin   Date Value Ref Range Status   08/15/2022 2.60 (L) 3.50 -  5.20 g/dL Final      Magnesium No results found for: MG   Uric Acid No results found for: URICACID        Results Review:     I reviewed the patient's new clinical results.    apixaban, 2.5 mg, Oral, Q12H  atorvastatin, 20 mg, Oral, Nightly  carvedilol, 3.125 mg, Oral, Q12H  castor oil-balsam peru, 1 application, Topical, Q12H  dextromethorphan polistirex ER, 60 mg, Oral, Q12H  epoetin shukri/shukri-epbx, 10,000 Units, Subcutaneous, Once per day on Tue Thu Sat  insulin lispro, 0-7 Units, Subcutaneous, TID AC  Insulin Lispro, 4 Units, Subcutaneous, TID With Meals  latanoprost, 1 drop, Both Eyes, Nightly  levothyroxine, 112 mcg, Oral, Q AM  pantoprazole, 40 mg, Oral, BID AC  sodium chloride, 10 mL, Intravenous, Q12H  timolol, 1 drop, Both Eyes, BID           Medication Review:     Assessment & Plan       GIB (gastrointestinal bleeding)    Chronic atrial fibrillation (HCC)    CKD (chronic kidney disease), stage IV (HCC)    Hyperlipidemia LDL goal <100    Essential hypertension    Hypothyroidism    Pulmonary embolism (HCC)    CHF (NYHA class IV, ACC/AHA stage D) (HCC)    CAD (coronary artery disease)    Chronic systolic congestive heart failure (HCC)    Type 2 diabetes mellitus without complication, with long-term current use of insulin (HCC)    Normocytic anemia    Acute renal failure (ARF) (HCC)    COVID-19 virus detected    Thrombocytopenia (HCC)    Hypoalbuminemia    AMS (altered mental status)    Lower GI bleed      1- ROBBI on CKD stage IV - Thought to be hemodynamic injury progression of CKD to ESRD. Started on HD on 8/6/22.   2. CKD stage IV -baseline Scr 2.5 range  3- GI bleed   4- Low albumin level   5- Iron def anemia Tsat 11%. S/p iron supplementation. On  BENY on HD days   6- Renal cysts   7- Systolic CHF - Recent Echo showing EF 37% now. Volume status stable.  8. BMD: Check PTH level.     Plan:  Continue with dialysis Tuesday Thursday Saturday.  Dialysis orders written  BENY on dialysis days  Renal diet with fluid  restriction less than 1500 mL/day.  Will need outpatient hemodialysis chair.  Case management working with rehab placement with HD.  Case discussed with the daughter Frida on [phone] in detail regards to patient's future plan.    Albert Sharma MD  08/15/22  18:06 EDT

## 2022-08-15 NOTE — THERAPY TREATMENT NOTE
Patient Name: Karla Cristobal  : 1935    MRN: [id number]                              Today's Date: 8/15/2022       Admit Date: 2022    Visit Dx:     ICD-10-CM ICD-9-CM   1. Lower GI bleed  K92.2 578.9   2. Chronic anticoagulation  Z79.01 V58.61   3. History of atrial fibrillation  Z86.79 V12.59   4. Generalized weakness  R53.1 780.79   5. Malaise and fatigue  R53.81 780.79    R53.83    6. Nausea  R11.0 787.02   7. Acute renal failure superimposed on stage 4 chronic kidney disease, unspecified acute renal failure type (HCC)  N17.9 584.9    N18.4 585.4   8. Thrombocytopenia ([facility])  D69.6 287.5   9. History of CHF (congestive heart failure)  Z86.79 V12.59   10. History of diabetes mellitus  Z86.39 V12.29   11. History of diverticulosis  Z87.19 V12.79   12. Gastrointestinal hemorrhage, unspecified gastrointestinal hemorrhage type  K92.2 578.9     Patient Active Problem List   Diagnosis   • Chronic atrial fibrillation (HCC)   • CKD (chronic kidney disease), stage IV (HCC)   • Diabetic nephropathy (HCC)   • Diabetic retinopathy (HCC)   • Malignant neoplasm of endometrium (HCC)   • Hyperlipidemia LDL goal <100   • Essential hypertension   • Hypothyroidism   • Insomnia   • Leukocytosis   • Memory impairment   • Nausea   • Pulmonary embolism (HCC)   • Sleep apnea   • Squamous cell carcinoma of skin   • CHF (NYHA class IV, ACC/AHA stage D) ([facility])   • Ischemic heart disease   • CAD (coronary artery disease)   • DVT of lower extremity (deep venous thrombosis) (HCC)   • Chronic systolic congestive heart failure (HCC)   • Morbidly obese (HCC)   • Ischemic cardiomyopathy   • Type 2 diabetes mellitus without complication, with long-term current use of insulin (HCC)   • Exudative age-related macular degeneration, right eye, with active choroidal neovascularization (HCC)   • Hypoxia   • Cellulitis of left lower extremity   • Left leg cellulitis   • Food impaction of esophagus   • Malignant tumor of ascending colon (HCC)   •  GIB (gastrointestinal bleeding)   • Normocytic anemia   • Acute renal failure (ARF) ([facility])   • COVID-19 virus detected   • Thrombocytopenia ([facility])   • Hypoalbuminemia   • AMS (altered mental status)   • Lower GI bleed     Past Medical History:   Diagnosis Date   • Acute bronchitis    • Arthritis    • Atrial fibrillation ([facility])    • CAD (coronary artery disease)     s/p MI 2005; 3 stents inserted    • Change in mole    • Change in mole    • Change in nevus 6/16/2016   • Chronic kidney disease     stage IV-sees Dr Bk Mckeon every 3-4 months    • Chronic renal disease, stage 4, severely decreased glomerular filtration rate between 15-29 mL/min/1.73 square meter ([facility])    • Chronic systolic heart failure ([facility])    • Congestive heart disease ([facility])    • Conjunctivitis    • Deep vein thrombosis of lower extremity ([facility])    • Diabetes mellitus ([facility])    • Diabetes mellitus ([facility]) 6/16/2016    Impression: 03/15/2016 - blood sugar 166 and hgn a1c 7.3%  is up to date with eye exam  neuropathy with callus, no ulcer  some scratches feet  ur alb due and ordered  no change other than provided samples of toujeo because she feels like lantus worked much better than levemir, she has tried titrating levemir and it is less effective  continue testing and f/u 3-4 months  Impression: 11/23/2015 - bl   • Diabetic foot ulcer ([facility]) 6/16/2016   • Dog bite of hand    • Easy bruising    • Endometrial cancer ([facility])     partial hysterectomy; radiation as well    • Foot pain    • Foot pain 6/16/2016    Description: lancinating- worse but not consistent enough to want rx   • Fracture of hip ([facility])    • Generalized ischemic myocardial dysfunction 6/16/2016   • Glaucoma     drops daily    • Hyperlipidemia    • Hypertension    • Hypothyroidism    • Insomnia    • Ischemic heart disease    • Macular degeneration    • Methicillin resistant Staphylococcus aureus infection 6/16/2016   • Myocardial infarction ([facility]) 2005   • Nausea with vomiting    • Pericardial  effusion    • Shortness of breath 6/16/2016    Impression: 03/24/2015 - chronic. On 2 l oxygen at night. check cbc, bnp;    • Skin cancer, basal cell     nose, leg    • Skin lesion 6/16/2016    Impression: 03/24/2015 - refer derm for eval- seb kerat ant tib and ?ak medially with redness and irritation;    • Sleep apnea     oxygen at night due to low sats but no cpap   • Type 2 diabetes mellitus (HCC)    • UTI (urinary tract infection) 06/12/2020    currently taking antibiotics-patient's daughter notified Dr Beal's office and office said it should not delay generator change      Past Surgical History:   Procedure Laterality Date   • CARDIAC CATHETERIZATION     • CARDIAC DEFIBRILLATOR PLACEMENT     • CARDIAC ELECTROPHYSIOLOGY PROCEDURE N/A 7/17/2020    Procedure: ICD BIV  generator change- [facility]- 3-6 weeks;  Surgeon: Tano Beal MD;  Location:  [facility];  Service: Cardiology;  Laterality: N/A;   • CHOLECYSTECTOMY     • COLONOSCOPY N/A 7/30/2022    Procedure: COLONOSCOPY;  Surgeon: Brunner, Mark I, MD;  Location:  [facility];  Service: Gastroenterology;  Laterality: N/A;  WITH 3 RESOLUTION CLIPS   • CORONARY ARTERY BYPASS GRAFT  2000   • CORONARY STENT PLACEMENT      3 stents    • ENDOSCOPY N/A 11/17/2021    Procedure: ESOPHAGOGASTRODUODENOSCOPY;  Surgeon: Brunner, Mark I, MD;  Location:  [facility];  Service: Gastroenterology;  Laterality: N/A;   • EYE SURGERY Bilateral     cataract extraction    • HIP SURGERY Left     x3   • HYSTERECTOMY      partial    • KNEE ARTHROPLASTY Bilateral    • PACEMAKER IMPLANTATION     • TONSILLECTOMY        General Information     Row Name 08/15/22 1346          OT Time and Intention    Document Type therapy note (daily note)  -IFRAH     Mode of Treatment individual therapy;occupational therapy  -IFRAH     Row Name 08/15/22 1346          General Information    Patient Profile Reviewed yes  -IFRAH     Existing Precautions/Restrictions fall  -IFRAH     Barriers to  Rehab medically complex;previous functional deficit  -[facility] Name 08/15/22 1346          Cognition    Orientation Status (Cognition) oriented x 3  -[facility] Name 08/15/22 1346          Safety Issues, Functional Mobility    Safety Issues Affecting Function (Mobility) insight into deficits/self-awareness  -     Impairments Affecting Function (Mobility) balance;endurance/activity tolerance;strength;pain;range of motion (ROM);postural/trunk control  -     Comment, Safety Issues/Impairments (Mobility) limited by pain in feet  -           User Key  (r) = Recorded By, (t) = Taken By, (c) = Cosigned By    Initials Name Provider Type     Sabi Lim, OT Occupational Therapist                 Mobility/ADL's     [facility] Name 08/15/22 1347          Bed Mobility    Comment, (Bed Mobility) UIC on arrival  -[facility] Name 08/15/22 1347          Transfers    Transfers sit-stand transfer;stand-sit transfer  -     Sit-Stand [place] (Transfers) moderate assist (50% patient effort);verbal cues  -     Stand-Sit [place] (Transfers) minimum assist (75% patient effort)  -[facility] Name 08/15/22 1347          Sit-Stand Transfer    Assistive Device (Sit-Stand Transfers) other (see comments)  -     Comment, (Sit-Stand Transfer) standing at bed rail  -[facility] Name 08/15/22 1347          Stand-Sit Transfer    Assistive Device (Stand-Sit Transfers) other (see comments)  -     Comment, (Stand-Sit Transfer) standing at bed railing  -[facility] Name 08/15/22 1347          Functional Mobility    Functional Mobility- Comment unable due to weakness and foot pain with WB  -[facility] Name 08/15/22 1347          Activities of Daily Living    BADL Assessment/Intervention grooming;toileting;feeding  -[facility] Name 08/15/22 1347          Grooming Assessment/Training    [place] Level (Grooming) oral care regimen;wash face, hands;standby assist;set up;hair care, combing/brushing;minimum assist (75% patient effort)   -IFRAH     Position (Grooming) supported sitting  -IFRAH     Row Name 08/15/22 1340          Toileting Assessment/Training    [place] Level (Toileting) dependent (less than 25% patient effort);change pad/brief;perform perineal hygiene  -IFRAH     Position (Toileting) supported standing  -IFRAH     Comment, (Toileting) Pt. stood at bed rail grossly 2 minutes to complete a BM, be cleaned and pads changed, standing SBA.  -     Row Name 08/15/22 1348          Self-Feeding Assessment/Training    [place] Level (Feeding) liquids to mouth;minimum assist (75% patient effort)  -IFRAH     Position (Self-Feeding) supported sitting  -IFRAH     Comment, (Feeding) assist when tray to side to reach drink, in front of her able to , limited shoulder ROM/strength  -           User Key  (r) = Recorded By, (t) = Taken By, (c) = Cosigned By    Initials Name Provider Type    Sabi Hernandez, OT Occupational Therapist               Obj/Interventions     Row Name 08/15/22 1347          Shoulder (Therapeutic Exercise)    Shoulder AROM (Therapeutic Exercise) bilateral;aBduction;aDduction;10 repetitions;sitting  very limited mvmt  -     Shoulder AAROM (Therapeutic Exercise) bilateral;flexion;extension;10 repetitions;sitting  -     Row Name 08/15/22 1344          Elbow/Forearm (Therapeutic Exercise)    Elbow/Forearm (Therapeutic Exercise) strengthening exercise  -     Elbow/Forearm Strengthening (Therapeutic Exercise) bilateral;flexion;extension;sitting;10 repetitions  mild manual resistance  -     Row Name 08/15/22 1340          Motor Skills    Therapeutic Exercise shoulder;elbow/forearm  -     Row Name 08/15/22 1340          Balance    Static Sitting Balance standby assist  -IFRAH     Dynamic Sitting Balance standby assist  -IFRAH     Position, Sitting Balance unsupported;sitting in chair  sitting edge of recliner  -     Static Standing Balance standby assist  -IFRAH     Dynamic Standing Balance standby assist  -IFRAH      Position/Device Used, Standing Balance supported  -IFRAH     Balance Interventions sit to stand;occupation based/functional task  -IFRAH     Comment, Balance pt. able to stand at bed railing with recliner behind pt. while being cleaned for BM grossly 2 minutes, pt. also stood 4 additional times, but only 2 sec to 10 sec each  -IFRAH           User Key  (r) = Recorded By, (t) = Taken By, (c) = Cosigned By    Initials Name Provider Type    Sabi Hernandez, OT Occupational Therapist               Goals/Plan     Row Name 08/15/22 [facility]          Bed Mobility Goal 1 (OT)    Progress/Outcomes (Bed Mobility Goal 1, OT) goal ongoing  -IFRAH     Row Name 08/15/22 5086          Transfer Goal 1 (OT)    Progress/Outcome (Transfer Goal 1, OT) continuing progress toward goal  -IFRAH     Row Name 08/15/22 5869          Dressing Goal 1 (OT)    Progress/Outcome (Dressing Goal 1, OT) goal ongoing  -IFRAH     Row Name 08/15/22 1359          Toileting Goal 1 (OT)    Progress/Outcome (Toileting Goal 1, OT) goal ongoing  -IFRAH           User Key  (r) = Recorded By, (t) = Taken By, (c) = Cosigned By    Initials Name Provider Type    Sabi Hernandez, OT Occupational Therapist               Clinical Impression     Row Name 08/15/22 5380          Pain Assessment    Pretreatment Pain Rating 0/10 - no pain  -IFRAH     Posttreatment Pain Rating 0/10 - no pain  -IFRAH     Pain Location - Side/Orientation Bilateral  -IFRAH     Pain Location - foot  -IFRAH     Pre/Posttreatment Pain Comment pt. with 5/10 foot pain  in standing limiting standing time, per pt. this is new pain for her  -IFRAH     Pain Intervention(s) Repositioned  -IFRAH     Row Name 08/15/22 6389          Plan of Care Review    Plan of Care Reviewed With patient  -IFRAH     Progress improving  -IFRAH     Outcome Evaluation Pt. was able to progress to grossly min A with grooming compared to max A last session.  Pt. stood at bed railing with mod A to stand and then SBA once up, but limited by B foot pain standing 4 times  only 2-10 sec and 5th time 2 minutes while cleaned for bowel incontinence. Good participation with BUE TE, but limited range in shoulders limiting. Continue OT POC.  -IFRAH     Row Name 08/15/22 1351          Therapy Assessment/Plan (OT)    Therapy Frequency (OT) daily  -IFRAH     Row Name 08/15/22 1351          Therapy Plan Review/Discharge Plan (OT)    Anticipated Discharge Disposition (OT) skilled nursing facility  -IFRAH     Row Name 08/15/22 1351          Vital Signs    Pre Systolic BP Rehab 95  -IFRAH     Pre Treatment Diastolic BP 50  -IFRAH     Post Systolic BP Rehab 102  -IFRAH     Post Treatment Diastolic BP 44  -IFRAH     Pretreatment Heart Rate (beats/min) 71  -IFRAH     Posttreatment Heart Rate (beats/min) 72  -IFRAH     Pre SpO2 (%) 97  -IFRAH     O2 Delivery Pre Treatment room air  -IFRAH     O2 Delivery Intra Treatment room air  -IFRAH     Post SpO2 (%) 99  -IFRAH     O2 Delivery Post Treatment room air  -IFRAH     Pre Patient Position Sitting  -IFRAH     Intra Patient Position Standing  -IFRAH     Post Patient Position Sitting  -IFRAH     Row Name 08/15/22 1351          Positioning and Restraints    Pre-Treatment Position sitting in chair/recliner  -IFRAH     Post Treatment Position chair  -IFRAH     In Chair reclined;encouraged to call for assist;exit alarm on;waffle cushion;heels elevated;notified [facility]  -           User Key  (r) = Recorded By, (t) = Taken By, (c) = Cosigned By    Initials Name Provider Type    Sabi Hernandez, OT Occupational Therapist               Outcome Measures     Row Name 08/15/22 9132          How much help from another is currently needed...    Putting on and taking off regular lower body clothing? 1  -IFRAH     Bathing (including washing, rinsing, and drying) 1  -IFRAH     Toileting (which includes using toilet bed pan or urinal) 1  -IFRAH     Putting on and taking off regular upper body clothing 2  -IFRAH     Taking care of personal grooming (such as brushing teeth) 3  -IFRAH     Eating meals 3  -IFRAH     AM-PAC 6 Clicks Score (OT) 11   -IFRAH     Row Name 08/15/22 0805          How much help from another person do you currently need...    Turning from your back to your side while in flat bed without using bedrails? 2  -KB     Moving from lying on back to sitting on the side of a flat bed without bedrails? 2  -KB     Moving to and from a bed to a chair (including a wheelchair)? 2  -KB     Standing up from a chair using your arms (e.g., wheelchair, bedside chair)? 2  -KB     Climbing 3-5 steps with a railing? 1  -KB     To walk in hospital room? 1  -KB     AM-PAC 6 Clicks Score (PT) 10  -KB     Highest level of mobility 4 --> Transferred to chair/commode  -     Row Name 08/15/22 1355          Functional Assessment    Outcome Measure Options AM-PAC 6 Clicks Daily Activity (OT)  -IFRAH           User Key  (r) = Recorded By, (t) = Taken By, (c) = Cosigned By    Initials Name Provider Type    Sabi Hernandez, OT Occupational Therapist    Cb Staley RN Registered Nurse                Occupational Therapy Education                 Title: PT OT SLP Therapies (In Progress)     Topic: Occupational Therapy (Done)     Point: ADL training (Done)     Description:   Instruct learner(s) on proper safety adaptation and remediation techniques during self care or transfers.   Instruct in proper use of assistive devices.              Learning Progress Summary           Patient Acceptance, E,D, VU,NR by IFRAH at 8/15/2022 1356    Comment: UE TE, transfer technique, progress to goals      Show all documentation for this point (8)                 Point: Home exercise program (Done)     Description:   Instruct learner(s) on appropriate technique for monitoring, assisting and/or progressing therapeutic exercises/activities.              Learning Progress Summary           Patient Acceptance, E,D, VU,NR by IFRAH at 8/15/2022 1356    Comment: UE TE, transfer technique, progress to goals      Show all documentation for this point (7)                 Point: Precautions  (Done)     Description:   Instruct learner(s) on prescribed precautions during self-care and functional transfers.              Learning Progress Summary           Patient Acceptance, E,D, VU,NR by  at 8/15/2022 1356    Comment: UE TE, transfer technique, progress to goals      Show all documentation for this point (4)                 Point: Body mechanics (Done)     Description:   Instruct learner(s) on proper positioning and spine alignment during self-care, functional mobility activities and/or exercises.              Learning Progress Summary           Patient Acceptance, E,D, VU,NR by  at 8/15/2022 1356    Comment: UE TE, transfer technique, progress to goals      Show all documentation for this point (4)                             User Key     Initials Effective Dates Name Provider Type Discipline     06/16/21 -  Sabi Lim, OT Occupational Therapist OT              OT Recommendation and Plan  Therapy Frequency (OT): daily  Plan of Care Review  Plan of Care Reviewed With: patient  Progress: improving  Outcome Evaluation: Pt. was able to progress to grossly min A with grooming compared to max A last session.  Pt. stood at bed railing with mod A to stand and then SBA once up, but limited by B foot pain standing 4 times only 2-10 sec and 5th time 2 minutes while cleaned for bowel incontinence. Good participation with BUE TE, but limited range in shoulders limiting. Continue OT POC.     Time Calculation:    Time Calculation- OT     Row Name 08/15/22 1356             Time Calculation- OT    OT Start Time 1317  -IFRAH      OT Received On 08/15/22  -      OT Goal Re-Cert Due Date 08/20/22  -IFRAH              Timed Charges    75335 - OT Therapeutic Exercise Minutes 7  -IFRAH      99845 - OT Therapeutic Activity Minutes 9  -IFRAH      21239 - OT Self Care/Mgmt Minutes 12  -IFRAH              Total Minutes    Timed Charges Total Minutes 28  -IFRAH       Total Minutes 28  -IFRAH            User Key  (r) = Recorded By, (t) =  Taken By, (c) = Cosigned By    Initials Name Provider Type    Sabi Hernandez, OT Occupational Therapist              Therapy Charges for Today     Code Description Service Date Service Provider Modifiers Qty    [id number]  OT THERAPEUTIC ACT EA 15 MIN 8/15/2022 Sabi Lim, OT GO 1    [id number]  OT SELF CARE/MGMT/TRAIN EA 15 MIN 8/15/2022 Sabi Lim, OT GO 1               Sabi Lim OT  8/15/2022

## 2022-08-15 NOTE — PLAN OF CARE
Goal Outcome Evaluation:  Plan of Care Reviewed With: patient        Progress: improving  Outcome Evaluation: Patient demo. steady improvement w/ mobility, although continues to be limited by weakness. She completed transfers w/ mod A and ambulated 4ft w/ RW and mod A. Patient participated in BUE/BLE/Pulmonary TherEx w/ good effort. PT continues to recommend SNF rehab.

## 2022-08-15 NOTE — PROGRESS NOTES
[facility] Medicine Services  PROGRESS NOTE    Patient Name: Karla Cristobal  : 1935  MRN: [id number]    Date of Admission: 2022  Primary Care Physician: Luis Cardenas MD    Subjective   Subjective     CC:   Weakness following dialysis    HPI:    No labs as yet today.  On room air.  No new notes today as yet.  Await multidisciplinary rounds to discuss her disposition planning.  Patient appears uncomfortable this morning.  She reports that she had a difficult night.  Discussing with her nurse Jian he says that she had had a large bowel movement overnight and had required to be cleaned up.  She denies any pain denies any shortness of breath outside of her normal shortness of breath.    ROS:  MSK- is ambulating  CV- No chest pain  Resp-chronic dyspnea baseline  GI- no diarrhea or constipation, good appetite  - no oliguria or dysuria  Neuro-did not rest well overnight    Objective   Objective     Vital Signs:   Temp:  [97.3 °F (36.3 °C)-98.2 °F (36.8 °C)] 98 °F (36.7 °C)  Heart Rate:  [69-80] 73  Resp:  [16-18] 18  BP: (103-127)/(52-64) 127/59     Physical Exam:  Constitutional: No acute distress, awake, alert  HENT: NCAT, mucous membranes moist  Respiratory: Clear to auscultation bilaterally, respiratory effort normal   Cardiovascular: RRR, no murmurs, rubs, or gallops  Gastrointestinal: Positive bowel sounds, soft, nontender, nondistended  Musculoskeletal: No bilateral ankle edema  Psychiatric: Appropriate affect, cooperative  Neurologic:  speech clear and coherent  Skin: No rashes      Results Reviewed:  LAB RESULTS:      Lab 22  0631 22  0635 22  1201 22  0424 22  1347   WBC 7.01 7.08 6.50 5.83 5.33   HEMOGLOBIN 10.4* 10.0* 9.8* 10.0* 9.7*   HEMATOCRIT 31.5* 31.4* 29.9* 30.4* 29.7*   PLATELETS 205 186 133* 212 190   NEUTROS ABS  --  4.27 3.64 3.76  --    IMMATURE GRANS (ABS)  --  0.09* 0.08* 0.06*  --    LYMPHS ABS  --  1.36 1.46 0.95  --     MONOS ABS  --  0.99* 1.01* 0.74  --    EOS ABS  --  0.30 0.25 0.26  --    MCV 88.7 92.4 89.5 87.4 89.2         Lab 08/14/22  0631 08/13/22  0635 08/12/22 2004 08/11/22 0827 08/09/22 0424 08/08/22  1347   SODIUM 137 133* 133* 133* 140 141   POTASSIUM 4.6 4.0 4.2 3.7 3.9 3.9   CHLORIDE 104 100 99 97* 106 106   CO2 21.0* 20.0* 24.0 26.0 24.0 22.0   ANION GAP 12.0 13.0 10.0 10.0 10.0 13.0   BUN 27* 47* 42* 54* 58* 100*   CREATININE 2.25* 2.98* 2.59* 2.81* 2.34* 3.13*   EGFR 20.8* 14.8* 17.5* 15.9* 19.8* 14.0*   GLUCOSE 146* 159* 200* 139* 166* 125*   CALCIUM 8.1* 8.1* 8.2* 8.3* 8.7 8.6   MAGNESIUM  --   --   --   --   --  2.1   PHOSPHORUS  --   --   --  2.9 2.9 4.1         Lab 08/11/22 0827 08/09/22 0424 08/08/22  1347   ALBUMIN 2.50* 2.50* 2.60*                     Brief Urine Lab Results  (Last result in the past 365 days)      Color   Clarity   Blood   Leuk Est   Nitrite   Protein   CREAT   Urine HCG        07/30/22 1742 Yellow   Clear   Small (1+)   Trace   Negative   Trace                 Microbiology Results Abnormal     Procedure Component Value - Date/Time    Eosinophil Smear - Urine, Urine, Clean Catch [[id number]]  (Normal) Collected: 07/30/22 1742    Lab Status: Final result Specimen: Urine, Clean Catch Updated: 07/30/22 1938     Eosinophil Smear 0 % EOS/100 Cells     Narrative:      No eosinophil seen          No radiology results from the last 24 hrs    Results for orders placed during the hospital encounter of 07/28/22    Adult Transthoracic Echo Complete W/ Cont if Necessary Per Protocol    Interpretation Summary  · Mild biatrial enlargement.  · Moderately reduced right ventricular systolic function noted.  · Moderate left ventricular systolic dysfunction, estimated EF 37%.  · Left ventricular wall thickness is consistent with mild concentric hypertrophy.  · Lambl's excrescences of the aortic valve are noted. There is trace aortic insufficiency, no evidence of aortic stenosis.  · Moderate mitral  regurgitation.  · Moderate to severe tricuspid regurgitation with RVSP 48 mmHg.  · Moderate pulmonic insufficiency.      I have reviewed the medications:  Scheduled Meds:apixaban, 2.5 mg, Oral, Q12H  atorvastatin, 20 mg, Oral, Nightly  carvedilol, 3.125 mg, Oral, Q12H  castor oil-balsam peru, 1 application, Topical, Q12H  dextromethorphan polistirex ER, 60 mg, Oral, Q12H  epoetin shukri/shukri-epbx, 10,000 Units, Subcutaneous, Once per day on Tue Thu Sat  insulin lispro, 0-7 Units, Subcutaneous, TID AC  Insulin Lispro, 4 Units, Subcutaneous, TID With Meals  latanoprost, 1 drop, Both Eyes, Nightly  levothyroxine, 112 mcg, Oral, Q AM  pantoprazole, 40 mg, Oral, BID AC  sodium chloride, 10 mL, Intravenous, Q12H  timolol, 1 drop, Both Eyes, BID      Continuous Infusions:   PRN Meds:.•  acetaminophen **OR** acetaminophen **OR** acetaminophen  •  benzonatate  •  dextrose  •  dextrose  •  glucagon (human recombinant)  •  melatonin  •  ondansetron  •  sodium chloride    Assessment & Plan   Assessment & Plan     Active Hospital Problems    Diagnosis  POA   • **GIB (gastrointestinal bleeding) [K92.2]  Yes   • Normocytic anemia [D64.9]  Unknown   • Acute renal failure (ARF) (HCC) [N17.9]  Unknown   • COVID-19 virus detected [U07.1]  Unknown   • Thrombocytopenia (HCC) [D69.6]  Unknown   • Hypoalbuminemia [E88.09]  Unknown   • AMS (altered mental status) [R41.82]  Unknown   • Lower GI bleed [K92.2]  Yes   • Type 2 diabetes mellitus without complication, with long-term current use of insulin (HCC) [E11.9, Z79.4]  Not Applicable   • Chronic systolic congestive heart failure (HCC) [I50.22]  Yes   • CAD (coronary artery disease) [I25.10]  Yes   • Chronic atrial fibrillation (HCC) [I48.20]  Yes   • CKD (chronic kidney disease), stage IV (HCC) [N18.4]  Yes   • Hyperlipidemia LDL goal <100 [E78.5]  Yes   • Essential hypertension [I10]  Yes   • Hypothyroidism [E03.9]  Yes   • Pulmonary embolism (HCC) [I26.99]  Yes   • CHF (NYHA class IV,  ACC/AHA stage D) ([facility]) [I50.84]  Yes      Resolved Hospital Problems   No resolved problems to display.     Brief Hospital Course to date:  Karla Cristobal is a 87 y.o. female with anemia, atrial fibrillation on Eliquis, CKD stage IV, HLD, hypertension, hypothyroidism, CAD s/p stents, diabetes type 2, and systolic heart failure (2L NC at night) who presented to the ED on 7/28/2022 for rectal bleeding. 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  3 endoclips were applied for hemostasis.  H/H has been stable since. She continues on PPI BID.  She was incidentally found to be COVID positive and had acute kidney injury for which nephrology has been following. She had temporary dialysis catheter placed on 8/6/2022 and was started on hemodialysis.      This patient's problems and plans were partially entered by my partner and updated as appropriate by me 08/15/22.    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  3 endoclips were applied for hemostasis  -s/p IV Iron  -Eliquis resumed 8/7, missed am doses on 8/8 and 8/9  -on low dose apixaban 2.5 mg every 12 hours for atrial fibrillation  -PPI BID     ROBBI on CKD IV  -Nephrology consulted  -Started on Hemodialysis this admission on 8/6  -working on outpatient dialysis chair (New) Tuesday Thursday Saturday  -has access in Right Upper Chest     N/V  -Zofran as needed    DMII  - A1C 05/2022 7.6  - Meal time and SSI-adjust as needed  -Blood glucose reviewed euglycemic 8/15/2022    Hypothyroidism  -levothyroxine     COVID-19 positive  -Asymptomatic    -Out of Isolation on 8/8     Mixed systolic and diastolic CHF  Moderate to severe MR  Persistent atrial fibrillation   -Seen by cardiology and started on carvedilol but held a few  times due to low normal BP   -apixaban restarted on 8/7 but has missed a few doses on 8/8 and 8/9     Expected Discharge Location and Transportation: Plan to go to rehab. Will need new Dialysis Chair appt  Expected Discharge Date: 8/15    DVT prophylaxis:  Medical DVT prophylaxis orders are present.     AM-PAC 6 Clicks Score (PT): 10 (08/14/22 0805)    CODE STATUS:   Code Status and Medical Interventions:   Ordered at: 07/28/22 2174     Level Of Support Discussed With:    Patient     Code Status (Patient has no pulse and is not breathing):    CPR (Attempt to Resuscitate)     Medical Interventions (Patient has pulse or is breathing):    Full Support       Armando Jacobson DO  08/15/22

## 2022-08-15 NOTE — DISCHARGE PLACEMENT REQUEST
"Updated HD order and nephrology note attached    Waiting for chair time     Thank you!     Neris Rodarte [phone]      David Mead (87 y.o. Female)             Date of Birth   1935    Social Security Number       Address   [facility] [address] [facility] [address]    Home Phone   [phone]    MRN   [id number]       Orthodoxy   Yazidism    Marital Status                               Admission Date   7/28/22    Admission Type   Emergency    Admitting Provider   Armando Jacobson DO    Attending Provider   Armando Jacobson DO    Department, Room/Bed   [facility] 6A, N619/1       Discharge Date       Discharge Disposition       Discharge Destination                               Attending Provider: Armando Jacobson DO    Allergies: Bactrim [Sulfamethoxazole-trimethoprim], Lisinopril, Rocephin [Ceftriaxone], Codeine    Isolation: None   Infection: COVID (History) (08/08/22)   Code Status: CPR   Advance Care Planning Activity    Ht: 162.6 cm (64.02\")   Wt: 91.8 kg (202 lb 6.4 oz)    Admission Cmt: None   Principal Problem: GIB (gastrointestinal bleeding) [K92.2]                 Active Insurance as of 7/28/2022     Primary Coverage     Payor Plan Insurance Group Employer/Plan Group    MEDICARE MEDICARE A & B      Payor Plan Address Payor Plan Phone Number Payor Plan Fax Number Effective Dates    [address] [phone]  7/1/2001 - None Entered    [facility] [id number]       Subscriber Name Subscriber Birth Date Member ID       DAVID MEAD 1935 [id number]           Secondary Coverage     Payor Plan Insurance Group Employer/Plan Group    [place] [employer] [facility]      Payor Plan Address Payor Plan Phone Number Payor Plan Fax Number Effective Dates    [address] [phone]  7/1/2001 - None Entered    [place] [address]       Subscriber Name Subscriber Birth Date Member ID       DAVID MEAD 1935 [id number]           Tertiary Coverage  "    Payor Plan Insurance Group Employer/Plan Group    [place]      Payor Plan Address Payor Plan Phone Number Payor Plan Fax Number Effective Dates    [address] [phone]  2020 - None Entered    [facility] [id number]       Subscriber Name Subscriber Birth Date Member ID       KARLA MEAD 1935 [id number]                 Emergency Contacts      (Rel.) Home Phone Work Phone Mobile Phone    Frida Vazquez (Daughter) [phone] -- --                          [address]  [address] [facility] [address]  Phone:  [phone]  Fax:  [phone]        Patient:     Karla Mead MRN:  [id number]   [address] [facility] [address] :  1935  SSN:    Phone: [phone] Sex:  F      INSURANCE PAYOR PLAN GROUP # SUBSCRIBER ID   Primary:  Secondary:    MEDICARE [employer] [id number]  [id number]      [id number]  [id number]   Admitting Diagnosis: GIB (gastrointestinal bleeding) [K92.2]  Order Date:  Aug 12, 2022        Hemodialysis Inpatient       (Order ID: [id number])     Diagnosis:         Priority:  Routine Expected Date:   Expiration Date:        Interval:  Once Count:    Duration of Treatment: 3.5 Hours  Access Site: Tunneled Dialysis Catheter  Dialyzer: Revaclear  DFR: 700 mL/min  Dialysate Temperature (C): 36  BFR-As tolerated to a maximum of: 400 mL/min  Dialysate Solution Bath: K+ = 4 mEq, Ca = 2.5mEq  Bicarb: 30 meq  Na+: 138 mEq  Fluid Removal: Ultrafiltrate 2.5 L as tolerated     Specimen Type:   Specimen Source:   Specimen Taken Date:   Specimen Taken Time:                  Authorizing Provider:Albert Sharma MD  Authorizing Provider's NPI: [id number]  Order Entered By: Albert Sharma MD 2022  3:20 PM     Electronically signed by: Albert Sharma MD 2022  3:20 PM                    Albert Sharma MD    Physician   Nephrology   Progress Notes      Signed   Date of  "Service:  08/14/22 1036   Creation Time:  08/14/22 1036              Signed        Expand All Collapse All[]Expand All by Default        Show:Clear all  [x]Manual[x]Template[x]Copied    Added by:  [x]Albert Sharma MD      []Darvin for details        LOS: 16 days    Patient Care Team:  Luis Cardenas MD as PCP - General (Nephrology)     Chief Complaint:  Gi bleed        Subjective      Interval History:   No new events no added distress.        Review of Systems:   Denies any nausea, vomiting, shortness of breath, chest pain, diarrhea.           Objective         Vital Sign Min/Max for last 24 hours  Temp  Min: 97.4 °F (36.3 °C)  Max: 98.2 °F (36.8 °C)   BP  Min: 100/53  Max: 128/64   Pulse  Min: 76  Max: 83   Resp  Min: 16  Max: 18   SpO2  Min: 97 %  Max: 100 %   No data recorded   Weight  Min: 91.8 kg (202 lb 6.4 oz)  Max: 91.8 kg (202 lb 6.4 oz)          Flowsheet Rows    Flowsheet Row First Filed Value   Admission Height 162.6 cm (64\") Documented at 07/28/2022 1451   Admission Weight 76.2 kg (168 lb) Documented at 07/28/2022 1451             No intake/output data recorded.  I/O last 3 completed shifts:  In: 440 [P.O.:440]  Out: 2100 [Other:2100]     Physical Exam:     General Appearance: Awake alert oriented x3 no obvious distress laying comfortable  Eyes: PER, EOMI.  Neck: Supple no JVD.  Lungs: Clear auscultation, no rales rhonchi's, equal chest movement, nonlabored.  Heart: No gallop, murmur, rub, RRR.  Abdomen: Soft, nontender, positive bowel sounds, no organomegaly.  Extremities: No edema, no cyanosis.  Neuro: No focal deficit, moving all extremities, alert oriented X 3  Tunnel catheter in place.        WBC       WBC   Date Value Ref Range Status   08/14/2022 7.01 3.40 - 10.80 10*3/mm3 Final   08/13/2022 7.08 3.40 - 10.80 10*3/mm3 Final   08/12/2022 6.50 3.40 - 10.80 10*3/mm3 Final       HGB       Hemoglobin   Date Value Ref Range Status   08/14/2022 10.4 (L) 12.0 - 15.9 g/dL Final   08/13/2022 10.0 (L) " 12.0 - 15.9 g/dL Final   08/12/2022 9.8 (L) 12.0 - 15.9 g/dL Final       HCT       Hematocrit   Date Value Ref Range Status   08/14/2022 31.5 (L) 34.0 - 46.6 % Final   08/13/2022 31.4 (L) 34.0 - 46.6 % Final   08/12/2022 29.9 (L) 34.0 - 46.6 % Final       Platlets No results found for: LABPLAT   MCV       MCV   Date Value Ref Range Status   08/14/2022 88.7 79.0 - 97.0 fL Final   08/13/2022 92.4 79.0 - 97.0 fL Final   08/12/2022 89.5 79.0 - 97.0 fL Final             Sodium       Sodium   Date Value Ref Range Status   08/14/2022 137 136 - 145 mmol/L Final   08/13/2022 133 (L) 136 - 145 mmol/L Final   08/12/2022 133 (L) 136 - 145 mmol/L Final       Potassium         Potassium   Date Value Ref Range Status   08/14/2022 4.6 3.5 - 5.2 mmol/L Final       Comment:       Slight hemolysis detected by analyzer. Results may be affected.   08/13/2022 4.0 3.5 - 5.2 mmol/L Final       Comment:       Slight hemolysis detected by analyzer. Results may be affected.   08/12/2022 4.2 3.5 - 5.2 mmol/L Final       Chloride       Chloride   Date Value Ref Range Status   08/14/2022 104 98 - 107 mmol/L Final   08/13/2022 100 98 - 107 mmol/L Final   08/12/2022 99 98 - 107 mmol/L Final       CO2       CO2   Date Value Ref Range Status   08/14/2022 21.0 (L) 22.0 - 29.0 mmol/L Final   08/13/2022 20.0 (L) 22.0 - 29.0 mmol/L Final   08/12/2022 24.0 22.0 - 29.0 mmol/L Final       BUN       BUN   Date Value Ref Range Status   08/14/2022 27 (H) 8 - 23 mg/dL Final   08/13/2022 47 (H) 8 - 23 mg/dL Final   08/12/2022 42 (H) 8 - 23 mg/dL Final       Creatinine       Creatinine   Date Value Ref Range Status   08/14/2022 2.25 (H) 0.57 - 1.00 mg/dL Final   08/13/2022 2.98 (H) 0.57 - 1.00 mg/dL Final   08/12/2022 2.59 (H) 0.57 - 1.00 mg/dL Final       Calcium       Calcium   Date Value Ref Range Status   08/14/2022 8.1 (L) 8.6 - 10.5 mg/dL Final   08/13/2022 8.1 (L) 8.6 - 10.5 mg/dL Final   08/12/2022 8.2 (L) 8.6 - 10.5 mg/dL Final       PO4 No results  found for: CAPO4   Albumin No results found for: ALBUMIN   Magnesium No results found for: MG   Uric Acid No results found for: URICACID                    Results Review:                I reviewed the patient's new clinical results.     apixaban, 2.5 mg, Oral, Q12H  atorvastatin, 20 mg, Oral, Nightly  carvedilol, 3.125 mg, Oral, Q12H  castor oil-balsam peru, 1 application, Topical, Q12H  dextromethorphan polistirex ER, 60 mg, Oral, Q12H  epoetin shukri/shukri-epbx, 10,000 Units, Subcutaneous, Once per day on Tue Thu Sat  insulin lispro, 0-7 Units, Subcutaneous, TID AC  Insulin Lispro, 4 Units, Subcutaneous, TID With Meals  latanoprost, 1 drop, Both Eyes, Nightly  levothyroxine, 112 mcg, Oral, Q AM  pantoprazole, 40 mg, Oral, BID AC  sodium chloride, 10 mL, Intravenous, Q12H  timolol, 1 drop, Both Eyes, BID           Medication Review:            Assessment & Plan          GIB (gastrointestinal bleeding)    Chronic atrial fibrillation (HCC)    CKD (chronic kidney disease), stage IV (HCC)    Hyperlipidemia LDL goal <100    Essential hypertension    Hypothyroidism    Pulmonary embolism (HCC)    CHF (NYHA class IV, ACC/AHA stage D) (HCC)    CAD (coronary artery disease)    Chronic systolic congestive heart failure (HCC)    Type 2 diabetes mellitus without complication, with long-term current use of insulin (HCC)    Normocytic anemia    Acute renal failure (ARF) (HCC)    COVID-19 virus detected    Thrombocytopenia (HCC)    Hypoalbuminemia    AMS (altered mental status)    Lower GI bleed        1- ROBBI on CKD stage IV - Thought to be hemodynamic injury progression of CKD to ESRD. Started on HD on 8/6/22.   2. CKD stage IV -baseline Scr 2.5 range  3- GI bleed   4- Low albumin level   5- Iron def anemia Tsat 11%. S/p iron supplementation. On  BENY on HD days   6- Renal cysts   7- Systolic CHF - Recent Echo showing EF 37% now. Volume status stable.  8. BMD: Check PTH level.     Plan:  Continue with dialysis Tuesday Thursday  Saturday.  BENY on dialysis days  Renal diet with fluid restriction less than 1500 mL/day.  Will need outpatient hemodialysis chair.  Case management working with rehab placement with HD.  Case discussed with the daughter Frida on [phone] in detail regards to patient's future plan.     Albert Sharma MD  08/14/22  10:36 EDT                                   Problem List           Codes Noted - Resolved       Hospital    Normocytic anemia ICD-10-CM: D64.9  ICD-9-CM: 285.9 7/29/2022 - Present    Acute renal failure (ARF) (HCC) ICD-10-CM: N17.9  ICD-9-CM: 584.9 7/29/2022 - Present    COVID-19 virus detected ICD-10-CM: U07.1  ICD-9-CM: 079.89 7/29/2022 - Present    Thrombocytopenia (HCC) ICD-10-CM: D69.6  ICD-9-CM: 287.5 7/29/2022 - Present    Hypoalbuminemia ICD-10-CM: E88.09  ICD-9-CM: 273.8 7/29/2022 - Present    AMS (altered mental status) ICD-10-CM: R41.82  ICD-9-CM: 780.97 7/29/2022 - Present    Lower GI bleed ICD-10-CM: K92.2  ICD-9-CM: 578.9 7/29/2022 - Present    * (Principal) GIB (gastrointestinal bleeding) ICD-10-CM: K92.2  ICD-9-CM: 578.9 7/28/2022 - Present    Type 2 diabetes mellitus without complication, with long-term current use of insulin (HCC) ICD-10-CM: E11.9, Z79.4  ICD-9-CM: 250.00, V58.67 9/8/2021 - Present    Chronic systolic congestive heart failure (HCC) ICD-10-CM: I50.22  ICD-9-CM: 428.22, 428.0 3/15/2018 - Present    CAD (coronary artery disease) ICD-10-CM: I25.10  ICD-9-CM: 414.00 Unknown - Present    Chronic atrial fibrillation (HCC) ICD-10-CM: I48.20  ICD-9-CM: 427.31 6/16/2016 - Present    CKD (chronic kidney disease), stage IV (HCC) ICD-10-CM: N18.4  ICD-9-CM: 585.4 6/16/2016 - Present    Hyperlipidemia LDL goal <100 ICD-10-CM: E78.5  ICD-9-CM: 272.4 6/16/2016 - Present    Essential hypertension ICD-10-CM: I10  ICD-9-CM: 401.9 6/16/2016 - Present    Hypothyroidism ICD-10-CM: E03.9  ICD-9-CM: 244.9 6/16/2016 - Present    Pulmonary embolism (HCC) ICD-10-CM: I26.99  ICD-9-CM: 415.19  6/16/2016 - Present    CHF (NYHA class IV, ACC/AHA stage D) (HCC) ICD-10-CM: I50.84  ICD-9-CM: 428.0 6/16/2016 - Present       Non-Hospital    Cellulitis of left lower extremity ICD-10-CM: L03.116  ICD-9-CM: 682.6 11/15/2021 - Present    Left leg cellulitis ICD-10-CM: L03.116  ICD-9-CM: 682.6 11/15/2021 - Present    Food impaction of esophagus ICD-10-CM: T18.128A  ICD-9-CM: 935.1 11/15/2021 - Present    Exudative age-related macular degeneration, right eye, with active choroidal neovascularization (HCC) ICD-10-CM: H35.3211  ICD-9-CM: 362.52, 362.16 9/8/2021 - Present    Hypoxia ICD-10-CM: R09.02  ICD-9-CM: 799.02 9/8/2021 - Present    Malignant tumor of ascending colon (HCC) ICD-10-CM: C18.2  ICD-9-CM: 153.6 3/19/2021 - Present    Ischemic cardiomyopathy ICD-10-CM: I25.5  ICD-9-CM: 414.8 6/23/2020 - Present    Morbidly obese (HCC) ICD-10-CM: E66.01  ICD-9-CM: 278.01 4/3/2019 - Present    DVT of lower extremity (deep venous thrombosis) (HCC) ICD-10-CM: I82.409  ICD-9-CM: 453.40 8/25/2016 - Present    Ischemic heart disease ICD-10-CM: I25.9  ICD-9-CM: 414.9 6/21/2016 - Present    Diabetic nephropathy (HCC) ICD-10-CM: E11.21  ICD-9-CM: 250.40, 583.81 6/16/2016 - Present    Diabetic retinopathy (HCC) ICD-10-CM: E11.319  ICD-9-CM: 250.50, 362.01 6/16/2016 - Present    Malignant neoplasm of endometrium (HCC) ICD-10-CM: C54.1  ICD-9-CM: 182.0 6/16/2016 - Present    Insomnia ICD-10-CM: G47.00  ICD-9-CM: 780.52 6/16/2016 - Present    Leukocytosis ICD-10-CM: D72.829  ICD-9-CM: 288.60 6/16/2016 - Present    Memory impairment ICD-10-CM: R41.3  ICD-9-CM: 780.93 6/16/2016 - Present    Nausea ICD-10-CM: R11.0  ICD-9-CM: 787.02 6/16/2016 - Present    Sleep apnea ICD-10-CM: G47.30  ICD-9-CM: 780.57 6/16/2016 - Present    Squamous cell carcinoma of skin ICD-10-CM: C44.92  ICD-9-CM: 173.92 6/16/2016 - Present

## 2022-08-15 NOTE — PLAN OF CARE
Goal Outcome Evaluation:  Plan of Care Reviewed With: patient        Progress: improving  Outcome Evaluation: Pt. was able to progress to grossly min A with grooming compared to max A last session.  Pt. stood at bed railing with mod A to stand and then SBA once up, but limited by B foot pain standing 4 times only 2-10 sec and 5th time 2 minutes while cleaned for bowel incontinence. Good participation with BUE TE, but limited range in shoulders limiting. Continue OT POC.

## 2022-08-15 NOTE — CASE MANAGEMENT/SOCIAL WORK
Continued Stay Note  [facility]     Patient Name: Karla Cristobal  MRN: [id number]  Today's Date: 8/15/2022    Admit Date: 7/28/2022     Discharge Plan     Row Name 08/15/22 1215       Plan    Plan Case management Update    Plan Comments Spoke with [facility], they have requested updated HD order and MD notes- those have been faxed to [phone]. Also left a message with Johan at [facility] as we are waiting to hear back about a rehab bed being available. Spoke with daughter as well to update her on where we are and she plans to check with [facility] as well. CM will continue to follow- matheus [phone]    Final Discharge Disposition Code 03 - skilled nursing facility (SNF)               Discharge Codes    No documentation.                     Matheus Rodarte RN

## 2022-08-15 NOTE — THERAPY TREATMENT NOTE
Patient Name: Karla Cristobal  : 1935    MRN: [id number]                              Today's Date: 8/15/2022       Admit Date: 2022    Visit Dx:     ICD-10-CM ICD-9-CM   1. Lower GI bleed  K92.2 578.9   2. Chronic anticoagulation  Z79.01 V58.61   3. History of atrial fibrillation  Z86.79 V12.59   4. Generalized weakness  R53.1 780.79   5. Malaise and fatigue  R53.81 780.79    R53.83    6. Nausea  R11.0 787.02   7. Acute renal failure superimposed on stage 4 chronic kidney disease, unspecified acute renal failure type (HCC)  N17.9 584.9    N18.4 585.4   8. Thrombocytopenia (Formerly [facility])  D69.6 287.5   9. History of CHF (congestive heart failure)  Z86.79 V12.59   10. History of diabetes mellitus  Z86.39 V12.29   11. History of diverticulosis  Z87.19 V12.79   12. Gastrointestinal hemorrhage, unspecified gastrointestinal hemorrhage type  K92.2 578.9     Patient Active Problem List   Diagnosis   • Chronic atrial fibrillation (HCC)   • CKD (chronic kidney disease), stage IV (HCC)   • Diabetic nephropathy (HCC)   • Diabetic retinopathy (HCC)   • Malignant neoplasm of endometrium (HCC)   • Hyperlipidemia LDL goal <100   • Essential hypertension   • Hypothyroidism   • Insomnia   • Leukocytosis   • Memory impairment   • Nausea   • Pulmonary embolism (HCC)   • Sleep apnea   • Squamous cell carcinoma of skin   • CHF (NYHA class IV, ACC/AHA stage D) (Formerly [facility])   • Ischemic heart disease   • CAD (coronary artery disease)   • DVT of lower extremity (deep venous thrombosis) (HCC)   • Chronic systolic congestive heart failure (HCC)   • Morbidly obese (HCC)   • Ischemic cardiomyopathy   • Type 2 diabetes mellitus without complication, with long-term current use of insulin (HCC)   • Exudative age-related macular degeneration, right eye, with active choroidal neovascularization (HCC)   • Hypoxia   • Cellulitis of left lower extremity   • Left leg cellulitis   • Food impaction of esophagus   • Malignant tumor of ascending colon (HCC)   •  GIB (gastrointestinal bleeding)   • Normocytic anemia   • Acute renal failure (ARF) ([facility])   • COVID-19 virus detected   • Thrombocytopenia ([facility])   • Hypoalbuminemia   • AMS (altered mental status)   • Lower GI bleed     Past Medical History:   Diagnosis Date   • Acute bronchitis    • Arthritis    • Atrial fibrillation ([facility])    • CAD (coronary artery disease)     s/p MI 2005; 3 stents inserted    • Change in mole    • Change in mole    • Change in nevus 6/16/2016   • Chronic kidney disease     stage IV-sees Dr Bk Mckeon every 3-4 months    • Chronic renal disease, stage 4, severely decreased glomerular filtration rate between 15-29 mL/min/1.73 square meter ([facility])    • Chronic systolic heart failure ([facility])    • Congestive heart disease ([facility])    • Conjunctivitis    • Deep vein thrombosis of lower extremity ([facility])    • Diabetes mellitus ([facility])    • Diabetes mellitus ([facility]) 6/16/2016    Impression: 03/15/2016 - blood sugar 166 and hgn a1c 7.3%  is up to date with eye exam  neuropathy with callus, no ulcer  some scratches feet  ur alb due and ordered  no change other than provided samples of toujeo because she feels like lantus worked much better than levemir, she has tried titrating levemir and it is less effective  continue testing and f/u 3-4 months  Impression: 11/23/2015 - bl   • Diabetic foot ulcer ([facility]) 6/16/2016   • Dog bite of hand    • Easy bruising    • Endometrial cancer ([facility])     partial hysterectomy; radiation as well    • Foot pain    • Foot pain 6/16/2016    Description: lancinating- worse but not consistent enough to want rx   • Fracture of hip ([facility])    • Generalized ischemic myocardial dysfunction 6/16/2016   • Glaucoma     drops daily    • Hyperlipidemia    • Hypertension    • Hypothyroidism    • Insomnia    • Ischemic heart disease    • Macular degeneration    • Methicillin resistant Staphylococcus aureus infection 6/16/2016   • Myocardial infarction ([facility]) 2005   • Nausea with vomiting    • Pericardial  effusion    • Shortness of breath 6/16/2016    Impression: 03/24/2015 - chronic. On 2 l oxygen at night. check cbc, bnp;    • Skin cancer, basal cell     nose, leg    • Skin lesion 6/16/2016    Impression: 03/24/2015 - refer derm for eval- seb kerat ant tib and ?ak medially with redness and irritation;    • Sleep apnea     oxygen at night due to low sats but no cpap   • Type 2 diabetes mellitus (HCC)    • UTI (urinary tract infection) 06/12/2020    currently taking antibiotics-patient's daughter notified Dr Beal's office and office said it should not delay generator change      Past Surgical History:   Procedure Laterality Date   • CARDIAC CATHETERIZATION     • CARDIAC DEFIBRILLATOR PLACEMENT     • CARDIAC ELECTROPHYSIOLOGY PROCEDURE N/A 7/17/2020    Procedure: ICD BIV  generator change- [facility]- 3-6 weeks;  Surgeon: Tano Beal MD;  Location:  [facility] INVASIVE LOCATION;  Service: Cardiology;  Laterality: N/A;   • CHOLECYSTECTOMY     • COLONOSCOPY N/A 7/30/2022    Procedure: COLONOSCOPY;  Surgeon: Brunner, Mark I, MD;  Location:  [facility] ENDOSCOPY;  Service: Gastroenterology;  Laterality: N/A;  WITH 3 RESOLUTION CLIPS   • CORONARY ARTERY BYPASS GRAFT  2000   • CORONARY STENT PLACEMENT      3 stents    • ENDOSCOPY N/A 11/17/2021    Procedure: ESOPHAGOGASTRODUODENOSCOPY;  Surgeon: Brunner, Mark I, MD;  Location:  [facility];  Service: Gastroenterology;  Laterality: N/A;   • EYE SURGERY Bilateral     cataract extraction    • HIP SURGERY Left     x3   • HYSTERECTOMY      partial    • KNEE ARTHROPLASTY Bilateral    • PACEMAKER IMPLANTATION     • TONSILLECTOMY        General Information     Row Name 08/15/22 1141          Physical Therapy Time and Intention    Document Type therapy note (daily note)  -MB     Mode of Treatment physical therapy  -MB     Row Name 08/15/22 1141          General Information    Patient Profile Reviewed yes  -MB     Existing Precautions/Restrictions fall  -MB     Row Name 08/15/22  1141          Cognition    Orientation Status (Cognition) oriented x 3  -MB     Row Name 08/15/22 1141          Safety Issues, Functional Mobility    Safety Issues Affecting Function (Mobility) insight into deficits/self-awareness;positioning of assistive device;safety precaution awareness;safety precautions follow-through/compliance;sequencing abilities  -MB     Impairments Affecting Function (Mobility) balance;endurance/activity tolerance;strength;pain;range of motion (ROM);postural/trunk control  -MB           User Key  (r) = Recorded By, (t) = Taken By, (c) = Cosigned By    Initials Name Provider Type    Qiana Camacho, PT Physical Therapist               Mobility     Row Name 08/15/22 1449          Bed Mobility    Supine-Sit Dennison (Bed Mobility) moderate assist (50% patient effort);verbal cues;nonverbal cues (demo/gesture)  -MB     Sit-Supine Dennison (Bed Mobility) not tested  -MB     Assistive Device (Bed Mobility) bed rails;draw sheet;head of bed elevated  -MB     Comment, (Bed Mobility) Pt. required assist to move BLEs towards EOB, raise trunk/shoulders, and scoot hips forward to EOB. Increased time/effort to complete.  -MB     Row Name 08/15/22 1449          Transfers    Comment, (Transfers) STS from EOB/recliner w/ VCs/tactile cues for set up, safe hand placement, and upright posture. Pt. demo retrograde posture in standing, able to correct w/ assist.  -MB     Row Name 08/15/22 1449          Bed-Chair Transfer    Bed-Chair Dennison (Transfers) moderate assist (50% patient effort);verbal cues  -MB     Assistive Device (Bed-Chair Transfers) walker, front-wheeled  -MB     Row Name 08/15/22 1449          Sit-Stand Transfer    Sit-Stand Dennison (Transfers) moderate assist (50% patient effort);verbal cues  -MB     Assistive Device (Sit-Stand Transfers) walker, front-wheeled  -MB     Row Name 08/15/22 1449          Gait/Stairs (Locomotion)    Dennison Level (Gait) moderate assist  (50% patient effort);verbal cues;nonverbal cues (demo/gesture)  -MB     Assistive Device (Gait) walker, front-wheeled  -MB     Distance in Feet (Gait) 4  -MB     Deviations/Abnormal Patterns (Gait) base of support, narrow;dimitry decreased;gait speed decreased;stride length decreased;weight shifting decreased  -MB     Bilateral Gait Deviations forward flexed posture;heel strike decreased  -MB     Comment, (Gait/Stairs) Pt. amb. w/ step to gait pattern exhibiting difficulty shifting weight and advancing each LE. She required VCs for step sequence, increased B step length, upright posture, and safe chair approach.  -MB           User Key  (r) = Recorded By, (t) = Taken By, (c) = Cosigned By    Initials Name Provider Type    Qiana Camacho, PT Physical Therapist               Obj/Interventions     Row Name 08/15/22 1453          Motor Skills    Therapeutic Exercise shoulder;hip;knee;ankle  -MB     Row Name 08/15/22 1453          Shoulder (Therapeutic Exercise)    Shoulder AROM (Therapeutic Exercise) bilateral;flexion;10 repetitions;scapular retraction  limited shoulder ROM  -MB     Row Name 08/15/22 1453          Hip (Therapeutic Exercise)    Hip (Therapeutic Exercise) strengthening exercise  -MB     Hip Strengthening (Therapeutic Exercise) bilateral;marching while seated;aBduction;15 repititions  -MB     Row Name 08/15/22 1453          Knee (Therapeutic Exercise)    Knee (Therapeutic Exercise) strengthening exercise  -MB     Knee Strengthening (Therapeutic Exercise) bilateral;SAQ (short arc quad);15 repititions  -MB     Row Name 08/15/22 1453          Ankle (Therapeutic Exercise)    Ankle AROM (Therapeutic Exercise) bilateral;dorsiflexion;plantarflexion;15 repititions  -MB     Row Name 08/15/22 1453          Balance    Static Standing Balance minimal assist  -MB     Dynamic Standing Balance minimal assist  -MB     Position/Device Used, Standing Balance supported;walker, rolling  -MB     Balance Interventions  standing;sit to stand;weight shifting activity  -MB           User Key  (r) = Recorded By, (t) = Taken By, (c) = Cosigned By    Initials Name Provider Type    Qiana Camacho, PT Physical Therapist               Goals/Plan    No documentation.                Clinical Impression     Row Name 08/15/22 1455          Pain    Pretreatment Pain Rating 0/10 - no pain  -MB     Posttreatment Pain Rating 0/10 - no pain  -MB     Row Name 08/15/22 1455          Pain Scale: FACES Pre/Post-Treatment    Pain: FACES Scale, Pretreatment 0-->no hurt  -MB     Posttreatment Pain Rating 0-->no hurt  -MB     Row Name 08/15/22 1454          Plan of Care Review    Plan of Care Reviewed With patient  -MB     Progress improving  -MB     Outcome Evaluation Patient demo. steady improvement w/ mobility, although continues to be limited by weakness. She completed transfers w/ mod A and ambulated 4ft w/ RW and mod A. Patient participated in BUE/BLE/Pulmonary TherEx w/ good effort. PT continues to recommend SNF rehab.  -MB     Row Name 08/15/22 1455          Vital Signs    Pre Systolic BP Rehab --  VSS  -MB     Pre Patient Position Supine  -MB     Intra Patient Position Standing  -MB     Post Patient Position Sitting  -MB     Row Name 08/15/22 1455          Positioning and Restraints    Pre-Treatment Position in bed  -MB     Post Treatment Position chair  -MB     In Chair notified nsg;reclined;call light within reach;encouraged to call for assist;exit alarm on;waffle cushion;on mechanical lift sling;legs elevated;heels elevated  -MB           User Key  (r) = Recorded By, (t) = Taken By, (c) = Cosigned By    Initials Name Provider Type    Qiana Camacho, PT Physical Therapist               Outcome Measures     Row Name 08/15/22 1502 08/15/22 0805       How much help from another person do you currently need...    Turning from your back to your side while in flat bed without using bedrails? 3  -MB 2  -KB    Moving from lying on back  to sitting on the side of a flat bed without bedrails? 2  -MB 2  -KB    Moving to and from a bed to a chair (including a wheelchair)? 2  -MB 2  -KB    Standing up from a chair using your arms (e.g., wheelchair, bedside chair)? 2  -MB 2  -KB    Climbing 3-5 steps with a railing? 1  -MB 1  -KB    To walk in hospital room? 2  -MB 1  -KB    AM-PAC 6 Clicks Score (PT) 12  -MB 10  -KB    Highest level of mobility 4 --> Transferred to chair/commode  -MB 4 --> Transferred to chair/commode  -KB    Row Name 08/15/22 1502 08/15/22 1355       Functional Assessment    Outcome Measure Options AM-PAC 6 Clicks Basic Mobility (PT)  -MB AM-PAC 6 Clicks Daily Activity (OT)  -IFRAH          User Key  (r) = Recorded By, (t) = Taken By, (c) = Cosigned By    Initials Name Provider Type    Sabi Hernandez, OT Occupational Therapist    Qiana Camacho, PT Physical Therapist    Cb Staley, RN Registered Nurse                             Physical Therapy Education                 Title: PT OT SLP Therapies (In Progress)     Topic: Physical Therapy (In Progress)     Point: Mobility training (In Progress)     Learning Progress Summary           Patient Acceptance, E, NR by NS at 8/4/2022 1416      Show all documentation for this point (3)                 Point: Home exercise program (Done)     Learning Progress Summary           Patient Acceptance, E, VU,NR by JT at 8/12/2022 0457      Show all documentation for this point (5)                 Point: Body mechanics (Done)     Learning Progress Summary           Patient Acceptance, E, VU,NR by JT at 8/12/2022 0457      Show all documentation for this point (5)                 Point: Precautions (Done)     Learning Progress Summary           Patient Acceptance, E, VU,NR by JT at 8/12/2022 0457      Show all documentation for this point (5)                             User Key     Initials Effective Dates Name Provider Type Alivia DUNN 06/16/21 -  Terri Hendrickson, PT Physical  Therapist PT    JT 03/25/22 -  Flip Allen, RN Registered Nurse Nurse              PT Recommendation and Plan  Planned Therapy Interventions (PT): balance training, bed mobility training, gait training, home exercise program, patient/family education, postural re-education, ROM (range of motion), stair training, strengthening, transfer training  Plan of Care Reviewed With: patient  Progress: improving  Outcome Evaluation: Patient demo. steady improvement w/ mobility, although continues to be limited by weakness. She completed transfers w/ mod A and ambulated 4ft w/ RW and mod A. Patient participated in BUE/BLE/Pulmonary TherEx w/ good effort. PT continues to recommend SNF rehab.     Time Calculation:    PT Charges     Row Name 08/15/22 1503             Time Calculation    Start Time 1141  -MB      PT Received On 08/15/22  -MB      PT Goal Re-Cert Due Date 08/20/22  -MB              Time Calculation- PT    Total Timed Code Minutes- PT 40 minute(s)  -MB              Timed Charges    76497 - PT Therapeutic Exercise Minutes 28  -MB      82375 - Gait Training Minutes  12  -MB              Total Minutes    Timed Charges Total Minutes 40  -MB       Total Minutes 40  -MB            User Key  (r) = Recorded By, (t) = Taken By, (c) = Cosigned By    Initials Name Provider Type    Qiana Camacho, PT Physical Therapist              Therapy Charges for Today     Code Description Service Date Service Provider Modifiers Qty    47575825809 HC PT THER PROC EA 15 MIN 8/15/2022 Qiana Isabel, PT GP 2    [id number] HC GAIT TRAINING EA 15 MIN 8/15/2022 Qiana Isabel, PT GP 1          PT G-Codes  Outcome Measure Options: AM-PAC 6 Clicks Basic Mobility (PT)  AM-PAC 6 Clicks Score (PT): 12  AM-PAC 6 Clicks Score (OT): 11    Qiana Isabel PT  8/15/2022

## 2022-08-15 NOTE — DISCHARGE PLACEMENT REQUEST
"  Patient in need of short term rehab     Will be going to [facility] in [facility]     Thank you!     Neris Rodarte [phone]    MeadDavid (87 y.o. Female)             Date of Birth   1935    Social Security Number       Address   [address] [facility] [address]    Home Phone   [phone]    MRN   [id number]       [place]osque   Anab[employer]ism    Marital Status                               Admission Date   7/28/22    Admission Type   Emergency    Admitting Provider   Armando Jacobson DO    Attending Provider   Armando Jacobson DO    Department, Room/Bed   [facility] 6A, N619/1       Discharge Date       Discharge Disposition       Discharge Destination                               Attending Provider: Armando Jacobson DO    Allergies: Bactrim [Sulfamethoxazole-trimethoprim], Lisinopril, Rocephin [Ceftriaxone], Codeine    Isolation: None   Infection: COVID (History) (08/08/22)   Code Status: CPR   Advance Care Planning Activity    Ht: 162.6 cm (64.02\")   Wt: 91.8 kg (202 lb 6.4 oz)    Admission Cmt: None   Principal Problem: GIB (gastrointestinal bleeding) [K92.2]                 Active Insurance as of 7/28/2022     Primary Coverage     Payor Plan Insurance Group Employer/Plan Group    MEDICARE MEDICARE A & B      Payor Plan Address Payor Plan Phone Number Payor Plan Fax Number Effective Dates    [address] [phone]  7/1/2001 - None Entered    [facility] [id number]       Subscriber Name Subscriber Birth Date Member ID       DAVID MEAD 1935 [id number]           Secondary Coverage     Payor Plan Insurance Group Employer/Plan Group    [place] [employer] [facility]      Payor Plan Address Payor Plan Phone Number Payor Plan Fax Number Effective Dates    [address] [phone]  7/1/2001 - None Entered    [place] [address]       Subscriber Name Subscriber Birth Date Member ID       AYANA MEADIS LINO 1935 [id number]           Tertiary " Coverage     Payor Plan Insurance Group Employer/Plan Group    [place]      Payor Plan Address Payor Plan Phone Number Payor Plan Fax Number Effective Dates    [address] [phone]  2020 - None Entered    [place] [address]       Subscriber Name Subscriber Birth Date Member ID       KARLA MEAD 1935 [id number]                 Emergency Contacts      (Rel.) Home Phone Work Phone Mobile Phone    Frida Vazquez (Daughter) [phone] -- --               Physician Progress Notes (most recent note)      Armando Jacobson DO at 08/15/22 0836              [facility] Medicine Services  PROGRESS NOTE    Patient Name: Karla Mead  : 1935  MRN: [id number]    Date of Admission: 2022  Primary Care Physician: Luis Cardenas MD    Subjective   Subjective     CC:   Weakness following dialysis    HPI:    No labs as yet today.  On room air.  No new notes today as yet.  Await multidisciplinary rounds to discuss her disposition planning.  Patient appears uncomfortable this morning.  She reports that she had a difficult night.  Discussing with her nurse Jian he says that she had had a large bowel movement overnight and had required to be cleaned up.  She denies any pain denies any shortness of breath outside of her normal shortness of breath.    ROS:  MSK- is ambulating  CV- No chest pain  Resp-chronic dyspnea baseline  GI- no diarrhea or constipation, good appetite  - no oliguria or dysuria  Neuro-did not rest well overnight    Objective   Objective     Vital Signs:   Temp:  [97.3 °F (36.3 °C)-98.2 °F (36.8 °C)] 98 °F (36.7 °C)  Heart Rate:  [69-80] 73  Resp:  [16-18] 18  BP: (103-127)/(52-64) 127/59     Physical Exam:  Constitutional: No acute distress, awake, alert  HENT: NCAT, mucous membranes moist  Respiratory: Clear to auscultation bilaterally, respiratory effort normal   Cardiovascular: RRR, no murmurs, rubs, or  gallops  Gastrointestinal: Positive bowel sounds, soft, nontender, nondistended  Musculoskeletal: No bilateral ankle edema  Psychiatric: Appropriate affect, cooperative  Neurologic:  speech clear and coherent  Skin: No rashes      Results Reviewed:  LAB RESULTS:      Lab 08/14/22  0631 08/13/22  0635 08/12/22  1201 08/09/22  0424 08/08/22  1347   WBC 7.01 7.08 6.50 5.83 5.33   HEMOGLOBIN 10.4* 10.0* 9.8* 10.0* 9.7*   HEMATOCRIT 31.5* 31.4* 29.9* 30.4* 29.7*   PLATELETS 205 186 133* 212 190   NEUTROS ABS  --  4.27 3.64 3.76  --    IMMATURE GRANS (ABS)  --  0.09* 0.08* 0.06*  --    LYMPHS ABS  --  1.36 1.46 0.95  --    MONOS ABS  --  0.99* 1.01* 0.74  --    EOS ABS  --  0.30 0.25 0.26  --    MCV 88.7 92.4 89.5 87.4 89.2         Lab 08/14/22  0631 08/13/22  0635 08/12/22  2004 08/11/22  0827 08/09/22  0424 08/08/22  1347   SODIUM 137 133* 133* 133* 140 141   POTASSIUM 4.6 4.0 4.2 3.7 3.9 3.9   CHLORIDE 104 100 99 97* 106 106   CO2 21.0* 20.0* 24.0 26.0 24.0 22.0   ANION GAP 12.0 13.0 10.0 10.0 10.0 13.0   BUN 27* 47* 42* 54* 58* 100*   CREATININE 2.25* 2.98* 2.59* 2.81* 2.34* 3.13*   EGFR 20.8* 14.8* 17.5* 15.9* 19.8* 14.0*   GLUCOSE 146* 159* 200* 139* 166* 125*   CALCIUM 8.1* 8.1* 8.2* 8.3* 8.7 8.6   MAGNESIUM  --   --   --   --   --  2.1   PHOSPHORUS  --   --   --  2.9 2.9 4.1         Lab 08/11/22  0827 08/09/22  0424 08/08/22  1347   ALBUMIN 2.50* 2.50* 2.60*                     Brief Urine Lab Results  (Last result in the past 365 days)      Color   Clarity   Blood   Leuk Est   Nitrite   Protein   CREAT   Urine HCG        07/30/22 1742 Yellow   Clear   Small (1+)   Trace   Negative   Trace                 Microbiology Results Abnormal     Procedure Component Value - Date/Time    Eosinophil Smear - Urine, Urine, Clean Catch [[id number]]  (Normal) Collected: 07/30/22 1742    Lab Status: Final result Specimen: Urine, Clean Catch Updated: 07/30/22 1938     Eosinophil Smear 0 % EOS/100 Cells     Narrative:      No  eosinophil seen          No radiology results from the last 24 hrs    Results for orders placed during the hospital encounter of 07/28/22    Adult Transthoracic Echo Complete W/ Cont if Necessary Per Protocol    Interpretation Summary  · Mild biatrial enlargement.  · Moderately reduced right ventricular systolic function noted.  · Moderate left ventricular systolic dysfunction, estimated EF 37%.  · Left ventricular wall thickness is consistent with mild concentric hypertrophy.  · Lambl's excrescences of the aortic valve are noted. There is trace aortic insufficiency, no evidence of aortic stenosis.  · Moderate mitral regurgitation.  · Moderate to severe tricuspid regurgitation with RVSP 48 mmHg.  · Moderate pulmonic insufficiency.      I have reviewed the medications:  Scheduled Meds:apixaban, 2.5 mg, Oral, Q12H  atorvastatin, 20 mg, Oral, Nightly  carvedilol, 3.125 mg, Oral, Q12H  castor oil-balsam peru, 1 application, Topical, Q12H  dextromethorphan polistirex ER, 60 mg, Oral, Q12H  epoetin shukri/shukri-epbx, 10,000 Units, Subcutaneous, Once per day on Tue Thu Sat  insulin lispro, 0-7 Units, Subcutaneous, TID AC  Insulin Lispro, 4 Units, Subcutaneous, TID With Meals  latanoprost, 1 drop, Both Eyes, Nightly  levothyroxine, 112 mcg, Oral, Q AM  pantoprazole, 40 mg, Oral, BID AC  sodium chloride, 10 mL, Intravenous, Q12H  timolol, 1 drop, Both Eyes, BID      Continuous Infusions:   PRN Meds:.•  acetaminophen **OR** acetaminophen **OR** acetaminophen  •  benzonatate  •  dextrose  •  dextrose  •  glucagon (human recombinant)  •  melatonin  •  ondansetron  •  sodium chloride    Assessment & Plan   Assessment & Plan     Active Hospital Problems    Diagnosis  POA   • **GIB (gastrointestinal bleeding) [K92.2]  Yes   • Normocytic anemia [D64.9]  Unknown   • Acute renal failure (ARF) (HCC) [N17.9]  Unknown   • COVID-19 virus detected [U07.1]  Unknown   • Thrombocytopenia (HCC) [D69.6]  Unknown   • Hypoalbuminemia [E88.09]   Unknown   • AMS (altered mental status) [R41.82]  Unknown   • Lower GI bleed [K92.2]  Yes   • Type 2 diabetes mellitus without complication, with long-term current use of insulin (HCC) [E11.9, Z79.4]  Not Applicable   • Chronic systolic congestive heart failure (HCC) [I50.22]  Yes   • CAD (coronary artery disease) [I25.10]  Yes   • Chronic atrial fibrillation (HCC) [I48.20]  Yes   • CKD (chronic kidney disease), stage IV (HCC) [N18.4]  Yes   • Hyperlipidemia LDL goal <100 [E78.5]  Yes   • Essential hypertension [I10]  Yes   • Hypothyroidism [E03.9]  Yes   • Pulmonary embolism (HCC) [I26.99]  Yes   • CHF (NYHA class IV, ACC/AHA stage D) (HCC) [I50.84]  Yes      Resolved Hospital Problems   No resolved problems to display.     Brief Hospital Course to date:  Karla Cristobal is a 87 y.o. female with anemia, atrial fibrillation on Eliquis, CKD stage IV, HLD, hypertension, hypothyroidism, CAD s/p stents, diabetes type 2, and systolic heart failure (2L NC at night) who presented to the ED on 7/28/2022 for rectal bleeding. 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  3 endoclips were applied for hemostasis.  H/H has been stable since. She continues on PPI BID.  She was incidentally found to be COVID positive and had acute kidney injury for which nephrology has been following. She had temporary dialysis catheter placed on 8/6/2022 and was started on hemodialysis.      This patient's problems and plans were partially entered by my partner and updated as appropriate by me 08/15/22.    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  3 endoclips were applied for hemostasis  -s/p IV Iron  -Eliquis resumed 8/7, missed am doses on 8/8 and 8/9  -on low dose apixaban 2.5 mg every 12  hours for atrial fibrillation  -PPI BID     ROBBI on CKD IV  -Nephrology consulted  -Started on Hemodialysis this admission on   -working on outpatient dialysis chair (New)   -has access in Right Upper Chest     N/V  -Zofran as needed    DMII  - A1C 2022 7.6  - Meal time and SSI-adjust as needed  -Blood glucose reviewed euglycemic 8/15/2022    Hypothyroidism  -levothyroxine     COVID-19 positive  -Asymptomatic    -Out of Isolation on      Mixed systolic and diastolic CHF  Moderate to severe MR  Persistent atrial fibrillation   -Seen by cardiology and started on carvedilol but held a few times due to low normal BP   -apixaban restarted on  but has missed a few doses on  and      Expected Discharge Location and Transportation: Plan to go to rehab. Will need new Dialysis Chair appt  Expected Discharge Date: 8/15    DVT prophylaxis:  Medical DVT prophylaxis orders are present.     AM-PAC 6 Clicks Score (PT): 10 (22 0805)    CODE STATUS:   Code Status and Medical Interventions:   Ordered at: 22 2343     Level Of Support Discussed With:    Patient     Code Status (Patient has no pulse and is not breathing):    CPR (Attempt to Resuscitate)     Medical Interventions (Patient has pulse or is breathing):    Full Support       Armando Jacobson DO  08/15/22              Electronically signed by Armando Jacobson DO at 08/15/22 0848          Physical Therapy Notes (most recent note)      Qiana Isabel, PT at 08/10/22 1110  Version 1 of 1         Patient Name: Karla Cristobal  : 1935    MRN: [id number]                              Today's Date: 8/10/2022       Admit Date: 2022    Visit Dx:     ICD-10-CM ICD-9-CM   1. Lower GI bleed  K92.2 578.9   2. Chronic anticoagulation  Z79.01 V58.61   3. History of atrial fibrillation  Z86.79 V12.59   4. Generalized weakness  R53.1 780.79   5. Malaise and fatigue  R53.81 780.79    R53.83    6. Nausea  R11.0 787.02   7. Acute  renal failure superimposed on stage 4 chronic kidney disease, unspecified acute renal failure type (HCC)  N17.9 584.9    N18.4 585.4   8. Thrombocytopenia (HCC)  D69.6 287.5   9. History of CHF (congestive heart failure)  Z86.79 V12.59   10. History of diabetes mellitus  Z86.39 V12.29   11. History of diverticulosis  Z87.19 V12.79   12. Gastrointestinal hemorrhage, unspecified gastrointestinal hemorrhage type  K92.2 578.9     Patient Active Problem List   Diagnosis   • Chronic atrial fibrillation (HCC)   • CKD (chronic kidney disease), stage IV (HCC)   • Diabetic nephropathy (HCC)   • Diabetic retinopathy (HCC)   • Malignant neoplasm of endometrium (HCC)   • Hyperlipidemia LDL goal <100   • Essential hypertension   • Hypothyroidism   • Insomnia   • Leukocytosis   • Memory impairment   • Nausea   • Pulmonary embolism (HCC)   • Sleep apnea   • Squamous cell carcinoma of skin   • CHF (NYHA class IV, ACC/AHA stage D) (HCC)   • Ischemic heart disease   • CAD (coronary artery disease)   • DVT of lower extremity (deep venous thrombosis) (HCC)   • Chronic systolic congestive heart failure (HCC)   • Morbidly obese (HCC)   • Ischemic cardiomyopathy   • Type 2 diabetes mellitus without complication, with long-term current use of insulin (HCC)   • Exudative age-related macular degeneration, right eye, with active choroidal neovascularization (HCC)   • Hypoxia   • Cellulitis of left lower extremity   • Left leg cellulitis   • Food impaction of esophagus   • Malignant tumor of ascending colon (HCC)   • GIB (gastrointestinal bleeding)   • Normocytic anemia   • Acute renal failure (ARF) (HCC)   • COVID-19 virus detected   • Thrombocytopenia (HCC)   • Hypoalbuminemia   • AMS (altered mental status)   • Lower GI bleed     Past Medical History:   Diagnosis Date   • Acute bronchitis    • Arthritis    • Atrial fibrillation (HCC)    • CAD (coronary artery disease)     s/p MI 2005; 3 stents inserted    • Change in mole    • Change in mole     • Change in nevus 6/16/2016   • Chronic kidney disease     stage IV-sees Dr Bk Mckeon every 3-4 months    • Chronic renal disease, stage 4, severely decreased glomerular filtration rate between 15-29 mL/min/1.73 square meter ([facility])    • Chronic systolic heart failure ([facility])    • Congestive heart disease ([facility])    • Conjunctivitis    • Deep vein thrombosis of lower extremity ([facility])    • Diabetes mellitus ([facility])    • Diabetes mellitus ([facility]) 6/16/2016    Impression: 03/15/2016 - blood sugar 166 and hgn a1c 7.3%  is up to date with eye exam  neuropathy with callus, no ulcer  some scratches feet  ur alb due and ordered  no change other than provided samples of toujeo because she feels like lantus worked much better than levemir, she has tried titrating levemir and it is less effective  continue testing and f/u 3-4 months  Impression: 11/23/2015 - bl   • Diabetic foot ulcer ([facility]) 6/16/2016   • Dog bite of hand    • Easy bruising    • Endometrial cancer ([facility])     partial hysterectomy; radiation as well    • Foot pain    • Foot pain 6/16/2016    Description: lancinating- worse but not consistent enough to want rx   • Fracture of hip ([facility])    • Generalized ischemic myocardial dysfunction 6/16/2016   • Glaucoma     drops daily    • Hyperlipidemia    • Hypertension    • Hypothyroidism    • Insomnia    • Ischemic heart disease    • Macular degeneration    • Methicillin resistant Staphylococcus aureus infection 6/16/2016   • Myocardial infarction ([facility]) 2005   • Nausea with vomiting    • Pericardial effusion    • Shortness of breath 6/16/2016    Impression: 03/24/2015 - chronic. On 2 l oxygen at night. check cbc, bnp;    • Skin cancer, basal cell     nose, leg    • Skin lesion 6/16/2016    Impression: 03/24/2015 - refer derm for eval- seb kerat ant tib and ?ak medially with redness and irritation;    • Sleep apnea     oxygen at night due to low sats but no cpap   • Type 2 diabetes mellitus ([facility])    • UTI (urinary tract  infection) 06/12/2020    currently taking antibiotics-patient's daughter notified Dr Beal's office and office said it should not delay generator change      Past Surgical History:   Procedure Laterality Date   • CARDIAC CATHETERIZATION     • CARDIAC DEFIBRILLATOR PLACEMENT     • CARDIAC ELECTROPHYSIOLOGY PROCEDURE N/A 7/17/2020    Procedure: ICD BIV  generator change- SJM- 3-6 weeks;  Surgeon: Tano Beal MD;  Location:  [facility];  Service: Cardiology;  Laterality: N/A;   • CHOLECYSTECTOMY     • COLONOSCOPY N/A 7/30/2022    Procedure: COLONOSCOPY;  Surgeon: Brunner, Mark I, MD;  Location:  [facility];  Service: Gastroenterology;  Laterality: N/A;  WITH 3 RESOLUTION CLIPS   • CORONARY ARTERY BYPASS GRAFT  2000   • CORONARY STENT PLACEMENT      3 stents    • ENDOSCOPY N/A 11/17/2021    Procedure: ESOPHAGOGASTRODUODENOSCOPY;  Surgeon: Brunner, Mark I, MD;  Location:  [facility];  Service: Gastroenterology;  Laterality: N/A;   • EYE SURGERY Bilateral     cataract extraction    • HIP SURGERY Left     x3   • HYSTERECTOMY      partial    • KNEE ARTHROPLASTY Bilateral    • PACEMAKER IMPLANTATION     • TONSILLECTOMY        General Information     Row Name 08/10/22 1110          Physical Therapy Time and Intention    Document Type progress note/recertification;therapy note (daily note)  -MB     Mode of Treatment physical therapy  -MB     Row Name 08/10/22 1110          General Information    Patient Profile Reviewed yes  -MB     Existing Precautions/Restrictions fall  -MB     Barriers to Rehab medically complex;previous functional deficit  -MB     Row Name 08/10/22 1110          Cognition    Orientation Status (Cognition) oriented x 3  -MB     Row Name 08/10/22 1110          Safety Issues, Functional Mobility    Safety Issues Affecting Function (Mobility) awareness of need for assistance;insight into deficits/self-awareness;positioning of assistive device;problem-solving;safety  precaution awareness;safety precautions follow-through/compliance;sequencing abilities  -MB     Impairments Affecting Function (Mobility) balance;coordination;endurance/activity tolerance;strength;postural/trunk control;shortness of breath;motor planning;pain  -MB           User Key  (r) = Recorded By, (t) = Taken By, (c) = Cosigned By    Initials Name Provider Type    MB Qiana Isabel, PT Physical Therapist               Mobility     Row Name 08/10/22 1110          Bed Mobility    Comment, (Bed Mobility) Pt. received sitting EOB.  -MB     Row Name 08/10/22 1110          Transfers    Comment, (Transfers) Pt. required consistent VCs/tactile cues for set up, safe hand placement, and sequencing. Pt. able to stand ~80% upright. Pt. quickly c/o fatigue and requests to sit down despite encouragement.  -MB     Row Name 08/10/22 1110          Bed-Chair Transfer    Bed-Chair Cecil (Transfers) maximum assist (25% patient effort);2 person assist;verbal cues;nonverbal cues (demo/gesture)  -MB     Assistive Device (Bed-Chair Transfers) other (see comments)  BUE support  -MB     Row Name 08/10/22 1110          Sit-Stand Transfer    Sit-Stand Cecil (Transfers) maximum assist (25% patient effort);2 person assist;verbal cues;nonverbal cues (demo/gesture)  -MB     Assistive Device (Sit-Stand Transfers) other (see comments)  BUE support  -MB     Row Name 08/10/22 1110          Gait/Stairs (Locomotion)    Cecil Level (Gait) maximum assist (25% patient effort);2 person assist;verbal cues;nonverbal cues (demo/gesture)  -MB     Assistive Device (Gait) other (see comments)  BUE support  -MB     Distance in Feet (Gait) 2  -MB     Deviations/Abnormal Patterns (Gait) dimitry decreased;gait speed decreased;stride length decreased;weight shifting decreased  -MB     Bilateral Gait Deviations forward flexed posture;heel strike decreased  -MB     Comment, (Gait/Stairs) Pt. amb to chair w/ max A x 2 to shift weight and  VCs for step sequencing. Pt. limited by weakness and dec safety awareness; attempts to sit prematurely.  -MB           User Key  (r) = Recorded By, (t) = Taken By, (c) = Cosigned By    Initials Name Provider Type    Qiana Camacho, PT Physical Therapist               Obj/Interventions     Row Name 08/10/22 1550          Motor Skills    Therapeutic Exercise hip;knee;ankle  -MB     Row Name 08/10/22 1550          Hip (Therapeutic Exercise)    Hip Isometrics (Therapeutic Exercise) bilateral;gluteal sets;10 repetitions  -MB     Hip Strengthening (Therapeutic Exercise) bilateral;heel slides;aBduction;10 repetitions  -MB     Row Name 08/10/22 1550          Knee (Therapeutic Exercise)    Knee Isometrics (Therapeutic Exercise) bilateral;quad sets;10 repetitions  -MB     Knee Strengthening (Therapeutic Exercise) bilateral;LAQ (long arc quad);10 repetitions  -MB     Row Name 08/10/22 1550          Ankle (Therapeutic Exercise)    Ankle (Therapeutic Exercise) AROM (active range of motion)  -MB     Ankle AROM (Therapeutic Exercise) bilateral;dorsiflexion;plantarflexion;10 repetitions  -MB     Row Name 08/10/22 1550          Balance    Balance Assessment sitting static balance;standing static balance;standing dynamic balance  -MB     Static Sitting Balance contact guard  -MB     Position, Sitting Balance unsupported;sitting edge of bed  -MB     Static Standing Balance maximum assist;2-person assist  -MB     Dynamic Standing Balance maximum assist;2-person assist  -MB     Position/Device Used, Standing Balance supported;other (see comments)  BUE support  -MB     Balance Interventions standing;sit to stand;weight shifting activity  -MB           User Key  (r) = Recorded By, (t) = Taken By, (c) = Cosigned By    Initials Name Provider Type    Qiana Camacho, PT Physical Therapist               Goals/Plan     Row Name 08/10/22 1550          Bed Mobility Goal 1 (PT)    Activity/Assistive Device (Bed Mobility Goal 1, PT)  sit to supine/supine to sit  -MB     [place] Level/Cues Needed (Bed Mobility Goal 1, PT) minimum assist (75% or more patient effort)  -MB     Time Frame (Bed Mobility Goal 1, PT) 10 days  -MB     Progress/Outcomes (Bed Mobility Goal 1, PT) goal revised this date;progress slower than expected;medical status inhibiting progress  -MB     Row Name 08/10/22 1554          Transfer Goal 1 (PT)    Activity/Assistive Device (Transfer Goal 1, PT) sit-to-stand/stand-to-sit;walker, rolling;bed-to-chair/chair-to-bed  -MB     [place] Level/Cues Needed (Transfer Goal 1, PT) moderate assist (50-74% patient effort)  -MB     Time Frame (Transfer Goal 1, PT) 10 days  -MB     Progress/Outcome (Transfer Goal 1, PT) progress slower than expected;goal revised this date  -MB     Row Name 08/10/22 1554          Gait Training Goal 1 (PT)    Activity/Assistive Device (Gait Training Goal 1, PT) gait (walking locomotion);decrease fall risk;diminish gait deviation;improve balance and speed;increase endurance/gait distance;walker, rolling  -MB     [place] Level (Gait Training Goal 1, PT) moderate assist (50-74% patient effort)  -MB     Distance (Gait Training Goal 1, PT) 15  -MB     Time Frame (Gait Training Goal 1, PT) 10 days  -MB     Progress/Outcome (Gait Training Goal 1, PT) goal revised this date;progress slower than expected  -MB     Row Name 08/10/22 1554          Stairs Goal 1 (PT)    Activity/Assistive Device (Stairs Goal 1, PT) stairs, all skills;using handrail, left;using handrail, right  -MB     [place] Level/Cues Needed (Stairs Goal 1, PT) contact guard required  -MB     Progress/Outcome (Stairs Goal 1, PT) goal no longer appropriate  -MB     Row Name 08/10/22 1554          Patient Education Goal (PT)    Activity (Patient Education Goal, PT) HEP  -MB     [place]/Cues/Accuracy (Memory Goal 2, PT) demonstrates adequately  -MB     Time Frame (Patient Education Goal, PT) 10 days  -MB     Progress/Outcome  (Patient Education Goal, PT) goal ongoing  -MB     Row Name 08/10/22 1552          Therapy Assessment/Plan (PT)    Planned Therapy Interventions (PT) balance training;bed mobility training;gait training;home exercise program;patient/family education;postural re-education;ROM (range of motion);stair training;strengthening;transfer training  -MB           User Key  (r) = Recorded By, (t) = Taken By, (c) = Cosigned By    Initials Name Provider Type    Qiana Camacho, PT Physical Therapist               Clinical Impression     Row Name 08/10/22 6193          Pain    Additional Documentation Pain Scale: FACES Pre/Post-Treatment (Group)  -MB     Row Name 08/10/22 3243          Pain Scale: FACES Pre/Post-Treatment    Pain: FACES Scale, Pretreatment 2-->hurts little bit  -MB     Posttreatment Pain Rating 4-->hurts little more  -MB     Pain Location - Side/Orientation Bilateral  -MB     Pain Location lower  -MB     Pain Location - extremity  -MB     Row Name 08/10/22 0923          Plan of Care Review    Plan of Care Reviewed With patient  -MB     Progress no change  -MB     Outcome Evaluation PT recert completed. Patient limited by weakness, impaired balance, and decreased activity tolerance. Goals modified to reflect current level of function. She required max A x 2 for transfers and to ambulate 2ft to chair w/ UE support. PT recommends SNF rehab at D/C.  -MB     Row Name 08/10/22 0620          Therapy Assessment/Plan (PT)    Rehab Potential (PT) fair, will monitor progress closely  -MB     Criteria for Skilled Interventions Met (PT) yes;meets criteria;skilled treatment is necessary  -MB     Therapy Frequency (PT) daily  -MB     Row Name 08/10/22 1115          Vital Signs    Pre Patient Position Sitting  -MB     Intra Patient Position Standing  -MB     Post Patient Position Sitting  -MB     Row Name 08/10/22 8557          Positioning and Restraints    Pre-Treatment Position in bed  -MB     Post Treatment Position  chair  -MB     In Chair notified nsg;reclined;call light within reach;encouraged to call for assist;exit alarm on;waffle cushion;on mechanical lift sling;legs elevated;heels elevated;LUE elevated  -MB           User Key  (r) = Recorded By, (t) = Taken By, (c) = Cosigned By    Initials Name Provider Type    Qiana Camacho, PT Physical Therapist               Outcome Measures     Row Name 08/10/22 1559          How much help from another person do you currently need...    Turning from your back to your side while in flat bed without using bedrails? 2  -MB     Moving from lying on back to sitting on the side of a flat bed without bedrails? 2  -MB     Moving to and from a bed to a chair (including a wheelchair)? 2  -MB     Standing up from a chair using your arms (e.g., wheelchair, bedside chair)? 2  -MB     Climbing 3-5 steps with a railing? 1  -MB     To walk in hospital room? 1  -MB     AM-PAC 6 Clicks Score (PT) 10  -MB     Highest level of mobility 4 --> Transferred to chair/commode  -MB     Row Name 08/10/22 1559 08/10/22 1131       Functional Assessment    Outcome Measure Options AM-PAC 6 Clicks Basic Mobility (PT)  -MB AM-PAC 6 Clicks Daily Activity (OT)  -MA          User Key  (r) = Recorded By, (t) = Taken By, (c) = Cosigned By    Initials Name Provider Type    Qiana Camacho, PT Physical Therapist    Janelle Moncada, OT Occupational Therapist                             Physical Therapy Education                 Title: PT OT SLP Therapies (In Progress)     Topic: Physical Therapy (In Progress)     Point: Mobility training (In Progress)     Learning Progress Summary           Patient Acceptance, E, NR by NS at 8/4/2022 1416      Show all documentation for this point (3)                 Point: Home exercise program (In Progress)     Learning Progress Summary           Patient Acceptance, E, NR by NS at 8/4/2022 1416      Show all documentation for this point (3)                 Point: Body  mechanics (In Progress)     Learning Progress Summary           Patient Acceptance, E, NR by NS at 8/4/2022 1416      Show all documentation for this point (3)                 Point: Precautions (In Progress)     Learning Progress Summary           Patient Acceptance, E, NR by NS at 8/4/2022 1416      Show all documentation for this point (3)                             User Key     Initials Effective Dates Name Provider Type Discipline    NS 06/16/21 -  Terri Hendrickson, PT Physical Therapist PT              PT Recommendation and Plan  Planned Therapy Interventions (PT): balance training, bed mobility training, gait training, home exercise program, patient/family education, postural re-education, ROM (range of motion), stair training, strengthening, transfer training  Plan of Care Reviewed With: patient  Progress: no change  Outcome Evaluation: PT recert completed. Patient limited by weakness, impaired balance, and decreased activity tolerance. Goals modified to reflect current level of function. She required max A x 2 for transfers and to ambulate 2ft to chair w/ UE support. PT recommends SNF rehab at D/C.     Time Calculation:    PT Charges     Row Name 08/10/22 1559             Time Calculation    Start Time 1110  -MB      PT Received On 08/10/22  -MB      PT Goal Re-Cert Due Date 08/20/22  -MB              Time Calculation- PT    Total Timed Code Minutes- PT 36 minute(s)  -MB              Timed Charges    74596 - PT Therapeutic Exercise Minutes 36  -MB              Total Minutes    Timed Charges Total Minutes 36  -MB       Total Minutes 36  -MB            User Key  (r) = Recorded By, (t) = Taken By, (c) = Cosigned By    Initials Name Provider Type    Qiana Camacho, PT Physical Therapist              Therapy Charges for Today     Code Description Service Date Service Provider Modifiers Qty    98456230536 HC PT THER PROC EA 15 MIN 8/10/2022 Qiana Isabel, PT GP 2          PT G-Codes  Outcome Measure  Options: AM-PAC 6 Clicks Basic Mobility (PT)  AM-PAC 6 Clicks Score (PT): 10  AM-PAC 6 Clicks Score (OT): 11    Qiana Isabel, PAVEL  8/10/2022      Electronically signed by Qiana Isabel, PT at 08/10/22 1600          Occupational Therapy Notes (most recent note)      Charisse Llamas, OT at 22 1345          Patient Name: Karla Cristobal  : 1935    MRN: [id number]                              Today's Date: 2022       Admit Date: 2022    Visit Dx:     ICD-10-CM ICD-9-CM   1. Lower GI bleed  K92.2 578.9   2. Chronic anticoagulation  Z79.01 V58.61   3. History of atrial fibrillation  Z86.79 V12.59   4. Generalized weakness  R53.1 780.79   5. Malaise and fatigue  R53.81 780.79    R53.83    6. Nausea  R11.0 787.02   7. Acute renal failure superimposed on stage 4 chronic kidney disease, unspecified acute renal failure type (HCC)  N17.9 584.9    N18.4 585.4   8. Thrombocytopenia (HCC)  D69.6 287.5   9. History of CHF (congestive heart failure)  Z86.79 V12.59   10. History of diabetes mellitus  Z86.39 V12.29   11. History of diverticulosis  Z87.19 V12.79   12. Gastrointestinal hemorrhage, unspecified gastrointestinal hemorrhage type  K92.2 578.9     Patient Active Problem List   Diagnosis   • Chronic atrial fibrillation (HCC)   • CKD (chronic kidney disease), stage IV (HCC)   • Diabetic nephropathy (HCC)   • Diabetic retinopathy (HCC)   • Malignant neoplasm of endometrium (HCC)   • Hyperlipidemia LDL goal <100   • Essential hypertension   • Hypothyroidism   • Insomnia   • Leukocytosis   • Memory impairment   • Nausea   • Pulmonary embolism (HCC)   • Sleep apnea   • Squamous cell carcinoma of skin   • CHF (NYHA class IV, ACC/AHA stage D) (HCC)   • Ischemic heart disease   • CAD (coronary artery disease)   • DVT of lower extremity (deep venous thrombosis) (HCC)   • Chronic systolic congestive heart failure (HCC)   • Morbidly obese (HCC)   • Ischemic cardiomyopathy   • Type 2 diabetes mellitus  without complication, with long-term current use of insulin (HCC)   • Exudative age-related macular degeneration, right eye, with active choroidal neovascularization (HCC)   • Hypoxia   • Cellulitis of left lower extremity   • Left leg cellulitis   • Food impaction of esophagus   • Malignant tumor of ascending colon (HCC)   • GIB (gastrointestinal bleeding)   • Normocytic anemia   • Acute renal failure (ARF) (HCC)   • COVID-19 virus detected   • Thrombocytopenia (HCC)   • Hypoalbuminemia   • AMS (altered mental status)   • Lower GI bleed     Past Medical History:   Diagnosis Date   • Acute bronchitis    • Arthritis    • Atrial fibrillation (HCC)    • CAD (coronary artery disease)     s/p MI 2005; 3 stents inserted    • Change in mole    • Change in mole    • Change in nevus 6/16/2016   • Chronic kidney disease     stage IV-sees Dr Bk Mckeon every 3-4 months    • Chronic renal disease, stage 4, severely decreased glomerular filtration rate between 15-29 mL/min/1.73 square meter (HCC)    • Chronic systolic heart failure (HCC)    • Congestive heart disease (HCC)    • Conjunctivitis    • Deep vein thrombosis of lower extremity (HCC)    • Diabetes mellitus ([facility])    • Diabetes mellitus (HCC) 6/16/2016    Impression: 03/15/2016 - blood sugar 166 and hgn a1c 7.3%  is up to date with eye exam  neuropathy with callus, no ulcer  some scratches feet  ur alb due and ordered  no change other than provided samples of toujeo because she feels like lantus worked much better than levemir, she has tried titrating levemir and it is less effective  continue testing and f/u 3-4 months  Impression: 11/23/2015 - bl   • Diabetic foot ulcer (HCC) 6/16/2016   • Dog bite of hand    • Easy bruising    • Endometrial cancer (HCC)     partial hysterectomy; radiation as well    • Foot pain    • Foot pain 6/16/2016    Description: lancinating- worse but not consistent enough to want rx   • Fracture of hip (HCC)    • Generalized ischemic  myocardial dysfunction 6/16/2016   • Glaucoma     drops daily    • Hyperlipidemia    • Hypertension    • Hypothyroidism    • Insomnia    • Ischemic heart disease    • Macular degeneration    • Methicillin resistant Staphylococcus aureus infection 6/16/2016   • Myocardial infarction (HCC) 2005   • Nausea with vomiting    • Pericardial effusion    • Shortness of breath 6/16/2016    Impression: 03/24/2015 - chronic. On 2 l oxygen at night. check cbc, bnp;    • Skin cancer, basal cell     nose, leg    • Skin lesion 6/16/2016    Impression: 03/24/2015 - refer derm for eval- seb kerat ant tib and ?ak medially with redness and irritation;    • Sleep apnea     oxygen at night due to low sats but no cpap   • Type 2 diabetes mellitus (HCC)    • UTI (urinary tract infection) 06/12/2020    currently taking antibiotics-patient's daughter notified Dr Beal's office and office said it should not delay generator change      Past Surgical History:   Procedure Laterality Date   • CARDIAC CATHETERIZATION     • CARDIAC DEFIBRILLATOR PLACEMENT     • CARDIAC ELECTROPHYSIOLOGY PROCEDURE N/A 7/17/2020    Procedure: ICD BIV  generator change- [facility]- 3-6 weeks;  Surgeon: Tano Beal MD;  Location:  [facility];  Service: Cardiology;  Laterality: N/A;   • CHOLECYSTECTOMY     • COLONOSCOPY N/A 7/30/2022    Procedure: COLONOSCOPY;  Surgeon: Brunner, Mark I, MD;  Location:  [facility];  Service: Gastroenterology;  Laterality: N/A;  WITH 3 RESOLUTION CLIPS   • CORONARY ARTERY BYPASS GRAFT  2000   • CORONARY STENT PLACEMENT      3 stents    • ENDOSCOPY N/A 11/17/2021    Procedure: ESOPHAGOGASTRODUODENOSCOPY;  Surgeon: Brunner, Mark I, MD;  Location:  [facility];  Service: Gastroenterology;  Laterality: N/A;   • EYE SURGERY Bilateral     cataract extraction    • HIP SURGERY Left     x3   • HYSTERECTOMY      partial    • KNEE ARTHROPLASTY Bilateral    • PACEMAKER IMPLANTATION     • TONSILLECTOMY        General  Information     Row Name 08/13/22 1403          OT Time and Intention    Document Type therapy note (daily note)  -JR     Mode of Treatment occupational therapy  -     Row Name 08/13/22 1403          General Information    Patient Profile Reviewed yes  -JR     Existing Precautions/Restrictions fall  -JR     Barriers to Rehab medically complex;previous functional deficit  -     Row Name 08/13/22 1403          Cognition    Orientation Status (Cognition) oriented x 3  -     Row Name 08/13/22 1403          Safety Issues, Functional Mobility    Safety Issues Affecting Function (Mobility) awareness of need for assistance;insight into deficits/self-awareness  -     Impairments Affecting Function (Mobility) strength;grasp;endurance/activity tolerance;balance;postural/trunk control;range of motion (ROM);shortness of breath  -           User Key  (r) = Recorded By, (t) = Taken By, (c) = Cosigned By    Initials Name Provider Type    Charisse Rider, OT Occupational Therapist                 Mobility/ADL's     Row Name 08/13/22 1404          Bed Mobility    Comment, (Bed Mobility) Pt declined EOB/OOB due to just returning from dialysis. Pt agreeable to ex in bed.  -     Row Name 08/13/22 1404          Activities of Daily Living    BADL Assessment/Intervention grooming  -     Row Name 08/13/22 1404          Grooming Assessment/Training    [place] Level (Grooming) wash face, hands;set up;hair care, combing/brushing;maximum assist (25% patient effort);verbal cues  -     Position (Grooming) sitting up in bed  -           User Key  (r) = Recorded By, (t) = Taken By, (c) = Cosigned By    Initials Name Provider Type    Charisse Rider OT Occupational Therapist               Obj/Interventions     Row Name 08/13/22 1406          Shoulder (Therapeutic Exercise)    Shoulder (Therapeutic Exercise) AAROM (active assistive range of motion)  -     Shoulder AAROM (Therapeutic Exercise)  bilateral;flexion;extension;10 repetitions  -[facility] Name 08/13/22 1406          Elbow/Forearm (Therapeutic Exercise)    Elbow/Forearm (Therapeutic Exercise) AAROM (active assistive range of motion)  -     Elbow/Forearm AROM (Therapeutic Exercise) bilateral;flexion;extension;10 repetitions  -[facility] Name 08/13/22 1406          Hand (Therapeutic Exercise)    Hand AROM/AAROM (Therapeutic Exercise) bilateral;AAROM (active assistive range of motion);finger flexion;finger extension;10 repetitions  -[facility] Name 08/13/22 1406          Motor Skills    Therapeutic Exercise shoulder;elbow/forearm;hand  -           User Key  (r) = Recorded By, (t) = Taken By, (c) = Cosigned By    Initials Name Provider Type    JR Charisse Llamas, OT Occupational Therapist               Goals/Plan    No documentation.                Clinical Impression     Row Name 08/13/22 1407          Pain Assessment    Additional Documentation Pain Scale: FACES Pre/Post-Treatment (Group)  -JR     Row Name 08/13/22 1407          Pain Scale: FACES Pre/Post-Treatment    Pain: FACES Scale, Pretreatment 2-->hurts little bit  -JR     Posttreatment Pain Rating 2-->hurts little bit  -JR     Pain Location generalized  -JR     Row Name 08/13/22 1407          Plan of Care Review    Plan of Care Reviewed With patient  -     Outcome Evaluation Limited treatment session this date due to fatigue following dialysis. Recommend continued skilled OT services and transfer to SNF at d/c.  -[facility] 08/13/22 1407          Therapy Assessment/Plan (OT)    Patient/Family Therapy Goal Statement (OT) go home  -     Rehab Potential (OT) fair, will monitor progress closely  -JR     Row Name 08/13/22 1407          Therapy Plan Review/Discharge Plan (OT)    Anticipated Discharge Disposition (OT) skilled nursing facility  -JR     Row Name 08/13/22 1407          Vital Signs    Pre Systolic BP Rehab 128  -JR     Pre Treatment Diastolic BP 64  -JR     Post Systolic  BP Rehab 114  -JR     Post Treatment Diastolic BP 71  -JR     Pretreatment Heart Rate (beats/min) 77  -JR     Posttreatment Heart Rate (beats/min) 77  -JR     Pre SpO2 (%) 100  -JR     O2 Delivery Pre Treatment room air  -JR     Post SpO2 (%) 96  -JR     O2 Delivery Post Treatment room air  -JR     Pre Patient Position Supine  -JR     Intra Patient Position Supine  -JR     Post Patient Position Supine  -JR     Row Name 08/13/22 1407          Positioning and Restraints    Pre-Treatment Position in bed  -JR     Post Treatment Position bed  -JR     In Bed notified nsg;supine;call light within reach;encouraged to call for assist;exit alarm on  -JR           User Key  (r) = Recorded By, (t) = Taken By, (c) = Cosigned By    Initials Name Provider Type    Charisse Rider OT Occupational Therapist               Outcome Measures     Row Name 08/13/22 1409          How much help from another is currently needed...    Putting on and taking off regular lower body clothing? 1  -JR     Bathing (including washing, rinsing, and drying) 1  -JR     Toileting (which includes using toilet bed pan or urinal) 1  -JR     Putting on and taking off regular upper body clothing 2  -JR     Taking care of personal grooming (such as brushing teeth) 2  -JR     Eating meals 3  -JR     AM-PAC 6 Clicks Score (OT) 10  -JR     Row Name 08/13/22 1409          Functional Assessment    Outcome Measure Options AM-PAC 6 Clicks Daily Activity (OT)  -JR           User Key  (r) = Recorded By, (t) = Taken By, (c) = Cosigned By    Initials Name Provider Type    Charisse Rider OT Occupational Therapist                Occupational Therapy Education                 Title: PT OT SLP Therapies (In Progress)     Topic: Occupational Therapy (In Progress)     Point: ADL training (In Progress)     Description:   Instruct learner(s) on proper safety adaptation and remediation techniques during self care or transfers.   Instruct in proper use of assistive  devices.              Learning Progress Summary           Patient Acceptance, E, NR by  at 8/13/2022 1345    Comment: Educated pt regarding therex      Show all documentation for this point (7)                 Point: Home exercise program (In Progress)     Description:   Instruct learner(s) on appropriate technique for monitoring, assisting and/or progressing therapeutic exercises/activities.              Learning Progress Summary           Patient Acceptance, E, NR by  at 8/13/2022 1345    Comment: Educated pt regarding therex      Show all documentation for this point (6)                 Point: Precautions (In Progress)     Description:   Instruct learner(s) on prescribed precautions during self-care and functional transfers.              Learning Progress Summary           Patient Acceptance, E, NR by MA at 8/10/2022 1131      Show all documentation for this point (3)                 Point: Body mechanics (In Progress)     Description:   Instruct learner(s) on proper positioning and spine alignment during self-care, functional mobility activities and/or exercises.              Learning Progress Summary           Patient Acceptance, E, NR by MA at 8/10/2022 1131      Show all documentation for this point (3)                             User Key     Initials Effective Dates Name Provider Type Discipline     06/16/21 -  Charisse Llamas, OT Occupational Therapist OT    MA 11/19/20 -  Janelle Leiva OT Occupational Therapist OT              OT Recommendation and Plan  Planned Therapy Interventions (OT): activity tolerance training, adaptive equipment training, BADL retraining, functional balance retraining, patient/caregiver education/training, transfer/mobility retraining, strengthening exercise, ROM/therapeutic exercise, occupation/activity based interventions  Therapy Frequency (OT): daily  Plan of Care Review  Plan of Care Reviewed With: patient  Outcome Evaluation: Limited treatment session this date  due to fatigue following dialysis. Recommend continued skilled OT services and transfer to SNF at d/c.     Time Calculation:    Time Calculation- OT     Row Name 08/13/22 1410             Time Calculation- OT    OT Start Time 1345  -JR      OT Received On 08/13/22  -JR              Timed Charges    46335 - OT Self Care/Mgmt Minutes 11  -JR              Total Minutes    Timed Charges Total Minutes 11  -JR       Total Minutes 11  -JR            User Key  (r) = Recorded By, (t) = Taken By, (c) = Cosigned By    Initials Name Provider Type     Charisse Llamas OT Occupational Therapist              Therapy Charges for Today     Code Description Service Date Service Provider Modifiers Qty    02197473359 HC OT SELF CARE/MGMT/TRAIN EA 15 MIN 8/13/2022 Charisse Llamas OT GO 1               Charisse Llamas OT  8/13/2022    Electronically signed by Charisse Llamas OT at 08/13/22 1410

## 2022-08-15 NOTE — CASE MANAGEMENT/SOCIAL WORK
Continued Stay Note  [facility]     Patient Name: Karla Cristobal  MRN: [id number]  Today's Date: 8/15/2022    Admit Date: 7/28/2022     Discharge Plan     Row Name 08/15/22 1621       Plan    Plan CM update    Plan Comments Spoke with Johan at [facility] - as soon as they get confirmation from [facility] on a chair time, they will be able to offer a bed - Messages left with [facility] waiting to confirm the chair time- CM will continue to follow -matheus [phone]               Discharge Codes    No documentation.                     Matheus Rodarte RN

## 2022-08-16 NOTE — PLAN OF CARE
Problem: Device-Related Complication Risk (Hemodialysis)  Goal: Safe, Effective Therapy Delivery  Outcome: Ongoing, Progressing     Problem: Hemodynamic Instability (Hemodialysis)  Goal: Effective Tissue Perfusion  Outcome: Ongoing, Progressing     Problem: Infection (Hemodialysis)  Goal: Absence of Infection Signs and Symptoms  Outcome: Ongoing, Progressing   Goal Outcome Evaluation:               HD completed. UF goal not reached due to low blood pressure. Report to primary nurse.

## 2022-08-16 NOTE — PROGRESS NOTES
[facility]  PROGRESS NOTE    Patient Name: Karla Cristobal  : 1935  MRN: [id number]    Date of Admission: 2022  Primary Care Physician: Luis Cardenas MD    Subjective   Subjective     CC:   Weakness following dialysis    HPI:    Case management note reviewed plan is for [facility] tomorrow.  Dr. Sharma's note reviewed is setting her up for dialysis and updated Frida the daughter.    ROS:  MSK- is ambulating  CV- No chest pain  Resp-chronic dyspnea baseline  GI- no diarrhea or constipation, good appetite  - no oliguria or dysuria  Neuro-did not rest well overnight    Objective   Objective     Vital Signs:   Temp:  [98 °F (36.7 °C)-98.2 °F (36.8 °C)] 98.2 °F (36.8 °C)  Heart Rate:  [] 91  Resp:  [18] 18  BP: ()/(28-69) 108/42     Physical Exam:  Constitutional: No acute distress, awake, alert  HENT: NCAT, mucous membranes moist  Respiratory: Clear to auscultation bilaterally, respiratory effort normal   Cardiovascular: RRR, no murmurs, rubs, or gallops  Gastrointestinal: Positive bowel sounds, soft, nontender, nondistended  Musculoskeletal: No bilateral ankle edema  Psychiatric: Appropriate affect, cooperative  Neurologic:  speech clear and coherent  Skin: No rashes      Results Reviewed:  LAB RESULTS:      Lab 08/15/22  0639 22  1201   WBC 7.65 7.01 7.08 6.50   HEMOGLOBIN 10.4* 10.4* 10.0* 9.8*   HEMATOCRIT 33.1* 31.5* 31.4* 29.9*   PLATELETS 216 205 186 133*   NEUTROS ABS  --   --  4.27 3.64   IMMATURE GRANS (ABS)  --   --  0.09* 0.08*   LYMPHS ABS  --   --  1.36 1.46   MONOS ABS  --   --  0.99* 1.01*   EOS ABS  --   --  0.30 0.25   MCV 93.0 88.7 92.4 89.5         Lab 08/15/22  0639 2231 22  0827   SODIUM 134* 137 133* 133* 133*   POTASSIUM 4.3 4.6 4.0 4.2 3.7   CHLORIDE 100 104 100 99 97*   CO2 23.0 21.0* 20.0* 24.0 26.0   ANION GAP 11.0 12.0 13.0 10.0 10.0    BUN 40* 27* 47* 42* 54*   CREATININE 3.16* 2.25* 2.98* 2.59* 2.81*   EGFR 13.8* 20.8* 14.8* 17.5* 15.9*   GLUCOSE 210* 146* 159* 200* 139*   CALCIUM 8.6 8.1* 8.1* 8.2* 8.3*   PHOSPHORUS 4.2  --   --   --  2.9         Lab 08/15/22  0639 08/11/22  0827   ALBUMIN 2.60* 2.50*                     Brief Urine Lab Results  (Last result in the past 365 days)      Color   Clarity   Blood   Leuk Est   Nitrite   Protein   CREAT   Urine HCG        07/30/22 1742 Yellow   Clear   Small (1+)   Trace   Negative   Trace                 Microbiology Results Abnormal     Procedure Component Value - Date/Time    Eosinophil Smear - Urine, Urine, Clean Catch [[id number]]  (Normal) Collected: 07/30/22 1742    Lab Status: Final result Specimen: Urine, Clean Catch Updated: 07/30/22 1938     Eosinophil Smear 0 % EOS/100 Cells     Narrative:      No eosinophil seen          No radiology results from the last 24 hrs    Results for orders placed during the hospital encounter of 07/28/22    Adult Transthoracic Echo Complete W/ Cont if Necessary Per Protocol    Interpretation Summary  · Mild biatrial enlargement.  · Moderately reduced right ventricular systolic function noted.  · Moderate left ventricular systolic dysfunction, estimated EF 37%.  · Left ventricular wall thickness is consistent with mild concentric hypertrophy.  · Lambl's excrescences of the aortic valve are noted. There is trace aortic insufficiency, no evidence of aortic stenosis.  · Moderate mitral regurgitation.  · Moderate to severe tricuspid regurgitation with RVSP 48 mmHg.  · Moderate pulmonic insufficiency.      I have reviewed the medications:  Scheduled Meds:apixaban, 2.5 mg, Oral, Q12H  atorvastatin, 20 mg, Oral, Nightly  carvedilol, 3.125 mg, Oral, Q12H  castor oil-balsam peru, 1 application, Topical, Q12H  dextromethorphan polistirex ER, 60 mg, Oral, Q12H  epoetin shukri/shukri-epbx, 10,000 Units, Subcutaneous, Once per day on Tue Thu Sat  insulin lispro, 0-7 Units,  Subcutaneous, TID AC  Insulin Lispro, 4 Units, Subcutaneous, TID With Meals  latanoprost, 1 drop, Both Eyes, Nightly  levothyroxine, 112 mcg, Oral, Q AM  pantoprazole, 40 mg, Oral, BID AC  sodium chloride, 10 mL, Intravenous, Q12H  timolol, 1 drop, Both Eyes, BID      Continuous Infusions:   PRN Meds:.•  acetaminophen **OR** acetaminophen **OR** acetaminophen  •  albumin human  •  benzonatate  •  dextrose  •  dextrose  •  glucagon (human recombinant)  •  melatonin  •  ondansetron  •  sodium chloride    Assessment & Plan   Assessment & Plan     Active Hospital Problems    Diagnosis  POA   • **GIB (gastrointestinal bleeding) [K92.2]  Yes   • Normocytic anemia [D64.9]  Unknown   • Acute renal failure (ARF) (HCC) [N17.9]  Unknown   • COVID-19 virus detected [U07.1]  Unknown   • Thrombocytopenia (HCC) [D69.6]  Unknown   • Hypoalbuminemia [E88.09]  Unknown   • AMS (altered mental status) [R41.82]  Unknown   • Lower GI bleed [K92.2]  Yes   • Type 2 diabetes mellitus without complication, with long-term current use of insulin (HCC) [E11.9, Z79.4]  Not Applicable   • Chronic systolic congestive heart failure (HCC) [I50.22]  Yes   • CAD (coronary artery disease) [I25.10]  Yes   • Chronic atrial fibrillation (HCC) [I48.20]  Yes   • CKD (chronic kidney disease), stage IV (HCC) [N18.4]  Yes   • Hyperlipidemia LDL goal <100 [E78.5]  Yes   • Essential hypertension [I10]  Yes   • Hypothyroidism [E03.9]  Yes   • Pulmonary embolism (HCC) [I26.99]  Yes   • CHF (NYHA class IV, ACC/AHA stage D) (HCC) [I50.84]  Yes      Resolved Hospital Problems   No resolved problems to display.     Brief Hospital Course to date:  Karla Cristobal is a 87 y.o. female with anemia, atrial fibrillation on Eliquis, CKD stage IV, HLD, hypertension, hypothyroidism, CAD s/p stents, diabetes type 2, and systolic heart failure (2L NC at night) who presented to the ED on 7/28/2022 for rectal bleeding. She was evaluated by GI and had colonoscopy which showed sigmoid  diverticulosis and AVM in proximal ascending colon with adherent clots, likely the source of bleeding.  3 endoclips were applied for hemostasis.  H/H has been stable since. She continues on PPI BID.  She was incidentally found to be COVID positive and had acute kidney injury for which nephrology has been following. She had temporary dialysis catheter placed on 8/6/2022 and was started on hemodialysis.      This patient's problems and plans were partially entered by my partner and updated as appropriate by me 08/16/22.    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  3 endoclips were applied for hemostasis  -s/p IV Iron  -Eliquis resumed 8/7, missed am doses on 8/8 and 8/9  -on low dose apixaban 2.5 mg every 12 hours for atrial fibrillation  -PPI BID     ROBBI on CKD IV  -Nephrology consulted  -Started on Hemodialysis this admission on 8/6  -working on outpatient dialysis chair (New) Tuesday Thursday Saturday  -has access in Right Upper Chest     N/V  -Zofran as needed    DMII  - A1C 05/2022 7.6  - Meal time and SSI-adjust as needed  -Blood glucose reviewed euglycemic 8/16/2022    Hypothyroidism  -levothyroxine     COVID-19 positive  -Asymptomatic    -Out of Isolation on 8/8     Mixed systolic and diastolic CHF  Moderate to severe MR  Persistent atrial fibrillation   -Seen by cardiology and started on carvedilol but held a few times due to low normal BP   -apixaban restarted on 8/7 but has missed a few doses on 8/8 and 8/9     Expected Discharge Location and Transportation: Plan to go to rehab. Will need new Dialysis Chair appt  Expected Discharge Date: 8/17/2022    DVT prophylaxis:  Medical DVT prophylaxis orders are present.     AM-PAC 6 Clicks Score (PT): 12 (08/15/22 1502)    CODE STATUS:   Code Status and Medical Interventions:   Ordered at: 07/28/22 4369     Level Of Support  Discussed With:    Patient     Code Status (Patient has no pulse and is not breathing):    CPR (Attempt to Resuscitate)     Medical Interventions (Patient has pulse or is breathing):    Full Support       Armando Jacobson DO  08/16/22

## 2022-08-16 NOTE — PROGRESS NOTES
"   LOS: 18 days    Patient Care Team:  Luis Cardenas MD as PCP - General (Nephrology)    Chief Complaint:  Gi bleed    Subjective   Interval History:   Seen on dialysis without any complaints.       Review of Systems:   No nausea vomiting shortness of breath or chest pain    Objective     Vital Sign Min/Max for last 24 hours  Temp  Min: 98 °F (36.7 °C)  Max: 98.2 °F (36.8 °C)   BP  Min: 95/50  Max: 137/39   Pulse  Min: 71  Max: 99   Resp  Min: 18  Max: 18   SpO2  Min: 97 %  Max: 100 %   No data recorded   No data recorded     Flowsheet Rows    Flowsheet Row First Filed Value   Admission Height 162.6 cm (64\") Documented at 07/28/2022 1451   Admission Weight 76.2 kg (168 lb) Documented at 07/28/2022 1451          No intake/output data recorded.  No intake/output data recorded.    Physical Exam:    General Appearance: Awake alert oriented no obvious distress comfortable.  Eyes: PER, EOMI.  Neck: Supple no JVD.  Lungs: Clear auscultation, no rales rhonchi's, equal chest movement, nonlabored.  Heart: No gallop, murmur, rub, RRR.  Abdomen: Soft, nontender, positive bowel sounds, no organomegaly.  Extremities: No edema, no cyanosis.  Neuro: No focal deficit, moving all extremities, alert oriented X 3  Tunnel catheter in place.      WBC WBC   Date Value Ref Range Status   08/15/2022 7.65 3.40 - 10.80 10*3/mm3 Final   08/14/2022 7.01 3.40 - 10.80 10*3/mm3 Final      HGB Hemoglobin   Date Value Ref Range Status   08/15/2022 10.4 (L) 12.0 - 15.9 g/dL Final   08/14/2022 10.4 (L) 12.0 - 15.9 g/dL Final      HCT Hematocrit   Date Value Ref Range Status   08/15/2022 33.1 (L) 34.0 - 46.6 % Final   08/14/2022 31.5 (L) 34.0 - 46.6 % Final      Platlets No results found for: LABPLAT   MCV MCV   Date Value Ref Range Status   08/15/2022 93.0 79.0 - 97.0 fL Final   08/14/2022 88.7 79.0 - 97.0 fL Final          Sodium Sodium   Date Value Ref Range Status   08/15/2022 134 (L) 136 - 145 mmol/L Final   08/14/2022 137 136 - 145 mmol/L Final "      Potassium Potassium   Date Value Ref Range Status   08/15/2022 4.3 3.5 - 5.2 mmol/L Final   08/14/2022 4.6 3.5 - 5.2 mmol/L Final     Comment:     Slight hemolysis detected by analyzer. Results may be affected.      Chloride Chloride   Date Value Ref Range Status   08/15/2022 100 98 - 107 mmol/L Final   08/14/2022 104 98 - 107 mmol/L Final      CO2 CO2   Date Value Ref Range Status   08/15/2022 23.0 22.0 - 29.0 mmol/L Final   08/14/2022 21.0 (L) 22.0 - 29.0 mmol/L Final      BUN BUN   Date Value Ref Range Status   08/15/2022 40 (H) 8 - 23 mg/dL Final   08/14/2022 27 (H) 8 - 23 mg/dL Final      Creatinine Creatinine   Date Value Ref Range Status   08/15/2022 3.16 (H) 0.57 - 1.00 mg/dL Final   08/14/2022 2.25 (H) 0.57 - 1.00 mg/dL Final      Calcium Calcium   Date Value Ref Range Status   08/15/2022 8.6 8.6 - 10.5 mg/dL Final   08/14/2022 8.1 (L) 8.6 - 10.5 mg/dL Final      PO4 No results found for: CAPO4   Albumin Albumin   Date Value Ref Range Status   08/15/2022 2.60 (L) 3.50 - 5.20 g/dL Final      Magnesium No results found for: MG   Uric Acid No results found for: URICACID        Results Review:     I reviewed the patient's new clinical results.    apixaban, 2.5 mg, Oral, Q12H  atorvastatin, 20 mg, Oral, Nightly  carvedilol, 3.125 mg, Oral, Q12H  castor oil-balsam peru, 1 application, Topical, Q12H  dextromethorphan polistirex ER, 60 mg, Oral, Q12H  epoetin shukri/shukri-epbx, 10,000 Units, Subcutaneous, Once per day on Tue Thu Sat  insulin lispro, 0-7 Units, Subcutaneous, TID AC  Insulin Lispro, 4 Units, Subcutaneous, TID With Meals  latanoprost, 1 drop, Both Eyes, Nightly  levothyroxine, 112 mcg, Oral, Q AM  pantoprazole, 40 mg, Oral, BID AC  sodium chloride, 10 mL, Intravenous, Q12H  timolol, 1 drop, Both Eyes, BID           Medication Review:     Assessment & Plan       GIB (gastrointestinal bleeding)    Chronic atrial fibrillation (HCC)    CKD (chronic kidney disease), stage IV (HCC)    Hyperlipidemia LDL  goal <100    Essential hypertension    Hypothyroidism    Pulmonary embolism (HCC)    CHF (NYHA class IV, ACC/AHA stage D) (HCC)    CAD (coronary artery disease)    Chronic systolic congestive heart failure (HCC)    Type 2 diabetes mellitus without complication, with long-term current use of insulin (HCC)    Normocytic anemia    Acute renal failure (ARF) (HCC)    COVID-19 virus detected    Thrombocytopenia (HCC)    Hypoalbuminemia    AMS (altered mental status)    Lower GI bleed      1- ROBBI on CKD stage IV - Thought to be hemodynamic injury progression of CKD to ESRD. Started on HD on 8/6/22.   2. CKD stage IV -baseline Scr 2.5 range  3- GI bleed   4- Low albumin level   5- Iron def anemia Tsat 11%. S/p iron supplementation. On  BENY on HD days   6- Renal cysts   7- Systolic CHF - Recent Echo showing EF 37% now. Volume status stable.  8. BMD: Check PTH level.     Plan:  This is in progress.  Continue with the TTS dialysis.  BENY on dialysis days  Renal diet with fluid restriction less than 1500 mL/day.  Will need outpatient hemodialysis chair.  Case management working with rehab placement with HD.  Case discussed with the daughter Frida on [phone] in detail regards to patient's future plan.    Albert Sharma MD  08/16/22  09:48 EDT

## 2022-08-16 NOTE — CASE MANAGEMENT/SOCIAL WORK
Continued Stay Note  Psychiatric     Patient Name: Karla Cristobal  MRN: [id number]  Today's Date: 8/16/2022    Admit Date: 7/28/2022     Discharge Plan     Row Name 08/16/22 1053       Plan    Plan CM update- [facility]    Plan Comments Spoke with staff at the [facility] in [place] [phone]- they have obtained all the necessary clinicals and are just awaiting their MD's final approval so they can assign a chair time. She states they should have a chair time to assign by today or tomorrow at the latest. [facility] will accept her as soon as a chair time is confirmed. Patient is in HD inpatient today. Ideally, a chair time will be given in time for patient to admit to [facility] tomorrow and begin HD at the clinic in [place] on Thursday. Neris [phone]    Final Discharge Disposition Code 03 - skilled nursing facility (SNF)               Discharge Codes    No documentation.                     Neris Rodarte RN

## 2022-08-17 NOTE — PLAN OF CARE
Goal Outcome Evaluation:  Plan of Care Reviewed With: patient        Progress: no change  Outcome Evaluation: Pt. required mod A to move to EOB, stand and move to recliner with rollator.  Limited by weakness, balance and dizziness and posterior lean in standing. Max A to pat robe due to poor shoulder function.  Pt. with incontinence of bowel on standing, dependent for hygiene. Good participation UE TE.  Continue to recommend SNF at discharge.

## 2022-08-17 NOTE — TELEPHONE ENCOUNTER
I called and left message to Ms Levar regarding her missed transmission.  I asked her to send in a manual transmission.  I left my name and this device clinic telephone number to call if she needs assistance.

## 2022-08-17 NOTE — DISCHARGE SUMMARY
[facility] Medicine Services  DISCHARGE SUMMARY    Patient Name: Karla Cristobal  : 1935  MRN: [id number]    Date of Admission: 2022  7:39 PM  Date of Discharge:  22  Primary Care Physician: Luis Cardenas MD    Consults     Date and Time Order Name Status Description    2022  1:13 PM Inpatient Cardiology Consult Completed     2022  3:55 AM Inpatient Gastroenterology Consult Completed     2022  1:53 AM Inpatient Nephrology Consult Completed           Hospital Course     Presenting Problem:   GIB (gastrointestinal bleeding) [K92.2]  Lower GI bleed [K92.2]    Active Hospital Problems    Diagnosis  POA   • **GIB (gastrointestinal bleeding) [K92.2]  Yes   • Normocytic anemia [D64.9]  Unknown   • Acute renal failure (ARF) (HCC) [N17.9]  Unknown   • COVID-19 virus detected [U07.1]  Unknown   • Thrombocytopenia (HCC) [D69.6]  Unknown   • Hypoalbuminemia [E88.09]  Unknown   • AMS (altered mental status) [R41.82]  Unknown   • Lower GI bleed [K92.2]  Yes   • Type 2 diabetes mellitus without complication, with long-term current use of insulin (HCC) [E11.9, Z79.4]  Not Applicable   • Chronic systolic congestive heart failure (HCC) [I50.22]  Yes   • CAD (coronary artery disease) [I25.10]  Yes   • Chronic atrial fibrillation (HCC) [I48.20]  Yes   • CKD (chronic kidney disease), stage IV (HCC) [N18.4]  Yes   • Hyperlipidemia LDL goal <100 [E78.5]  Yes   • Essential hypertension [I10]  Yes   • Hypothyroidism [E03.9]  Yes   • Pulmonary embolism (HCC) [I26.99]  Yes   • CHF (NYHA class IV, ACC/AHA stage D) (HCC) [I50.84]  Yes      Resolved Hospital Problems   No resolved problems to display.          Hospital Course:  Karla Cristobal is a 87 y.o. female with anemia, atrial fibrillation on Eliquis, CKD stage IV, HLD, hypertension, hypothyroidism, CAD s/p stents, diabetes type 2, and systolic heart failure (2L NC at night) who presented to the ED on 2022 for rectal  bleeding. 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  3 endoclips were applied for hemostasis.  H/H has been stable since.  She was incidentally found to be COVID positive and had acute kidney injury for which nephrology has been following. She had temporary dialysis catheter placed on 8/6/2022 and was started on hemodialysis.      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  3 endoclips were applied for hemostasis  -s/p IV Iron  -Eliquis resumed 8/7, on low dose apixaban 2.5 mg every 12 hours for atrial fibrillation  -PPI BID x 1 month, then daily thereafter     ROBBI on CKD IV  -Nephrology consulted  -Started on Hemodialysis this admission on 8/6  -working on outpatient dialysis chair (New) Tuesday Thursday Saturday  -has access in Right Upper Chest    DMII  - A1C 05/2022 7.6  - Meal time and SSI-adjust as needed     Hypothyroidism  -levothyroxine     COVID-19 positive  -Asymptomatic    -Out of Isolation on 8/8     Mixed systolic and diastolic CHF  Moderate to severe MR  Persistent atrial fibrillation   -Seen by cardiology and started on carvedilol   -apixaban restarted   -recommended to followup with primary cardiologist in one month      Discharge Follow Up Recommendations for outpatient labs/diagnostics:  Suggest checking cbc and bmp in one week, thereafter as needed.    Day of Discharge     HPI:   Feels fine. Denies any current pain.  Eager for transfer to rehab today.    Review of Systems  Gen- No fevers, chills  CV- No chest pain, palpitations  Resp- No cough, dyspnea  GI- No N/V/D, abd pain        Vital Signs:   Temp:  [97.9 °F (36.6 °C)-99.4 °F (37.4 °C)] 98 °F (36.7 °C)  Heart Rate:  [] 81  Resp:  [18] 18  BP: (107-135)/(39-67)  108/61      Physical Exam:  Constitutional - appears somewhat fatigued, in bed  HEENT-NCAT, mucous membranes moist  CV-RRR  Resp-CTAB  Abd-soft, nontender, nondistended, normoactive bowel sounds  Ext-No lower extremity cyanosis, clubbing or edema bilaterally  Neuro-alert, speech clear, moves all extremities   Psych-normal affect   Skin- No rash on exposed UE or LE bilaterally      Pertinent  and/or Most Recent Results     LAB RESULTS:      Lab 08/17/22  0435 08/15/22  0639 08/14/22  0631 08/13/22  0635 08/12/22  1201   WBC 8.62 7.65 7.01 7.08 6.50   HEMOGLOBIN 9.8* 10.4* 10.4* 10.0* 9.8*   HEMATOCRIT 30.2* 33.1* 31.5* 31.4* 29.9*   PLATELETS 176 216 205 186 133*   NEUTROS ABS  --   --   --  4.27 3.64   IMMATURE GRANS (ABS)  --   --   --  0.09* 0.08*   LYMPHS ABS  --   --   --  1.36 1.46   MONOS ABS  --   --   --  0.99* 1.01*   EOS ABS  --   --   --  0.30 0.25   MCV 91.5 93.0 88.7 92.4 89.5         Lab 08/17/22  0435 08/15/22  0639 08/14/22  0631 08/13/22  0635 08/12/22  2004 08/11/22  0827   SODIUM 136 134* 137 133* 133* 133*   POTASSIUM 4.1 4.3 4.6 4.0 4.2 3.7   CHLORIDE 103 100 104 100 99 97*   CO2 23.0 23.0 21.0* 20.0* 24.0 26.0   ANION GAP 10.0 11.0 12.0 13.0 10.0 10.0   BUN 21 40* 27* 47* 42* 54*   CREATININE 2.11* 3.16* 2.25* 2.98* 2.59* 2.81*   EGFR 22.4* 13.8* 20.8* 14.8* 17.5* 15.9*   GLUCOSE 131* 210* 146* 159* 200* 139*   CALCIUM 8.7 8.6 8.1* 8.1* 8.2* 8.3*   PHOSPHORUS  --  4.2  --   --   --  2.9         Lab 08/15/22  0639 08/11/22  0827   ALBUMIN 2.60* 2.50*                     Brief Urine Lab Results  (Last result in the past 365 days)      Color   Clarity   Blood   Leuk Est   Nitrite   Protein   CREAT   Urine HCG        07/30/22 1742 Yellow   Clear   Small (1+)   Trace   Negative   Trace               Microbiology Results (last 10 days)     ** No results found for the last 240 hours. **          Invasive peripheral vascular study    Result Date: 8/6/2022  Clinical indication: 87-year-old female with a  chronic kidney disease, need for long-term dialysis. : Dr. Washburn Given Conscious sedation: Moderate sedation was provided by me for a total of 23 minutes.  A total of 1 mg of Versed was administered intravenously.  The patient's vital signs were monitored throughout the procedure and recorded in the patient's medical record by the nurse. Procedures: 1.  Ultrasound and fluoroscopic guided tunneled dialysis catheter placement 2.  Conscious Sedation Complication: None apparent Technique: Formal written consent for the procedure was obtained from the patient/family after explaining risks to include bleeding, infection, injury to vasculature/adjacent organs, and need for additional surgery/procedures. 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  There is a prominent right external jugular vein present.  The patient has a left-sided pacemaker in place.  Given the aforementioned findings, cannulation and placement through the external jugular vein was deemed indicated. Pertinent ultrasound images were stored in the PACS system for documentation.  The right neck and upper chest were then prepped and draped in the usual, sterile technique.  Local anesthesia with 1% lidocaine infiltration was achieved.  Utilizing aseptic precautions and real-time ultrasound guidance, the right external jugular vein was accessed utilizing a micropuncture technique.  An incision was then created at the exit site location, and a cuffed tunneled dialysis catheter of appropriate length (15.5 Tunisian by 19 cm cuff to tip Palindrome catheter) was tunneled from the exit site to the venotomy site without difficulty.  The micropuncture sheath was then exchanged over wire for a peel-away sheath which was placed into the right external jugular vein under fluoroscopic guidance without difficulty.  The dialysis catheter was then advanced into the venous system through  the peel-away sheath, which was then removed without difficulty.  Fluoroscopic interrogation revealed appropriate placement of the tunneled dialysis catheter, with the tips is situated near the cavoatrial junction. The catheter aspirated and flushed well and was terminally packed with 1000 units/cc of heparin.  The catheter was secured to the skin utilizing a nonabsorbable suture and a CHG dressing was applied.  The venotomy site was closed utilizing Dermabond, and an aseptic dressing was applied to the venotomy site.  The patient was transferred to the recovery area and was discharged from the department in stable condition. Impression: Successful ultrasound and fluoroscopic guided right external jugular vein route cuffed tunneled dialysis catheter placement, as detailed above.  Both ports of the dialysis catheter flushed and aspirated easily, and the catheter is ready for immediate use.      Results for orders placed during the hospital encounter of 11/15/21    Duplex Venous Lower Extremity - Left    Interpretation Summary  · Normal left lower extremity venous duplex scan.      Results for orders placed during the hospital encounter of 11/15/21    Duplex Venous Lower Extremity - Left    Interpretation Summary  · Normal left lower extremity venous duplex scan.      Results for orders placed during the hospital encounter of 07/28/22    Adult Transthoracic Echo Complete W/ Cont if Necessary Per Protocol    Interpretation Summary  · Mild biatrial enlargement.  · Moderately reduced right ventricular systolic function noted.  · Moderate left ventricular systolic dysfunction, estimated EF 37%.  · Left ventricular wall thickness is consistent with mild concentric hypertrophy.  · Lambl's excrescences of the aortic valve are noted. There is trace aortic insufficiency, no evidence of aortic stenosis.  · Moderate mitral regurgitation.  · Moderate to severe tricuspid regurgitation with RVSP 48 mmHg.  · Moderate pulmonic  "insufficiency.      Plan for Follow-up of Pending Labs/Results:     Discharge Details        Discharge Medications      New Medications      Instructions Start Date   castor oil-balsam peru ointment   1 application, Topical, Every 12 Hours Scheduled      epoetin shukri-epbx 30750 UNIT/ML injection  Commonly known as: RETACRIT   10,000 Units, Subcutaneous, 3 Times Weekly, To be given while in dialysis   Start Date: August 18, 2022     Insulin Lispro 100 UNIT/ML injection  Commonly known as: humaLOG   0-7 Units, Subcutaneous, 3 Times Daily Before Meals      Insulin Lispro 100 UNIT/ML injection  Commonly known as: humaLOG   4 Units, Subcutaneous, 3 Times Daily With Meals      pantoprazole 40 MG EC tablet  Commonly known as: PROTONIX   40 mg, Oral, 2 Times Daily Before Meals, Twice daily for one month, then daily thereafter         Changes to Medications      Instructions Start Date   apixaban 2.5 MG tablet tablet  Commonly known as: ELIQUIS  What changed:   · how much to take  · how to take this  · when to take this   2.5 mg, Oral, Every 12 Hours Scheduled      carvedilol 3.125 MG tablet  Commonly known as: COREG  What changed:   · medication strength  · how much to take  · when to take this   3.125 mg, Oral, Every 12 Hours Scheduled         Continue These Medications      Instructions Start Date   atorvastatin 20 MG tablet  Commonly known as: LIPITOR   20 mg, Oral, Nightly      cholecalciferol 25 MCG (1000 UT) tablet  Commonly known as: VITAMIN D3   1,000 Units, Oral, Daily      Insulin Syringe-Needle U-100 31G X 5/16\" 0.5 ML misc  Commonly known as: TRUEplus Insulin Syringe   USE WITH INSULIN  TIMES DAILY      latanoprost 0.005 % ophthalmic solution  Commonly known as: XALATAN   1 drop, Both Eyes, Nightly      levothyroxine 112 MCG tablet  Commonly known as: SYNTHROID, LEVOTHROID   TAKE ONE TABLET BY MOUTH ONE TIME DAILY      melatonin 5 MG tablet tablet   5 mg, Oral, Nightly      saccharomyces boulardii 250 MG " capsule  Commonly known as: FLORASTOR   250 mg, Oral, 2 Times Daily      timolol 0.5 % ophthalmic solution  Commonly known as: TIMOPTIC   1 drop, Both Eyes, 2 Times Daily      Unifine Pentips Plus 31G X 8 MM misc  Generic drug: Insulin Pen Needle   Use 4 per day to administer insulin         Stop These Medications    AZO-CRANBERRY PO     BASAGLAR KWIKPEN 100 UNIT/ML injection pen     ciprofloxacin 500 MG tablet  Commonly known as: Cipro     eszopiclone 2 MG tablet  Commonly known as: LUNESTA     furosemide 40 MG tablet  Commonly known as: LASIX     indapamide 2.5 MG tablet  Commonly known as: LOZOL     isosorbide mononitrate 30 MG 24 hr tablet  Commonly known as: IMDUR     multivitamins-minerals capsule capsule     mupirocin 2 % ointment  Commonly known as: BACTROBAN     NovoLOG FlexPen 100 UNIT/ML solution pen-injector sc pen  Generic drug: insulin aspart     omeprazole 40 MG capsule  Commonly known as: priLOSEC     ondansetron 4 MG tablet  Commonly known as: ZOFRAN     spironolactone 25 MG tablet  Commonly known as: ALDACTONE            Allergies   Allergen Reactions   • Bactrim [Sulfamethoxazole-Trimethoprim] GI Intolerance   • Lisinopril Cough   • Rocephin [Ceftriaxone] Itching   • Codeine Rash         Discharge Disposition:  Rehab Facility or Unit (DC - External)    Diet:  Hospital:  Diet Order   Procedures   • Diet Regular; Thin; Cardiac, Consistent Carbohydrate, Renal       Activity:      Restrictions or Other Recommendations:         CODE STATUS:    Code Status and Medical Interventions:   Ordered at: 07/28/22 5628     Level Of Support Discussed With:    Patient     Code Status (Patient has no pulse and is not breathing):    CPR (Attempt to Resuscitate)     Medical Interventions (Patient has pulse or is breathing):    Full Support       Future Appointments   Date Time Provider Department Center   9/14/2022  1:30 PM Rani Lane MD [facility]   9/14/2022  2:45 PM Giselle Guzman DO [facility]   9/15/2022  1:30 PM Tano Beal MD [facility]                 Darci Pepe MD  08/17/22      Time Spent on Discharge:  I spent  40  minutes on this discharge activity which included: face-to-face encounter with the patient, reviewing the data in the system, coordination of the care with the nursing staff as well as consultants, documentation, and entering orders.

## 2022-08-17 NOTE — NURSING NOTE
Patient is being discharged today, was able to briefly assess her moisture associated skin damage wounds and they have not changed since last assessment.  Continue with Venelex and Z guard cream and offloading every 2 hours.

## 2022-08-17 NOTE — PROGRESS NOTES
"   LOS: 19 days    Patient Care Team:  Luis Cardenas MD as PCP - General (Nephrology)    Chief Complaint:  Gi bleed    Subjective   Interval History:   No new events no added distress.    Review of Systems:   Patient denies shortness of breath, chest pain, dysuria, hematuria, nausea, vomiting.      Objective     Vital Sign Min/Max for last 24 hours  Temp  Min: 97.9 °F (36.6 °C)  Max: 99.4 °F (37.4 °C)   BP  Min: 88/36  Max: 135/67   Pulse  Min: 72  Max: 81   Resp  Min: 18  Max: 18   SpO2  Min: 93 %  Max: 99 %   No data recorded   Weight  Min: 89.4 kg (197 lb 3.2 oz)  Max: 89.4 kg (197 lb 3.2 oz)     Flowsheet Rows    Flowsheet Row First Filed Value   Admission Height 162.6 cm (64\") Documented at 07/28/2022 1451   Admission Weight 76.2 kg (168 lb) Documented at 07/28/2022 1451          No intake/output data recorded.  I/O last 3 completed shifts:  In: 250 [P.O.:250]  Out: 1200 [Urine:200; Other:1000]    Physical Exam:    General Appearance:  female awake alert oriented no obvious distress.  Eyes: PER, EOMI.  Neck: Supple no JVD.  Lungs: Clear auscultation, no rales rhonchi's, equal chest movement, nonlabored.  Heart: No gallop, murmur, rub, RRR.  Abdomen: Soft, nontender, positive bowel sounds, no organomegaly.  Extremities: No edema, no cyanosis.  Neuro: No focal deficit, moving all extremities, alert oriented X 3  Tunnel catheter in place.      WBC WBC   Date Value Ref Range Status   08/17/2022 8.62 3.40 - 10.80 10*3/mm3 Final   08/15/2022 7.65 3.40 - 10.80 10*3/mm3 Final      HGB Hemoglobin   Date Value Ref Range Status   08/17/2022 9.8 (L) 12.0 - 15.9 g/dL Final   08/15/2022 10.4 (L) 12.0 - 15.9 g/dL Final      HCT Hematocrit   Date Value Ref Range Status   08/17/2022 30.2 (L) 34.0 - 46.6 % Final   08/15/2022 33.1 (L) 34.0 - 46.6 % Final      Platlets No results found for: LABPLAT   MCV MCV   Date Value Ref Range Status   08/17/2022 91.5 79.0 - 97.0 fL Final   08/15/2022 93.0 79.0 - 97.0 fL Final    "       Sodium Sodium   Date Value Ref Range Status   08/17/2022 136 136 - 145 mmol/L Final   08/15/2022 134 (L) 136 - 145 mmol/L Final      Potassium Potassium   Date Value Ref Range Status   08/17/2022 4.1 3.5 - 5.2 mmol/L Final   08/15/2022 4.3 3.5 - 5.2 mmol/L Final      Chloride Chloride   Date Value Ref Range Status   08/17/2022 103 98 - 107 mmol/L Final   08/15/2022 100 98 - 107 mmol/L Final      CO2 CO2   Date Value Ref Range Status   08/17/2022 23.0 22.0 - 29.0 mmol/L Final   08/15/2022 23.0 22.0 - 29.0 mmol/L Final      BUN BUN   Date Value Ref Range Status   08/17/2022 21 8 - 23 mg/dL Final   08/15/2022 40 (H) 8 - 23 mg/dL Final      Creatinine Creatinine   Date Value Ref Range Status   08/17/2022 2.11 (H) 0.57 - 1.00 mg/dL Final   08/15/2022 3.16 (H) 0.57 - 1.00 mg/dL Final      Calcium Calcium   Date Value Ref Range Status   08/17/2022 8.7 8.6 - 10.5 mg/dL Final   08/15/2022 8.6 8.6 - 10.5 mg/dL Final      PO4 No results found for: CAPO4   Albumin Albumin   Date Value Ref Range Status   08/15/2022 2.60 (L) 3.50 - 5.20 g/dL Final      Magnesium No results found for: MG   Uric Acid No results found for: URICACID        Results Review:     I reviewed the patient's new clinical results.    apixaban, 2.5 mg, Oral, Q12H  atorvastatin, 20 mg, Oral, Nightly  carvedilol, 3.125 mg, Oral, Q12H  castor oil-balsam peru, 1 application, Topical, Q12H  dextromethorphan polistirex ER, 60 mg, Oral, Q12H  epoetin shukri/shukri-epbx, 10,000 Units, Subcutaneous, Once per day on Tue Thu Sat  insulin lispro, 0-7 Units, Subcutaneous, TID AC  Insulin Lispro, 4 Units, Subcutaneous, TID With Meals  latanoprost, 1 drop, Both Eyes, Nightly  levothyroxine, 112 mcg, Oral, Q AM  pantoprazole, 40 mg, Oral, BID AC  sodium chloride, 10 mL, Intravenous, Q12H  timolol, 1 drop, Both Eyes, BID           Medication Review:     Assessment & Plan       GIB (gastrointestinal bleeding)    Chronic atrial fibrillation (HCC)    CKD (chronic kidney  disease), stage IV (HCC)    Hyperlipidemia LDL goal <100    Essential hypertension    Hypothyroidism    Pulmonary embolism (HCC)    CHF (NYHA class IV, ACC/AHA stage D) (HCC)    CAD (coronary artery disease)    Chronic systolic congestive heart failure (HCC)    Type 2 diabetes mellitus without complication, with long-term current use of insulin (HCC)    Normocytic anemia    Acute renal failure (ARF) (HCC)    COVID-19 virus detected    Thrombocytopenia (HCC)    Hypoalbuminemia    AMS (altered mental status)    Lower GI bleed      1- ROBBI on CKD stage IV - Thought to be hemodynamic injury progression of CKD to ESRD. Started on HD on 8/6/22.   2. CKD stage IV -baseline Scr 2.5 range  3- GI bleed   4- Low albumin level   5- Iron def anemia Tsat 11%. S/p iron supplementation. On  BENY on HD days   6- Renal cysts   7- Systolic CHF - Recent Echo showing EF 37% now. Volume status stable.  8. BMD: Check PTH level.     Plan:  Continue with TTS dialysis outpatient.  If she stays overnight we will dialyze her again tomorrow in the morning.  BENY on dialysis days  Renal diet with fluid restriction less than 1500 mL/day.  Will need outpatient hemodialysis chair.  Case management working with rehab placement with HD.  Case discussed with the daughter Frida on [phone] in detail regards to patient's future plan.    Albert Sharma MD  08/17/22  12:01 EDT

## 2022-08-17 NOTE — CASE MANAGEMENT/SOCIAL WORK
Continued Stay Note  [facility]     Patient Name: Karla Cristobal  MRN: [id number]  Today's Date: 8/17/2022    Admit Date: 7/28/2022     Discharge Plan     Row Name 08/17/22 1642       Plan    Plan To [facility] (tomorrow)    Plan Comments Patient does have a chair time confirmed with [facility] in [place] for MWF at 7 am with start date of 8/22. I Spoke with Dr Sharma who has said it is ok if the patient has HD tomorrow and then waits to have HD until Monday 8/22 at the clinic in [place] and he will see her there. Once patient has HD tomorrow, she can dc to [facility]- I just spoke with Johan and their medical director and they are okay with this plan as well. Also spoke with patient's daughter and informed her. Plan is for patient to have HD here tomorrow then staff can call the daughter to let her know when patient is ready for discharge. [facility] report # [phone] Fax [phone]- matheus [phone]    Final Discharge Disposition Code 03 - skilled nursing facility (SNF)               Discharge Codes    No documentation.               Expected Discharge Date and Time     Expected Discharge Date Expected Discharge Time    Aug 17, 2022             Matheus Rodarte RN

## 2022-08-17 NOTE — THERAPY TREATMENT NOTE
Patient Name: Karla Cristobal  : 1935    MRN: [id number]                              Today's Date: 2022       Admit Date: 2022    Visit Dx:     ICD-10-CM ICD-9-CM   1. Lower GI bleed  K92.2 578.9   2. Chronic anticoagulation  Z79.01 V58.61   3. History of atrial fibrillation  Z86.79 V12.59   4. Generalized weakness  R53.1 780.79   5. Malaise and fatigue  R53.81 780.79    R53.83    6. Nausea  R11.0 787.02   7. Acute renal failure superimposed on stage 4 chronic kidney disease, unspecified acute renal failure type (HCC)  N17.9 584.9    N18.4 585.4   8. Thrombocytopenia (Formerly [facility])  D69.6 287.5   9. History of CHF (congestive heart failure)  Z86.79 V12.59   10. History of diabetes mellitus  Z86.39 V12.29   11. History of diverticulosis  Z87.19 V12.79   12. Gastrointestinal hemorrhage, unspecified gastrointestinal hemorrhage type  K92.2 578.9     Patient Active Problem List   Diagnosis   • Chronic atrial fibrillation (HCC)   • CKD (chronic kidney disease), stage IV (HCC)   • Diabetic nephropathy (HCC)   • Diabetic retinopathy (HCC)   • Malignant neoplasm of endometrium (HCC)   • Hyperlipidemia LDL goal <100   • Essential hypertension   • Hypothyroidism   • Insomnia   • Leukocytosis   • Memory impairment   • Nausea   • Pulmonary embolism (HCC)   • Sleep apnea   • Squamous cell carcinoma of skin   • CHF (NYHA class IV, ACC/AHA stage D) (Formerly [facility])   • Ischemic heart disease   • CAD (coronary artery disease)   • DVT of lower extremity (deep venous thrombosis) (HCC)   • Chronic systolic congestive heart failure (HCC)   • Morbidly obese (HCC)   • Ischemic cardiomyopathy   • Type 2 diabetes mellitus without complication, with long-term current use of insulin (HCC)   • Exudative age-related macular degeneration, right eye, with active choroidal neovascularization (HCC)   • Hypoxia   • Cellulitis of left lower extremity   • Left leg cellulitis   • Food impaction of esophagus   • Malignant tumor of ascending colon (HCC)   •  GIB (gastrointestinal bleeding)   • Normocytic anemia   • Acute renal failure (ARF) (Formerly [facility])   • COVID-19 virus detected   • Thrombocytopenia (Formerly [facility])   • Hypoalbuminemia   • AMS (altered mental status)   • Lower GI bleed     Past Medical History:   Diagnosis Date   • Acute bronchitis    • Arthritis    • Atrial fibrillation (Formerly [facility])    • CAD (coronary artery disease)     s/p MI 2005; 3 stents inserted    • Change in mole    • Change in mole    • Change in nevus 6/16/2016   • Chronic kidney disease     stage IV-sees Dr Bk Mckeon every 3-4 months    • Chronic renal disease, stage 4, severely decreased glomerular filtration rate between 15-29 mL/min/1.73 square meter (Formerly [facility])    • Chronic systolic heart failure (Formerly [facility])    • Congestive heart disease (Formerly [facility])    • Conjunctivitis    • Deep vein thrombosis of lower extremity (Formerly [facility])    • Diabetes mellitus (Formerly [facility])    • Diabetes mellitus (Formerly [facility]) 6/16/2016    Impression: 03/15/2016 - blood sugar 166 and hgn a1c 7.3%  is up to date with eye exam  neuropathy with callus, no ulcer  some scratches feet  ur alb due and ordered  no change other than provided samples of toujeo because she feels like lantus worked much better than levemir, she has tried titrating levemir and it is less effective  continue testing and f/u 3-4 months  Impression: 11/23/2015 - bl   • Diabetic foot ulcer (Formerly [facility]) 6/16/2016   • Dog bite of hand    • Easy bruising    • Endometrial cancer (Formerly [facility])     partial hysterectomy; radiation as well    • Foot pain    • Foot pain 6/16/2016    Description: lancinating- worse but not consistent enough to want rx   • Fracture of hip (Formerly [facility])    • Generalized ischemic myocardial dysfunction 6/16/2016   • Glaucoma     drops daily    • Hyperlipidemia    • Hypertension    • Hypothyroidism    • Insomnia    • Ischemic heart disease    • Macular degeneration    • Methicillin resistant Staphylococcus aureus infection 6/16/2016   • Myocardial infarction (Formerly [facility]) 2005   • Nausea with vomiting    • Pericardial  effusion    • Shortness of breath 6/16/2016    Impression: 03/24/2015 - chronic. On 2 l oxygen at night. check cbc, bnp;    • Skin cancer, basal cell     nose, leg    • Skin lesion 6/16/2016    Impression: 03/24/2015 - refer derm for eval- seb kerat ant tib and ?ak medially with redness and irritation;    • Sleep apnea     oxygen at night due to low sats but no cpap   • Type 2 diabetes mellitus (HCC)    • UTI (urinary tract infection) 06/12/2020    currently taking antibiotics-patient's daughter notified Dr Beal's office and office said it should not delay generator change      Past Surgical History:   Procedure Laterality Date   • CARDIAC CATHETERIZATION     • CARDIAC DEFIBRILLATOR PLACEMENT     • CARDIAC ELECTROPHYSIOLOGY PROCEDURE N/A 7/17/2020    Procedure: ICD BIV  generator change- [facility]- 3-6 weeks;  Surgeon: Tano Beal MD;  Location:  [facility];  Service: Cardiology;  Laterality: N/A;   • CHOLECYSTECTOMY     • COLONOSCOPY N/A 7/30/2022    Procedure: COLONOSCOPY;  Surgeon: Brunner, Mark I, MD;  Location:  [facility];  Service: Gastroenterology;  Laterality: N/A;  WITH 3 RESOLUTION CLIPS   • CORONARY ARTERY BYPASS GRAFT  2000   • CORONARY STENT PLACEMENT      3 stents    • ENDOSCOPY N/A 11/17/2021    Procedure: ESOPHAGOGASTRODUODENOSCOPY;  Surgeon: Brunner, Mark I, MD;  Location:  [facility];  Service: Gastroenterology;  Laterality: N/A;   • EYE SURGERY Bilateral     cataract extraction    • HIP SURGERY Left     x3   • HYSTERECTOMY      partial    • KNEE ARTHROPLASTY Bilateral    • PACEMAKER IMPLANTATION     • TONSILLECTOMY        General Information     Row Name 08/17/22 1441 08/17/22 1405       OT Time and Intention    Document Type therapy note (daily note)  -IFRAH evaluation  -IFRAH    Mode of Treatment individual therapy;occupational therapy  -IFRAH --    Row Name 08/17/22 1441          General Information    Patient Profile Reviewed yes  -IFRAH     Existing  Precautions/Restrictions fall  watch BP, some dizziness  -     Barriers to Rehab medically complex;previous functional deficit  -     Row Name 08/17/22 1441          Cognition    Orientation Status (Cognition) oriented x 3  minus off one month  -     Row Name 08/17/22 1441          Safety Issues, Functional Mobility    Safety Issues Affecting Function (Mobility) insight into deficits/self-awareness  -     Impairments Affecting Function (Mobility) balance;endurance/activity tolerance;strength;postural/trunk control;range of motion (ROM);pain  -     Comment, Safety Issues/Impairments (Mobility) foot pain in standing, limited shouler and hip ROM  -           User Key  (r) = Recorded By, (t) = Taken By, (c) = Cosigned By    Initials Name Provider Type    IFRAH Sabi Lim, OT Occupational Therapist                 Mobility/ADL's     Row Name 08/17/22 1442          Bed Mobility    Supine-Sit [place] (Bed Mobility) moderate assist (50% patient effort);verbal cues;nonverbal cues (demo/gesture)  -     Bed Mobility, Safety Issues decreased use of arms for pushing/pulling;decreased use of legs for bridging/pushing;impaired trunk control for bed mobility  -     Assistive Device (Bed Mobility) bed rails;draw sheet;head of bed elevated  -     Comment, (Bed Mobility) some assist to turn LE's fully to align at EOB, assist with trunk and to scoot hips to EOB, increased time and effort  -     Row Name 08/17/22 1442          Transfers    Transfers sit-stand transfer;stand-sit transfer;bed-chair transfer  -     Comment, (Transfers) cues for foot and trunk placement and balance once standing along with posture  -     Bed-Chair [place] (Transfers) moderate assist (50% patient effort);verbal cues;nonverbal cues (demo/gesture)  -     Assistive Device (Bed-Chair Transfers) walker, 4-wheeled  -     Sit-Stand [place] (Transfers) moderate assist (50% patient effort);verbal cues  -     Stand-Sit  [place] (Transfers) minimum assist (75% patient effort);verbal cues  -     Row Name 08/17/22 1442          Bed-Chair Transfer    Comment, (Bed-Chair Transfer) on first standing pt. needed assist to get weight over feet due to posterior lean, progressed to min A when wx rolled forward to help get balance, mod A to maintain balance moving to recliner, pt. noted to be having BM with mvmt to recliner, pt. reporting dizziness  -     Row Name 08/17/22 1442          Sit-Stand Transfer    Assistive Device (Sit-Stand Transfers) walker, front-wheeled  -IFRAH     Row Name 08/17/22 1442          Stand-Sit Transfer    Assistive Device (Stand-Sit Transfers) walker, 4-wheeled  -IFRAH     Row Name 08/17/22 1442          Activities of Daily Living    BADL Assessment/Intervention upper body dressing  -     Row Name 08/17/22 1442          Toileting Assessment/Training    [place] Level (Toileting) dependent (less than 25% patient effort)  -IFRAH     Position (Toileting) supine  -IFRAH     Comment, (Toileting) rolled pt. in recliner mod of 2 to be cleaned due to dizziness and low BP unable to stand  -IFRAH     Row Name 08/17/22 1442          Upper Body Dressing Assessment/Training    [place] Level (Upper Body Dressing) don;doff;pajama/robe;maximum assist (25% patient effort)  -IFRAH     Position (Upper Body Dressing) edge of bed sitting;supported sitting  -           User Key  (r) = Recorded By, (t) = Taken By, (c) = Cosigned By    Initials Name Provider Type    Sabi Hernandez OT Occupational Therapist               Obj/Interventions     Row Name 08/17/22 1446          Shoulder (Therapeutic Exercise)    Shoulder AROM (Therapeutic Exercise) 10 repetitions;sitting  shoulder circling in small mvmts  -     Row Name 08/17/22 1446          Elbow/Forearm (Therapeutic Exercise)    Elbow/Forearm Strengthening (Therapeutic Exercise) bilateral;flexion;extension;sitting;10 repetitions  mild manual resistance  -     Row Name 08/17/22  1446          Motor Skills    Therapeutic Exercise shoulder;elbow/forearm  -IFRAH     Row Name 08/17/22 1446          Balance    Static Sitting Balance standby assist  -IFRAH     Dynamic Sitting Balance standby assist  -IFRAH     Position, Sitting Balance unsupported;sitting edge of bed  -IFRAH     Static Standing Balance moderate assist  -IFRAH     Dynamic Standing Balance moderate assist  -IFRAH     Position/Device Used, Standing Balance supported;walker, front-wheeled  -IFRAH     Balance Interventions sit to stand;occupation based/functional task  -IFRAH     Comment, Balance pt. with standing with walker with posterior lean needing mod A to balance, progressing to min A, but back to mod A with mvmt to recliner, unable to stand further due to weakness and dizziness  -IFRAH           User Key  (r) = Recorded By, (t) = Taken By, (c) = Cosigned By    Initials Name Provider Type    Sabi Hernandez OT Occupational Therapist               Goals/Plan     Row Name 08/17/22 1452          Bed Mobility Goal 1 (OT)    Progress/Outcomes (Bed Mobility Goal 1, OT) continuing progress toward goal  -IFRAH     Row Name 08/17/22 1452          Transfer Goal 1 (OT)    Progress/Outcome (Transfer Goal 1, OT) continuing progress toward goal  -IFRAH     Row Name 08/17/22 1452          Dressing Goal 1 (OT)    Progress/Outcome (Dressing Goal 1, OT) progress slower than expected  -IFRAH     Row Name 08/17/22 1452          Toileting Goal 1 (OT)    Progress/Outcome (Toileting Goal 1, OT) progress slower than expected  -IFRAH           User Key  (r) = Recorded By, (t) = Taken By, (c) = Cosigned By    Initials Name Provider Type    Sabi Hernandez OT Occupational Therapist               Clinical Impression     Row Name 08/17/22 1448          Pain Assessment    Pretreatment Pain Rating 0/10 - no pain  -IFRAH     Posttreatment Pain Rating 0/10 - no pain  -IFRAH     Pre/Posttreatment Pain Comment no pain at rest, but strong foot pain in standing and WB  -IFRAH     Pain Intervention(s)  Repositioned  -IFRAH     Row Name 08/17/22 1448          Plan of Care Review    Plan of Care Reviewed With patient  -IFRAH     Progress no change  -     Outcome Evaluation Pt. required mod A to move to EOB, stand and move to recliner with rollator.  Limited by weakness, balance and dizziness and posterior lean in standing. Max A to pat robe due to poor shoulder function.  Pt. with incontinence of bowel on standing, dependent for hygiene. Good participation UE TE.  Continue to recommend SNF at discharge.  -IFRAH     Row Name 08/17/22 1448          Therapy Assessment/Plan (OT)    Therapy Frequency (OT) daily  -IFRAH     Row Name 08/17/22 1448          Therapy Plan Review/Discharge Plan (OT)    Anticipated Discharge Disposition (OT) skilled nursing facility  -     Row Name 08/17/22 1448          Vital Signs    Pre Systolic BP Rehab 98  -IFRAH     Pre Treatment Diastolic BP 51  -IFRAH     Intra Systolic BP Rehab 87  -IFRAH     Intra Treatment Diastolic BP 62  -IFRAH     Post Systolic BP Rehab 99  -IFRAH     Post Treatment Diastolic BP 44  -IFRAH     Pretreatment Heart Rate (beats/min) 77  -IFRAH     Intratreatment Heart Rate (beats/min) 95  -IFRAH     Posttreatment Heart Rate (beats/min) 71  -IFRAH     Pre SpO2 (%) 98  -IFRAH     O2 Delivery Pre Treatment room air  -IFRAH     O2 Delivery Intra Treatment room air  -IFRAH     Post SpO2 (%) 95  -IFRAH     O2 Delivery Post Treatment room air  -IFRAH     Pre Patient Position Supine  -IFRAH     Intra Patient Position Sitting  taken just post standing sittin edge of chair  -IFRAH     Post Patient Position Sitting  -     Row Name 08/17/22 1448          Positioning and Restraints    Pre-Treatment Position in bed  -IFRAH     Post Treatment Position chair  -IFRAH     In Chair notified nsg;reclined;call light within reach;encouraged to call for assist;exit alarm on;waffle cushion;legs elevated;heels elevated  -           User Key  (r) = Recorded By, (t) = Taken By, (c) = Cosigned By    Initials Name Provider Type    Sabi Hernandez,  OT Occupational Therapist               Outcome Measures     Row Name 08/17/22 1452          How much help from another is currently needed...    Putting on and taking off regular lower body clothing? 1  -IFRAH     Bathing (including washing, rinsing, and drying) 1  -IFRAH     Toileting (which includes using toilet bed pan or urinal) 1  -IFRAH     Putting on and taking off regular upper body clothing 2  -IFRAH     Taking care of personal grooming (such as brushing teeth) 3  -IFRAH     Eating meals 3  -IFRAH     AM-PAC 6 Clicks Score (OT) 11  -IFRAH     Row Name 08/17/22 1452          Functional Assessment    Outcome Measure Options AM-PAC 6 Clicks Daily Activity (OT)  -IFRAH           User Key  (r) = Recorded By, (t) = Taken By, (c) = Cosigned By    Initials Name Provider Type    Sabi Hernandez, OT Occupational Therapist                Occupational Therapy Education                 Title: PT OT SLP Therapies (In Progress)     Topic: Occupational Therapy (In Progress)     Point: ADL training (In Progress)     Description:   Instruct learner(s) on proper safety adaptation and remediation techniques during self care or transfers.   Instruct in proper use of assistive devices.              Learning Progress Summary           Patient Acceptance, E,D, NR by IFRAH at 8/17/2022 1453    Comment: UE TE, bed mobility and transfer sequencing, BP's      Show all documentation for this point (9)                 Point: Home exercise program (In Progress)     Description:   Instruct learner(s) on appropriate technique for monitoring, assisting and/or progressing therapeutic exercises/activities.              Learning Progress Summary           Patient Acceptance, E,D, NR by IFRAH at 8/17/2022 1453    Comment: UE TE, bed mobility and transfer sequencing, BP's      Show all documentation for this point (8)                 Point: Precautions (In Progress)     Description:   Instruct learner(s) on prescribed precautions during self-care and functional  transfers.              Learning Progress Summary           Patient Acceptance, E,D, NR by  at 8/17/2022 1453    Comment: UE TE, bed mobility and transfer sequencing, BP's      Show all documentation for this point (5)                 Point: Body mechanics (In Progress)     Description:   Instruct learner(s) on proper positioning and spine alignment during self-care, functional mobility activities and/or exercises.              Learning Progress Summary           Patient Acceptance, E,D, NR by  at 8/17/2022 1453    Comment: UE TE, bed mobility and transfer sequencing, BP's      Show all documentation for this point (5)                             User Key     Initials Effective Dates Name Provider Type Discipline     06/16/21 -  Sabi Lim, OT Occupational Therapist OT              OT Recommendation and Plan  Therapy Frequency (OT): daily  Plan of Care Review  Plan of Care Reviewed With: patient  Progress: no change  Outcome Evaluation: Pt. required mod A to move to EOB, stand and move to recliner with rollator.  Limited by weakness, balance and dizziness and posterior lean in standing. Max A to pat robe due to poor shoulder function.  Pt. with incontinence of bowel on standing, dependent for hygiene. Good participation UE TE.  Continue to recommend SNF at discharge.     Time Calculation:    Time Calculation- OT     Row Name 08/17/22 1454             Time Calculation- OT    OT Start Time 1406  -IFRAH      OT Received On 08/17/22  -      OT Goal Re-Cert Due Date 08/20/22  -              Timed Charges    25473 - OT Therapeutic Exercise Minutes 8  -IFRAH      66732 - OT Therapeutic Activity Minutes 15  -IFRAH      09148 - OT Self Care/Mgmt Minutes 10  -IFRAH              Total Minutes    Timed Charges Total Minutes 33  -IFRAH       Total Minutes 33  -FIRAH            User Key  (r) = Recorded By, (t) = Taken By, (c) = Cosigned By    Initials Name Provider Type    Sabi Hernandez, OT Occupational Therapist               Therapy Charges for Today     Code Description Service Date Service Provider Modifiers Qty    [id number]  OT THERAPEUTIC ACT EA 15 MIN 8/17/2022 Sabi Lim, OT GO 1    [id number]  OT SELF CARE/MGMT/TRAIN EA 15 MIN 8/17/2022 Sabi Lim, OT GO 1               Sabi Lim OT  8/17/2022

## 2022-08-18 NOTE — DISCHARGE PLACEMENT REQUEST
"Mead David A (87 y.o. Female)     To Caleb  From Marcella GIFFORD RN Case Manager              Date of Birth   1935    Social Security Number       [facility] [address]    Home Phone   [phone]    MRN   [id number]       Alevism   Christian    Marital Status                               Admission Date   7/28/22    Admission Type   Emergency    Admitting Provider   Darci Pepe MD    Attending Provider   Darci Pepe MD    Department, Room/Bed   [facility] 6A, N619/1       Discharge Date       Discharge Disposition   Rehab Facility or Unit (DC - External)    Discharge Destination                               Attending Provider: Darci Pepe MD    Allergies: Bactrim [Sulfamethoxazole-trimethoprim], Lisinopril, Rocephin [Ceftriaxone], Codeine    Isolation: None   Infection: COVID (History) (08/08/22)   Code Status: CPR   Advance Care Planning Activity    Ht: 162.6 cm (64.02\")   Wt: 89.4 kg (197 lb 3.2 oz)    Admission Cmt: None   Principal Problem: GIB (gastrointestinal bleeding) [K92.2]                 Active Insurance as of 7/28/2022     Primary Coverage     Payor Plan Insurance Group Employer/Plan Group    MEDICARE MEDICARE A & B      Payor Plan Address Payor Plan Phone Number Payor Plan Fax Number Effective Dates    [address] [phone]  7/1/2001 - None Entered    [facility] [address]       Subscriber Name Subscriber Birth Date Member ID       DAVID MEAD 1935 [id number]           Secondary Coverage     Payor Plan Insurance Group Employer/Plan Group    [facility]      Payor Plan Address Payor Plan Phone Number Payor Plan Fax Number Effective Dates    [address] [phone]  7/1/2001 - None Entered    [place] [address]       Subscriber Name Subscriber Birth Date Member ID       DAVID MEAD 1935 [id number]           Tertiary Coverage     Payor Plan Insurance Group " Employer/Plan Group    [place]      Payor Plan Address Payor Plan Phone Number Payor Plan Fax Number Effective Dates    [phone]  2020 - None Entered    [facility] [id number]       Subscriber Name Subscriber Birth Date Member ID       KARLA MEAD 1935 [id number]                 Emergency Contacts      (Rel.) Home Phone Work Phone Mobile Phone    Frida Vazquez (Daughter) [phone] -- --               Discharge Summary      Darci Pepe MD at 22 [facility]              [facility] Medicine Services  DISCHARGE SUMMARY    Patient Name: Karla Mead  : 1935  MRN: [id number]    Date of Admission: 2022  7:39 PM  Date of Discharge:  22  Primary Care Physician: Luis Cardenas MD    Consults     Date and Time Order Name Status Description    2022  1:13 PM Inpatient Cardiology Consult Completed     2022  3:55 AM Inpatient Gastroenterology Consult Completed     2022  1:53 AM Inpatient Nephrology Consult Completed           Hospital Course     Presenting Problem:   GIB (gastrointestinal bleeding) [K92.2]  Lower GI bleed [K92.2]    Active Hospital Problems    Diagnosis  POA   • **GIB (gastrointestinal bleeding) [K92.2]  Yes   • Normocytic anemia [D64.9]  Unknown   • Acute renal failure (ARF) (HCC) [N17.9]  Unknown   • COVID-19 virus detected [U07.1]  Unknown   • Thrombocytopenia (HCC) [D69.6]  Unknown   • Hypoalbuminemia [E88.09]  Unknown   • AMS (altered mental status) [R41.82]  Unknown   • Lower GI bleed [K92.2]  Yes   • Type 2 diabetes mellitus without complication, with long-term current use of insulin (HCC) [E11.9, Z79.4]  Not Applicable   • Chronic systolic congestive heart failure (HCC) [I50.22]  Yes   • CAD (coronary artery disease) [I25.10]  Yes   • Chronic atrial fibrillation (HCC) [I48.20]  Yes   • CKD (chronic kidney disease), stage IV (HCC) [N18.4]  Yes   • Hyperlipidemia LDL goal <100  [E78.5]  Yes   • Essential hypertension [I10]  Yes   • Hypothyroidism [E03.9]  Yes   • Pulmonary embolism (HCC) [I26.99]  Yes   • CHF (NYHA class IV, ACC/AHA stage D) (HCC) [I50.84]  Yes      Resolved Hospital Problems   No resolved problems to display.          Hospital Course:  Karla Cristobal is a 87 y.o. female with anemia, atrial fibrillation on Eliquis, CKD stage IV, HLD, hypertension, hypothyroidism, CAD s/p stents, diabetes type 2, and systolic heart failure (2L NC at night) who presented to the ED on 7/28/2022 for rectal bleeding. 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  3 endoclips were applied for hemostasis.  H/H has been stable since.  She was incidentally found to be COVID positive and had acute kidney injury for which nephrology has been following. She had temporary dialysis catheter placed on 8/6/2022 and was started on hemodialysis.      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  3 endoclips were applied for hemostasis  -s/p IV Iron  -Eliquis resumed 8/7, on low dose apixaban 2.5 mg every 12 hours for atrial fibrillation  -PPI BID x 1 month, then daily thereafter     ROBBI on CKD IV  -Nephrology consulted  -Started on Hemodialysis this admission on 8/6  -working on outpatient dialysis chair (New) Tuesday Thursday Saturday  -has access in Right Upper Chest    DMII  - A1C 05/2022 7.6  - Meal time and SSI-adjust as needed     Hypothyroidism  -levothyroxine     COVID-19 positive  -Asymptomatic    -Out of Isolation on 8/8     Mixed systolic and diastolic CHF  Moderate to severe MR  Persistent atrial fibrillation   -Seen by cardiology and started on carvedilol   -apixaban restarted   -recommended to followup with primary cardiologist in one  month      Discharge Follow Up Recommendations for outpatient labs/diagnostics:  Suggest checking cbc and bmp in one week, thereafter as needed.    Day of Discharge     HPI:   Feels fine. Denies any current pain.  Eager for transfer to rehab today.    Review of Systems  Gen- No fevers, chills  CV- No chest pain, palpitations  Resp- No cough, dyspnea  GI- No N/V/D, abd pain        Vital Signs:   Temp:  [97.9 °F (36.6 °C)-99.4 °F (37.4 °C)] 98 °F (36.7 °C)  Heart Rate:  [] 81  Resp:  [18] 18  BP: (107-135)/(39-67) 108/61      Physical Exam:  Constitutional - appears somewhat fatigued, in bed  HEENT-NCAT, mucous membranes moist  CV-RRR  Resp-CTAB  Abd-soft, nontender, nondistended, normoactive bowel sounds  Ext-No lower extremity cyanosis, clubbing or edema bilaterally  Neuro-alert, speech clear, moves all extremities   Psych-normal affect   Skin- No rash on exposed UE or LE bilaterally      Pertinent  and/or Most Recent Results     LAB RESULTS:      Lab 08/17/22  0435 08/15/22  0639 08/14/22 0631 08/13/22 0635 08/12/22  1201   WBC 8.62 7.65 7.01 7.08 6.50   HEMOGLOBIN 9.8* 10.4* 10.4* 10.0* 9.8*   HEMATOCRIT 30.2* 33.1* 31.5* 31.4* 29.9*   PLATELETS 176 216 205 186 133*   NEUTROS ABS  --   --   --  4.27 3.64   IMMATURE GRANS (ABS)  --   --   --  0.09* 0.08*   LYMPHS ABS  --   --   --  1.36 1.46   MONOS ABS  --   --   --  0.99* 1.01*   EOS ABS  --   --   --  0.30 0.25   MCV 91.5 93.0 88.7 92.4 89.5         Lab 08/17/22  0435 08/15/22  0639 08/14/22  0631 08/13/22 0635 08/12/22 2004 08/11/22  0827   SODIUM 136 134* 137 133* 133* 133*   POTASSIUM 4.1 4.3 4.6 4.0 4.2 3.7   CHLORIDE 103 100 104 100 99 97*   CO2 23.0 23.0 21.0* 20.0* 24.0 26.0   ANION GAP 10.0 11.0 12.0 13.0 10.0 10.0   BUN 21 40* 27* 47* 42* 54*   CREATININE 2.11* 3.16* 2.25* 2.98* 2.59* 2.81*   EGFR 22.4* 13.8* 20.8* 14.8* 17.5* 15.9*   GLUCOSE 131* 210* 146* 159* 200* 139*   CALCIUM 8.7 8.6 8.1* 8.1* 8.2* 8.3*   PHOSPHORUS  --  4.2  --   --    --  2.9         Lab 08/15/22  0639 08/11/22  0827   ALBUMIN 2.60* 2.50*                     Brief Urine Lab Results  (Last result in the past 365 days)      Color   Clarity   Blood   Leuk Est   Nitrite   Protein   CREAT   Urine HCG        07/30/22 1742 Yellow   Clear   Small (1+)   Trace   Negative   Trace               Microbiology Results (last 10 days)     ** No results found for the last 240 hours. **          Invasive peripheral vascular study    Result Date: 8/6/2022  Clinical indication: 87-year-old female with a chronic kidney disease, need for long-term dialysis. : Dr. Washburn Given Conscious sedation: Moderate sedation was provided by me for a total of 23 minutes.  A total of 1 mg of Versed was administered intravenously.  The patient's vital signs were monitored throughout the procedure and recorded in the patient's medical record by the nurse. Procedures: 1.  Ultrasound and fluoroscopic guided tunneled dialysis catheter placement 2.  Conscious Sedation Complication: None apparent Technique: Formal written consent for the procedure was obtained from the patient/family after explaining risks to include bleeding, infection, injury to vasculature/adjacent organs, and need for additional surgery/procedures. 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  There is a prominent right external jugular vein present.  The patient has a left-sided pacemaker in place.  Given the aforementioned findings, cannulation and placement through the external jugular vein was deemed indicated. Pertinent ultrasound images were stored in the PACS system for documentation.  The right neck and upper chest were then prepped and draped in the usual, sterile technique.  Local anesthesia with 1% lidocaine infiltration was achieved.  Utilizing aseptic precautions and real-time ultrasound guidance, the right external jugular vein was accessed utilizing a  micropuncture technique.  An incision was then created at the exit site location, and a cuffed tunneled dialysis catheter of appropriate length (15.5 Nigerien by 19 cm cuff to tip Palindrome catheter) was tunneled from the exit site to the venotomy site without difficulty.  The micropuncture sheath was then exchanged over wire for a peel-away sheath which was placed into the right external jugular vein under fluoroscopic guidance without difficulty.  The dialysis catheter was then advanced into the venous system through the peel-away sheath, which was then removed without difficulty.  Fluoroscopic interrogation revealed appropriate placement of the tunneled dialysis catheter, with the tips is situated near the cavoatrial junction. The catheter aspirated and flushed well and was terminally packed with 1000 units/cc of heparin.  The catheter was secured to the skin utilizing a nonabsorbable suture and a CHG dressing was applied.  The venotomy site was closed utilizing Dermabond, and an aseptic dressing was applied to the venotomy site.  The patient was transferred to the recovery area and was discharged from the department in stable condition. Impression: Successful ultrasound and fluoroscopic guided right external jugular vein route cuffed tunneled dialysis catheter placement, as detailed above.  Both ports of the dialysis catheter flushed and aspirated easily, and the catheter is ready for immediate use.      Results for orders placed during the hospital encounter of 11/15/21    Duplex Venous Lower Extremity - Left    Interpretation Summary  · Normal left lower extremity venous duplex scan.      Results for orders placed during the hospital encounter of 11/15/21    Duplex Venous Lower Extremity - Left    Interpretation Summary  · Normal left lower extremity venous duplex scan.      Results for orders placed during the hospital encounter of 07/28/22    Adult Transthoracic Echo Complete W/ Cont if Necessary Per  Protocol    Interpretation Summary  · Mild biatrial enlargement.  · Moderately reduced right ventricular systolic function noted.  · Moderate left ventricular systolic dysfunction, estimated EF 37%.  · Left ventricular wall thickness is consistent with mild concentric hypertrophy.  · Lambl's excrescences of the aortic valve are noted. There is trace aortic insufficiency, no evidence of aortic stenosis.  · Moderate mitral regurgitation.  · Moderate to severe tricuspid regurgitation with RVSP 48 mmHg.  · Moderate pulmonic insufficiency.      Plan for Follow-up of Pending Labs/Results:     Discharge Details        Discharge Medications      New Medications      Instructions Start Date   castor oil-balsam peru ointment   1 application, Topical, Every 12 Hours Scheduled      epoetin shukri-epbx 49089 UNIT/ML injection  Commonly known as: RETACRIT   10,000 Units, Subcutaneous, 3 Times Weekly, To be given while in dialysis   Start Date: August 18, 2022     Insulin Lispro 100 UNIT/ML injection  Commonly known as: humaLOG   0-7 Units, Subcutaneous, 3 Times Daily Before Meals      Insulin Lispro 100 UNIT/ML injection  Commonly known as: humaLOG   4 Units, Subcutaneous, 3 Times Daily With Meals      pantoprazole 40 MG EC tablet  Commonly known as: PROTONIX   40 mg, Oral, 2 Times Daily Before Meals, Twice daily for one month, then daily thereafter         Changes to Medications      Instructions Start Date   apixaban 2.5 MG tablet tablet  Commonly known as: ELIQUIS  What changed:   · how much to take  · how to take this  · when to take this   2.5 mg, Oral, Every 12 Hours Scheduled      carvedilol 3.125 MG tablet  Commonly known as: COREG  What changed:   · medication strength  · how much to take  · when to take this   3.125 mg, Oral, Every 12 Hours Scheduled         Continue These Medications      Instructions Start Date   atorvastatin 20 MG tablet  Commonly known as: LIPITOR   20 mg, Oral, Nightly      cholecalciferol 25 MCG  "(1000 UT) tablet  Commonly known as: VITAMIN D3   1,000 Units, Oral, Daily      Insulin Syringe-Needle U-100 31G X 5/16\" 0.5 ML misc  Commonly known as: TRUEplus Insulin Syringe   USE WITH INSULIN  TIMES DAILY      latanoprost 0.005 % ophthalmic solution  Commonly known as: XALATAN   1 drop, Both Eyes, Nightly      levothyroxine 112 MCG tablet  Commonly known as: SYNTHROID, LEVOTHROID   TAKE ONE TABLET BY MOUTH ONE TIME DAILY      melatonin 5 MG tablet tablet   5 mg, Oral, Nightly      saccharomyces boulardii 250 MG capsule  Commonly known as: FLORASTOR   250 mg, Oral, 2 Times Daily      timolol 0.5 % ophthalmic solution  Commonly known as: TIMOPTIC   1 drop, Both Eyes, 2 Times Daily      Unifine Pentips Plus 31G X 8 MM misc  Generic drug: Insulin Pen Needle   Use 4 per day to administer insulin         Stop These Medications    AZO-CRANBERRY PO     BASAGLAR KWIKPEN 100 UNIT/ML injection pen     ciprofloxacin 500 MG tablet  Commonly known as: Cipro     eszopiclone 2 MG tablet  Commonly known as: LUNESTA     furosemide 40 MG tablet  Commonly known as: LASIX     indapamide 2.5 MG tablet  Commonly known as: LOZOL     isosorbide mononitrate 30 MG 24 hr tablet  Commonly known as: IMDUR     multivitamins-minerals capsule capsule     mupirocin 2 % ointment  Commonly known as: BACTROBAN     NovoLOG FlexPen 100 UNIT/ML solution pen-injector sc pen  Generic drug: insulin aspart     omeprazole 40 MG capsule  Commonly known as: priLOSEC     ondansetron 4 MG tablet  Commonly known as: ZOFRAN     spironolactone 25 MG tablet  Commonly known as: ALDACTONE            Allergies   Allergen Reactions   • Bactrim [Sulfamethoxazole-Trimethoprim] GI Intolerance   • Lisinopril Cough   • Rocephin [Ceftriaxone] Itching   • Codeine Rash         Discharge Disposition:  Rehab Facility or Unit (DC - External)    Diet:  Hospital:  Diet Order   Procedures   • Diet Regular; Thin; Cardiac, Consistent Carbohydrate, Renal "       Activity:      Restrictions or Other Recommendations:         CODE STATUS:    Code Status and Medical Interventions:   Ordered at: 22 2343     Level Of Support Discussed With:    Patient     Code Status (Patient has no pulse and is not breathing):    CPR (Attempt to Resuscitate)     Medical Interventions (Patient has pulse or is breathing):    Full Support       Future Appointments   Date Time Provider Department Center   2022  1:30 PM Rani Lane MD [facility]   2022  2:45 PM Giselle Guzman DO [facility]   9/15/2022  1:30 PM Tano Beal MD [facility]     Darci SUN. MD Afshin  22      Time Spent on Discharge:  I spent  40  minutes on this discharge activity which included: face-to-face encounter with the patient, reviewing the data in the system, coordination of the care with the nursing staff as well as consultants, documentation, and entering orders.      Electronically signed by Darci Pepe MD at 22 [address] [place] [id number]  Phone:  [phone]  Fax:  [phone]        Patient:     Karla Cristobal MRN:  [id number]   [address] [place] [id number] :  1935  SSN:    Phone: [phone] Sex:  F      INSURANCE PAYOR PLAN GROUP # SUBSCRIBER ID   Primary:  Secondary:    MEDICARE  [employer] [id number]  [id number]      [id number]  [id number]   Admitting Diagnosis: GIB (gastrointestinal bleeding) [K92.2]  Order Date:  Aug 17, 2022        Hemodialysis Inpatient       (Order ID: [id number])     Diagnosis:         Priority:  Routine Expected Date:   Expiration Date:        Interval:  Once Count:    Duration of Treatment: 3.5 Hours  Access Site: Tunneled Dialysis Catheter  Dialyzer: Revaclear  DFR: 700 mL/min  Dialysate Temperature (C): 36  BFR-As tolerated to a maximum of: 400 mL/min  Dialysate Solution Bath: K+ = 4 mEq, Ca = 2.5mEq  Bicarb:  "30 meq  Na+: 138 mEq  Fluid Removal: 1.5 L     Specimen Type:   Specimen Source:   Specimen Taken Date:   Specimen Taken Time:        Authorizing Provider:Albert Sharma MD  Authorizing Provider's NPI: [id number]  Order Entered By: Albert Sharma MD 8/17/2022 12:02 PM     Electronically signed by: Albert Sharma MD 8/17/2022 12:02 PM     Luis Cardenas MD    Physician   Nephrology   Progress Notes      Signed   Date of Service:  08/18/22 1340   Creation Time:  08/18/22 1340              Signed        Expand All Collapse All[]Expand All by Default          LOS: 20 days    Patient Care Team:  Luis Cardenas MD as PCP - General (Nephrology)     Chief Complaint:  Gi bleed        Subjective      Interval History:   Seen on HD tolerating well so far. Goal UF 2 liter as tolerated. Bp stable. Good access pressure.         Review of Systems:   Patient denies shortness of breath, chest pain, dysuria, hematuria, nausea, vomiting.              Objective         Vital Sign Min/Max for last 24 hours  Temp  Min: 97.8 °F (36.6 °C)  Max: 98 °F (36.7 °C)   BP  Min: 96/44  Max: 123/65   Pulse  Min: 69  Max: 111   Resp  Min: 16  Max: 18   SpO2  Min: 95 %  Max: 100 %   No data recorded   No data recorded          Flowsheet Rows    Flowsheet Row First Filed Value   Admission Height 162.6 cm (64\") Documented at 07/28/2022 1451   Admission Weight 76.2 kg (168 lb) Documented at 07/28/2022 1451             No intake/output data recorded.  I/O last 3 completed shifts:  In: 490 [P.O.:490]  Out: 200 [Urine:200]     Physical Exam:     General Appearance:  female awake alert oriented no obvious distress.  Eyes: PER, EOMI.  Neck: Supple no JVD.  Lungs: Clear auscultation, no rales rhonchi's, equal chest movement, nonlabored.  Heart: No gallop, murmur, rub, RRR.  Abdomen: Soft, nontender, positive bowel sounds, no organomegaly.  Extremities: No edema, no cyanosis.  Neuro: No focal deficit, moving all extremities, alert oriented " X 3  Tunnel catheter in place.        WBC       WBC   Date Value Ref Range Status   08/17/2022 8.62 3.40 - 10.80 10*3/mm3 Final       HGB       Hemoglobin   Date Value Ref Range Status   08/17/2022 9.8 (L) 12.0 - 15.9 g/dL Final       HCT       Hematocrit   Date Value Ref Range Status   08/17/2022 30.2 (L) 34.0 - 46.6 % Final       Platlets No results found for: LABPLAT   MCV       MCV   Date Value Ref Range Status   08/17/2022 91.5 79.0 - 97.0 fL Final             Sodium       Sodium   Date Value Ref Range Status   08/17/2022 136 136 - 145 mmol/L Final       Potassium       Potassium   Date Value Ref Range Status   08/17/2022 4.1 3.5 - 5.2 mmol/L Final       Chloride       Chloride   Date Value Ref Range Status   08/17/2022 103 98 - 107 mmol/L Final       CO2       CO2   Date Value Ref Range Status   08/17/2022 23.0 22.0 - 29.0 mmol/L Final       BUN       BUN   Date Value Ref Range Status   08/17/2022 21 8 - 23 mg/dL Final       Creatinine       Creatinine   Date Value Ref Range Status   08/17/2022 2.11 (H) 0.57 - 1.00 mg/dL Final       Calcium       Calcium   Date Value Ref Range Status   08/17/2022 8.7 8.6 - 10.5 mg/dL Final       PO4 No results found for: CAPO4   Albumin No results found for: ALBUMIN   Magnesium No results found for: MG   Uric Acid No results found for: URICACID                    Results Review:                I reviewed the patient's new clinical results.     albumin human, , ,   apixaban, 2.5 mg, Oral, Q12H  atorvastatin, 20 mg, Oral, Nightly  carvedilol, 3.125 mg, Oral, Q12H  castor oil-balsam peru, 1 application, Topical, Q12H  dextromethorphan polistirex ER, 60 mg, Oral, Q12H  epoetin shukri/shukri-epbx, 10,000 Units, Subcutaneous, Once per day on Tue Thu Sat  insulin lispro, 0-7 Units, Subcutaneous, TID AC  Insulin Lispro, 4 Units, Subcutaneous, TID With Meals  latanoprost, 1 drop, Both Eyes, Nightly  levothyroxine, 112 mcg, Oral, Q AM  pantoprazole, 40 mg, Oral, BID AC  sodium chloride, 10  mL, Intravenous, Q12H  timolol, 1 drop, Both Eyes, BID           Medication Review:            Assessment & Plan          GIB (gastrointestinal bleeding)    Chronic atrial fibrillation (HCC)    CKD (chronic kidney disease), stage IV (HCC)    Hyperlipidemia LDL goal <100    Essential hypertension    Hypothyroidism    Pulmonary embolism (HCC)    CHF (NYHA class IV, ACC/AHA stage D) (HCC)    CAD (coronary artery disease)    Chronic systolic congestive heart failure (HCC)    Type 2 diabetes mellitus without complication, with long-term current use of insulin (HCC)    Normocytic anemia    Acute renal failure (ARF) (HCC)    COVID-19 virus detected    Thrombocytopenia (HCC)    Hypoalbuminemia    AMS (altered mental status)    Lower GI bleed        1- ROBBI on CKD stage IV - Thought to be hemodynamic injury progression of CKD to ESRD. Started on HD on 8/6/22.   2. CKD stage IV -baseline Scr 2.5 range  3- GI bleed   4- Low albumin level   5- Iron def anemia On  BENY on HD days   6- Renal cysts   7- Systolic CHF - Recent Echo showing EF 37% now. Volume status stable.  8. BMD:      Plan:  Continue with TTS dialysis outpatient.   BENY on dialysis days  Renal diet with fluid restriction less than 1500 mL/day.  Case discussed with the daughter Frida on [phone] in detail regards to patient's future plan.     Luis Cardenas MD  08/18/22  13:40 EDT

## 2022-08-18 NOTE — DISCHARGE PLACEMENT REQUEST
"David Mead (87 y.o. Female)     To [facility]  From Marcella GIFFORD RN Case Manager  [phone]     DC Summary & covid            Date of Birth   1935    Social Security Number       Address   [address] [facility] [address]    Home Phone   [phone]    MRN   [id number]       Scientologist   Spiritism    Marital Status                               Admission Date   7/28/22    Admission Type   Emergency    Admitting Provider   Darci Pepe MD    Attending Provider   Darci Pepe MD    Department, Room/Bed   [facility] 6A, N619/1       Discharge Date       Discharge Disposition   Rehab Facility or Unit (DC - External)    Discharge Destination                               Attending Provider: Darci Pepe MD    Allergies: Bactrim [Sulfamethoxazole-trimethoprim], Lisinopril, Rocephin [Ceftriaxone], Codeine    Isolation: None   Infection: COVID (History) (08/08/22)   Code Status: CPR   Advance Care Planning Activity    Ht: 162.6 cm (64.02\")   Wt: 89.4 kg (197 lb 3.2 oz)    Admission Cmt: None   Principal Problem: GIB (gastrointestinal bleeding) [K92.2]                 Active Insurance as of 7/28/2022     Primary Coverage     Payor Plan Insurance Group Employer/Plan Group    MEDICARE MEDICARE A & B      Payor Plan Address Payor Plan Phone Number Payor Plan Fax Number Effective Dates    [address] [phone]  7/1/2001 - None Entered    [facility] [address]       Subscriber Name Subscriber Birth Date Member ID       DAVID MEAD 1935 [id number]           Secondary Coverage     Payor Plan Insurance Group Employer/Plan Group    [facility]      Payor Plan Address Payor Plan Phone Number Payor Plan Fax Number Effective Dates    [address] [phone]  7/1/2001 - None Entered    [place] [address]       Subscriber Name Subscriber Birth Date Member ID       DAVID MEAD 1935 [id number]           Tertiary " Coverage     Payor Plan Insurance Group Employer/Plan Group    [place]      Payor Plan Address Payor Plan Phone Number Payor Plan Fax Number Effective Dates    [address] [phone]  2020 - None Entered    [place] [address]       Subscriber Name Subscriber Birth Date Member ID       KARLA MEAD 1935 [id number]                 Emergency Contacts      (Rel.) Home Phone Work Phone Mobile Phone    Frida Vazquez (Daughter) [phone] -- --               Discharge Summary      Darci Pepe MD at 22 1045              [facility] Medicine Services  DISCHARGE SUMMARY    Patient Name: Karla Mead  : 1935  MRN: [id number]    Date of Admission: 2022  7:39 PM  Date of Discharge:  22  Primary Care Physician: Luis Cardenas MD    Consults     Date and Time Order Name Status Description    2022  1:13 PM Inpatient Cardiology Consult Completed     2022  3:55 AM Inpatient Gastroenterology Consult Completed     2022  1:53 AM Inpatient Nephrology Consult Completed           Hospital Course     Presenting Problem:   GIB (gastrointestinal bleeding) [K92.2]  Lower GI bleed [K92.2]    Active Hospital Problems    Diagnosis  POA   • **GIB (gastrointestinal bleeding) [K92.2]  Yes   • Normocytic anemia [D64.9]  Unknown   • Acute renal failure (ARF) (HCC) [N17.9]  Unknown   • COVID-19 virus detected [U07.1]  Unknown   • Thrombocytopenia (HCC) [D69.6]  Unknown   • Hypoalbuminemia [E88.09]  Unknown   • AMS (altered mental status) [R41.82]  Unknown   • Lower GI bleed [K92.2]  Yes   • Type 2 diabetes mellitus without complication, with long-term current use of insulin (HCC) [E11.9, Z79.4]  Not Applicable   • Chronic systolic congestive heart failure (HCC) [I50.22]  Yes   • CAD (coronary artery disease) [I25.10]  Yes   • Chronic atrial fibrillation (HCC) [I48.20]  Yes   • CKD (chronic kidney disease), stage IV (HCC) [N18.4]  Yes    • Hyperlipidemia LDL goal <100 [E78.5]  Yes   • Essential hypertension [I10]  Yes   • Hypothyroidism [E03.9]  Yes   • Pulmonary embolism (HCC) [I26.99]  Yes   • CHF (NYHA class IV, ACC/AHA stage D) (HCC) [I50.84]  Yes      Resolved Hospital Problems   No resolved problems to display.          Hospital Course:  Karla Cristobal is a 87 y.o. female with anemia, atrial fibrillation on Eliquis, CKD stage IV, HLD, hypertension, hypothyroidism, CAD s/p stents, diabetes type 2, and systolic heart failure (2L NC at night) who presented to the ED on 7/28/2022 for rectal bleeding. 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  3 endoclips were applied for hemostasis.  H/H has been stable since.  She was incidentally found to be COVID positive and had acute kidney injury for which nephrology has been following. She had temporary dialysis catheter placed on 8/6/2022 and was started on hemodialysis.      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  3 endoclips were applied for hemostasis  -s/p IV Iron  -Eliquis resumed 8/7, on low dose apixaban 2.5 mg every 12 hours for atrial fibrillation  -PPI BID x 1 month, then daily thereafter     ROBBI on CKD IV  -Nephrology consulted  -Started on Hemodialysis this admission on 8/6  -working on outpatient dialysis chair (New) Tuesday Thursday Saturday  -has access in Right Upper Chest    DMII  - A1C 05/2022 7.6  - Meal time and SSI-adjust as needed     Hypothyroidism  -levothyroxine     COVID-19 positive  -Asymptomatic    -Out of Isolation on 8/8     Mixed systolic and diastolic CHF  Moderate to severe MR  Persistent atrial fibrillation   -Seen by cardiology and started on carvedilol   -apixaban restarted   -recommended to followup  with primary cardiologist in one month      Discharge Follow Up Recommendations for outpatient labs/diagnostics:  Suggest checking cbc and bmp in one week, thereafter as needed.    Day of Discharge     HPI:   Feels fine. Denies any current pain.  Eager for transfer to rehab today.    Review of Systems  Gen- No fevers, chills  CV- No chest pain, palpitations  Resp- No cough, dyspnea  GI- No N/V/D, abd pain        Vital Signs:   Temp:  [97.9 °F (36.6 °C)-99.4 °F (37.4 °C)] 98 °F (36.7 °C)  Heart Rate:  [] 81  Resp:  [18] 18  BP: (107-135)/(39-67) 108/61      Physical Exam:  Constitutional - appears somewhat fatigued, in bed  HEENT-NCAT, mucous membranes moist  CV-RRR  Resp-CTAB  Abd-soft, nontender, nondistended, normoactive bowel sounds  Ext-No lower extremity cyanosis, clubbing or edema bilaterally  Neuro-alert, speech clear, moves all extremities   Psych-normal affect   Skin- No rash on exposed UE or LE bilaterally      Pertinent  and/or Most Recent Results     LAB RESULTS:      Lab 08/17/22  0435 08/15/22  0639 08/14/22  0631 08/13/22  0635 08/12/22  1201   WBC 8.62 7.65 7.01 7.08 6.50   HEMOGLOBIN 9.8* 10.4* 10.4* 10.0* 9.8*   HEMATOCRIT 30.2* 33.1* 31.5* 31.4* 29.9*   PLATELETS 176 216 205 186 133*   NEUTROS ABS  --   --   --  4.27 3.64   IMMATURE GRANS (ABS)  --   --   --  0.09* 0.08*   LYMPHS ABS  --   --   --  1.36 1.46   MONOS ABS  --   --   --  0.99* 1.01*   EOS ABS  --   --   --  0.30 0.25   MCV 91.5 93.0 88.7 92.4 89.5         Lab 08/17/22  0435 08/15/22  0639 08/14/22  0631 08/13/22  0635 08/12/22  2004 08/11/22  0827   SODIUM 136 134* 137 133* 133* 133*   POTASSIUM 4.1 4.3 4.6 4.0 4.2 3.7   CHLORIDE 103 100 104 100 99 97*   CO2 23.0 23.0 21.0* 20.0* 24.0 26.0   ANION GAP 10.0 11.0 12.0 13.0 10.0 10.0   BUN 21 40* 27* 47* 42* 54*   CREATININE 2.11* 3.16* 2.25* 2.98* 2.59* 2.81*   EGFR 22.4* 13.8* 20.8* 14.8* 17.5* 15.9*   GLUCOSE 131* 210* 146* 159* 200* 139*   CALCIUM 8.7 8.6 8.1* 8.1* 8.2* 8.3*    PHOSPHORUS  --  4.2  --   --   --  2.9         Lab 08/15/22  0639 08/11/22  0827   ALBUMIN 2.60* 2.50*                     Brief Urine Lab Results  (Last result in the past 365 days)      Color   Clarity   Blood   Leuk Est   Nitrite   Protein   CREAT   Urine HCG        07/30/22 1742 Yellow   Clear   Small (1+)   Trace   Negative   Trace               Microbiology Results (last 10 days)     ** No results found for the last 240 hours. **          Invasive peripheral vascular study    Result Date: 8/6/2022  Clinical indication: 87-year-old female with a chronic kidney disease, need for long-term dialysis. : Dr. Washburn Given Conscious sedation: Moderate sedation was provided by me for a total of 23 minutes.  A total of 1 mg of Versed was administered intravenously.  The patient's vital signs were monitored throughout the procedure and recorded in the patient's medical record by the nurse. Procedures: 1.  Ultrasound and fluoroscopic guided tunneled dialysis catheter placement 2.  Conscious Sedation Complication: None apparent Technique: Formal written consent for the procedure was obtained from the patient/family after explaining risks to include bleeding, infection, injury to vasculature/adjacent organs, and need for additional surgery/procedures. 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  There is a prominent right external jugular vein present.  The patient has a left-sided pacemaker in place.  Given the aforementioned findings, cannulation and placement through the external jugular vein was deemed indicated. Pertinent ultrasound images were stored in the PACS system for documentation.  The right neck and upper chest were then prepped and draped in the usual, sterile technique.  Local anesthesia with 1% lidocaine infiltration was achieved.  Utilizing aseptic precautions and real-time ultrasound guidance, the right external  jugular vein was accessed utilizing a micropuncture technique.  An incision was then created at the exit site location, and a cuffed tunneled dialysis catheter of appropriate length (15.5 Vietnamese by 19 cm cuff to tip Palindrome catheter) was tunneled from the exit site to the venotomy site without difficulty.  The micropuncture sheath was then exchanged over wire for a peel-away sheath which was placed into the right external jugular vein under fluoroscopic guidance without difficulty.  The dialysis catheter was then advanced into the venous system through the peel-away sheath, which was then removed without difficulty.  Fluoroscopic interrogation revealed appropriate placement of the tunneled dialysis catheter, with the tips is situated near the cavoatrial junction. The catheter aspirated and flushed well and was terminally packed with 1000 units/cc of heparin.  The catheter was secured to the skin utilizing a nonabsorbable suture and a CHG dressing was applied.  The venotomy site was closed utilizing Dermabond, and an aseptic dressing was applied to the venotomy site.  The patient was transferred to the recovery area and was discharged from the department in stable condition. Impression: Successful ultrasound and fluoroscopic guided right external jugular vein route cuffed tunneled dialysis catheter placement, as detailed above.  Both ports of the dialysis catheter flushed and aspirated easily, and the catheter is ready for immediate use.      Results for orders placed during the hospital encounter of 11/15/21    Duplex Venous Lower Extremity - Left    Interpretation Summary  · Normal left lower extremity venous duplex scan.      Results for orders placed during the hospital encounter of 11/15/21    Duplex Venous Lower Extremity - Left    Interpretation Summary  · Normal left lower extremity venous duplex scan.      Results for orders placed during the hospital encounter of 07/28/22    Adult Transthoracic Echo  Complete W/ Cont if Necessary Per Protocol    Interpretation Summary  · Mild biatrial enlargement.  · Moderately reduced right ventricular systolic function noted.  · Moderate left ventricular systolic dysfunction, estimated EF 37%.  · Left ventricular wall thickness is consistent with mild concentric hypertrophy.  · Lambl's excrescences of the aortic valve are noted. There is trace aortic insufficiency, no evidence of aortic stenosis.  · Moderate mitral regurgitation.  · Moderate to severe tricuspid regurgitation with RVSP 48 mmHg.  · Moderate pulmonic insufficiency.      Plan for Follow-up of Pending Labs/Results:     Discharge Details        Discharge Medications      New Medications      Instructions Start Date   castor oil-balsam peru ointment   1 application, Topical, Every 12 Hours Scheduled      epoetin shukri-epbx 26869 UNIT/ML injection  Commonly known as: RETACRIT   10,000 Units, Subcutaneous, 3 Times Weekly, To be given while in dialysis   Start Date: August 18, 2022     Insulin Lispro 100 UNIT/ML injection  Commonly known as: humaLOG   0-7 Units, Subcutaneous, 3 Times Daily Before Meals      Insulin Lispro 100 UNIT/ML injection  Commonly known as: humaLOG   4 Units, Subcutaneous, 3 Times Daily With Meals      pantoprazole 40 MG EC tablet  Commonly known as: PROTONIX   40 mg, Oral, 2 Times Daily Before Meals, Twice daily for one month, then daily thereafter         Changes to Medications      Instructions Start Date   apixaban 2.5 MG tablet tablet  Commonly known as: ELIQUIS  What changed:   · how much to take  · how to take this  · when to take this   2.5 mg, Oral, Every 12 Hours Scheduled      carvedilol 3.125 MG tablet  Commonly known as: COREG  What changed:   · medication strength  · how much to take  · when to take this   3.125 mg, Oral, Every 12 Hours Scheduled         Continue These Medications      Instructions Start Date   atorvastatin 20 MG tablet  Commonly known as: LIPITOR   20 mg, Oral,  "Nightly      cholecalciferol 25 MCG (1000 UT) tablet  Commonly known as: VITAMIN D3   1,000 Units, Oral, Daily      Insulin Syringe-Needle U-100 31G X 5/16\" 0.5 ML misc  Commonly known as: TRUEplus Insulin Syringe   USE WITH INSULIN  TIMES DAILY      latanoprost 0.005 % ophthalmic solution  Commonly known as: XALATAN   1 drop, Both Eyes, Nightly      levothyroxine 112 MCG tablet  Commonly known as: SYNTHROID, LEVOTHROID   TAKE ONE TABLET BY MOUTH ONE TIME DAILY      melatonin 5 MG tablet tablet   5 mg, Oral, Nightly      saccharomyces boulardii 250 MG capsule  Commonly known as: FLORASTOR   250 mg, Oral, 2 Times Daily      timolol 0.5 % ophthalmic solution  Commonly known as: TIMOPTIC   1 drop, Both Eyes, 2 Times Daily      Unifine Pentips Plus 31G X 8 MM misc  Generic drug: Insulin Pen Needle   Use 4 per day to administer insulin         Stop These Medications    AZO-CRANBERRY PO     BASAGLAR KWIKPEN 100 UNIT/ML injection pen     ciprofloxacin 500 MG tablet  Commonly known as: Cipro     eszopiclone 2 MG tablet  Commonly known as: LUNESTA     furosemide 40 MG tablet  Commonly known as: LASIX     indapamide 2.5 MG tablet  Commonly known as: LOZOL     isosorbide mononitrate 30 MG 24 hr tablet  Commonly known as: IMDUR     multivitamins-minerals capsule capsule     mupirocin 2 % ointment  Commonly known as: BACTROBAN     NovoLOG FlexPen 100 UNIT/ML solution pen-injector sc pen  Generic drug: insulin aspart     omeprazole 40 MG capsule  Commonly known as: priLOSEC     ondansetron 4 MG tablet  Commonly known as: ZOFRAN     spironolactone 25 MG tablet  Commonly known as: ALDACTONE            Allergies   Allergen Reactions   • Bactrim [Sulfamethoxazole-Trimethoprim] GI Intolerance   • Lisinopril Cough   • Rocephin [Ceftriaxone] Itching   • Codeine Rash         Discharge Disposition:  Rehab Facility or Unit (DC - External)    Diet:  Hospital:  Diet Order   Procedures   • Diet Regular; Thin; Cardiac, Consistent Carbohydrate, " Renal       Activity:      Restrictions or Other Recommendations:         CODE STATUS:    Code Status and Medical Interventions:   Ordered at: 07/28/22 2343     Level Of Support Discussed With:    Patient     Code Status (Patient has no pulse and is not breathing):    CPR (Attempt to Resuscitate)     Medical Interventions (Patient has pulse or is breathing):    Full Support       Future Appointments   Date Time Provider Department Center   9/14/2022  1:30 PM Rani Lane MD [facility]   9/14/2022  2:45 PM Giselle Guzman DO [facility]   9/15/2022  1:30 PM Tano Beal MD [facility]     Darci SUN. MD Afshin  08/17/22      Time Spent on Discharge:  I spent  40  minutes on this discharge activity which included: face-to-face encounter with the patient, reviewing the data in the system, coordination of the care with the nursing staff as well as consultants, documentation, and entering orders.      Electronically signed by Darci Pepe MD at 08/17/22 1056                                 COVID-19 Report      COVID-19    Procedure Component Value Ref Range Date/Time   COVID-19, A[facility]OTT IN-HOUSE,NASAL Swab (NO TRANSPORT MEDIA) 2 HR TAT - Swab, Nasopharynx [[id number]] (Normal) Collected: 08/18/22 1255   Lab Status: Final result Specimen: Swab from Nasopharynx Updated: 08/18/22 1314    COVID19 Presumptive Negative Presumptive Negative - Ref. Range    Narrative:     Fact sheet for providers: https://www.fda.gov/media/568650/download     Fact sheet for patients: https://www.fda.gov/media/728054/download     Test performed by PCR.   If inconsistent with clinical signs and symptoms patient should be tested with different authorized molecular test.       CT Results  Comment  (72h ago, onward)        None       Link to Fever/Sepsis Report    Fever/Sepsis       Patient Infection Status    Duration (days) Infection Encounter Level? Onset Added Last Indicated By Resolved Resolved By Last Indicated  Last Indicated   21 COVID (History) No 07/28/22 08/08/22 Gisselle Wilks RN   08/08/22 1616 08/08/22   Resolved   0 COVID Screen (preop/placement) No 08/18/22 08/18/22 COVID PRE-OP / PRE-PROCEDURE SCREENING ORDER (NO ISOLATION) - Swab, Nasopharynx (Ordered) 08/18/22 Rule-Out Test Resulted 08/18/22 0850 08/18/22   11 COVID (confirmed) No 07/28/22 07/28/22 COVID-19 and FLU A/B PCR - Swab, Nasopharynx 08/08/22 Gisselle Wilks RN 07/28/22 2148 07/28/22   0 COVID Screen (preop/placement) No 07/28/22 07/28/22 COVID PRE-OP / PRE-PROCEDURE SCREENING ORDER (NO ISOLATION) - Swab, Nasopharynx (Ordered) 07/28/22 Rule-Out Test Resulted 07/28/22 2050 07/28/22   1 COVID Screen (preop/placement) No 11/15/21 11/15/21 COVID PRE-OP / PRE-PROCEDURE SCREENING ORDER (NO ISOLATION) - Swab, Nasopharynx (Ordered) 11/16/21 Rule-Out Test Resulted 11/16/21 0056 11/16/21    COVID Screen (preop/placement) No  07/14/20 COVID PRE-OP / PRE-PROCEDURE SCREENING ORDER (NO ISOLATION) - Swab, Nasopharynx (Ordered) 07/15/20 Rule-Out Test Resulted 07/14/20 0805 07/14/20       Outbreak Screen      Outbreak Screen Flowsheet    COVID-19 TESTING     ALERT: Place Mask -- --   Have you had a positive COVID test anywhere else in the last 90 days? No 07/28/22 1514   ALERT: Place Mask -- --   Have you had a positive COVID test anywhere else in the last 90 days? -- --   MONKEYPOX OUTBREAK SCREENING     COVID-19 OUTBREAK SCREENING     In the last 21 days, have you been in close physical contact with anyone who is suspected or known to have the Monkeypox virus? No 07/28/22 1514   Do you currently have a new onset of the following symptoms? No 07/28/22 1514   EXTENDED CARE FACILITY SCREENING     In the last 10 days, have you had contact with anyone who is ill, has shown any of the symptoms listed above and/or has been diagnosed with Covid-19? This includes any immediate household member but excludes any patients with whom you have No 07/28/22 7103   Have you  been a resident in a Long Term Care, Skilled Nursing, Nursing Home Facility or hospitalized outside of the [place] within the past 12 months? No 07/28/22 1514   In the last 14 days before you began feeling sick, have you traveled to the following country?   No  03/04/20 1421           Travel and Exposure Screening      Travel, Exposure, and Communicable Disease Screening Flowsheet    INFECTION RISK - COMMUNICABLE DISEASE RISK SCREENING     Have you traveled in the last month? No 07/28/22 1514   Do you currently have the following symptoms? No 07/28/22 1514   EXPOSURE SCREENING     TRAVEL SCREENING     Have you been exposed to any of these contagious diseases in the last month? No 07/28/22 1514          COVID-19 Report     critical  Chest X-Ray Not Ordered                                       CBC with Diff Has Been Ordered      positive  CBC Received by Lab       D-Dimer Ordered          suggestion  ABG Not Ordered     critical  Chest CT Not Ordered                critical  Strep Pneumo AG Not Ordered         Abbott ID NOW COVID PCR Has Been Ordered      positive  Abbott ID NOW COVID Received by Lab                 CMP Ordered         Lactic Acid Ordered         LDH Ordered           critical  Respiratory Panel Not Ordered                                suggestion  CRP Not Ordered            suggestion  Procalcitonin Not Ordered       Ferritin Ordered

## 2022-08-18 NOTE — CASE MANAGEMENT/SOCIAL WORK
Case Management Discharge Note      Final Note: Called [facility] this morning to confirm that Ms Cristobal is leaving today and they are accepting her. HD plans have already been finalized with [facility]. Called [facility] and confirmed that Ms Cristobal has a chair time for Monday morning at 0700. Her daughter will be transporting. Per facility request, faxed the HD progress note, DC summary, and latest HD order to [phone]. DC summary & negative covid faxed to [facility].    Provided Post Acute Provider List?: N/A  N/A Provider List Comment: DC needs unknown at this time    Selected Continued Care - Admitted Since 7/28/2022     Destination Coordination complete.    Service Provider Selected Services Address Phone Fax Patient Preferred    [facility] [place]  Skilled Nursing [address] [place] [id number] [phone] --          Durable Medical Equipment    No services have been selected for the patient.              Dialysis/Infusion Coordination complete.    Service Provider Selected Services Address Phone Fax Patient Preferred    [facility]  Dialysis [address] [facility] [address] [phone] --          Home Medical Care    No services have been selected for the patient.              Therapy    No services have been selected for the patient.              Community Resources    No services have been selected for the patient.              Community & DME    No services have been selected for the patient.                       Final Discharge Disposition Code: 03 - skilled nursing facility (SNF)

## 2022-08-18 NOTE — PROGRESS NOTES
"   LOS: 20 days    Patient Care Team:  Luis Cardenas MD as PCP - General (Nephrology)    Chief Complaint:  Gi bleed    Subjective   Interval History:   Seen on HD tolerating well so far. Goal UF 2 liter as tolerated. Bp stable. Good access pressure.       Review of Systems:   Patient denies shortness of breath, chest pain, dysuria, hematuria, nausea, vomiting.      Objective     Vital Sign Min/Max for last 24 hours  Temp  Min: 97.8 °F (36.6 °C)  Max: 98 °F (36.7 °C)   BP  Min: 96/44  Max: 123/65   Pulse  Min: 69  Max: 111   Resp  Min: 16  Max: 18   SpO2  Min: 95 %  Max: 100 %   No data recorded   No data recorded     Flowsheet Rows    Flowsheet Row First Filed Value   Admission Height 162.6 cm (64\") Documented at 07/28/2022 1451   Admission Weight 76.2 kg (168 lb) Documented at 07/28/2022 1451          No intake/output data recorded.  I/O last 3 completed shifts:  In: 490 [P.O.:490]  Out: 200 [Urine:200]    Physical Exam:    General Appearance:  female awake alert oriented no obvious distress.  Eyes: PER, EOMI.  Neck: Supple no JVD.  Lungs: Clear auscultation, no rales rhonchi's, equal chest movement, nonlabored.  Heart: No gallop, murmur, rub, RRR.  Abdomen: Soft, nontender, positive bowel sounds, no organomegaly.  Extremities: No edema, no cyanosis.  Neuro: No focal deficit, moving all extremities, alert oriented X 3  Tunnel catheter in place.      WBC WBC   Date Value Ref Range Status   08/17/2022 8.62 3.40 - 10.80 10*3/mm3 Final      HGB Hemoglobin   Date Value Ref Range Status   08/17/2022 9.8 (L) 12.0 - 15.9 g/dL Final      HCT Hematocrit   Date Value Ref Range Status   08/17/2022 30.2 (L) 34.0 - 46.6 % Final      Platlets No results found for: LABPLAT   MCV MCV   Date Value Ref Range Status   08/17/2022 91.5 79.0 - 97.0 fL Final          Sodium Sodium   Date Value Ref Range Status   08/17/2022 136 136 - 145 mmol/L Final      Potassium Potassium   Date Value Ref Range Status   08/17/2022 4.1 3.5 - " 5.2 mmol/L Final      Chloride Chloride   Date Value Ref Range Status   08/17/2022 103 98 - 107 mmol/L Final      CO2 CO2   Date Value Ref Range Status   08/17/2022 23.0 22.0 - 29.0 mmol/L Final      BUN BUN   Date Value Ref Range Status   08/17/2022 21 8 - 23 mg/dL Final      Creatinine Creatinine   Date Value Ref Range Status   08/17/2022 2.11 (H) 0.57 - 1.00 mg/dL Final      Calcium Calcium   Date Value Ref Range Status   08/17/2022 8.7 8.6 - 10.5 mg/dL Final      PO4 No results found for: CAPO4   Albumin No results found for: ALBUMIN   Magnesium No results found for: MG   Uric Acid No results found for: URICACID        Results Review:     I reviewed the patient's new clinical results.    albumin human, , ,   apixaban, 2.5 mg, Oral, Q12H  atorvastatin, 20 mg, Oral, Nightly  carvedilol, 3.125 mg, Oral, Q12H  castor oil-balsam peru, 1 application, Topical, Q12H  dextromethorphan polistirex ER, 60 mg, Oral, Q12H  epoetin shukri/shukri-epbx, 10,000 Units, Subcutaneous, Once per day on Tue Thu Sat  insulin lispro, 0-7 Units, Subcutaneous, TID AC  Insulin Lispro, 4 Units, Subcutaneous, TID With Meals  latanoprost, 1 drop, Both Eyes, Nightly  levothyroxine, 112 mcg, Oral, Q AM  pantoprazole, 40 mg, Oral, BID AC  sodium chloride, 10 mL, Intravenous, Q12H  timolol, 1 drop, Both Eyes, BID           Medication Review:     Assessment & Plan       GIB (gastrointestinal bleeding)    Chronic atrial fibrillation (HCC)    CKD (chronic kidney disease), stage IV (HCC)    Hyperlipidemia LDL goal <100    Essential hypertension    Hypothyroidism    Pulmonary embolism (HCC)    CHF (NYHA class IV, ACC/AHA stage D) (HCC)    CAD (coronary artery disease)    Chronic systolic congestive heart failure (HCC)    Type 2 diabetes mellitus without complication, with long-term current use of insulin (HCC)    Normocytic anemia    Acute renal failure (ARF) (HCC)    COVID-19 virus detected    Thrombocytopenia (HCC)    Hypoalbuminemia    AMS (altered  mental status)    Lower GI bleed      1- ROBBI on CKD stage IV - Thought to be hemodynamic injury progression of CKD to ESRD. Started on HD on 8/6/22.   2. CKD stage IV -baseline Scr 2.5 range  3- GI bleed   4- Low albumin level   5- Iron def anemia On  BENY on HD days   6- Renal cysts   7- Systolic CHF - Recent Echo showing EF 37% now. Volume status stable.  8. BMD:      Plan:  Continue with TTS dialysis outpatient.   BENY on dialysis days  Renal diet with fluid restriction less than 1500 mL/day.  Case discussed with the daughter Frida on [phone] in detail regards to patient's future plan.    Luis Cardenas MD  08/18/22  13:40 EDT

## 2022-08-19 NOTE — TELEPHONE ENCOUNTER
Caller: Archie Vazquez    Relationship to patient: Emergency Contact    Best call back number: [phone]    Chief complaint: SWV    Type of visit: SWV    Requested date: 10/20/2022    If rescheduling, when is the original appointment: 09/14/2022    Additional notes:PATIENT IS NOW HAVING DIALYSIS AND HAS ANOTHER APPOINTMENT ON 10/20/2022 AND WOULD LIKE TO COME AT 3:30 OR LATER ON THE 10/20/2022 WITH ANYONE. PLEASE ADVISE AND CALL ARCHIE BACK

## 2022-08-24 NOTE — PROGRESS NOTES
[facility], [facility]    This was an audio  enabled telemedicine encounter. Pt resident of [facility].  Nurse Sarah assisting with visit.     You have chosen to receive care through the use of telemedicine. Telemedicine enables health care providers at different locations to provide safe, effective, and convenient care through the use of technology. As with any health care service, there are risks associated with the use of telemedicine, including equipment failure, poor connections, and  issues.    • Do you understand the risks and benefits of telemedicine as I have explained them to you? Yes  • Have your questions regarding telemedicine been answered? Yes  • Do you consent to the use of telemedicine in your medical care today? Yes    Chief Complaint  Hospital Follow Up Visit (AFib, HF, VHD)    Subjective    History of Present Illness {CC  Problem List  Visit  Diagnosis   Encounters  Notes  Medications  Labs  Result Review Imaging  Media :23}     Karla Cristobal presents to [facility] CARDIOLOG[facility] for   History of Present Illness     87-year-old female with anemia, atrial fibrillation on Eliquis, chronic kidney disease stage IV on hemodialysis, hyperlipidemia, hypertension, hypothyroidism, CAD status post stents, diabetes type 2, heart failure with reduced EF, moderate  MR.  Patient was [facility] for GI bleed.  Colonoscopy showed sigmoid diverticulosis and AVM status post 3 endoclips.  Incidentally found to have COVID.  Eliquis restarted after colonoscopy.  Patient started on carvedilol    Echocardiogram 7/31/2022: Mild biatrial enlargement, EF 37%, moderate MR, moderate to severe TR with RVSP 48 mmHg, moderate pulmonary insufficiency    Patient reports she is tolerating dialysis well.  She denies dyspnea, PND, orthopnea, edema.  Denies chest pain, pressure, palpitations,  "dizziness, near syncope, syncope.  Nurse reports having routine labs monitored for kidney function as well as anemia.  She denies abdominal pain, hematuria, dysuria, excessive bruising, headaches, vision changes, generalized weakness or paresthesias.      Objective     Vital Signs:   Vitals:    08/24/22 1357   BP: 118/68   Pulse: 72   Resp: 16   Temp: 97.6 °F (36.4 °C)   SpO2: 96%   Weight: 89 kg (196 lb 3.2 oz)   Height: 162.6 cm (64\")     Body mass index is 33.68 kg/m².  Physical Exam     Patient is alert and oriented, good historian, engaged, nonlabored respiratory effort.  Nurse Sarah is also present during interview.    Result Review  Data Reviewed:{ Labs  Result Review  Imaging  Med Tab  Media :23}   Echocardiogram 7/31/2022: Mild biatrial enlargement, EF 37%, moderate MR, moderate to severe TR with RVSP 48 mmHg, moderate pulmonary insufficiency                Assessment and Plan {CC Problem List  Visit Diagnosis  ROS  Review (Popup)  Health Maintenance  Quality  BestPractice  Medications  SmartSets  SnapShot Encounters  Media :23}   1. Chronic systolic congestive heart failure (HCC)  EF 37%  Carvedilol    Volume status controlled with hemodialysis    Max tolerated heart failure medications due to risk of hypotension at this time    2. Permanent atrial fibrillation (HCC)  Heart rate controlled on beta-blocker.  Low-dose Eliquis without signs and symptoms of bleeding.  Nurse reports hemoglobin hematocrit are monitored and have been stable.  Lab not available for review today    3. Ischemic heart disease  No chest pain or pressure.    4. Essential hypertension  Blood pressure well controlled.    5. VHD (valvular heart disease)  No signs and symptoms of heart failure or volume overload reported.    Patient will follow up with cardiology as scheduled.  Follow-up in the heart valve center as needed or as determined by cardiology.    Reviewed the role of the heart valve center and when to call.  " Reviewed signs and symptoms of heart failure worsening, uncontrolled atrial fibrillation, hypotension.      I spent 20 minutes caring for Karla on this date of service. This time includes time spent by me in the following activities:preparing for the visit, reviewing tests, performing a medically appropriate examination and/or evaluation , counseling and educating the patient/family/caregiver and documenting information in the medical record    Follow Up {Instructions Charge Capture  Follow-up Communications :23}   Return if symptoms worsen or fail to improve.    Patient was given instructions and counseling regarding her condition or for health maintenance advice. Please see specific information pulled into the AVS if appropriate.  Patient was instructed to call the [facility] Center with any questions, concerns, or worsening symptoms.

## 2022-09-06 NOTE — ED PROVIDER NOTES
[facility]    EMERGENCY DEPARTMENT ENCOUNTER      Pt Name: Karla Cristobal  MRN: [id number]  YOB: 1935  Date of evaluation: 9/6/2022  Provider: Marcel Hendrickson MD    CHIEF COMPLAINT       Chief Complaint   Patient presents with   • Urinary Tract Infection         HISTORY OF PRESENT ILLNESS  (Location/Symptom, Timing/Onset, Context/Setting, Quality, Duration, Modifying Factors, Severity.)   Karla Cristobal is a 87 y.o. female who presents to the emergency department with 1 day of progressively worsening moderate severity dysuria and urinary frequency in the setting of a history of frequent UTIs.  She denies any associated abdominal pain, flank pain, vomiting, fever, chills.  No obvious inciting or modifying factors.      Nursing notes were reviewed.    REVIEW OF SYSTEMS    (2-9 systems for level 4, 10 or more for level 5)   ROS:  General:  No fevers, no chills, no weakness  Cardiovascular:  No chest pain, no palpitations  Respiratory:  No shortness of breath, no cough, no wheezing  Gastrointestinal:  No pain, no nausea, no vomiting, no diarrhea  Musculoskeletal:  No muscle pain, no joint pain  Skin:  No rash  Neurologic:  No speech problems, no headache, no extremity numbness, no extremity tingling, no extremity weakness  Psychiatric:  No anxiety  Genitourinary: Dysuria, urinary frequency    Except as noted above the remainder of the review of systems was reviewed and negative.       PAST MEDICAL HISTORY     Past Medical History:   Diagnosis Date   • Acute bronchitis    • Arthritis    • Atrial fibrillation (HCC)    • CAD (coronary artery disease)     s/p MI 2005; 3 stents inserted    • Change in mole    • Change in mole    • Change in nevus 6/16/2016   • Chronic kidney disease     stage IV-sees Dr Bk Mckeon every 3-4 months    • Chronic renal disease, stage 4, severely decreased glomerular filtration rate between 15-29 mL/min/1.73 square meter (HCC)    • Chronic systolic heart failure (HCC)    •  Congestive heart disease ([facility])    • Conjunctivitis    • Deep vein thrombosis of lower extremity ([facility])    • Diabetes mellitus ([facility])    • Diabetes mellitus ([facility]) 6/16/2016    Impression: 03/15/2016 - blood sugar 166 and hgn a1c 7.3%  is up to date with eye exam  neuropathy with callus, no ulcer  some scratches feet  ur alb due and ordered  no change other than provided samples of toujeo because she feels like lantus worked much better than levemir, she has tried titrating levemir and it is less effective  continue testing and f/u 3-4 months  Impression: 11/23/2015 - bl   • Diabetic foot ulcer ([facility]) 6/16/2016   • Dog bite of hand    • Easy bruising    • Endometrial cancer ([facility])     partial hysterectomy; radiation as well    • Foot pain    • Foot pain 6/16/2016    Description: lancinating- worse but not consistent enough to want rx   • Fracture of hip ([facility])    • Generalized ischemic myocardial dysfunction 6/16/2016   • Glaucoma     drops daily    • Hyperlipidemia    • Hypertension    • Hypothyroidism    • Insomnia    • Ischemic heart disease    • Macular degeneration    • Methicillin resistant Staphylococcus aureus infection 6/16/2016   • Myocardial infarction ([facility]) 2005   • Nausea with vomiting    • Pericardial effusion    • Shortness of breath 6/16/2016    Impression: 03/24/2015 - chronic. On 2 l oxygen at night. check cbc, bnp;    • Skin cancer, basal cell     nose, leg    • Skin lesion 6/16/2016    Impression: 03/24/2015 - refer derm for eval- seb kerat ant tib and ?ak medially with redness and irritation;    • Sleep apnea     oxygen at night due to low sats but no cpap   • Type 2 diabetes mellitus ([facility])    • UTI (urinary tract infection) 06/12/2020    currently taking antibiotics-patient's daughter notified Dr Beal's office and office said it should not delay generator change          SURGICAL HISTORY       Past Surgical History:   Procedure Laterality Date   • CARDIAC CATHETERIZATION     • CARDIAC  DEFIBRILLATOR PLACEMENT     • CARDIAC ELECTROPHYSIOLOGY PROCEDURE N/A 7/17/2020    Procedure: ICD BIV  generator change- SJM- 3-6 weeks;  Surgeon: Tano Beal MD;  Location:  [facility];  Service: Cardiology;  Laterality: N/A;   • CHOLECYSTECTOMY     • COLONOSCOPY N/A 7/30/2022    Procedure: COLONOSCOPY;  Surgeon: Brunner, Mark I, MD;  Location:  [facility];  Service: Gastroenterology;  Laterality: N/A;  WITH 3 RESOLUTION CLIPS   • CORONARY ARTERY BYPASS GRAFT  2000   • CORONARY STENT PLACEMENT      3 stents    • ENDOSCOPY N/A 11/17/2021    Procedure: ESOPHAGOGASTRODUODENOSCOPY;  Surgeon: Brunner, Mark I, MD;  Location:  [facility];  Service: Gastroenterology;  Laterality: N/A;   • EYE SURGERY Bilateral     cataract extraction    • HIP SURGERY Left     x3   • HYSTERECTOMY      partial    • KNEE ARTHROPLASTY Bilateral    • PACEMAKER IMPLANTATION     • TONSILLECTOMY           CURRENT MEDICATIONS     No current facility-administered medications for this encounter.    Current Outpatient Medications:   •  apixaban (ELIQUIS) 2.5 MG tablet tablet, Take 1 tablet by mouth Every 12 (Twelve) Hours. Indications: Atrial Fibrillation, Disp: 60 tablet, Rfl:   •  atorvastatin (LIPITOR) 20 MG tablet, Take 1 tablet by mouth Every Night., Disp: 90 tablet, Rfl: 1  •  carvedilol (COREG) 3.125 MG tablet, Take 1 tablet by mouth Every 12 (Twelve) Hours., Disp: , Rfl:   •  castor oil-balsam peru (VENELEX) ointment, Apply 1 application topically to the appropriate area as directed Every 12 (Twelve) Hours., Disp: , Rfl:   •  cephalexin (KEFLEX) 500 MG capsule, Take 1 capsule by mouth 4 (Four) Times a Day for 7 days., Disp: 28 capsule, Rfl: 0  •  cholecalciferol (VITAMIN D3) 1000 UNITS tablet, Take 1,000 Units by mouth Daily., Disp: , Rfl:   •  epoetin shukri-epbx (RETACRIT) 25832 UNIT/ML injection, Inject 1 mL under the skin into the appropriate area as directed 3 (Three) Times a Week for 12 doses. To be given  "while in dialysis  Indications: ESRD on Dialysis, Disp: 6.6 mL, Rfl:   •  Insulin Lispro (humaLOG) 100 UNIT/ML injection, Inject 0-7 Units under the skin into the appropriate area as directed 3 (Three) Times a Day Before Meals., Disp: , Rfl: 12  •  Insulin Lispro (humaLOG) 100 UNIT/ML injection, Inject 4 Units under the skin into the appropriate area as directed 3 (Three) Times a Day With Meals., Disp: , Rfl: 12  •  Insulin Pen Needle (Unifine Pentips Plus) 31G X 8 MM misc, Use 4 per day to administer insulin, Disp: 400 each, Rfl: 1  •  Insulin Syringe-Needle U-100 (TRUEplus Insulin Syringe) 31G X 5/16\" 0.5 ML misc, USE WITH INSULIN  TIMES DAILY, Disp: 200 each, Rfl: 5  •  latanoprost (XALATAN) 0.005 % ophthalmic solution, Administer 1 drop to both eyes Every Night., Disp: , Rfl: 5  •  levothyroxine (SYNTHROID, LEVOTHROID) 112 MCG tablet, TAKE ONE TABLET BY MOUTH ONE TIME DAILY, Disp: 90 tablet, Rfl: 1  •  melatonin 5 MG tablet tablet, Take 5 mg by mouth Every Night., Disp: , Rfl:   •  pantoprazole (PROTONIX) 40 MG EC tablet, Take 1 tablet by mouth 2 (Two) Times a Day Before Meals. Twice daily for one month, then daily thereafter, Disp: , Rfl:   •  saccharomyces boulardii (FLORASTOR) 250 MG capsule, Take 250 mg by mouth 2 (Two) Times a Day., Disp: , Rfl:   •  timolol (TIMOPTIC) 0.5 % ophthalmic solution, Administer 1 drop to both eyes 2 (Two) Times a Day., Disp: , Rfl:     ALLERGIES     Bactrim [sulfamethoxazole-trimethoprim], Lisinopril, Rocephin [ceftriaxone], and Codeine    FAMILY HISTORY       Family History   Problem Relation Age of Onset   • Breast cancer Other    • Diabetes Other    • Esophageal cancer Other    • Hypertension Other    • Transient ischemic attack Other    • Breast cancer Mother    • Cancer Father           SOCIAL HISTORY       Social History     Socioeconomic History   • Marital status:    Tobacco Use   • Smoking status: Never Smoker   • Smokeless tobacco: Never Used   Vaping Use   • " "Vaping Use: Never used   Substance and Sexual Activity   • Alcohol use: No   • Drug use: No   • Sexual activity: Defer         PHYSICAL EXAM    (up to 7 for level 4, 8 or more for level 5)     Vitals:    09/06/22 0409   BP: 122/61   BP Location: Right arm   Pulse: 75   Resp: 14   Temp: 98.4 °F (36.9 °C)   SpO2: 96%   Weight: 76.2 kg (168 lb)   Height: 165.1 cm (65\")       Physical Exam  General: Awake, alert, no acute distress.  HEENT: Conjunctivae normal.  Neck: Trachea midline.  Cardiac: Heart regular rate, rhythm, no murmurs, rubs, or gallops  Lungs: Lungs are clear to auscultation, there is no wheezing, rhonchi, or rales. There is no use of accessory muscles.  Chest wall: There is no tenderness to palpation over the chest wall or over ribs  Abdomen: Abdomen is soft, nontender, nondistended. There are no firm or pulsatile masses, no rebound rigidity or guarding.   Musculoskeletal: No deformity.  Neuro: Alert and oriented x 4.  Dermatology: Skin is warm and dry  Psych: Mentation is grossly normal, cognition is grossly normal. Affect is appropriate.        DIAGNOSTIC RESULTS     EKG: All EKGs are interpreted by the Emergency Department Physician who either signs or Co-signs this chart in the absence of a cardiologist.    No orders to display       RADIOLOGY:   Non-plain film images such as CT, Ultrasound and MRI are read by the radiologist. Plain radiographic images are visualized and preliminarily interpreted by the emergency physician with the below findings:      [] Radiologist's Report Reviewed:  No orders to display         ED BEDSIDE ULTRASOUND:   Performed by ED Physician - none    LABS:    I have reviewed and interpreted all of the currently available lab results from this visit (if applicable):  Results for orders placed or performed during the hospital encounter of 09/06/22   Urinalysis With Culture If Indicated - Urine, Catheter    Specimen: Urine, Catheter   Result Value Ref Range    Color, UA Yellow " "Yellow, Straw    Appearance, UA Turbid (A) Clear    pH, UA 5.5 5.0 - 8.0    Specific Gravity, UA 1.025 1.005 - 1.030    Glucose,  mg/dL (Trace) (A) Negative    Ketones, UA Trace (A) Negative    Bilirubin, UA Moderate (2+) (A) Negative    Blood, UA Large (3+) (A) Negative    Protein, UA >=300 mg/dL (3+) (A) Negative    Leuk Esterase, UA Large (3+) (A) Negative    Nitrite, UA Negative Negative    Urobilinogen, UA 0.2 E.U./dL 0.2 - 1.0 E.U./dL   Urinalysis, Microscopic Only - Urine, Catheter    Specimen: Urine, Catheter   Result Value Ref Range    RBC, UA 3-6 (A) None Seen, 0-2 /HPF    WBC, UA Too Numerous to Count (A) None Seen, 0-2 /HPF    Bacteria, UA 2+ (A) None Seen, Trace /HPF    Squamous Epithelial Cells, UA 3-6 (A) None Seen, 0-2 /HPF    Transitional Epithelial Cells, UA 3-6 (A) 0 - 2 /HPF    Renal Epithelial Cells, UA 3-6 (A) 0 - 2 /HPF    Hyaline Casts, UA None Seen 0 - 6 /LPF    Methodology Automated Microscopy         All other labs were within normal range or not returned as of this dictation.      EMERGENCY DEPARTMENT COURSE and DIFFERENTIAL DIAGNOSIS/MDM:   Vitals:    Vitals:    09/06/22 0409   BP: 122/61   BP Location: Right arm   Pulse: 75   Resp: 14   Temp: 98.4 °F (36.9 °C)   SpO2: 96%   Weight: 76.2 kg (168 lb)   Height: 165.1 cm (65\")       ED Course as of 09/06/22 0547   Tue Sep 06, 2022   0546 This patient's presentation is most consistent with a simple cystitis.  She has no flank pain, vomiting, fever, or other findings that would raise concern for pyelonephritis.  She does not appear to be systemically ill or septic.  Do not feel that imaging is warranted at this point.  We will start the patient on oral antibiotics and have her follow-up closely with her PCP.  Her urine has been cultured. [NS]      ED Course User Index  [NS] Marcel Hendrickson MD         I had a discussion with the patient/family regarding diagnosis, diagnostic results, treatment plan, and medications.  The " patient/family indicated understanding of these instructions.  I spent adequate time at the bedside preceding discharge necessary to personally discuss the aftercare instructions, giving patient education, providing explanations of the results of our evaluations/findings, and my decision making to assure that the patient/family understand the plan of care.  Time was allotted to answer questions at that time and throughout the ED course.  Emphasis was placed on timely follow-up after discharge.  I also discussed the potential for the development of an acute emergent condition requiring further evaluation, admission, or even surgical intervention. I discussed that we found nothing during the visit today indicating the need for further workup, admission, or the presence of an unstable medical condition.  I encouraged the patient to return to the emergency department immediately for ANY concerns, worsening, new complaints, or if symptoms persist and unable to seek follow-up in a timely fashion.  The patient/family expressed understanding and agreement with this plan.  The patient will follow-up with their PCP in 1-2 days for reevaluation.       MEDICATIONS ADMINISTERED IN ED:  Medications   HYDROcodone-acetaminophen (NORCO) 5-325 MG per tablet 1 tablet (1 tablet Oral Given 9/6/22 0532)       PROCEDURES:  Procedures    CRITICAL CARE TIME    Total Critical Care time was 0 minutes, excluding separately reportable procedures.   There was a high probability of clinically significant/life threatening deterioration in the patient's condition which required my urgent intervention.      FINAL IMPRESSION      1. Acute cystitis without hematuria          DISPOSITION/PLAN     ED Disposition     ED Disposition   Discharge    Condition   Stable    Comment   --             PATIENT REFERRED TO:  Giselle Guzman DO  [address] [facility] [address] [phone]    Schedule an appointment as soon as possible for a visit in 2  "days      [facility] Emergency Department  [address] [address] [phone]    If symptoms worsen      DISCHARGE MEDICATIONS:     Medication List      START taking these medications    cephalexin 500 MG capsule  Commonly known as: KEFLEX  Take 1 capsule by mouth 4 (Four) Times a Day for 7 days.        CONTINUE taking these medications    apixaban 2.5 MG tablet tablet  Commonly known as: ELIQUIS  Take 1 tablet by mouth Every 12 (Twelve) Hours. Indications: Atrial Fibrillation     atorvastatin 20 MG tablet  Commonly known as: LIPITOR  Take 1 tablet by mouth Every Night.     carvedilol 3.125 MG tablet  Commonly known as: COREG  Take 1 tablet by mouth Every 12 (Twelve) Hours.     castor oil-balsam peru ointment  Apply 1 application topically to the appropriate area as directed Every 12 (Twelve) Hours.     cholecalciferol 25 MCG (1000 UT) tablet  Commonly known as: VITAMIN D3     epoetin shukri-epbx 19916 UNIT/ML injection  Commonly known as: RETACRIT  Inject 1 mL under the skin into the appropriate area as directed 3 (Three) Times a Week for 12 doses. To be given while in dialysis  Indications: ESRD on Dialysis     * Insulin Lispro 100 UNIT/ML injection  Commonly known as: humaLOG  Inject 0-7 Units under the skin into the appropriate area as directed 3 (Three) Times a Day Before Meals.     * Insulin Lispro 100 UNIT/ML injection  Commonly known as: humaLOG  Inject 4 Units under the skin into the appropriate area as directed 3 (Three) Times a Day With Meals.     Insulin Syringe-Needle U-100 31G X 5/16\" 0.5 ML misc  Commonly known as: TRUEplus Insulin Syringe  USE WITH INSULIN  TIMES DAILY     latanoprost 0.005 % ophthalmic solution  Commonly known as: XALATAN     levothyroxine 112 MCG tablet  Commonly known as: SYNTHROID, LEVOTHROID  TAKE ONE TABLET BY MOUTH ONE TIME DAILY     melatonin 5 MG tablet tablet     pantoprazole 40 MG EC tablet  Commonly known as: PROTONIX  Take 1 " tablet by mouth 2 (Two) Times a Day Before Meals. Twice daily for one month, then daily thereafter     saccharomyces boulardii 250 MG capsule  Commonly known as: FLORASTOR     timolol 0.5 % ophthalmic solution  Commonly known as: TIMOPTIC     Unifine Pentips Plus 31G X 8 MM misc  Generic drug: Insulin Pen Needle  Use 4 per day to administer insulin         * This list has 2 medication(s) that are the same as other medications prescribed for you. Read the directions carefully, and ask your doctor or other care provider to review them with you.               Where to Get Your Medications      These medications were sent to [id number] - [place], [place] - [address] [facility] - [phone]  - [phone]   [address] [facility] [address]    Phone: [phone]   · cephalexin 500 MG capsule             Comment: Please note this report has been produced using speech recognition software.      Marcel Hendrickson MD  Attending Emergency Physician               Marcel Hendrickson MD  09/06/22 4758

## 2022-09-27 NOTE — TELEPHONE ENCOUNTER
PT'S DAUGHTER CALLED REQUESTING CONTOUR NEXT TEST STRIPS TO BE SENT IN TO [facility] IN [place], [place].

## 2022-09-29 NOTE — TELEPHONE ENCOUNTER
PT'S DAUGHTER CALLED STATING WE SENT IN RX FOR TEST STRIPS TO WRONG PHARM. SHE REQUESTED WE SEND IN CONTOUR NEXT TEST STRIPS IN TO [facility] IN [place], [place].

## 2022-10-03 NOTE — TELEPHONE ENCOUNTER
PT'S DAUGHTER CALLED STATING [facility] IN [place], [place] NEEDS DIAGNOSIS CODE AND QUANTITY/DOSE FOR TEST STRIPS PER MEDICARE.

## 2022-10-11 NOTE — PROGRESS NOTES
Karla HUYNH [place]  1935  [phone]      [facility] CARDIOLOGY MAIN CAMP[facility]     Giselle Guzman DO  [address] [address]    Chief Complaint   Patient presents with   • Follow-up       Problem List:     1. Ischemic heart disease:  a. History of remote myocardial infarction/CABG x3, [facility] (database insufficient).   b. Left heart catheterization by Dr. Basilio Grossman, 2007, revealing patent LIMA graft/medical therapy, LVEF 30%.  c. Echocardiogram, [facility], 2007: LVEF 25%; moderate/severe mitral regurgitation.  d. Upgrade of pacemaker to [employer] Jamel BI-V pacemaker per Dr. Hammonds on 10/25/2007.  e. Reported negative Cardiolite stress test in 2010, [facility], Dr. Basliio Grossman (database insufficient).  f. Reported echocardiogram, October 2011, [facility] (database insufficient).   g. Echocardiogram, 08/29/2012: LVEF 35% to 40%, mild mitral regurgitation.  h. History of MRSA pacemaker infection, 2005 (database insufficient).   i. St. Jamel pacemaker, 2005, with upgrade to a [employer] Jamel BI-V ICD device in 2007, Dr. Hammonds.   j. CRT-D generator exchange by Tano Beal, 09/21/2012, St. Jamel device (Serial# [id number]).  k. Persistent ischemic cardiomyopathy.   l. Echocardiogram, 10/20/2015: LVEF 35% to 40%, moderate mitral regurgitation/tricuspid regurgitation; RVSP 45 mmHg.   m. Echocardiogram EF 37%, Moderate MR, Severe TR. 7/31/2022  2. Chronic class III systolic heart failure.  a. Echocardiogram 11/17/2021 LVEF 51 to 55%, RV dilatation noted, moderate TR, moderate MR,  3. History of persistent atrial fibrillation.  a. CHADS2 score equal to 4.   b. Chronic Coumadin therapy managed by Bk Mckeon’s office.   4. Pericardial effusion, 2008.  5. History of frequent urinary tract infections.  6. Hypothyroidism.  7. Insulin-dependent diabetes mellitus, type 2.  8. Chronic kidney disease, stage 4, followed by Bk Mckeon. She is now on  "HD-three days a week.   9. Dyslipidemia.  10. Hypertension.  11. Home O2 nightly/sleep apnea.  12. History of endometrial cancer, status post partial hysterectomy in 2008 with follow up radiation therapy followed by Dr. Sosa.   13. History of multiple left hip surgeries most recently in 2011 at [facility]      Allergies  Allergies   Allergen Reactions   • Bactrim [Sulfamethoxazole-Trimethoprim] GI Intolerance   • Lisinopril Cough   • Rocephin [Ceftriaxone] Itching   • Codeine Rash       Current Medications    Current Outpatient Medications:   •  apixaban (ELIQUIS) 2.5 MG tablet tablet, Take 1 tablet by mouth Every 12 (Twelve) Hours. Indications: Atrial Fibrillation, Disp: 60 tablet, Rfl:   •  atorvastatin (LIPITOR) 20 MG tablet, Take 1 tablet by mouth Every Night., Disp: 90 tablet, Rfl: 1  •  carvedilol (COREG) 3.125 MG tablet, Take 1 tablet by mouth Every 12 (Twelve) Hours., Disp: , Rfl:   •  cholecalciferol (VITAMIN D3) 1000 UNITS tablet, Take 1,000 Units by mouth Daily., Disp: , Rfl:   •  glucose blood (Contour Next Test) test strip, Test four times daily DX Code E; 11.65, Disp: 200 each, Rfl: 3  •  Insulin Lispro (humaLOG) 100 UNIT/ML injection, Inject 0-7 Units under the skin into the appropriate area as directed 3 (Three) Times a Day Before Meals., Disp: , Rfl: 12  •  Insulin Lispro (humaLOG) 100 UNIT/ML injection, Inject 4 Units under the skin into the appropriate area as directed 3 (Three) Times a Day With Meals., Disp: , Rfl: 12  •  Insulin Pen Needle (Unifine Pentips Plus) 31G X 8 MM misc, Use 4 per day to administer insulin, Disp: 400 each, Rfl: 1  •  Insulin Syringe-Needle U-100 (TRUEplus Insulin Syringe) 31G X 5/16\" 0.5 ML misc, USE WITH INSULIN  TIMES DAILY, Disp: 200 each, Rfl: 5  •  latanoprost (XALATAN) 0.005 % ophthalmic solution, Administer 1 drop to both eyes Every Night., Disp: , Rfl: 5  •  levothyroxine (SYNTHROID, LEVOTHROID) 112 MCG tablet, TAKE ONE TABLET BY MOUTH ONE TIME " "DAILY, Disp: 90 tablet, Rfl: 1  •  melatonin 5 MG tablet tablet, Take 5 mg by mouth Every Night., Disp: , Rfl:   •  pantoprazole (PROTONIX) 40 MG EC tablet, Take 1 tablet by mouth 2 (Two) Times a Day Before Meals. Twice daily for one month, then daily thereafter, Disp: , Rfl:   •  saccharomyces boulardii (FLORASTOR) 250 MG capsule, Take 250 mg by mouth 2 (Two) Times a Day., Disp: , Rfl:   •  timolol (TIMOPTIC) 0.5 % ophthalmic solution, Administer 1 drop to both eyes 2 (Two) Times a Day., Disp: , Rfl:   •  castor oil-balsam peru (VENELEX) ointment, Apply 1 application topically to the appropriate area as directed Every 12 (Twelve) Hours., Disp: , Rfl:     History of Present Illness     Pt presents for follow up of CHF/atrial fibrillation/complete heart block/dilated cardiomyopathy. Since we last saw the pt, pt denies any palpitation episodes, SOB, CP, LH, and dizziness.  She denies any bleeding, or TIA/CVA symptoms.  She has had no recurrent lower extremity cellulitis issues.  She is dealing with a cough which has been more persistent recently.  She also was in the ER recently because of a UTI.    Vitals:    10/11/22 1516   BP: 98/60   BP Location: Right arm   Patient Position: Sitting   Pulse: 55   Resp: 14   SpO2: 99%   Weight: 76 kg (167 lb 9.6 oz)   Height: 165.1 cm (65\")       PE:  General: NAD  Neck: no JVD, no carotid bruits, no TM  Heart irregular rate regular rhythm NL S1, S2, TR and MR murmurs are appreciated grade 3/6 murmur    Lungs: Few expiratory wheezes clear with cough  Abd: soft, non-tender, NL BS  Ext: No musculoskeletal deformities, no edema, cyanosis, or clubbing  Psych: normal mood and affect    Diagnostic Data:    ECG 12 Lead    Date/Time: 10/11/2022 3:38 PM  Performed by: Terrence Byrnes PA  Authorized by: Terrence Byrnes PA   Rhythm: atrial fibrillation  Ectopy: unifocal PVCs  Rate: normal  QRS axis: normal  Other findings: non-specific ST-T wave changes    Clinical impression: " abnormal EKG        .    Manual interrogation of [employer] biventricular ICD.    VVIR settings at 70.  BiV paced 93%.  Normal R wave thresholds impedances.  Battery voltage 70% 4.9 years remaining.    1. Chronic atrial fibrillation (HCC)    2. Chronic systolic congestive heart failure (HCC)    3. Essential hypertension    4. Ischemic cardiomyopathy          Plan:  1. Permanent Afib/AV block, Asymptomatic rate controlled.  2. Chronic SHF/cardiomyopathy, Class II with normal biventricular pacemaker ICD function.  Most recent LVEF normalized by echocardiogram.  3. End stage Kidney disease. She is now on HD three days a week   4. Anticoagulation: Chadsvasc=7, on low-dose Eliquis.  5. GIB: Resolved, Diverticulitis, now resolved.       F/up in 6 months  Electronically signed by TIMMY Paniagua, 10/11/22, 3:32 PM EDT.

## 2022-10-15 NOTE — ED PROVIDER NOTES
"Subjective   History of Present Illness  87-year-old female presents for evaluation of \"possible UTI.\"  Of note, the patient is on hemodialysis on Monday, Wednesday, and Friday.  She makes a small amount of urine.  She states that for the past day or so she has been experiencing vaginal irritation, dysuria, and a \"burning sensation\" in her vaginal region that she attributes to a UTI.  She has had similar episodes in the past with prior UTIs and feels that this is the case again today.  No rash.  Given her symptoms, she came to the emergency department to be evaluated.  She denies any fevers, chills, vomiting, diarrhea, or any other systemic symptoms.        Review of Systems   Genitourinary: Positive for dysuria and vaginal pain.   All other systems reviewed and are negative.      Past Medical History:   Diagnosis Date   • Acute bronchitis    • Arthritis    • Atrial fibrillation (HCC)    • CAD (coronary artery disease)     s/p MI 2005; 3 stents inserted    • Change in mole    • Change in mole    • Change in nevus 6/16/2016   • Chronic kidney disease     stage IV-sees Dr Bk Mckeon every 3-4 months    • Chronic renal disease, stage 4, severely decreased glomerular filtration rate between 15-29 mL/min/1.73 square meter (HCC)    • Chronic systolic heart failure (HCC)    • Congestive heart disease (HCC)    • Conjunctivitis    • Deep vein thrombosis of lower extremity (HCC)    • Diabetes mellitus (HCC)    • Diabetes mellitus (HCC) 6/16/2016    Impression: 03/15/2016 - blood sugar 166 and hgn a1c 7.3%  is up to date with eye exam  neuropathy with callus, no ulcer  some scratches feet  ur alb due and ordered  no change other than provided samples of toujeo because she feels like lantus worked much better than levemir, she has tried titrating levemir and it is less effective  continue testing and f/u 3-4 months  Impression: 11/23/2015 - bl   • Diabetic foot ulcer (HCC) 6/16/2016   • Dog bite of hand    • Easy bruising  "   • Endometrial cancer (HCC)     partial hysterectomy; radiation as well    • Foot pain    • Foot pain 6/16/2016    Description: lancinating- worse but not consistent enough to want rx   • Fracture of hip (HCC)    • Generalized ischemic myocardial dysfunction 6/16/2016   • Glaucoma     drops daily    • Hyperlipidemia    • Hypertension    • Hypothyroidism    • Insomnia    • Ischemic heart disease    • Macular degeneration    • Methicillin resistant Staphylococcus aureus infection 6/16/2016   • Myocardial infarction ([facility]) 2005   • Nausea with vomiting    • Pericardial effusion    • Shortness of breath 6/16/2016    Impression: 03/24/2015 - chronic. On 2 l oxygen at night. check cbc, bnp;    • Skin cancer, basal cell     nose, leg    • Skin lesion 6/16/2016    Impression: 03/24/2015 - refer derm for eval- seb kerat ant tib and ?ak medially with redness and irritation;    • Sleep apnea     oxygen at night due to low sats but no cpap   • Type 2 diabetes mellitus ([facility])    • UTI (urinary tract infection) 06/12/2020    currently taking antibiotics-patient's daughter notified Dr Beal's office and office said it should not delay generator change        Allergies   Allergen Reactions   • Bactrim [Sulfamethoxazole-Trimethoprim] GI Intolerance   • Lisinopril Cough   • Rocephin [Ceftriaxone] Itching   • Codeine Rash       Past Surgical History:   Procedure Laterality Date   • CARDIAC CATHETERIZATION     • CARDIAC DEFIBRILLATOR PLACEMENT     • CARDIAC ELECTROPHYSIOLOGY PROCEDURE N/A 07/17/2020    Procedure: ICD BIV  generator change- SJM- 3-6 weeks;  Surgeon: Tano Beal MD;  Location: [facility] EP INVASIVE LOCATION;  Service: Cardiology;  Laterality: N/A;   • CHOLECYSTECTOMY     • COLONOSCOPY N/A 07/30/2022    Procedure: COLONOSCOPY;  Surgeon: Brunner, Mark I, MD;  Location: [facility] ENDOSCOPY;  Service: Gastroenterology;  Laterality: N/A;  WITH 3 RESOLUTION CLIPS   • CORONARY ARTERY BYPASS GRAFT  2000   • CORONARY STENT  PLACEMENT      3 stents    • DIALYSIS FISTULA CREATION     • ENDOSCOPY N/A 11/17/2021    Procedure: ESOPHAGOGASTRODUODENOSCOPY;  Surgeon: Brunner, Mark I, MD;  Location: [facility] ENDOSCOPY;  Service: Gastroenterology;  Laterality: N/A;   • EYE SURGERY Bilateral     cataract extraction    • HIP SURGERY Left     x3   • HYSTERECTOMY      partial    • KNEE ARTHROPLASTY Bilateral    • PACEMAKER IMPLANTATION     • TONSILLECTOMY         Family History   Problem Relation Age of Onset   • Breast cancer Other    • Diabetes Other    • Esophageal cancer Other    • Hypertension Other    • Transient ischemic attack Other    • Breast cancer Mother    • Cancer Father        Social History     Socioeconomic History   • Marital status:    Tobacco Use   • Smoking status: Never   • Smokeless tobacco: Never   Vaping Use   • Vaping Use: Never used   Substance and Sexual Activity   • Alcohol use: No   • Drug use: No   • Sexual activity: Defer           Objective   Physical Exam  Vitals and nursing note reviewed.   Constitutional:       General: She is not in acute distress.     Appearance: Normal appearance. She is well-developed. She is not diaphoretic.      Comments: Nontoxic-appearing female   HENT:      Head: Normocephalic and atraumatic.   Cardiovascular:      Rate and Rhythm: Normal rate and regular rhythm.      Heart sounds: Normal heart sounds. No murmur heard.    No friction rub. No gallop.   Pulmonary:      Effort: Pulmonary effort is normal. No respiratory distress.      Breath sounds: Normal breath sounds. No wheezing or rales.   Abdominal:      General: Bowel sounds are normal. There is no distension.      Palpations: Abdomen is soft. There is no mass.      Tenderness: There is no abdominal tenderness. There is no guarding or rebound.      Comments: No focal abdominal tenderness, no peritoneal signs, no pain out of proportion to exam   Genitourinary:     Comments: No CVA tenderness present  Unremarkable  exam, no  "vaginal rash or irritation noted    Musculoskeletal:         General: Normal range of motion.   Skin:     General: Skin is warm and dry.      Findings: No erythema or rash.   Neurological:      Mental Status: She is alert and oriented to person, place, and time.   Psychiatric:         Mood and Affect: Mood normal.         Thought Content: Thought content normal.         Judgment: Judgment normal.         Procedures           ED Course  ED Course as of 10/14/22 2232   Fri Oct 14, 2022   1916 87-year-old female presents for evaluation of \"possible UTI.\"  Of note, the patient is on hemodialysis on Monday, Wednesday, and Friday.  For the past day she has been experiencing vaginal irritation, dysuria, and a \"burning sensation\" that she attributes to UTI, prompting her visit to the emergency department.  On arrival, the patient is nontoxic-appearing.  No fevers or systemic symptoms.  Nonsurgical abdomen.  No CVA tenderness present.  We will obtain labs, and we will reassess following initial interventions. [DD]   2040 On vaginal exam, the patient has no rash or cellulitis noted to her  region. [DD]   2104 Labs remarkable for mildly elevated lactate.  Renal function is at baseline. [DD]   2137 Urinalysis is suggestive of infection.  Rocephin given.  Urine culture obtained. [DD]   2138 Given the patient's overall well appearance, feel that she can be managed on an outpatient basis.  Prescription for cefdinir.  She will follow-up with her primary care physician within the next week.  Agreeable with plan and given appropriate strict return precautions. [DD]      ED Course User Index  [DD] Basilio Abbasi MD                                       Recent Results (from the past 24 hour(s))   Comprehensive Metabolic Panel    Collection Time: 10/14/22  8:22 PM    Specimen: Blood   Result Value Ref Range    Glucose 186 (H) 65 - 99 mg/dL    BUN 12 8 - 23 mg/dL    Creatinine 2.42 (H) 0.57 - 1.00 mg/dL    Sodium 138 136 - 145 " mmol/L    Potassium 3.6 3.5 - 5.2 mmol/L    Chloride 103 98 - 107 mmol/L    CO2 22.0 22.0 - 29.0 mmol/L    Calcium 8.8 8.6 - 10.5 mg/dL    Total Protein 7.2 6.0 - 8.5 g/dL    Albumin 3.50 3.50 - 5.20 g/dL    ALT (SGPT) 13 1 - 33 U/L    AST (SGOT) 23 1 - 32 U/L    Alkaline Phosphatase 301 (H) 39 - 117 U/L    Total Bilirubin 1.2 0.0 - 1.2 mg/dL    Globulin 3.7 gm/dL    A/G Ratio 0.9 g/dL    BUN/Creatinine Ratio 5.0 (L) 7.0 - 25.0    Anion Gap 13.0 5.0 - 15.0 mmol/L    eGFR 19.0 (L) >60.0 mL/min/1.73   Lipase    Collection Time: 10/14/22  8:22 PM    Specimen: Blood   Result Value Ref Range    Lipase 24 13 - 60 U/L   Lactic Acid, Plasma    Collection Time: 10/14/22  8:22 PM    Specimen: Blood   Result Value Ref Range    Lactate 2.4 (C) 0.5 - 2.0 mmol/L   Green Top (Gel)    Collection Time: 10/14/22  8:22 PM   Result Value Ref Range    Extra Tube Hold for add-ons.    Lavender Top    Collection Time: 10/14/22  8:22 PM   Result Value Ref Range    Extra Tube hold for add-on    Gold Top - SST    Collection Time: 10/14/22  8:22 PM   Result Value Ref Range    Extra Tube Hold for add-ons.    Light Blue Top    Collection Time: 10/14/22  8:22 PM   Result Value Ref Range    Extra Tube Hold for add-ons.    CBC Auto Differential    Collection Time: 10/14/22  8:22 PM    Specimen: Blood   Result Value Ref Range    WBC 5.12 3.40 - 10.80 10*3/mm3    RBC 4.42 3.77 - 5.28 10*6/mm3    Hemoglobin 13.3 12.0 - 15.9 g/dL    Hematocrit 40.8 34.0 - 46.6 %    MCV 92.3 79.0 - 97.0 fL    MCH 30.1 26.6 - 33.0 pg    MCHC 32.6 31.5 - 35.7 g/dL    RDW 18.4 (H) 12.3 - 15.4 %    RDW-SD 61.3 (H) 37.0 - 54.0 fl    MPV 12.9 (H) 6.0 - 12.0 fL    Platelets 79 (L) 140 - 450 10*3/mm3    Neutrophil % 69.1 42.7 - 76.0 %    Lymphocyte % 20.3 19.6 - 45.3 %    Monocyte % 8.6 5.0 - 12.0 %    Eosinophil % 0.8 0.3 - 6.2 %    Basophil % 1.0 0.0 - 1.5 %    Immature Grans % 0.2 0.0 - 0.5 %    Neutrophils, Absolute 3.54 1.70 - 7.00 10*3/mm3    Lymphocytes, Absolute 1.04  0.70 - 3.10 10*3/mm3    Monocytes, Absolute 0.44 0.10 - 0.90 10*3/mm3    Eosinophils, Absolute 0.04 0.00 - 0.40 10*3/mm3    Basophils, Absolute 0.05 0.00 - 0.20 10*3/mm3    Immature Grans, Absolute 0.01 0.00 - 0.05 10*3/mm3    nRBC 0.0 0.0 - 0.2 /100 WBC   Scan Slide    Collection Time: 10/14/22  8:22 PM    Specimen: Blood   Result Value Ref Range    RBC Morphology Normal Normal    WBC Morphology Normal Normal    Platelet Estimate Decreased Normal   Urinalysis With Culture If Indicated - Urine, Catheter    Collection Time: 10/14/22  8:37 PM    Specimen: Urine, Catheter   Result Value Ref Range    Color, UA Red (A) Yellow, Straw    Appearance, UA Turbid (A) Clear    pH, UA <=5.0 5.0 - 8.0    Specific Gravity, UA 1.028 1.001 - 1.030    Glucose, UA Negative Negative    Ketones, UA Negative Negative    Bilirubin, UA Moderate (2+) (A) Negative    Blood, UA Moderate (2+) (A) Negative    Protein,  mg/dL (2+) (A) Negative    Leuk Esterase, UA Large (3+) (A) Negative    Nitrite, UA Positive (A) Negative    Urobilinogen, UA 0.2 E.U./dL 0.2 - 1.0 E.U./dL   Urinalysis, Microscopic Only - Urine, Catheter    Collection Time: 10/14/22  8:37 PM    Specimen: Urine, Catheter   Result Value Ref Range    RBC, UA Too Numerous to Count (A) None Seen, 0-2 /HPF    WBC, UA Too Numerous to Count (A) None Seen, 0-2 /HPF    Bacteria, UA 3+ (A) None Seen, Trace /HPF    Squamous Epithelial Cells, UA 3-6 (A) None Seen, 0-2 /HPF    Hyaline Casts, UA Unable to determine due to loaded field 0 - 6 /LPF    Methodology Manual Light Microscopy      Note: In addition to lab results from this visit, the labs listed above may include labs taken at another facility or during a different encounter within the last 24 hours. Please correlate lab times with ED admission and discharge times for further clarification of the services performed during this visit.    No orders to display     Vitals:    10/14/22 1828 10/14/22 2159   BP: 127/64 120/54   BP  "Location: Right arm    Patient Position: Sitting    Pulse: 74 74   Resp: 18 16   Temp: 98.2 °F (36.8 °C)    TempSrc: Oral    SpO2: 96% 94%   Weight: 75.8 kg (167 lb)    Height: 164.6 cm (64.8\")      Medications   Sodium Chloride (PF) 0.9 % 10 mL (has no administration in time range)   cefTRIAXone (ROCEPHIN) 1 g/100 mL 0.9% NS (MBP) (1 g Intravenous New Bag 10/14/22 2152)   diphenhydrAMINE (BENADRYL) injection 25 mg (25 mg Intravenous Given 10/14/22 2152)     ECG/EMG Results (last 24 hours)     ** No results found for the last 24 hours. **        No orders to display              MDM    Final diagnoses:   Acute UTI   End-stage renal disease on hemodialysis (HCC)       ED Disposition  ED Disposition     ED Disposition   Discharge    Condition   Stable    Comment   --             Giselle Guzman DO  [address] [address]  [phone]    In 1 week           Medication List      New Prescriptions    cefdinir 300 MG capsule  Commonly known as: OMNICEF  Take 1 capsule by mouth 2 (Two) Times a Day for 7 days.           Where to Get Your Medications      These medications were sent to [id number] - [place], [place] - [address] - [phone]  - [phone]   [address] [address]    Phone: [phone]   · cefdinir 300 MG capsule          Basilio Abbasi MD  10/14/22 2234    "

## 2022-10-20 NOTE — PROGRESS NOTES
"Karla Cristobal 87 y.o.  CC:Follow-up and Diabetes (Type II, eye exam 5-6 months ago Dr Donohue)        [place]: Follow-up and Diabetes (Type II, eye exam 5-6 months ago Dr Donohue)    Blood sugar and 90 day average sugar reviewed  Results for orders placed or performed in visit on 10/20/22   POC Glycosylated Hemoglobin (Hb A1C)    Specimen: Blood   Result Value Ref Range    Hemoglobin A1C 5.9 %    Lot Number [id number]     Expiration Date 06/14/2024    POC Glucose, Blood    Specimen: Blood   Result Value Ref Range    Glucose 208 (A) 70 - 130 mg/dL    Lot Number [id number]     Expiration Date 03/29/2023      ckd planning dialysis  Was in hospital July to august  Sores left toes and ant tib shallow ulcer  Mild pigmentation but no true redness     Allergies   Allergen Reactions   • Bactrim [Sulfamethoxazole-Trimethoprim] GI Intolerance   • Lisinopril Cough   • Rocephin [Ceftriaxone] Itching   • Codeine Rash       Current Outpatient Medications:   •  pantoprazole (PROTONIX) 40 MG EC tablet, Take 1 tablet by mouth 2 (Two) Times a Day Before Meals. Twice daily for one month, then daily thereafter, Disp: , Rfl:   •  apixaban (ELIQUIS) 2.5 MG tablet tablet, Take 1 tablet by mouth Every 12 (Twelve) Hours. Indications: Atrial Fibrillation, Disp: 60 tablet, Rfl:   •  atorvastatin (LIPITOR) 20 MG tablet, Take 1 tablet by mouth Every Night., Disp: 90 tablet, Rfl: 1  •  carvedilol (COREG) 3.125 MG tablet, Take 1 tablet by mouth Every 12 (Twelve) Hours., Disp: , Rfl:   •  cholecalciferol (VITAMIN D3) 1000 UNITS tablet, Take 1,000 Units by mouth Daily., Disp: , Rfl:   •  glucose blood (Contour Next Test) test strip, Test four times daily DX Code E; 11.65, Disp: 200 each, Rfl: 3  •  Insulin Pen Needle (Unifine Pentips Plus) 31G X 8 MM misc, Use 4 per day to administer insulin, Disp: 400 each, Rfl: 1  •  Insulin Syringe-Needle U-100 (TRUEplus Insulin Syringe) 31G X 5/16\" 0.5 ML misc, USE WITH INSULIN  TIMES DAILY, Disp: 200 each, Rfl: " 5  •  latanoprost (XALATAN) 0.005 % ophthalmic solution, Administer 1 drop to both eyes Every Night., Disp: , Rfl: 5  •  levothyroxine (SYNTHROID, LEVOTHROID) 125 MCG tablet, Take 1 tablet by mouth Daily., Disp: , Rfl:   •  midodrine (PROAMATINE) 5 MG tablet, Take one po up to TID if BP under 110, Disp: , Rfl:   •  NovoLOG FlexPen 100 UNIT/ML solution pen-injector sc pen, , Disp: , Rfl:   •  saccharomyces boulardii (FLORASTOR) 250 MG capsule, Take 250 mg by mouth 2 (Two) Times a Day., Disp: , Rfl:   •  timolol (TIMOPTIC) 0.5 % ophthalmic solution, Administer 1 drop to both eyes 2 (Two) Times a Day., Disp: , Rfl:   Patient Active Problem List    Diagnosis    • Normocytic anemia [D64.9]    • Acute renal failure (ARF) ([facility]) [N17.9]    • COVID-19 virus detected [U07.1]    • Thrombocytopenia ([facility]) [D69.6]    • Hypoalbuminemia [E88.09]    • AMS (altered mental status) [R41.82]    • Lower GI bleed [K92.2]    • GIB (gastrointestinal bleeding) [K92.2]    • Cellulitis of left lower extremity [L03.116]    • Left leg cellulitis [L03.116]    • Food impaction of esophagus [T18.128A]    • Type 2 diabetes mellitus without complication, with long-term current use of insulin ([facility]) [E11.9, Z79.4]    • Exudative age-related macular degeneration, right eye, with active choroidal neovascularization ([facility]) [H35.3211]    • Hypoxia [R09.02]    • Malignant tumor of ascending colon (HCC) [C18.2]    • Ischemic cardiomyopathy [I25.5]    • Morbidly obese ([facility]) [E66.01]    • Chronic systolic congestive heart failure (HCC) [I50.22]    • DVT of lower extremity (deep venous thrombosis) ([facility]) [I82.409]    • CAD (coronary artery disease) [I25.10]    • Ischemic heart disease [I25.9]    • Chronic atrial fibrillation ([facility]) [I48.20]    • CKD (chronic kidney disease), stage IV (HCC) [N18.4]    • Diabetic nephropathy (HCC) [E11.21]    • Diabetic retinopathy (HCC) [E11.319]    • Malignant neoplasm of endometrium (HCC) [C54.1]    • Hyperlipidemia LDL goal <100  "[E78.5]    • Essential hypertension [I10]    • Hypothyroidism [E03.9]    • Insomnia [G47.00]    • Leukocytosis [D72.829]    • Memory impairment [R41.3]    • Nausea [R11.0]    • Pulmonary embolism (HCC) [I26.99]    • Sleep apnea [G47.30]    • Squamous cell carcinoma of skin [C44.92]    • CHF (NYHA class IV, ACC/AHA stage D) (HCC) [I50.84]      Review of Systems   Constitutional: Positive for activity change. Negative for appetite change and unexpected weight change.   HENT: Negative for congestion and rhinorrhea.    Eyes: Negative for visual disturbance.   Respiratory: Positive for shortness of breath. Negative for cough.    Cardiovascular: Positive for leg swelling. Negative for palpitations.   Gastrointestinal: Negative for constipation, diarrhea and nausea.   Genitourinary: Negative for hematuria.   Musculoskeletal: Positive for arthralgias and gait problem. Negative for back pain, joint swelling and myalgias.   Skin: Positive for wound. Negative for color change and rash.   Allergic/Immunologic: Negative for environmental allergies, food allergies and immunocompromised state.   Neurological: Positive for weakness and numbness. Negative for dizziness and light-headedness.   Psychiatric/Behavioral: Negative for confusion, decreased concentration, dysphoric mood and sleep disturbance. The patient is not nervous/anxious.      Social History     Socioeconomic History   • Marital status:    Tobacco Use   • Smoking status: Never   • Smokeless tobacco: Never   Vaping Use   • Vaping Use: Never used   Substance and Sexual Activity   • Alcohol use: No   • Drug use: No   • Sexual activity: Defer     Family History   Problem Relation Age of Onset   • Breast cancer Other    • Diabetes Other    • Esophageal cancer Other    • Hypertension Other    • Transient ischemic attack Other    • Breast cancer Mother    • Cancer Father      /58   Pulse 68   Ht 162.6 cm (64\")   Wt 77.3 kg (170 lb 6.4 oz)   LMP  (LMP " Unknown) Comment: last mammogram -unsure   SpO2 100%   BMI 29.25 kg/m²   Physical Exam  Vitals and nursing note reviewed.   Constitutional:       Appearance: Normal appearance. She is well-developed. She is ill-appearing.   HENT:      Head: Normocephalic and atraumatic.   Eyes:      General: Lids are normal.      Extraocular Movements: Extraocular movements intact.      Conjunctiva/sclera: Conjunctivae normal.      Pupils: Pupils are equal, round, and reactive to light.   Neck:      Thyroid: No thyroid mass or thyromegaly.      Vascular: No carotid bruit.      Trachea: Trachea normal. No tracheal deviation.   Cardiovascular:      Rate and Rhythm: Normal rate and regular rhythm.      Heart sounds: Murmur heard.     No friction rub. No gallop.      Comments: Vascular changes bilateral feet   Pulmonary:      Effort: Pulmonary effort is normal. No respiratory distress.      Breath sounds: Normal breath sounds. No wheezing.   Musculoskeletal:         General: No deformity. Normal range of motion.      Cervical back: Normal range of motion and neck supple.   Lymphadenopathy:      Cervical: No cervical adenopathy.   Skin:     General: Skin is warm and dry.      Findings: No erythema or rash.      Nails: There is no clubbing.   Neurological:      General: No focal deficit present.      Mental Status: She is alert and oriented to person, place, and time.      Cranial Nerves: No cranial nerve deficit.      Sensory: Sensory deficit present.      Motor: Weakness present.      Gait: Gait abnormal.      Deep Tendon Reflexes: Reflexes abnormal.   Psychiatric:         Speech: Speech normal.         Behavior: Behavior normal.       Results for orders placed or performed in visit on 10/20/22   POC Glycosylated Hemoglobin (Hb A1C)    Specimen: Blood   Result Value Ref Range    Hemoglobin A1C 5.9 %    Lot Number [id number]     Expiration Date 06/14/2024    POC Glucose, Blood    Specimen: Blood   Result Value Ref Range    Glucose 208  (A) 70 - 130 mg/dL    Lot Number [id number]     Expiration Date 03/29/2023      Diagnoses and all orders for this visit:    1. Type 2 diabetes mellitus without complication, with long-term current use of insulin (HCC) (Primary)  Assessment & Plan:  Blood sugar and 90 day average sugar reviewed  Results for orders placed or performed in visit on 10/20/22   POC Glycosylated Hemoglobin (Hb A1C)    Specimen: Blood   Result Value Ref Range    Hemoglobin A1C 5.9 %    Lot Number [id number]     Expiration Date 06/14/2024    POC Glucose, Blood    Specimen: Blood   Result Value Ref Range    Glucose 208 (A) 70 - 130 mg/dL    Lot Number [id number]     Expiration Date 03/29/2023      ckd affecting a1c  High current sugar  Multiple small abrasions bilateral legs with decreased peripheral pulses but good capillary refill  Has bactroban at home- discussed twice daily use on open sores with her daughter  Update eye exam  ckd known   F/u 3-4 months   Avoid low sugar (less than 80), treatment reviewed     Orders:  -     POC Glycosylated Hemoglobin (Hb A1C)  -     POC Glucose, Blood    2. Coronary artery disease involving native coronary artery of native heart without angina pectoris  Assessment & Plan:  With orthostatic hypotension using proamatine prn   bp today good  Continue lipitor 20 mg daily       3. Essential hypertension  Assessment & Plan:  See above - using low dose coreg   In wheelchair today       4. Hyperlipidemia LDL goal <100  Assessment & Plan:  See above - continue statin therapy   CAD, PVD       5. Acquired hypothyroidism  Assessment & Plan:  tsh 1.9 11/21  Update with nov     Return in about 3 months (around 1/20/2023) for Recheck.    Rani Lane MD  Signed Rani Lane MD

## 2022-11-26 PROBLEM — S32.010A COMPRESSION FRACTURE OF L1 VERTEBRA, INITIAL ENCOUNTER (HCC): Status: ACTIVE | Noted: 2022-01-01

## 2022-11-26 PROBLEM — N18.6 ESRD (END STAGE RENAL DISEASE) (HCC): Chronic | Status: ACTIVE | Noted: 2022-01-01

## 2022-11-27 PROBLEM — D69.6 THROMBOCYTOPENIA (HCC): Status: ACTIVE | Noted: 2022-01-01

## 2022-11-28 PROBLEM — D69.6 THROMBOCYTOPENIA (HCC): Status: RESOLVED | Noted: 2022-01-01 | Resolved: 2022-01-01

## 2022-11-28 PROBLEM — L03.116 LEFT LEG CELLULITIS: Status: RESOLVED | Noted: 2021-11-15 | Resolved: 2022-01-01

## 2022-11-28 PROBLEM — Z79.4 TYPE 2 DIABETES MELLITUS WITHOUT COMPLICATION, WITH LONG-TERM CURRENT USE OF INSULIN (HCC): Chronic | Status: ACTIVE | Noted: 2021-09-08

## 2022-11-28 PROBLEM — I95.9 HYPOTENSION: Status: ACTIVE | Noted: 2022-01-01

## 2022-11-28 PROBLEM — U07.1 COVID-19 VIRUS DETECTED: Status: RESOLVED | Noted: 2022-01-01 | Resolved: 2022-01-01

## 2022-11-28 PROBLEM — E11.9 TYPE 2 DIABETES MELLITUS WITHOUT COMPLICATION, WITH LONG-TERM CURRENT USE OF INSULIN: Chronic | Status: ACTIVE | Noted: 2021-09-08

## 2022-11-28 PROBLEM — R41.82 AMS (ALTERED MENTAL STATUS): Status: RESOLVED | Noted: 2022-01-01 | Resolved: 2022-01-01

## 2022-11-28 PROBLEM — L03.116 CELLULITIS OF LEFT LOWER EXTREMITY: Status: RESOLVED | Noted: 2021-11-15 | Resolved: 2022-01-01

## 2022-11-28 PROBLEM — M48.50XA SPINAL COMPRESSION FRACTURE: Status: ACTIVE | Noted: 2022-01-01

## 2022-12-01 NOTE — PROGRESS NOTES
"Palliative Care Daily Progress Note     C/C: Patient reports she \"does not feel good\".      S: Medical record reviewed. Follow up visit for . Events noted. Patient with HD in room. Patient denies pain at this time. Patient's BP normotensive and tolerating opioids. Hydromorphone 0.5mg x2 doses in the last 24 hours.     ROS:  +pain, back acute due to fall and compression fractures, constant dull, aching, improved with laying still, worse with any type of movement, 4-5/10 with movement. +SOA/cough, ongoing for several months, productive. +constipation, resolved. +debility +bedbound due to fractures/pain. Denies anxiety, N/V, HA, chest pain,  diarrhea.     O: Code Status:   Code Status and Medical Interventions:   Ordered at: 12/01/22 0902     Medical Intervention Limits:    NO intubation (DNI)     Level Of Support Discussed With:    Patient     Code Status (Patient has no pulse and is not breathing):    No CPR (Do Not Attempt to Resuscitate)     Medical Interventions (Patient has pulse or is breathing):    Limited Support      Advanced Directives: Advance Directive Status: Patient does not have advance directive   Goals of Care: Ongoing.   Palliative Performance Scale Score: 30%     /58   Pulse 76   Temp 96.8 °F (36 °C) (Axillary)   Resp 18   Ht 162.6 cm (64\")   Wt 77.9 kg (171 lb 11.8 oz)   LMP  (LMP Unknown) Comment: last mammogram -unsure   SpO2 99%   BMI 29.48 kg/m²     Intake/Output Summary (Last 24 hours) at 12/1/2022 0905  Last data filed at 11/30/2022 2000  Gross per 24 hour   Intake 120 ml   Output --   Net 120 ml       PE:  General Appearance:    Patient awake, chronically ill appearing, frail, alert, cooperative, NAD   HEENT:    NC/AT, EOMI, anicteric, MMM, face relaxed, NC in place   Neck:   supple, trachea midline, no JVD   Lungs:     Crackles to all fields, diminished in bases; respirations regular, even and unlabored; cough productive white/yellow sputum out, RR 24-26 on exam, 1L NC    " Heart:    RRR, normal S1 and S2, no M/R/G   Abdomen:     Hypoactive bowel sounds, soft, non-tender, distended   G/U:   anuric   MSK/Extremities:   Wasting, +1 edema all extremities, scattered bruising to BUE's   Pulses:   Pulses palpable and equal bilaterally   Skin:   Warm, dry   Neurologic:   A/Ox3, cooperative, BOWERS   Psych:   Calm, appropriate       Meds: Reviewed and changes noted    Labs:   Results from last 7 days   Lab Units 12/01/22  0558   WBC 10*3/mm3 4.63   HEMOGLOBIN g/dL 12.3   HEMATOCRIT % 37.0   PLATELETS 10*3/mm3 65*     Results from last 7 days   Lab Units 12/01/22  0558   SODIUM mmol/L 134*   POTASSIUM mmol/L 3.5   CHLORIDE mmol/L 95*   CO2 mmol/L 24.0   BUN mg/dL 37*   CREATININE mg/dL 4.67*   GLUCOSE mg/dL 151*   CALCIUM mg/dL 8.4*     Results from last 7 days   Lab Units 12/01/22  0558 11/29/22  0822 11/28/22  0329   SODIUM mmol/L 134*   < > 133*   POTASSIUM mmol/L 3.5   < > 5.1   CHLORIDE mmol/L 95*   < > 95*   CO2 mmol/L 24.0   < > 20.0*   BUN mg/dL 37*   < > 48*   CREATININE mg/dL 4.67*   < > 5.39*   CALCIUM mg/dL 8.4*   < > 9.0   BILIRUBIN mg/dL  --   --  1.5*   ALK PHOS U/L  --   --  243*   ALT (SGPT) U/L  --   --  12   AST (SGOT) U/L  --   --  27   GLUCOSE mg/dL 151*   < > 117*    < > = values in this interval not displayed.     Imaging Results (Last 72 Hours)     Procedure Component Value Units Date/Time    XR Wrist 3+ View Left [[id number]] Collected: 11/29/22 1039     Updated: 11/29/22 1048    Narrative:      DATE OF EXAM: 11/29/2022 10:11 AM     PROCEDURE: XR WRIST 3+ VW LEFT-     INDICATIONS: swelling and pain; S32.010A-Wedge compression fracture of  first lumbar vertebra, initial encounter for closed fracture;  S32.030A-Wedge compression fracture of third lumbar vertebra, initial  encounter for closed fracture; S22.010A-Wedge compression fracture of  first thoracic vertebra, initial encounter for closed fracture;  W19.XXXA-Unspecified fall, initial encounter; Z74.09-Other  reduced  mobility     COMPARISON: Left wrist radiographs 8/8/2015     TECHNIQUE: 3 images of the left wrist     FINDINGS:  The bones are demineralized limiting assessment for occult nondisplaced  fractures. A peripheral IV is present within the radial soft tissues of  the distal forearm. There are scattered vascular calcifications. There  is chondrocalcinosis of the TFCC. There is soft tissue fullness or  swelling at the dorsal wrist. There is a chronic ununited fracture  through the waist of the scaphoid, present on prior wrist radiographs  series. No evidence of acute fracture. There is mild widening of the  scapholunate interval with some joint space narrowing of the  radioscaphoid joint space and mild proximal migration of the capitate  compatible with early sequelae of SLAC wrist. There are moderate to  severe osteoarthritic changes of the triscaphe and first carpal  metacarpal joints.       Impression:      No definite acute fracture or traumatic malalignment.     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     Chronic ununited scaphoid waist fracture.     Degenerative changes.     This report was finalized on 11/29/2022 10:44 AM by Walt Mckay MD.               Lab 11/29/22  0521   HEMOGLOBIN A1C 6.10*         Diagnostics: Reviewed    A:   Patient Active Problem List   Diagnosis   • Chronic atrial fibrillation   • Diabetic nephropathy (HCC)   • Diabetic retinopathy (HCC)   • Malignant neoplasm of endometrium (HCC)   • Hyperlipidemia LDL goal <100   • Hypothyroidism   • Insomnia   • Memory impairment   • Nausea   • Pulmonary embolism (HCC)   • AFTAB   • Squamous cell carcinoma of skin   • CHF (NYHA class IV, ACC/AHA stage D)   • Ischemic heart disease   • CAD s/p stents   • DVT of lower extremity (deep venous thrombosis) (HCC)   • Chronic systolic congestive heart failure (HCC)   • Morbidly obese (HCC)   • Ischemic cardiomyopathy   • T2DM   • Exudative age-related  macular degeneration, right eye, with active choroidal neovascularization (HCC)   • Hypoxia   • Food impaction of esophagus   • Malignant tumor of ascending colon (HCC)   • GIB (gastrointestinal bleeding)   • Normocytic anemia   • Acute renal failure (ARF) (HCC)   • Hypoalbuminemia   • Lower GI bleed   • Multiple spinal compression fractures   • ESRD on HD   • Thrombocytopenia   • Hypotension     87 y.o. female with ESRD, HFrEF, thrombocytopenia.      S/S:   1. Pain -acute back pain MSK due to compression fractures  -scheduledTylenol 500mg PO q 6 hours for pain  - started Oxycodone 5mg PO q 8 hours prn pain (hold for hypotension)  -continue Hydromorphone to 0.5mg IV q 4 hours prn (hold for hypotension)  -continue lidocaine patches to back     2. Constipation -started Senna-S 2 tablets PO nightly  -Bisacodyl HI daily prn constipation     3. Debility -pain with movement due to compression fractures, adequate pain control necessary to increase movement which is complicated by hypotension  -PT/OT eval   -potentially new baseline due to fractures     4. SOA/Cough -currently on 1LNC, cough is consistent and source of iritation/fatigue for patient  -persistent CHF cough complicated by ESRD     5. GOC -DNR/DNI -per discussion with patient  -discussing GOC with patient and family ongoing  -called karen Galicia on phone at [phone], updated on patient's condition, daughter agreeable to more information regarding outpatient hospice services, hospice consult placed    P: Patient confirms that she is electing DNR/DNI. Patient's BP normotensive. Continued GOC discussion ongoing. Plan to call daughter to further discuss GOC this afternoon.    Spoke to Frida on phone and discussed continued Full Treatment with palliative services versus comfort focused plan of care with hospice services. Daughter agreeable to more information regarding outpatient hospice services, consult placed. Discussed potential discharge placement  depending on PT/OT eval to determine if STR potential or requiring LTC/NH. All questions and concerns addressed.   Palliative Care Team will continue to follow patient. Please do not hesitate to contact us regarding further sx mgmt or GOC needs.  Rosalva Aguilar, APRN  12/1/2022  Time spent: 30 minutes

## 2022-12-01 NOTE — PROGRESS NOTES
" LOS: 4 days   Patient Care Team:  Giselle Guzman DO as PCP - General (Internal Medicine)  Terrence Byrnes PA as Physician Assistant (Cardiology)    Chief Complaint: ESRD  Fall    Subjective      SEEN ON HD TODAY AND TOLERATING WELL SO FAR.     ROS: UNOBTAINABLE     Objective     Vital Sign Min/Max for last 24 hours  Temp  Min: 96.8 °F (36 °C)  Max: 98.3 °F (36.8 °C)   BP  Min: 117/63  Max: 180/73   Pulse  Min: 70  Max: 82   Resp  Min: 16  Max: 20   SpO2  Min: 82 %  Max: 100 %   Flow (L/min)  Min: 1  Max: 1   No data recorded     Flowsheet Rows    Flowsheet Row First Filed Value   Admission Height 162.6 cm (64\") Documented at 11/26/2022 1336   Admission Weight 76.2 kg (168 lb) Documented at 11/26/2022 1336          No intake/output data recorded.  I/O last 3 completed shifts:  In: 360 [P.O.:360]  Out: -     Objective:  General Appearance:  Ill-appearing and uncomfortable (TUNNELED HD CATH).    Vital signs: (most recent): Blood pressure 152/68, pulse 76, temperature 97.7 °F (36.5 °C), temperature source Oral, resp. rate 20, height 162.6 cm (64\"), weight 77.9 kg (171 lb 11.8 oz), SpO2 100 %, not currently breastfeeding.  Vital signs are normal.    Output: Minimal urine output.    Lungs:  Normal effort and normal respiratory rate.  Breath sounds clear to auscultation.  She is not in respiratory distress.  No decreased breath sounds.    Heart: Normal rate.  Regular rhythm.  S1 normal and S2 normal.    Abdomen: Abdomen is soft.  Bowel sounds are normal.   There is no abdominal tenderness.     Extremities: Normal range of motion.  There is dependent edema.  There is no local swelling.    Pulses: Distal pulses are intact.    Neurological: (CONFUSED).    Pupils:  Pupils are equal, round, and reactive to light.              Results Review:     I reviewed the patient's new clinical results.    WBC WBC   Date Value Ref Range Status   12/01/2022 4.63 3.40 - 10.80 10*3/mm3 Final   11/30/2022 5.17 3.40 - 10.80 10*3/mm3 " Final   11/29/2022 7.65 3.40 - 10.80 10*3/mm3 Final      HGB Hemoglobin   Date Value Ref Range Status   12/01/2022 12.3 12.0 - 15.9 g/dL Final   11/30/2022 13.2 12.0 - 15.9 g/dL Final   11/29/2022 13.4 12.0 - 15.9 g/dL Final      HCT Hematocrit   Date Value Ref Range Status   12/01/2022 37.0 34.0 - 46.6 % Final   11/30/2022 39.8 34.0 - 46.6 % Final   11/29/2022 39.7 34.0 - 46.6 % Final      Platlets No results found for: LABPLAT   MCV MCV   Date Value Ref Range Status   12/01/2022 97.1 (H) 79.0 - 97.0 fL Final   11/30/2022 96.1 79.0 - 97.0 fL Final   11/29/2022 95.7 79.0 - 97.0 fL Final          Sodium Sodium   Date Value Ref Range Status   12/01/2022 134 (L) 136 - 145 mmol/L Final   11/30/2022 133 (L) 136 - 145 mmol/L Final   11/29/2022 135 (L) 136 - 145 mmol/L Final      Potassium Potassium   Date Value Ref Range Status   12/01/2022 3.5 3.5 - 5.2 mmol/L Final     Comment:     Slight hemolysis detected by analyzer. Results may be affected.   11/30/2022 3.1 (L) 3.5 - 5.2 mmol/L Final     Comment:     Slight hemolysis detected by analyzer. Results may be affected.   11/29/2022 4.0 3.5 - 5.2 mmol/L Final     Comment:     Slight hemolysis detected by analyzer. Results may be affected.      Chloride Chloride   Date Value Ref Range Status   12/01/2022 95 (L) 98 - 107 mmol/L Final   11/30/2022 94 (L) 98 - 107 mmol/L Final   11/29/2022 96 (L) 98 - 107 mmol/L Final      CO2 CO2   Date Value Ref Range Status   12/01/2022 24.0 22.0 - 29.0 mmol/L Final   11/30/2022 22.0 22.0 - 29.0 mmol/L Final   11/29/2022 16.0 (L) 22.0 - 29.0 mmol/L Final      BUN BUN   Date Value Ref Range Status   12/01/2022 37 (H) 8 - 23 mg/dL Final   11/30/2022 27 (H) 8 - 23 mg/dL Final   11/29/2022 54 (H) 8 - 23 mg/dL Final      Creatinine Creatinine   Date Value Ref Range Status   12/01/2022 4.67 (H) 0.57 - 1.00 mg/dL Final   11/30/2022 3.97 (H) 0.57 - 1.00 mg/dL Final   11/29/2022 5.69 (H) 0.57 - 1.00 mg/dL Final      Calcium Calcium   Date Value Ref  Range Status   12/01/2022 8.4 (L) 8.6 - 10.5 mg/dL Final   11/30/2022 8.8 8.6 - 10.5 mg/dL Final   11/29/2022 9.0 8.6 - 10.5 mg/dL Final      PO4 No results found for: CAPO4   Albumin Albumin   Date Value Ref Range Status   11/29/2022 3.80 3.50 - 5.20 g/dL Final      Magnesium Magnesium   Date Value Ref Range Status   12/01/2022 1.9 1.6 - 2.4 mg/dL Final      Uric Acid No results found for: URICACID     Medication Review: Yes    Assessment & Plan       Hypotension    Chronic atrial fibrillation    Hypothyroidism    AFTAB    CHF (NYHA class IV, ACC/AHA stage D)    CAD s/p stents    T2DM    Multiple spinal compression fractures    ESRD on HD    Thrombocytopenia      Assessment & Plan      ESRD:  On HD per MWF [facility]. Started on HD in Aug 2022. Still make some urine.     Anemia: BENY on HD days.     Acid base and Elytes:  Hyperkalemia on admission.     Volume status :  Stable. AWG 3 kg on outpatient records.    Fall: Compression fracture on this admission.         Recs  Currently on TTS [facility] during this admission. Outpatient [facility] MWF . HD TODAY. FAMILY MAY BE CONSIDERING STOPPING DIALYSIS SUPPORT. D/W DIALYSIS NURSE.    Renal Diet   Resume home meds     Terrence Greenfield MD  12/01/22  09:44 EST

## 2022-12-01 NOTE — PROGRESS NOTES
Pulmonary/Critical Care History and Physical Exam     LOS: 4 days   Patient Care Team:  Giselle Guzman DO as PCP - General (Internal Medicine)  Terrence Byrnes PA as Physician Assistant (Cardiology)    Chief Complaint:    Chief Complaint   Patient presents with   • Fall       Subjective     Interval History:  Chart reviewed. Plts remain low at 65. No complaints overnight. VSS on 2L NC. On HD currently. Palliative saw and made patient DNR/DNI.     History taken from: patient    Past Medical History:   Diagnosis Date   • Acute bronchitis    • Arthritis    • Atrial fibrillation (HCC)    • CAD (coronary artery disease)     s/p MI 2005; 3 stents inserted    • Change in mole    • Change in mole    • Change in nevus 6/16/2016   • Chronic kidney disease     stage IV-sees Dr Bk Mckeon every 3-4 months    • Chronic renal disease, stage 4, severely decreased glomerular filtration rate between 15-29 mL/min/1.73 square meter (HCC)    • Chronic systolic heart failure (HCC)    • Congestive heart disease (HCC)    • Conjunctivitis    • Deep vein thrombosis of lower extremity ([facility])    • Diabetes mellitus ([facility])    • Diabetes mellitus (HCC) 6/16/2016    Impression: 03/15/2016 - blood sugar 166 and hgn a1c 7.3%  is up to date with eye exam  neuropathy with callus, no ulcer  some scratches feet  ur alb due and ordered  no change other than provided samples of toujeo because she feels like lantus worked much better than levemir, she has tried titrating levemir and it is less effective  continue testing and f/u 3-4 months  Impression: 11/23/2015 - bl   • Diabetic foot ulcer (HCC) 6/16/2016   • Dog bite of hand    • Easy bruising    • Endometrial cancer (HCC)     partial hysterectomy; radiation as well    • Foot pain    • Foot pain 6/16/2016    Description: lancinating- worse but not consistent enough to want rx   • Fracture of hip (HCC)    • Generalized ischemic myocardial dysfunction 6/16/2016   • Glaucoma     drops daily     • HTN 6/16/2016    Impression: 03/15/2016 - bp is good  Impression: 03/15/2016 - cmp, lipids, urine micro  Impression: 11/23/2015 - stable  Impression: 03/24/2015 - urine micro  Impression: 11/18/2014 - bp is good  Impression: 11/18/2014 - stable  Impression: 07/17/2014 - bp is good;    • Hyperlipidemia    • Hypertension    • Hypothyroidism    • Insomnia    • Ischemic heart disease    • Macular degeneration    • Methicillin resistant Staphylococcus aureus infection 6/16/2016   • Myocardial infarction (HCC) 2005   • Nausea with vomiting    • Pericardial effusion    • Shortness of breath 6/16/2016    Impression: 03/24/2015 - chronic. On 2 l oxygen at night. check cbc, bnp;    • Skin cancer, basal cell     nose, leg    • Skin lesion 6/16/2016    Impression: 03/24/2015 - refer derm for eval- seb kerat ant tib and ?ak medially with redness and irritation;    • Sleep apnea     oxygen at night due to low sats but no cpap   • Type 2 diabetes mellitus (HCC)    • UTI (urinary tract infection) 06/12/2020    currently taking antibiotics-patient's daughter notified Dr Beal's office and office said it should not delay generator change        Past Surgical History:   Procedure Laterality Date   • CARDIAC CATHETERIZATION     • CARDIAC DEFIBRILLATOR PLACEMENT     • CARDIAC ELECTROPHYSIOLOGY PROCEDURE N/A 07/17/2020    Procedure: ICD BIV  generator change- [facility]- 3-6 weeks;  Surgeon: Tano Beal MD;  Location: [facility] INVASIVE LOCATION;  Service: Cardiology;  Laterality: N/A;   • CHOLECYSTECTOMY     • COLONOSCOPY N/A 07/30/2022    Procedure: COLONOSCOPY;  Surgeon: Brunner, Mark I, MD;  Location: [facility] ENDOSCOPY;  Service: Gastroenterology;  Laterality: N/A;  WITH 3 RESOLUTION CLIPS   • CORONARY ARTERY BYPASS GRAFT  2000   • CORONARY STENT PLACEMENT      3 stents    • DIALYSIS FISTULA CREATION     • ENDOSCOPY N/A 11/17/2021    Procedure: ESOPHAGOGASTRODUODENOSCOPY;  Surgeon: Brunner, Mark I, MD;  Location: [facility]  ENDOSCOPY;  Service: Gastroenterology;  Laterality: N/A;   • EYE SURGERY Bilateral     cataract extraction    • HIP SURGERY Left     x3   • HYSTERECTOMY      partial    • KNEE ARTHROPLASTY Bilateral    • PACEMAKER IMPLANTATION     • TONSILLECTOMY         Family History   Problem Relation Age of Onset   • Breast cancer Other    • Diabetes Other    • Esophageal cancer Other    • Hypertension Other    • Transient ischemic attack Other    • Breast cancer Mother    • Cancer Father        Social History     Socioeconomic History   • Marital status:    Tobacco Use   • Smoking status: Never   • Smokeless tobacco: Never   Vaping Use   • Vaping Use: Never used   Substance and Sexual Activity   • Alcohol use: No   • Drug use: No   • Sexual activity: Defer       Allergies   Allergen Reactions   • Bactrim [Sulfamethoxazole-Trimethoprim] GI Intolerance   • Lisinopril Cough   • Rocephin [Ceftriaxone] Itching     Has tolerated Zosyn, Unasyn, and ceftriaxone several times prior to this.  Has had Rx for cefdinir, Augmentin, and cephalexin in past   • Codeine Rash       PMH/FH/SocH were reviewed by me and updates were made.     Review of Systems:    All systems were reviewed and negative except as noted in subjective.    Objective     Vital Signs  Temp:  [96.8 °F (36 °C)-98.3 °F (36.8 °C)] 97.7 °F (36.5 °C)  Heart Rate:  [70-82] 76  Resp:  [16-20] 20  BP: (117-180)/(33-83) 138/62    Physical Exam:     General Appearance:    Frail elderly lady resting in bed on HD. Alert, cooperative, in no acute distress   Head:    Normocephalic, without obvious abnormality, atraumatic   Eyes:            Lids and lashes normal, conjunctivae and sclerae normal, no   icterus, no pallor, corneas clear, PERRLA   ENMT:   Ears appear intact with no abnormalities noted      No oral lesions, no thrush, oral mucosa moist      No adenopathy, supple, trachea midline, no thyromegaly, no   carotid bruit, no JVD       Lungs/resp:     Normal effort, symmetric  chest rise, no crepitus, clear to      auscultation bilaterally, no chest wall tenderness, resonant to percussion throughout.                  Heart/CV:    Regular rhythm and normal rate, normal S1 and S2, no            murmur, no gallop, no rub, no click   Abdomen/GI:     Normal bowel sounds, no masses, no organomegaly, soft        non-tender, non-distended, no guarding, no rebound                tenderness   G/U:     Deferred   Extremities/MSK:   Ecchymosis. No clubbing, cyanosis or edema.  No deformities.    Pulses:   Pulses palpable and equal bilaterally   Skin:   Bilateral upper and lower extremity bruising   Hem/Lymph:   No cervical or supraclavicular adenopathy.    Neurologic:   Moves all extremities with no obvious focal motor deficit.  Cranial nerves 2 - 12 grossly intact            Psychiatric:  Normal mood and affect, oriented x 3.      Results Review:     I reviewed the patient's new clinical results.   Results from last 7 days   Lab Units 12/01/22 0558 11/30/22 0516 11/29/22  0822 11/28/22  0329 11/27/22  0918 11/26/22  1440   SODIUM mmol/L 134* 133* 135* 133*   < > 138   POTASSIUM mmol/L 3.5 3.1* 4.0 5.1   < > 4.5   CHLORIDE mmol/L 95* 94* 96* 95*   < > 101   CO2 mmol/L 24.0 22.0 16.0* 20.0*   < > 25.0   BUN mg/dL 37* 27* 54* 48*   < > 33*   CREATININE mg/dL 4.67* 3.97* 5.69* 5.39*   < > 4.48*   CALCIUM mg/dL 8.4* 8.8 9.0 9.0   < > 9.0   BILIRUBIN mg/dL  --   --   --  1.5*  --  1.2   ALK PHOS U/L  --   --   --  243*  --  324*   ALT (SGPT) U/L  --   --   --  12  --  13   AST (SGOT) U/L  --   --   --  27  --  31   GLUCOSE mg/dL 151* 101* 167* 117*   < > 121*    < > = values in this interval not displayed.     Results from last 7 days   Lab Units 12/01/22 0558 11/30/22 0516 11/29/22  0521   WBC 10*3/mm3 4.63 5.17 7.65   HEMOGLOBIN g/dL 12.3 13.2 13.4   HEMATOCRIT % 37.0 39.8 39.7   PLATELETS 10*3/mm3 65* 61* 85*           I reviewed the patient's new imaging including images and reports.    Medication  Review:   acetaminophen, 500 mg, Oral, Q6H  atorvastatin, 20 mg, Oral, Nightly  cefTRIAXone, 1 g, Intravenous, Q24H  insulin lispro, 0-7 Units, Subcutaneous, TID AC  latanoprost, 1 drop, Both Eyes, Nightly  levothyroxine, 125 mcg, Oral, Q AM  lidocaine, 2 patch, Transdermal, Q24H  midodrine, 10 mg, Oral, TID AC  pantoprazole, 40 mg, Oral, BID AC  saccharomyces boulardii, 250 mg, Oral, BID  senna-docusate sodium, 2 tablet, Oral, Nightly  sevelamer, 800 mg, Oral, TID With Meals  sodium chloride, 10 mL, Intravenous, Q12H  sodium zirconium cyclosilicate, 10 g, Oral, TID  timolol, 1 drop, Both Eyes, BID      norepinephrine, 0.02-0.3 mcg/kg/min, Last Rate: Stopped (11/30/22 1000)        Assessment & Plan       Hypotension    Chronic atrial fibrillation    Hypothyroidism    AFTAB    CHF (NYHA class IV, ACC/AHA stage D)    CAD s/p stents    T2DM    Multiple spinal compression fractures    ESRD on HD    Thrombocytopenia    Ms. Cristobal is an 86 yo female with PMH AFTAB, nocturnal hypoxia on 2L HS, Recent covid August 2022, Type 2 DM, A. Fib on Eliquis, remote history of DVT & PE > 10 years ago, ESRD on HD MWF, multiple AV fistula revisions (last 11/3/22 @ [facility]), chornic hypotension on midodrine, CAD s/p CABG x 3 & stents, prior MRSA infection 2005, CHF and ICM with EF 35% s/p BIV ICD, VHD (moderate MR and severe TR), and hypothyroidism who initially presented to the ED on 11/26 with severe back pain following a fall. She was found to have multiple spinal compression fractures (T1, L1, & L3) on CT imaigng and was admitted to hospital medicine. On admission she was placed on pain meds with plan to consult Neurosurgery if she had acute worsening. She was additionally found to have a GNR in urine and placed on Merrem. Nephrology was consulted for HD.    On the afternoon of 11/28 she developed worsening hypotension with inability to initiate HD despite increasing home midodrine to 10 mg and administration of albumin 25g x 3. She was  transferred to ICU for management. On arrival she required initiation of levophed. ECHO showed no pericardial effusion. Midodrine continued.     On 11/30 she continued to require Levophed drip with hypotension. This hypotension was felt to be related to difficulty obtaining accurate BP's due to vascular access issues in bilateral upper extremities. Her BP cuff was placed on her thigh with improvement in her BP and ability to wean Levophed off.     For her UTI she was continued on Merrem and Vanc. Merrem was ultimately transitioned to Rocephin and Vanc was stopped.     Palliative care was consulted on 11/30. Decision to make patient DNR/DNI was made by patient and family.     Plan:  Hypotension  Coronary artery disease  Mitral regurgitation  Chronic systolic heart failure  Chronic atrial fibrillation  - Off norepinephrine drip, home dose of midodrine restarted and increased to 10 mg  - Echo with no pericardial effusion, moderate mitral regurgitation, LVEF of 41 to 45%  - Mildly elevated troponin with previous history of stents, remote CABG 2000  - BiV ICD, currently in a paced rhythm  - Continue Midodrine  - Leave blood pressure cuff on the thigh for a more accurate reading  - Continue Rocephin x 3 doses     End-stage renal disease on hemodialysis  Stewart cath  Right upper extremity access  - Hemodialysis November 30 (2L off) and 12/1  - Chronic anemia, hemoglobin 12.3  - Lokelma stopped     Fall with multiple compression fractures of the spine  - Very debilitated, poor candidate for surgery  - Palliative following     Diabetes mellitus type 2  Hypothyroid  - Glucose 100-130  - Siding scale insulin  - Thyroid replacement  - A1c 6.1     Possible sepsis  Urinary tract infection   - Cultures positive for Enterococcus faecalis, sensitive to vancomycin, Klebsiella resistant to ampicillin  - She lists an allergy to Rocephin causing itching but has tolerated it in the past and therefore meropenem was changed to Rocephin.  She  currently has no rash. Continue Rocephin      Glaucoma  - Continue eyedrops, latanoprost and timoptic    DISPO: To TELE    AM Labs ordered      NESS Negrete  12/01/22  10:52 EST    Level of Risk High due to: severe exacerbation of chronic illness and decision for DNR or to de-escalate care    Electronically signed by NESS Negrete, 12/01/22, 10:52 AM EST.    Copied text in this note has been reviewed and is accurate as of 12/01/22.

## 2022-12-01 NOTE — PLAN OF CARE
Problem: Hemodynamic Instability (Hemodialysis)  Goal: Effective Tissue Perfusion  Outcome: Ongoing, Progressing     Problem: Palliative Care  Goal: Enhanced Quality of Life  Outcome: Ongoing, Progressing     Problem: Adult Inpatient Plan of Care  Goal: Plan of Care Review  Outcome: Ongoing, Progressing   Goal Outcome Evaluation:         HD tx complete, net UF 2.5 liters, tolerated well.

## 2022-12-01 NOTE — CASE MANAGEMENT/SOCIAL WORK
Continued Stay Note  [facility]     Patient Name: Karla Cristoabl  MRN: [id number]  Today's Date: 12/1/2022    Admit Date: 11/26/2022    Plan: Ongoing   Discharge Plan     Row Name 12/01/22 1216       Plan    Plan Ongoing    Patient/Family in Agreement with Plan yes    Plan Comments Spoke with patient at bedside, no family present.  HD in progress at time of visit. Per palliative note, discussion planned with daughter later today.  Discharge plan is ongoing pending C discussion.  CM will continue to follow.               Discharge Codes    No documentation.               Expected Discharge Date and Time     Expected Discharge Date Expected Discharge Time    Dec 8, 2022             Belia Darling RN

## 2022-12-02 NOTE — TELEPHONE ENCOUNTER
HOSPICE CARE WAS CALLING IN REGARDS TO PATIENT BEING ADMITTED INTO HOSPICE CARE.    THEY ARE WANTING TO KNOW IF DR. MARTINEZ IS GOING TO FOLLOW THE PATIENT WHILE IN HOSPICE.    PLEASE ADVISE     HOSPICE  [phone]  OPTION 2

## 2022-12-02 NOTE — DISCHARGE PLACEMENT REQUEST
"David Mead (87 y.o. Female)   Referred by Rosalva Aguilar APRN  PCP- Dr. Giselle Guzman  Dx-ESRD  Screened negative for covid 19    Date of Birth   1935    Social Security Number       Address   [address] [facility] [place] [address]    Home Phone   [phone]    MRN   [id number]       Judaism   Bahai    Marital Status                               Admission Date   11/26/22    Admission Type   Emergency    Admitting Provider   Shea Smith DO    Attending Provider   Shea Smith DO    Department, Room/Bed   [facility] 3F, S305/1       Discharge Date       Discharge Disposition       Discharge Destination                               Attending Provider: Shea Smith DO    Allergies: Bactrim [Sulfamethoxazole-trimethoprim], Lisinopril, Rocephin [Ceftriaxone], Codeine    Isolation: None   Infection: None   Code Status: No CPR    Ht: 162.6 cm (64\")   Wt: 77.9 kg (171 lb 11.8 oz)    Admission Cmt: None   Principal Problem: Hypotension [I95.9]                 Active Insurance as of 11/26/2022     Primary Coverage     Payor Plan Insurance Group Employer/Plan Group    MEDICARE MEDICARE A & B      Payor Plan Address Payor Plan Phone Number Payor Plan Fax Number Effective Dates    [address] [phone]  7/1/2001 - None Entered    [facility] [address]       Subscriber Name Subscriber Birth Date Member ID       DAVID MEAD 1935 [id number]           Secondary Coverage     Payor Plan Insurance Group Employer/Plan Group    [place]      Payor Plan Address Payor Plan Phone Number Payor Plan Fax Number Effective Dates    [address] [phone]  7/1/2020 - None Entered    [place] [address]       Subscriber Name Subscriber Birth Date Member ID       DAVID MEAD 1935 [id number]           Tertiary Coverage     Payor Plan Insurance Group Employer/Plan Group    [employer]     Payor Plan " Address Payor Plan Phone Number Payor Plan Fax Number Effective Dates    [phone]  2001 - None Entered    [place] [address]       Subscriber Name Subscriber Birth Date Member ID       KARLA MEAD 1935 [id number]                 Emergency Contacts      (Rel.) Home Phone Work Phone Mobile Phone    Frida Vazquez (Daughter) [phone] -- --            Emergency Contact Information     Name Relation Home Work Mobile    Frida Vazquez Daughter [phone]            Insurance Information                MEDICARE/MEDICARE A & B Phone: [phone]    Subscriber: Karla Mead Subscriber#: [id number]    Group#: -- Precert#: --        [place] Phone: [phone]    Subscriber: Karla Mead Subscriber#: [id number]    Group#: -- Precert#: --        [employer] Phone: [phone]    Subscriber: Karla Mead Subscriber#: [id number]    Group#: [id number] Precert#: --             History & Physical      Kamlesh Gomez DO at 22              [facility] Medicine Services  HISTORY AND PHYSICAL    Patient Name: Karla Mead  : 1935  MRN: [id number]  Primary Care Physician: Gieslle Guzman DO  Date of admission: 2022      Subjective    Subjective     Chief Complaint:  Fall, back pain    HPI:  Karla Mead is a 87 y.o. female w/ ESRD on HD MWF, Afib on oral AC, DM2, other chronic illness who presents to the hospital for evaluation of back fail following a fall. She was in her usual state of health until today, she went to the restroom and thinks that she missed the commode. Family found her wedged against the wall. When EMS arrived she was unable to sit upright secondary to pain. Imaging in the ED demonstrated multiple compression fractures and she is still unable to sit upright. Pain initially wasn't too bad but currently it is getting worse and she is having  associated nausea.      Review of Systems   Gen- No fevers, chills  CV- No chest pain, palpitations  Resp- No cough, dyspnea  GI- No V/D, abd pain; yes nausea  All other systems reviewed and are negative.     Personal History     Past Medical History:   Diagnosis Date   • Acute bronchitis    • Arthritis    • Atrial fibrillation (HCC)    • CAD (coronary artery disease)     s/p MI 2005; 3 stents inserted    • Change in mole    • Change in mole    • Change in nevus 6/16/2016   • Chronic kidney disease     stage IV-sees Dr Bk Mckeon every 3-4 months    • Chronic renal disease, stage 4, severely decreased glomerular filtration rate between 15-29 mL/min/1.73 square meter (HCC)    • Chronic systolic heart failure (HCC)    • Congestive heart disease (HCC)    • Conjunctivitis    • Deep vein thrombosis of lower extremity ([facility])    • Diabetes mellitus ([facility])    • Diabetes mellitus (HCC) 6/16/2016    Impression: 03/15/2016 - blood sugar 166 and hgn a1c 7.3%  is up to date with eye exam  neuropathy with callus, no ulcer  some scratches feet  ur alb due and ordered  no change other than provided samples of toujeo because she feels like lantus worked much better than levemir, she has tried titrating levemir and it is less effective  continue testing and f/u 3-4 months  Impression: 11/23/2015 - bl   • Diabetic foot ulcer (HCC) 6/16/2016   • Dog bite of hand    • Easy bruising    • Endometrial cancer ([facility])     partial hysterectomy; radiation as well    • Foot pain    • Foot pain 6/16/2016    Description: lancinating- worse but not consistent enough to want rx   • Fracture of hip ([facility])    • Generalized ischemic myocardial dysfunction 6/16/2016   • Glaucoma     drops daily    • Hyperlipidemia    • Hypertension    • Hypothyroidism    • Insomnia    • Ischemic heart disease    • Macular degeneration    • Methicillin resistant Staphylococcus aureus infection 6/16/2016   • Myocardial infarction (HCC) 2005   • Nausea with vomiting    •  Pericardial effusion    • Shortness of breath 6/16/2016    Impression: 03/24/2015 - chronic. On 2 l oxygen at night. check cbc, bnp;    • Skin cancer, basal cell     nose, leg    • Skin lesion 6/16/2016    Impression: 03/24/2015 - refer derm for eval- seb kerat ant tib and ?ak medially with redness and irritation;    • Sleep apnea     oxygen at night due to low sats but no cpap   • Type 2 diabetes mellitus (HCC)    • UTI (urinary tract infection) 06/12/2020    currently taking antibiotics-patient's daughter notified Dr Beal's office and office said it should not delay generator change          Oncology Problem List:  Malignant tumor of ascending colon (HCC) (03/19/2021; Status: Active)  Malignant neoplasm of endometrium (HCC) (06/16/2016; Status: Active)  Squamous cell carcinoma of skin (06/16/2016; Status: Active)       Past Surgical History:   Procedure Laterality Date   • CARDIAC CATHETERIZATION     • CARDIAC DEFIBRILLATOR PLACEMENT     • CARDIAC ELECTROPHYSIOLOGY PROCEDURE N/A 07/17/2020    Procedure: ICD BIV  generator change- [facility]- 3-6 weeks;  Surgeon: Tano Beal MD;  Location:  [facility] IN[facility]AS[facility] LOCATION;  Service: Cardiology;  Laterality: N/A;   • CHOLECYSTECTOMY     • COLONOSCOPY N/A 07/30/2022    Procedure: COLONOSCOPY;  Surgeon: Brunner, Mark I, MD;  Location:  [facility];  Service: Gastroenterology;  Laterality: N/A;  WITH 3 RESOLUTION CLIPS   • CORONARY ARTERY BYPASS GRAFT  2000   • CORONARY STENT PLACEMENT      3 stents    • DIALYSIS FISTULA CREATION     • ENDOSCOPY N/A 11/17/2021    Procedure: ESOPHAGOGASTRODUODENOSCOPY;  Surgeon: Brunner, Mark I, MD;  Location:  [facility];  Service: Gastroenterology;  Laterality: N/A;   • EYE SURGERY Bilateral     cataract extraction    • HIP SURGERY Left     x3   • HYSTERECTOMY      partial    • KNEE ARTHROPLASTY Bilateral    • PACEMAKER IMPLANTATION     • TONSILLECTOMY         Family History:  family history includes Breast cancer in her  mother and another family member; Cancer in her father; Diabetes in an other family member; Esophageal cancer in an other family member; Hypertension in an other family member; Transient ischemic attack in an other family member. Otherwise pertinent FHx was reviewed and unremarkable.     Social History:  reports that she has never smoked. She has never used smokeless tobacco. She reports that she does not drink alcohol and does not use drugs.  Social History     Social History Narrative   • Not on file       Medications:  Available home medication information reviewed.  (Not in a hospital admission)      Allergies   Allergen Reactions   • Bactrim [Sulfamethoxazole-Trimethoprim] GI Intolerance   • Lisinopril Cough   • Rocephin [Ceftriaxone] Itching   • Codeine Rash       Objective    Objective     Vital Signs:   Temp:  [97.6 °F (36.4 °C)] 97.6 °F (36.4 °C)  Heart Rate:  [70-89] 71  Resp:  [16] 16  BP: (102)/(57) 102/57       Physical Exam   Constitutional: Awake, alert, chronically ill appearing female laying on ED stretcher, appears in pain  Eyes: PERRL, sclerae anicteric, no conjunctival injection  HENT: NCAT, mucous membranes moist  Neck: Supple, trachea midline  Respiratory: Clear to auscultation bilaterally, nonlabored respirations   Cardiovascular: RRR, no murmurs, rubs, or gallops, palpable radial pulses bilaterally  Gastrointestinal: Positive bowel sounds, soft, nontender, nondistended  Musculoskeletal: No bilateral ankle edema, no clubbing or cyanosis to extremities; RT chest wall w/ temp HD catheter  Psychiatric: Appropriate affect, cooperative  Neurologic: Speech clear and fluent, moving all extremities spontaneously    Result Review:  I have personally reviewed the results from the time of this admission to 11/26/2022 19:34 EST and agree with these findings:  []  Laboratory list / accordion  []  Microbiology  [x]  Radiology  []  EKG/Telemetry   []  Cardiology/Vascular   []  Pathology  []  Old records  []   Other:  Most notable findings include: multiple compression fractures        LAB RESULTS:      Lab 11/26/22  1440   WBC 5.94   HEMOGLOBIN 12.8   HEMATOCRIT 39.3   PLATELETS 68*   NEUTROS ABS 4.16   IMMATURE GRANS (ABS) 0.05   LYMPHS ABS 1.07   MONOS ABS 0.54   EOS ABS 0.09   MCV 98.7*         Lab 11/26/22  1440   SODIUM 138   POTASSIUM 4.5   CHLORIDE 101   CO2 25.0   ANION GAP 12.0   BUN 33*   CREATININE 4.48*   EGFR 9.1*   GLUCOSE 121*   CALCIUM 9.0             Lab 11/26/22  1440   PROBNP 23,304.0*   TROPONIN T 0.063*                 UA    Urinalysis 7/30/22 7/30/22 9/6/22 9/6/22 10/14/22 10/14/22    1742 1742 0433 0433 2037 2037   Squamous Epithelial Cells, UA  3-6 (A)  3-6 (A)  3-6 (A)   Specific Gravity, UA 1.012  1.025  1.028    Ketones, UA Negative  Trace (A)  Negative    Blood, UA Small (1+) (A)  Large (3+) (A)  Moderate (2+) (A)    Leukocytes, UA Trace (A)  Large (3+) (A)  Large (3+) (A)    Nitrite, UA Negative  Negative  Positive (A)    RBC, UA  0-2  3-6 (A)  Too Numerous to Count (A)   WBC, UA  3-5 (A)  Too Numerous to Count (A)  Too Numerous to Count (A)   Bacteria, UA  1+ (A)  2+ (A)  3+ (A)   (A) Abnormal value              Microbiology Results (last 10 days)     ** No results found for the last 240 hours. **          CT Head Without Contrast    Result Date: 11/26/2022  DATE OF EXAM: 11/26/2022 3:19 PM  PROCEDURE: CT HEAD WO CONTRAST-  INDICATIONS: unwitnessed fall, head contusion, pt on Eliquis  COMPARISON: No comparisons available.  TECHNIQUE: Routine transaxial and coronal reconstruction images were obtained through the head without the administration of contrast. Automated exposure control and iterative reconstruction methods were used.  The radiation dose reduction device was turned on for each scan per the ALARA (As Low as Reasonably Achievable) protocol.  FINDINGS: There is no evidence for acute intracranial hemorrhage. No definitive acute focal ischemia is identified. Nonspecific diffuse white  matter changes are noted likely related to chronic small vessel ischemic changes and age-related changes. Associated diffuse volume loss is observed. There is no evidence for abnormal cerebral edema. There is no mass effect or midline shift. The ventricular system is nondilated. The basal cisterns are patent. The skull is intact. The paranasal sinuses and mastoid air cells are clear.      Impression: 1.  No evidence for acute intracranial abnormality. 2.  Diffuse nonspecific white matter changes are noted with associated diffuse volume loss. These findings are likely related to chronic small vessel ischemic changes and/or age-related changes.  This report was finalized on 11/26/2022 3:48 PM by Basilio Heard MD.      CT Cervical Spine Without Contrast    Result Date: 11/26/2022  DATE OF EXAM: 11/26/2022 3:19 PM  PROCEDURE: CT CERVICAL SPINE WO CONTRAST-  INDICATIONS: unwitnessed fall, head contusion  COMPARISON: No comparisons available.  TECHNIQUE: Routine transaxial slices were obtained through the cervical spine without the administration of intravenous contrast. Reconstructed coronal and sagittal images were also obtained. Automated exposure control and iterative construction methods were used.  The radiation dose reduction device was turned on for each scan per the ALARA (As Low as Reasonably Achievable) protocol.  FINDINGS: Motion artifact is noted.  There is a mild compression fracture deformity at the superior endplate of the L1 vertebral body with mild concave deformity. There is no significant retropulsion of fracture fragments. There is no malalignment.  There is no additional displaced fracture or subluxation. There is no malalignment. The posterior facets are intact.  Multilevel cervical spondylosis is noted. Multilevel posterior facet changes and uncovertebral changes are also seen throughout the cervical spine. These findings contribute to central canal narrowing and neural foraminal narrowing at  multiple levels.  There is no evidence for abnormal edema or fluid collection within the surrounding paraspinal soft tissues. There is no significant hemorrhage or hematoma. Carotid artery calcifications are noted. The lung apices are clear. The mastoid air cells are clear.      Impression: 1.  Mild compression fracture deformity at the superior endplate of the T1 vertebral level with mild concave deformity. There is no significant retropulsion of fracture fragments. There is no malalignment. 2.  No evidence for acute soft tissue abnormality. 3.  Changes of cervical spondylosis are noted throughout the cervical spine. Associated hypertrophic posterior facet changes and uncovertebral changes are also observed.  This report was finalized on 11/26/2022 3:51 PM by Basilio Heard MD.      CT Thoracic Spine Without Contrast    Result Date: 11/26/2022  DATE OF EXAM: 11/26/2022 3:19 PM  PROCEDURE: CT THORACIC SPINE WO CONTRAST-  INDICATIONS: mid back pain s/p fall  COMPARISON: No comparisons available.  TECHNIQUE: Routine transaxial slices were obtained through the thoracic spine without the administration of intravenous contrast. Reconstructed coronal and sagittal images were also obtained. Automated exposure control and iterative construction methods were used.  The radiation dose reduction device was turned on for each scan per the ALARA (As Low as Reasonably Achievable) protocol.  FINDINGS: There is mild compression fracture deformity at the superior endplate of the T1 vertebral body with mild concave deformity. There is no retropulsion of fracture fragments. There is no malalignment.  There is additional mild compression fracture deformity at the superior endplate of the L1 vertebral body with mild concave deformity at the superior endplate. There is no retropulsion of fracture fragments. There is no malalignment.  There is no malalignment throughout the thoracic spine. The posterior facets are intact.  There is no  evidence for large posterior disc protrusion/extrusion throughout the thoracic spine. The central canal is grossly patent. The neural foramina are grossly patent.  There is no evidence for abnormal edema or fluid collection within the surrounding paraspinal soft tissues. There is no significant hemorrhage or hematoma.      Impression: 1.  Mild compression fracture deformities at the T1 and L1 vertebral levels. There is no associated malalignment. 2.  The central canal and neural foramina are grossly patent throughout the thoracic spine. 3.  No evidence for acute soft tissue abnormality.  This report was finalized on 11/26/2022 3:56 PM by Basilio Heard MD.      CT Lumbar Spine Without Contrast    Result Date: 11/26/2022  DATE OF EXAM: 11/26/2022 3:19 PM  PROCEDURE: CT LUMBAR SPINE WO CONTRAST-  INDICATIONS: low back pain s/p fall  COMPARISON: No comparisons available.  TECHNIQUE: Routine transaxial slices were obtained through the lumbar spine without the administration of intravenous contrast. Reconstructed coronal and sagittal images were also obtained. Automated exposure control and iterative construction methods were used.  The radiation dose reduction device was turned on for each scan per the ALARA (As Low as Reasonably Achievable) protocol.  FINDINGS: There is mild compression fracture deformity at the superior endplate of the L1 vertebral body. There is mild concave deformity. There is no retropulsion of fracture fragments. There is no malalignment.  There is moderate compression fracture deformity at the inferior endplate of the L3 vertebral body with concave deformity. There is mild retropulsion of the posterior inferior endplate fracture fragment. There is no malalignment.  Otherwise the lumbar vertebral body alignment is within normal limits. The posterior facets are intact. The SI joints are intact. Age-related changes are noted.  There is no evidence for significant hemorrhage or hematoma within the  paraspinal soft tissues. No significant edema or abnormal fluid collections are seen. Severe atherosclerotic calcifications are noted.  Discogenic degenerative changes are noted throughout the mid to lower lumbar spine. Degenerative hypertrophic posterior facet changes are also seen. These findings contribute to central canal narrowing and neural foraminal narrowing at multiple levels.      Impression: 1.  Mild compression fracture deformity of the L1 vertebral body with mild concave deformity of the superior endplate. There is no malalignment. 2.  Moderate compression fracture deformity of the L3 vertebral body with concave deformity at the inferior endplate. There is mild retropulsion of the posterior inferior endplate fracture fragment. There is no malalignment. 3.  No evidence for acute soft tissue abnormality. 4.  Discogenic degenerative changes and hypertrophic posterior facet changes are seen throughout the lumbar spine.  This report was finalized on 11/26/2022 4:02 PM by Basilio Heard MD.        Results for orders placed during the hospital encounter of 07/28/22    Adult Transthoracic Echo Complete W/ Cont if Necessary Per Protocol    Interpretation Summary  · Mild biatrial enlargement.  · Moderately reduced right ventricular systolic function noted.  · Moderate left ventricular systolic dysfunction, estimated EF 37%.  · Left ventricular wall thickness is consistent with mild concentric hypertrophy.  · Lambl's excrescences of the aortic valve are noted. There is trace aortic insufficiency, no evidence of aortic stenosis.  · Moderate mitral regurgitation.  · Moderate to severe tricuspid regurgitation with RVSP 48 mmHg.  · Moderate pulmonic insufficiency.      Assessment & Plan   Assessment & Plan     Active Hospital Problems    Diagnosis  POA   • **Compression fracture of L1 vertebra, initial encounter ([facility]) [S32.010A]  Yes   • ESRD (end stage renal disease) ([facility]) [N18.6]  Yes   • Type 2 diabetes mellitus  without complication, with long-term current use of insulin (HCC) [E11.9, Z79.4]  Not Applicable   • CAD (coronary artery disease) [I25.10]  Yes   • Chronic atrial fibrillation (HCC) [I48.20]  Yes     Impression: 03/15/2016 - stable  Impression: 11/23/2015 - stable. INR is  Impression: 03/24/2015 - INR is 2.1. Continue 3 mg and f/u with cardio  Impression: 03/24/2015 - INR is  Impression: 11/18/2014 - stable;      • Essential hypertension [I10]  Yes     Impression: 03/15/2016 - bp is good  Impression: 03/15/2016 - cmp, lipids, urine micro  Impression: 11/23/2015 - stable  Impression: 03/24/2015 - urine micro  Impression: 11/18/2014 - bp is good  Impression: 11/18/2014 - stable  Impression: 07/17/2014 - bp is good;      • Hypothyroidism [E03.9]  Yes     Impression: 03/15/2016 - check tsh  Impression: 11/23/2015 - tsh normal 7/15- update next lab draw  Impression: 07/20/2015 - check tfts  Impression: 11/18/2014 - check tft;      • Memory impairment [R41.3]  Yes     Impression: 03/15/2016 - MSE 29  May be Lunesta (started around this time)  Impression: 03/15/2016 - MSE  May be Lunesta (started around this time);      • Sleep apnea [G47.30]  Yes     Summary: This is an 88 y/o female w/ ESRD on HD, Afib on oral AC, DM2, multiple chronic illnesses who presents after sustaining a fall in the home, identified to have multiple compression fractures and intractable pain    Assessment/Plan    Acute compression fractures (T1, L1, L3) w/ intractable back pain  -identified on CT imaging of the spine  -pain control and antiemetics ordered, IV dilaudid for breakthrough  -PT/OT, if pain not improving can consider NSGY eval (last dose of Eliquis 11/25)    ESRD on HD, MWF  -last HD yesterday 11/25  -recently started using UE fistula, temp HD cath scheduled to be removed this coming week, was told to hold oral AC starting today  -nephro consult for continuation of HD    Atrial fibrillation  Hx DVT, data deficit  Chronic HFrEF, EF 37%,  compensated  HLD  -home coreg, statin; other meds limited due to renal function and BP  -eliquis on hold    Thrombocytopenia  -appears chronic? Monitor on heparin for dvt-ppx    DM type 2, a1c 5.9%, w/ long term use of insulin  -accuchecks w/ SSI    Hypothyroidism  - home levothyroxine    AFTAB  -CPAP prn qHS    DVT prophylaxis:  Heparin subq      CODE STATUS:    Code Status and Medical Interventions:   Ordered at: 11/26/22 1934     Code Status (Patient has no pulse and is not breathing):    CPR (Attempt to Resuscitate)     Medical Interventions (Patient has pulse or is breathing):    Full Support         Kamlesh Gomez DO  11/26/22      Electronically signed by Kamlesh Gomez DO at 11/26/22 1955         Current Facility-Administered Medications   Medication Dose Route Frequency Provider Last Rate Last Admin   • acetaminophen (TYLENOL) tablet 500 mg  500 mg Oral Q6H Rosalva Aguilar APRN   500 mg at 12/02/22 0827   • atorvastatin (LIPITOR) tablet 20 mg  20 mg Oral Nightly Anay Hines APRN   20 mg at 12/01/22 2121   • bisacodyl (DULCOLAX) suppository 10 mg  10 mg Rectal Daily PRN Anay Hines APRN       • dextrose (D50W) (25 g/50 mL) IV injection 25 g  25 g Intravenous Q15 Min PRN Anay Hines APRN       • dextrose (GLUTOSE) oral gel 15 g  15 g Oral Q15 Min PRN Anay Hines APRN       • glucagon (human recombinant) (GLUCAGEN DIAGNOSTIC) injection 1 mg  1 mg Intramuscular Q15 Min PRN Anay Hines APRN       • heparin (porcine) injection 1,600 Units  1,600 Units Intracatheter PRN Anay Hines APRN   1,600 Units at 12/01/22 1236   • HYDROmorphone (DILAUDID) injection 0.5 mg  0.5 mg Intravenous Q4H PRN Sanya Hammonds DO   0.5 mg at 12/02/22 0322   • Insulin Lispro (humaLOG) injection 0-7 Units  0-7 Units Subcutaneous TID AC Anay Hines APRN       • ipratropium-albuterol (DUO-NEB) nebulizer solution 3 mL  3 mL Nebulization Q4H PRN Anay Hines, NESS   3 mL at 11/28/22 1011   • latanoprost  (XALATAN) 0.005 % ophthalmic solution 1 drop  1 drop Both Eyes Nightly Anay Hines, APRN   1 drop at 12/01/22 2156   • levothyroxine (SYNTHROID, LEVOTHROID) tablet 125 mcg  125 mcg Oral Q AM Anay Hines, APRN   125 mcg at 12/02/22 0536   • lidocaine (LIDODERM) 5 % 2 patch  2 patch Transdermal Q24H Anay Hines, APRN   2 patch at 12/02/22 0827   • midodrine (PROAMATINE) tablet 10 mg  10 mg Oral TID AC Anay Hines, APRN   10 mg at 12/02/22 0827   • ondansetron (ZOFRAN) tablet 4 mg  4 mg Oral Q6H PRN Anay Hines, APRN        Or   • ondansetron (ZOFRAN) injection 4 mg  4 mg Intravenous Q6H PRN Anay Hines, APRN   4 mg at 11/28/22 0923   • oxyCODONE (ROXICODONE) immediate release tablet 5 mg  5 mg Oral Q8H PRN Anay Hines, APRN   5 mg at 12/02/22 0150   • pantoprazole (PROTONIX) EC tablet 40 mg  40 mg Oral BID AC Anay Hines, APRN   40 mg at 12/02/22 0827   • saccharomyces boulardii (FLORASTOR) capsule 250 mg  250 mg Oral BID Anay Hines, APRN   250 mg at 12/02/22 0827   • sennosides-docusate (PERICOLACE) 8.6-50 MG per tablet 2 tablet  2 tablet Oral Nightly Anay Hines, APRN   2 tablet at 12/01/22 2122   • sevelamer (RENVELA) packet 0.8 g  800 mg Oral TID With Meals Anay Hines, APRN   0.8 g at 12/02/22 0828   • sodium chloride 0.9 % flush 10 mL  10 mL Intravenous Q12H Anay Hines, APRN   10 mL at 12/02/22 0830   • sodium chloride 0.9 % flush 10 mL  10 mL Intravenous PRN Anay Hines, APRN       • sodium chloride 0.9 % infusion 40 mL  40 mL Intravenous PRN Anay Hines, APRN       • sodium zirconium cyclosilicate (LOKELMA) pack 10 g  10 g Oral TID Anay Hines APRN   10 g at 12/02/22 0828   • timolol (TIMOPTIC) 0.5 % ophthalmic solution 1 drop  1 drop Both Eyes BID Anay Hines APRN   1 drop at 12/02/22 0830        Physician Progress Notes (last 72 hours)      Anay Hines APRN at 12/01/22 1052          Pulmonary/Critical Care History and Physical Exam     LOS: 4 days   Patient Care  Team:  Giselle Guzman DO as PCP - General (Internal Medicine)  Terrence Byrnes PA as Physician Assistant (Cardiology)    Chief Complaint:    Chief Complaint   Patient presents with   • Fall       Subjective     Interval History:  Chart reviewed. Plts remain low at 65. No complaints overnight. VSS on 2L NC. On HD currently. Palliative saw and made patient DNR/DNI.     History taken from: patient    Past Medical History:   Diagnosis Date   • Acute bronchitis    • Arthritis    • Atrial fibrillation (HCC)    • CAD (coronary artery disease)     s/p MI 2005; 3 stents inserted    • Change in mole    • Change in mole    • Change in nevus 6/16/2016   • Chronic kidney disease     stage IV-sees Dr Bk Mckeon every 3-4 months    • Chronic renal disease, stage 4, severely decreased glomerular filtration rate between 15-29 mL/min/1.73 square meter (HCC)    • Chronic systolic heart failure (HCC)    • Congestive heart disease (HCC)    • Conjunctivitis    • Deep vein thrombosis of lower extremity (HCC)    • Diabetes mellitus (HCC)    • Diabetes mellitus (HCC) 6/16/2016    Impression: 03/15/2016 - blood sugar 166 and hgn a1c 7.3%  is up to date with eye exam  neuropathy with callus, no ulcer  some scratches feet  ur alb due and ordered  no change other than provided samples of toujeo because she feels like lantus worked much better than levemir, she has tried titrating levemir and it is less effective  continue testing and f/u 3-4 months  Impression: 11/23/2015 - bl   • Diabetic foot ulcer (HCC) 6/16/2016   • Dog bite of hand    • Easy bruising    • Endometrial cancer (HCC)     partial hysterectomy; radiation as well    • Foot pain    • Foot pain 6/16/2016    Description: lancinating- worse but not consistent enough to want rx   • Fracture of hip (HCC)    • Generalized ischemic myocardial dysfunction 6/16/2016   • Glaucoma     drops daily    • HTN 6/16/2016    Impression: 03/15/2016 - bp is good  Impression: 03/15/2016 -  cmp, lipids, urine micro  Impression: 11/23/2015 - stable  Impression: 03/24/2015 - urine micro  Impression: 11/18/2014 - bp is good  Impression: 11/18/2014 - stable  Impression: 07/17/2014 - bp is good;    • Hyperlipidemia    • Hypertension    • Hypothyroidism    • Insomnia    • Ischemic heart disease    • Macular degeneration    • Methicillin resistant Staphylococcus aureus infection 6/16/2016   • Myocardial infarction (HCC) 2005   • Nausea with vomiting    • Pericardial effusion    • Shortness of breath 6/16/2016    Impression: 03/24/2015 - chronic. On 2 l oxygen at night. check cbc, bnp;    • Skin cancer, basal cell     nose, leg    • Skin lesion 6/16/2016    Impression: 03/24/2015 - refer derm for eval- seb kerat ant tib and ?ak medially with redness and irritation;    • Sleep apnea     oxygen at night due to low sats but no cpap   • Type 2 diabetes mellitus (HCC)    • UTI (urinary tract infection) 06/12/2020    currently taking antibiotics-patient's daughter notified Dr Beal's office and office said it should not delay generator change        Past Surgical History:   Procedure Laterality Date   • CARDIAC CATHETERIZATION     • CARDIAC DEFIBRILLATOR PLACEMENT     • CARDIAC ELECTROPHYSIOLOGY PROCEDURE N/A 07/17/2020    Procedure: ICD BIV  generator change- SJM- 3-6 weeks;  Surgeon: Tano Beal MD;  Location: [facility] EP INVASIVE LOCATION;  Service: Cardiology;  Laterality: N/A;   • CHOLECYSTECTOMY     • COLONOSCOPY N/A 07/30/2022    Procedure: COLONOSCOPY;  Surgeon: Brunner, Mark I, MD;  Location:  [facility];  Service: Gastroenterology;  Laterality: N/A;  WITH 3 RESOLUTION CLIPS   • CORONARY ARTERY BYPASS GRAFT  2000   • CORONARY STENT PLACEMENT      3 stents    • DIALYSIS FISTULA CREATION     • ENDOSCOPY N/A 11/17/2021    Procedure: ESOPHAGOGASTRODUODENOSCOPY;  Surgeon: Brunner, Mark I, MD;  Location:  [facility];  Service: Gastroenterology;  Laterality: N/A;   • EYE SURGERY Bilateral      cataract extraction    • HIP SURGERY Left     x3   • HYSTERECTOMY      partial    • KNEE ARTHROPLASTY Bilateral    • PACEMAKER IMPLANTATION     • TONSILLECTOMY         Family History   Problem Relation Age of Onset   • Breast cancer Other    • Diabetes Other    • Esophageal cancer Other    • Hypertension Other    • Transient ischemic attack Other    • Breast cancer Mother    • Cancer Father        Social History     Socioeconomic History   • Marital status:    Tobacco Use   • Smoking status: Never   • Smokeless tobacco: Never   Vaping Use   • Vaping Use: Never used   Substance and Sexual Activity   • Alcohol use: No   • Drug use: No   • Sexual activity: Defer       Allergies   Allergen Reactions   • Bactrim [Sulfamethoxazole-Trimethoprim] GI Intolerance   • Lisinopril Cough   • Rocephin [Ceftriaxone] Itching     Has tolerated Zosyn, Unasyn, and ceftriaxone several times prior to this.  Has had Rx for cefdinir, Augmentin, and cephalexin in past   • Codeine Rash       PMH/FH/SocH were reviewed by me and updates were made.     Review of Systems:    All systems were reviewed and negative except as noted in subjective.    Objective     Vital Signs  Temp:  [96.8 °F (36 °C)-98.3 °F (36.8 °C)] 97.7 °F (36.5 °C)  Heart Rate:  [70-82] 76  Resp:  [16-20] 20  BP: (117-180)/(33-83) 138/62    Physical Exam:     General Appearance:    Frail elderly lady resting in bed on HD. Alert, cooperative, in no acute distress   Head:    Normocephalic, without obvious abnormality, atraumatic   Eyes:            Lids and lashes normal, conjunctivae and sclerae normal, no   icterus, no pallor, corneas clear, PERRLA   ENMT:   Ears appear intact with no abnormalities noted      No oral lesions, no thrush, oral mucosa moist      No adenopathy, supple, trachea midline, no thyromegaly, no   carotid bruit, no JVD       Lungs/resp:     Normal effort, symmetric chest rise, no crepitus, clear to      auscultation bilaterally, no chest wall  tenderness, resonant to percussion throughout.                  Heart/CV:    Regular rhythm and normal rate, normal S1 and S2, no            murmur, no gallop, no rub, no click   Abdomen/GI:     Normal bowel sounds, no masses, no organomegaly, soft        non-tender, non-distended, no guarding, no rebound                tenderness   G/U:     Deferred   Extremities/MSK:   Ecchymosis. No clubbing, cyanosis or edema.  No deformities.    Pulses:   Pulses palpable and equal bilaterally   Skin:   Bilateral upper and lower extremity bruising   Hem/Lymph:   No cervical or supraclavicular adenopathy.    Neurologic:   Moves all extremities with no obvious focal motor deficit.  Cranial nerves 2 - 12 grossly intact            Psychiatric:  Normal mood and affect, oriented x 3.      Results Review:     I reviewed the patient's new clinical results.   Results from last 7 days   Lab Units 12/01/22 0558 11/30/22 0516 11/29/22  0822 11/28/22  0329 11/27/22  0918 11/26/22  1440   SODIUM mmol/L 134* 133* 135* 133*   < > 138   POTASSIUM mmol/L 3.5 3.1* 4.0 5.1   < > 4.5   CHLORIDE mmol/L 95* 94* 96* 95*   < > 101   CO2 mmol/L 24.0 22.0 16.0* 20.0*   < > 25.0   BUN mg/dL 37* 27* 54* 48*   < > 33*   CREATININE mg/dL 4.67* 3.97* 5.69* 5.39*   < > 4.48*   CALCIUM mg/dL 8.4* 8.8 9.0 9.0   < > 9.0   BILIRUBIN mg/dL  --   --   --  1.5*  --  1.2   ALK PHOS U/L  --   --   --  243*  --  324*   ALT (SGPT) U/L  --   --   --  12  --  13   AST (SGOT) U/L  --   --   --  27  --  31   GLUCOSE mg/dL 151* 101* 167* 117*   < > 121*    < > = values in this interval not displayed.     Results from last 7 days   Lab Units 12/01/22 0558 11/30/22  0516 11/29/22  0521   WBC 10*3/mm3 4.63 5.17 7.65   HEMOGLOBIN g/dL 12.3 13.2 13.4   HEMATOCRIT % 37.0 39.8 39.7   PLATELETS 10*3/mm3 65* 61* 85*           I reviewed the patient's new imaging including images and reports.    Medication Review:   acetaminophen, 500 mg, Oral, Q6H  atorvastatin, 20 mg, Oral,  Nightly  cefTRIAXone, 1 g, Intravenous, Q24H  insulin lispro, 0-7 Units, Subcutaneous, TID AC  latanoprost, 1 drop, Both Eyes, Nightly  levothyroxine, 125 mcg, Oral, Q AM  lidocaine, 2 patch, Transdermal, Q24H  midodrine, 10 mg, Oral, TID AC  pantoprazole, 40 mg, Oral, BID AC  saccharomyces boulardii, 250 mg, Oral, BID  senna-docusate sodium, 2 tablet, Oral, Nightly  sevelamer, 800 mg, Oral, TID With Meals  sodium chloride, 10 mL, Intravenous, Q12H  sodium zirconium cyclosilicate, 10 g, Oral, TID  timolol, 1 drop, Both Eyes, BID      norepinephrine, 0.02-0.3 mcg/kg/min, Last Rate: Stopped (11/30/22 1000)        Assessment & Plan       Hypotension    Chronic atrial fibrillation    Hypothyroidism    AFTAB    CHF (NYHA class IV, ACC/AHA stage D)    CAD s/p stents    T2DM    Multiple spinal compression fractures    ESRD on HD    Thrombocytopenia    Ms. Cristobal is an 86 yo female with PMH AFTAB, nocturnal hypoxia on 2L HS, Recent covid August 2022, Type 2 DM, A. Fib on Eliquis, remote history of DVT & PE > 10 years ago, ESRD on HD MWF, multiple AV fistula revisions (last 11/3/22 @ [facility]), chornic hypotension on midodrine, CAD s/p CABG x 3 & stents, prior MRSA infection 2005, CHF and ICM with EF 35% s/p BIV ICD, VHD (moderate MR and severe TR), and hypothyroidism who initially presented to the ED on 11/26 with severe back pain following a fall. She was found to have multiple spinal compression fractures (T1, L1, & L3) on CT imaigng and was admitted to hospital medicine. On admission she was placed on pain meds with plan to consult Neurosurgery if she had acute worsening. She was additionally found to have a GNR in urine and placed on Merrem. Nephrology was consulted for HD.    On the afternoon of 11/28 she developed worsening hypotension with inability to initiate HD despite increasing home midodrine to 10 mg and administration of albumin 25g x 3. She was transferred to ICU for management. On arrival she required initiation of  levophed. ECHO showed no pericardial effusion. Midodrine continued.     On 11/30 she continued to require Levophed drip with hypotension. This hypotension was felt to be related to difficulty obtaining accurate BP's due to vascular access issues in bilateral upper extremities. Her BP cuff was placed on her thigh with improvement in her BP and ability to wean Levophed off.     For her UTI she was continued on Merrem and Vanc. Merrem was ultimately transitioned to Rocephin and Vanc was stopped.     Palliative care was consulted on 11/30. Decision to make patient DNR/DNI was made by patient and family.     Plan:  Hypotension  Coronary artery disease  Mitral regurgitation  Chronic systolic heart failure  Chronic atrial fibrillation  - Off norepinephrine drip, home dose of midodrine restarted and increased to 10 mg  - Echo with no pericardial effusion, moderate mitral regurgitation, LVEF of 41 to 45%  - Mildly elevated troponin with previous history of stents, remote CABG 2000  - BiV ICD, currently in a paced rhythm  - Continue Midodrine  - Leave blood pressure cuff on the thigh for a more accurate reading  - Continue Rocephin x 3 doses     End-stage renal disease on hemodialysis  Stewart cath  Right upper extremity access  - Hemodialysis November 30 (2L off) and 12/1  - Chronic anemia, hemoglobin 12.3  - Lokelma stopped     Fall with multiple compression fractures of the spine  - Very debilitated, poor candidate for surgery  - Palliative following     Diabetes mellitus type 2  Hypothyroid  - Glucose 100-130  - Siding scale insulin  - Thyroid replacement  - A1c 6.1     Possible sepsis  Urinary tract infection   - Cultures positive for Enterococcus faecalis, sensitive to vancomycin, Klebsiella resistant to ampicillin  - She lists an allergy to Rocephin causing itching but has tolerated it in the past and therefore meropenem was changed to Rocephin.  She currently has no rash. Continue Rocephin      Glaucoma  - Continue  "eyedrops, latanoprost and timoptic    DISPO: To TELE    AM Labs ordered      NESS Negrete  12/01/22  10:52 EST    Level of Risk High due to: severe exacerbation of chronic illness and decision for DNR or to de-escalate care    Electronically signed by NESS Negrete, 12/01/22, 10:52 AM EST.    Copied text in this note has been reviewed and is accurate as of 12/01/22.       Electronically signed by Anay Hines APRN at 12/01/22 1122     Terrence Greenfield MD at 12/01/22 0944           LOS: 4 days   Patient Care Team:  Giselle Guzman DO as PCP - General (Internal Medicine)  Terrence Byrnes PA as Physician Assistant (Cardiology)    Chief Complaint: ESRD  Fall    Subjective      SEEN ON HD TODAY AND TOLERATING WELL SO FAR.     ROS: UNOBTAINABLE     Objective     Vital Sign Min/Max for last 24 hours  Temp  Min: 96.8 °F (36 °C)  Max: 98.3 °F (36.8 °C)   BP  Min: 117/63  Max: 180/73   Pulse  Min: 70  Max: 82   Resp  Min: 16  Max: 20   SpO2  Min: 82 %  Max: 100 %   Flow (L/min)  Min: 1  Max: 1   No data recorded     Flowsheet Rows    Flowsheet Row First Filed Value   Admission Height 162.6 cm (64\") Documented at 11/26/2022 1336   Admission Weight 76.2 kg (168 lb) Documented at 11/26/2022 1336          No intake/output data recorded.  I/O last 3 completed shifts:  In: 360 [P.O.:360]  Out: -     Objective:  General Appearance:  Ill-appearing and uncomfortable (TUNNELED HD CATH).    Vital signs: (most recent): Blood pressure 152/68, pulse 76, temperature 97.7 °F (36.5 °C), temperature source Oral, resp. rate 20, height 162.6 cm (64\"), weight 77.9 kg (171 lb 11.8 oz), SpO2 100 %, not currently breastfeeding.  Vital signs are normal.    Output: Minimal urine output.    Lungs:  Normal effort and normal respiratory rate.  Breath sounds clear to auscultation.  She is not in respiratory distress.  No decreased breath sounds.    Heart: Normal rate.  Regular rhythm.  S1 normal and S2 normal.    Abdomen: " Abdomen is soft.  Bowel sounds are normal.   There is no abdominal tenderness.     Extremities: Normal range of motion.  There is dependent edema.  There is no local swelling.    Pulses: Distal pulses are intact.    Neurological: (CONFUSED).    Pupils:  Pupils are equal, round, and reactive to light.              Results Review:     I reviewed the patient's new clinical results.    WBC WBC   Date Value Ref Range Status   12/01/2022 4.63 3.40 - 10.80 10*3/mm3 Final   11/30/2022 5.17 3.40 - 10.80 10*3/mm3 Final   11/29/2022 7.65 3.40 - 10.80 10*3/mm3 Final      HGB Hemoglobin   Date Value Ref Range Status   12/01/2022 12.3 12.0 - 15.9 g/dL Final   11/30/2022 13.2 12.0 - 15.9 g/dL Final   11/29/2022 13.4 12.0 - 15.9 g/dL Final      HCT Hematocrit   Date Value Ref Range Status   12/01/2022 37.0 34.0 - 46.6 % Final   11/30/2022 39.8 34.0 - 46.6 % Final   11/29/2022 39.7 34.0 - 46.6 % Final      Platlets No results found for: LABPLAT   MCV MCV   Date Value Ref Range Status   12/01/2022 97.1 (H) 79.0 - 97.0 fL Final   11/30/2022 96.1 79.0 - 97.0 fL Final   11/29/2022 95.7 79.0 - 97.0 fL Final          Sodium Sodium   Date Value Ref Range Status   12/01/2022 134 (L) 136 - 145 mmol/L Final   11/30/2022 133 (L) 136 - 145 mmol/L Final   11/29/2022 135 (L) 136 - 145 mmol/L Final      Potassium Potassium   Date Value Ref Range Status   12/01/2022 3.5 3.5 - 5.2 mmol/L Final     Comment:     Slight hemolysis detected by analyzer. Results may be affected.   11/30/2022 3.1 (L) 3.5 - 5.2 mmol/L Final     Comment:     Slight hemolysis detected by analyzer. Results may be affected.   11/29/2022 4.0 3.5 - 5.2 mmol/L Final     Comment:     Slight hemolysis detected by analyzer. Results may be affected.      Chloride Chloride   Date Value Ref Range Status   12/01/2022 95 (L) 98 - 107 mmol/L Final   11/30/2022 94 (L) 98 - 107 mmol/L Final   11/29/2022 96 (L) 98 - 107 mmol/L Final      CO2 CO2   Date Value Ref Range Status   12/01/2022 24.0  "22.0 - 29.0 mmol/L Final   11/30/2022 22.0 22.0 - 29.0 mmol/L Final   11/29/2022 16.0 (L) 22.0 - 29.0 mmol/L Final      BUN BUN   Date Value Ref Range Status   12/01/2022 37 (H) 8 - 23 mg/dL Final   11/30/2022 27 (H) 8 - 23 mg/dL Final   11/29/2022 54 (H) 8 - 23 mg/dL Final      Creatinine Creatinine   Date Value Ref Range Status   12/01/2022 4.67 (H) 0.57 - 1.00 mg/dL Final   11/30/2022 3.97 (H) 0.57 - 1.00 mg/dL Final   11/29/2022 5.69 (H) 0.57 - 1.00 mg/dL Final      Calcium Calcium   Date Value Ref Range Status   12/01/2022 8.4 (L) 8.6 - 10.5 mg/dL Final   11/30/2022 8.8 8.6 - 10.5 mg/dL Final   11/29/2022 9.0 8.6 - 10.5 mg/dL Final      PO4 No results found for: CAPO4   Albumin Albumin   Date Value Ref Range Status   11/29/2022 3.80 3.50 - 5.20 g/dL Final      Magnesium Magnesium   Date Value Ref Range Status   12/01/2022 1.9 1.6 - 2.4 mg/dL Final      Uric Acid No results found for: URICACID     Medication Review: Yes    Assessment & Plan       Hypotension    Chronic atrial fibrillation    Hypothyroidism    AFTAB    CHF (NYHA class IV, ACC/AHA stage D)    CAD s/p stents    T2DM    Multiple spinal compression fractures    ESRD on HD    Thrombocytopenia      Assessment & Plan      ESRD:  On HD per MWF miranda. Started on HD in Aug 2022. Still make some urine.     Anemia: BENY on HD days.     Acid base and Elytes:  Hyperkalemia on admission.     Volume status :  Stable. AWG 3 kg on outpatient records.    Fall: Compression fracture on this admission.         Recs  Currently on TTS mir[facility] during this admission. Outpatient [facility] MWF . HD TODAY. FAMILY MAY BE CONSIDERING STOPPING DIALYSIS SUPPORT. D/W DIALYSIS NURSE.    Renal Diet   Resume home meds     Terrence Greenfield MD  12/01/22  09:44 EST          Electronically signed by Terrence Greenfield MD at 12/01/22 0949     Rosalva Aguilar APRN at 12/01/22 0905          Palliative Care Daily Progress Note     C/C: Patient reports she \"does not feel good\".      S: " "Medical record reviewed. Follow up visit for . Events noted. Patient with HD in room. Patient denies pain at this time. Patient's BP normotensive and tolerating opioids. Hydromorphone 0.5mg x2 doses in the last 24 hours.     ROS:  +pain, back acute due to fall and compression fractures, constant dull, aching, improved with laying still, worse with any type of movement, 4-5/10 with movement. +SOA/cough, ongoing for several months, productive. +constipation, resolved. +debility +bedbound due to fractures/pain. Denies anxiety, N/V, HA, chest pain,  diarrhea.     O: Code Status:   Code Status and Medical Interventions:   Ordered at: 12/01/22 0902     Medical Intervention Limits:    NO intubation (DNI)     Level Of Support Discussed With:    Patient     Code Status (Patient has no pulse and is not breathing):    No CPR (Do Not Attempt to Resuscitate)     Medical Interventions (Patient has pulse or is breathing):    Limited Support      Advanced Directives: Advance Directive Status: Patient does not have advance directive   Goals of Care: Ongoing.   Palliative Performance Scale Score: 30%     /58   Pulse 76   Temp 96.8 °F (36 °C) (Axillary)   Resp 18   Ht 162.6 cm (64\")   Wt 77.9 kg (171 lb 11.8 oz)   LMP  (LMP Unknown) Comment: last mammogram -unsure   SpO2 99%   BMI 29.48 kg/m²     Intake/Output Summary (Last 24 hours) at 12/1/2022 0905  Last data filed at 11/30/2022 2000  Gross per 24 hour   Intake 120 ml   Output --   Net 120 ml       PE:  General Appearance:    Patient awake, chronically ill appearing, frail, alert, cooperative, NAD   HEENT:    NC/AT, EOMI, anicteric, MMM, face relaxed, NC in place   Neck:   supple, trachea midline, no JVD   Lungs:     Crackles to all fields, diminished in bases; respirations regular, even and unlabored; cough productive white/yellow sputum out, RR 24-26 on exam, 1L NC    Heart:    RRR, normal S1 and S2, no M/R/G   Abdomen:     Hypoactive bowel sounds, soft, non-tender, " distended   G/U:   anuric   MSK/Extremities:   Wasting, +1 edema all extremities, scattered bruising to BUE's   Pulses:   Pulses palpable and equal bilaterally   Skin:   Warm, dry   Neurologic:   A/Ox3, cooperative, BOWERS   Psych:   Calm, appropriate       Meds: Reviewed and changes noted    Labs:   Results from last 7 days   Lab Units 12/01/22  0558   WBC 10*3/mm3 4.63   HEMOGLOBIN g/dL 12.3   HEMATOCRIT % 37.0   PLATELETS 10*3/mm3 65*     Results from last 7 days   Lab Units 12/01/22  0558   SODIUM mmol/L 134*   POTASSIUM mmol/L 3.5   CHLORIDE mmol/L 95*   CO2 mmol/L 24.0   BUN mg/dL 37*   CREATININE mg/dL 4.67*   GLUCOSE mg/dL 151*   CALCIUM mg/dL 8.4*     Results from last 7 days   Lab Units 12/01/22  0558 11/29/22  0822 11/28/22  0329   SODIUM mmol/L 134*   < > 133*   POTASSIUM mmol/L 3.5   < > 5.1   CHLORIDE mmol/L 95*   < > 95*   CO2 mmol/L 24.0   < > 20.0*   BUN mg/dL 37*   < > 48*   CREATININE mg/dL 4.67*   < > 5.39*   CALCIUM mg/dL 8.4*   < > 9.0   BILIRUBIN mg/dL  --   --  1.5*   ALK PHOS U/L  --   --  243*   ALT (SGPT) U/L  --   --  12   AST (SGOT) U/L  --   --  27   GLUCOSE mg/dL 151*   < > 117*    < > = values in this interval not displayed.     Imaging Results (Last 72 Hours)     Procedure Component Value Units Date/Time    XR Wrist 3+ View Left [[id number]] Collected: 11/29/22 1039     Updated: 11/29/22 1048    Narrative:      DATE OF EXAM: 11/29/2022 10:11 AM     PROCEDURE: XR WRIST 3+ VW LEFT-     INDICATIONS: swelling and pain; S32.010A-Wedge compression fracture of  first lumbar vertebra, initial encounter for closed fracture;  S32.030A-Wedge compression fracture of third lumbar vertebra, initial  encounter for closed fracture; S22.010A-Wedge compression fracture of  first thoracic vertebra, initial encounter for closed fracture;  W19.XXXA-Unspecified fall, initial encounter; Z74.09-Other reduced  mobility     COMPARISON: Left wrist radiographs 8/8/2015     TECHNIQUE: 3 images of the left wrist      FINDINGS:  The bones are demineralized limiting assessment for occult nondisplaced  fractures. A peripheral IV is present within the radial soft tissues of  the distal forearm. There are scattered vascular calcifications. There  is chondrocalcinosis of the TFCC. There is soft tissue fullness or  swelling at the dorsal wrist. There is a chronic ununited fracture  through the waist of the scaphoid, present on prior wrist radiographs  series. No evidence of acute fracture. There is mild widening of the  scapholunate interval with some joint space narrowing of the  radioscaphoid joint space and mild proximal migration of the capitate  compatible with early sequelae of SLAC wrist. There are moderate to  severe osteoarthritic changes of the triscaphe and first carpal  metacarpal joints.       Impression:      No definite acute fracture or traumatic malalignment.     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     Chronic ununited scaphoid waist fracture.     Degenerative changes.     This report was finalized on 11/29/2022 10:44 AM by Walt Mckay MD.               Lab 11/29/22  0521   HEMOGLOBIN A1C 6.10*         Diagnostics: Reviewed    A:   Patient Active Problem List   Diagnosis   • Chronic atrial fibrillation   • Diabetic nephropathy (HCC)   • Diabetic retinopathy (HCC)   • Malignant neoplasm of endometrium (HCC)   • Hyperlipidemia LDL goal <100   • Hypothyroidism   • Insomnia   • Memory impairment   • Nausea   • Pulmonary embolism (HCC)   • AFTAB   • Squamous cell carcinoma of skin   • CHF (NYHA class IV, ACC/AHA stage D)   • Ischemic heart disease   • CAD s/p stents   • DVT of lower extremity (deep venous thrombosis) (HCC)   • Chronic systolic congestive heart failure (HCC)   • Morbidly obese (HCC)   • Ischemic cardiomyopathy   • T2DM   • Exudative age-related macular degeneration, right eye, with active choroidal neovascularization (HCC)   • Hypoxia   • Food impaction of  esophagus   • Malignant tumor of ascending colon (HCC)   • GIB (gastrointestinal bleeding)   • Normocytic anemia   • Acute renal failure (ARF) (HCC)   • Hypoalbuminemia   • Lower GI bleed   • Multiple spinal compression fractures   • ESRD on HD   • Thrombocytopenia   • Hypotension     87 y.o. female with ESRD, HFrEF, thrombocytopenia.      S/S:   1. Pain -acute back pain MSK due to compression fractures  -scheduledTylenol 500mg PO q 6 hours for pain  - started Oxycodone 5mg PO q 8 hours prn pain (hold for hypotension)  -continue Hydromorphone to 0.5mg IV q 4 hours prn (hold for hypotension)  -continue lidocaine patches to back     2. Constipation -started Senna-S 2 tablets PO nightly  -Bisacodyl SC daily prn constipation     3. Debility -pain with movement due to compression fractures, adequate pain control necessary to increase movement which is complicated by hypotension  -PT/OT eval   -potentially new baseline due to fractures     4. SOA/Cough -currently on 1LNC, cough is consistent and source of iritation/fatigue for patient  -persistent CHF cough complicated by ESRD     5. GOC -DNR/DNI -per discussion with patient  -discussing GOC with patient and family ongoing  -called karen Galicia on phone at [phone], updated on patient's condition, daughter agreeable to more information regarding outpatient hospice services, hospice consult placed    P: Patient confirms that she is electing DNR/DNI. Patient's BP normotensive. Continued GOC discussion ongoing. Plan to call daughter to further discuss GOC this afternoon.    Spoke to Frida on phone and discussed continued Full Treatment with palliative services versus comfort focused plan of care with hospice services. Daughter agreeable to more information regarding outpatient hospice services, consult placed. Discussed potential discharge placement depending on PT/OT eval to determine if STR potential or requiring LTC/NH. All questions and concerns addressed.    Palliative Care Team will continue to follow patient. Please do not hesitate to contact us regarding further sx mgmt or GOC needs.  NESS Mclaughlin  12/1/2022  Time spent: 30 minutes      Electronically signed by Rosalva Aguilar APRN at 12/01/22 1418     Sarah Hatch MD at 11/30/22 1335          Critical Care Note     LOS: 3 days   Patient Care Team:  Giselle Guzman DO as PCP - General (Internal Medicine)  Terrence Byrnes PA as Physician Assistant (Cardiology)    Chief Complaint/Reason for visit:    Chief Complaint   Patient presents with   • Fall   Hypotension  End-stage renal disease on hemodialysis  Multiple compression fractures of the spine  Gram-negative derrick urinary tract infection  Coronary artery disease with history of CABG    Subjective     Interval History:     Difficulty getting an accurate blood pressure because she has had vascular accesses in both upper extremities.  She now has a thigh cuff in place and blood pressures have been more accurate.  She is off norepinephrine this morning.  She is complaining of a mostly nonproductive cough and severe back pain.  She did undergo hemodialysis yesterday with 2 L removed.  Potassium is down to 3.1 today and Lokelma is on hold.  She is having some nausea but no vomiting.      Review of Systems:    All systems were reviewed and negative except as noted in subjective.    Medical history, surgical history, social history, family history reviewed    Objective     Intake/Output:    Intake/Output Summary (Last 24 hours) at 11/30/2022 1337  Last data filed at 11/29/2022 2000  Gross per 24 hour   Intake 240 ml   Output --   Net 240 ml       Nutrition:  Diet: Regular/House Diet, Renal Diets; Low Sodium (2-3g), Low Potassium, Low Phosphorus; Texture: Regular Texture (IDDSI 7); Fluid Consistency: Thin (IDDSI 0)    Infusions:  norepinephrine, 0.02-0.3 mcg/kg/min, Last Rate: 0.02 mcg/kg/min (11/30/22 0808)        Mechanical Ventilator  "Settings:                                                Telemetry: Ventricular paced rhythm             Vital Signs  Blood pressure (!) 65/33, pulse 76, temperature 97.6 °F (36.4 °C), temperature source Oral, resp. rate 20, height 162.6 cm (64\"), weight 77.9 kg (171 lb 11.8 oz), SpO2 98 %, not currently breastfeeding.    Physical Exam:  General Appearance:   Elderly woman supine in bed appearing uncomfortable   Head:   No visible trauma   Eyes:          Conjunctiva pale, no jaundice   Ears:     Throat:  No thrush   Neck:  Right Stewart cath   Back:      Lungs:    Respirations are shallow, equal, unlabored, clear    Heart:   Paced rhythm, S1, S2 auscultated   Abdomen:    Hypoactive bowel sounds, nondistended, soft   Rectal:   Deferred   Extremities:  Left wrist swelling and ecchymosis.  Right upper extremity access present.  Generalized edema   Pulses:  No palpable pedal pulses    Skin:  No rash, multiple areas of bruising, small coccyx breakdown present on admission.  Left second toe with scabbing on top but no purulence   Lymph nodes:  No cervical adenopathy   Neurologic:  Awake and conversant, generalized weakness, oriented      Results Review:     I reviewed the patient's new clinical results.   Results from last 7 days   Lab Units 11/30/22  0516 11/29/22  0822 11/28/22  0329 11/27/22  0918 11/26/22  1440   SODIUM mmol/L 133* 135* 133*   < > 138   POTASSIUM mmol/L 3.1* 4.0 5.1   < > 4.5   CHLORIDE mmol/L 94* 96* 95*   < > 101   CO2 mmol/L 22.0 16.0* 20.0*   < > 25.0   BUN mg/dL 27* 54* 48*   < > 33*   CREATININE mg/dL 3.97* 5.69* 5.39*   < > 4.48*   CALCIUM mg/dL 8.8 9.0 9.0   < > 9.0   BILIRUBIN mg/dL  --   --  1.5*  --  1.2   ALK PHOS U/L  --   --  243*  --  324*   ALT (SGPT) U/L  --   --  12  --  13   AST (SGOT) U/L  --   --  27  --  31   GLUCOSE mg/dL 101* 167* 117*   < > 121*    < > = values in this interval not displayed.     Results from last 7 days   Lab Units 11/30/22  0516 11/29/22  0521 11/28/22  0329 "   WBC 10*3/mm3 5.17 7.65 5.95   HEMOGLOBIN g/dL 13.2 13.4 12.6   HEMATOCRIT % 39.8 39.7 38.4   PLATELETS 10*3/mm3 61* 85* 72*         Lab Results   Component Value Date    BLOODCX No growth at 3 days 11/26/2022     Lab Results   Component Value Date    URINECX (A) 11/26/2022     >100,000 CFU/mL Klebsiella pneumoniae ssp pneumoniae    URINECX >100,000 CFU/mL Enterococcus faecium (A) 11/26/2022       I reviewed the patient's new imaging including images and reports.    Left wrist film with a chronic scaphoid fracture but no acute fracture    Interpretation Summary echocardiogram November 28, 2022       •  Left ventricular systolic function is mildly decreased. Left ventricular ejection fraction appears to be 41 - 45%.  •  Left ventricular wall thickness is consistent with borderline concentric hypertrophy.  •  Left ventricular diastolic dysfunction is noted.  •  Moderately reduced right ventricular systolic function noted.  •  Systolic and diastolic flattening of the interventricular septum consistent with right ventricle pressure and volume overload.  •  The right ventricular cavity is severely dilated.  •  Left atrial volume is mildly increased.  •  The right atrial cavity is moderately dilated.  •  There is mild calcification of the aortic valve.  •  Moderate mitral annular calcification is present.  •  Mild mitral valve regurgitation is present.  •  Mild pulmonic valve regurgitation is present.  •  Severe tricuspid valve regurgitation is present.  •  Estimated right ventricular systolic pressure from tricuspid regurgitation is at least mildly elevated (35-45 mmHg) and may be underestimated.      All medications reviewed.   atorvastatin, 20 mg, Oral, Nightly  cefTRIAXone, 1 g, Intravenous, Q24H  insulin lispro, 0-7 Units, Subcutaneous, TID AC  latanoprost, 1 drop, Both Eyes, Nightly  levothyroxine, 125 mcg, Oral, Q AM  lidocaine, 2 patch, Transdermal, Q24H  midodrine, 10 mg, Oral, TID AC  pantoprazole, 40 mg, Oral,  BID AC  saccharomyces boulardii, 250 mg, Oral, BID  senna-docusate sodium, 2 tablet, Oral, Nightly  sevelamer, 800 mg, Oral, TID With Meals  sodium chloride, 10 mL, Intravenous, Q12H  sodium zirconium cyclosilicate, 10 g, Oral, TID  timolol, 1 drop, Both Eyes, BID          Assessment & Plan       Hypotension    Chronic atrial fibrillation    Hypothyroidism    AFTAB    CHF (NYHA class IV, ACC/AHA stage D)    CAD s/p stents    T2DM    Multiple spinal compression fractures    ESRD on HD    Thrombocytopenia    Hypotension  Coronary artery disease  Mitral regurgitation  Chronic systolic heart failure  Chronic atrial fibrillation    -Off norepinephrine drip, home dose of midodrine restarted and increased to 10 mg  -Blood pressure cuff currently on her thigh  -Echo with no pericardial effusion, moderate mitral regurgitation, LVEF of 41 to 45%  -Mildly elevated troponin with previous history of stents, remote CABG 2000  -BiV ICD, currently in a paced rhythm    End-stage renal disease on hemodialysis  Stewart cath  Right upper extremity access  -Hemodialysis November 29  -BUN 27, creatinine 3.97, potassium 3.1  -Chronic anemia, hemoglobin 13.2  -Lokelma on hold    Fall with multiple compression fractures of the spine  -Very debilitated, poor candidate for surgery    Diabetes mellitus type 2  Hypothyroid  -Glucose 100-130  -Sliding scale insulin  -Thyroid replacement  -A1c 6.1    Possible sepsis  Urinary tract infection    -Cultures positive for Enterococcus faecalis, sensitive to vancomycin, Klebsiella resistant to ampicillin  -Added vancomycin  -She lists an allergy to Rocephin causing itching but has tolerated it in the past and therefore meropenem was changed to Rocephin.  She currently has no rash      Patient is elderly, very debilitated, poor IV access, multiorgan dysfunction.  I would like to have a goals of care discussion with both the patient and her family as she remains a full resuscitation.  Palliative care team has  been consulted.  Does she want to continue dialysis?,  Her blood pressures been borderline and we have not been able to give her narcotics for her multiple compression fractures.  She currently has peripheral IV access but it is tenuous.  She has a biventricular pacemaker on the left and has her upper extremity axis on the right making central access challenging as well.    PLAN:    -Continue midodrine  -Restart norepinephrine if needed  -Hemodialysis Monday Wednesday Friday  -Sliding scale insulin  -Thyroid replacement  -Leave blood pressure cuff on the thigh for a more accurate reading  -Glaucoma eyedrops, latanoprost, Timoptic  -Hold Lokelma  -Rocephin, vancomycin  -Palliative care team to assess goals of care      VTE Prophylaxis:SCDS    Stress Ulcer Prophylaxis: Protonix    Sarah Hatch MD  11/30/22  13:37 EST      Time: Critical care 35 min  I personally provided care to this critically ill patient as documented above.  Critical care time does not include time spent on separately billed procedures.  None of my critical care time was concurrent with other critical care providers.     Electronically signed by Sarah Hatch MD at 11/30/22 1345     Luis Cardenas MD at 11/30/22 1204           LOS: 3 days   Patient Care Team:  Giselle Guzman DO as PCP - General (Internal Medicine)  Terrence Byrnes PA as Physician Assistant (Cardiology)    Chief Complaint: ESRD  Fall    Subjective     HD yesterday tolerated well. UF 2 liter. Off pressors. Doing well. Palliative consulted.    Subjective:  Symptoms:  Stable.  No shortness of breath.    Diet:  She reports  nausea.        History taken from: patient    Objective     Vital Sign Min/Max for last 24 hours  Temp  Min: 97.6 °F (36.4 °C)  Max: 98.3 °F (36.8 °C)   BP  Min: 65/33  Max: 115/57   Pulse  Min: 69  Max: 85   Resp  Min: 20  Max: 20   SpO2  Min: 88 %  Max: 99 %   Flow (L/min)  Min: 1  Max: 1   No data recorded     Flowsheet Rows   "  Flowsheet Row First Filed Value   Admission Height 162.6 cm (64\") Documented at 11/26/2022 1336   Admission Weight 76.2 kg (168 lb) Documented at 11/26/2022 1336          No intake/output data recorded.  I/O last 3 completed shifts:  In: 737.3 [P.O.:480; I.V.:57.3; IV Piggyback:200]  Out: 2790 [Other:2790]    Objective:  General Appearance:  Ill-appearing.    Vital signs: (most recent): Blood pressure (!) 65/33, pulse 76, temperature 97.6 °F (36.4 °C), temperature source Oral, resp. rate 20, height 162.6 cm (64\"), weight 77.9 kg (171 lb 11.8 oz), SpO2 98 %, not currently breastfeeding.  Vital signs are normal.    Output: Minimal urine output.    Lungs:  Normal effort and normal respiratory rate.  Breath sounds clear to auscultation.  She is not in respiratory distress.  No decreased breath sounds.    Heart: Normal rate.  Regular rhythm.  S1 normal and S2 normal.    Abdomen: Abdomen is soft.    Extremities: Normal range of motion.  There is no dependent edema or local swelling.    Pulses: Distal pulses are intact.    Neurological: Patient is alert and oriented to person, place and time.  Normal strength.    Pupils:  Pupils are equal, round, and reactive to light.              Results Review:     I reviewed the patient's new clinical results.    WBC WBC   Date Value Ref Range Status   11/30/2022 5.17 3.40 - 10.80 10*3/mm3 Final   11/29/2022 7.65 3.40 - 10.80 10*3/mm3 Final   11/28/2022 5.95 3.40 - 10.80 10*3/mm3 Final      HGB Hemoglobin   Date Value Ref Range Status   11/30/2022 13.2 12.0 - 15.9 g/dL Final   11/29/2022 13.4 12.0 - 15.9 g/dL Final   11/28/2022 12.6 12.0 - 15.9 g/dL Final      HCT Hematocrit   Date Value Ref Range Status   11/30/2022 39.8 34.0 - 46.6 % Final   11/29/2022 39.7 34.0 - 46.6 % Final   11/28/2022 38.4 34.0 - 46.6 % Final      Platlets No results found for: LABPLAT   MCV MCV   Date Value Ref Range Status   11/30/2022 96.1 79.0 - 97.0 fL Final   11/29/2022 95.7 79.0 - 97.0 fL Final "   11/28/2022 97.0 79.0 - 97.0 fL Final          Sodium Sodium   Date Value Ref Range Status   11/30/2022 133 (L) 136 - 145 mmol/L Final   11/29/2022 135 (L) 136 - 145 mmol/L Final   11/28/2022 133 (L) 136 - 145 mmol/L Final      Potassium Potassium   Date Value Ref Range Status   11/30/2022 3.1 (L) 3.5 - 5.2 mmol/L Final     Comment:     Slight hemolysis detected by analyzer. Results may be affected.   11/29/2022 4.0 3.5 - 5.2 mmol/L Final     Comment:     Slight hemolysis detected by analyzer. Results may be affected.   11/28/2022 5.1 3.5 - 5.2 mmol/L Final      Chloride Chloride   Date Value Ref Range Status   11/30/2022 94 (L) 98 - 107 mmol/L Final   11/29/2022 96 (L) 98 - 107 mmol/L Final   11/28/2022 95 (L) 98 - 107 mmol/L Final      CO2 CO2   Date Value Ref Range Status   11/30/2022 22.0 22.0 - 29.0 mmol/L Final   11/29/2022 16.0 (L) 22.0 - 29.0 mmol/L Final   11/28/2022 20.0 (L) 22.0 - 29.0 mmol/L Final      BUN BUN   Date Value Ref Range Status   11/30/2022 27 (H) 8 - 23 mg/dL Final   11/29/2022 54 (H) 8 - 23 mg/dL Final   11/28/2022 48 (H) 8 - 23 mg/dL Final      Creatinine Creatinine   Date Value Ref Range Status   11/30/2022 3.97 (H) 0.57 - 1.00 mg/dL Final   11/29/2022 5.69 (H) 0.57 - 1.00 mg/dL Final   11/28/2022 5.39 (H) 0.57 - 1.00 mg/dL Final      Calcium Calcium   Date Value Ref Range Status   11/30/2022 8.8 8.6 - 10.5 mg/dL Final   11/29/2022 9.0 8.6 - 10.5 mg/dL Final   11/28/2022 9.0 8.6 - 10.5 mg/dL Final      PO4 No results found for: CAPO4   Albumin Albumin   Date Value Ref Range Status   11/29/2022 3.80 3.50 - 5.20 g/dL Final   11/28/2022 3.70 3.50 - 5.20 g/dL Final      Magnesium No results found for: MG   Uric Acid No results found for: URICACID     Medication Review: Yes    Assessment & Plan       Hypotension    Chronic atrial fibrillation    Hypothyroidism    AFTAB    CHF (NYHA class IV, ACC/AHA stage D)    CAD s/p stents    T2DM    Multiple spinal compression fractures    ESRD on HD     Thrombocytopenia      Assessment & Plan      ESRD:  On HD per MWF miranda. Started on HD in Aug 2022. Still make some urine.     Anemia: BENY on HD days.     Acid base and Elytes:  Hyperkalemia on admission.     Volume status :  Stable. AWG 3 kg on outpatient records.    Fall: Compression fracture on this admission.         Recs  Currently on TTS mir[facility] during this admission. Outpatient miranda MWF     Renal Diet   Resume home meds     Luis Cardenas MD  11/30/22  12:04 EST          Electronically signed by Luis Cardenas MD at 11/30/22 1206          Consult Notes (last 72 hours)      Rosalva Aguilar APRN at 11/30/22 0824      Consult Orders    1. Inpatient Palliative Care MD Consult [[id number]] ordered by Cathleen Cardenas DNP, APRN at 11/29/22 1433               Palliative Care Initial Consult   Attending Physician: Sarah Hatch MD  Referring Provider: NESS Wylie    Reason for Referral:  assistance with clarification of goals of care    Code Status:   Code Status and Medical Interventions:   Ordered at: 11/26/22 1934     Code Status (Patient has no pulse and is not breathing):    CPR (Attempt to Resuscitate)     Medical Interventions (Patient has pulse or is breathing):    Full Support      Advanced Directives: Advance Directive Status: Patient does not have advance directive   Family/Support: Frida Vazquez (dtr)    Goals of Care: TBD.    HPI: Karla Cristobal is a 87 y.o. female with PMH significant for ESRD on HD MWF, Afib on anticoagulation, T2DM, CAD s/p stent placement, HFrEF with ECHO on 7/28/22 with LVEF 37%, s/p biV ICD, endometrial cancer, arthritis, HTN, HLD, macular degeneration. Patient presented from home via EMS to [facility] ER on 11/26 after fall at home. Work up revealed multiple compression fractures of T1, L1, and L3 with intractable back pain, thrombocytopenia which appears chronic, hyperkalemia, AFTAB using CPAP at hs, UTI with positive cultures for enterococcus faecalis and Klebsiella. Hospitalization  "complicated by hypotension and transferred to ICU. Nephrology following for HD, hypotension preventing HD, vasopressors initiated. Patient now off Levo and resuming HD. Palliative Care consulted for GOC in the context of complex medical decision making.   No family at bedside. Patient alert and oriented at time of visit. Patient reports she, \"does not feel good, I don't feel normal\". Patient reports that she lives with her daughter, Frida, used a walker at baseline prior to her fall. She used 2LNC oxygen at night at home. She has had a productive cough for a \"few months\" now, which she finds exhausting. She denies pain to her back with lying still, however, with any movement increases to 4-5/10, she describes the pain as dull, aching.   Patient is not aware of her LBM. She denies anxiety and reports she has been sleeping well. She is agreeable to this provider calling her daughter.     ROS: +pain, back acute due to fall and compression fractures, constant dull, aching, improved with laying still, worse with any type of movement, 4-5/10 with movement. +SOA/cough, ongoing for several months, productive. +constipation. +debility +bedbound due to fractures/pain. Denies anxiety, N/V, HA, chest pain,  diarrhea.       Past Medical History:   Diagnosis Date   • Acute bronchitis    • Arthritis    • Atrial fibrillation (HCC)    • CAD (coronary artery disease)     s/p MI 2005; 3 stents inserted    • Change in mole    • Change in mole    • Change in nevus 6/16/2016   • Chronic kidney disease     stage IV-sees Dr Bk Mckeon every 3-4 months    • Chronic renal disease, stage 4, severely decreased glomerular filtration rate between 15-29 mL/min/1.73 square meter (HCC)    • Chronic systolic heart failure (HCC)    • Congestive heart disease (HCC)    • Conjunctivitis    • Deep vein thrombosis of lower extremity (HCC)    • Diabetes mellitus (HCC)    • Diabetes mellitus (HCC) 6/16/2016    Impression: 03/15/2016 - blood sugar 166 " and hgn a1c 7.3%  is up to date with eye exam  neuropathy with callus, no ulcer  some scratches feet  ur alb due and ordered  no change other than provided samples of toujeo because she feels like lantus worked much better than levemir, she has tried titrating levemir and it is less effective  continue testing and f/u 3-4 months  Impression: 11/23/2015 - bl   • Diabetic foot ulcer (HCC) 6/16/2016   • Dog bite of hand    • Easy bruising    • Endometrial cancer (HCC)     partial hysterectomy; radiation as well    • Foot pain    • Foot pain 6/16/2016    Description: lancinating- worse but not consistent enough to want rx   • Fracture of hip (HCC)    • Generalized ischemic myocardial dysfunction 6/16/2016   • Glaucoma     drops daily    • HTN 6/16/2016    Impression: 03/15/2016 - bp is good  Impression: 03/15/2016 - cmp, lipids, urine micro  Impression: 11/23/2015 - stable  Impression: 03/24/2015 - urine micro  Impression: 11/18/2014 - bp is good  Impression: 11/18/2014 - stable  Impression: 07/17/2014 - bp is good;    • Hyperlipidemia    • Hypertension    • Hypothyroidism    • Insomnia    • Ischemic heart disease    • Macular degeneration    • Methicillin resistant Staphylococcus aureus infection 6/16/2016   • Myocardial infarction (HCC) 2005   • Nausea with vomiting    • Pericardial effusion    • Shortness of breath 6/16/2016    Impression: 03/24/2015 - chronic. On 2 l oxygen at night. check cbc, bnp;    • Skin cancer, basal cell     nose, leg    • Skin lesion 6/16/2016    Impression: 03/24/2015 - refer derm for eval- seb kerat ant tib and ?ak medially with redness and irritation;    • Sleep apnea     oxygen at night due to low sats but no cpap   • Type 2 diabetes mellitus (HCC)    • UTI (urinary tract infection) 06/12/2020    currently taking antibiotics-patient's daughter notified Dr Beal's office and office said it should not delay generator change      Past Surgical History:   Procedure Laterality Date   •  CARDIAC CATHETERIZATION     • CARDIAC DEFIBRILLATOR PLACEMENT     • CARDIAC ELECTROPHYSIOLOGY PROCEDURE N/A 07/17/2020    Procedure: ICD BIV  generator change- SJM- 3-6 weeks;  Surgeon: Tano Beal MD;  Location:  [facility];  Service: Cardiology;  Laterality: N/A;   • CHOLECYSTECTOMY     • COLONOSCOPY N/A 07/30/2022    Procedure: COLONOSCOPY;  Surgeon: Brunner, Mark I, MD;  Location:  [facility];  Service: Gastroenterology;  Laterality: N/A;  WITH 3 RESOLUTION CLIPS   • CORONARY ARTERY BYPASS GRAFT  2000   • CORONARY STENT PLACEMENT      3 stents    • DIALYSIS FISTULA CREATION     • ENDOSCOPY N/A 11/17/2021    Procedure: ESOPHAGOGASTRODUODENOSCOPY;  Surgeon: Brunner, Mark I, MD;  Location:  [facility];  Service: Gastroenterology;  Laterality: N/A;   • EYE SURGERY Bilateral     cataract extraction    • HIP SURGERY Left     x3   • HYSTERECTOMY      partial    • KNEE ARTHROPLASTY Bilateral    • PACEMAKER IMPLANTATION     • TONSILLECTOMY       Social History     Socioeconomic History   • Marital status:    Tobacco Use   • Smoking status: Never   • Smokeless tobacco: Never   Vaping Use   • Vaping Use: Never used   Substance and Sexual Activity   • Alcohol use: No   • Drug use: No   • Sexual activity: Defer     Family History   Problem Relation Age of Onset   • Breast cancer Other    • Diabetes Other    • Esophageal cancer Other    • Hypertension Other    • Transient ischemic attack Other    • Breast cancer Mother    • Cancer Father        Allergies   Allergen Reactions   • Bactrim [Sulfamethoxazole-Trimethoprim] GI Intolerance   • Lisinopril Cough   • Rocephin [Ceftriaxone] Itching     Has tolerated Zosyn, Unasyn, and ceftriaxone several times prior to this.  Has had Rx for cefdinir, Augmentin, and cephalexin in past   • Codeine Rash       Current medication reviewed for route, type, dose and frequency and are current per MAR at time of dictation.    Palliative Performance Scale  "Score:  30%    BP (!) 65/33   Pulse 76   Temp 97.6 °F (36.4 °C) (Oral)   Resp 20   Ht 162.6 cm (64\")   Wt 77.9 kg (171 lb 11.8 oz)   LMP  (LMP Unknown) Comment: last mammogram -unsure   SpO2 98%   BMI 29.48 kg/m²     Intake/Output Summary (Last 24 hours) at 11/30/2022 0825  Last data filed at 11/29/2022 2000  Gross per 24 hour   Intake 240 ml   Output 2790 ml   Net -2550 ml       Physical Exam:    General Appearance:    Patient awake, chronically ill appearing, frail, alert, cooperative, NAD   HEENT:    NC/AT, EOMI, anicteric, MMM, face relaxed, NC in place   Neck:   supple, trachea midline, no JVD   Lungs:     Crackles to all fields, diminished in bases; respirations regular, even and unlabored; cough productive white/yellow sputum out, RR 24-26 on exam, 1L NC    Heart:    RRR, normal S1 and S2, no M/R/G   Abdomen:     Hypoactive bowel sounds, soft, non-tender, distended   G/U:   anuric   MSK/Extremities:   Wasting, +1 edema all extremities, scattered bruising to BUE's   Pulses:   Pulses palpable and equal bilaterally   Skin:   Warm, dry   Neurologic:   A/Ox3, cooperative, BOWERS   Psych:   Calm, appropriate         Labs:   Results from last 7 days   Lab Units 11/30/22  0516   WBC 10*3/mm3 5.17   HEMOGLOBIN g/dL 13.2   HEMATOCRIT % 39.8   PLATELETS 10*3/mm3 61*     Results from last 7 days   Lab Units 11/30/22  0516   SODIUM mmol/L 133*   POTASSIUM mmol/L 3.1*   CHLORIDE mmol/L 94*   CO2 mmol/L 22.0   BUN mg/dL 27*   CREATININE mg/dL 3.97*   GLUCOSE mg/dL 101*   CALCIUM mg/dL 8.8     Results from last 7 days   Lab Units 11/30/22  0516 11/29/22  0822 11/28/22  0329   SODIUM mmol/L 133*   < > 133*   POTASSIUM mmol/L 3.1*   < > 5.1   CHLORIDE mmol/L 94*   < > 95*   CO2 mmol/L 22.0   < > 20.0*   BUN mg/dL 27*   < > 48*   CREATININE mg/dL 3.97*   < > 5.39*   CALCIUM mg/dL 8.8   < > 9.0   BILIRUBIN mg/dL  --   --  1.5*   ALK PHOS U/L  --   --  243*   ALT (SGPT) U/L  --   --  12   AST (SGOT) U/L  --   --  27 "   GLUCOSE mg/dL 101*   < > 117*    < > = values in this interval not displayed.     Imaging Results (Last 72 Hours)     Procedure Component Value Units Date/Time    XR Wrist 3+ View Left [[id number]] Collected: 11/29/22 1039     Updated: 11/29/22 1048    Narrative:      DATE OF EXAM: 11/29/2022 10:11 AM     PROCEDURE: XR WRIST 3+ VW LEFT-     INDICATIONS: swelling and pain; S32.010A-Wedge compression fracture of  first lumbar vertebra, initial encounter for closed fracture;  S32.030A-Wedge compression fracture of third lumbar vertebra, initial  encounter for closed fracture; S22.010A-Wedge compression fracture of  first thoracic vertebra, initial encounter for closed fracture;  W19.XXXA-Unspecified fall, initial encounter; Z74.09-Other reduced  mobility     COMPARISON: Left wrist radiographs 8/8/2015     TECHNIQUE: 3 images of the left wrist     FINDINGS:  The bones are demineralized limiting assessment for occult nondisplaced  fractures. A peripheral IV is present within the radial soft tissues of  the distal forearm. There are scattered vascular calcifications. There  is chondrocalcinosis of the TFCC. There is soft tissue fullness or  swelling at the dorsal wrist. There is a chronic ununited fracture  through the waist of the scaphoid, present on prior wrist radiographs  series. No evidence of acute fracture. There is mild widening of the  scapholunate interval with some joint space narrowing of the  radioscaphoid joint space and mild proximal migration of the capitate  compatible with early sequelae of SLAC wrist. There are moderate to  severe osteoarthritic changes of the triscaphe and first carpal  metacarpal joints.       Impression:      No definite acute fracture or traumatic malalignment.     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     Chronic ununited scaphoid waist fracture.     Degenerative changes.     This report was finalized on 11/29/2022 10:44 AM by Walt  MD Woody.       XR Hand 3+ View Right [[id number]] Collected: 11/28/22 0810     Updated: 11/28/22 0816    Narrative:      DATE OF EXAM: 11/27/2022 5:48 PM     PROCEDURE: XR HAND 3+ VW RIGHT-     INDICATIONS: Hand pain post falll; S32.010A-Wedge compression fracture  of first lumbar vertebra, initial encounter for closed fracture;  S32.030A-Wedge compression fracture of third lumbar vertebra, initial  encounter for closed fracture; S22.010A-Wedge compression fracture of  first thoracic vertebra, initial encounter for closed fracture;  W19.XXXA-Unspecified fall, initial encounter; Z74.09-Other reduced mobil     COMPARISON: None     TECHNIQUE: 3 images of the right hand     FINDINGS:  No definite soft tissue swelling. The bones are diffusely demineralized  which limits assessment for occult nondisplaced fractures. No acute  fracture or traumatic malalignment. There are moderate to severe  scattered interphalangeal osteoarthritic changes. There is severe  osteoarthritic change of the first carpometacarpal joint and triscaphe  joint. There is chondrocalcinosis of the TFCC with widening of the  scapholunate interval and proximal migration of the capitate, compatible  sequelae of CPPD and SLAC wrist. There are scattered vascular  calcifications.       Impression:      No acute osseous findings.     CPPD and SLAC wrist.     Degenerative changes as above.     This report was finalized on 11/28/2022 8:13 AM by Walt Mckay MD.       XR Chest 1 View [[id number]] Collected: 11/27/22 2344     Updated: 11/28/22 0147    Narrative:      Examination: AP view of the chest    Indication: Shortness of breath    Comparison: 7/31/2022    Findings:  The cardiomediastinal silhouette is enlarged. Thoracic aortic calcifications are present. Status post median sternotomy. A defibrillator is present in the left chest wall. A dual lumen right internal jugular venous line is present on the right.    There is no consolidative airspace  disease, pleural effusion or pulmonary edema. There is no pneumothorax.     No acute osseous abnormality is identified.      Impression:      Impression:  No evidence of an acute pleural or pulmonary parenchymal abnormality.    Electronically signed by:  Michael Pittman M.D.    11/27/2022 11:45 PM Mountain Time    US Liver [[id number]] Collected: 11/27/22 1400     Updated: 11/27/22 1408    Narrative:      DATE OF EXAM: 11/27/2022 10:06 AM     PROCEDURE: US LIVER-     INDICATIONS: concern for cirrhosis; S32.010A-Wedge compression fracture  of first lumbar vertebra, initial encounter for closed fracture;  S32.030A-Wedge compression fracture of third lumbar vertebra, initial  encounter for closed fracture; S22.010A-Wedge compression fracture of  first thoracic vertebra, initial encounter for closed fracture;  W19.XXXA-Unspecified fall, initial encounter; Z74.09-Other reduced mobi     COMPARISON: CT abdomen pelvis 7/8/2022     TECHNIQUE: Grayscale and color Doppler ultrasound evaluation of the  limited abdomen was performed.     FINDINGS:  The pancreas was not well visualized. Due to acute lumbar spine  fracture, the patient had limited tolerance for positioning and exam.     Somewhat limited evaluation demonstrates no evidence of focal hepatic  parenchymal lesion or definite intrahepatic biliary ductal dilatation.     Prior cholecystectomy     Redemonstrated prominent extrahepatic common bile duct, 10 mm likely due  to postcholecystectomy state.     The right kidney was not well seen.       Impression:      Limited exam due to patient being acute pain due to  fracture. There is difficulty with patient positioning intolerance to  exam. No focal hepatic parenchymal mass or or intrahepatic biliary  ductal dilatation.     This report was finalized on 11/27/2022 2:05 PM by Terrence Mckeon.               Lab 11/29/22  0521   HEMOGLOBIN A1C 6.10*         Diagnostics: Reviewed    A:   Patient Active Problem List   Diagnosis   •  Chronic atrial fibrillation   • Diabetic nephropathy (HCC)   • Diabetic retinopathy (HCC)   • Malignant neoplasm of endometrium (HCC)   • Hyperlipidemia LDL goal <100   • Hypothyroidism   • Insomnia   • Memory impairment   • Nausea   • Pulmonary embolism (HCC)   • AFTAB   • Squamous cell carcinoma of skin   • CHF (NYHA class IV, ACC/AHA stage D)   • Ischemic heart disease   • CAD s/p stents   • DVT of lower extremity (deep venous thrombosis) (HCC)   • Chronic systolic congestive heart failure (HCC)   • Morbidly obese (HCC)   • Ischemic cardiomyopathy   • T2DM   • Exudative age-related macular degeneration, right eye, with active choroidal neovascularization (HCC)   • Hypoxia   • Food impaction of esophagus   • Malignant tumor of ascending colon (HCC)   • GIB (gastrointestinal bleeding)   • Normocytic anemia   • Acute renal failure (ARF) (HCC)   • Hypoalbuminemia   • Lower GI bleed   • Multiple spinal compression fractures   • ESRD on HD   • Thrombocytopenia   • Hypotension     87 y.o. female with ESRD, HFrEF, thrombocytopenia.     S/S:   1. Pain -acute back pain MSK due to compression fractures  -continue Tylenol 650mg PO q 4 hours prn pain  -recommend Hydrocodone-Acetaminophen 5-325mg PO q 8 hours prn pain (hold for hypotension)  -recommend decreasing Hydromorphone to 0.25mg IV q 4 hours prn (hold for hypotension)  -started lidocaine patches to back    2. Constipation -started Senna-S 2 tablets PO nightly  -one time Bisacodyl HI today  -Bisacodyl HI daily prn constipation    3. Debility -pain with movement due to compression fractures, adequate pain control necessary to increase movement which is complicated by hypotension  -PT/OT eval   -potentially new baseline due to fractures    4. SOA/Cough -currently on 1LNC, cough is consistent and source of iritation/fatigue for patient  -persistent CHF cough complicated by ESRD    5. GOC -Full Code/Full Support -patient considering code status after discussion  -long  discussion regarding patient's condition, code status, and symptoms  -plan to call daughter Frida and discuss GOC with patient's permission, please see extensive discussion below    P: Introduced Palliative Care and services to patient. Patient reports symptoms above, medication adjustments and recommendations made above. Long discussion with patient regarding her understanding of her condition and patient is understanding of her CHF and ESRD. Long discussion regarding code status in the setting of chronic co-morbidities and decreased functional status. She is considering her code status. Permission received from patient to call and discuss GOC with her daughter.   Long discussion with daughter Frida and granddaughter Maria Guadalupe on phone at [phone]. Introduced Palliative Care and services. Long discussion regarding patient condition and risk for further decline as well as complexity of care. Discussed code status and family report they will discuss with patient tonight. This provider to follow up with patient regarding code status tomorrow. Long discussion regarding continued Full Treatment versus comfort focused plan of care with discontinuation of HD. Discussed outpatient hospice and prognosis of approximately 2 weeks with discontinuation of HD. Discussed continued Full Treatment with discharge to SNF for STR at a facility that offers HD as well as need for 24/7 assistance/nursing care on going due to compression fractures/co-morbidities. Family report they plan to come see patient tonight and discuss patient's goals with patient. Discussed following up with patient tomorrow morning and calling family tomorrow as well, they are in agreement. All questions and concerns addressed.   Thank you for this consult and allowing us to participate in patient's plan of care. Palliative Care Team will continue to follow patient. Please do not hesitate to contact us regarding further symptom management or goals of care  needs.   Time: 90 minutes spent reviewing medical and medication records, assessing and examining patient, discussing with family, answering questions, providing some guidance about a plan and documentation of care, and coordinating care with other healthcare members, with > 50% time spent face to face.         NESS Mclaughlin  11/30/2022      Electronically signed by Rosalva Aguilar APRN at 11/30/22 0863

## 2022-12-02 NOTE — DISCHARGE PLACEMENT REQUEST
"Case Management  Jeanette Sweet -[phone]    David Mead (87 y.o. Female)     Date of Birth   1935    Social Security Number       Address   [address] [place], [place] [address] [facility] [address]    Home Phone   [phone]    MRN   [id number]       S[employer]   Congregational    Marital Status                               Admission Date   11/26/22    Admission Type   Emergency    Admitting Provider   Shea Smith DO    Attending Provider   Shea Smith DO    Department, Room/Bed   [facility] 3F, S305/1       Discharge Date       Discharge Disposition       Discharge Destination                               Attending Provider: Shea Smith DO    Allergies: Bactrim [Sulfamethoxazole-trimethoprim], Lisinopril, Rocephin [Ceftriaxone], Codeine    Isolation: None   Infection: None   Code Status: No CPR    Ht: 162.6 cm (64\")   Wt: 77.9 kg (171 lb 11.8 oz)    Admission Cmt: None   Principal Problem: Hypotension [I95.9]                 Active Insurance as of 11/26/2022     Primary Coverage     Payor Plan Insurance Group Employer/Plan Group    MEDICARE MEDICARE A & B      Payor Plan Address Payor Plan Phone Number Payor Plan Fax Number Effective Dates    [address] [phone]  7/1/2001 - None Entered    [facility] [address]       Subscriber Name Subscriber Birth Date Member ID       DAVID MEAD 1935 [id number]           Secondary Coverage     Payor Plan Insurance Group Employer/Plan Group    [place]      Payor Plan Address Payor Plan Phone Number Payor Plan Fax Number Effective Dates    [address] [phone]  7/1/2020 - None Entered    [place] [address]       Subscriber Name Subscriber Birth Date Member ID       DAVID MEAD 1935 [id number]           Tertiary Coverage     Payor Plan Insurance Group Employer/Plan Group    [place] [employer] [facility]     Payor Plan Address Payor Plan Phone Number Payor Plan " Fax Number Effective Dates    [phone]  2001 - None Entered    [place] [address]       Subscriber Name Subscriber Birth Date Member ID       KARLA MEAD 1935 [id number]                 Emergency Contacts      (Rel.) Home Phone Work Phone Mobile Phone    Frida Vazquez (Daughter) [phone] -- --            Physician Progress Notes (last 24 hours)  Notes from 22 152 through 22   No notes of this type exist for this encounter.            Physical Therapy Notes (most recent note)      Chuy Teixeira, PT at 2230  Version 1 of 1         Patient Name: Karla Mead  : 1935    MRN: [id number]                              Today's Date: 2022       Admit Date: 2022    Visit Dx:     ICD-10-CM ICD-9-CM   1. Compression fracture of L1 vertebra, initial encounter ([facility])  S32.010A 805.4   2. Compression fracture of L3 lumbar vertebra, closed, initial encounter ([facility])  S32.030A 805.4   3. Compression fracture of T1 vertebra, initial encounter ([facility])  S22.010A 805.2   4. Fall, initial encounter  W19.XXXA E888.9   5. Impaired mobility and activities of daily living  Z74.09 V49.89    Z78.9    6. ESRD (end stage renal disease) on dialysis ([facility])  N18.6 585.6    Z99.2 V45.11   7. Urinary tract infection with hematuria, site unspecified  N39.0 599.0    R31.9 599.70     Patient Active Problem List   Diagnosis   • Chronic atrial fibrillation (HCC)   • CKD (chronic kidney disease), stage IV (HCC)   • Diabetic nephropathy (HCC)   • Diabetic retinopathy (HCC)   • Malignant neoplasm of endometrium (HCC)   • Hyperlipidemia LDL goal <100   • Essential hypertension   • Hypothyroidism   • Insomnia   • Leukocytosis   • Memory impairment   • Nausea   • Pulmonary embolism (HCC)   • Sleep apnea   • Squamous cell carcinoma of skin   • CHF (NYHA class IV, ACC/AHA stage D) (HCC)   • Ischemic heart disease   • CAD (coronary artery disease)   • DVT of lower extremity  (deep venous thrombosis) (HCC)   • Chronic systolic congestive heart failure (HCC)   • Morbidly obese (HCC)   • Ischemic cardiomyopathy   • Type 2 diabetes mellitus without complication, with long-term current use of insulin (HCC)   • Exudative age-related macular degeneration, right eye, with active choroidal neovascularization (HCC)   • Hypoxia   • Cellulitis of left lower extremity   • Left leg cellulitis   • Food impaction of esophagus   • Malignant tumor of ascending colon (HCC)   • GIB (gastrointestinal bleeding)   • Normocytic anemia   • Acute renal failure (ARF) ([facility])   • COVID-19 virus detected   • Thrombocytopenia (HCC)   • Hypoalbuminemia   • AMS (altered mental status)   • Lower GI bleed   • Compression fracture of L1 vertebra, initial encounter ([facility])   • ESRD (end stage renal disease) ([facility])   • Thrombocytopenia ([facility])     Past Medical History:   Diagnosis Date   • Acute bronchitis    • Arthritis    • Atrial fibrillation ([facility])    • CAD (coronary artery disease)     s/p MI 2005; 3 stents inserted    • Change in mole    • Change in mole    • Change in nevus 6/16/2016   • Chronic kidney disease     stage IV-sees Dr Bk Mckeon every 3-4 months    • Chronic renal disease, stage 4, severely decreased glomerular filtration rate between 15-29 mL/min/1.73 square meter ([facility])    • Chronic systolic heart failure ([facility])    • Congestive heart disease ([facility])    • Conjunctivitis    • Deep vein thrombosis of lower extremity ([facility])    • Diabetes mellitus ([facility])    • Diabetes mellitus ([facility]) 6/16/2016    Impression: 03/15/2016 - blood sugar 166 and hgn a1c 7.3%  is up to date with eye exam  neuropathy with callus, no ulcer  some scratches feet  ur alb due and ordered  no change other than provided samples of toujeo because she feels like lantus worked much better than levemir, she has tried titrating levemir and it is less effective  continue testing and f/u 3-4 months  Impression: 11/23/2015 - bl   • Diabetic foot ulcer  ([facility]) 6/16/2016   • Dog bite of hand    • Easy bruising    • Endometrial cancer (formerly [facility])     partial hysterectomy; radiation as well    • Foot pain    • Foot pain 6/16/2016    Description: lancinating- worse but not consistent enough to want rx   • Fracture of hip (formerly [facility])    • Generalized ischemic myocardial dysfunction 6/16/2016   • Glaucoma     drops daily    • Hyperlipidemia    • Hypertension    • Hypothyroidism    • Insomnia    • Ischemic heart disease    • Macular degeneration    • Methicillin resistant Staphylococcus aureus infection 6/16/2016   • Myocardial infarction (formerly [facility]) 2005   • Nausea with vomiting    • Pericardial effusion    • Shortness of breath 6/16/2016    Impression: 03/24/2015 - chronic. On 2 l oxygen at night. check cbc, bnp;    • Skin cancer, basal cell     nose, leg    • Skin lesion 6/16/2016    Impression: 03/24/2015 - refer derm for eval- seb kerat ant tib and ?ak medially with redness and irritation;    • Sleep apnea     oxygen at night due to low sats but no cpap   • Type 2 diabetes mellitus (formerly [facility])    • UTI (urinary tract infection) 06/12/2020    currently taking antibiotics-patient's daughter notified Dr Beal's office and office said it should not delay generator change      Past Surgical History:   Procedure Laterality Date   • CARDIAC CATHETERIZATION     • CARDIAC DEFIBRILLATOR PLACEMENT     • CARDIAC ELECTROPHYSIOLOGY PROCEDURE N/A 07/17/2020    Procedure: ICD BIV  generator change- [facility]- 3-6 weeks;  Surgeon: Tano Beal MD;  Location: [facility] EP INVASIVE LOCATION;  Service: Cardiology;  Laterality: N/A;   • CHOLECYSTECTOMY     • COLONOSCOPY N/A 07/30/2022    Procedure: COLONOSCOPY;  Surgeon: Brunner, Mark I, MD;  Location: [facility] ENDOSCOPY;  Service: Gastroenterology;  Laterality: N/A;  WITH 3 RESOLUTION CLIPS   • CORONARY ARTERY BYPASS GRAFT  2000   • CORONARY STENT PLACEMENT      3 stents    • DIALYSIS FISTULA CREATION     • ENDOSCOPY N/A 11/17/2021    Procedure:  ESOPHAGOGASTRODUODENOSCOPY;  Surgeon: Brunner, Mark I, MD;  Location: [facility] ENDOSCOPY;  Service: Gastroenterology;  Laterality: N/A;   • EYE SURGERY Bilateral     cataract extraction    • HIP SURGERY Left     x3   • HYSTERECTOMY      partial    • KNEE ARTHROPLASTY Bilateral    • PACEMAKER IMPLANTATION     • TONSILLECTOMY        General Information     Row Name 11/27/22 1107          Physical Therapy Time and Intention    Document Type evaluation  -FW     Mode of Treatment physical therapy  -FW     Row Name 11/27/22 1107          General Information    Patient Profile Reviewed yes  -FW     Prior Level of Function independent:;all household mobility;gait;transfer;mod assist:;max assist:;community mobility;w/c or scooter;home management;ADL's;dressing;bathing  rollator for household mobility, wc for community, shower bench; 24/7 assist at home  -FW     Existing Precautions/Restrictions fall;spinal  -FW     Barriers to Rehab previous functional deficit;medically complex  -FW     Row Name 11/27/22 1107          Living Environment    People in Home child(karyna), adult;grandchild(karyna);other relative(s)  lives with adult daughter/son in-law and grandchildren  -FW     Row Name 11/27/22 1107          Home Main Entrance    Number of Stairs, Main Entrance none  ramped entrace with living space on first floor with no steps  -FW     Row Name 11/27/22 1107          Stairs Within Home, Primary    Number of Stairs, Within Home, Primary none  -FW     Row Name 11/27/22 1107          Cognition    Orientation Status (Cognition) oriented x 4  -FW     Row Name 11/27/22 1107          Safety Issues, Functional Mobility    Safety Issues Affecting Function (Mobility) safety precaution awareness;safety precautions follow-through/compliance;sequencing abilities  -FW     Impairments Affecting Function (Mobility) pain;strength;endurance/activity tolerance  -FW           User Key  (r) = Recorded By, (t) = Taken By, (c) = Cosigned By    Initials  Name Provider Type    FW Chyu Teixeira PT Physical Therapist               Mobility     Row Name 11/27/22 1111          Bed Mobility    Bed Mobility rolling left;rolling right;sidelying-sit;sit-sidelying  -FW     Rolling Left Burke (Bed Mobility) moderate assist (50% patient effort)  -FW     Rolling Right Burke (Bed Mobility) moderate assist (50% patient effort)  -FW     Sidelying-Sit Burke (Bed Mobility) maximum assist (25% patient effort);2 person assist  -FW     Sit-Sidelying Burke (Bed Mobility) maximum assist (25% patient effort);2 person assist  -FW     Assistive Device (Bed Mobility) bed rails;draw sheet  -FW     Comment, (Bed Mobility) vc for sequencing  -FW     Row Name 11/27/22 1111          Bed-Chair Transfer    Bed-Chair Burke (Transfers) dependent (less than 25% patient effort);2 person assist  -FW     Assistive Device (Bed-Chair Transfers) lift device  -FW     Row Name 11/27/22 1111          Sit-Stand Transfer    Sit-Stand Burke (Transfers) not tested  -FW     Comment, (Sit-Stand Transfer) OOB deferred secondary to pain and symptomatic low BP  -FW     Row Name 11/27/22 1111          Gait/Stairs (Locomotion)    Burke Level (Gait) not tested  -FW     Comment, (Gait/Stairs) OOB deferred secondary to pain and symptomatic low BP  -FW           User Key  (r) = Recorded By, (t) = Taken By, (c) = Cosigned By    Initials Name Provider Type    FW Chuy Teixeria PT Physical Therapist               Obj/Interventions     Row Name 11/27/22 1112          Range of Motion Comprehensive    General Range of Motion other (see comments);bilateral lower extremity ROM WFL  -FW     Row Name 11/27/22 1112          Strength Comprehensive (MMT)    General Manual Muscle Testing (MMT) Assessment other (see comments)  -FW     Comment, General Manual Muscle Testing (MMT) Assessment NT this date secondary to increased pain  -FW     Row Name 11/27/22 1112          Balance     Balance Assessment sitting static balance;sitting dynamic balance  -FW     Static Sitting Balance contact guard  -FW     Dynamic Sitting Balance contact guard;minimal assist  -FW     Position, Sitting Balance unsupported;sitting edge of bed  -FW     Row Name 11/27/22 1112          Sensory Assessment (Somatosensory)    Sensory Assessment (Somatosensory) LE sensation intact  -FW           User Key  (r) = Recorded By, (t) = Taken By, (c) = Cosigned By    Initials Name Provider Type    FW Chuy Teixeira PT Physical Therapist               Goals/Plan     Row Name 11/27/22 1122          Bed Mobility Goal 1 (PT)    Activity/Assistive Device (Bed Mobility Goal 1, PT) rolling to left;rolling to right;sidelying to sit/sit to sidelying  -FW     Hooker Level/Cues Needed (Bed Mobility Goal 1, PT) minimum assist (75% or more patient effort);moderate assist (50-74% patient effort)  -FW     Time Frame (Bed Mobility Goal 1, PT) long term goal (LTG);10 days  -FW     Row Name 11/27/22 1122          Transfer Goal 1 (PT)    Activity/Assistive Device (Transfer Goal 1, PT) sit-to-stand/stand-to-sit;bed-to-chair/chair-to-bed  -FW     Hooker Level/Cues Needed (Transfer Goal 1, PT) moderate assist (50-74% patient effort)  -FW     Time Frame (Transfer Goal 1, PT) long term goal (LTG);10 days  -FW     Row Name 11/27/22 1122          Therapy Assessment/Plan (PT)    Planned Therapy Interventions (PT) balance training;bed mobility training;home exercise program;patient/family education;strengthening;stretching;transfer training;lumbar stabilization;gait training  -FW           User Key  (r) = Recorded By, (t) = Taken By, (c) = Cosigned By    Initials Name Provider Type    FW Chuy Teixeira PT Physical Therapist               Clinical Impression     Row Name 11/27/22 1114          Pain    Pretreatment Pain Rating 5/10  -FW     Posttreatment Pain Rating 5/10  -FW     Pain Location lower  -FW     Pain Location - back  -FW      Pain Intervention(s) Repositioned;Ambulation/increased activity  -FW     Row Name 11/27/22 1114          Plan of Care Review    Plan of Care Reviewed With patient  -FW     Outcome Evaluation Pt evaluation limited by symptomatic low BP, sensation of n/v, and increased pain. Pt rolled left and right mod A and went from sidelying to sitting EOB max A. Pt w/ c/o of dizziness, increased pain, and sensation of n/v upon sitting EOB. Pt requested to defer further mobility and was transferred to recliner depedently via lift. IPPT warranted. Recommend dc SnF. Pt may benefit from neurosurgery consult if pain continues to limit OOB mobility.  -     Row Name 11/27/22 1114          Therapy Assessment/Plan (PT)    Patient/Family Therapy Goals Statement (PT) to return home with family  -FW     Rehab Potential (PT) fair, will monitor progress closely  -FW     Criteria for Skilled Interventions Met (PT) yes;meets criteria;skilled treatment is necessary  -FW     Therapy Frequency (PT) daily  -     Row Name 11/27/22 1114          Vital Signs    Pre Systolic BP Rehab 86  -FW     Pre Treatment Diastolic BP 59  -FW     Post Systolic BP Rehab 96  -FW     Post Treatment Diastolic BP 76  -FW     Pre SpO2 (%) 95  -FW     O2 Delivery Pre Treatment supplemental O2  -FW     O2 Delivery Intra Treatment room air  -FW     Post SpO2 (%) 96  -FW     O2 Delivery Post Treatment supplemental O2  -FW     Pre Patient Position Supine  -FW     Intra Patient Position Sitting  -FW     Post Patient Position Sitting  -FW     Row Name 11/27/22 1114          Positioning and Restraints    Pre-Treatment Position in bed  -FW     Post Treatment Position chair  -FW     In Chair notified nsg;reclined;sitting;call light within reach;encouraged to call for assist;exit alarm on;legs elevated;waffle cushion;on mechanical lift sling;with nsg  -FW           User Key  (r) = Recorded By, (t) = Taken By, (c) = Cosigned By    Initials Name Provider Type    RAFITA Teixeira  PAVEL Vera Physical Therapist               Outcome Measures     Row Name 11/27/22 1124          How much help from another person do you currently need...    Turning from your back to your side while in flat bed without using bedrails? 2  -FW     Moving from lying on back to sitting on the side of a flat bed without bedrails? 2  -FW     Moving to and from a bed to a chair (including a wheelchair)? 1  -FW     Standing up from a chair using your arms (e.g., wheelchair, bedside chair)? 1  -FW     Climbing 3-5 steps with a railing? 1  -FW     To walk in hospital room? 1  -FW     AM-PAC 6 Clicks Score (PT) 8  -FW     Highest level of mobility 3 --> Sat at edge of bed  -FW     Row Name 11/27/22 1124          Functional Assessment    Outcome Measure Options AM-PAC 6 Clicks Basic Mobility (PT)  -           User Key  (r) = Recorded By, (t) = Taken By, (c) = Cosigned By    Initials Name Provider Type     Chuy Teixeira PT Physical Therapist                             Physical Therapy Education     Title: PT OT SLP Therapies (In Progress)     Topic: Physical Therapy (In Progress)     Point: Mobility training (Done)     Learning Progress Summary           Patient Acceptance, E, VU by  at 11/27/2022 1125                   Point: Home exercise program (Not Started)     Learner Progress:  Not documented in this visit.          Point: Body mechanics (Done)     Learning Progress Summary           Patient Acceptance, E, VU by  at 11/27/2022 1125                   Point: Precautions (Done)     Learning Progress Summary           Patient Acceptance, E, VU by  at 11/27/2022 1125                               User Key     Initials Effective Dates Name Provider Type Discipline     05/05/22 -  Chuy Teixeira PT Physical Therapist PT              PT Recommendation and Plan  Planned Therapy Interventions (PT): balance training, bed mobility training, home exercise program, patient/family education, strengthening,  stretching, transfer training, lumbar stabilization, gait training  Plan of Care Reviewed With: patient  Outcome Evaluation: Pt evaluation limited by symptomatic low BP, sensation of n/v, and increased pain. Pt rolled left and right mod A and went from sidelying to sitting EOB max A. Pt w/ c/o of dizziness, increased pain, and sensation of n/v upon sitting EOB. Pt requested to defer further mobility and was transferred to recliner depedently via lift. IPPT warranted. Recommend dc SnF. Pt may benefit from neurosurgery consult if pain continues to limit OOB mobility.     Time Calculation:    PT Charges     Row Name 22 1126             Time Calculation    Start Time 0930  -FW      PT Received On 22  -      PT Goal Re-Cert Due Date 22  -         Timed Charges    34353 - PT Therapeutic Activity Minutes 8  -FW         Untimed Charges    PT Eval/Re-eval Minutes 46  -FW         Total Minutes    Timed Charges Total Minutes 8  -FW      Untimed Charges Total Minutes 46  -FW       Total Minutes 54  -FW            User Key  (r) = Recorded By, (t) = Taken By, (c) = Cosigned By    Initials Name Provider Type    FW Chuy Teixeira, PT Physical Therapist              Therapy Charges for Today     Code Description Service Date Service Provider Modifiers Qty    60993496465 HC PT THERAPEUTIC ACT EA 15 MIN 2022 Chuy Teixeira, PT GP 1    94729794402 HC PT EVAL LOW COMPLEXITY 4 2022 Chuy Teixeira, PT GP 1          PT G-Codes  Outcome Measure Options: AM-PAC 6 Clicks Basic Mobility (PT)  AM-PAC 6 Clicks Score (PT): 8  PT Discharge Summary  Anticipated Discharge Disposition (PT): skilled nursing facility    Chuy Teixeira PT  2022      Electronically signed by Chuy Teixeira, PT at 22 1127          Occupational Therapy Notes (most recent note)      Juliane Hall, OT at 22 1035          Patient Name: Karla Cristobal  : 1935    MRN: [id number]                               Today's Date: 11/27/2022       Admit Date: 11/26/2022    Visit Dx:     ICD-10-CM ICD-9-CM   1. Compression fracture of L1 vertebra, initial encounter ([facility])  S32.010A 805.4   2. Compression fracture of L3 lumbar vertebra, closed, initial encounter ([facility])  S32.030A 805.4   3. Compression fracture of T1 vertebra, initial encounter ([facility])  S22.010A 805.2   4. Fall, initial encounter  W19.XXXA E888.9   5. Impaired mobility and activities of daily living  Z74.09 V49.89    Z78.9    6. ESRD (end stage renal disease) on dialysis ([facility])  N18.6 585.6    Z99.2 V45.11   7. Urinary tract infection with hematuria, site unspecified  N39.0 599.0    R31.9 599.70     Patient Active Problem List   Diagnosis   • Chronic atrial fibrillation ([facility])   • CKD (chronic kidney disease), stage IV ([facility])   • Diabetic nephropathy ([facility])   • Diabetic retinopathy ([facility])   • Malignant neoplasm of endometrium ([facility])   • Hyperlipidemia LDL goal <100   • Essential hypertension   • Hypothyroidism   • Insomnia   • Leukocytosis   • Memory impairment   • Nausea   • Pulmonary embolism (HCC)   • Sleep apnea   • Squamous cell carcinoma of skin   • CHF (NYHA class IV, ACC/AHA stage D) ([facility])   • Ischemic heart disease   • CAD (coronary artery disease)   • DVT of lower extremity (deep venous thrombosis) ([facility])   • Chronic systolic congestive heart failure ([facility])   • Morbidly obese ([facility])   • Ischemic cardiomyopathy   • Type 2 diabetes mellitus without complication, with long-term current use of insulin ([facility])   • Exudative age-related macular degeneration, right eye, with active choroidal neovascularization ([facility])   • Hypoxia   • Cellulitis of left lower extremity   • Left leg cellulitis   • Food impaction of esophagus   • Malignant tumor of ascending colon (HCC)   • GIB (gastrointestinal bleeding)   • Normocytic anemia   • Acute renal failure (ARF) (HCC)   • COVID-19 virus detected   • Thrombocytopenia (HCC)   • Hypoalbuminemia   • AMS (altered mental status)   •  Lower GI bleed   • Compression fracture of L1 vertebra, initial encounter ([facility])   • ESRD (end stage renal disease) ([facility])   • Thrombocytopenia ([facility])     Past Medical History:   Diagnosis Date   • Acute bronchitis    • Arthritis    • Atrial fibrillation ([facility])    • CAD (coronary artery disease)     s/p MI 2005; 3 stents inserted    • Change in mole    • Change in mole    • Change in nevus 6/16/2016   • Chronic kidney disease     stage IV-sees Dr Bk Mckeon every 3-4 months    • Chronic renal disease, stage 4, severely decreased glomerular filtration rate between 15-29 mL/min/1.73 square meter ([facility])    • Chronic systolic heart failure ([facility])    • Congestive heart disease ([facility])    • Conjunctivitis    • Deep vein thrombosis of lower extremity ([facility])    • Diabetes mellitus ([facility])    • Diabetes mellitus ([facility]) 6/16/2016    Impression: 03/15/2016 - blood sugar 166 and hgn a1c 7.3%  is up to date with eye exam  neuropathy with callus, no ulcer  some scratches feet  ur alb due and ordered  no change other than provided samples of toujeo because she feels like lantus worked much better than levemir, she has tried titrating levemir and it is less effective  continue testing and f/u 3-4 months  Impression: 11/23/2015 - bl   • Diabetic foot ulcer ([facility] [facility]) 6/16/2016   • Dog bite of hand    • Easy bruising    • Endometrial cancer ([facility])     partial hysterectomy; radiation as well    • Foot pain    • Foot pain 6/16/2016    Description: lancinating- worse but not consistent enough to want rx   • Fracture of hip ([facility])    • Generalized ischemic myocardial dysfunction 6/16/2016   • Glaucoma     drops daily    • Hyperlipidemia    • Hypertension    • Hypothyroidism    • Insomnia    • Ischemic heart disease    • Macular degeneration    • Methicillin resistant Staphylococcus aureus infection 6/16/2016   • Myocardial infarction ([facility]) 2005   • Nausea with vomiting    • Pericardial effusion    • Shortness of breath 6/16/2016    Impression:  03/24/2015 - chronic. On 2 l oxygen at night. check cbc, bnp;    • Skin cancer, basal cell     nose, leg    • Skin lesion 6/16/2016    Impression: 03/24/2015 - refer derm for eval- seb kerat ant tib and ?ak medially with redness and irritation;    • Sleep apnea     oxygen at night due to low sats but no cpap   • Type 2 diabetes mellitus (HCC)    • UTI (urinary tract infection) 06/12/2020    currently taking antibiotics-patient's daughter notified Dr Beal's office and office said it should not delay generator change      Past Surgical History:   Procedure Laterality Date   • CARDIAC CATHETERIZATION     • CARDIAC DEFIBRILLATOR PLACEMENT     • CARDIAC ELECTROPHYSIOLOGY PROCEDURE N/A 07/17/2020    Procedure: ICD BIV  generator change- [facility]- 3-6 weeks;  Surgeon: Tano Beal MD;  Location:  [facility];  Service: Cardiology;  Laterality: N/A;   • CHOLECYSTECTOMY     • COLONOSCOPY N/A 07/30/2022    Procedure: COLONOSCOPY;  Surgeon: Brunner, Mark I, MD;  Location:  [facility];  Service: Gastroenterology;  Laterality: N/A;  WITH 3 RESOLUTION CLIPS   • CORONARY ARTERY BYPASS GRAFT  2000   • CORONARY STENT PLACEMENT      3 stents    • DIALYSIS FISTULA CREATION     • ENDOSCOPY N/A 11/17/2021    Procedure: ESOPHAGOGASTRODUODENOSCOPY;  Surgeon: Brunner, Mark I, MD;  Location:  [facility];  Service: Gastroenterology;  Laterality: N/A;   • EYE SURGERY Bilateral     cataract extraction    • HIP SURGERY Left     x3   • HYSTERECTOMY      partial    • KNEE ARTHROPLASTY Bilateral    • PACEMAKER IMPLANTATION     • TONSILLECTOMY        General Information     Row Name 11/27/22 1117          OT Time and Intention    Document Type evaluation  -LC     Mode of Treatment occupational therapy  -     Row Name 11/27/22 1117          General Information    Patient Profile Reviewed yes  -LC     Prior Level of Function independent:;all household mobility;transfer;community mobility;w/c or scooter;min  assist:;dressing;bathing  -     Existing Precautions/Restrictions fall;spinal  -     Barriers to Rehab previous functional deficit;medically complex  -     Row Name 11/27/22 1117          Living Environment    People in Home child(karyna), adult;other relative(s)  -     Row Name 11/27/22 1117          Home Main Entrance    Number of Stairs, Main Entrance none  Ramp  -     Row Name 11/27/22 1117          Stairs Within Home, Primary    Number of Stairs, Within Home, Primary none  -     Row Name 11/27/22 1117          Cognition    Orientation Status (Cognition) oriented x 3  -     Row Name 11/27/22 1117          Safety Issues, Functional Mobility    Safety Issues Affecting Function (Mobility) insight into deficits/self-awareness;safety precaution awareness;safety precautions follow-through/compliance;sequencing abilities  -     Impairments Affecting Function (Mobility) pain;strength;endurance/activity tolerance;balance;postural/trunk control  -           User Key  (r) = Recorded By, (t) = Taken By, (c) = Cosigned By    Initials Name Provider Type     Juliane Hall OT Occupational Therapist                 Mobility/ADL's     Row Name 11/27/22 1119          Bed Mobility    Bed Mobility rolling left;rolling right;sidelying-sit-sidelying;sidelying-sit;sit-sidelying  -     Rolling Left [place] (Bed Mobility) moderate assist (50% patient effort);verbal cues  -LC     Rolling Right [place] (Bed Mobility) moderate assist (50% patient effort);verbal cues  -LC     Sidelying-Sit [place] (Bed Mobility) maximum assist (25% patient effort);2 person assist;verbal cues  -     Sit-Sidelying [place] (Bed Mobility) maximum assist (25% patient effort);2 person assist;verbal cues  -     Assistive Device (Bed Mobility) bed rails;head of bed elevated  -     Comment, (Bed Mobility) VC's to sequence log roll technique. Pt. reports dizziness and nausea sitting EOB, returned to supine. BP stable. RN  notified  -[facility] Name 11/27/22 1119          Transfers    Transfers bed-chair transfer  -     Comment, (Transfers) Transferred via lift. Deferred STS secondary to nausea and dizziness.  -[facility] Name 11/27/22 1119          Bed-Chair Transfer    Bed-Chair [place] (Transfers) dependent (less than 25% patient effort);2 person assist  -     Assistive Device (Bed-Chair Transfers) lift device  -[facility] Name 11/27/22 1119          Activities of Daily Living    BADL Assessment/Intervention grooming;lower body dressing;upper body dressing  -[facility] Name 11/27/22 1119          Grooming Assessment/Training    [place] Level (Grooming) wash face, hands;minimum assist (75% patient effort)  -     Position (Grooming) supine  -[facility] Name 11/27/22 1119          Lower Body Dressing Assessment/Training    [place] Level (Lower Body Dressing) don;socks;dependent (less than 25% patient effort)  -[facility] Name 11/27/22 1119          Upper Body Dressing Assessment/Training    [place] Level (Upper Body Dressing) don;doff;front opening garment;maximum assist (25% patient effort)  -     Position (Upper Body Dressing) edge of bed sitting  -           User Key  (r) = Recorded By, (t) = Taken By, (c) = Cosigned By    Initials Name Provider Type     Juliane Hall OT Occupational Therapist               Obj/Interventions     [facility] Name 11/27/22 1131          Sensory Assessment (Somatosensory)    Sensory Assessment (Somatosensory) UE sensation intact  -[facility] Name 11/27/22 1131          Range of Motion Comprehensive    General Range of Motion bilateral upper extremity ROM WFL  -[facility] Name 11/27/22 1131          Strength Comprehensive (MMT)    Comment, General Manual Muscle Testing (MMT) Assessment Deferred secondary to nausea, anticipate 4-/5  -[facility] Name 11/27/22 1131          Motor Skills    Motor Skills functional endurance  -     Functional Endurance impaired activity tolerance   -     Row Name 11/27/22 1131          Balance    Balance Assessment sitting static balance;sitting dynamic balance  -     Static Sitting Balance contact guard  -     Dynamic Sitting Balance minimal assist  -           User Key  (r) = Recorded By, (t) = Taken By, (c) = Cosigned By    Initials Name Provider Type     Juliane Hall OT Occupational Therapist               Goals/Plan     Row Name 11/27/22 1142          Transfer Goal 1 (OT)    Activity/Assistive Device (Transfer Goal 1, OT) sit-to-stand/stand-to-sit;bed-to-chair/chair-to-bed;walker, rolling  -     [place] Level/Cues Needed (Transfer Goal 1, OT) moderate assist (50-74% patient effort);verbal cues required  -     Time Frame (Transfer Goal 1, OT) long term goal (LTG);by discharge  -     Progress/Outcome (Transfer Goal 1, OT) goal ongoing  -     Row Name 11/27/22 1142          Dressing Goal 1 (OT)    Activity/Device (Dressing Goal 1, OT) upper body dressing;lower body dressing;long-handled shoe horn;reacher;sock-aid  -     [place]/Cues Needed (Dressing Goal 1, OT) minimum assist (75% or more patient effort);verbal cues required;moderate assist (50-74% patient effort)  -     Time Frame (Dressing Goal 1, OT) long term goal (LTG);by discharge  -     Progress/Outcome (Dressing Goal 1, OT) goal ongoing  -           User Key  (r) = Recorded By, (t) = Taken By, (c) = Cosigned By    Initials Name Provider Type     Juliane Hall OT Occupational Therapist               Clinical Impression     Row Name 11/27/22 1136          Pain Assessment    Pretreatment Pain Rating 5/10  -     Posttreatment Pain Rating 5/10  -     Pain Location lower  -     Pain Location - back  -     Pain Intervention(s) Repositioned;Ambulation/increased activity;Nursing Notified  -     Additional Documentation Pain Scale: Word Pre/Post-Treatment (Group)  -     Row Name 11/27/22 1136          Plan of Care Review    Plan of Care Reviewed With  patient  -LC     Progress no change  -LC     Outcome Evaluation Pt. completed rolling L and R with Mod A and sidelying to sit EOB to sidelying with Max A x 2. Pt. with nausea and dizziness upon sitting EOB. Returned to supine, VSS, and notified RN. Pt. T/F'd via lift to recliner DEP x 2 for safety. Pt. required Max A for UBD, Min A for grooming, and DEP for LBD. Recommend SNF at discharge.  -     Row Name 11/27/22 1136          Therapy Assessment/Plan (OT)    Patient/Family Therapy Goal Statement (OT) To return to PLOF  -     Therapy Frequency (OT) daily  -     Row Name 11/27/22 1136          Therapy Plan Review/Discharge Plan (OT)    Anticipated Discharge Disposition (OT) skilled nursing facility  -     Row Name 11/27/22 1136          Vital Signs    Pre Systolic BP Rehab 86  -LC     Pre Treatment Diastolic BP 46  -LC     Intra Systolic BP Rehab 86  -LC     Intra Treatment Diastolic BP 59  -LC     Post Systolic BP Rehab 96  -LC     Post Treatment Diastolic BP 76  -LC     Pre Patient Position Supine  -LC     Intra Patient Position Sitting  -LC     Post Patient Position Sitting  -LC     Row Name 11/27/22 1136          Positioning and Restraints    Pre-Treatment Position in bed  -LC     Post Treatment Position chair  -LC     In Chair notified nsg;reclined;call light within reach;encouraged to call for assist;exit alarm on;waffle cushion;on mechanical lift sling;legs elevated  -           User Key  (r) = Recorded By, (t) = Taken By, (c) = Cosigned By    Initials Name Provider Type     Juliane Hall, OT Occupational Therapist               Outcome Measures     Row Name 11/27/22 1143          How much help from another is currently needed...    Putting on and taking off regular lower body clothing? 1  -LC     Bathing (including washing, rinsing, and drying) 2  -LC     Toileting (which includes using toilet bed pan or urinal) 2  -LC     Putting on and taking off regular upper body clothing 2  -LC     Taking  care of personal grooming (such as brushing teeth) 3  -     Eating meals 3  -     AM-PAC 6 Clicks Score (OT) 13  -     Row Name 11/27/22 1124          How much help from another person do you currently need...    Turning from your back to your side while in flat bed without using bedrails? 2  -FW     Moving from lying on back to sitting on the side of a flat bed without bedrails? 2  -FW     Moving to and from a bed to a chair (including a wheelchair)? 1  -FW     Standing up from a chair using your arms (e.g., wheelchair, bedside chair)? 1  -FW     Climbing 3-5 steps with a railing? 1  -FW     To walk in hospital room? 1  -     AM-PAC 6 Clicks Score (PT) 8  -FW     Highest level of mobility 3 --> Sat at edge of bed  -FW     Row Name 11/27/22 1143 11/27/22 1124       Functional Assessment    Outcome Measure Options AM-PAC 6 Clicks Daily Activity (OT)  - AM-PAC 6 Clicks Basic Mobility (PT)  -          User Key  (r) = Recorded By, (t) = Taken By, (c) = Cosigned By    Initials Name Provider Type    Juliane Chase, OT Occupational Therapist    FW Chuy Teixeira, PT Physical Therapist                Occupational Therapy Education     Title: PT OT SLP Therapies (In Progress)     Topic: Occupational Therapy (In Progress)     Point: ADL training (In Progress)     Description:   Instruct learner(s) on proper safety adaptation and remediation techniques during self care or transfers.   Instruct in proper use of assistive devices.              Learning Progress Summary           Patient Acceptance, E, NR by  at 11/27/2022 1035                   Point: Home exercise program (Not Started)     Description:   Instruct learner(s) on appropriate technique for monitoring, assisting and/or progressing therapeutic exercises/activities.              Learner Progress:  Not documented in this visit.          Point: Precautions (In Progress)     Description:   Instruct learner(s) on prescribed precautions during  self-care and functional transfers.              Learning Progress Summary           Patient Acceptance, E, NR by  at 11/27/2022 1035                   Point: Body mechanics (In Progress)     Description:   Instruct learner(s) on proper positioning and spine alignment during self-care, functional mobility activities and/or exercises.              Learning Progress Summary           Patient Acceptance, E, NR by  at 11/27/2022 1035                               User Key     Initials Effective Dates Name Provider Type [facility] 06/16/21 -  Juliane Hall OT Occupational Therapist OT              OT Recommendation and Plan  Therapy Frequency (OT): daily  Plan of Care Review  Plan of Care Reviewed With: patient  Progress: no change  Outcome Evaluation: Pt. completed rolling L and R with Mod A and sidelying to sit EOB to sidelying with Max A x 2. Pt. with nausea and dizziness upon sitting EOB. Returned to supine, VSS, and notified RN. Pt. T/F'd via lift to recliner DEP x 2 for safety. Pt. required Max A for UBD, Min A for grooming, and DEP for LBD. Recommend SNF at discharge.     Time Calculation:    Time Calculation- OT     Row Name 11/27/22 1144             Time Calculation- OT    OT Start Time 1035  -      OT Received On 11/27/22  -      OT Goal Re-Cert Due Date 12/07/22  -         Untimed Charges    OT Eval/Re-eval Minutes 51  -         Total Minutes    Untimed Charges Total Minutes 51  -       Total Minutes 51  -LC            User Key  (r) = Recorded By, (t) = Taken By, (c) = Cosigned By    Initials Name Provider Type     Juliane Hall OT Occupational Therapist              Therapy Charges for Today     Code Description Service Date Service Provider Modifiers Qty    [id number]  OT EVAL LOW COMPLEXITY 4 11/27/2022 Juliane Hall OT GO 1               Juliane Hall OT  11/27/2022    Electronically signed by Juliane Hall OT at 11/27/22 1146

## 2022-12-02 NOTE — CONSULTS
Continued Stay Note  [facility]     Patient Name: Karla Cristobal  MRN: [id number]  Today's Date: 12/2/2022    Admit Date: 11/26/2022    Plan: Rehab and dialysis   Discharge Plan     Row Name 12/02/22 1703       Plan    Plan Rehab and dialysis    Plan Comments Visit made to pt's room, no family present. Telephone call to pt's daughter at 1000, daughter asked for this writer to call at 1630. Telephone call to daughter regarding hospice referral, daughter stated pt wants to go to a facility for rehab and continue with dialysis. Daughter wanted hospice information for future reference. Teaching done on hospice philosophy, goals of care and services. Daughter stated as long as pt is of a sound mind and can make own decision the family is willing to let the pt direct the plan of care. Daughter stated when the time comes that pt chooses to stop dialysis the daughter will call hospice, provided the hospice 24 hr number. Will close the hospice referral. Please call [phone] if can be of further assistance.    Row Name 12/02/22 0027       Plan    Plan     Patient/Family in Agreement with Plan     Plan Comments     Final Discharge Disposition Code                Discharge Codes    No documentation.               Expected Discharge Date and Time     Expected Discharge Date Expected Discharge Time    Dec 8, 2022             Cande Stewart RN

## 2022-12-02 NOTE — PROGRESS NOTES
" LOS: 5 days   Patient Care Team:  Giselle Guzman DO as PCP - General (Internal Medicine)  Terrence Byrnes PA as Physician Assistant (Cardiology)    Chief Complaint: ESRD  Fall    Subjective     .     ROS: UNOBTAINABLE     Objective     Vital Sign Min/Max for last 24 hours  Temp  Min: 97.6 °F (36.4 °C)  Max: 98.3 °F (36.8 °C)   BP  Min: 95/67  Max: 127/74   Pulse  Min: 74  Max: 82   Resp  Min: 18  Max: 22   SpO2  Min: 95 %  Max: 98 %   Flow (L/min)  Min: 1  Max: 1   No data recorded     Flowsheet Rows    Flowsheet Row First Filed Value   Admission Height 162.6 cm (64\") Documented at 11/26/2022 1336   Admission Weight 76.2 kg (168 lb) Documented at 11/26/2022 1336          No intake/output data recorded.  I/O last 3 completed shifts:  In: 600 [P.O.:600]  Out: 2550 [Other:2550]    Objective:  General Appearance:  Ill-appearing and uncomfortable (TUNNELED HD CATH).    Vital signs: (most recent): Blood pressure 127/74, pulse 78, temperature 97.7 °F (36.5 °C), temperature source Oral, resp. rate 18, height 162.6 cm (64\"), weight 77.9 kg (171 lb 11.8 oz), SpO2 95 %, not currently breastfeeding.  Vital signs are normal.    Output: Minimal urine output.    Lungs:  Normal effort and normal respiratory rate.  Breath sounds clear to auscultation.  She is not in respiratory distress.  No decreased breath sounds.    Heart: Normal rate.  Regular rhythm.  S1 normal and S2 normal.    Abdomen: Abdomen is soft.  Bowel sounds are normal.   There is no abdominal tenderness.     Extremities: Normal range of motion.  There is dependent edema.  There is no local swelling.    Pulses: Distal pulses are intact.    Neurological: (CONFUSED).    Pupils:  Pupils are equal, round, and reactive to light.              Results Review:     I reviewed the patient's new clinical results.    WBC WBC   Date Value Ref Range Status   12/02/2022 5.43 3.40 - 10.80 10*3/mm3 Final   12/01/2022 4.63 3.40 - 10.80 10*3/mm3 Final   11/30/2022 5.17 3.40 - " 10.80 10*3/mm3 Final      HGB Hemoglobin   Date Value Ref Range Status   12/02/2022 12.8 12.0 - 15.9 g/dL Final   12/01/2022 12.3 12.0 - 15.9 g/dL Final   11/30/2022 13.2 12.0 - 15.9 g/dL Final      HCT Hematocrit   Date Value Ref Range Status   12/02/2022 38.2 34.0 - 46.6 % Final   12/01/2022 37.0 34.0 - 46.6 % Final   11/30/2022 39.8 34.0 - 46.6 % Final      Platlets No results found for: LABPLAT   MCV MCV   Date Value Ref Range Status   12/02/2022 96.0 79.0 - 97.0 fL Final   12/01/2022 97.1 (H) 79.0 - 97.0 fL Final   11/30/2022 96.1 79.0 - 97.0 fL Final          Sodium Sodium   Date Value Ref Range Status   12/02/2022 135 (L) 136 - 145 mmol/L Final   12/01/2022 134 (L) 136 - 145 mmol/L Final   11/30/2022 133 (L) 136 - 145 mmol/L Final      Potassium Potassium   Date Value Ref Range Status   12/02/2022 3.9 3.5 - 5.2 mmol/L Final     Comment:     Slight hemolysis detected by analyzer. Results may be affected.   12/01/2022 3.5 3.5 - 5.2 mmol/L Final     Comment:     Slight hemolysis detected by analyzer. Results may be affected.   11/30/2022 3.1 (L) 3.5 - 5.2 mmol/L Final     Comment:     Slight hemolysis detected by analyzer. Results may be affected.      Chloride Chloride   Date Value Ref Range Status   12/02/2022 95 (L) 98 - 107 mmol/L Final   12/01/2022 95 (L) 98 - 107 mmol/L Final   11/30/2022 94 (L) 98 - 107 mmol/L Final      CO2 CO2   Date Value Ref Range Status   12/02/2022 24.0 22.0 - 29.0 mmol/L Final   12/01/2022 24.0 22.0 - 29.0 mmol/L Final   11/30/2022 22.0 22.0 - 29.0 mmol/L Final      BUN BUN   Date Value Ref Range Status   12/02/2022 25 (H) 8 - 23 mg/dL Final   12/01/2022 37 (H) 8 - 23 mg/dL Final   11/30/2022 27 (H) 8 - 23 mg/dL Final      Creatinine Creatinine   Date Value Ref Range Status   12/02/2022 3.73 (H) 0.57 - 1.00 mg/dL Final   12/01/2022 4.67 (H) 0.57 - 1.00 mg/dL Final   11/30/2022 3.97 (H) 0.57 - 1.00 mg/dL Final      Calcium Calcium   Date Value Ref Range Status   12/02/2022 8.3 (L)  8.6 - 10.5 mg/dL Final   12/01/2022 8.4 (L) 8.6 - 10.5 mg/dL Final   11/30/2022 8.8 8.6 - 10.5 mg/dL Final      PO4 No results found for: CAPO4   Albumin No results found for: ALBUMIN   Magnesium Magnesium   Date Value Ref Range Status   12/02/2022 2.1 1.6 - 2.4 mg/dL Final   12/01/2022 1.9 1.6 - 2.4 mg/dL Final      Uric Acid No results found for: URICACID     Medication Review: Yes    Assessment & Plan       Hypotension    Chronic atrial fibrillation    Hypothyroidism    AFTAB    CHF (NYHA class IV, ACC/AHA stage D)    CAD s/p stents    T2DM    Multiple spinal compression fractures    ESRD on HD    Thrombocytopenia      Assessment & Plan      ESRD:  On HD per MWF [facility]. Started on HD in Aug 2022. Still make some urine.     Anemia: BENY on HD days.     Acid base and Elytes:  Hyperkalemia on admission.     Volume status :  Stable. AWG 3 kg on outpatient records.    Fall: Compression fracture on this admission.         Recs  Currently on TTS [facility] during this admission. Outpatient [facility] MWF . HD 12/3/22.. FAMILY MAY BE CONSIDERING STOPPING DIALYSIS SUPPORT. D/W DIALYSIS NURSE.    Renal Diet   Resume home meds     Terrence Greenfield MD  12/02/22  16:49 EST

## 2022-12-02 NOTE — CASE MANAGEMENT/SOCIAL WORK
Continued Stay Note  [facility]     Patient Name: Karla Cristobal  MRN: [id number]  Today's Date: 12/2/2022    Admit Date: 11/26/2022    Plan: [facility]   Discharge Plan     Row Name 12/02/22 1517       Plan    Plan [facility]    Patient/Family in Agreement with Plan yes    Plan Comments Discussed patient in MDR.  She is being followed by nephrology, and hospice has been consulted. Patient normally goes to [facility] in [place] on Monday, Wednesday, Friday. PT and OT have worked with patient and are recommending SNF.  I spoke with patient at bedside.  She states that she has been to [facility] before and would like to return.  I have left a voicemail for Johan, admissions at [facility]. Per Dr. Smith, there are questions about goals of care but patient would also like to continue dialysis.  CM will continue to follow.    Final Discharge Disposition Code 03 - skilled nursing facility (SNF)               Discharge Codes    No documentation.               Expected Discharge Date and Time     Expected Discharge Date Expected Discharge Time    Dec 8, 2022             Jeanette Sweet RN

## 2022-12-02 NOTE — PROGRESS NOTES
[facility] Medicine Services  PROGRESS NOTE    Patient Name: Karla Cristobal  : 1935  MRN: [id number]    Date of Admission: 2022  Primary Care Physician: Giselle Guzman DO    Subjective   Subjective     CC:  Fall, back pain     HPI:  No acute events. States she is feeling a bit better. Still with pain. States she would like to continue eliquis    ROS:  Gen- No fevers, chills  CV- No chest pain, palpitations  Resp- No cough, dyspnea  GI- No N/V/D, abd pain     Objective   Objective     Vital Signs:   Temp:  [97.7 °F (36.5 °C)-99.1 °F (37.3 °C)] 98 °F (36.7 °C)  Heart Rate:  [70-82] 74  Resp:  [16-22] 18  BP: ()/(32-87) 95/67  Flow (L/min):  [1] 1     Physical Exam:  Constitutional: No acute distress, awake, alert; chronically ill appearing   HENT: NCAT, mucous membranes moist  Respiratory: Clear to auscultation bilaterally, respiratory effort normal   Cardiovascular: RRR, II/VI  Gastrointestinal: Positive bowel sounds, soft, nontender, nondistended  Musculoskeletal: No bilateral ankle edema  Psychiatric: cooperative  Neurologic: Oriented x 3, strength symmetric in all extremities, Cranial Nerves grossly intact to confrontation, speech clear  Skin: No rashes    Results Reviewed:  LAB RESULTS:      Lab 22  0440 22  0558 22  0516 22  0822 22  0521 22  2141 22  1734 22  1250 22  0329 22  0528 22  1440   WBC 5.43 4.63 5.17  --  7.65  --   --   --  5.95   < > 5.94   HEMOGLOBIN 12.8 12.3 13.2  --  13.4  --   --   --  12.6   < > 12.8   HEMATOCRIT 38.2 37.0 39.8  --  39.7  --   --   --  38.4   < > 39.3   PLATELETS 68* 65* 61*  --  85*  --   --   --  72*   < > 68*   NEUTROS ABS  --  2.84  --   --   --   --   --   --   --   --  4.16   IMMATURE GRANS (ABS)  --  0.03  --   --   --   --   --   --   --   --  0.05   LYMPHS ABS  --  0.89  --   --   --   --   --   --   --   --  1.07   MONOS ABS  --  0.75  --   --   --    --   --   --   --   --  0.54   EOS ABS  --  0.09  --   --   --   --   --   --   --   --  0.09   MCV 96.0 97.1* 96.1  --  95.7  --   --   --  97.0   < > 98.7*   PROCALCITONIN  --   --  0.69*  --   --   --   --   --  0.40*  --   --    LACTATE  --   --   --  2.9*  --  3.3* 3.4* 3.8*  --   --   --    LDH  --   --   --   --   --   --   --   --   --   --  347*   PROTIME  --   --   --   --   --   --   --  22.9* 22.6*  --   --    APTT  --   --   --   --   --   --   --  39.6*  --   --   --    D DIMER QUANT  --   --   --   --   --   --   --   --  3.71*  --   --     < > = values in this interval not displayed.         Lab 12/02/22  0440 12/01/22  0558 11/30/22  0516 11/29/22  0822 11/29/22  0521 11/28/22  0329 11/27/22  0918   SODIUM 135* 134* 133* 135*  --  133* 137   POTASSIUM 3.9 3.5 3.1* 4.0  --  5.1 5.6*   CHLORIDE 95* 95* 94* 96*  --  95* 99   CO2 24.0 24.0 22.0 16.0*  --  20.0* 20.0*   ANION GAP 16.0* 15.0 17.0* 23.0*  --  18.0* 18.0*   BUN 25* 37* 27* 54*  --  48* 43*   CREATININE 3.73* 4.67* 3.97* 5.69*  --  5.39* 5.15*   EGFR 11.3* 8.6* 10.5* 6.8*  --  7.3* 7.7*   GLUCOSE 155* 151* 101* 167*  --  117* 166*   CALCIUM 8.3* 8.4* 8.8 9.0  --  9.0 9.1   IONIZED CALCIUM 1.12  --   --   --   --   --   --    MAGNESIUM 2.1 1.9  --   --   --   --   --    PHOSPHORUS 3.9  --   --  7.0*  --   --  6.8*   HEMOGLOBIN A1C  --   --   --   --  6.10*  --   --    TSH  --   --   --   --   --  5.200*  --          Lab 11/29/22  0822 11/28/22  0329 11/27/22  0918 11/26/22  1440   TOTAL PROTEIN  --  6.4  --  6.6   ALBUMIN 3.80 3.70 3.40* 3.30*   GLOBULIN  --  2.7  --   --    ALT (SGPT)  --  12  --  13   AST (SGOT)  --  27  --  31   BILIRUBIN  --  1.5*  --  1.2   INDIRECT BILIRUBIN  --   --   --  0.6   BILIRUBIN DIRECT  --   --   --  0.6*   ALK PHOS  --  243*  --  324*         Lab 11/29/22  0822 11/28/22  1250 11/28/22  0329 11/26/22  1440   PROBNP  --   --   --  23,304.0*   TROPONIN T 0.081*  --   --  0.063*   PROTIME  --  22.9* 22.6*  --     INR  --  2.02* 1.98*  --                  Lab 11/28/22  1432   FIO2 28   CARBOXYHEMOGLOBIN (VENOUS) 1.1     Brief Urine Lab Results  (Last result in the past 365 days)      Color   Clarity   Blood   Leuk Est   Nitrite   Protein   CREAT   Urine HCG        11/26/22 1949 Dark Yellow   Turbid   Large (3+)   Moderate (2+)   Positive   >=300 mg/dL (3+)                 Microbiology Results Abnormal     Procedure Component Value - Date/Time    Blood Culture - Blood, Arm, Left [[id number]]  (Normal) Collected: 11/26/22 2111    Lab Status: Final result Specimen: Blood from Arm, Left Updated: 12/01/22 2230     Blood Culture No growth at 5 days    Blood Culture - Blood, Arm, Left [[id number]]  (Normal) Collected: 11/26/22 2155    Lab Status: Final result Specimen: Blood from Arm, Left Updated: 12/01/22 2230     Blood Culture No growth at 5 days          No radiology results from the last 24 hrs    Results for orders placed during the hospital encounter of 11/26/22    Adult Transthoracic Echo Complete W/ Cont if Necessary Per Protocol    Interpretation Summary  •  Left ventricular systolic function is mildly decreased. Left ventricular ejection fraction appears to be 41 - 45%.  •  Left ventricular wall thickness is consistent with borderline concentric hypertrophy.  •  Left ventricular diastolic dysfunction is noted.  •  Moderately reduced right ventricular systolic function noted.  •  Systolic and diastolic flattening of the interventricular septum consistent with right ventricle pressure and volume overload.  •  The right ventricular cavity is severely dilated.  •  Left atrial volume is mildly increased.  •  The right atrial cavity is moderately dilated.  •  There is mild calcification of the aortic valve.  •  Moderate mitral annular calcification is present.  •  Mild mitral valve regurgitation is present.  •  Mild pulmonic valve regurgitation is present.  •  Severe tricuspid valve regurgitation is present.  •  Estimated right  ventricular systolic pressure from tricuspid regurgitation is at least mildly elevated (35-45 mmHg) and may be underestimated.      I have reviewed the medications:  Scheduled Meds:acetaminophen, 500 mg, Oral, Q6H  atorvastatin, 20 mg, Oral, Nightly  insulin lispro, 0-7 Units, Subcutaneous, TID AC  latanoprost, 1 drop, Both Eyes, Nightly  levothyroxine, 125 mcg, Oral, Q AM  lidocaine, 2 patch, Transdermal, Q24H  midodrine, 10 mg, Oral, TID AC  pantoprazole, 40 mg, Oral, BID AC  saccharomyces boulardii, 250 mg, Oral, BID  senna-docusate sodium, 2 tablet, Oral, Nightly  sevelamer, 800 mg, Oral, TID With Meals  sodium chloride, 10 mL, Intravenous, Q12H  sodium zirconium cyclosilicate, 10 g, Oral, TID  timolol, 1 drop, Both Eyes, BID      Continuous Infusions:   PRN Meds:.•  bisacodyl  •  dextrose  •  dextrose  •  glucagon (human recombinant)  •  heparin (porcine)  •  HYDROmorphone  •  ipratropium-albuterol  •  ondansetron **OR** ondansetron  •  oxyCODONE  •  sodium chloride  •  sodium chloride    Assessment & Plan   Assessment & Plan     Active Hospital Problems    Diagnosis  POA   • **Hypotension [I95.9]  Yes   • Thrombocytopenia [D69.6]  Yes   • Multiple spinal compression fractures [M48.50XA]  Yes   • ESRD on HD [N18.6]  Yes   • T2DM [E11.9, Z79.4]  Not Applicable   • CAD s/p stents [I25.10]  Yes   • Chronic atrial fibrillation [I48.20]  Yes   • Hypothyroidism [E03.9]  Yes   • CHF (NYHA class IV, ACC/AHA stage D) [I50.84]  Yes   • 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        Brief Hospital Course to date:  Karla Cristobal is a 87 y.o. female with history of AFTAB, nocturnal O2 use, DM, afib on eliquis, history of remove DVT/PE, ESRD MWF with multiple AV fistula revisions, CAD s/p CABG, systolic heart failure/ICM with EF 35% s/p BiV ICD and hypothyroid who was admitted on 11/26 to medicine for fall and multiple spinal compression fractures. She was also noted to have a UTI  with GNR and started on merrem. 11/28 she developed hypotension and was transferred to the ICU. She required levophed for BP management. Palliative care was consulted and she was made a DNR/DNI on 11/30. Transferred to medicine service 12/2.     Hypotension   - BP medication stopped  - Continue midodrine 10 mg TID     CAD   Valvular heart disease   Chronic systolic heart failure  - Continue statin. Coreg discontinued due to hypotension     Atrial fibrillation   - Coreg discontinued due to hypotension   - Resume eliquis     UTI   - Cultures positive for enterococcus faecium and klebsiella   - Completed treatment with rocephin     Thrombocytopenia   - Noted in Oct and continuing this admission   - Monitor with eliquis     ESRD   - Neph following. Currently on dialysis MWF. States they have decided to continue dialysis   - Continue lokelma, sevelamer     Fall with multiple compression fractures   - PRN pain control     DM   - SSI; adjust PRN     Hypothyroid   - Continue synthroid     Glaucoma       Expected Discharge Location and Transportation: rehab   Expected Discharge Date: 12/3    DVT prophylaxis:  Medical and mechanical DVT prophylaxis orders are present.     AM-PAC 6 Clicks Score (PT): 8 (11/28/22 0945)    CODE STATUS:   Code Status and Medical Interventions:   Ordered at: 12/01/22 0902     Medical Intervention Limits:    NO intubation (DNI)     Level Of Support Discussed With:    Patient     Code Status (Patient has no pulse and is not breathing):    No CPR (Do Not Attempt to Resuscitate)     Medical Interventions (Patient has pulse or is breathing):    Limited Support       Shea Smith DO  12/02/22

## 2022-12-02 NOTE — PLAN OF CARE
Problem: Palliative Care  Goal: Enhanced Quality of Life  Intervention: Optimize Psychosocial Wellbeing  Recent Flowsheet Documentation  Taken 12/2/2022 1101 by Elizabeth May, MSW  Supportive Measures: (symptom assessment)   active listening utilized   positive reinforcement provided   verbalization of feelings encouraged   other (see comments)  Patient resting quietly and calmly at time of visit, awakens to name but drowsy, reports mild back pain, thirsty, very tired.  Hospice consult for information.  Per CM note, patient would like to transfer to [facility], continue dialysis.   Palliative Care continues to follow for support and assist with plan of care.    1300 Palliative IDT:  JOSELO Hammonds DO; DAGOBERTO Aguilar APRN; ARLETH May LCSW, ACHP-SW; Inpt Hospice Team

## 2022-12-03 NOTE — DISCHARGE PLACEMENT REQUEST
"David Mead (87 y.o. Female)     Date of Birth   1935    Social Security Number       Address   [address] [facility] [address]    Home Phone   [phone]    MRN   [id number]       Anabaptism   Yarsanism    Marital Status                               Admission Date   11/26/22    Admission Type   Emergency    Admitting Provider   Shea Smith DO    Attending Provider   Shea Smith DO    Department, Room/Bed   [address], [address]/1       Discharge Date       Discharge Disposition       Discharge Destination                               Attending Provider: Shea Smith DO    Allergies: Bactrim [Sulfamethoxazole-trimethoprim], Lisinopril, Rocephin [Ceftriaxone], Codeine    Isolation: None   Infection: None   Code Status: No CPR    Ht: 162.6 cm (64\")   Wt: 77.9 kg (171 lb 11.8 oz)    Admission Cmt: None   Principal Problem: Hypotension [I95.9]                 Active Insurance as of 11/26/2022     Primary Coverage     Payor Plan Insurance Group Employer/Plan Group    MEDICARE MEDICARE A & B      Payor Plan Address Payor Plan Phone Number Payor Plan Fax Number Effective Dates    [address] [phone]  7/1/2001 - None Entered    [facility] [id number]       Subscriber Name Subscriber Birth Date Member ID       DAVID MEAD 1935 [id number]           Secondary Coverage     Payor Plan Insurance Group Employer/Plan Group    [place]      Payor Plan Address Payor Plan Phone Number Payor Plan Fax Number Effective Dates    [address] [phone]  7/1/2020 - None Entered    [place] [address]       Subscriber Name Subscriber Birth Date Member ID       DAVID MEAD 1935 [id number]           Tertiary Coverage     Payor Plan Insurance Group Employer/Plan Group    [employer] [facility]     Payor Plan Address Payor Plan Phone Number Payor Plan Fax Number Effective Dates    [address] [phone] "  2001 - None Entered    [place] [address]       Subscriber Name Subscriber Birth Date Member ID       KARLA MEAD 1935 [id number]                 Emergency Contacts      (Rel.) Home Phone Work Phone Mobile Phone    Frida Vazquez (Daughter) [phone] -- --            Emergency Contact Information     Name Relation Home Work Mobile    Frida Vazquez Daughter [phone]            Insurance Information                MEDICARE/MEDICARE A & B Phone: [phone]    Subscriber: Karla Mead Subscriber#: [id number]    Group#: -- Precert#: --        [place] Phone: [phone]    Subscriber: Karla Mead Subscriber#: [id number]    Group#: -- Precert#: --        [employer] Phone: [phone]    Subscriber: Karla Mead Subscriber#: [id number]    Group#: [id number] Precert#: --             History & Physical      Kamlesh Gomez DO at 23              [facility] Medicine Services  HISTORY AND PHYSICAL    Patient Name: Karla Mead  : 1935  MRN: [id number]  Primary Care Physician: Giselle Guzman DO  Date of admission: 2022      Subjective    Subjective     Chief Complaint:  Fall, back pain    HPI:  Karla Mead is a 87 y.o. female w/ ESRD on HD MWF, Afib on oral AC, DM2, other chronic illness who presents to the hospital for evaluation of back fail following a fall. She was in her usual state of health until today, she went to the restroom and thinks that she missed the commode. Family found her wedged against the wall. When EMS arrived she was unable to sit upright secondary to pain. Imaging in the ED demonstrated multiple compression fractures and she is still unable to sit upright. Pain initially wasn't too bad but currently it is getting worse and she is having associated nausea.      Review of Systems   Gen- No fevers, chills  CV- No chest pain, palpitations  Resp-  No cough, dyspnea  GI- No V/D, abd pain; yes nausea  All other systems reviewed and are negative.     Personal History     Past Medical History:   Diagnosis Date   • Acute bronchitis    • Arthritis    • Atrial fibrillation ([facility])    • CAD (coronary artery disease)     s/p MI 2005; 3 stents inserted    • Change in mole    • Change in mole    • Change in nevus 6/16/2016   • Chronic kidney disease     stage IV-sees Dr Bk Mckeon every 3-4 months    • Chronic renal disease, stage 4, severely decreased glomerular filtration rate between 15-29 mL/min/1.73 square meter ([facility])    • Chronic systolic heart failure ([facility])    • Congestive heart disease ([facility])    • Conjunctivitis    • Deep vein thrombosis of lower extremity ([facility])    • Diabetes mellitus ([facility])    • Diabetes mellitus ([facility]) 6/16/2016    Impression: 03/15/2016 - blood sugar 166 and hgn a1c 7.3%  is up to date with eye exam  neuropathy with callus, no ulcer  some scratches feet  ur alb due and ordered  no change other than provided samples of toujeo because she feels like lantus worked much better than levemir, she has tried titrating levemir and it is less effective  continue testing and f/u 3-4 months  Impression: 11/23/2015 - bl   • Diabetic foot ulcer ([facility]) 6/16/2016   • Dog bite of hand    • Easy bruising    • Endometrial cancer ([facility])     partial hysterectomy; radiation as well    • Foot pain    • Foot pain 6/16/2016    Description: lancinating- worse but not consistent enough to want rx   • Fracture of hip ([facility])    • Generalized ischemic myocardial dysfunction 6/16/2016   • Glaucoma     drops daily    • Hyperlipidemia    • Hypertension    • Hypothyroidism    • Insomnia    • Ischemic heart disease    • Macular degeneration    • Methicillin resistant Staphylococcus aureus infection 6/16/2016   • Myocardial infarction ([facility]) 2005   • Nausea with vomiting    • Pericardial effusion    • Shortness of breath 6/16/2016    Impression: 03/24/2015 - chronic. On 2 l oxygen  at night. check cbc, bnp;    • Skin cancer, basal cell     nose, leg    • Skin lesion 6/16/2016    Impression: 03/24/2015 - refer derm for eval- seb kerat ant tib and ?ak medially with redness and irritation;    • Sleep apnea     oxygen at night due to low sats but no cpap   • Type 2 diabetes mellitus (HCC)    • UTI (urinary tract infection) 06/12/2020    currently taking antibiotics-patient's daughter notified Dr Beal's office and office said it should not delay generator change          Oncology Problem List:  Malignant tumor of ascending colon (HCC) (03/19/2021; Status: Active)  Malignant neoplasm of endometrium (HCC) (06/16/2016; Status: Active)  Squamous cell carcinoma of skin (06/16/2016; Status: Active)       Past Surgical History:   Procedure Laterality Date   • CARDIAC CATHETERIZATION     • CARDIAC DEFIBRILLATOR PLACEMENT     • CARDIAC ELECTROPHYSIOLOGY PROCEDURE N/A 07/17/2020    Procedure: ICD BIV  generator change- SJM- 3-6 weeks;  Surgeon: Tano Beal MD;  Location:  [facility];  Service: Cardiology;  Laterality: N/A;   • CHOLECYSTECTOMY     • COLONOSCOPY N/A 07/30/2022    Procedure: COLONOSCOPY;  Surgeon: Brunner, Mark I, MD;  Location:  [facility];  Service: Gastroenterology;  Laterality: N/A;  WITH 3 RESOLUTION CLIPS   • CORONARY ARTERY BYPASS GRAFT  2000   • CORONARY STENT PLACEMENT      3 stents    • DIALYSIS FISTULA CREATION     • ENDOSCOPY N/A 11/17/2021    Procedure: ESOPHAGOGASTRODUODENOSCOPY;  Surgeon: Brunner, Mark I, MD;  Location:  [facility];  Service: Gastroenterology;  Laterality: N/A;   • EYE SURGERY Bilateral     cataract extraction    • HIP SURGERY Left     x3   • HYSTERECTOMY      partial    • KNEE ARTHROPLASTY Bilateral    • PACEMAKER IMPLANTATION     • TONSILLECTOMY         Family History:  family history includes Breast cancer in her mother and another family member; Cancer in her father; Diabetes in an other family member; Esophageal cancer  in an other family member; Hypertension in an other family member; Transient ischemic attack in an other family member. Otherwise pertinent FHx was reviewed and unremarkable.     Social History:  reports that she has never smoked. She has never used smokeless tobacco. She reports that she does not drink alcohol and does not use drugs.  Social History     Social History Narrative   • Not on file       Medications:  Available home medication information reviewed.  (Not in a hospital admission)      Allergies   Allergen Reactions   • Bactrim [Sulfamethoxazole-Trimethoprim] GI Intolerance   • Lisinopril Cough   • Rocephin [Ceftriaxone] Itching   • Codeine Rash       Objective    Objective     Vital Signs:   Temp:  [97.6 °F (36.4 °C)] 97.6 °F (36.4 °C)  Heart Rate:  [70-89] 71  Resp:  [16] 16  BP: (102)/(57) 102/57       Physical Exam   Constitutional: Awake, alert, chronically ill appearing female laying on ED stretcher, appears in pain  Eyes: PERRL, sclerae anicteric, no conjunctival injection  HENT: NCAT, mucous membranes moist  Neck: Supple, trachea midline  Respiratory: Clear to auscultation bilaterally, nonlabored respirations   Cardiovascular: RRR, no murmurs, rubs, or gallops, palpable radial pulses bilaterally  Gastrointestinal: Positive bowel sounds, soft, nontender, nondistended  Musculoskeletal: No bilateral ankle edema, no clubbing or cyanosis to extremities; RT chest wall w/ temp HD catheter  Psychiatric: Appropriate affect, cooperative  Neurologic: Speech clear and fluent, moving all extremities spontaneously    Result Review:  I have personally reviewed the results from the time of this admission to 11/26/2022 19:34 EST and agree with these findings:  []  Laboratory list / accordion  []  Microbiology  [x]  Radiology  []  EKG/Telemetry   []  Cardiology/Vascular   []  Pathology  []  Old records  []  Other:  Most notable findings include: multiple compression fractures        LAB RESULTS:      Lab  11/26/22  1440   WBC 5.94   HEMOGLOBIN 12.8   HEMATOCRIT 39.3   PLATELETS 68*   NEUTROS ABS 4.16   IMMATURE GRANS (ABS) 0.05   LYMPHS ABS 1.07   MONOS ABS 0.54   EOS ABS 0.09   MCV 98.7*         Lab 11/26/22  1440   SODIUM 138   POTASSIUM 4.5   CHLORIDE 101   CO2 25.0   ANION GAP 12.0   BUN 33*   CREATININE 4.48*   EGFR 9.1*   GLUCOSE 121*   CALCIUM 9.0             Lab 11/26/22  1440   PROBNP 23,304.0*   TROPONIN T 0.063*                 UA    Urinalysis 7/30/22 7/30/22 9/6/22 9/6/22 10/14/22 10/14/22    1742 1742 0433 0433 2037 2037   Squamous Epithelial Cells, UA  3-6 (A)  3-6 (A)  3-6 (A)   Specific Gravity, UA 1.012  1.025  1.028    Ketones, UA Negative  Trace (A)  Negative    Blood, UA Small (1+) (A)  Large (3+) (A)  Moderate (2+) (A)    Leukocytes, UA Trace (A)  Large (3+) (A)  Large (3+) (A)    Nitrite, UA Negative  Negative  Positive (A)    RBC, UA  0-2  3-6 (A)  Too Numerous to Count (A)   WBC, UA  3-5 (A)  Too Numerous to Count (A)  Too Numerous to Count (A)   Bacteria, UA  1+ (A)  2+ (A)  3+ (A)   (A) Abnormal value              Microbiology Results (last 10 days)     ** No results found for the last 240 hours. **          CT Head Without Contrast    Result Date: 11/26/2022  DATE OF EXAM: 11/26/2022 3:19 PM  PROCEDURE: CT HEAD WO CONTRAST-  INDICATIONS: unwitnessed fall, head contusion, pt on Eliquis  COMPARISON: No comparisons available.  TECHNIQUE: Routine transaxial and coronal reconstruction images were obtained through the head without the administration of contrast. Automated exposure control and iterative reconstruction methods were used.  The radiation dose reduction device was turned on for each scan per the ALARA (As Low as Reasonably Achievable) protocol.  FINDINGS: There is no evidence for acute intracranial hemorrhage. No definitive acute focal ischemia is identified. Nonspecific diffuse white matter changes are noted likely related to chronic small vessel ischemic changes and age-related  changes. Associated diffuse volume loss is observed. There is no evidence for abnormal cerebral edema. There is no mass effect or midline shift. The ventricular system is nondilated. The basal cisterns are patent. The skull is intact. The paranasal sinuses and mastoid air cells are clear.      Impression: 1.  No evidence for acute intracranial abnormality. 2.  Diffuse nonspecific white matter changes are noted with associated diffuse volume loss. These findings are likely related to chronic small vessel ischemic changes and/or age-related changes.  This report was finalized on 11/26/2022 3:48 PM by Basilio Heard MD.      CT Cervical Spine Without Contrast    Result Date: 11/26/2022  DATE OF EXAM: 11/26/2022 3:19 PM  PROCEDURE: CT CERVICAL SPINE WO CONTRAST-  INDICATIONS: unwitnessed fall, head contusion  COMPARISON: No comparisons available.  TECHNIQUE: Routine transaxial slices were obtained through the cervical spine without the administration of intravenous contrast. Reconstructed coronal and sagittal images were also obtained. Automated exposure control and iterative construction methods were used.  The radiation dose reduction device was turned on for each scan per the ALARA (As Low as Reasonably Achievable) protocol.  FINDINGS: Motion artifact is noted.  There is a mild compression fracture deformity at the superior endplate of the L1 vertebral body with mild concave deformity. There is no significant retropulsion of fracture fragments. There is no malalignment.  There is no additional displaced fracture or subluxation. There is no malalignment. The posterior facets are intact.  Multilevel cervical spondylosis is noted. Multilevel posterior facet changes and uncovertebral changes are also seen throughout the cervical spine. These findings contribute to central canal narrowing and neural foraminal narrowing at multiple levels.  There is no evidence for abnormal edema or fluid collection within the surrounding  paraspinal soft tissues. There is no significant hemorrhage or hematoma. Carotid artery calcifications are noted. The lung apices are clear. The mastoid air cells are clear.      Impression: 1.  Mild compression fracture deformity at the superior endplate of the T1 vertebral level with mild concave deformity. There is no significant retropulsion of fracture fragments. There is no malalignment. 2.  No evidence for acute soft tissue abnormality. 3.  Changes of cervical spondylosis are noted throughout the cervical spine. Associated hypertrophic posterior facet changes and uncovertebral changes are also observed.  This report was finalized on 11/26/2022 3:51 PM by Basilio Heard MD.      CT Thoracic Spine Without Contrast    Result Date: 11/26/2022  DATE OF EXAM: 11/26/2022 3:19 PM  PROCEDURE: CT THORACIC SPINE WO CONTRAST-  INDICATIONS: mid back pain s/p fall  COMPARISON: No comparisons available.  TECHNIQUE: Routine transaxial slices were obtained through the thoracic spine without the administration of intravenous contrast. Reconstructed coronal and sagittal images were also obtained. Automated exposure control and iterative construction methods were used.  The radiation dose reduction device was turned on for each scan per the ALARA (As Low as Reasonably Achievable) protocol.  FINDINGS: There is mild compression fracture deformity at the superior endplate of the T1 vertebral body with mild concave deformity. There is no retropulsion of fracture fragments. There is no malalignment.  There is additional mild compression fracture deformity at the superior endplate of the L1 vertebral body with mild concave deformity at the superior endplate. There is no retropulsion of fracture fragments. There is no malalignment.  There is no malalignment throughout the thoracic spine. The posterior facets are intact.  There is no evidence for large posterior disc protrusion/extrusion throughout the thoracic spine. The central canal  is grossly patent. The neural foramina are grossly patent.  There is no evidence for abnormal edema or fluid collection within the surrounding paraspinal soft tissues. There is no significant hemorrhage or hematoma.      Impression: 1.  Mild compression fracture deformities at the T1 and L1 vertebral levels. There is no associated malalignment. 2.  The central canal and neural foramina are grossly patent throughout the thoracic spine. 3.  No evidence for acute soft tissue abnormality.  This report was finalized on 11/26/2022 3:56 PM by Basilio Heard MD.      CT Lumbar Spine Without Contrast    Result Date: 11/26/2022  DATE OF EXAM: 11/26/2022 3:19 PM  PROCEDURE: CT LUMBAR SPINE WO CONTRAST-  INDICATIONS: low back pain s/p fall  COMPARISON: No comparisons available.  TECHNIQUE: Routine transaxial slices were obtained through the lumbar spine without the administration of intravenous contrast. Reconstructed coronal and sagittal images were also obtained. Automated exposure control and iterative construction methods were used.  The radiation dose reduction device was turned on for each scan per the ALARA (As Low as Reasonably Achievable) protocol.  FINDINGS: There is mild compression fracture deformity at the superior endplate of the L1 vertebral body. There is mild concave deformity. There is no retropulsion of fracture fragments. There is no malalignment.  There is moderate compression fracture deformity at the inferior endplate of the L3 vertebral body with concave deformity. There is mild retropulsion of the posterior inferior endplate fracture fragment. There is no malalignment.  Otherwise the lumbar vertebral body alignment is within normal limits. The posterior facets are intact. The SI joints are intact. Age-related changes are noted.  There is no evidence for significant hemorrhage or hematoma within the paraspinal soft tissues. No significant edema or abnormal fluid collections are seen. Severe  atherosclerotic calcifications are noted.  Discogenic degenerative changes are noted throughout the mid to lower lumbar spine. Degenerative hypertrophic posterior facet changes are also seen. These findings contribute to central canal narrowing and neural foraminal narrowing at multiple levels.      Impression: 1.  Mild compression fracture deformity of the L1 vertebral body with mild concave deformity of the superior endplate. There is no malalignment. 2.  Moderate compression fracture deformity of the L3 vertebral body with concave deformity at the inferior endplate. There is mild retropulsion of the posterior inferior endplate fracture fragment. There is no malalignment. 3.  No evidence for acute soft tissue abnormality. 4.  Discogenic degenerative changes and hypertrophic posterior facet changes are seen throughout the lumbar spine.  This report was finalized on 11/26/2022 4:02 PM by Basilio Heard MD.        Results for orders placed during the hospital encounter of 07/28/22    Adult Transthoracic Echo Complete W/ Cont if Necessary Per Protocol    Interpretation Summary  · Mild biatrial enlargement.  · Moderately reduced right ventricular systolic function noted.  · Moderate left ventricular systolic dysfunction, estimated EF 37%.  · Left ventricular wall thickness is consistent with mild concentric hypertrophy.  · Lambl's excrescences of the aortic valve are noted. There is trace aortic insufficiency, no evidence of aortic stenosis.  · Moderate mitral regurgitation.  · Moderate to severe tricuspid regurgitation with RVSP 48 mmHg.  · Moderate pulmonic insufficiency.      Assessment & Plan   Assessment & Plan     Active Hospital Problems    Diagnosis  POA   • **Compression fracture of L1 vertebra, initial encounter ([facility]) [S32.010A]  Yes   • ESRD (end stage renal disease) ([facility]) [N18.6]  Yes   • Type 2 diabetes mellitus without complication, with long-term current use of insulin ([facility]) [E11.9, Z79.4]  Not  Applicable   • CAD (coronary artery disease) [I25.10]  Yes   • Chronic atrial fibrillation (HCC) [I48.20]  Yes     Impression: 03/15/2016 - stable  Impression: 11/23/2015 - stable. INR is  Impression: 03/24/2015 - INR is 2.1. Continue 3 mg and f/u with cardio  Impression: 03/24/2015 - INR is  Impression: 11/18/2014 - stable;      • Essential hypertension [I10]  Yes     Impression: 03/15/2016 - bp is good  Impression: 03/15/2016 - cmp, lipids, urine micro  Impression: 11/23/2015 - stable  Impression: 03/24/2015 - urine micro  Impression: 11/18/2014 - bp is good  Impression: 11/18/2014 - stable  Impression: 07/17/2014 - bp is good;      • Hypothyroidism [E03.9]  Yes     Impression: 03/15/2016 - check tsh  Impression: 11/23/2015 - tsh normal 7/15- update next lab draw  Impression: 07/20/2015 - check tfts  Impression: 11/18/2014 - check tft;      • Memory impairment [R41.3]  Yes     Impression: 03/15/2016 - MSE 29  May be Lunesta (started around this time)  Impression: 03/15/2016 - MSE  May be Lunesta (started around this time);      • Sleep apnea [G47.30]  Yes     Summary: This is an 88 y/o female w/ ESRD on HD, Afib on oral AC, DM2, multiple chronic illnesses who presents after sustaining a fall in the home, identified to have multiple compression fractures and intractable pain    Assessment/Plan    Acute compression fractures (T1, L1, L3) w/ intractable back pain  -identified on CT imaging of the spine  -pain control and antiemetics ordered, IV dilaudid for breakthrough  -PT/OT, if pain not improving can consider NSGY eval (last dose of Eliquis 11/25)    ESRD on HD, MWF  -last HD yesterday 11/25  -recently started using UE fistula, temp HD cath scheduled to be removed this coming week, was told to hold oral AC starting today  -nephro consult for continuation of HD    Atrial fibrillation  Hx DVT, data deficit  Chronic HFrEF, EF 37%, compensated  HLD  -home coreg, statin; other meds limited due to renal function and  BP  -eliquis on hold    Thrombocytopenia  -appears chronic? Monitor on heparin for dvt-ppx    DM type 2, a1c 5.9%, w/ long term use of insulin  -accuchecks w/ SSI    Hypothyroidism  - home levothyroxine    AFTAB  -CPAP prn qHS    DVT prophylaxis:  Heparin subq      CODE STATUS:    Code Status and Medical Interventions:   Ordered at: 11/26/22 1934     Code Status (Patient has no pulse and is not breathing):    CPR (Attempt to Resuscitate)     Medical Interventions (Patient has pulse or is breathing):    Full Support         Kamlesh Gomez DO  11/26/22      Electronically signed by Kamlesh Gomez DO at 11/26/22 5822

## 2022-12-03 NOTE — PROGRESS NOTES
" LOS: 6 days   Patient Care Team:  Giselle Guzman DO as PCP - General (Internal Medicine)  Terrence Byrnes PA as Physician Assistant (Cardiology)    Chief Complaint: ESRD. SEEN ON DIALYSIS  Fall    Subjective     .     ROS: UNOBTAINABLE     Objective     Vital Sign Min/Max for last 24 hours  Temp  Min: 95.9 °F (35.5 °C)  Max: 97.9 °F (36.6 °C)   BP  Min: 78/22  Max: 127/74   Pulse  Min: 72  Max: 80   Resp  Min: 16  Max: 20   SpO2  Min: 92 %  Max: 98 %   Flow (L/min)  Min: 1  Max: 1   No data recorded     Flowsheet Rows    Flowsheet Row First Filed Value   Admission Height 162.6 cm (64\") Documented at 11/26/2022 1336   Admission Weight 76.2 kg (168 lb) Documented at 11/26/2022 1336          No intake/output data recorded.  I/O last 3 completed shifts:  In: 240 [P.O.:240]  Out: -     Objective:  General Appearance:  Ill-appearing and uncomfortable (TUNNELED HD CATH).    Vital signs: (most recent): Blood pressure (!) 83/77, pulse 76, temperature 95.9 °F (35.5 °C), temperature source Temporal, resp. rate 20, height 162.6 cm (64\"), weight 77.9 kg (171 lb 11.8 oz), SpO2 98 %, not currently breastfeeding.  Vital signs are normal.    Output: Minimal urine output.    Lungs:  Normal effort and normal respiratory rate.  Breath sounds clear to auscultation.  She is not in respiratory distress.  No decreased breath sounds.    Heart: Normal rate.  Regular rhythm.  S1 normal and S2 normal.    Abdomen: Abdomen is soft.  Bowel sounds are normal.   There is no abdominal tenderness.     Extremities: Normal range of motion.  There is dependent edema.  There is no local swelling.  (TRACE)  Pulses: Distal pulses are intact.    Neurological: (CONFUSED).    Pupils:  Pupils are equal, round, and reactive to light.              Results Review:     I reviewed the patient's new clinical results.    WBC WBC   Date Value Ref Range Status   12/03/2022 5.90 3.40 - 10.80 10*3/mm3 Final   12/02/2022 5.43 3.40 - 10.80 10*3/mm3 Final "   12/01/2022 4.63 3.40 - 10.80 10*3/mm3 Final      HGB Hemoglobin   Date Value Ref Range Status   12/03/2022 13.8 12.0 - 15.9 g/dL Final   12/02/2022 12.8 12.0 - 15.9 g/dL Final   12/01/2022 12.3 12.0 - 15.9 g/dL Final      HCT Hematocrit   Date Value Ref Range Status   12/03/2022 42.1 34.0 - 46.6 % Final   12/02/2022 38.2 34.0 - 46.6 % Final   12/01/2022 37.0 34.0 - 46.6 % Final      Platlets No results found for: LABPLAT   MCV MCV   Date Value Ref Range Status   12/03/2022 97.7 (H) 79.0 - 97.0 fL Final   12/02/2022 96.0 79.0 - 97.0 fL Final   12/01/2022 97.1 (H) 79.0 - 97.0 fL Final          Sodium Sodium   Date Value Ref Range Status   12/03/2022 131 (L) 136 - 145 mmol/L Final   12/02/2022 135 (L) 136 - 145 mmol/L Final   12/01/2022 134 (L) 136 - 145 mmol/L Final      Potassium Potassium   Date Value Ref Range Status   12/03/2022 4.1 3.5 - 5.2 mmol/L Final     Comment:     Slight hemolysis detected by analyzer. Results may be affected.   12/02/2022 3.9 3.5 - 5.2 mmol/L Final     Comment:     Slight hemolysis detected by analyzer. Results may be affected.   12/01/2022 3.5 3.5 - 5.2 mmol/L Final     Comment:     Slight hemolysis detected by analyzer. Results may be affected.      Chloride Chloride   Date Value Ref Range Status   12/03/2022 91 (L) 98 - 107 mmol/L Final   12/02/2022 95 (L) 98 - 107 mmol/L Final   12/01/2022 95 (L) 98 - 107 mmol/L Final      CO2 CO2   Date Value Ref Range Status   12/03/2022 19.0 (L) 22.0 - 29.0 mmol/L Final   12/02/2022 24.0 22.0 - 29.0 mmol/L Final   12/01/2022 24.0 22.0 - 29.0 mmol/L Final      BUN BUN   Date Value Ref Range Status   12/03/2022 37 (H) 8 - 23 mg/dL Final   12/02/2022 25 (H) 8 - 23 mg/dL Final   12/01/2022 37 (H) 8 - 23 mg/dL Final      Creatinine Creatinine   Date Value Ref Range Status   12/03/2022 4.50 (H) 0.57 - 1.00 mg/dL Final   12/02/2022 3.73 (H) 0.57 - 1.00 mg/dL Final   12/01/2022 4.67 (H) 0.57 - 1.00 mg/dL Final      Calcium Calcium   Date Value Ref Range  Status   12/03/2022 8.6 8.6 - 10.5 mg/dL Final   12/02/2022 8.3 (L) 8.6 - 10.5 mg/dL Final   12/01/2022 8.4 (L) 8.6 - 10.5 mg/dL Final      PO4 No results found for: CAPO4   Albumin No results found for: ALBUMIN   Magnesium Magnesium   Date Value Ref Range Status   12/02/2022 2.1 1.6 - 2.4 mg/dL Final   12/01/2022 1.9 1.6 - 2.4 mg/dL Final      Uric Acid No results found for: URICACID     Medication Review: Yes    Assessment & Plan       Hypotension    Chronic atrial fibrillation    Hypothyroidism    AFTAB    CHF (NYHA class IV, ACC/AHA stage D)    CAD s/p stents    T2DM    Multiple spinal compression fractures    ESRD on HD    Thrombocytopenia      Assessment & Plan      ESRD:  On HD per MWF miranda. Started on HD in Aug 2022. Still make some urine.     Anemia: BENY on HD days.     Acid base and Elytes:  Hyperkalemia on admission.     Volume status :  Stable. AWG 3 kg on outpatient records.    Fall: Compression fracture on this admission.    HYPOTENSION. NOT ABLE TO PULL ANY FLUID ON DIALYSIS TODAY 12/3/22.      Recs  Currently on TTS miranda during this admission. Outpatient miranda MWF . HD 12/3/22.. FAMILY MAY BE CONSIDERING STOPPING DIALYSIS SUPPORT. D/W DIALYSIS NURSE. HD TODAY.    Renal Diet   Resume home meds     Terrence Greenfield MD  12/03/22  09:12 EST

## 2022-12-03 NOTE — PROGRESS NOTES
Continued Stay Note  [facility]     Patient Name: Karla Cristobal  MRN: [id number]  Today's Date: 12/3/2022    Admit Date: 11/26/2022    Plan: Rehab and dialysis   Discharge Plan     Row Name 12/03/22 1876       Plan    Plan Comments Hospice RN spoke with patient's daughter, Frida Vazquez.  Per Frida she is on her way to the hospital.  They are to alert staff when they arrive to make contact with hospice.  Please contact [phone] should assistance be required.               Discharge Codes    No documentation.               Expected Discharge Date and Time     Expected Discharge Date Expected Discharge Time    Dec 8, 2022             Mireya Patrick RN

## 2022-12-03 NOTE — PROGRESS NOTES
[facility]  PROGRESS NOTE    Patient Name: Karla Cristobal  : 1935  MRN: [id number]    Date of Admission: 2022  Primary Care Physician: Giselle Guzman DO    Subjective   Subjective     CC:  Hypotension     HPI:  No acute overnight events. Evaluated on dialysis. Low BP but denies symptoms. Appears uncomfortable, but denies pain, CP, SOA.     ROS:  Gen- No fevers, chills  CV- No chest pain, palpitations  Resp- No cough, dyspnea  GI- No N/V/D, abd pain     Objective   Objective     Vital Signs:   Temp:  [95.9 °F (35.5 °C)-97.9 °F (36.6 °C)] 95.9 °F (35.5 °C)  Heart Rate:  [72-80] 77  Resp:  [16-20] 20  BP: ()/(22-74) 90/25  Flow (L/min):  [1] 1     Physical Exam:  Constitutional: chronically ill appearing;   HENT: NCAT, mucous membranes moist  Respiratory: poor effort, diminished in the bases   Cardiovascular: RRR, no murmurs, rubs, or gallops  Gastrointestinal: Positive bowel sounds, soft, nontender, nondistended  Musculoskeletal: No bilateral ankle edema  Psychiatric: cooperative  Neurologic: Oriented x 3, strength symmetric in all extremities, Cranial Nerves grossly intact to confrontation, speech clear  Skin: No rashes    Results Reviewed:  LAB RESULTS:      Lab 22  0440 22  0558 22  0516 22  0822 22  0521 22  2141 22  1734 22  1250 22  0329 22  0528 22  1440   WBC 5.43 4.63 5.17  --  7.65  --   --   --  5.95   < > 5.94   HEMOGLOBIN 12.8 12.3 13.2  --  13.4  --   --   --  12.6   < > 12.8   HEMATOCRIT 38.2 37.0 39.8  --  39.7  --   --   --  38.4   < > 39.3   PLATELETS 68* 65* 61*  --  85*  --   --   --  72*   < > 68*   NEUTROS ABS  --  2.84  --   --   --   --   --   --   --   --  4.16   IMMATURE GRANS (ABS)  --  0.03  --   --   --   --   --   --   --   --  0.05   LYMPHS ABS  --  0.89  --   --   --   --   --   --   --   --  1.07   MONOS ABS  --  0.75  --   --   --   --   --   --   --    --  0.54   EOS ABS  --  0.09  --   --   --   --   --   --   --   --  0.09   MCV 96.0 97.1* 96.1  --  95.7  --   --   --  97.0   < > 98.7*   PROCALCITONIN  --   --  0.69*  --   --   --   --   --  0.40*  --   --    LACTATE  --   --   --  2.9*  --  3.3* 3.4* 3.8*  --   --   --    LDH  --   --   --   --   --   --   --   --   --   --  347*   PROTIME  --   --   --   --   --   --   --  22.9* 22.6*  --   --    APTT  --   --   --   --   --   --   --  39.6*  --   --   --    D DIMER QUANT  --   --   --   --   --   --   --   --  3.71*  --   --     < > = values in this interval not displayed.         Lab 12/03/22  0740 12/02/22  0440 12/01/22  0558 11/30/22  0516 11/29/22  0822 11/29/22  0521 11/28/22  0329 11/27/22  0918   SODIUM 131* 135* 134* 133* 135*  --  133* 137   POTASSIUM 4.1 3.9 3.5 3.1* 4.0  --  5.1 5.6*   CHLORIDE 91* 95* 95* 94* 96*  --  95* 99   CO2 19.0* 24.0 24.0 22.0 16.0*  --  20.0* 20.0*   ANION GAP 21.0* 16.0* 15.0 17.0* 23.0*  --  18.0* 18.0*   BUN 37* 25* 37* 27* 54*  --  48* 43*   CREATININE 4.50* 3.73* 4.67* 3.97* 5.69*  --  5.39* 5.15*   EGFR 9.0* 11.3* 8.6* 10.5* 6.8*  --  7.3* 7.7*   GLUCOSE 90 155* 151* 101* 167*  --  117* 166*   CALCIUM 8.6 8.3* 8.4* 8.8 9.0  --  9.0 9.1   IONIZED CALCIUM  --  1.12  --   --   --   --   --   --    MAGNESIUM  --  2.1 1.9  --   --   --   --   --    PHOSPHORUS  --  3.9  --   --  7.0*  --   --  6.8*   HEMOGLOBIN A1C  --   --   --   --   --  6.10*  --   --    TSH  --   --   --   --   --   --  5.200*  --          Lab 11/29/22  0822 11/28/22  0329 11/27/22  0918 11/26/22  1440   TOTAL PROTEIN  --  6.4  --  6.6   ALBUMIN 3.80 3.70 3.40* 3.30*   GLOBULIN  --  2.7  --   --    ALT (SGPT)  --  12  --  13   AST (SGOT)  --  27  --  31   BILIRUBIN  --  1.5*  --  1.2   INDIRECT BILIRUBIN  --   --   --  0.6   BILIRUBIN DIRECT  --   --   --  0.6*   ALK PHOS  --  243*  --  324*         Lab 11/29/22  0822 11/28/22  1250 11/28/22  0329 11/26/22  1440   PROBNP  --   --   --  23,304.0*    TROPONIN T 0.081*  --   --  0.063*   PROTIME  --  22.9* 22.6*  --    INR  --  2.02* 1.98*  --                  Lab 11/28/22  1432   FIO2 28   CARBOXYHEMOGLOBIN (VENOUS) 1.1     Brief Urine Lab Results  (Last result in the past 365 days)      Color   Clarity   Blood   Leuk Est   Nitrite   Protein   CREAT   Urine HCG        11/26/22 1949 Dark Yellow   Turbid   Large (3+)   Moderate (2+)   Positive   >=300 mg/dL (3+)                 Microbiology Results Abnormal     Procedure Component Value - Date/Time    Blood Culture - Blood, Arm, Left [[id number]]  (Normal) Collected: 11/26/22 2111    Lab Status: Final result Specimen: Blood from Arm, Left Updated: 12/01/22 2230     Blood Culture No growth at 5 days    Blood Culture - Blood, Arm, Left [[id number]]  (Normal) Collected: 11/26/22 2155    Lab Status: Final result Specimen: Blood from Arm, Left Updated: 12/01/22 2230     Blood Culture No growth at 5 days          No radiology results from the last 24 hrs    Results for orders placed during the hospital encounter of 11/26/22    Adult Transthoracic Echo Complete W/ Cont if Necessary Per Protocol    Interpretation Summary  •  Left ventricular systolic function is mildly decreased. Left ventricular ejection fraction appears to be 41 - 45%.  •  Left ventricular wall thickness is consistent with borderline concentric hypertrophy.  •  Left ventricular diastolic dysfunction is noted.  •  Moderately reduced right ventricular systolic function noted.  •  Systolic and diastolic flattening of the interventricular septum consistent with right ventricle pressure and volume overload.  •  The right ventricular cavity is severely dilated.  •  Left atrial volume is mildly increased.  •  The right atrial cavity is moderately dilated.  •  There is mild calcification of the aortic valve.  •  Moderate mitral annular calcification is present.  •  Mild mitral valve regurgitation is present.  •  Mild pulmonic valve regurgitation is present.  •   Severe tricuspid valve regurgitation is present.  •  Estimated right ventricular systolic pressure from tricuspid regurgitation is at least mildly elevated (35-45 mmHg) and may be underestimated.      I have reviewed the medications:  Scheduled Meds:acetaminophen, 500 mg, Oral, Q6H  albumin human, , ,   apixaban, 2.5 mg, Oral, Q12H  atorvastatin, 20 mg, Oral, Nightly  insulin lispro, 0-7 Units, Subcutaneous, TID AC  latanoprost, 1 drop, Both Eyes, Nightly  levothyroxine, 125 mcg, Oral, Q AM  lidocaine, 2 patch, Transdermal, Q24H  midodrine, 10 mg, Oral, TID AC  pantoprazole, 40 mg, Oral, BID AC  saccharomyces boulardii, 250 mg, Oral, BID  senna-docusate sodium, 2 tablet, Oral, Nightly  sevelamer, 800 mg, Oral, TID With Meals  sodium chloride, 10 mL, Intravenous, Q12H  sodium zirconium cyclosilicate, 10 g, Oral, TID  timolol, 1 drop, Both Eyes, BID      Continuous Infusions:   PRN Meds:.•  bisacodyl  •  dextrose  •  dextrose  •  glucagon (human recombinant)  •  heparin (porcine)  •  HYDROmorphone  •  ipratropium-albuterol  •  mannitol  •  ondansetron **OR** ondansetron  •  oxyCODONE  •  sodium chloride  •  sodium chloride    Assessment & Plan   Assessment & Plan     Active Hospital Problems    Diagnosis  POA   • **Hypotension [I95.9]  Yes   • Thrombocytopenia [D69.6]  Yes   • Multiple spinal compression fractures [M48.50XA]  Yes   • ESRD on HD [N18.6]  Yes   • T2DM [E11.9, Z79.4]  Not Applicable   • CAD s/p stents [I25.10]  Yes   • Chronic atrial fibrillation [I48.20]  Yes   • Hypothyroidism [E03.9]  Yes   • CHF (NYHA class IV, ACC/AHA stage D) [I50.84]  Yes   • 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        Brief Hospital Course to date:  Karla Cristobal is a 87 y.o. female with history of AFTAB, nocturnal O2 use, DM, afib on eliquis, history of remove DVT/PE, ESRD MWF with multiple AV fistula revisions, CAD s/p CABG, systolic heart failure/ICM with EF 35% s/p BiV ICD and  hypothyroid who was admitted on 11/26 to medicine for fall and multiple spinal compression fractures. She was also noted to have a UTI with GNR and started on merrem. 11/28 she developed hypotension and was transferred to the ICU. She required levophed for BP management. Palliative care was consulted and she was made a DNR/DNI on 11/30. Transferred to medicine service 12/2.     Hypotension   - BP medication stopped  - Continue midodrine 10 mg TID. Currently with difficulty tolerating dialysis due to low BP. Continue to monitor    CAD   Valvular heart disease   Chronic systolic heart failure  - Continue statin. Coreg discontinued due to hypotension     Atrial fibrillation   - Coreg discontinued due to hypotension   - Resume eliquis     UTI   - Cultures positive for enterococcus faecium and klebsiella   - Completed treatment with rocephin     Thrombocytopenia   - Noted in Oct and continuing this admission   - Monitor with eliquis     ESRD   - Neph following. Currently on dialysis MWF outpatient. States they have decided to continue dialysis   - Continue lokelma, sevelamer     Fall with multiple compression fractures   - PRN pain control     DM   - SSI; adjust PRN     Hypothyroid   - Continue synthroid     Glaucoma     Addendum:   Patient with persistent hypotension following dialysis. Not improved with fluids, albumin or midodrine. Reviewed GOC with patient and daughter. Patient with multiple co-morbidities and currently not tolerating dialysis. She was just in the ICU for hypotension. Daughter wishes to pursue comfort. Hospice was consulted and patient to be transitioned to inpatient hospice tomorrow. Will change orders to comfort measures.     Expected Discharge Location and Transportation: rehab   Expected Discharge Date: 12/5    DVT prophylaxis:  Medical and mechanical DVT prophylaxis orders are present.     AM-PAC 6 Clicks Score (PT): 8 (11/28/22 4727)    CODE STATUS:   Code Status and Medical Interventions:    Ordered at: 12/01/22 0902     Medical Intervention Limits:    NO intubation (DNI)     Level Of Support Discussed With:    Patient     Code Status (Patient has no pulse and is not breathing):    No CPR (Do Not Attempt to Resuscitate)     Medical Interventions (Patient has pulse or is breathing):    Limited Support       Shea Smith DO  12/03/22

## 2022-12-03 NOTE — PROGRESS NOTES
Continued Stay Note  [facility]     Patient Name: Karla Cristobal  MRN: [id number]  Today's Date: 12/3/2022    Admit Date: 11/26/2022    Plan: Rehab and dialysis   Discharge Plan     Row Name 12/03/22 6778       Plan    Plan Comments Consult with family in [place] after seeing patient. Patient with moaning, denies pain, daughter relays that this is baseline.  Noted has been requiring iv dilaudid for comfort/symptom palliation.  Plan to admit to inpatient hospice 12-4-2022 1300. Should assistance be required please contact [phone].               Discharge Codes    No documentation.               Expected Discharge Date and Time     Expected Discharge Date Expected Discharge Time    Dec 8, 2022             Mireya Patrick RN

## 2022-12-04 PROBLEM — N18.6 END STAGE RENAL DISEASE (HCC): Status: ACTIVE | Noted: 2022-01-01

## 2022-12-04 NOTE — PROGRESS NOTES
" LOS: 7 days   Patient Care Team:  Giselle Guzman DO as PCP - General (Internal Medicine)  Terrence Byrnes PA as Physician Assistant (Cardiology)    Chief Complaint: F/U ESRD.   Fall    Subjective     .     ROS: UNOBTAINABLE     Objective     Vital Sign Min/Max for last 24 hours  Temp  Min: 97.8 °F (36.6 °C)  Max: 98.6 °F (37 °C)   BP  Min: 64/27  Max: 86/29   Pulse  Min: 73  Max: 80   Resp  Min: 12  Max: 20   SpO2  Min: 91 %  Max: 100 %   Flow (L/min)  Min: 1  Max: 2   No data recorded     Flowsheet Rows    Flowsheet Row First Filed Value   Admission Height 162.6 cm (64\") Documented at 11/26/2022 1336   Admission Weight 76.2 kg (168 lb) Documented at 11/26/2022 1336          No intake/output data recorded.  I/O last 3 completed shifts:  In: 690 [P.O.:240; I.V.:250; IV Piggyback:200]  Out: 390 [Other:390]    Objective:  General Appearance:  Ill-appearing and uncomfortable (TUNNELED HD CATH).    Vital signs: (most recent): Blood pressure (!) 86/29, pulse 73, temperature 97.8 °F (36.6 °C), temperature source Axillary, resp. rate 12, height 162.6 cm (64\"), weight 77.9 kg (171 lb 11.8 oz), SpO2 100 %, not currently breastfeeding.  Vital signs are normal.    Output: Minimal urine output.    Lungs:  Normal effort and normal respiratory rate.  Breath sounds clear to auscultation.  She is not in respiratory distress.  No decreased breath sounds.    Heart: Normal rate.  Regular rhythm.  S1 normal and S2 normal.    Abdomen: Abdomen is soft.  Bowel sounds are normal.   There is no abdominal tenderness.     Extremities: Normal range of motion.  There is dependent edema.  There is no local swelling.  (TRACE)  Pulses: Distal pulses are intact.    Neurological: (UNRESPONSIVE).    Pupils:  Pupils are equal, round, and reactive to light.              Results Review:     I reviewed the patient's new clinical results.    WBC WBC   Date Value Ref Range Status   12/03/2022 5.90 3.40 - 10.80 10*3/mm3 Final   12/02/2022 5.43 3.40 " - 10.80 10*3/mm3 Final      HGB Hemoglobin   Date Value Ref Range Status   12/03/2022 13.8 12.0 - 15.9 g/dL Final   12/02/2022 12.8 12.0 - 15.9 g/dL Final      HCT Hematocrit   Date Value Ref Range Status   12/03/2022 42.1 34.0 - 46.6 % Final   12/02/2022 38.2 34.0 - 46.6 % Final      Platlets No results found for: LABPLAT   MCV MCV   Date Value Ref Range Status   12/03/2022 97.7 (H) 79.0 - 97.0 fL Final   12/02/2022 96.0 79.0 - 97.0 fL Final          Sodium Sodium   Date Value Ref Range Status   12/03/2022 131 (L) 136 - 145 mmol/L Final   12/02/2022 135 (L) 136 - 145 mmol/L Final      Potassium Potassium   Date Value Ref Range Status   12/03/2022 4.1 3.5 - 5.2 mmol/L Final     Comment:     Slight hemolysis detected by analyzer. Results may be affected.   12/02/2022 3.9 3.5 - 5.2 mmol/L Final     Comment:     Slight hemolysis detected by analyzer. Results may be affected.      Chloride Chloride   Date Value Ref Range Status   12/03/2022 91 (L) 98 - 107 mmol/L Final   12/02/2022 95 (L) 98 - 107 mmol/L Final      CO2 CO2   Date Value Ref Range Status   12/03/2022 19.0 (L) 22.0 - 29.0 mmol/L Final   12/02/2022 24.0 22.0 - 29.0 mmol/L Final      BUN BUN   Date Value Ref Range Status   12/03/2022 37 (H) 8 - 23 mg/dL Final   12/02/2022 25 (H) 8 - 23 mg/dL Final      Creatinine Creatinine   Date Value Ref Range Status   12/03/2022 4.50 (H) 0.57 - 1.00 mg/dL Final   12/02/2022 3.73 (H) 0.57 - 1.00 mg/dL Final      Calcium Calcium   Date Value Ref Range Status   12/03/2022 8.6 8.6 - 10.5 mg/dL Final   12/02/2022 8.3 (L) 8.6 - 10.5 mg/dL Final      PO4 No results found for: CAPO4   Albumin No results found for: ALBUMIN   Magnesium Magnesium   Date Value Ref Range Status   12/02/2022 2.1 1.6 - 2.4 mg/dL Final      Uric Acid No results found for: URICACID     Medication Review: Yes    Assessment & Plan       Hypotension    Chronic atrial fibrillation    Hypothyroidism    AFTAB    CHF (NYHA class IV, ACC/AHA stage D)    CAD s/p  stents    T2DM    Multiple spinal compression fractures    ESRD on HD    Thrombocytopenia      Assessment & Plan      ESRD:  On HD per MWF miranda. Started on HD in Aug 2022. Still make some urine.     Anemia: BENY on HD days.     Acid base and Elytes:  Hyperkalemia on admission.     Volume status :  Stable. AWG 3 kg on outpatient records.    Fall: Compression fracture on this admission.    HYPOTENSION. NOT ABLE TO PULL ANY FLUID ON DIALYSIS TODAY 12/3/22.      Recs  FAMILY DECIDED STOPPING DIALYSIS SUPPORT AND GOING TO HOSPICE TODAY. WILL SIGN OFF.     Renal Diet   Resume home meds     Terrence Greenfield MD  12/04/22  13:54 EST

## 2022-12-04 NOTE — PROGRESS NOTES
Continued Stay Note  [facility]     Patient Name: Karla Cristobal  MRN: [id number]  Today's Date: 12/4/2022    Admit Date: 12/4/2022    Plan: Inpatient hospice   Discharge Plan     Row Name 12/04/22 1420       Plan    Plan Inpatient hospice    Plan Comments Admitted to inpatient hospice services this day.  Dr. Smith aware of admission. Patient requires inpatient hospice services due to requirements of injectable medications necessary for palliation of symptoms requiring frequent skilled nursing assessment and interventions.  Her current level of care is unable to be provided in an alternate setting. Disposition dependent on trajectory of illness and survival of this hospitalization.    Final Discharge Disposition Code 51 - hospice medical facility               Discharge Codes    No documentation.                     Mireya Patrick RN

## 2022-12-04 NOTE — PLAN OF CARE
Goal Outcome Evaluation:   Patient admitted to inpatient hospice today.  Patient is able to answer simple yes or no questions.  Beyond that patient unable.  PRN pain meds given.  Pt currently on 2L NC.  Family at bedside.  Huang not put in place as both daughter and daughter in law declined the order.  Pt is a former dialysis patient and does not produce a lot if any urine states the family.

## 2022-12-04 NOTE — DISCHARGE SUMMARY
[facility] Medicine Services  DISCHARGE TO INPATIENT HOSPICE    Patient Name: Karla Cristobal  : 1935  MRN: [id number]    Date of Admission: 2022  Date of Discharge:  22  Primary Care Physician: Giselle Guzman DO    Consults     Date and Time Order Name Status Description    2022 12:33 AM Inpatient Palliative Care MD Consult Completed     2022 12:38 AM Inpatient Nephrology Consult          Hospital Course     Presenting Problem:   Compression fracture of L1 vertebra, initial encounter ([facility]) [S32.010A]    Active Hospital Problems    Diagnosis  POA   • **Hypotension [I95.9]  Yes   • Thrombocytopenia [D69.6]  Yes   • Multiple spinal compression fractures [M48.50XA]  Yes   • ESRD on HD [N18.6]  Yes   • T2DM [E11.9, Z79.4]  Not Applicable   • CAD s/p stents [I25.10]  Yes   • Chronic atrial fibrillation [I48.20]  Yes   • Hypothyroidism [E03.9]  Yes   • CHF (NYHA class IV, ACC/AHA stage D) [I50.84]  Yes   • 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          Hospital Course:  Karla Cristobal is a 87 y.o. female with history of AFTAB, nocturnal O2 use, DM, afib on eliquis, history of remove DVT/PE, ESRD MWF with multiple AV fistula revisions, CAD s/p CABG, systolic heart failure/ICM with EF 35% s/p BiV ICD and hypothyroid who was admitted on  to medicine for fall and multiple spinal compression fractures. She was also noted to have a UTI with GNR and started on merrem.  she developed hypotension and was transferred to the ICU. She required levophed for BP management. Palliative care was consulted and she was made a DNR/DNI on . Transferred to medicine service .     Hypotension   - BP medication stopped  - Continued midodrine 10 mg TID.   - Patient with hypotension during dialysis 12/3 and persistently low following. Continued to be an issue for patient. Discussed GOC with daughter and patient. Decision was  made not to go back to the ICU. She was given fluids, albumin and midodrine with no improvement. Hospice was consulted and decision was made to transition to inpatient hospice. Medications were transitioned to comfort.     CAD   Valvular heart disease   Chronic systolic heart failure  - Continued statin. Coreg discontinued due to hypotension     Atrial fibrillation   - Coreg discontinued due to hypotension   - Resume eliquis     UTI   - Cultures positive for enterococcus faecium and klebsiella   - Completed treatment with rocephin     Thrombocytopenia   - Noted in Oct and continuing this admission   - Monitor with eliquis     ESRD   - Neph consulted. Unable to tolerate dialysis   - Continude lokelma, sevelamer     Fall with multiple compression fractures   - PRN pain control     DM   - SSI; adjust PRN     Hypothyroid   - Continued synthroid     Glaucoma       Day of Discharge     HPI:   Remained unchanged this morning. Appears uncomfortable. Denies pain and SOA.     ROS:  Difficult to obtain    Vital Signs:   Temp:  [97.8 °F (36.6 °C)-98.6 °F (37 °C)] 97.8 °F (36.6 °C)  Heart Rate:  [57-85] 73  Resp:  [12-20] 12  BP: (52-86)/(21-49) 86/29     Physical Exam:  Constitutional: chronically ill appearing; appears uncomfortable and restless   HENT: NCAT, mucous membranes moist  Respiratory: poor effort; diminished   Cardiovascular: RRR, no murmurs, rubs, or gallops  Gastrointestinal: Positive bowel sounds, soft, nontender, nondistended  Musculoskeletal: No bilateral ankle edema; fistula right upper arm   Psychiatric: Appropriate affect, cooperative  Neurologic: Oriented x 3, strength symmetric in all extremities, Cranial Nerves grossly intact to confrontation, speech clear  Skin: No rashes; ecchymosis hands/arms     Discharge Details     Discharge Disposition:  Transfer care to inpatient Hospice at [facility]    Time Spent on Discharge:  35 minutes    Shea Smith DO  12/04/22

## 2022-12-05 NOTE — PROGRESS NOTES
Continued Stay Note  [facility]     Patient Name: Karla Cristobal  MRN: [id number]  Today's Date: 12/5/2022    Admit Date: 12/4/2022    Plan: Inpatient hospice   Discharge Plan     Row Name 12/05/22 0948       Plan    Plan Inpatient hospice    Plan Comments Visit to bedside. Patient lying in hospital bed. 20% pps.  Grimace, moaning, tense body posture. Relays yes to hurting. Relays all over.  Omer RN medicated with prn dilaudid.  Patient relays yes to thirst and took sips applejuice without difficulty. Continues to require inpatient hospice criteria due to necessity of injectable medications for palliation of symptoms.  Ongoing medication titration necessary for palliation of symptoms.  Current level of care is unable to be provided in an alternate setting. Reassured patient that she is safe and cared for.               Discharge Codes    No documentation.                     Mireya Patrick RN

## 2022-12-05 NOTE — PLAN OF CARE
Goal Outcome Evaluation:Hospice care patient with comfort as main goal.  She has visited with family this afternoon. Only one episode of break-thru pain, treated as ordered.

## 2022-12-05 NOTE — PLAN OF CARE
Problem: Skin Injury Risk Increased  Goal: Skin Health and Integrity  Intervention: Optimize Skin Protection  Recent Flowsheet Documentation  Taken 12/5/2022 0200 by Laney Espinosa RN  Pressure Reduction Techniques: frequent weight shift encouraged  Pressure Reduction Devices: positioning supports utilized  Skin Protection: adhesive use limited  Taken 12/4/2022 2227 by Laney Espinosa RN  Pressure Reduction Techniques: frequent weight shift encouraged  Pressure Reduction Devices: positioning supports utilized  Skin Protection:   adhesive use limited   tubing/devices free from skin contact  Taken 12/4/2022 2000 by Laney Espinosa RN  Pressure Reduction Techniques: frequent weight shift encouraged  Pressure Reduction Devices: positioning supports utilized  Skin Protection:   adhesive use limited   tubing/devices free from skin contact   Goal Outcome Evaluation:   Pt has been intermittently restless at times and scratching at skin (PRN Benadryl given per family request). Turned after pain medication administrations. Will CTM and provide care.

## 2022-12-05 NOTE — INTERVAL H&P NOTE
Admitted to inpatient Hospice on 12/4 for full comfort focused EOL care.   Terminal diagnosis  ESRD. Prognosis days to weeks.  Requires GIP level of care due to need for frequent skilled nursing assessments and intervention to promote comfort. Currently ordered injection medication.        Chart reviewed.   Patient seen and examined.    Orders placed.   Discussed with Hospice IDG and [facility] staff.  Full F2F visit details and Hospice H&P documented in Hospice EMR, [facility].

## 2022-12-06 NOTE — PLAN OF CARE
Goal Outcome Evaluation:Her symptoms are managed with the addition of scheduled lorazepam.  Continue to monitor for changes.

## 2022-12-06 NOTE — PROGRESS NOTES
Continued Stay Note  [facility]     Patient Name: Karla Cristobal  MRN: [id number]  Today's Date: 12/6/2022    Admit Date: 12/4/2022    Plan: inpatient hospice   Discharge Plan     Row Name 12/06/22 1529       Plan    Plan inpatient hospice    Plan Comments Patient is an 87 year old white female admitted with End Stage Renal Failure. PPS 20%. Patient moans with movement, but quickly returns to baseline. Continued inpatient hospice care required due to necessity of injectable medications for palliation of symptoms. Medication titration necessary for symptom management. Current level of care is unable to be provided in an alternate setting.               Discharge Codes    No documentation.               Expected Discharge Date and Time     Expected Discharge Date Expected Discharge Time    Dec 4, 2022             Minoo Victor

## 2022-12-07 NOTE — PROGRESS NOTES
Continued Stay Note  [facility]     Patient Name: Karla Cristobal  MRN: [id number]  Today's Date: 12/7/2022    Admit Date: 12/4/2022    Plan: IPU assessment   Discharge Plan     Row Name 12/07/22 1759       Plan    Plan IPU assessment    Plan Comments   12/7 PPs 10 % Pt resting comfortably on my visit. Face, jaw, and body relaxed and breathing even and unlabored. Pt is slightly congested and scop patch applied and robinul given.  PRN Robinul 0.4 mg x 1, Ativan 0.5 mg x 2/ 24 hr given for dyspnea and anxiety. No family at bedside. CHANGES: Ativan 0.5mg q4h ATC, suppository scheduled x3 days                   Discharge Codes    No documentation.               Expected Discharge Date and Time     Expected Discharge Date Expected Discharge Time    Dec 4, 2022             Micaela Hood RN

## 2022-12-07 NOTE — PROGRESS NOTES
Hospice Progress Note:       Chart reviewed. Clinical status discussed with floor RN.    Patient seen and examined. No family at bedside.  Orders adjusted as needed.   Hospice IDG and [facility] staff updated with plan of care.       *Full visit details documented in Hospice EMR, [facility].     Collette Esteves, APRN

## 2022-12-07 NOTE — PLAN OF CARE
Goal Outcome Evaluation:    88 yo F on day 4 of this hospice stay. Pt appears to be resting comfortably with no nonverbal indicators present. No PRN medications have been needed this shift. Oral care and frequent weight shifts have occurred with no response noted. Continue POC and report as needed.

## 2022-12-07 NOTE — PROGRESS NOTES
Hospice Progress Note:         Chart reviewed. Clinical status discussed with floor RN.    Patient seen and examined with daughter present.   Orders adjusted as needed.   Hospice IDG and [facility] staff updated with plan of care.       *Full visit details documented in Hospice EMR, [facility].       Stacy Santacruz MD  [facility] Navigators  Hospice and Palliative Care Physician  12/06/22

## 2022-12-07 NOTE — PLAN OF CARE
Goal Outcome Evaluation:         Pt appears to have rested comfortably with some restlessness requiring PRN ativan. She was also given Robinul for congestion. Turned after scheduled medication administrations. 2L 02 NC applied per family request. Will CTm and provide care.         Patient/Caregiver provided printed discharge information.

## 2022-12-08 NOTE — PLAN OF CARE
Goal Outcome Evaluation:      Patient hospice. Rested well overnight, appeared to be comfortable with scheduled pain medications. Slightly turned/repositioned throughout the night for increased comfort and to limit further skin breakdown. Will continue to monitor.

## 2022-12-08 NOTE — PLAN OF CARE
Goal Outcome Evaluation:      Comfortable on meds as scheduled. Biscody supp given. Robinul given x1. Family at bedside today very supportive.

## 2022-12-08 NOTE — PROGRESS NOTES
Continued Stay Note  [facility]     Patient Name: Karla Cristobal  MRN: [id number]  Today's Date: 12/8/2022    Admit Date: 12/4/2022    Plan: IPU assessment   Discharge Plan     Row Name 12/08/22 1517       Plan    Plan IPU assessment    Plan Comments   12/8 PPS 10 % Pt nonresponsive but very peaceful. Dtr at bedside and comfort given. Bilateral toes mottling. No urine output, dulcolax to be given last BM 12/1. PRN Dulcolx 10 mg x 1, robinul 0.1mg x 1/ 24 24 hr given for secretions and bowel regiment                     Discharge Codes    No documentation.               Expected Discharge Date and Time     Expected Discharge Date Expected Discharge Time    Dec 4, 2022             Micaela Hood RN

## 2022-12-09 NOTE — PLAN OF CARE
Goal Outcome Evaluation:  87 year old white female admitted to Hospice on 12/4/2022, she is on day 5 of this hospitalization.  She has a PICC in and patent.  She has an A-V fistula not accessed.  Have administered scheduled medication as indicated.  Will continue to monitor patient throughout the rest of this shift.

## 2022-12-09 NOTE — SIGNIFICANT NOTE
Exam confirms with auscultation zero audible heart tones and zero audible respirations. Ms.Lois LINO Cristobal was pronounced dead at 0958.  MD notified by Patient's RN.    Arlene Bonilla RN  Clinical House Supervisor  12/9/2022 10:16 EST

## 2022-12-12 NOTE — DISCHARGE SUMMARY
Date of Admission: 12/04/2022   Date and Time of Death: 12/9/2022 at 9:58 AM    Hospital Course:  Karla Cristobal is a 87 y.o. female admitted to inpatient hospice with diagnosis of End stage renal disease (HCC) [N18.6]  for symptom management. Medications were available throughout admission for symptom management and comfort. Patient continued to decline after admission as expected ultimately to their death on 12/9/2022 at 9:58 AM. Patient was pronounced dead by Clinical House Supervisor. Spiritual and psychosocial support were available to patient and family throughout admission. Patient's remains were released per [facility] protocol.      Stacy Santacruz MD  [facility] Navigators  Hospice and Palliative Care Physician

## 2022-12-27 NOTE — CASE MANAGEMENT/SOCIAL WORK
Continued Stay Note  [facility]     Patient Name: Karla Cristobal  MRN: [id number]  Today's Date: 8/9/2022    Admit Date: 7/28/2022     Discharge Plan     Row Name 08/09/22 1252       Plan    Plan Update    Plan Comments I faxed admission packet to [facility] for chair time for OP HD @ [phone]. I ordered a hepatitis panel too that needs to be faxed once done. CM working. Rebekah    Final Discharge Disposition Code 01 - home or self-care               Discharge Codes    No documentation.                     Qiana Araujo RN     Initiate Treatment: Mupirocin Render In Strict Bullet Format?: No Detail Level: Zone

## 2023-05-26 NOTE — ASSESSMENT & PLAN NOTE
Blood sugar and 90 day average sugar reviewed  Results for orders placed or performed in visit on 10/20/22   POC Glycosylated Hemoglobin (Hb A1C)    Specimen: Blood   Result Value Ref Range    Hemoglobin A1C 5.9 %    Lot Number [id number]     Expiration Date 06/14/2024    POC Glucose, Blood    Specimen: Blood   Result Value Ref Range    Glucose 208 (A) 70 - 130 mg/dL    Lot Number [id number]     Expiration Date 03/29/2023      ckd affecting a1c  High current sugar  Multiple small abrasions bilateral legs with decreased peripheral pulses but good capillary refill  Has bactroban at home- discussed twice daily use on open sores with her daughter  Update eye exam  ckd known   F/u 3-4 months   Avoid low sugar (less than 80), treatment reviewed   
See above - continue statin therapy   CAD, PVD   
See above - using low dose coreg   In wheelchair today   
With orthostatic hypotension using proamatine prn   bp today good  Continue lipitor 20 mg daily   
tsh 1.9 11/21  Update with nov   
operating room

## 2023-08-08 NOTE — ED PROVIDER NOTES
Detail Level: Detailed Subjective   Pt is a 85 yo female presenting to ED with complaints of left leg pain. PMHx significant for Afib (Eliquis), CAD, HTN, HLD, CKD, CHF, DM (insulin), PE/DVT, Endometrial cancer s/p hysterectomy, Hypothyroidism, MRSA, AFTAB, Macular Degeneration and Skin cancer. Pt reports about a week ago was getting out of bed and fell bumping her left lower leg on she believes her walker. She has pain, swelling and redness to left lower leg. She denies break in skin. She denies weakness or numbness to LE. She has not been evaluated for these complaints. She denies fever, chills, CP, SOB, cough, N/V/D or abdominal pain.       History provided by:  Patient and medical records      Review of Systems   Constitutional: Negative for chills and fever.   HENT: Negative for congestion.    Eyes: Negative for visual disturbance.   Respiratory: Negative for cough and shortness of breath.    Cardiovascular: Positive for leg swelling (left lower leg). Negative for chest pain.   Gastrointestinal: Negative for abdominal pain, diarrhea, nausea and vomiting.   Genitourinary: Negative for difficulty urinating.   Musculoskeletal: Positive for myalgias (left lower leg).   Skin:        Left lower leg redness     Neurological: Negative for dizziness, weakness, numbness and headaches.   Psychiatric/Behavioral: Negative for confusion.   All other systems reviewed and are negative.      Past Medical History:   Diagnosis Date   • Acute bronchitis    • Arthritis    • Atrial fibrillation (HCC)    • CAD (coronary artery disease)     s/p MI 2005; 3 stents inserted    • Change in mole    • Change in mole    • Change in nevus 6/16/2016   • Chronic kidney disease     stage IV-sees Dr Bk Mckeon every 3-4 months    • Chronic renal disease, stage 4, severely decreased glomerular filtration rate between 15-29 mL/min/1.73 square meter (HCC)    • Chronic systolic heart failure (HCC)    • Congestive heart disease (HCC)    • Conjunctivitis    • Deep vein  thrombosis of lower extremity (HCC)    • Diabetes mellitus ([facility])    • Diabetes mellitus ([facility]) 6/16/2016    Impression: 03/15/2016 - blood sugar 166 and hgn a1c 7.3%  is up to date with eye exam  neuropathy with callus, no ulcer  some scratches feet  ur alb due and ordered  no change other than provided samples of toujeo because she feels like lantus worked much better than levemir, she has tried titrating levemir and it is less effective  continue testing and f/u 3-4 months  Impression: 11/23/2015 - bl   • Diabetic foot ulcer (HCC) 6/16/2016   • Dog bite of hand    • Easy bruising    • Endometrial cancer ([facility])     partial hysterectomy; radiation as well    • Foot pain    • Foot pain 6/16/2016    Description: lancinating- worse but not consistent enough to want rx   • Fracture of hip ([facility])    • Generalized ischemic myocardial dysfunction 6/16/2016   • Glaucoma     drops daily    • Hyperlipidemia    • Hypertension    • Hypothyroidism    • Insomnia    • Ischemic heart disease    • Macular degeneration    • Methicillin resistant Staphylococcus aureus infection 6/16/2016   • Myocardial infarction ([facility]) 2005   • Nausea with vomiting    • Pericardial effusion    • Shortness of breath 6/16/2016    Impression: 03/24/2015 - chronic. On 2 l oxygen at night. check cbc, bnp;    • Skin cancer, basal cell     nose, leg    • Skin lesion 6/16/2016    Impression: 03/24/2015 - refer derm for eval- seb kerat ant tib and ?ak medially with redness and irritation;    • Sleep apnea     oxygen at night due to low sats but no cpap   • Type 2 diabetes mellitus ([facility])    • UTI (urinary tract infection) 06/12/2020    currently taking antibiotics-patient's daughter notified Dr Beal's office and office said it should not delay generator change        Allergies   Allergen Reactions   • Bactrim [Sulfamethoxazole-Trimethoprim] GI Intolerance   • Lisinopril Cough   • Codeine Rash       Past Surgical History:   Procedure Laterality Date   •  CARDIAC CATHETERIZATION     • CARDIAC DEFIBRILLATOR PLACEMENT     • CARDIAC ELECTROPHYSIOLOGY PROCEDURE N/A 7/17/2020    Procedure: ICD BIV  generator change- SJM- 3-6 weeks;  Surgeon: Tano Beal MD;  Location: [facility] INVASIVE LOCATION;  Service: Cardiology;  Laterality: N/A;   • CHOLECYSTECTOMY     • CORONARY ARTERY BYPASS GRAFT  2000   • CORONARY STENT PLACEMENT      3 stents    • EYE SURGERY Bilateral     cataract extraction    • HIP SURGERY Left     x3   • HYSTERECTOMY      partial    • KNEE ARTHROPLASTY Bilateral    • PACEMAKER IMPLANTATION     • TONSILLECTOMY         Family History   Problem Relation Age of Onset   • Breast cancer Other    • Diabetes Other    • Esophageal cancer Other    • Hypertension Other    • Transient ischemic attack Other    • Breast cancer Mother    • Cancer Father        Social History     Socioeconomic History   • Marital status:    Tobacco Use   • Smoking status: Never Smoker   • Smokeless tobacco: Never Used   Vaping Use   • Vaping Use: Former   Substance and Sexual Activity   • Alcohol use: No   • Drug use: No   • Sexual activity: Defer           Objective   Physical Exam  Vitals and nursing note reviewed.   Constitutional:       Appearance: She is well-developed.   HENT:      Head: Atraumatic.      Nose: Nose normal.   Eyes:      General: Lids are normal.      Conjunctiva/sclera: Conjunctivae normal.      Pupils: Pupils are equal, round, and reactive to light.   Cardiovascular:      Rate and Rhythm: Normal rate and regular rhythm.      Heart sounds: Normal heart sounds.   Pulmonary:      Effort: Pulmonary effort is normal.      Breath sounds: Normal breath sounds. No wheezing.   Abdominal:      General: There is no distension.      Palpations: Abdomen is soft.      Tenderness: There is no abdominal tenderness. There is no guarding or rebound.   Musculoskeletal:         General: Normal range of motion.      Cervical back: Normal range of motion and neck supple.       Right lower leg: Edema present.      Left lower leg: Swelling and tenderness present.        Legs:       Comments: Left lower leg with TTP, warmth, edema and erythema consistent with cellulitis. No obvious break in skin. +2 pedal pulse.    Skin:     General: Skin is warm and dry.      Findings: No erythema or rash.   Neurological:      Mental Status: She is alert and oriented to person, place, and time.      Sensory: No sensory deficit.   Psychiatric:         Mood and Affect: Mood normal.         Speech: Speech normal.         Behavior: Behavior normal.         Procedures           ED Course  ED Course as of 11/15/21 2121   Mon Nov 15, 2021   2002 proBNP(!): 4,108.0 [RT]   2113 Creatinine(!): 2.35 [RT]      ED Course User Index  [RT] Nallely Munguia PA      Discussed results and tx plan with patient who is agreeable.     Discussed patient with Dr. Baez. Agreeable with plan for admission. Will initiate Daptomycin and Zosyn for left leg cellulitis. Doppler ordered in ED but unavailable at this time. Patient having difficulty getting around at home due to the pain in her leg. She has multiple co morbidities.     Reviewed old records.     Discussed admission with hospitalist Dr. Lee     Recent Results (from the past 24 hour(s))   BNP    Collection Time: 11/15/21  7:26 PM    Specimen: Blood   Result Value Ref Range    proBNP 4,108.0 (H) 0.0-1,800.0 pg/mL   CBC Auto Differential    Collection Time: 11/15/21  7:26 PM    Specimen: Blood   Result Value Ref Range    WBC 5.70 3.40 - 10.80 10*3/mm3    RBC 4.56 3.77 - 5.28 10*6/mm3    Hemoglobin 13.3 12.0 - 15.9 g/dL    Hematocrit 41.8 34.0 - 46.6 %    MCV 91.7 79.0 - 97.0 fL    MCH 29.2 26.6 - 33.0 pg    MCHC 31.8 31.5 - 35.7 g/dL    RDW 15.2 12.3 - 15.4 %    RDW-SD 51.3 37.0 - 54.0 fl    MPV 12.4 (H) 6.0 - 12.0 fL    Platelets 133 (L) 140 - 450 10*3/mm3    Neutrophil % 55.5 42.7 - 76.0 %    Lymphocyte % 30.9 19.6 - 45.3 %    Monocyte % 9.5 5.0 - 12.0 %    Eosinophil %  "3.0 0.3 - 6.2 %    Basophil % 0.9 0.0 - 1.5 %    Immature Grans % 0.2 0.0 - 0.5 %    Neutrophils, Absolute 3.17 1.70 - 7.00 10*3/mm3    Lymphocytes, Absolute 1.76 0.70 - 3.10 10*3/mm3    Monocytes, Absolute 0.54 0.10 - 0.90 10*3/mm3    Eosinophils, Absolute 0.17 0.00 - 0.40 10*3/mm3    Basophils, Absolute 0.05 0.00 - 0.20 10*3/mm3    Immature Grans, Absolute 0.01 0.00 - 0.05 10*3/mm3    nRBC 0.0 0.0 - 0.2 /100 WBC   Comprehensive Metabolic Panel    Collection Time: 11/15/21  8:28 PM    Specimen: Blood   Result Value Ref Range    Glucose 111 (H) 65 - 99 mg/dL    BUN 66 (H) 8 - 23 mg/dL    Creatinine 2.35 (H) 0.57 - 1.00 mg/dL    Sodium 144 136 - 145 mmol/L    Potassium 4.1 3.5 - 5.2 mmol/L    Chloride 105 98 - 107 mmol/L    CO2 28.0 22.0 - 29.0 mmol/L    Calcium 9.0 8.6 - 10.5 mg/dL    Total Protein 6.9 6.0 - 8.5 g/dL    Albumin 3.30 (L) 3.50 - 5.20 g/dL    ALT (SGPT) 8 1 - 33 U/L    AST (SGOT) 15 1 - 32 U/L    Alkaline Phosphatase 236 (H) 39 - 117 U/L    Total Bilirubin 1.1 0.0 - 1.2 mg/dL    eGFR Non African Amer 20 (L) >60 mL/min/1.73    Globulin 3.6 gm/dL    A/G Ratio 0.9 g/dL    BUN/Creatinine Ratio 28.1 (H) 7.0 - 25.0    Anion Gap 11.0 5.0 - 15.0 mmol/L     Note: In addition to lab results from this visit, the labs listed above may include labs taken at another facility or during a different encounter within the last 24 hours. Please correlate lab times with ED admission and discharge times for further clarification of the services performed during this visit.    XR Tibia Fibula 2 View Left   Final Result   No acute fracture or subluxation.      Signer Name: Angelique Kelley MD    Signed: 11/15/2021 8:23 PM    Workstation Name: [id number]     [facility]        Vitals:    11/15/21 1832   BP: 130/67   BP Location: Left arm   Patient Position: Sitting   Pulse: 72   Resp: 18   Temp: 97.1 °F (36.2 °C)   SpO2: 95%   Weight: 86.6 kg (191 lb)   Height: 162.6 cm (64\")     Medications - No data to " display  ECG/EMG Results (last 24 hours)     ** No results found for the last 24 hours. **        No orders to display                                            MDM    Final diagnoses:   Left leg cellulitis   History of insulin dependent diabetes mellitus   Chronic kidney disease, unspecified CKD stage   Chronic a-fib (HCC)   Anticoagulated       ED Disposition  ED Disposition     ED Disposition Condition Comment    Decision to Admit            No follow-up provider specified.       Medication List      No changes were made to your prescriptions during this visit.          Nallely Munguia PA  11/15/21 8492

## (undated) DEVICE — ST ACC MICROPUNCTURE .018 TRANSLSS/SS/TP 5F/10CM 21G/7CM

## (undated) DEVICE — LUBE GEL ENDOGLIDE 1.1OZ

## (undated) DEVICE — LIMB HOLDER, WRIST/ANKLE: Brand: DEROYAL

## (undated) DEVICE — SOL IRR H2O BTL 1000ML STRL

## (undated) DEVICE — IRRIGATOR BULB ASEPTO 60CC STRL

## (undated) DEVICE — MEDI-VAC YANKAUER SUCTION HANDLE W/BULBOUS TIP: Brand: CARDINAL HEALTH

## (undated) DEVICE — GRADUATE CONTN 1000ML

## (undated) DEVICE — DEBRIDEMENT KIT: Brand: MEDLINE INDUSTRIES, INC.

## (undated) DEVICE — RT CHRONIC CATHETER KIT,SYMMETRICAL TIP, REVERSE-TUNNELED,15 FR/CH (5.0 MM) X 19 CM: Brand: PALINDROME

## (undated) DEVICE — SPNG ENDO BEDSIDE TUB ENZYM

## (undated) DEVICE — KT ORCA ORCAPOD DISP STRL

## (undated) DEVICE — SYR LUERLOK 50ML

## (undated) DEVICE — TUBING, SUCTION, 1/4" X 10', STRAIGHT: Brand: MEDLINE

## (undated) DEVICE — ADULT, W/LG. BACK PAD, RADIOTRANSPARENT ELEMENT AND LEAD WIRE: Brand: DEFIBRILLATION ELECTRODES

## (undated) DEVICE — SET PRIMARY GRVTY 10DP MALE LL 104IN

## (undated) DEVICE — SOL NACL 0.9PCT 1000ML

## (undated) DEVICE — HYBRID CO2 TUBING/CAP SET FOR OLYMPUS® SCOPES & CO2 SOURCE: Brand: ERBE

## (undated) DEVICE — DECANT BG O JET

## (undated) DEVICE — ST EXT IV SMARTSITE 2VLV SP M LL 5ML IV1

## (undated) DEVICE — ST INF PRI SMRTSTE 20DRP 2VLV 24ML 117

## (undated) DEVICE — INTRO ACCSR BLNT TP

## (undated) DEVICE — 3M™ STERI-STRIP™ REINFORCED ADHESIVE SKIN CLOSURES, R1547, 1/2 IN X 4 IN (12 MM X 100 MM), 6 STRIPS/ENVELOPE: Brand: 3M™ STERI-STRIP™

## (undated) DEVICE — CANN NASL CO2 DIVIDED A/

## (undated) DEVICE — CAUTERY TIP POLISHER: Brand: DEVON

## (undated) DEVICE — LEX ELECTRO PHYSIOLOGY: Brand: MEDLINE INDUSTRIES, INC.

## (undated) DEVICE — 3M™ TEGADERM™ CHG DRESSING 25/CARTON 4 CARTONS/CASE 1659: Brand: TEGADERM™

## (undated) DEVICE — SKIN PREP TRAY W/CHG: Brand: MEDLINE INDUSTRIES, INC.

## (undated) DEVICE — THE BITE BLOCK MAXI, LATEX FREE STRAP IS USED TO PROTECT THE ENDOSCOPE INSERTION TUBE FROM BEING BITTEN BY THE PATIENT.

## (undated) DEVICE — LEX CATH LAB MINOR: Brand: MEDLINE INDUSTRIES, INC.

## (undated) DEVICE — CONTN GRAD MEAS TRIANG 32OZ BLK

## (undated) DEVICE — DRAPE,CHEST,FENES,15X10,STERIL: Brand: MEDLINE

## (undated) DEVICE — PENCL E/S HNDSWCH ROCKRBTN HOLSTR 10FT

## (undated) DEVICE — DRSNG SURESITE123 4X4.8IN